# Patient Record
Sex: MALE | Race: WHITE | Employment: OTHER | URBAN - METROPOLITAN AREA
[De-identification: names, ages, dates, MRNs, and addresses within clinical notes are randomized per-mention and may not be internally consistent; named-entity substitution may affect disease eponyms.]

---

## 2017-09-18 PROBLEM — L03.115 CELLULITIS OF FOOT, RIGHT: Status: ACTIVE | Noted: 2017-09-18

## 2017-09-18 PROBLEM — R73.9 HYPERGLYCEMIA: Status: ACTIVE | Noted: 2017-09-18

## 2017-09-18 PROBLEM — G93.41 ACUTE METABOLIC ENCEPHALOPATHY: Status: ACTIVE | Noted: 2017-09-18

## 2017-09-18 PROBLEM — G90.A POTS (POSTURAL ORTHOSTATIC TACHYCARDIA SYNDROME): Status: ACTIVE | Noted: 2017-09-18

## 2017-09-18 PROBLEM — E11.9 DMII (DIABETES MELLITUS, TYPE 2) (HCC): Status: ACTIVE | Noted: 2017-09-18

## 2017-10-09 PROBLEM — R51.9 HEADACHE: Status: ACTIVE | Noted: 2017-10-09

## 2017-12-01 ENCOUNTER — HOSPITAL ENCOUNTER (OUTPATIENT)
Dept: OTHER | Age: 52
Discharge: HOME | End: 2017-12-31
Attending: HOSPITALIST | Admitting: HOSPITALIST

## 2018-06-01 ENCOUNTER — HOSPITAL ENCOUNTER (OUTPATIENT)
Dept: WOUND CARE | Age: 53
Discharge: HOME | End: 2018-06-01
Attending: NURSE PRACTITIONER | Admitting: NURSE PRACTITIONER

## 2018-06-01 VITALS
SYSTOLIC BLOOD PRESSURE: 119 MMHG | WEIGHT: 207.67 LBS | DIASTOLIC BLOOD PRESSURE: 80 MMHG | HEART RATE: 117 BPM | TEMPERATURE: 97.5 F | BODY MASS INDEX: 26.66 KG/M2 | RESPIRATION RATE: 16 BRPM

## 2018-06-01 DIAGNOSIS — L97.511 SKIN ULCER OF BOTH FEET LIMITED TO BREAKDOWN OF SKIN (HCC): ICD-10-CM

## 2018-06-01 DIAGNOSIS — L97.521 SKIN ULCER OF BOTH FEET LIMITED TO BREAKDOWN OF SKIN (HCC): ICD-10-CM

## 2018-06-01 PROCEDURE — 99213 OFFICE O/P EST LOW 20 MIN: CPT | Performed by: NURSE PRACTITIONER

## 2018-06-01 RX ORDER — LIDOCAINE HYDROCHLORIDE 40 MG/ML
SOLUTION TOPICAL PRN
Status: DISCONTINUED | OUTPATIENT
Start: 2018-06-01 | End: 2018-06-02 | Stop reason: HOSPADM

## 2018-06-01 RX ORDER — CITALOPRAM 40 MG/1
40 TABLET ORAL
COMMUNITY
End: 2018-07-20

## 2018-06-01 RX ORDER — ACETAMINOPHEN 160 MG
1 TABLET,DISINTEGRATING ORAL DAILY
COMMUNITY
End: 2018-08-03

## 2018-06-08 ENCOUNTER — HOSPITAL ENCOUNTER (OUTPATIENT)
Dept: WOUND CARE | Age: 53
Discharge: HOME | End: 2018-06-08
Attending: NURSE PRACTITIONER | Admitting: NURSE PRACTITIONER

## 2018-06-08 VITALS
SYSTOLIC BLOOD PRESSURE: 92 MMHG | HEART RATE: 96 BPM | DIASTOLIC BLOOD PRESSURE: 65 MMHG | TEMPERATURE: 97.4 F | RESPIRATION RATE: 16 BRPM

## 2018-06-08 DIAGNOSIS — E11.42 DIABETIC PERIPHERAL NEUROPATHY (HCC): ICD-10-CM

## 2018-06-08 DIAGNOSIS — L97.521 SKIN ULCER OF BOTH FEET LIMITED TO BREAKDOWN OF SKIN (HCC): Primary | ICD-10-CM

## 2018-06-08 DIAGNOSIS — L97.511 SKIN ULCER OF BOTH FEET LIMITED TO BREAKDOWN OF SKIN (HCC): Primary | ICD-10-CM

## 2018-06-08 PROCEDURE — 97597 DBRDMT OPN WND 1ST 20 CM/<: CPT | Performed by: NURSE PRACTITIONER

## 2018-06-08 RX ORDER — CEPHALEXIN 500 MG/1
500 CAPSULE ORAL 3 TIMES DAILY
COMMUNITY
End: 2018-06-26 | Stop reason: ALTCHOICE

## 2018-06-08 RX ORDER — LIDOCAINE HYDROCHLORIDE 40 MG/ML
SOLUTION TOPICAL PRN
Status: DISCONTINUED | OUTPATIENT
Start: 2018-06-08 | End: 2018-06-09 | Stop reason: HOSPADM

## 2018-06-15 ENCOUNTER — HOSPITAL ENCOUNTER (OUTPATIENT)
Dept: WOUND CARE | Age: 53
Discharge: HOME | End: 2018-06-15
Attending: NURSE PRACTITIONER | Admitting: NURSE PRACTITIONER

## 2018-06-15 DIAGNOSIS — L97.521 SKIN ULCER OF LEFT FOOT, LIMITED TO BREAKDOWN OF SKIN (HCC): Primary | ICD-10-CM

## 2018-06-15 PROCEDURE — 97597 DBRDMT OPN WND 1ST 20 CM/<: CPT | Performed by: NURSE PRACTITIONER

## 2018-06-15 RX ORDER — LIDOCAINE HYDROCHLORIDE 40 MG/ML
SOLUTION TOPICAL PRN
Status: DISCONTINUED | OUTPATIENT
Start: 2018-06-15 | End: 2018-06-16 | Stop reason: HOSPADM

## 2018-06-29 ENCOUNTER — HOSPITAL ENCOUNTER (OUTPATIENT)
Dept: WOUND CARE | Age: 53
Discharge: HOME | End: 2018-06-29
Admitting: NURSE PRACTITIONER

## 2018-06-29 VITALS
HEART RATE: 99 BPM | DIASTOLIC BLOOD PRESSURE: 85 MMHG | TEMPERATURE: 97.5 F | SYSTOLIC BLOOD PRESSURE: 142 MMHG | RESPIRATION RATE: 18 BRPM

## 2018-06-29 DIAGNOSIS — L97.521 SKIN ULCER OF LEFT FOOT, LIMITED TO BREAKDOWN OF SKIN (HCC): Primary | ICD-10-CM

## 2018-06-29 DIAGNOSIS — L02.414 ABSCESS OF ARM, LEFT: ICD-10-CM

## 2018-06-29 PROCEDURE — 97597 DBRDMT OPN WND 1ST 20 CM/<: CPT | Performed by: NURSE PRACTITIONER

## 2018-06-29 RX ORDER — LIDOCAINE HYDROCHLORIDE 40 MG/ML
SOLUTION TOPICAL PRN
Status: DISCONTINUED | OUTPATIENT
Start: 2018-06-29 | End: 2018-06-30 | Stop reason: HOSPADM

## 2018-06-29 ASSESSMENT — PAIN DESCRIPTION - ORIENTATION: ORIENTATION: LEFT

## 2018-06-29 ASSESSMENT — PAIN SCALES - GENERAL: PAINLEVEL_OUTOF10: 6

## 2018-06-29 ASSESSMENT — PAIN DESCRIPTION - LOCATION: LOCATION: ARM

## 2018-06-29 ASSESSMENT — PAIN DESCRIPTION - DESCRIPTORS: DESCRIPTORS: ACHING

## 2018-06-29 ASSESSMENT — PAIN DESCRIPTION - FREQUENCY: FREQUENCY: CONTINUOUS

## 2018-06-29 ASSESSMENT — PAIN DESCRIPTION - PAIN TYPE: TYPE: ACUTE PAIN

## 2018-07-01 PROBLEM — L02.414 ABSCESS OF ARM, LEFT: Status: ACTIVE | Noted: 2018-07-01

## 2018-07-13 ENCOUNTER — HOSPITAL ENCOUNTER (OUTPATIENT)
Dept: WOUND CARE | Age: 53
Discharge: HOME | End: 2018-07-13
Attending: NURSE PRACTITIONER | Admitting: NURSE PRACTITIONER

## 2018-07-13 VITALS
SYSTOLIC BLOOD PRESSURE: 148 MMHG | RESPIRATION RATE: 16 BRPM | DIASTOLIC BLOOD PRESSURE: 92 MMHG | HEART RATE: 96 BPM | TEMPERATURE: 97.6 F | WEIGHT: 207.45 LBS | BODY MASS INDEX: 27.37 KG/M2

## 2018-07-13 DIAGNOSIS — L97.522 SKIN ULCER OF LEFT FOOT WITH FAT LAYER EXPOSED (HCC): Primary | ICD-10-CM

## 2018-07-13 DIAGNOSIS — L02.414 ABSCESS OF ARM, LEFT: ICD-10-CM

## 2018-07-13 PROCEDURE — 11042 DBRDMT SUBQ TIS 1ST 20SQCM/<: CPT | Performed by: NURSE PRACTITIONER

## 2018-07-13 RX ORDER — LIDOCAINE 50 MG/G
OINTMENT TOPICAL PRN
Status: DISCONTINUED | OUTPATIENT
Start: 2018-07-13 | End: 2018-07-14 | Stop reason: HOSPADM

## 2018-07-13 ASSESSMENT — PAIN DESCRIPTION - ORIENTATION: ORIENTATION: LEFT

## 2018-07-13 ASSESSMENT — PAIN DESCRIPTION - DESCRIPTORS: DESCRIPTORS: ACHING

## 2018-07-13 ASSESSMENT — PAIN DESCRIPTION - PROGRESSION: CLINICAL_PROGRESSION: NOT CHANGED

## 2018-07-13 ASSESSMENT — PAIN DESCRIPTION - PAIN TYPE: TYPE: ACUTE PAIN

## 2018-07-13 ASSESSMENT — PAIN DESCRIPTION - LOCATION: LOCATION: FOOT

## 2018-07-13 ASSESSMENT — PAIN SCALES - GENERAL: PAINLEVEL_OUTOF10: 3

## 2018-07-13 ASSESSMENT — PAIN DESCRIPTION - FREQUENCY: FREQUENCY: CONTINUOUS

## 2018-07-13 NOTE — PROGRESS NOTES
unspecified type diabetes mellitus without mention of complication, not stated as uncontrolled        PAST SURGICAL HISTORY    Past Surgical History:   Procedure Laterality Date    CARDIAC CATHETERIZATION      FINGER SURGERY Left     Left Thumb    HERNIA REPAIR      VASECTOMY         FAMILY HISTORY    Family History   Problem Relation Age of Onset    High Blood Pressure Mother     Stroke Mother     Heart Disease Mother     COPD Mother     Heart Disease Father     Cancer Father         skin    Diabetes Sister     Cancer Sister     Heart Disease Brother     Diabetes Brother        SOCIAL HISTORY    Social History   Substance Use Topics    Smoking status: Former Smoker     Types: Cigars     Quit date: 9/10/1987    Smokeless tobacco: Never Used    Alcohol use No       ALLERGIES    Allergies   Allergen Reactions    No Known Allergies        MEDICATIONS    Current Outpatient Prescriptions on File Prior to Encounter   Medication Sig Dispense Refill    citalopram (CELEXA) 40 MG tablet Take 40 mg by mouth      Cholecalciferol (VITAMIN D3) 2000 units CAPS Take 1 capsule by mouth daily      ondansetron (ZOFRAN) 4 MG tablet Take 1 tablet by mouth every 8 hours as needed for Nausea or Vomiting 12 tablet 0    simvastatin (ZOCOR) 20 MG tablet Take 1 tablet by mouth nightly 30 tablet 3    insulin glargine (LANTUS) 100 UNIT/ML injection vial Inject 15 Units into the skin nightly 4.5 mL 0    insulin aspart (NOVOLOG FLEXPEN) 100 UNIT/ML injection pen Inject 15 Units into the skin 3 times daily (before meals) 13.5 mL 0    Insulin Pen Needle 32G X 4 MM MISC 1 each by Does not apply route daily 100 each 3     No current facility-administered medications on file prior to encounter. REVIEW OF SYSTEMS    Pertinent items are noted in HPI.     Objective:      BP (!) 148/92   Pulse 96   Temp 97.6 °F (36.4 °C) (Oral)   Resp 16   Wt 207 lb 7.3 oz (94.1 kg)   BMI 27.37 kg/m²     Wt Readings from Last 3 Cleansing:   Do not scrub or use excessive force. Cleanse wound prior to applying a clean dressing with:  [] Normal Saline            [x] Keep Wound Dry in Shower    [] Wound Cleanser   [] Cleanse wound with Mild Soap & Water  [] May Shower at Discharge   [] Other:        Topical Treatments:  Do not apply lotions, creams, or ointments to wound bed unless directed. [] Apply moisturizing lotion to skin surrounding the wound prior to dressing change.  [] Apply antifungal ointment to skin surrounding the wound prior to dressing change.  [] Apply thin film of moisture barrier ointment to skin immediately around wound.   [] Other:       Dressings:                  Wound Location: LEFT PLANTAR FOOT  [x] Apply Primary Dressing:                                                                      [x] Moistened Collagen with Silver     [x] Cover and Secure with:                       [x] Gauze            [x] Nolan  [] Kerlix              [] Ace Wrap       [] Cover Roll Tape         [] ABD                              [x] Other: Oval Podiatry Pad to Off Load Wound              Avoid contact of tape with skin.      [x] Change dressing:       [] Daily              [] Every Other Day        [x] Three times per week              [] Once a week  [] Do Not Change Dressing         [] Other:     Dressings:                  Wound Location: LEFT LATERAL ARM  [x] Apply Primary Dressing:                                                                     [x] Bactroban/Mupirocin       [x] Cover and Secure with:                       [x] Gauze            [x] Nolan  [] Kerlix              [] Ace Wrap       [] Cover Roll Tape         [] ABD                              [] Other:               Avoid contact of tape with skin.      [x] Change dressing:       [x] Daily              [] Every Other Day        [] Three times per week              [] Once a week  [] Do Not Change Dressing         [] Other:     Off-Loading:   [x] Off-loading when        [x] walking           [] in bed [] sitting  [] Total non-weight bearing  [] Right Leg  [] Left Leg          [] Assistive Device         [] Walker            [] Cane   [] Wheelchair      [] Crutches              [] Surgical shoe    [] Podus Boot(s)   [] Foam Boot(s)  [] Roll About              [] Cast Boot       [] CROW Boot  [] Other:     Dietary:  [] Diet as tolerated:        [x] Calorie Diabetic Diet: [] No Added Salt:  [x] Increase Protein:        [] Other:      Activity:  [x] Activity as tolerated:  [] Patient has no activity restrictions     [] Strict Bedrest:            [] Remain off Work:     [] May return to full duty work:                                       [] [facility] with [employer]     Return Appointment:  [] Wound and dressing supply provider:   [] ECF or Home Healthcare:  [] Nurse visit: Onur Hyman or NP scheduled for Nurse Visit:   [x] Return Appointment: Maris Peters CNP in 1 WEEK(S)  [x] Ordered:  WOUND CULTURE FOR LEFT FOOT- PENDING     **PLEASE CONSIDER DIABETIC EDUCATION**     Nurse Case Manger:  Sandrine  Electronically signed by Moises Lam RN on 7/13/2018 at 9:41 AM  [address] [place] [address] Information: Should you experience any significant changes in your wound(s) or have questions about your wound care, please contact the [address] [place] [address] [phone] [facility] - THURSDAY 8:30 am - 4:30 pm and Friday 8:30 am - 1:00 pm.  If you need help with your wound outside these hours and cannot wait until we are again available, contact your PCP or go to the hospital emergency room.      Nurse Signature:_______________________     Date: ___________ Time:  ____________        Discharge Nurse Signature        PLEASE NOTE: IF YOU ARE UNABLE TO OBTAIN WOUND SUPPLIES, CONTINUE TO USE THE SUPPLIES YOU HAVE AVAILABLE UNTIL YOU ARE ABLE TO [facility].  IT IS MOST IMPORTANT TO KEEP THE WOUND COVERED AT ALL TIMES.     Physician Signature:_______________________     Date: ___________ Time:  ____________                    [] Dr Michael Hammond    [] Dr Jean Marie Lima CNP              [] Dr Clifton Ware  [] Dr Alfred Mccarthy   [] Dr Patti Fall                [] Dr Liana Amezquita   [x] Thomas Almeida NP    [] Dr. Karolina Kraft        The information contained in the After Visit Summary has been reviewed with me, the patient and/or responsible adult, by my health care provider(s).  I had the opportunity to ask questions regarding this information.  I have elected to receive;        Patient Signature:_______________________     Date: ___________ Time:  ____________     [] Patient unable to sign Discharge Instructions given to ECF/Transportation/POA        [x]  After Visit Summary  []  Comprehensive Discharge Instruction                   Electronically signed by LAURA Sun CNP on 7/13/2018 at 11:16 AM

## 2018-07-15 LAB
GRAM STAIN RESULT: ABNORMAL
ORGANISM: ABNORMAL
WOUND/ABSCESS: ABNORMAL
WOUND/ABSCESS: ABNORMAL

## 2018-07-16 RX ORDER — SULFAMETHOXAZOLE AND TRIMETHOPRIM 800; 160 MG/1; MG/1
1 TABLET ORAL 2 TIMES DAILY
Qty: 20 TABLET | Refills: 0 | Status: SHIPPED | OUTPATIENT
Start: 2018-07-16 | End: 2018-07-26

## 2018-07-18 ENCOUNTER — TELEPHONE (OUTPATIENT)
Dept: WOUND CARE | Age: 53
End: 2018-07-18

## 2018-07-20 ENCOUNTER — HOSPITAL ENCOUNTER (OUTPATIENT)
Dept: WOUND CARE | Age: 53
Discharge: HOME | End: 2018-07-20
Attending: NURSE PRACTITIONER | Admitting: NURSE PRACTITIONER

## 2018-07-20 VITALS
SYSTOLIC BLOOD PRESSURE: 120 MMHG | TEMPERATURE: 97.6 F | RESPIRATION RATE: 16 BRPM | DIASTOLIC BLOOD PRESSURE: 91 MMHG | HEART RATE: 106 BPM

## 2018-07-20 DIAGNOSIS — L02.414 ABSCESS OF ARM, LEFT: Primary | ICD-10-CM

## 2018-07-20 DIAGNOSIS — L97.522 SKIN ULCER OF LEFT FOOT WITH FAT LAYER EXPOSED (HCC): ICD-10-CM

## 2018-07-20 PROCEDURE — 11042 DBRDMT SUBQ TIS 1ST 20SQCM/<: CPT | Performed by: NURSE PRACTITIONER

## 2018-07-20 RX ORDER — LIDOCAINE HYDROCHLORIDE 40 MG/ML
SOLUTION TOPICAL ONCE
Status: DISCONTINUED | OUTPATIENT
Start: 2018-07-20 | End: 2018-07-21 | Stop reason: HOSPADM

## 2018-07-20 ASSESSMENT — PAIN DESCRIPTION - ONSET: ONSET: ON-GOING

## 2018-07-20 ASSESSMENT — PAIN SCALES - GENERAL: PAINLEVEL_OUTOF10: 3

## 2018-07-20 ASSESSMENT — PAIN DESCRIPTION - PAIN TYPE: TYPE: ACUTE PAIN

## 2018-07-20 ASSESSMENT — PAIN DESCRIPTION - FREQUENCY: FREQUENCY: INTERMITTENT

## 2018-07-20 ASSESSMENT — PAIN DESCRIPTION - ORIENTATION: ORIENTATION: LEFT

## 2018-07-20 ASSESSMENT — PAIN DESCRIPTION - LOCATION: LOCATION: FOOT

## 2018-07-20 ASSESSMENT — PAIN DESCRIPTION - DESCRIPTORS: DESCRIPTORS: ACHING

## 2018-07-20 NOTE — PROGRESS NOTES
[facility]   Progress Note and Procedure Note      Ancelmo Hammond  MEDICAL RECORD NUMBER:  [id number]  AGE: 48 y.o. GENDER: male  : 1965  EPISODE DATE:  2018    Subjective:     Chief Complaint   Patient presents with    Wound Check     f/u left arm & left plantar foot         HISTORY of PRESENT ILLNESS HPI  Juni King is a 48 y. o. male who presents today for wound/ulcer evaluation. History of Wound Context: foot ulcers developed from walking on hot concrete at a water park. He developed blisters and and the skin sloughed off of those areas of his feet.  All of them have healed except for the area on plantar aspect left foot which pt states is getting better. Pt started taking ordered Bactrim DS for the past 2 days and tolerating well. Pt developed an abscess on his left forearm and was seen in the ED on 6/6/15. This has nearly healed with the use of an topical antibiotic, no pain in left arm this past week. Denies constitutional issues and is adherent with dressing changes. Wound/Ulcer Pain Timing/Severity: none  Quality of pain: n/a  Severity:  0 / 10   Modifying Factors: none  Associated Signs/Symptoms: none     Ulcer Identification:  Ulcer Type: traumatic  Contributing Factors: diabetes, poor glucose control and neuropathy in his feet     Wound: one remaining wound on the plantar aspect of his left foot.           PAST MEDICAL HISTORY        Diagnosis Date    Arthritis     Blood circulation, collateral     CAD (coronary artery disease) 7/15/2014    [facility]bral [facility] with [facility]     Diabetes mellitus [facility]     Diabetic peripheral neuropathy ([facility] 2018    GERD (gastroesophageal reflux disease)     ulcers    Hypercholesteremia     Hypertension     Movement disorder     possible arthritis right hand    MRSA (methicillin resistant staph aureus) culture positive 2018    foot wound    Neuropathy (HCC)     Other disorders of kidney and ureter in diseases classified elsewhere     POTS (postural orthostatic tachycardia syndrome)     Psychiatric problem     anxiety, depression, bipolar    Sleep apnea     Syncope     Type II or unspecified type diabetes mellitus without mention of complication, not stated as uncontrolled        PAST SURGICAL HISTORY    Past Surgical History:   Procedure Laterality Date    CARDIAC CATHETERIZATION      FINGER SURGERY Left     Left Thumb    HERNIA REPAIR      VASECTOMY         FAMILY HISTORY    Family History   Problem Relation Age of Onset    High Blood Pressure Mother     Stroke Mother     Heart Disease Mother     COPD Mother     Heart Disease Father     Cancer Father         skin    Diabetes Sister     Cancer Sister     Heart Disease Brother     Diabetes Brother        SOCIAL HISTORY    Social History   Substance Use Topics    Smoking status: Former Smoker     Types: Cigars     Quit date: 9/10/1987    Smokeless tobacco: Never Used    Alcohol use No       ALLERGIES    Allergies   Allergen Reactions    No Known Allergies        MEDICATIONS    Current Outpatient Prescriptions on File Prior to Encounter   Medication Sig Dispense Refill    sulfamethoxazole-trimethoprim (BACTRIM DS) 800-160 MG per tablet Take 1 tablet by mouth 2 times daily for 10 days 20 tablet 0    simvastatin (ZOCOR) 20 MG tablet Take 1 tablet by mouth nightly 30 tablet 3    Cholecalciferol (VITAMIN D3) 2000 units CAPS Take 1 capsule by mouth daily       No current facility-administered medications on file prior to encounter. REVIEW OF SYSTEMS    Pertinent items are noted in HPI.     Objective:      BP (!) 120/91   Pulse 106   Temp 97.6 °F (36.4 °C) (Oral)   Resp 16     Wt Readings from Last 3 Encounters:   07/13/18 207 lb 7.3 oz (94.1 kg)   06/26/18 208 lb 1.8 oz (94.4 kg)   06/01/18 207 lb 10.8 oz (94.2 kg)       PHYSICAL EXAM    General Appearance: alert and oriented to person, place and time, well-developed and well-nourished, in no acute distress  Skin: warm and dry, no rash or erythema  Head: normocephalic and atraumatic  Eyes: pupils equal, round, and reactive to light  Pulmonary/Chest:  normal air movement, no respiratory distress  Cardiovascular: normal rate, regular rhythm and intact distal pulses  Wound:  Wound base moist, and red, bleeds easily with debridement to a healthy granular base, does not probe to bone. No evidence of infection. Callus formation around wound. Left arm wound is almost resurfaced with only a small amount of pink surrounding tissue, no drainage or erythema. Assessment:      Patient Active Problem List   Diagnosis Code    Diabetes mellitus out of control ([facility] E11.65    Chest pain R07.9    Left arm numbness R20.0    Essential hypertension I10    Elbow contusion S50.00XA    CAD (coronary artery disease) I25.10    HLD (hyperlipidemia) E78.5    Abnormal stress test R94.39    DMII (diabetes mellitus, type 2) ([facility]) E11.9    Acute metabolic encephalopathy F08.74    POTS (postural orthostatic tachycardia syndrome) R00.0, I95.1    Hyperglycemia R73.9    Headache R51    Skin ulcer of left foot with fat layer exposed ([facility] L97.522    Diabetic peripheral neuropathy ([facility]) E11.42    Abscess of arm, left L02.414        Procedure Note  Indications:  Based on my examination of this patient's wound(s)/ulcer(s) today, debridement is required to promote healing and evaluate the wound base. Performed by: LAURA Solorzano - CNP    Consent obtained:  Yes    Time out taken:  Yes    Pain Control: Anesthetic  Anesthetic: 4% Lidocaine Liquid Topical (2.5ml)       Debridement:Excisional Debridement    Using curette and #15 blade scalpel the wound(s)/ulcer(s) was/were sharply debrided down through and including the removal of epidermis, dermis and subcutaneous tissue.         Devitalized Tissue Debrided:  fibrin, biofilm, slough and callus    Pre Debridement   [] Wheelchair      [] Crutches              [] Surgical shoe    [] Podus Boot(s)   [] Foam Boot(s)  [] Roll About              [] Cast Boot       [] CROW Boot  [] Other:     Dietary:  [] Diet as tolerated:        [x] Calorie Diabetic Diet: [] No Added Salt:  [x] Increase Protein:        [] Other:      Activity:  [x] Activity as tolerated:  [] Patient has no activity restrictions     [] Strict Bedrest:            [] Remain off Work:     [] May return to full duty work: [employer] to work with [employer]     Return Appointment:  [] Wound and dressing supply provider:   [] ECF or Home Healthcare:  [] Nurse visit: Sarah Kahn or FERNANDO scheduled for Nurse Visit:   [x] Return Appointment: Juani Mtz CNP in 1 WEEK(S)  [x] Ordered:  Continue taking Bactrim DS Antibiotic     **PLEASE CONSIDER DIABETIC EDUCATION**     Nurse Case Manger:  Sandrine  Electronically signed by Colonel Lino RN on 7/13/2018 at 9:41 AM  [address] [place] [address] Information: Should you experience any significant changes in your wound(s) or have questions about your wound care, please contact the [address] [place] [address] [phone] [facility] - THURSDAY 8:30 am - 4:30 pm and Friday 8:30 am - 1:00 pm.  If you need help with your wound outside these hours and cannot wait until we are again available, contact your PCP or go to the hospital emergency room.      Nurse Signature:_______________________     Date: ___________ Time:  ____________        Discharge Nurse Signature        PLEASE NOTE: IF YOU ARE UNABLE TO OBTAIN WOUND SUPPLIES, CONTINUE TO USE THE SUPPLIES YOU HAVE AVAILABLE UNTIL YOU ARE ABLE TO [facility].  IT IS MOST IMPORTANT TO KEEP THE WOUND COVERED AT ALL TIMES.     Physician Signature:_______________________     Date: ___________ Time:  ____________                    [] Dr Nighat Hill    [] Dr Jessica Pruitt CNP              [] Dr Dixon Tompkins  [] Dr Vahe Strange   []  Darling Cuenca                [] Dr Michael Nicholson   [] Thomas Roche Left NP    [] Dr. Marzena Mccrary        The information contained in the After Visit Summary has been reviewed with me, the patient and/or responsible adult, by my health care provider(s).  I had the opportunity to ask questions regarding this information.  I have elected to receive;        Patient Signature:_______________________     Date: ___________ Time:  ____________     [] Patient unable to sign Discharge Instructions given to ECF/Transportation/POA        [x]  After Visit Summary  []  Comprehensive Discharge Instruction                   Electronically signed by LAURA Palma CNP on 7/20/2018 at 11:18 AM

## 2018-07-27 ENCOUNTER — HOSPITAL ENCOUNTER (OUTPATIENT)
Dept: WOUND CARE | Age: 53
Discharge: HOME | End: 2018-07-27
Attending: NURSE PRACTITIONER | Admitting: NURSE PRACTITIONER

## 2018-07-27 VITALS
DIASTOLIC BLOOD PRESSURE: 84 MMHG | HEART RATE: 90 BPM | RESPIRATION RATE: 16 BRPM | SYSTOLIC BLOOD PRESSURE: 122 MMHG | TEMPERATURE: 98 F

## 2018-07-27 DIAGNOSIS — E11.42 DIABETIC PERIPHERAL NEUROPATHY (HCC): ICD-10-CM

## 2018-07-27 DIAGNOSIS — L97.522 SKIN ULCER OF LEFT FOOT WITH FAT LAYER EXPOSED (HCC): Primary | ICD-10-CM

## 2018-07-27 PROCEDURE — 11042 DBRDMT SUBQ TIS 1ST 20SQCM/<: CPT | Performed by: NURSE PRACTITIONER

## 2018-07-27 RX ORDER — SULFAMETHOXAZOLE AND TRIMETHOPRIM 800; 160 MG/1; MG/1
1 TABLET ORAL 2 TIMES DAILY
COMMUNITY
End: 2018-08-03 | Stop reason: ALTCHOICE

## 2018-07-27 RX ORDER — LIDOCAINE 50 MG/G
OINTMENT TOPICAL PRN
Status: DISCONTINUED | OUTPATIENT
Start: 2018-07-27 | End: 2018-07-28 | Stop reason: HOSPADM

## 2018-07-27 NOTE — PROGRESS NOTES
6/1/2018  9:22 AM   Dressing Status Clean;Dry; Intact 7/27/2018  9:01 AM   Dressing Changed Changed/New 7/27/2018 10:09 AM   Dressing/Treatment Dry dressing; Other (Comment) 7/27/2018 10:09 AM   Wound Length (cm) 0.9 cm 7/27/2018  9:24 AM   Wound Width (cm) 0.7 cm 7/27/2018  9:24 AM   Wound Depth (cm)  0.2 7/27/2018  9:24 AM   Calculated Wound Size (cm^2) (l*w) 0.63 cm^2 7/27/2018  9:24 AM   Change in Wound Size % (l*w) 95.26 7/27/2018  9:24 AM   Distance Tunneling (cm) 0 cm 7/20/2018  9:17 AM   Undermining Starts ___ O'Clock 9 7/27/2018  9:01 AM   Undermining Ends___ O'Clock 3 7/27/2018  9:01 AM   Undermining Maxium Distance (cm) 0.3 7/27/2018  9:01 AM   Wound Assessment Granulation tissue;Slough 7/27/2018  9:01 AM   Drainage Amount None 7/27/2018  9:01 AM   Drainage Description Serosanguinous 7/20/2018  9:17 AM   Odor None 7/27/2018  9:01 AM   Margins Unattached edges 7/27/2018  9:01 AM   Kaley-wound Assessment Calloused;Dry 7/27/2018  9:01 AM   Non-staged Wound Description Full thickness 7/27/2018  9:01 AM   [place]%Wound Bed 50 7/27/2018  9:01 AM   Red%Wound Bed 90 7/20/2018  9:17 AM   Yellow%Wound Bed 50 7/27/2018  9:01 AM   Number of days: 56       Wound 06/29/18 Arm Left;Lateral #4 Date of onset June 25, went to ER on June 26 (Active)   Wound Image   7/27/2018  9:01 AM   Wound Type Wound 7/13/2018  8:51 AM   Wound Other 7/13/2018  8:51 AM   Dressing Changed Changed/New 7/20/2018 11:02 AM   Dressing/Treatment Other (Comment) 6/29/2018  9:44 AM   Wound Length (cm) 0 cm 7/27/2018  9:01 AM   Wound Width (cm) 0 cm 7/27/2018  9:01 AM   Wound Depth (cm)  0 7/27/2018  9:01 AM   Calculated Wound Size (cm^2) (l*w) 0 cm^2 7/27/2018  9:01 AM   Change in Wound Size % (l*w) 100 7/27/2018  9:01 AM   Wound Assessment Epithelialization 7/27/2018  9:01 AM   Drainage Amount Scant 7/13/2018  8:51 AM   Drainage Description Serosanguinous 7/13/2018  8:51 AM   Odor None 7/13/2018  8:51 AM   Margins Attached edges 7/13/2018  8:51 AM Kaley-wound Assessment [place] 7/13/2018  8:51 AM   Non-staged Wound Description Full thickness 7/13/2018  8:51 AM   Yellow%Wound Bed 100 7/13/2018  8:51 AM   Black%Wound Bed 40 6/29/2018  8:45 AM   Number of days: 28       Percent of Wound(s)/Ulcer(s) Debrided: 100%    Total Surface Area Debrided:  0.63 sq cm       Diabetic/Pressure/Non Pressure Ulcers only:  Ulcer: Diabetic ulcer, fat layer exposed      Estimated Blood Loss:  Minimal    Hemostasis Achieved:  by pressure    Procedural Pain:  0  / 10     Post Procedural Pain:  0 / 10     Response to treatment:  Well tolerated by patient. PATIENT INSTRUCTIONS focused on ways to offload pressure from the site of the wound. This week, we plan to offload with an oval podiatry pad. For future offloading, I went into detail about how shoe inserts are custom-made. Patient understands that he needs to follow up with a podiatrist.  He also understands that [employer] on [address] [place] [address] Drive. makes custom orthotics to offload. Plan:     Treatment Note please see attached Discharge Instructions    Written patient dismissal instructions given to patient and signed by patient or POA. Discharge Instructions         Discharge [facility] Physician Orders and Discharge Instructions  [facility]  [address] [place] [address] Drive   [address] [place] [address], [place] [address]  Telephone: [phone]      FAX [phone]     NAME: David Moore OF BIRTH:  1965  MEDICAL RECORD NUMBER:  [id number]  DATE:  7/27/2018     Wound Cleansing:   Do not scrub or use excessive force. Cleanse wound prior to applying a clean dressing with:  [] Normal Saline            [x] Keep Wound Dry in Shower    [] Wound Cleanser   [] Cleanse wound with Mild Soap & Water  [] May Shower at Discharge   [] Other:        Topical Treatments:  Do not apply lotions, creams, or ointments to wound bed unless directed.    [] Apply moisturizing lotion to skin surrounding

## 2018-08-03 ENCOUNTER — HOSPITAL ENCOUNTER (OUTPATIENT)
Dept: WOUND CARE | Age: 53
Discharge: HOME | End: 2018-08-03
Attending: NURSE PRACTITIONER | Admitting: NURSE PRACTITIONER

## 2018-08-03 VITALS
HEART RATE: 105 BPM | RESPIRATION RATE: 16 BRPM | DIASTOLIC BLOOD PRESSURE: 85 MMHG | TEMPERATURE: 97.6 F | SYSTOLIC BLOOD PRESSURE: 134 MMHG

## 2018-08-03 DIAGNOSIS — L97.522 SKIN ULCER OF LEFT FOOT WITH FAT LAYER EXPOSED (HCC): Primary | ICD-10-CM

## 2018-08-03 DIAGNOSIS — E11.42 DIABETIC PERIPHERAL NEUROPATHY (HCC): ICD-10-CM

## 2018-08-03 PROCEDURE — 11042 DBRDMT SUBQ TIS 1ST 20SQCM/<: CPT | Performed by: NURSE PRACTITIONER

## 2018-08-03 RX ORDER — LIDOCAINE HYDROCHLORIDE 40 MG/ML
SOLUTION TOPICAL PRN
Status: DISCONTINUED | OUTPATIENT
Start: 2018-08-03 | End: 2018-08-04 | Stop reason: HOSPADM

## 2018-08-03 NOTE — PROGRESS NOTES
[facility]   Progress Note and Procedure Note      Luci Hammond  MEDICAL RECORD NUMBER:  [id number]  AGE: 48 y.o. GENDER: male  : 1965  EPISODE DATE:  8/3/2018    Subjective:     Chief Complaint   Patient presents with    Wound Check     f/u left plantar foot         HISTORY of PRESENT ILLNESS HPI    Juni King is a 48 y. o. male who presents today for wound/ulcer evaluation. History of Wound Context: foot ulcers developed from walking on hot concrete at a water park. He developed blisters and and the skin sloughed off of those areas of his feet.  All of them have healed except for the area on plantar aspect left foot which pt states is getting better. Pt completed a course of Bactrim DS for MRSA (wound culture 18). Denies constitutional issues and is adherent with dressing changes.   Wound/Ulcer Pain Timing/Severity: none  Quality of pain: n/a  Severity:  0 / 10   Modifying Factors: none  Associated Signs/Symptoms: none     Ulcer Identification:  Ulcer Type: traumatic  Contributing Factors: diabetes, poor glucose control and neuropathy in his feet     Wound: one remaining wound on the plantar aspect of his left foot.                     PAST MEDICAL HISTORY        Diagnosis Date    Arthritis     Blood circulation, collateral     CAD (coronary artery disease) 7/15/2014    Cerebral artery occlusion with cerebral infarction ([address]     Diabetes mellitus ([address]     Diabetic peripheral neuropathy [facility] 2018    GERD (gastroesophageal reflux disease)     ulcers    Hypercholesteremia     Hypertension     Movement disorder     possible arthritis right hand    MRSA (methicillin resistant staph aureus) culture positive 2018    foot wound    Neuropathy (HCC)     Other disorders of kidney and ureter in diseases classified elsewhere     POTS (postural orthostatic tachycardia syndrome)     Psychiatric problem     anxiety, depression, bipolar    Sleep apnea     pulses  Wound:  Wound base moist, and red, bleeds easily with debridement to a healthy granular base, does not probe to bone. No evidence of infection.  Callus formation and undermining prior to debridement around wound. Assessment:      Patient Active Problem List   Diagnosis Code    Diabetes mellitus out of control ([facility] E11.65    Chest pain R07.9    Left arm numbness R20.0    Essential hypertension I10    Elbow contusion S50.00XA    CAD (coronary artery disease) I25.10    HLD (hyperlipidemia) E78.5    Abnormal stress test R94.39    DMII (diabetes mellitus, type 2) ([facility]) E11.9    Acute metabolic encephalopathy O20.49    POTS (postural orthostatic tachycardia syndrome) R00.0, I95.1    Hyperglycemia R73.9    Headache R51    Skin ulcer of left foot with fat layer exposed ([facility] L97.522    Diabetic peripheral neuropathy ([facility]) E11.42    Abscess of arm, left L02.414        Procedure Note  Indications:  Based on my examination of this patient's wound(s)/ulcer(s) today, debridement is required to promote healing and evaluate the wound base. Performed by: LAURA Butler CNP    Consent obtained:  Yes    Time out taken:  Yes    Pain Control: Anesthetic  Anesthetic: 4% Lidocaine Liquid Topical (2.5 ml)       Debridement:Excisional Debridement    Using curette, scissors, forceps and # 10 blade scalpel the wound(s)/ulcer(s) was/were sharply debrided down through and including the removal of subcutaneous tissue. Devitalized Tissue Debrided:  slough and callus    Pre Debridement Measurements:  Are located in the [place]  Documentation Flow Sheet    Wound/Ulcer #: 3    Post Debridement Measurements:  Wound/Ulcer Descriptions are Pre Debridement except measurements:    Wound 06/01/18 Burn Foot Plantar;Left #3 (Active)   Wound Image   7/13/2018  8:51 AM   Wound Type Wound 8/3/2018  8:45 AM   Wound Diabetic Moon 1 6/1/2018  9:22 AM   Dressing Status Clean;Dry; Intact 8/3/2018  8:45 AM Dressing Changed Changed/New 8/3/2018 10:02 AM   Dressing/Treatment Other (Comment) 8/3/2018 10:02 AM   Wound Length (cm) 0.8 cm 8/3/2018  9:32 AM   Wound Width (cm) 0.9 cm 8/3/2018  9:32 AM   Wound Depth (cm)  0.2 8/3/2018  9:32 AM   Calculated Wound Size (cm^2) (l*w) 0.72 cm^2 8/3/2018  9:32 AM   Change in Wound Size % (l*w) 94.59 8/3/2018  9:32 AM   Distance Tunneling (cm) 0 cm 7/20/2018  9:17 AM   Undermining Starts ___ O'Clock 12 8/3/2018  8:45 AM   Undermining Ends___ O'Clock 12 8/3/2018  8:45 AM   Undermining Maxium Distance (cm) 0.1 8/3/2018  8:45 AM   Wound Assessment Granulation tissue 8/3/2018  8:45 AM   Drainage Amount Scant 8/3/2018  8:45 AM   Drainage Description Serosanguinous 8/3/2018  8:45 AM   Odor None 8/3/2018  8:45 AM   Margins Unattached edges 8/3/2018  8:45 AM   Kaley-wound Assessment Calloused;Dry 7/27/2018  9:01 AM   Non-staged Wound Description Full thickness 7/27/2018  9:01 AM   [place]%Wound Bed 80 8/3/2018  8:45 AM   Red%Wound Bed 90 7/20/2018  9:17 AM   Yellow%Wound Bed 20 8/3/2018  8:45 AM   Number of days: 63       Percent of Wound(s)/Ulcer(s) Debrided: 100%    Total Surface Area Debrided:  0.72 sq cm       Diabetic/Pressure/Non Pressure Ulcers only:  Ulcer: Diabetic ulcer, fat layer exposed      Estimated Blood Loss:  Minimal    Hemostasis Achieved:  by pressure    Procedural Pain:  0  / 10     Post Procedural Pain:  0 / 10     Response to treatment:  Well tolerated by patient. PATIENT INSTRUCTIONS focused on ways to offload pressure from the site of the wound.  This week, we plan to offload with an oval podiatry pad.  I explained how a [employer]oe offloads, in the event the patient can get one. For long-term, he will need orthotics.   Patient understands that he needs to follow up with a podiatrist. [facility] also understands that [employer] on [address]. makes custom orthotics to offload.       Plan:     Treatment Note please see attached Discharge Instructions    Written patient dismissal instructions given to patient and signed by patient or POA. Discharge Instructions         Discharge [facility] Physician Orders and Discharge [facility]   [address], [place]  Telephone: [phone]      FAX [phone]     NAME: Alesia Rushing  DATE OF BIRTH:  1965  MEDICAL RECORD NUMBER:  [id number]  DATE:  8/3/2018     Wound Cleansing:   Do not scrub or use excessive force. Cleanse wound prior to applying a clean dressing with:  [] Normal Saline            [x] Keep Wound Dry in Shower    [] Wound Cleanser   [] Cleanse wound with Mild Soap & Water  [] May Shower at Discharge   [] Other:        Topical Treatments:  Do not apply lotions, creams, or ointments to wound bed unless directed. [] Apply moisturizing lotion to skin surrounding the wound prior to dressing change.  [] Apply antifungal ointment to skin surrounding the wound prior to dressing change.  [] Apply thin film of moisture barrier ointment to skin immediately around wound.   [] Other:       Dressings:                  Wound Location: LEFT PLANTAR FOOT  [x] Apply Primary Dressing:                                                                      [x] Moistened Collagen with Silver     [x] Cover and Secure with:                       [x] Gauze            [x] Nolan  [] Kerlix              [] Ace Wrap       [] Cover Roll Tape         [] ABD                              [x] Other: Oval Podiatry Pad to Off Load Wound              Avoid contact of tape with skin.      [x] Change dressing:       [] Daily              [] Every Other Day        [x] Three times per week- Change outer dressing if it gets saturated               [] Once a week  [] Do Not Change Dressing         [] Other:     Off-Loading:   [x] Off-loading when        [x] walking           [] in bed [] sitting  [] Total non-weight bearing  [] Right Leg  [] Left Leg          [] Assistive Device         [] Walker            [] Cane   [] Wheelchair      [] Crutches              [] Surgical shoe    [] Podus Boot(s)   [] Foam Boot(s)  [] Roll About              [] Cast Boot       [] CROW Boot  [] Other:     Dietary:  [] Diet as tolerated:        [x] Calorie Diabetic Diet: [] No Added Salt:  [x] Increase Protein:        [] Other:      Activity:  [x] Activity as tolerated:  [] Patient has no activity restrictions     [] Strict Bedrest:            [] Remain off Work:     [] May return to full duty work: [phone] to work with [address]     Return Appointment:  [] Wound and dressing supply provider:   [] ECF or Home Healthcare:  [] Nurse visit: Luis Ramírez or FERNANDO scheduled for Nurse Visit:   [x] Return Appointment: Yeyo Chavez CNP in 2 WEEK(S)  [] Ordered:       **PLEASE CONSIDER DIABETIC EDUCATION**     Nurse Case Manger:  Sandrine  Electronically signed by Cornelia Urban RN on 8/3/2018 at [address] [place] [address] Information: Should you experience any significant changes in your wound(s) or have questions about your wound care, please contact the [address] [place] [address] [phone] [facility] - THURSDAY 8:30 am - 4:30 pm and Friday 8:30 am - 1:00 pm.  If you need help with your wound outside these hours and cannot wait until we are again available, contact your PCP or go to the hospital emergency room.      Nurse Signature:_______________________     Date: ___________ Time:  ____________        Discharge Nurse Signature        PLEASE NOTE: IF YOU ARE UNABLE TO OBTAIN WOUND SUPPLIES, CONTINUE TO USE THE SUPPLIES YOU HAVE AVAILABLE UNTIL YOU ARE ABLE TO [facility].  IT IS MOST IMPORTANT TO KEEP THE WOUND COVERED AT ALL TIMES.     Physician Signature:_______________________     Date: ___________ Time:  ____________                    [] Dr Donnis Snellen    [] Dr Jhon Avitia CNP              [] Dr Richard Cardona Karen  [] Dr Ursula Mccarthy   [] Dr Jessica Hobbs                [] Dr Alvina Hernandez   [x] Thomas Izquierdo NP    [] Dr. Deonte Willard        The information contained in the After Visit Summary has been reviewed with me, the patient and/or responsible adult, by my health care provider(s).  I had the opportunity to ask questions regarding this information.  I have elected to receive;        Patient Signature:_______________________     Date: ___________ Time:  ____________     [] Patient unable to sign Discharge Instructions given to ECF/Transportation/POA        [x]  After Visit Summary  []  Comprehensive Discharge Instruction                                  Electronically signed by LAURA Martínez CNP on 8/3/2018 at 12:27 PM

## 2018-08-06 RX ORDER — MORPHINE SULFATE 4 MG/ML
2 INJECTION, SOLUTION INTRAMUSCULAR; INTRAVENOUS EVERY 5 MIN PRN
Status: DISCONTINUED | OUTPATIENT
Start: 2018-08-06 | End: 2018-08-11 | Stop reason: HOSPADM

## 2018-08-06 RX ORDER — MEPERIDINE HYDROCHLORIDE 25 MG/ML
12.5 INJECTION INTRAMUSCULAR; INTRAVENOUS; SUBCUTANEOUS EVERY 5 MIN PRN
Status: DISCONTINUED | OUTPATIENT
Start: 2018-08-06 | End: 2018-08-11 | Stop reason: HOSPADM

## 2018-08-06 RX ORDER — ONDANSETRON 2 MG/ML
4 INJECTION INTRAMUSCULAR; INTRAVENOUS
Status: ACTIVE | OUTPATIENT
Start: 2018-08-06 | End: 2018-08-06

## 2018-08-06 RX ORDER — FENTANYL CITRATE 50 UG/ML
50 INJECTION, SOLUTION INTRAMUSCULAR; INTRAVENOUS EVERY 5 MIN PRN
Status: DISCONTINUED | OUTPATIENT
Start: 2018-08-06 | End: 2018-08-11 | Stop reason: HOSPADM

## 2018-08-06 RX ORDER — FENTANYL CITRATE 50 UG/ML
25 INJECTION, SOLUTION INTRAMUSCULAR; INTRAVENOUS EVERY 5 MIN PRN
Status: DISCONTINUED | OUTPATIENT
Start: 2018-08-06 | End: 2018-08-11 | Stop reason: HOSPADM

## 2018-08-06 RX ORDER — OXYCODONE HYDROCHLORIDE AND ACETAMINOPHEN 5; 325 MG/1; MG/1
2 TABLET ORAL PRN
Status: ACTIVE | OUTPATIENT
Start: 2018-08-06 | End: 2018-08-06

## 2018-08-06 RX ORDER — OXYCODONE HYDROCHLORIDE AND ACETAMINOPHEN 5; 325 MG/1; MG/1
1 TABLET ORAL PRN
Status: ACTIVE | OUTPATIENT
Start: 2018-08-06 | End: 2018-08-06

## 2018-08-06 RX ORDER — MORPHINE SULFATE 4 MG/ML
1 INJECTION, SOLUTION INTRAMUSCULAR; INTRAVENOUS EVERY 5 MIN PRN
Status: DISCONTINUED | OUTPATIENT
Start: 2018-08-06 | End: 2018-08-11 | Stop reason: HOSPADM

## 2018-08-06 ASSESSMENT — LIFESTYLE VARIABLES: SMOKING_STATUS: 0

## 2018-08-06 NOTE — ANESTHESIA PRE-OP
Department of Anesthesiology  Preprocedure Note       Name:  Wisam Porras   Age:  48 y.o.  :  1965                                          MRN:  [id number]         Date:  2018      [facility] Department of Anesthesiology  Pre-Anesthesia Evaluation/Consultation       Name:  Wisam Porras                                         Age:  48 y.o.   MRN:  [id number]           Procedure (Scheduled):  COLONOSCOPY  Surgeon:  Dr. Gennaro Phoenix No Known Allergies      Patient Active Problem List   Diagnosis    Diabetes mellitus out of control ([address]    Chest pain    Left arm numbness    Essential hypertension    Elbow contusion    CAD (coronary artery disease)    HLD (hyperlipidemia)    Abnormal stress test    DMII (diabetes mellitus, type 2) (HCC)    Acute metabolic encephalopathy    POTS (postural orthostatic tachycardia syndrome)    Hyperglycemia    Headache    Skin ulcer of left foot with fat layer exposed ([address]    Diabetic peripheral neuropathy (HCC)    Abscess of arm, left     Past Medical History:   Diagnosis Date    Arthritis     Blood circulation, collateral     CAD (coronary artery disease) 7/15/2014    Cerebral artery occlusion with cerebral infarction ([address]     Diabetes mellitus ([address]     Diabetic peripheral neuropathy ([address] 2018    GERD (gastroesophageal reflux disease)     ulcers    Hypercholesteremia     Hypertension     Movement disorder     possible arthritis right hand    MRSA (methicillin resistant staph aureus) culture positive 2018    foot wound    Neuropathy     Other disorders of kidney and ureter in diseases classified elsewhere     POTS (postural orthostatic tachycardia syndrome)     Psychiatric problem     anxiety, depression, bipolar    Sleep apnea     Syncope     Type II or unspecified type diabetes mellitus without mention of complication, not stated as uncontrolled      Past Surgical History:   Procedure PROT 8.3 11/12/2012    CALCIUM 9.6 01/09/2018    BILITOT 0.3 12/06/2017    ALKPHOS 55 12/06/2017    AST 16 12/06/2017    ALT 26 12/06/2017     BMP    Lab Results   Component Value Date     01/09/2018    K 3.9 01/09/2018     01/09/2018    CO2 29 01/09/2018    BUN 15 01/09/2018    CREATININE 1.2 01/09/2018    CALCIUM 9.6 01/09/2018    GFRAA >60 01/09/2018    GFRAA >60 02/10/2013    LABGLOM >60 01/09/2018    GLUCOSE 136 01/09/2018     POCGlucose  No results for input(s): GLUCOSE in the last 72 hours. Coags    Lab Results   Component Value Date    PROTIME 9.9 07/26/2014    INR 0.88 07/26/2014    APTT 30.0 81/01/5159     HCG (If Applicable) No results found for: PREGTESTUR, PREGSERUM, HCG, HCGQUANT   ABGs No results found for: PHART, PO2ART, BGL5NJB, PZG0YHR, BEART, C5ODZDIZ   Type & Screen (If Applicable)  No results found for: LABABO, [address]    Surgeon:    Procedure:    Medications prior to admission:   Prior to Admission medications    Medication Sig Start Date End Date Taking? Authorizing Provider   Dulaglutide (TRULICITY) 1.5 IO/8.7KG SOPN Inject 1.5 mg into the skin once a week    Historical Provider, MD   simvastatin (ZOCOR) 20 MG tablet Take 1 tablet by mouth nightly 10/9/17   Manan Beth MD       Current medications:    Current Outpatient Prescriptions   Medication Sig Dispense Refill    Dulaglutide (TRULICITY) 1.5 UV/2.0VD SOPN Inject 1.5 mg into the skin once a week      simvastatin (ZOCOR) 20 MG tablet Take 1 tablet by mouth nightly 30 tablet 3     No current facility-administered medications for this encounter. Allergies:     Allergies   Allergen Reactions    No Known Allergies        Problem List:    Patient Active Problem List   Diagnosis Code    Diabetes mellitus out of control ([facility] E11.65    Chest pain R07.9    Left arm numbness R20.0    Essential hypertension I10    Elbow contusion S50.00XA    CAD (coronary artery disease) I25.10    HLD (hyperlipidemia) E78.5    Abnormal stress test R94.39    DMII (diabetes mellitus, type 2) (HCC) E11.9    Acute metabolic encephalopathy K74.90    POTS (postural orthostatic tachycardia syndrome) R00.0, I95.1    Hyperglycemia R73.9    Headache R51    Skin ulcer of left foot with fat layer exposed ([address] L97.522    Diabetic peripheral neuropathy (HCC) E11.42    Abscess of arm, left L02.414       Past Medical History:        Diagnosis Date    Arthritis     Blood circulation, collateral     CAD (coronary artery disease) 7/15/2014    [facility]     Diabetes mellitus [facility]     Diabetic peripheral neuropathy ([facility] 6/8/2018    GERD (gastroesophageal reflux disease)     ulcers    Hypercholesteremia     Hypertension     Movement disorder     possible arthritis right hand    MRSA (methicillin resistant staph aureus) culture positive 07/13/2018    foot wound    Neuropathy     Other disorders of kidney and ureter in diseases classified elsewhere     POTS (postural orthostatic tachycardia syndrome)     Psychiatric problem     anxiety, depression, bipolar    Sleep apnea     Syncope     Type II or unspecified type diabetes mellitus without mention of complication, not stated as uncontrolled        Past Surgical History:        Procedure Laterality Date    CARDIAC CATHETERIZATION      FINGER SURGERY Left     Left Thumb    HERNIA REPAIR      VASECTOMY         Social History:    Social History   Substance Use Topics    Smoking status: Former Smoker     Types: Cigars     Quit date: 9/10/1987    Smokeless tobacco: Never Used    Alcohol use No                                Counseling given: Not Answered      Vital Signs (Current): There were no vitals filed for this visit.                                            BP Readings from Last 3 Encounters:   08/03/18 134/85   07/27/18 122/84   07/20/18 (!) 120/91       NPO Status:  PREP 0400 MD  August 6, 2018  12:42 PM      Rafita Herrera MD   8/6/2018

## 2018-08-07 ENCOUNTER — PAT TELEPHONE (OUTPATIENT)
Dept: PREADMISSION TESTING | Age: 53
End: 2018-08-07

## 2018-08-07 VITALS — HEIGHT: 74 IN | BODY MASS INDEX: 26.95 KG/M2 | WEIGHT: 210 LBS

## 2018-08-07 NOTE — PROGRESS NOTES
[facility] time___0730________        Surgery time__0830__________    Take the following medications with a sip of water:    Do not eat or drink anything after 12:00 midnight prior to your surgery. EXCEPT PREP  This includes water chewing gum, mints and ice chips. You may brush your teeth and gargle the morning of your surgery, but do not swallow the water      You may be asked to stop blood thinners such as Coumadin, Plavix, Fragmin, Lovenox, etc., or any anti-inflammatories such as:  Aspirin, Ibuprofen, Advil, Naproxen prior to your surgery. We also ask that you stop any OTC medications such as fish oil, vitamin E, glucosamine, garlic, Multivitamins, COQ 10, etc.    We ask that you do not smoke 24 hours prior to surgery  We ask that you do not  drink any alcoholic beverages 24 hours prior to surgery     You must make arrangements for a responsible adult to take you home after your surgery. For your safety you will not be allowed to leave alone or drive yourself home. Your surgery will be cancelled if you do not have a ride home. Also for your safety, it is strongly suggested that someone stay with you the first 24 hours after your surgery. A parent or legal guardian must accompany a child scheduled for surgery and plan to stay at the hospital until the child is discharged. Please do not bring other children with you. For your comfort, please wear simple loose fitting clothing to the hospital.  Please do not bring valuables. Do not wear any make-up or nail polish on your fingers or toes      For your safety, please do not wear any jewelry or body piercing's on the day of surgery. All jewelry must be removed. If you have dentures, they will be removed before going to operating room. For your convenience, we will provide you with a container.     If you wear contact lenses or glasses, they will be removed, please bring a case for them.     If you have a living will and a durable power of  for healthcare, please bring in a copy. As part of our patient safety program to minimize surgical site infections, we ask you to do the following:    · Please notify your surgeon if you develop any illness between         now and the  day of your surgery. · This includes a cough, cold, fever, sore throat, nausea,         or vomiting, and diarrhea, etc.  ·  Please notify your surgeon if you experience dizziness, shortness         of breath or blurred vision between now and the time of your surgery. You may shower the night before surgery or the morning of   your surgery with an antibacterial soap. You will need to bring a photo ID and insurance card    [facility] has an onsite pharmacy, would you like to utilize our pharmacy     If you will be staying overnight and use a C-pap machine, please bring   your C-pap to hospital     Our goal is to provide you with excellent care, therefore, visitors will be limited to two(2) in the room at a time so that we may focus on providing this care for you. Please contact pre-admission testing if you have any further questions. [facility] phone number:  [phone]  Please note these are generalized instructions for all surgical cases, you may be provided with more specific instructions according to your surgery.

## 2018-08-10 ENCOUNTER — HOSPITAL ENCOUNTER (OUTPATIENT)
Dept: ENDOSCOPY | Age: 53
Discharge: HOME | End: 2018-08-10
Attending: INTERNAL MEDICINE | Admitting: INTERNAL MEDICINE

## 2018-08-10 VITALS
WEIGHT: 210 LBS | BODY MASS INDEX: 26.95 KG/M2 | HEART RATE: 96 BPM | OXYGEN SATURATION: 100 % | DIASTOLIC BLOOD PRESSURE: 84 MMHG | SYSTOLIC BLOOD PRESSURE: 109 MMHG | HEIGHT: 74 IN | RESPIRATION RATE: 18 BRPM | TEMPERATURE: 97.2 F

## 2018-08-10 DIAGNOSIS — Z12.10 ENCOUNTER FOR SCREENING FOR MALIGNANT NEOPLASM OF INTESTINAL TRACT: ICD-10-CM

## 2018-08-10 LAB
GLUCOSE BLD-MCNC: 199 MG/DL (ref 70–99)
PERFORMED ON: ABNORMAL

## 2018-08-10 RX ORDER — SODIUM CHLORIDE 0.9 % (FLUSH) 0.9 %
10 SYRINGE (ML) INJECTION PRN
Status: DISCONTINUED | OUTPATIENT
Start: 2018-08-10 | End: 2018-08-10 | Stop reason: SDUPTHER

## 2018-08-10 RX ORDER — SODIUM CHLORIDE 0.9 % (FLUSH) 0.9 %
10 SYRINGE (ML) INJECTION PRN
Status: DISCONTINUED | OUTPATIENT
Start: 2018-08-10 | End: 2018-08-11 | Stop reason: HOSPADM

## 2018-08-10 RX ORDER — SODIUM CHLORIDE, SODIUM LACTATE, POTASSIUM CHLORIDE, CALCIUM CHLORIDE 600; 310; 30; 20 MG/100ML; MG/100ML; MG/100ML; MG/100ML
INJECTION, SOLUTION INTRAVENOUS CONTINUOUS
Status: DISCONTINUED | OUTPATIENT
Start: 2018-08-10 | End: 2018-08-10 | Stop reason: SDUPTHER

## 2018-08-10 RX ORDER — SODIUM CHLORIDE 0.9 % (FLUSH) 0.9 %
10 SYRINGE (ML) INJECTION EVERY 12 HOURS SCHEDULED
Status: DISCONTINUED | OUTPATIENT
Start: 2018-08-10 | End: 2018-08-10 | Stop reason: SDUPTHER

## 2018-08-10 RX ORDER — SODIUM CHLORIDE 9 MG/ML
INJECTION, SOLUTION INTRAVENOUS CONTINUOUS
Status: DISCONTINUED | OUTPATIENT
Start: 2018-08-10 | End: 2018-08-11 | Stop reason: HOSPADM

## 2018-08-10 RX ORDER — SODIUM CHLORIDE 0.9 % (FLUSH) 0.9 %
10 SYRINGE (ML) INJECTION EVERY 12 HOURS SCHEDULED
Status: DISCONTINUED | OUTPATIENT
Start: 2018-08-10 | End: 2018-08-11 | Stop reason: HOSPADM

## 2018-08-10 RX ORDER — LIDOCAINE HYDROCHLORIDE 10 MG/ML
1 INJECTION, SOLUTION EPIDURAL; INFILTRATION; INTRACAUDAL; PERINEURAL
Status: ACTIVE | OUTPATIENT
Start: 2018-08-10 | End: 2018-08-10

## 2018-08-10 RX ADMIN — SODIUM CHLORIDE: 9 INJECTION, SOLUTION INTRAVENOUS at 08:09

## 2018-08-10 ASSESSMENT — PAIN SCALES - GENERAL
PAINLEVEL_OUTOF10: 0
PAINLEVEL_OUTOF10: 3

## 2018-08-10 ASSESSMENT — PAIN DESCRIPTION - PAIN TYPE: TYPE: ACUTE PAIN

## 2018-08-10 ASSESSMENT — PAIN DESCRIPTION - ORIENTATION: ORIENTATION: LEFT

## 2018-08-10 ASSESSMENT — PAIN SCALES - WONG BAKER: WONGBAKER_NUMERICALRESPONSE: 0

## 2018-08-10 ASSESSMENT — PAIN DESCRIPTION - LOCATION: LOCATION: FOOT

## 2018-08-10 ASSESSMENT — PAIN - FUNCTIONAL ASSESSMENT: PAIN_FUNCTIONAL_ASSESSMENT: 0-10

## 2018-08-10 ASSESSMENT — PAIN DESCRIPTION - DESCRIPTORS: DESCRIPTORS: ACHING

## 2018-08-10 NOTE — H&P
 Drug use: No    Sexual activity: Yes     Partners: Female     Other Topics Concern    Not on file     Social History Narrative    No narrative on file     Family History   Problem Relation Age of Onset    High Blood Pressure Mother     Stroke Mother     Heart Disease Mother     COPD Mother     Heart Disease Father     Cancer Father         skin    Diabetes Sister     Cancer Sister     Heart Disease Brother     Diabetes Brother          PHYSICAL EXAM:      /84   Pulse 95   Temp 98 °F (36.7 °C) (Tympanic)   Resp 18   Ht 6' 2\" (1.88 m)   Wt 210 lb (95.3 kg)   SpO2 100%   BMI 26.96 kg/m²  I        Heart:normal    Lungs: normal    Abdomen: normal      ASA Grade:  See anesthesia note      ASSESSMENT AND PLAN:    1. Procedure options, risks and benefits reviewed with patient and expresses understanding.

## 2018-08-13 ENCOUNTER — TELEPHONE (OUTPATIENT)
Dept: WOUND CARE | Age: 53
End: 2018-08-13

## 2018-08-15 PROBLEM — E13.621 ULCER OF FOOT DUE TO SECONDARY DIABETES (HCC): Status: ACTIVE | Noted: 2018-08-15

## 2018-08-15 PROBLEM — L97.509 ULCER OF FOOT DUE TO SECONDARY DIABETES (HCC): Status: ACTIVE | Noted: 2018-08-15

## 2018-08-28 ENCOUNTER — HOSPITAL ENCOUNTER (OUTPATIENT)
Dept: WOUND CARE | Age: 53
Discharge: HOME | End: 2018-08-28
Attending: PODIATRIST | Admitting: PODIATRIST

## 2018-08-28 VITALS
DIASTOLIC BLOOD PRESSURE: 73 MMHG | HEART RATE: 128 BPM | SYSTOLIC BLOOD PRESSURE: 115 MMHG | RESPIRATION RATE: 16 BRPM | TEMPERATURE: 98.4 F

## 2018-09-04 ENCOUNTER — HOSPITAL ENCOUNTER (OUTPATIENT)
Dept: WOUND CARE | Age: 53
Discharge: HOME | End: 2018-09-04
Attending: PODIATRIST | Admitting: PODIATRIST

## 2018-09-04 VITALS
TEMPERATURE: 97.3 F | SYSTOLIC BLOOD PRESSURE: 136 MMHG | DIASTOLIC BLOOD PRESSURE: 67 MMHG | RESPIRATION RATE: 18 BRPM | HEART RATE: 106 BPM

## 2018-09-04 NOTE — PROGRESS NOTES
[facility] AT [address], [place] [address]  Telephone: [phone]    NAME:  Maria Guadalupe Callahan OF BIRTH:  1965  MEDICAL RECORD NUMBER:  [id number]  DATE:  9/4/2018    Congratulations! You have completed your treatment. 1. Return to your Primary Care Physician for all your health issues. 2. Resume your ordinary activities as tolerated. 3. Take your medications as prescribed by your primary care physician. 4. Check your skin daily for cracks, bruises, sores, or dryness. Use a moisturizer as needed. 5. Clean and dry your skin, using mild soap and warm water (not hot). 6. Avoid alcohol and caffeine and do not smoke. 7. Maintain a nutritious diet. 8. Follow-up with Dr. Lieutenant Seaman in his office in 1 month. Call for an appointment. [x] Avoid pressure on your wound site. Keep your legs elevated above the level of the heart whenever possible.   [] Continue to use wraps/stockings/compression as prescribed. [] Replace compression stockings every 4 to 6 months as needed to ensure proper fit. [x] Wear well-fitting shoes and leg garments. THANK YOU FOR ALLOWING US TO SERVE YOU.   PLEASE CALL IF YOU DEVELOP ANOTHER WOUND. ([phone])    Physician Signature:_______________________    Date: ___________ Time:  ____________       [] Dr Ernestina Diaz    [] Dr Jackie Fitzpatrick   [] Adriana Corbett CNP     [] Dr Jammie Gamboa  [] Dr Yesica Ochoa   [] Dr Angela Velazquez  [] Dr Job Oneill     [x] Dr Prakash Hendrix   [] Glenn Villeda NP         Electronically signed by Hina Larsen RN on 9/4/2018 at 5:14 PM                            Electronically signed by Fatoumata Gao DPM on 9/4/2018 at 6:24 PM
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
## 2018-10-03 ENCOUNTER — APPOINTMENT (OUTPATIENT)
Dept: GENERAL RADIOLOGY | Age: 53
DRG: 247 | End: 2018-10-03
Payer: COMMERCIAL

## 2018-10-03 ENCOUNTER — HOSPITAL ENCOUNTER (INPATIENT)
Age: 53
LOS: 1 days | Discharge: HOME | DRG: 247 | End: 2018-10-04
Attending: EMERGENCY MEDICINE | Admitting: INTERNAL MEDICINE
Payer: COMMERCIAL

## 2018-10-03 DIAGNOSIS — R42 LIGHTHEADEDNESS: Primary | ICD-10-CM

## 2018-10-03 DIAGNOSIS — R07.9 CHEST PAIN, UNSPECIFIED TYPE: ICD-10-CM

## 2018-10-03 PROBLEM — I21.4 NSTEMI (NON-ST ELEVATED MYOCARDIAL INFARCTION) (HCC): Status: ACTIVE | Noted: 2018-10-03

## 2018-10-03 LAB
A/G RATIO: 0.7 (ref 1.1–2.2)
ALBUMIN SERPL-MCNC: 3.2 G/DL (ref 3.4–5)
ALP BLD-CCNC: 73 U/L (ref 40–129)
ALT SERPL-CCNC: 17 U/L (ref 10–40)
ANION GAP SERPL CALCULATED.3IONS-SCNC: 17 MMOL/L (ref 3–16)
APTT: 30.8 SEC (ref 26–36)
AST SERPL-CCNC: 13 U/L (ref 15–37)
BASOPHILS ABSOLUTE: 0.1 K/UL (ref 0–0.2)
BASOPHILS RELATIVE PERCENT: 1 %
BILIRUB SERPL-MCNC: 0.6 MG/DL (ref 0–1)
BUN BLDV-MCNC: 15 MG/DL (ref 7–20)
CALCIUM SERPL-MCNC: 9.2 MG/DL (ref 8.3–10.6)
CHLORIDE BLD-SCNC: 94 MMOL/L (ref 99–110)
CO2: 26 MMOL/L (ref 21–32)
CREAT SERPL-MCNC: 0.8 MG/DL (ref 0.9–1.3)
EKG ATRIAL RATE: 103 BPM
EKG DIAGNOSIS: NORMAL
EKG P AXIS: 30 DEGREES
EKG P-R INTERVAL: 144 MS
EKG Q-T INTERVAL: 372 MS
EKG QRS DURATION: 88 MS
EKG QTC CALCULATION (BAZETT): 487 MS
EKG R AXIS: 13 DEGREES
EKG T AXIS: 50 DEGREES
EKG VENTRICULAR RATE: 103 BPM
EOSINOPHILS ABSOLUTE: 0.1 K/UL (ref 0–0.6)
EOSINOPHILS RELATIVE PERCENT: 1.1 %
GFR AFRICAN AMERICAN: >60
GFR NON-AFRICAN AMERICAN: >60
GLOBULIN: 4.5 G/DL
GLUCOSE BLD-MCNC: 227 MG/DL (ref 70–99)
GLUCOSE BLD-MCNC: 256 MG/DL (ref 70–99)
GLUCOSE BLD-MCNC: 270 MG/DL (ref 70–99)
HCT VFR BLD CALC: 42.1 % (ref 40.5–52.5)
HEMOGLOBIN: 14.2 G/DL (ref 13.5–17.5)
INR BLD: 1.22 (ref 0.86–1.14)
LEFT VENTRICULAR EJECTION FRACTION MODE: NORMAL
LV EF: 48 %
LV EF: 50 %
LVEF MODALITY: NORMAL
LYMPHOCYTES ABSOLUTE: 1.9 K/UL (ref 1–5.1)
LYMPHOCYTES RELATIVE PERCENT: 17.6 %
MCH RBC QN AUTO: 27.3 PG (ref 26–34)
MCHC RBC AUTO-ENTMCNC: 33.8 G/DL (ref 31–36)
MCV RBC AUTO: 80.8 FL (ref 80–100)
MONOCYTES ABSOLUTE: 1.3 K/UL (ref 0–1.3)
MONOCYTES RELATIVE PERCENT: 11.9 %
NEUTROPHILS ABSOLUTE: 7.4 K/UL (ref 1.7–7.7)
NEUTROPHILS RELATIVE PERCENT: 68.4 %
PDW BLD-RTO: 14 % (ref 12.4–15.4)
PERFORMED ON: ABNORMAL
PERFORMED ON: ABNORMAL
PLATELET # BLD: 329 K/UL (ref 135–450)
PMV BLD AUTO: 7.5 FL (ref 5–10.5)
POC ACT LR: 231 SEC
POTASSIUM SERPL-SCNC: 3.8 MMOL/L (ref 3.5–5.1)
PROTHROMBIN TIME: 13.9 SEC (ref 9.8–13)
RBC # BLD: 5.21 M/UL (ref 4.2–5.9)
SODIUM BLD-SCNC: 137 MMOL/L (ref 136–145)
TOTAL PROTEIN: 7.7 G/DL (ref 6.4–8.2)
TROPONIN: 0.47 NG/ML
TROPONIN: 0.49 NG/ML
WBC # BLD: 10.8 K/UL (ref 4–11)

## 2018-10-03 PROCEDURE — 71045 X-RAY EXAM CHEST 1 VIEW: CPT

## 2018-10-03 PROCEDURE — 6370000000 HC RX 637 (ALT 250 FOR IP): Performed by: ORTHOPAEDIC SURGERY

## 2018-10-03 PROCEDURE — B2151ZZ FLUOROSCOPY OF LEFT HEART USING LOW OSMOLAR CONTRAST: ICD-10-PCS | Performed by: INTERNAL MEDICINE

## 2018-10-03 PROCEDURE — 92928 PRQ TCAT PLMT NTRAC ST 1 LES: CPT

## 2018-10-03 PROCEDURE — 6360000002 HC RX W HCPCS: Performed by: INTERNAL MEDICINE

## 2018-10-03 PROCEDURE — 99152 MOD SED SAME PHYS/QHP 5/>YRS: CPT | Performed by: INTERNAL MEDICINE

## 2018-10-03 PROCEDURE — C1769 GUIDE WIRE: HCPCS

## 2018-10-03 PROCEDURE — 4A023N7 MEASUREMENT OF CARDIAC SAMPLING AND PRESSURE, LEFT HEART, PERCUTANEOUS APPROACH: ICD-10-PCS | Performed by: INTERNAL MEDICINE

## 2018-10-03 PROCEDURE — 93005 ELECTROCARDIOGRAM TRACING: CPT | Performed by: EMERGENCY MEDICINE

## 2018-10-03 PROCEDURE — 2709999900 HC NON-CHARGEABLE SUPPLY

## 2018-10-03 PROCEDURE — 85025 COMPLETE CBC W/AUTO DIFF WBC: CPT

## 2018-10-03 PROCEDURE — 85347 COAGULATION TIME ACTIVATED: CPT

## 2018-10-03 PROCEDURE — 85730 THROMBOPLASTIN TIME PARTIAL: CPT

## 2018-10-03 PROCEDURE — 2500000003 HC RX 250 WO HCPCS

## 2018-10-03 PROCEDURE — 99152 MOD SED SAME PHYS/QHP 5/>YRS: CPT

## 2018-10-03 PROCEDURE — 99223 1ST HOSP IP/OBS HIGH 75: CPT | Performed by: INTERNAL MEDICINE

## 2018-10-03 PROCEDURE — 94762 N-INVAS EAR/PLS OXIMTRY CONT: CPT

## 2018-10-03 PROCEDURE — 84484 ASSAY OF TROPONIN QUANT: CPT

## 2018-10-03 PROCEDURE — 93005 ELECTROCARDIOGRAM TRACING: CPT | Performed by: INTERNAL MEDICINE

## 2018-10-03 PROCEDURE — 6370000000 HC RX 637 (ALT 250 FOR IP): Performed by: EMERGENCY MEDICINE

## 2018-10-03 PROCEDURE — 6370000000 HC RX 637 (ALT 250 FOR IP): Performed by: INTERNAL MEDICINE

## 2018-10-03 PROCEDURE — 027034Z DILATION OF CORONARY ARTERY, ONE ARTERY WITH DRUG-ELUTING INTRALUMINAL DEVICE, PERCUTANEOUS APPROACH: ICD-10-PCS | Performed by: INTERNAL MEDICINE

## 2018-10-03 PROCEDURE — 99285 EMERGENCY DEPT VISIT HI MDM: CPT

## 2018-10-03 PROCEDURE — 92941 PRQ TRLML REVSC TOT OCCL AMI: CPT | Performed by: INTERNAL MEDICINE

## 2018-10-03 PROCEDURE — 93306 TTE W/DOPPLER COMPLETE: CPT

## 2018-10-03 PROCEDURE — 6370000000 HC RX 637 (ALT 250 FOR IP)

## 2018-10-03 PROCEDURE — 6360000002 HC RX W HCPCS

## 2018-10-03 PROCEDURE — C1887 CATHETER, GUIDING: HCPCS

## 2018-10-03 PROCEDURE — 99153 MOD SED SAME PHYS/QHP EA: CPT

## 2018-10-03 PROCEDURE — 2580000003 HC RX 258: Performed by: INTERNAL MEDICINE

## 2018-10-03 PROCEDURE — C1894 INTRO/SHEATH, NON-LASER: HCPCS

## 2018-10-03 PROCEDURE — 2100000000 HC CCU R&B

## 2018-10-03 PROCEDURE — 6360000004 HC RX CONTRAST MEDICATION: Performed by: INTERNAL MEDICINE

## 2018-10-03 PROCEDURE — B2111ZZ FLUOROSCOPY OF MULTIPLE CORONARY ARTERIES USING LOW OSMOLAR CONTRAST: ICD-10-PCS | Performed by: INTERNAL MEDICINE

## 2018-10-03 PROCEDURE — C1725 CATH, TRANSLUMIN NON-LASER: HCPCS

## 2018-10-03 PROCEDURE — 80053 COMPREHEN METABOLIC PANEL: CPT

## 2018-10-03 PROCEDURE — 93458 L HRT ARTERY/VENTRICLE ANGIO: CPT

## 2018-10-03 PROCEDURE — C1874 STENT, COATED/COV W/DEL SYS: HCPCS

## 2018-10-03 PROCEDURE — 93458 L HRT ARTERY/VENTRICLE ANGIO: CPT | Performed by: INTERNAL MEDICINE

## 2018-10-03 PROCEDURE — 85610 PROTHROMBIN TIME: CPT

## 2018-10-03 RX ORDER — ACETAMINOPHEN 325 MG/1
650 TABLET ORAL EVERY 4 HOURS PRN
Status: DISCONTINUED | OUTPATIENT
Start: 2018-10-03 | End: 2018-10-04 | Stop reason: HOSPADM

## 2018-10-03 RX ORDER — CHOLECALCIFEROL (VITAMIN D3) 1250 MCG
50000 CAPSULE ORAL WEEKLY
COMMUNITY
End: 2018-10-24 | Stop reason: ALTCHOICE

## 2018-10-03 RX ORDER — SODIUM CHLORIDE 0.9 % (FLUSH) 0.9 %
10 SYRINGE (ML) INJECTION EVERY 12 HOURS SCHEDULED
Status: DISCONTINUED | OUTPATIENT
Start: 2018-10-03 | End: 2018-10-04 | Stop reason: HOSPADM

## 2018-10-03 RX ORDER — ONDANSETRON 2 MG/ML
4 INJECTION INTRAMUSCULAR; INTRAVENOUS EVERY 6 HOURS PRN
Status: DISCONTINUED | OUTPATIENT
Start: 2018-10-03 | End: 2018-10-04 | Stop reason: HOSPADM

## 2018-10-03 RX ORDER — DEXTROSE MONOHYDRATE 50 MG/ML
100 INJECTION, SOLUTION INTRAVENOUS PRN
Status: DISCONTINUED | OUTPATIENT
Start: 2018-10-03 | End: 2018-10-04 | Stop reason: HOSPADM

## 2018-10-03 RX ORDER — BENZONATATE 100 MG/1
100 CAPSULE ORAL 3 TIMES DAILY PRN
Status: DISCONTINUED | OUTPATIENT
Start: 2018-10-03 | End: 2018-10-04 | Stop reason: HOSPADM

## 2018-10-03 RX ORDER — ATORVASTATIN CALCIUM 20 MG/1
20 TABLET, FILM COATED ORAL NIGHTLY
Refills: 5 | Status: ON HOLD | COMMUNITY
Start: 2018-08-29 | End: 2018-10-04

## 2018-10-03 RX ORDER — ASPIRIN 81 MG/1
81 TABLET, CHEWABLE ORAL DAILY
Status: DISCONTINUED | OUTPATIENT
Start: 2018-10-04 | End: 2018-10-04 | Stop reason: HOSPADM

## 2018-10-03 RX ORDER — SODIUM CHLORIDE 9 MG/ML
INJECTION, SOLUTION INTRAVENOUS CONTINUOUS
Status: DISCONTINUED | OUTPATIENT
Start: 2018-10-03 | End: 2018-10-04 | Stop reason: HOSPADM

## 2018-10-03 RX ORDER — ATORVASTATIN CALCIUM 80 MG/1
80 TABLET, FILM COATED ORAL NIGHTLY
Status: DISCONTINUED | OUTPATIENT
Start: 2018-10-03 | End: 2018-10-04 | Stop reason: HOSPADM

## 2018-10-03 RX ORDER — DEXTROSE MONOHYDRATE 25 G/50ML
12.5 INJECTION, SOLUTION INTRAVENOUS PRN
Status: DISCONTINUED | OUTPATIENT
Start: 2018-10-03 | End: 2018-10-04 | Stop reason: HOSPADM

## 2018-10-03 RX ORDER — NICOTINE POLACRILEX 4 MG
15 LOZENGE BUCCAL PRN
Status: DISCONTINUED | OUTPATIENT
Start: 2018-10-03 | End: 2018-10-04 | Stop reason: HOSPADM

## 2018-10-03 RX ORDER — SODIUM CHLORIDE 0.9 % (FLUSH) 0.9 %
10 SYRINGE (ML) INJECTION PRN
Status: DISCONTINUED | OUTPATIENT
Start: 2018-10-03 | End: 2018-10-04 | Stop reason: HOSPADM

## 2018-10-03 RX ORDER — ASPIRIN 325 MG
325 TABLET ORAL ONCE
Status: COMPLETED | OUTPATIENT
Start: 2018-10-03 | End: 2018-10-03

## 2018-10-03 RX ADMIN — INSULIN LISPRO 1 UNITS: 100 INJECTION, SOLUTION INTRAVENOUS; SUBCUTANEOUS at 20:07

## 2018-10-03 RX ADMIN — ASPIRIN 325 MG ORAL TABLET 325 MG: 325 PILL ORAL at 14:19

## 2018-10-03 RX ADMIN — IOPAMIDOL 113 ML: 755 INJECTION, SOLUTION INTRAVENOUS at 16:41

## 2018-10-03 RX ADMIN — Medication 10 ML: at 21:26

## 2018-10-03 RX ADMIN — TICAGRELOR 90 MG: 90 TABLET ORAL at 21:25

## 2018-10-03 RX ADMIN — BENZONATATE 100 MG: 100 CAPSULE ORAL at 21:25

## 2018-10-03 RX ADMIN — INSULIN LISPRO 2 UNITS: 100 INJECTION, SOLUTION INTRAVENOUS; SUBCUTANEOUS at 21:27

## 2018-10-03 RX ADMIN — ONDANSETRON 4 MG: 2 INJECTION INTRAMUSCULAR; INTRAVENOUS at 23:48

## 2018-10-03 RX ADMIN — ATORVASTATIN CALCIUM 80 MG: 80 TABLET, FILM COATED ORAL at 21:25

## 2018-10-03 ASSESSMENT — PAIN DESCRIPTION - DESCRIPTORS: DESCRIPTORS: ACHING

## 2018-10-03 ASSESSMENT — PAIN SCALES - WONG BAKER
WONGBAKER_NUMERICALRESPONSE: 0
WONGBAKER_NUMERICALRESPONSE: 6

## 2018-10-03 ASSESSMENT — PAIN SCALES - GENERAL
PAINLEVEL_OUTOF10: 0
PAINLEVEL_OUTOF10: 0
PAINLEVEL_OUTOF10: 6
PAINLEVEL_OUTOF10: 0
PAINLEVEL_OUTOF10: 0

## 2018-10-03 ASSESSMENT — PAIN DESCRIPTION - ORIENTATION: ORIENTATION: LEFT

## 2018-10-03 ASSESSMENT — PAIN DESCRIPTION - PAIN TYPE: TYPE: ACUTE PAIN

## 2018-10-03 ASSESSMENT — PAIN DESCRIPTION - FREQUENCY: FREQUENCY: CONTINUOUS

## 2018-10-03 ASSESSMENT — PAIN DESCRIPTION - LOCATION: LOCATION: SHOULDER

## 2018-10-03 NOTE — ED PROVIDER NOTES
services I provided to this patient were to treat and/or prevent clinically significant deterioration that could result in: Cardiovascular collapse. Services included the following: chart data review, reviewing nursing notes and/or old charts, documentation time, consultant collaboration regarding findings and treatment options, medication orders and management, direct patient care, re-evaluations, vital sign assessments and ordering, interpreting and reviewing diagnostic studies/lab tests. Aggregate critical care time was 35 minutes, which includes only time during which I was engaged in work directly related to the patient's care, as described above, whether at the bedside or elsewhere in the Emergency Department. It did not include time spent performing other reported procedures or the services of residents, students, nurses or physician assistants. CONSULTS:  IP CONSULT TO CARDIAC REHAB      FINAL IMPRESSION      1. Lightheadedness    2.  Chest pain, unspecified type          DISPOSITION/PLAN   DISPOSITION Decision To Admit 10/03/2018 01:34:09 PM      PATIENT REFERRED TO:  LAURA Suggs - ROMMEL  [address]  [phone]            DISCHARGE MEDICATIONS:  Current Discharge Medication List             (Please note that portions of this note were completed with a voice recognition program.  Efforts were made to edit the dictations but occasionally words are mis-transcribed.)    Martina Fall DO (electronically signed)  Attending Emergency Physician      Martina Fall DO  10/03/18 1696

## 2018-10-03 NOTE — H&P
without obvious abnormality, atraumatic. Eyes:  Pupils equal and round. No scleral icterus. Mouth: Moist mucosa, no pharyngeal erythema. Nose: Nares normal. No drainage or sinus tenderness. Neck: Supple, symmetrical, trachea midline. No adenopathy. No tenderness/mass/nodules. No carotid bruit or elevated JVD. Lungs:   Clear to auscultation bilaterally, respirations unlabored. No wheeze, rales, or rhonchi. Chest Wall:  No tenderness or deformity. Heart:  Regular rate, S1/ S2 normal. No murmur, rub, or gallop. Abdomen:   Soft, non-tender, bowel sounds active. Musculoskeletal: No muscle wasting or digital clubbing. Extremities: Extremities normal, atraumatic. No cyanosis or edema. Pulses: 2+ radial and pedal pulses, symmetric. Skin: No rashes or lesions. Pysch: Normal mood and affect. Alert and oriented x 4.    Neurologic: Normal gross motor and sensory exam.       Labs     CBC: Lab Results   Component Value Date    WBC 10.8 10/03/2018    RBC 5.21 10/03/2018    HGB 14.2 10/03/2018    HCT 42.1 10/03/2018    MCV 80.8 10/03/2018    RDW 14.0 10/03/2018     10/03/2018     CMP:  Lab Results   Component Value Date     10/03/2018    K 3.8 10/03/2018    K 3.9 08/16/2018    CL 94 10/03/2018    CO2 26 10/03/2018    BUN 15 10/03/2018    CREATININE 0.8 10/03/2018    GFRAA >60 10/03/2018    GFRAA >60 02/10/2013    AGRATIO 0.7 10/03/2018    LABGLOM >60 10/03/2018    GLUCOSE 270 10/03/2018    PROT 7.7 10/03/2018    PROT 8.3 11/12/2012    CALCIUM 9.2 10/03/2018    BILITOT 0.6 10/03/2018    ALKPHOS 73 10/03/2018    AST 13 10/03/2018    ALT 17 10/03/2018     PT/INR:  No results found for: PTINR  HgBA1c:  Lab Results   Component Value Date    LABA1C 11.1 08/16/2018     Lab Results   Component Value Date    TROPONINI 0.49 (H) 10/03/2018       Cardiac, Vascular and Imaging Data     EKG: NSR with lateral dynamic changes     Echo: Left ventricular cavity size is at upper limits of normal. Normal

## 2018-10-03 NOTE — PRE SEDATION
complication, not stated as uncontrolled          Surgical History:  Past Surgical History:   Procedure Laterality Date    CARDIAC CATHETERIZATION      FINGER SURGERY Left     Left Thumb    HERNIA REPAIR      VASECTOMY           Medications:  Current Facility-Administered Medications   Medication Dose Route Frequency Provider Last Rate Last Dose    perflutren lipid microspheres (DEFINITY) injection 1.65 mg  1.5 mL Intravenous ONCE PRN Edel Seats, DO               Pre-Sedation:    Pre-Sedation Documentation and Exam:  I have personally completed a history, physical exam & review of systems for this patient (see notes). Prior History of Anesthesia Complications:   none    Modified Mallampati:  II (soft palate, uvula, fauces visible)    ASA Classification:  Class 2 - A normal healthy patient with mild systemic disease and Class 2 -- A normal healthy patient with mild systemic disease    Lauren Scale: Activity:  2 - Able to move 4 extremities voluntarily on command  Respiration:  2 - Able to breathe deeply and cough freely  Circulation:  2 - BP+/- 20mmHg of normal  Consciousness:  2 - Fully awake  Oxygen Saturation (color):  2 - Able to maintain oxygen saturation >92% on room air    Sedation/Anesthesia Plan:  Guard the patient's safety and welfare. Minimize physical discomfort and pain. Minimize negative psychological responses to treatment by providing sedation and analgesia and maximize the potential amnesia. Patient to meet pre-procedure discharge plan.     Medication Planned:  midazolam intravenously and fentanyl intravenously    Patient is an appropriate candidate for plan of sedation: yes      Electronically signed by Fadi Navas MD on 10/3/2018 at 4:34 PM

## 2018-10-04 VITALS
WEIGHT: 198.85 LBS | TEMPERATURE: 98.7 F | BODY MASS INDEX: 26.36 KG/M2 | RESPIRATION RATE: 20 BRPM | HEIGHT: 73 IN | SYSTOLIC BLOOD PRESSURE: 104 MMHG | OXYGEN SATURATION: 96 % | HEART RATE: 92 BPM | DIASTOLIC BLOOD PRESSURE: 68 MMHG

## 2018-10-04 LAB
ANION GAP SERPL CALCULATED.3IONS-SCNC: 15 MMOL/L (ref 3–16)
BUN BLDV-MCNC: 12 MG/DL (ref 7–20)
CALCIUM SERPL-MCNC: 9 MG/DL (ref 8.3–10.6)
CHLORIDE BLD-SCNC: 94 MMOL/L (ref 99–110)
CO2: 26 MMOL/L (ref 21–32)
CREAT SERPL-MCNC: 0.7 MG/DL (ref 0.9–1.3)
GFR AFRICAN AMERICAN: >60
GFR NON-AFRICAN AMERICAN: >60
GLUCOSE BLD-MCNC: 227 MG/DL (ref 70–99)
GLUCOSE BLD-MCNC: 240 MG/DL (ref 70–99)
PERFORMED ON: ABNORMAL
POTASSIUM REFLEX MAGNESIUM: 3.7 MMOL/L (ref 3.5–5.1)
SODIUM BLD-SCNC: 135 MMOL/L (ref 136–145)

## 2018-10-04 PROCEDURE — 36415 COLL VENOUS BLD VENIPUNCTURE: CPT

## 2018-10-04 PROCEDURE — 6370000000 HC RX 637 (ALT 250 FOR IP): Performed by: INTERNAL MEDICINE

## 2018-10-04 PROCEDURE — 94762 N-INVAS EAR/PLS OXIMTRY CONT: CPT

## 2018-10-04 PROCEDURE — 2580000003 HC RX 258: Performed by: INTERNAL MEDICINE

## 2018-10-04 PROCEDURE — 80048 BASIC METABOLIC PNL TOTAL CA: CPT

## 2018-10-04 RX ORDER — ATORVASTATIN CALCIUM 20 MG/1
40 TABLET, FILM COATED ORAL NIGHTLY
Qty: 30 TABLET | Refills: 5 | Status: SHIPPED | OUTPATIENT
Start: 2018-10-04 | End: 2018-10-24 | Stop reason: ALTCHOICE

## 2018-10-04 RX ORDER — METOPROLOL SUCCINATE 50 MG/1
50 TABLET, EXTENDED RELEASE ORAL DAILY
Status: DISCONTINUED | OUTPATIENT
Start: 2018-10-04 | End: 2018-10-04 | Stop reason: HOSPADM

## 2018-10-04 RX ORDER — ASPIRIN 81 MG/1
81 TABLET, CHEWABLE ORAL DAILY
Qty: 30 TABLET | Refills: 3 | Status: SHIPPED | OUTPATIENT
Start: 2018-10-05 | End: 2018-10-24 | Stop reason: ALTCHOICE

## 2018-10-04 RX ORDER — METOPROLOL SUCCINATE 50 MG/1
50 TABLET, EXTENDED RELEASE ORAL DAILY
Qty: 30 TABLET | Refills: 3 | Status: ON HOLD | OUTPATIENT
Start: 2018-10-05 | End: 2018-10-25

## 2018-10-04 RX ADMIN — METOPROLOL SUCCINATE 50 MG: 50 TABLET, EXTENDED RELEASE ORAL at 10:02

## 2018-10-04 RX ADMIN — INSULIN LISPRO 2 UNITS: 100 INJECTION, SOLUTION INTRAVENOUS; SUBCUTANEOUS at 10:09

## 2018-10-04 RX ADMIN — TICAGRELOR 90 MG: 90 TABLET ORAL at 10:02

## 2018-10-04 RX ADMIN — ASPIRIN 81 MG 81 MG: 81 TABLET ORAL at 10:03

## 2018-10-04 RX ADMIN — Medication 10 ML: at 10:03

## 2018-10-04 ASSESSMENT — PAIN SCALES - GENERAL
PAINLEVEL_OUTOF10: 0

## 2018-10-04 NOTE — PLAN OF CARE
Problem: Risk for Impaired Skin Integrity  Goal: Tissue integrity - skin and mucous membranes  Structural intactness and normal physiological function of skin and  mucous membranes. Outcome: Ongoing  Skin assessment completed Q4h. No signs of new skin breakdown noted. Pt repositions self in bed. Heels and elbows elevated off of bed. Will monitor. Problem: Falls - Risk of:  Goal: Will remain free from falls  Will remain free from falls   Outcome: Met This Shift    Goal: Absence of physical injury  Absence of physical injury   Outcome: Met This Shift  Pt remains free of falls. Fall risk protocol in place. Bed locked in lowest position. Call light in reach. Pt instructed to call for assistance, verbalizes understanding. Will continue to monitor. Problem: Pain - Acute:  Goal: Pain level will decrease  Pain level will decrease   Outcome: Ongoing  Pt alert and oriented. Pt able to communicate present pain and use the pain scale appropriately. Nonpharmacological pain reducers and pain medication offered as needed. Will cont to monitor. Problem: Fluid Volume - Imbalance:  Goal: Will show no signs and symptoms of excessive bleeding  Will show no signs and symptoms of excessive bleeding   Outcome: Met This Shift      Problem: Cardiac Output - Decreased:  Goal: Cardiac output within specified parameters  Cardiac output within specified parameters   Outcome: Ongoing  VSS throughout shift. Pt A&O x4. Denies pain and SOB. No chest pain noted. Continuous cardiac and pulse oximetry monitoring in place. NSR/ST on monitor. On room air, O2 sat > 92%. Pt tolerating PO fluids. Will monitor. Problem: Serum Glucose Level - Abnormal:  Goal: Ability to maintain appropriate glucose levels will improve  Ability to maintain appropriate glucose levels will improve   Outcome: Ongoing  Blood glucose assessed AC/HS. SSI admin per order. Will monitor.     Problem: Tissue Perfusion - Cardiopulmonary, Altered:  Goal:

## 2018-10-04 NOTE — PROGRESS NOTES
% oral gel 15 g PRN   dextrose 50 % solution 12.5 g PRN   glucagon (rDNA) injection 1 mg PRN   dextrose 5 % solution PRN   influenza quadrivalent split vaccine (FLUZONE;FLUARIX;FLULAVAL;AFLURIA) injection 0.5 mL Once   benzonatate (TESSALON) capsule 100 mg TID PRN       Allergies:  No known allergies     Review of Systems:     Constitutional: negative  Eyes: negative  Ears, nose, mouth, throat, and face: negative  Respiratory: negative  Cardiovascular: negative  Gastrointestinal: negative  Genitourinary:negative  Integument/breast: negative  Hematologic/lymphatic: negative  Musculoskeletal:negative  Neurological: negative  Behavioral/Psych: negative  Endocrine: negative  Allergic/Immunologic: negative          Objective:     PHYSICAL EXAM:      Vitals:    10/04/18 0820   BP:    Pulse:    Resp:    Temp:    SpO2: 95%    Weight: 198 lb 13.7 oz (90.2 kg)       General Appearance:  Alert, cooperative, no distress, appears stated age. Head:  Normocephalic, without obvious abnormality, atraumatic. Eyes:  Pupils equal and round. No scleral icterus. Mouth: Moist mucosa, no pharyngeal erythema. Nose: Nares normal. No drainage or sinus tenderness. Neck: Supple, symmetrical, trachea midline. No adenopathy. No tenderness/mass/nodules. No carotid bruit or elevated JVD. Lungs:   Clear to auscultation bilaterally, respirations unlabored. No wheeze, rales, or rhonchi. Chest Wall:  No tenderness or deformity. Heart:  Regular rate, S1/ S2 normal. No murmur, rub, or gallop. Abdomen:   Soft, non-tender, bowel sounds active. Musculoskeletal: No muscle wasting or digital clubbing. Extremities: Extremities normal, atraumatic. No cyanosis or edema. Pulses: 2+ radial and pedal pulses, symmetric. Skin: No rashes or lesions. Pysch: Normal mood and affect. Alert and oriented x 4.    Neurologic: Normal gross motor and sensory exam.       Labs     CBC: Lab Results   Component Value Date    WBC 10.8 10/03/2018    RBC 5.21 10/03/2018    HGB 14.2 10/03/2018    HCT 42.1 10/03/2018    MCV 80.8 10/03/2018    RDW 14.0 10/03/2018     10/03/2018     CMP:  Lab Results   Component Value Date     10/04/2018    K 3.7 10/04/2018    CL 94 10/04/2018    CO2 26 10/04/2018    BUN 12 10/04/2018    CREATININE 0.7 10/04/2018    GFRAA >60 10/04/2018    GFRAA >60 02/10/2013    AGRATIO 0.7 10/03/2018    LABGLOM >60 10/04/2018    GLUCOSE 240 10/04/2018    PROT 7.7 10/03/2018    PROT 8.3 11/12/2012    CALCIUM 9.0 10/04/2018    BILITOT 0.6 10/03/2018    ALKPHOS 73 10/03/2018    AST 13 10/03/2018    ALT 17 10/03/2018     PT/INR:  No results found for: PTINR  HgBA1c:  Lab Results   Component Value Date    LABA1C 11.1 08/16/2018     Lab Results   Component Value Date    TROPONINI 0.47 (H) 10/03/2018       Cardiac, Vascular and Imaging Data     EKG: Normal sinus rhythmInferior-posterior infarct (cited on or before 03-OCT-2018) ACUTE MI  Abnormal ECGWhen compared with ECG of 03-OCT-2018 12:22    Echo:   Left ventricular cavity size is at upper limits of normal. Normal left   ventricular wall thickness. Ejection fraction is visually estimated to be   45- 50%.   There is moderate hypokinesis of the inferolateral and inferior walls.   Normal diastolic function. Cath: ANGIOGRAPHIC FINDINGS:  1. Left main is normal.  LYNNE 3 flow. No stenosis. 2.  Left anterior descending artery comes from the left main coronary  artery. LYNNE 3 flow. No stenosis present with patent diagonal branches. 3.  Left circumflex is occluded in the mid portion. 4.  Right coronary comes from the right coronary cusp. It has a PDA which  has 80% stenosis present. The patient also has an obtuse marginal 1 which  as 80% stenosis present.     In summary, successful revascularization with stent placement in the  circumflex artery. This was 2.5 x 38 mm, expanded up to 2.8 mm. This was  a drug-coated Synergy stent.     Assessment and Plan     ---Acute Coronary

## 2018-10-05 ENCOUNTER — APPOINTMENT (OUTPATIENT)
Dept: CT IMAGING | Age: 53
DRG: 222 | End: 2018-10-05
Payer: COMMERCIAL

## 2018-10-05 ENCOUNTER — APPOINTMENT (OUTPATIENT)
Dept: GENERAL RADIOLOGY | Age: 53
DRG: 222 | End: 2018-10-05
Payer: COMMERCIAL

## 2018-10-05 ENCOUNTER — HOSPITAL ENCOUNTER (INPATIENT)
Age: 53
LOS: 11 days | Discharge: HOME | DRG: 222 | End: 2018-10-17
Attending: EMERGENCY MEDICINE | Admitting: INTERNAL MEDICINE
Payer: COMMERCIAL

## 2018-10-05 DIAGNOSIS — R55 SYNCOPE AND COLLAPSE: ICD-10-CM

## 2018-10-05 DIAGNOSIS — R77.8 ELEVATED TROPONIN: ICD-10-CM

## 2018-10-05 DIAGNOSIS — R94.31 ABNORMAL EKG: Primary | ICD-10-CM

## 2018-10-05 LAB
A/G RATIO: 0.8 (ref 1.1–2.2)
ALBUMIN SERPL-MCNC: 3.6 G/DL (ref 3.4–5)
ALP BLD-CCNC: 102 U/L (ref 40–129)
ALT SERPL-CCNC: 37 U/L (ref 10–40)
ANION GAP SERPL CALCULATED.3IONS-SCNC: 18 MMOL/L (ref 3–16)
APTT: 29.7 SEC (ref 26–36)
AST SERPL-CCNC: 68 U/L (ref 15–37)
BASOPHILS ABSOLUTE: 0.1 K/UL (ref 0–0.2)
BASOPHILS RELATIVE PERCENT: 0.8 %
BILIRUB SERPL-MCNC: 0.6 MG/DL (ref 0–1)
BUN BLDV-MCNC: 12 MG/DL (ref 7–20)
CALCIUM SERPL-MCNC: 9.5 MG/DL (ref 8.3–10.6)
CHLORIDE BLD-SCNC: 94 MMOL/L (ref 99–110)
CO2: 25 MMOL/L (ref 21–32)
CREAT SERPL-MCNC: 0.9 MG/DL (ref 0.9–1.3)
EKG ATRIAL RATE: 92 BPM
EKG DIAGNOSIS: NORMAL
EKG P AXIS: 44 DEGREES
EKG P-R INTERVAL: 150 MS
EKG Q-T INTERVAL: 400 MS
EKG QRS DURATION: 80 MS
EKG QTC CALCULATION (BAZETT): 494 MS
EKG R AXIS: 15 DEGREES
EKG T AXIS: 68 DEGREES
EKG VENTRICULAR RATE: 92 BPM
EOSINOPHILS ABSOLUTE: 0.3 K/UL (ref 0–0.6)
EOSINOPHILS RELATIVE PERCENT: 1.8 %
GFR AFRICAN AMERICAN: >60
GFR NON-AFRICAN AMERICAN: >60
GLOBULIN: 4.6 G/DL
GLUCOSE BLD-MCNC: 321 MG/DL (ref 70–99)
HCT VFR BLD CALC: 41.7 % (ref 40.5–52.5)
HEMOGLOBIN: 14.1 G/DL (ref 13.5–17.5)
LACTIC ACID: 3.7 MMOL/L (ref 0.4–2)
LYMPHOCYTES ABSOLUTE: 3.9 K/UL (ref 1–5.1)
LYMPHOCYTES RELATIVE PERCENT: 26.7 %
MCH RBC QN AUTO: 27.5 PG (ref 26–34)
MCHC RBC AUTO-ENTMCNC: 34 G/DL (ref 31–36)
MCV RBC AUTO: 81 FL (ref 80–100)
MONOCYTES ABSOLUTE: 1.4 K/UL (ref 0–1.3)
MONOCYTES RELATIVE PERCENT: 9.3 %
NEUTROPHILS ABSOLUTE: 9 K/UL (ref 1.7–7.7)
NEUTROPHILS RELATIVE PERCENT: 61.4 %
PDW BLD-RTO: 14 % (ref 12.4–15.4)
PLATELET # BLD: 463 K/UL (ref 135–450)
PMV BLD AUTO: 7.5 FL (ref 5–10.5)
POTASSIUM SERPL-SCNC: 3.5 MMOL/L (ref 3.5–5.1)
RBC # BLD: 5.15 M/UL (ref 4.2–5.9)
SODIUM BLD-SCNC: 137 MMOL/L (ref 136–145)
TOTAL PROTEIN: 8.2 G/DL (ref 6.4–8.2)
TROPONIN: 0.93 NG/ML
WBC # BLD: 14.7 K/UL (ref 4–11)

## 2018-10-05 PROCEDURE — 84484 ASSAY OF TROPONIN QUANT: CPT

## 2018-10-05 PROCEDURE — 80053 COMPREHEN METABOLIC PANEL: CPT

## 2018-10-05 PROCEDURE — 70450 CT HEAD/BRAIN W/O DYE: CPT

## 2018-10-05 PROCEDURE — C1725 CATH, TRANSLUMIN NON-LASER: HCPCS

## 2018-10-05 PROCEDURE — C1760 CLOSURE DEV, VASC: HCPCS

## 2018-10-05 PROCEDURE — 71045 X-RAY EXAM CHEST 1 VIEW: CPT

## 2018-10-05 PROCEDURE — 96375 TX/PRO/DX INJ NEW DRUG ADDON: CPT

## 2018-10-05 PROCEDURE — C1769 GUIDE WIRE: HCPCS

## 2018-10-05 PROCEDURE — 6370000000 HC RX 637 (ALT 250 FOR IP)

## 2018-10-05 PROCEDURE — 85730 THROMBOPLASTIN TIME PARTIAL: CPT

## 2018-10-05 PROCEDURE — 96365 THER/PROPH/DIAG IV INF INIT: CPT

## 2018-10-05 PROCEDURE — 2709999900 HC NON-CHARGEABLE SUPPLY

## 2018-10-05 PROCEDURE — 93005 ELECTROCARDIOGRAM TRACING: CPT | Performed by: EMERGENCY MEDICINE

## 2018-10-05 PROCEDURE — C1887 CATHETER, GUIDING: HCPCS

## 2018-10-05 PROCEDURE — C1894 INTRO/SHEATH, NON-LASER: HCPCS

## 2018-10-05 PROCEDURE — 83735 ASSAY OF MAGNESIUM: CPT

## 2018-10-05 PROCEDURE — 6360000002 HC RX W HCPCS

## 2018-10-05 PROCEDURE — 96368 THER/DIAG CONCURRENT INF: CPT

## 2018-10-05 PROCEDURE — 83605 ASSAY OF LACTIC ACID: CPT

## 2018-10-05 PROCEDURE — 85025 COMPLETE CBC W/AUTO DIFF WBC: CPT

## 2018-10-05 PROCEDURE — 2500000003 HC RX 250 WO HCPCS

## 2018-10-05 PROCEDURE — 99291 CRITICAL CARE FIRST HOUR: CPT

## 2018-10-05 PROCEDURE — 6360000002 HC RX W HCPCS: Performed by: EMERGENCY MEDICINE

## 2018-10-05 PROCEDURE — C1876 STENT, NON-COA/NON-COV W/DEL: HCPCS

## 2018-10-05 RX ORDER — HEPARIN SODIUM 1000 [USP'U]/ML
60 INJECTION, SOLUTION INTRAVENOUS; SUBCUTANEOUS PRN
Status: DISCONTINUED | OUTPATIENT
Start: 2018-10-05 | End: 2018-10-05 | Stop reason: CLARIF

## 2018-10-05 RX ORDER — HEPARIN SODIUM 10000 [USP'U]/100ML
10 INJECTION, SOLUTION INTRAVENOUS CONTINUOUS
Status: DISCONTINUED | OUTPATIENT
Start: 2018-10-05 | End: 2018-10-06

## 2018-10-05 RX ORDER — MORPHINE SULFATE 4 MG/ML
INJECTION, SOLUTION INTRAMUSCULAR; INTRAVENOUS
Status: COMPLETED
Start: 2018-10-05 | End: 2018-10-05

## 2018-10-05 RX ORDER — HEPARIN SODIUM 1000 [USP'U]/ML
4000 INJECTION, SOLUTION INTRAVENOUS; SUBCUTANEOUS ONCE
Status: COMPLETED | OUTPATIENT
Start: 2018-10-05 | End: 2018-10-05

## 2018-10-05 RX ORDER — HEPARIN SODIUM 1000 [USP'U]/ML
30 INJECTION, SOLUTION INTRAVENOUS; SUBCUTANEOUS PRN
Status: DISCONTINUED | OUTPATIENT
Start: 2018-10-05 | End: 2018-10-05 | Stop reason: CLARIF

## 2018-10-05 RX ADMIN — HEPARIN SODIUM 4000 UNITS: 1000 INJECTION INTRAVENOUS; SUBCUTANEOUS at 22:41

## 2018-10-05 RX ADMIN — HEPARIN SODIUM 10 ML/HR: 10000 INJECTION, SOLUTION INTRAVENOUS at 22:41

## 2018-10-05 RX ADMIN — Medication 12.5 MG: at 22:57

## 2018-10-05 RX ADMIN — MORPHINE SULFATE: 4 INJECTION INTRAVENOUS at 23:01

## 2018-10-05 ASSESSMENT — PAIN SCALES - GENERAL: PAINLEVEL_OUTOF10: 10

## 2018-10-06 PROBLEM — I21.3 STEMI (ST ELEVATION MYOCARDIAL INFARCTION) (HCC): Status: ACTIVE | Noted: 2018-10-06

## 2018-10-06 LAB
A/G RATIO: 0.6 (ref 1.1–2.2)
ALBUMIN SERPL-MCNC: 2.6 G/DL (ref 3.4–5)
ALP BLD-CCNC: 85 U/L (ref 40–129)
ALT SERPL-CCNC: 28 U/L (ref 10–40)
ANION GAP SERPL CALCULATED.3IONS-SCNC: 17 MMOL/L (ref 3–16)
AST SERPL-CCNC: 35 U/L (ref 15–37)
BILIRUB SERPL-MCNC: 0.3 MG/DL (ref 0–1)
BUN BLDV-MCNC: 12 MG/DL (ref 7–20)
CALCIUM SERPL-MCNC: 8.4 MG/DL (ref 8.3–10.6)
CHLORIDE BLD-SCNC: 97 MMOL/L (ref 99–110)
CHOLESTEROL, TOTAL: 129 MG/DL (ref 0–199)
CO2: 22 MMOL/L (ref 21–32)
CREAT SERPL-MCNC: 0.6 MG/DL (ref 0.9–1.3)
EKG ATRIAL RATE: 106 BPM
EKG ATRIAL RATE: 83 BPM
EKG ATRIAL RATE: 95 BPM
EKG DIAGNOSIS: NORMAL
EKG P AXIS: 26 DEGREES
EKG P AXIS: 54 DEGREES
EKG P AXIS: 54 DEGREES
EKG P-R INTERVAL: 154 MS
EKG P-R INTERVAL: 160 MS
EKG P-R INTERVAL: 168 MS
EKG Q-T INTERVAL: 404 MS
EKG Q-T INTERVAL: 406 MS
EKG Q-T INTERVAL: 408 MS
EKG QRS DURATION: 86 MS
EKG QRS DURATION: 86 MS
EKG QRS DURATION: 88 MS
EKG QTC CALCULATION (BAZETT): 479 MS
EKG QTC CALCULATION (BAZETT): 507 MS
EKG QTC CALCULATION (BAZETT): 539 MS
EKG R AXIS: 10 DEGREES
EKG R AXIS: 15 DEGREES
EKG R AXIS: 23 DEGREES
EKG T AXIS: 50 DEGREES
EKG T AXIS: 53 DEGREES
EKG T AXIS: 54 DEGREES
EKG VENTRICULAR RATE: 106 BPM
EKG VENTRICULAR RATE: 83 BPM
EKG VENTRICULAR RATE: 95 BPM
ESTIMATED AVERAGE GLUCOSE: 217.3 MG/DL
GFR AFRICAN AMERICAN: >60
GFR NON-AFRICAN AMERICAN: >60
GLOBULIN: 4.1 G/DL
GLUCOSE BLD-MCNC: 176 MG/DL (ref 70–99)
GLUCOSE BLD-MCNC: 184 MG/DL (ref 70–99)
GLUCOSE BLD-MCNC: 218 MG/DL (ref 70–99)
GLUCOSE BLD-MCNC: 231 MG/DL (ref 70–99)
GLUCOSE BLD-MCNC: 281 MG/DL (ref 70–99)
GLUCOSE BLD-MCNC: 283 MG/DL (ref 70–99)
GLUCOSE BLD-MCNC: 303 MG/DL (ref 70–99)
HBA1C MFR BLD: 9.2 %
HDLC SERPL-MCNC: 20 MG/DL (ref 40–60)
LDL CHOLESTEROL CALCULATED: 83 MG/DL
MAGNESIUM: 1.9 MG/DL (ref 1.8–2.4)
MAGNESIUM: 2.1 MG/DL (ref 1.8–2.4)
PERFORMED ON: ABNORMAL
POC ACT LR: 182 SEC
POC ACT LR: 196 SEC
POC ACT LR: 231 SEC
POTASSIUM SERPL-SCNC: 3.8 MMOL/L (ref 3.5–5.1)
SODIUM BLD-SCNC: 136 MMOL/L (ref 136–145)
TOTAL PROTEIN: 6.7 G/DL (ref 6.4–8.2)
TRIGL SERPL-MCNC: 131 MG/DL (ref 0–150)
VLDLC SERPL CALC-MCNC: 26 MG/DL

## 2018-10-06 PROCEDURE — 2060000000 HC ICU INTERMEDIATE R&B

## 2018-10-06 PROCEDURE — 99153 MOD SED SAME PHYS/QHP EA: CPT

## 2018-10-06 PROCEDURE — 85347 COAGULATION TIME ACTIVATED: CPT

## 2018-10-06 PROCEDURE — 6360000004 HC RX CONTRAST MEDICATION: Performed by: INTERNAL MEDICINE

## 2018-10-06 PROCEDURE — 6370000000 HC RX 637 (ALT 250 FOR IP): Performed by: INTERNAL MEDICINE

## 2018-10-06 PROCEDURE — 92941 PRQ TRLML REVSC TOT OCCL AMI: CPT

## 2018-10-06 PROCEDURE — 6360000002 HC RX W HCPCS: Performed by: INTERNAL MEDICINE

## 2018-10-06 PROCEDURE — 99233 SBSQ HOSP IP/OBS HIGH 50: CPT | Performed by: INTERNAL MEDICINE

## 2018-10-06 PROCEDURE — 99152 MOD SED SAME PHYS/QHP 5/>YRS: CPT

## 2018-10-06 PROCEDURE — 2580000003 HC RX 258: Performed by: INTERNAL MEDICINE

## 2018-10-06 PROCEDURE — 83036 HEMOGLOBIN GLYCOSYLATED A1C: CPT

## 2018-10-06 PROCEDURE — 02703DZ DILATION OF CORONARY ARTERY, ONE ARTERY WITH INTRALUMINAL DEVICE, PERCUTANEOUS APPROACH: ICD-10-PCS | Performed by: INTERNAL MEDICINE

## 2018-10-06 PROCEDURE — B2111ZZ FLUOROSCOPY OF MULTIPLE CORONARY ARTERIES USING LOW OSMOLAR CONTRAST: ICD-10-PCS | Performed by: INTERNAL MEDICINE

## 2018-10-06 PROCEDURE — C1769 GUIDE WIRE: HCPCS

## 2018-10-06 PROCEDURE — 80061 LIPID PANEL: CPT

## 2018-10-06 PROCEDURE — 4A023N7 MEASUREMENT OF CARDIAC SAMPLING AND PRESSURE, LEFT HEART, PERCUTANEOUS APPROACH: ICD-10-PCS | Performed by: INTERNAL MEDICINE

## 2018-10-06 PROCEDURE — 6360000002 HC RX W HCPCS

## 2018-10-06 PROCEDURE — 93005 ELECTROCARDIOGRAM TRACING: CPT | Performed by: INTERNAL MEDICINE

## 2018-10-06 PROCEDURE — 80053 COMPREHEN METABOLIC PANEL: CPT

## 2018-10-06 PROCEDURE — 93458 L HRT ARTERY/VENTRICLE ANGIO: CPT

## 2018-10-06 PROCEDURE — 93010 ELECTROCARDIOGRAM REPORT: CPT | Performed by: INTERNAL MEDICINE

## 2018-10-06 PROCEDURE — B2151ZZ FLUOROSCOPY OF LEFT HEART USING LOW OSMOLAR CONTRAST: ICD-10-PCS | Performed by: INTERNAL MEDICINE

## 2018-10-06 PROCEDURE — 6370000000 HC RX 637 (ALT 250 FOR IP)

## 2018-10-06 PROCEDURE — 83735 ASSAY OF MAGNESIUM: CPT

## 2018-10-06 PROCEDURE — 6370000000 HC RX 637 (ALT 250 FOR IP): Performed by: NURSE PRACTITIONER

## 2018-10-06 PROCEDURE — 94762 N-INVAS EAR/PLS OXIMTRY CONT: CPT

## 2018-10-06 RX ORDER — BENZONATATE 100 MG/1
100 CAPSULE ORAL 3 TIMES DAILY PRN
Status: DISCONTINUED | OUTPATIENT
Start: 2018-10-06 | End: 2018-10-17 | Stop reason: HOSPADM

## 2018-10-06 RX ORDER — SODIUM CHLORIDE 0.9 % (FLUSH) 0.9 %
10 SYRINGE (ML) INJECTION EVERY 12 HOURS SCHEDULED
Status: DISCONTINUED | OUTPATIENT
Start: 2018-10-06 | End: 2018-10-11

## 2018-10-06 RX ORDER — MORPHINE SULFATE 2 MG/ML
2 INJECTION, SOLUTION INTRAMUSCULAR; INTRAVENOUS
Status: DISCONTINUED | OUTPATIENT
Start: 2018-10-06 | End: 2018-10-06

## 2018-10-06 RX ORDER — PANTOPRAZOLE SODIUM 40 MG/1
40 TABLET, DELAYED RELEASE ORAL
Status: DISCONTINUED | OUTPATIENT
Start: 2018-10-06 | End: 2018-10-17 | Stop reason: HOSPADM

## 2018-10-06 RX ORDER — ONDANSETRON 2 MG/ML
4 INJECTION INTRAMUSCULAR; INTRAVENOUS EVERY 6 HOURS PRN
Status: DISCONTINUED | OUTPATIENT
Start: 2018-10-06 | End: 2018-10-17 | Stop reason: HOSPADM

## 2018-10-06 RX ORDER — DEXTROSE MONOHYDRATE 50 MG/ML
100 INJECTION, SOLUTION INTRAVENOUS PRN
Status: DISCONTINUED | OUTPATIENT
Start: 2018-10-06 | End: 2018-10-17 | Stop reason: HOSPADM

## 2018-10-06 RX ORDER — ASPIRIN 81 MG/1
81 TABLET, CHEWABLE ORAL DAILY
Status: DISCONTINUED | OUTPATIENT
Start: 2018-10-07 | End: 2018-10-06

## 2018-10-06 RX ORDER — ASPIRIN 81 MG/1
81 TABLET, CHEWABLE ORAL DAILY
Status: DISCONTINUED | OUTPATIENT
Start: 2018-10-06 | End: 2018-10-17 | Stop reason: HOSPADM

## 2018-10-06 RX ORDER — ACETAMINOPHEN 325 MG/1
650 TABLET ORAL EVERY 4 HOURS PRN
Status: DISCONTINUED | OUTPATIENT
Start: 2018-10-06 | End: 2018-10-17 | Stop reason: HOSPADM

## 2018-10-06 RX ORDER — METOPROLOL SUCCINATE 50 MG/1
50 TABLET, EXTENDED RELEASE ORAL DAILY
Status: DISCONTINUED | OUTPATIENT
Start: 2018-10-06 | End: 2018-10-08

## 2018-10-06 RX ORDER — ATORVASTATIN CALCIUM 40 MG/1
40 TABLET, FILM COATED ORAL NIGHTLY
Status: DISCONTINUED | OUTPATIENT
Start: 2018-10-06 | End: 2018-10-06 | Stop reason: SDUPTHER

## 2018-10-06 RX ORDER — SODIUM CHLORIDE 9 MG/ML
INJECTION, SOLUTION INTRAVENOUS CONTINUOUS
Status: ACTIVE | OUTPATIENT
Start: 2018-10-06 | End: 2018-10-06

## 2018-10-06 RX ORDER — MORPHINE SULFATE 2 MG/ML
2 INJECTION, SOLUTION INTRAMUSCULAR; INTRAVENOUS
Status: DISCONTINUED | OUTPATIENT
Start: 2018-10-06 | End: 2018-10-06 | Stop reason: RX

## 2018-10-06 RX ORDER — SODIUM CHLORIDE 0.9 % (FLUSH) 0.9 %
10 SYRINGE (ML) INJECTION PRN
Status: DISCONTINUED | OUTPATIENT
Start: 2018-10-06 | End: 2018-10-17 | Stop reason: HOSPADM

## 2018-10-06 RX ORDER — MORPHINE SULFATE 2 MG/ML
2 INJECTION, SOLUTION INTRAMUSCULAR; INTRAVENOUS
Status: DISCONTINUED | OUTPATIENT
Start: 2018-10-06 | End: 2018-10-17 | Stop reason: HOSPADM

## 2018-10-06 RX ORDER — DEXTROSE MONOHYDRATE 25 G/50ML
12.5 INJECTION, SOLUTION INTRAVENOUS PRN
Status: DISCONTINUED | OUTPATIENT
Start: 2018-10-06 | End: 2018-10-17 | Stop reason: HOSPADM

## 2018-10-06 RX ORDER — FAMOTIDINE 20 MG/1
20 TABLET, FILM COATED ORAL 2 TIMES DAILY
Status: DISCONTINUED | OUTPATIENT
Start: 2018-10-06 | End: 2018-10-06

## 2018-10-06 RX ORDER — POTASSIUM CHLORIDE 20 MEQ/1
40 TABLET, EXTENDED RELEASE ORAL ONCE
Status: COMPLETED | OUTPATIENT
Start: 2018-10-06 | End: 2018-10-06

## 2018-10-06 RX ORDER — LOSARTAN POTASSIUM 25 MG/1
25 TABLET ORAL DAILY
Status: DISCONTINUED | OUTPATIENT
Start: 2018-10-06 | End: 2018-10-08

## 2018-10-06 RX ORDER — CARVEDILOL 3.12 MG/1
3.12 TABLET ORAL 2 TIMES DAILY WITH MEALS
Status: DISCONTINUED | OUTPATIENT
Start: 2018-10-06 | End: 2018-10-06

## 2018-10-06 RX ORDER — NICOTINE POLACRILEX 4 MG
15 LOZENGE BUCCAL PRN
Status: DISCONTINUED | OUTPATIENT
Start: 2018-10-06 | End: 2018-10-17 | Stop reason: HOSPADM

## 2018-10-06 RX ORDER — MORPHINE SULFATE 10 MG/ML
2 INJECTION, SOLUTION INTRAMUSCULAR; INTRAVENOUS
Status: DISCONTINUED | OUTPATIENT
Start: 2018-10-06 | End: 2018-10-06 | Stop reason: ALTCHOICE

## 2018-10-06 RX ORDER — ATORVASTATIN CALCIUM 40 MG/1
40 TABLET, FILM COATED ORAL NIGHTLY
Status: DISCONTINUED | OUTPATIENT
Start: 2018-10-06 | End: 2018-10-17 | Stop reason: HOSPADM

## 2018-10-06 RX ORDER — MORPHINE SULFATE 2 MG/ML
1 INJECTION, SOLUTION INTRAMUSCULAR; INTRAVENOUS
Status: DISCONTINUED | OUTPATIENT
Start: 2018-10-06 | End: 2018-10-17 | Stop reason: HOSPADM

## 2018-10-06 RX ORDER — ATROPINE SULFATE 0.4 MG/ML
0.5 AMPUL (ML) INJECTION
Status: COMPLETED | OUTPATIENT
Start: 2018-10-06 | End: 2018-10-07

## 2018-10-06 RX ADMIN — INSULIN LISPRO 3 UNITS: 100 INJECTION, SOLUTION INTRAVENOUS; SUBCUTANEOUS at 10:37

## 2018-10-06 RX ADMIN — TIROFIBAN 0.15 MCG/KG/MIN: 5 INJECTION, SOLUTION INTRAVENOUS at 01:00

## 2018-10-06 RX ADMIN — ENOXAPARIN SODIUM 40 MG: 40 INJECTION SUBCUTANEOUS at 21:05

## 2018-10-06 RX ADMIN — INSULIN LISPRO 5 UNITS: 100 INJECTION, SOLUTION INTRAVENOUS; SUBCUTANEOUS at 18:48

## 2018-10-06 RX ADMIN — INSULIN LISPRO 8 UNITS: 100 INJECTION, SOLUTION INTRAVENOUS; SUBCUTANEOUS at 18:04

## 2018-10-06 RX ADMIN — IOPAMIDOL 273 ML: 755 INJECTION, SOLUTION INTRAVENOUS at 00:36

## 2018-10-06 RX ADMIN — TICAGRELOR 90 MG: 90 TABLET ORAL at 09:08

## 2018-10-06 RX ADMIN — MORPHINE SULFATE 2 MG: 2 INJECTION, SOLUTION INTRAMUSCULAR; INTRAVENOUS at 21:18

## 2018-10-06 RX ADMIN — AMIODARONE HYDROCHLORIDE 1 MG/MIN: 50 INJECTION, SOLUTION INTRAVENOUS at 02:00

## 2018-10-06 RX ADMIN — INSULIN LISPRO 3 UNITS: 100 INJECTION, SOLUTION INTRAVENOUS; SUBCUTANEOUS at 02:26

## 2018-10-06 RX ADMIN — INSULIN LISPRO 1 UNITS: 100 INJECTION, SOLUTION INTRAVENOUS; SUBCUTANEOUS at 21:09

## 2018-10-06 RX ADMIN — ATORVASTATIN CALCIUM 40 MG: 40 TABLET, FILM COATED ORAL at 21:05

## 2018-10-06 RX ADMIN — PANTOPRAZOLE SODIUM 40 MG: 40 TABLET, DELAYED RELEASE ORAL at 09:07

## 2018-10-06 RX ADMIN — Medication 10 ML: at 08:53

## 2018-10-06 RX ADMIN — Medication 10 ML: at 21:05

## 2018-10-06 RX ADMIN — TICAGRELOR 90 MG: 90 TABLET ORAL at 21:05

## 2018-10-06 RX ADMIN — SODIUM CHLORIDE: 9 INJECTION, SOLUTION INTRAVENOUS at 01:59

## 2018-10-06 RX ADMIN — BENZONATATE 100 MG: 100 CAPSULE ORAL at 22:58

## 2018-10-06 RX ADMIN — MORPHINE SULFATE 2 MG: 2 INJECTION, SOLUTION INTRAMUSCULAR; INTRAVENOUS at 18:10

## 2018-10-06 RX ADMIN — ASPIRIN 81 MG 81 MG: 81 TABLET ORAL at 11:07

## 2018-10-06 RX ADMIN — MORPHINE SULFATE 2 MG: 2 INJECTION, SOLUTION INTRAMUSCULAR; INTRAVENOUS at 13:56

## 2018-10-06 RX ADMIN — INSULIN LISPRO 2 UNITS: 100 INJECTION, SOLUTION INTRAVENOUS; SUBCUTANEOUS at 14:01

## 2018-10-06 RX ADMIN — MORPHINE SULFATE 2 MG: 2 INJECTION, SOLUTION INTRAMUSCULAR; INTRAVENOUS at 09:54

## 2018-10-06 RX ADMIN — POTASSIUM CHLORIDE 40 MEQ: 20 TABLET, EXTENDED RELEASE ORAL at 02:22

## 2018-10-06 ASSESSMENT — PAIN SCALES - GENERAL
PAINLEVEL_OUTOF10: 0
PAINLEVEL_OUTOF10: 0
PAINLEVEL_OUTOF10: 3
PAINLEVEL_OUTOF10: 6
PAINLEVEL_OUTOF10: 10
PAINLEVEL_OUTOF10: 7
PAINLEVEL_OUTOF10: 2
PAINLEVEL_OUTOF10: 8
PAINLEVEL_OUTOF10: 7
PAINLEVEL_OUTOF10: 2

## 2018-10-06 ASSESSMENT — PAIN DESCRIPTION - FREQUENCY: FREQUENCY: INTERMITTENT

## 2018-10-06 ASSESSMENT — PAIN DESCRIPTION - DESCRIPTORS: DESCRIPTORS: SORE

## 2018-10-06 ASSESSMENT — PAIN DESCRIPTION - ORIENTATION: ORIENTATION: RIGHT

## 2018-10-06 ASSESSMENT — PAIN DESCRIPTION - PROGRESSION
CLINICAL_PROGRESSION: NOT CHANGED

## 2018-10-06 ASSESSMENT — PAIN DESCRIPTION - LOCATION
LOCATION: GROIN
LOCATION: CHEST

## 2018-10-06 ASSESSMENT — PAIN DESCRIPTION - PAIN TYPE
TYPE: SURGICAL PAIN
TYPE: ACUTE PAIN

## 2018-10-06 NOTE — H&P
tachycardia on the cath lab table. GENERAL:  The patient is somewhat more alert. He is in some mild distress  from his chest pain which seems to be chest wall pain. HEENT:  Grossly nonfocal.  Poor dentition. CARDIAC EXAM:  S1, S2 normal.  LUNGS:  Clear anteriorly and laterally. ABDOMEN:  Nondistended, nontender. Hypoactive bowel sounds. EXTREMITIES:  Trace edema. No cyanosis. NEUROLOGIC:  Appears to be grossly intact. LABORATORY DATA:  EKG as described, subtle inferior ST elevation with  salvos of ventricular tachycardia on the EKG. IMPRESSION:  The patient had what sounds to be a witnessed arrest and  underwent CPR briefly and was recovered and returned to spontaneous  circulation. He was having ventricular tachycardia and subtle ST elevation  in the inferior leads. Because of this, he requires cardiac  catheterization. There will be a delay in this because a CT scan of the  head was ordered which subsequently came back negative for acute  intracranial pathology. PLAN:  Cath lab, ICU management postprocedurally. Interestingly, he was  known to have a high grade right coronary artery occlusion which was going  to be staged as when he presented on Wednesday, 10/03/2018, he had an  occluded circumflex and this was stented and he was discharged on  10/04/2018. So, he is critically ill but hopefully stable. We will give  amiodarone.         Nicole Munoz MD    D: 10/06/2018 0:43:26       T: 10/06/2018 4:12:50     [email or web address]  Job#: [id number]     Doc#: [id number]    CC:

## 2018-10-06 NOTE — PLAN OF CARE
Problem: Tissue Perfusion, Altered:  Goal: Circulatory function within specified parameters  Circulatory function within specified parameters   Outcome: Ongoing

## 2018-10-06 NOTE — ED NOTES
Pt to CT with Primary RN.  Electronically signed by Neo García RN on 10/5/2018 at 11:10 PM       Neo García RN  10/05/18 6090

## 2018-10-06 NOTE — PLAN OF CARE
Brief Pre-Op Note/Sedation Assessment      Juni King  1965  CATH/NONE  [id number]  12:51 AM    Planned Procedure: Cardiac Catheterization Procedure and possible PCI    Post Procedure Plan: Return to same level of care    Consent: Consent was unable to be obtained due to patient's condition. Chief Complaint: Chest Pain/Pressure  STEMI      Indications for the Procedure:   CAD Presentation:  Resuscitated Cardiac Arrest  Anginal Classification within 2 weeks:  CCS IV - Inability to perform any activity without angina or angina at rest, i.e., severe limitation  NYHA Heart Failure Class within 2 weeks: No symptoms      Anti- Anginal Meds within 2 weeks:   ANTI-ANGINAL MEDS: Yes: Beta Blockers, Aspirin and Statin (Any)      Stress or Imaging Studies Performed:  None    Vital Signs:  /62   Pulse 96   Temp 98.4 °F (36.9 °C) (Oral)   Resp 18   Ht 6' 2\" (1.88 m)   Wt 205 lb 14.6 oz (93.4 kg)   SpO2 99%   BMI 26.44 kg/m²     Allergies:  No Known Allergies    Past Medical History:  No past medical history on file. Surgical History:  No past surgical history on file. Medications:  Current Facility-Administered Medications   Medication Dose Route Frequency Provider Last Rate Last Dose    heparin 25,000 units in dextrose 5% 250 mL infusion  10 mL/hr Intravenous Continuous Ashley Paige MD 10 mL/hr at 10/05/18 2241 10 mL/hr at 10/05/18 2241           Pre-Sedation:    Pre-Sedation Documentation and Exam:  I have personally completed a history, physical exam & review of systems for this patient (see notes). Prior History of Anesthesia Complications:   unknown    Modified Mallampati:  II (soft palate, uvula, fauces visible)    ASA Classification:  Class 3 - A patient with severe systemic disease that limits activity but is not incapacitating and Class 2 -- A normal healthy patient with mild systemic disease    Lauren Scale:   Activity:  2 - Able to move 4 extremities voluntarily on

## 2018-10-06 NOTE — PROCEDURES
The right posterior descending with the high bifurcation of the  right coronary artery into the posterolateral and right posterior  descending has minor irregularities. The distal vessels are somewhat small  in caliber of the right posterior descending. I elected to proceed with PCI upon the posterolateral branch of the right  coronary artery. I used a JR4 guide 6-Somali. A 190 BMW guidewire was  advanced. He was given an extra 4000 units of heparin in divided doses. The 190 BMW guidewire advanced beyond the subtotal occlusion with some  difficulty and then I used a 2.0 x 15 mm balloon, inflated to 9 atmospheres  x25 seconds. There was LYNNE III blood flow with a residual 75% stenosis. I elected to stent with a 2.25 x 18 mm length Mini Vision bare metal stent. The stent was deployed at 12 atmospheres x25 seconds. There was LAN III  blood flow with nearly 0% residual stenosis. No dissection was  appreciated. The patient tolerated that well. The rhythm remained stable. There was minimal ST elevation on the inferior leads in the cath lab but  his chest pain was limited to chest wall pain. His rhythm was normal.  His  blood pressure was stable. Pigtail catheter in the left ventricle showed an LVEDP of 19 mmHg. Power  injection CERDA projection shows inferior wall hypokinesia. LV ejection  fraction estimated at 45%. No gradient on pullback from the left ventricle  and ascending aorta. No significant mitral regurgitation was seen. Injection of femoral sheath showed there was normal right common femoral  artery. For patient's safety and comfort, a 6-Somali Angio-Seal device was  deployed and hemostasis was achieved. PLAN:  Aggrastat bolus and drip for four hours, Brilinta 90 mg x1 now, and  we will resume the 90 mg p.o. b.i.d. starting tomorrow. We will start an  amiodarone drip for the time being. Patient will need EP evaluation.         Nicole Munoz MD    D: 10/06/2018 0:50:52 T: 10/06/2018 3:33:25     [email or web address]  Job#: [id number]     Doc#: [id number]    CC: MD Tamela Sun.  MD DR. Calli Salinas

## 2018-10-06 NOTE — ED PROVIDER NOTES
Triage Chief Complaint:   Cardiac Arrest (pt was found pulseless. CPR performed by . EMS states ST on arrival.  heart cath completed thursday. )    [place]:  Errol Hu is a 48 y.o. male that presents Emergency Department with complaints of having an episode of 60 while at home. The patient apparently had a stent placed yesterday here in the hospital, and was discharged home. The patient had no real symptoms until earlier this evening when he started to have increasing chest pain. Patient's wife states that the patient stood up, and while standing, then had an episode of syncope. The patient according to the wife did not have a pulse and was not breathing. She has not checked her pulse. The patient had CPR done as a bystander, and by the time EMS arrived, the patient was awake and alert. Patient's family at the bedside, and they state that he is not acting himself because he has not been able to recall that he had cardiac catheterization done yesterday. ROS:  At least 10 systems reviewed and otherwise acutely negative except as in the [address] History reviewed. No pertinent past medical history. No past surgical history on file. No family history on file. Social History     Social History    Marital status:      Spouse name: N/A    Number of children: N/A    Years of education: N/A     Occupational History    Not on file.      Social History Main Topics    Smoking status: Not on file    Smokeless tobacco: Not on file    Alcohol use Not on file    Drug use: Unknown    Sexual activity: Not on file     Other Topics Concern    Not on file     Social History Narrative    No narrative on file     Current Facility-Administered Medications   Medication Dose Route Frequency Provider Last Rate Last Dose    sodium chloride flush 0.9 % injection 10 mL  10 mL Intravenous 2 times per day Brigido Lomeli MD        sodium chloride flush 0.9 % injection 10 mL  10 mL Intravenous PRN recognition program. Efforts were made to edit the dictations but occasionally words are mis-transcribed.)    MD Kamilla Suarez MD  10/06/18 8617

## 2018-10-06 NOTE — PROGRESS NOTES
0730 Handoff report received from Mirlande Garcia RN. Patient resting in bed with eyes closed. VS stable    0830 Assessment completed. Afebrile. VS stable. Alert and oriented x 4. R groin site C/D/I, no hematoma noted, + pulses, neurovasc checks intact. Patient moaning with touch, movement. Rating rib pain 7/10.     0900 Dr Rob Syed at the bedside for patient assessment. Updated on overnight events. Order given for morphine 1 -2 mg PRN q 3 hours for pain, as patient states he is \"sore\" and having large amount of pain to rib area from CPR in the field. Amiodarone infusion stopped, orders reviewed. 1030 Per Dr Damion Howard administer 3 units as glucose level elevated 231 but patient not wanting to eat morning time meal.     1045 Dr Damion Howard at the bedside for patient assessment. Orders reviewed. 1200 Reassessment completed. Afebrile. VS stable. Patient states pain level is greatly improved. Denies chest pain/dyspnea. 1400 Patient awake, resting in bed, rating pain 7/10. PRN morphine administered. Wife at the bedside, updated on plan of care, questions addressed. Patient assisted up to chair, tolerated well. [address] report given to Carmina Estrada RN.

## 2018-10-07 ENCOUNTER — APPOINTMENT (OUTPATIENT)
Dept: GENERAL RADIOLOGY | Age: 53
DRG: 222 | End: 2018-10-07
Payer: COMMERCIAL

## 2018-10-07 LAB
ANION GAP SERPL CALCULATED.3IONS-SCNC: 14 MMOL/L (ref 3–16)
BUN BLDV-MCNC: 9 MG/DL (ref 7–20)
CALCIUM SERPL-MCNC: 8.6 MG/DL (ref 8.3–10.6)
CHLORIDE BLD-SCNC: 96 MMOL/L (ref 99–110)
CO2: 24 MMOL/L (ref 21–32)
CREAT SERPL-MCNC: 0.8 MG/DL (ref 0.9–1.3)
EKG ATRIAL RATE: 95 BPM
EKG DIAGNOSIS: NORMAL
EKG P AXIS: 25 DEGREES
EKG P-R INTERVAL: 154 MS
EKG Q-T INTERVAL: 388 MS
EKG QRS DURATION: 88 MS
EKG QTC CALCULATION (BAZETT): 487 MS
EKG R AXIS: 39 DEGREES
EKG T AXIS: 55 DEGREES
EKG VENTRICULAR RATE: 95 BPM
GFR AFRICAN AMERICAN: >60
GFR NON-AFRICAN AMERICAN: >60
GLUCOSE BLD-MCNC: 202 MG/DL (ref 70–99)
GLUCOSE BLD-MCNC: 227 MG/DL (ref 70–99)
GLUCOSE BLD-MCNC: 234 MG/DL (ref 70–99)
GLUCOSE BLD-MCNC: 247 MG/DL (ref 70–99)
GLUCOSE BLD-MCNC: 253 MG/DL (ref 70–99)
HCT VFR BLD CALC: 37.4 % (ref 40.5–52.5)
HEMOGLOBIN: 12.5 G/DL (ref 13.5–17.5)
MAGNESIUM: 2.1 MG/DL (ref 1.8–2.4)
MCH RBC QN AUTO: 27.3 PG (ref 26–34)
MCHC RBC AUTO-ENTMCNC: 33.5 G/DL (ref 31–36)
MCV RBC AUTO: 81.6 FL (ref 80–100)
PDW BLD-RTO: 14.1 % (ref 12.4–15.4)
PERFORMED ON: ABNORMAL
PHOSPHORUS: 2.6 MG/DL (ref 2.5–4.9)
PLATELET # BLD: 409 K/UL (ref 135–450)
PMV BLD AUTO: 7.8 FL (ref 5–10.5)
POC ACT LR: 197 SEC
POTASSIUM SERPL-SCNC: 3.2 MMOL/L (ref 3.5–5.1)
RBC # BLD: 4.58 M/UL (ref 4.2–5.9)
SODIUM BLD-SCNC: 134 MMOL/L (ref 136–145)
TROPONIN: 0.93 NG/ML
WBC # BLD: 16.6 K/UL (ref 4–11)

## 2018-10-07 PROCEDURE — 36556 INSERT NON-TUNNEL CV CATH: CPT

## 2018-10-07 PROCEDURE — 2000000000 HC ICU R&B

## 2018-10-07 PROCEDURE — 6360000002 HC RX W HCPCS: Performed by: INTERNAL MEDICINE

## 2018-10-07 PROCEDURE — 83735 ASSAY OF MAGNESIUM: CPT

## 2018-10-07 PROCEDURE — 2580000003 HC RX 258: Performed by: INTERNAL MEDICINE

## 2018-10-07 PROCEDURE — 93010 ELECTROCARDIOGRAM REPORT: CPT | Performed by: INTERNAL MEDICINE

## 2018-10-07 PROCEDURE — 6370000000 HC RX 637 (ALT 250 FOR IP): Performed by: INTERNAL MEDICINE

## 2018-10-07 PROCEDURE — 99152 MOD SED SAME PHYS/QHP 5/>YRS: CPT

## 2018-10-07 PROCEDURE — 93458 L HRT ARTERY/VENTRICLE ANGIO: CPT

## 2018-10-07 PROCEDURE — 85027 COMPLETE CBC AUTOMATED: CPT

## 2018-10-07 PROCEDURE — C1894 INTRO/SHEATH, NON-LASER: HCPCS

## 2018-10-07 PROCEDURE — 6360000004 HC RX CONTRAST MEDICATION: Performed by: INTERNAL MEDICINE

## 2018-10-07 PROCEDURE — 94762 N-INVAS EAR/PLS OXIMTRY CONT: CPT

## 2018-10-07 PROCEDURE — 92928 PRQ TCAT PLMT NTRAC ST 1 LES: CPT

## 2018-10-07 PROCEDURE — 2780000010 HC IMPLANT OTHER

## 2018-10-07 PROCEDURE — 99153 MOD SED SAME PHYS/QHP EA: CPT

## 2018-10-07 PROCEDURE — 36415 COLL VENOUS BLD VENIPUNCTURE: CPT

## 2018-10-07 PROCEDURE — 6360000002 HC RX W HCPCS

## 2018-10-07 PROCEDURE — C1769 GUIDE WIRE: HCPCS

## 2018-10-07 PROCEDURE — 99233 SBSQ HOSP IP/OBS HIGH 50: CPT | Performed by: INTERNAL MEDICINE

## 2018-10-07 PROCEDURE — 93005 ELECTROCARDIOGRAM TRACING: CPT

## 2018-10-07 PROCEDURE — 80048 BASIC METABOLIC PNL TOTAL CA: CPT

## 2018-10-07 PROCEDURE — 85347 COAGULATION TIME ACTIVATED: CPT

## 2018-10-07 PROCEDURE — 84100 ASSAY OF PHOSPHORUS: CPT

## 2018-10-07 PROCEDURE — 33967 INSERT I-AORT PERCUT DEVICE: CPT

## 2018-10-07 PROCEDURE — 02703DZ DILATION OF CORONARY ARTERY, ONE ARTERY WITH INTRALUMINAL DEVICE, PERCUTANEOUS APPROACH: ICD-10-PCS | Performed by: INTERNAL MEDICINE

## 2018-10-07 PROCEDURE — 5A02210 ASSISTANCE WITH CARDIAC OUTPUT USING BALLOON PUMP, CONTINUOUS: ICD-10-PCS | Performed by: INTERNAL MEDICINE

## 2018-10-07 PROCEDURE — 2500000003 HC RX 250 WO HCPCS

## 2018-10-07 PROCEDURE — C1887 CATHETER, GUIDING: HCPCS

## 2018-10-07 PROCEDURE — 2580000003 HC RX 258

## 2018-10-07 PROCEDURE — 93005 ELECTROCARDIOGRAM TRACING: CPT | Performed by: INTERNAL MEDICINE

## 2018-10-07 PROCEDURE — C1876 STENT, NON-COA/NON-COV W/DEL: HCPCS

## 2018-10-07 PROCEDURE — 71046 X-RAY EXAM CHEST 2 VIEWS: CPT

## 2018-10-07 PROCEDURE — 84484 ASSAY OF TROPONIN QUANT: CPT

## 2018-10-07 PROCEDURE — 2709999900 HC NON-CHARGEABLE SUPPLY

## 2018-10-07 RX ORDER — AMIODARONE HYDROCHLORIDE 200 MG/1
400 TABLET ORAL 2 TIMES DAILY
Status: DISCONTINUED | OUTPATIENT
Start: 2018-10-07 | End: 2018-10-17

## 2018-10-07 RX ORDER — POTASSIUM CHLORIDE 1.5 G/1.77G
40 POWDER, FOR SOLUTION ORAL ONCE
Status: DISCONTINUED | OUTPATIENT
Start: 2018-10-07 | End: 2018-10-08

## 2018-10-07 RX ORDER — SODIUM CHLORIDE 9 MG/ML
INJECTION, SOLUTION INTRAVENOUS CONTINUOUS
Status: DISPENSED | OUTPATIENT
Start: 2018-10-07 | End: 2018-10-08

## 2018-10-07 RX ORDER — MORPHINE SULFATE 2 MG/ML
2 INJECTION, SOLUTION INTRAMUSCULAR; INTRAVENOUS ONCE
Status: COMPLETED | OUTPATIENT
Start: 2018-10-07 | End: 2018-10-07

## 2018-10-07 RX ORDER — HEPARIN SODIUM AND DEXTROSE 10000; 5 [USP'U]/100ML; G/100ML
1000 INJECTION INTRAVENOUS CONTINUOUS
Status: DISCONTINUED | OUTPATIENT
Start: 2018-10-07 | End: 2018-10-08

## 2018-10-07 RX ORDER — HEPARIN SODIUM 10000 [USP'U]/100ML
10 INJECTION, SOLUTION INTRAVENOUS CONTINUOUS
Status: DISCONTINUED | OUTPATIENT
Start: 2018-10-08 | End: 2018-10-08

## 2018-10-07 RX ORDER — MORPHINE SULFATE 2 MG/ML
INJECTION, SOLUTION INTRAMUSCULAR; INTRAVENOUS
Status: DISPENSED
Start: 2018-10-07 | End: 2018-10-07

## 2018-10-07 RX ORDER — SODIUM CHLORIDE 9 MG/ML
INJECTION, SOLUTION INTRAVENOUS
Status: COMPLETED
Start: 2018-10-07 | End: 2018-10-07

## 2018-10-07 RX ORDER — HEPARIN SODIUM 1000 [USP'U]/ML
7000 INJECTION, SOLUTION INTRAVENOUS; SUBCUTANEOUS ONCE
Status: COMPLETED | OUTPATIENT
Start: 2018-10-07 | End: 2018-10-07

## 2018-10-07 RX ORDER — MAGNESIUM SULFATE IN WATER 40 MG/ML
INJECTION, SOLUTION INTRAVENOUS CONTINUOUS PRN
Status: COMPLETED | OUTPATIENT
Start: 2018-10-07 | End: 2018-10-07

## 2018-10-07 RX ORDER — AMIODARONE HYDROCHLORIDE 50 MG/ML
INJECTION, SOLUTION INTRAVENOUS
Status: COMPLETED | OUTPATIENT
Start: 2018-10-07 | End: 2018-10-07

## 2018-10-07 RX ORDER — POTASSIUM CHLORIDE 7.45 MG/ML
10 INJECTION INTRAVENOUS
Status: COMPLETED | OUTPATIENT
Start: 2018-10-07 | End: 2018-10-07

## 2018-10-07 RX ORDER — 0.9 % SODIUM CHLORIDE 0.9 %
250 INTRAVENOUS SOLUTION INTRAVENOUS ONCE
Status: COMPLETED | OUTPATIENT
Start: 2018-10-07 | End: 2018-10-07

## 2018-10-07 RX ORDER — HEPARIN SODIUM 1000 [USP'U]/ML
INJECTION, SOLUTION INTRAVENOUS; SUBCUTANEOUS
Status: DISPENSED
Start: 2018-10-07 | End: 2018-10-08

## 2018-10-07 RX ORDER — INSULIN GLARGINE 100 [IU]/ML
10 INJECTION, SOLUTION SUBCUTANEOUS NIGHTLY
Status: DISCONTINUED | OUTPATIENT
Start: 2018-10-07 | End: 2018-10-09

## 2018-10-07 RX ORDER — INSULIN GLARGINE 100 [IU]/ML
10 INJECTION, SOLUTION SUBCUTANEOUS NIGHTLY
Status: DISCONTINUED | OUTPATIENT
Start: 2018-10-07 | End: 2018-10-07

## 2018-10-07 RX ADMIN — MORPHINE SULFATE 2 MG: 2 INJECTION, SOLUTION INTRAMUSCULAR; INTRAVENOUS at 04:50

## 2018-10-07 RX ADMIN — ONDANSETRON 4 MG: 2 INJECTION INTRAMUSCULAR; INTRAVENOUS at 04:51

## 2018-10-07 RX ADMIN — INSULIN LISPRO 4 UNITS: 100 INJECTION, SOLUTION INTRAVENOUS; SUBCUTANEOUS at 13:18

## 2018-10-07 RX ADMIN — METOPROLOL SUCCINATE 50 MG: 50 TABLET, EXTENDED RELEASE ORAL at 09:07

## 2018-10-07 RX ADMIN — AMIODARONE HYDROCHLORIDE 400 MG: 200 TABLET ORAL at 05:33

## 2018-10-07 RX ADMIN — MAGNESIUM SULFATE HEPTAHYDRATE 2 G: 40 INJECTION, SOLUTION INTRAVENOUS at 04:53

## 2018-10-07 RX ADMIN — MORPHINE SULFATE 2 MG: 2 INJECTION, SOLUTION INTRAMUSCULAR; INTRAVENOUS at 17:05

## 2018-10-07 RX ADMIN — MORPHINE SULFATE 2 MG: 2 INJECTION, SOLUTION INTRAMUSCULAR; INTRAVENOUS at 09:17

## 2018-10-07 RX ADMIN — Medication 10 ML: at 09:08

## 2018-10-07 RX ADMIN — MORPHINE SULFATE 2 MG: 2 INJECTION, SOLUTION INTRAMUSCULAR; INTRAVENOUS at 03:24

## 2018-10-07 RX ADMIN — ATROPINE SULFATE 0.5 MG: 0.4 INJECTION, SOLUTION INTRAMUSCULAR; INTRAVENOUS; SUBCUTANEOUS at 04:45

## 2018-10-07 RX ADMIN — TICAGRELOR 90 MG: 90 TABLET ORAL at 09:07

## 2018-10-07 RX ADMIN — AMIODARONE HYDROCHLORIDE 400 MG: 200 TABLET ORAL at 21:32

## 2018-10-07 RX ADMIN — INSULIN LISPRO 4 UNITS: 100 INJECTION, SOLUTION INTRAVENOUS; SUBCUTANEOUS at 08:59

## 2018-10-07 RX ADMIN — Medication 10 ML: at 21:00

## 2018-10-07 RX ADMIN — ASPIRIN 81 MG 81 MG: 81 TABLET ORAL at 09:07

## 2018-10-07 RX ADMIN — AMIODARONE HYDROCHLORIDE 150 MG: 50 INJECTION, SOLUTION INTRAVENOUS at 04:57

## 2018-10-07 RX ADMIN — LOSARTAN POTASSIUM 25 MG: 25 TABLET ORAL at 10:41

## 2018-10-07 RX ADMIN — ENOXAPARIN SODIUM 40 MG: 40 INJECTION SUBCUTANEOUS at 09:07

## 2018-10-07 RX ADMIN — SODIUM CHLORIDE: 9 INJECTION, SOLUTION INTRAVENOUS at 16:36

## 2018-10-07 RX ADMIN — MORPHINE SULFATE 2 MG: 2 INJECTION, SOLUTION INTRAMUSCULAR; INTRAVENOUS at 20:37

## 2018-10-07 RX ADMIN — TICAGRELOR 90 MG: 90 TABLET ORAL at 21:32

## 2018-10-07 RX ADMIN — ONDANSETRON 4 MG: 2 INJECTION INTRAMUSCULAR; INTRAVENOUS at 19:11

## 2018-10-07 RX ADMIN — BENZONATATE 100 MG: 100 CAPSULE ORAL at 13:59

## 2018-10-07 RX ADMIN — Medication 10 MEQ: at 09:07

## 2018-10-07 RX ADMIN — MORPHINE SULFATE 2 MG: 2 INJECTION, SOLUTION INTRAMUSCULAR; INTRAVENOUS at 13:59

## 2018-10-07 RX ADMIN — INSULIN LISPRO 6 UNITS: 100 INJECTION, SOLUTION INTRAVENOUS; SUBCUTANEOUS at 17:12

## 2018-10-07 RX ADMIN — SODIUM CHLORIDE 250 ML: 9 INJECTION, SOLUTION INTRAVENOUS at 18:46

## 2018-10-07 RX ADMIN — ATORVASTATIN CALCIUM 40 MG: 40 TABLET, FILM COATED ORAL at 21:32

## 2018-10-07 RX ADMIN — HEPARIN SODIUM 7000 UNITS: 1000 INJECTION INTRAVENOUS; SUBCUTANEOUS at 21:52

## 2018-10-07 RX ADMIN — Medication 10 MEQ: at 10:41

## 2018-10-07 RX ADMIN — IOPAMIDOL 185 ML: 755 INJECTION, SOLUTION INTRAVENOUS at 23:43

## 2018-10-07 RX ADMIN — PANTOPRAZOLE SODIUM 40 MG: 40 TABLET, DELAYED RELEASE ORAL at 09:17

## 2018-10-07 RX ADMIN — HEPARIN SODIUM 1000 UNITS/HR: 10000 INJECTION, SOLUTION INTRAVENOUS at 22:06

## 2018-10-07 RX ADMIN — SODIUM CHLORIDE 250 ML: 9 INJECTION, SOLUTION INTRAVENOUS at 09:21

## 2018-10-07 ASSESSMENT — PAIN SCALES - GENERAL
PAINLEVEL_OUTOF10: 6
PAINLEVEL_OUTOF10: 6
PAINLEVEL_OUTOF10: 0
PAINLEVEL_OUTOF10: 6
PAINLEVEL_OUTOF10: 5
PAINLEVEL_OUTOF10: 8
PAINLEVEL_OUTOF10: 0
PAINLEVEL_OUTOF10: 7
PAINLEVEL_OUTOF10: 5
PAINLEVEL_OUTOF10: 6
PAINLEVEL_OUTOF10: 5

## 2018-10-07 ASSESSMENT — PAIN DESCRIPTION - LOCATION
LOCATION: RIB CAGE
LOCATION: CHEST

## 2018-10-07 ASSESSMENT — PAIN DESCRIPTION - PAIN TYPE
TYPE: ACUTE PAIN

## 2018-10-07 NOTE — PROGRESS NOTES
[address]   Cardiology Progress Note   Date: 10/7/2018  Reason for Consultation: syncope, VT, and ACS  Consult Requesting Physician: MD Dr. Toby Khanna    Chief Complaint:   Chief Complaint   Patient presents with    Cardiac Arrest     pt was found pulseless. CPR performed by . EMS states ST on arrival.  heart cath completed thursday. HPI: Luna Webb is a 48 y.o.male that presents Emergency Department with complaints of having an episode of 60 while at home. The patient apparently had a stent placed yesterday here in the hospital, and was discharged home. The patient had no real symptoms until earlier this evening when he started to have increasing chest pain. Patient's wife states that the patient stood up, and while standing, then had an episode of syncope. The patient according to the wife did not have a pulse and was not breathing. She has not checked her pulse. The patient had CPR done by EMS vs. Police officers who arrived at the scene with defibrillation x 1 performed for sustained VT, and by the time EMS arrived, the patient was awake and alert. Patient's family at the bedside, and they state that he is not acting himself because he has not been able to recall that he had cardiac catheterization done . Overnight 10/6 12am cath revealed that previous stent was patent in the LCx. However, PDA was 90% occluded. Now s/p PCI with bare metal stent in PDA. Of note, VT incessantly in cath lab but now quiescent. 10/7 from 02:00 - 04:40 sustained VT on telemetry with v-rates 212-220 bpm, some were 1 or 2 minutes in duration, others 35 beats, or 25 beats in duration. The patient became unresponsive and required CPR, external shock (?).       Past Medical History:   Diagnosis Date    Arthritis     CAD (coronary artery disease)     Diabetes mellitus ([facility] [address]     Hyperlipidemia     Hypertension         Past Surgical History:   Procedure Laterality Date  COLONOSCOPY      JOINT REPLACEMENT      VASCULAR SURGERY         Allergies:  No Known Allergies    Medication:   Prior to Admission medications    Not on File       Social History:   reports that he has never smoked. He has never used smokeless tobacco.        Family History:  family history is not on file. Reviewed. Denies family history of sudden cardiac death, arrhythmia, premature CAD    Review of System:    · General ROS: negative for - chills, fever   · Psychological ROS: negative for - anxiety or depression  · Ophthalmic ROS: negative for - eye pain or loss of vision  · ENT ROS: negative for - epistaxis, headaches, nasal discharge, sore throat   · Allergy and Immunology ROS: negative for - hives, nasal congestion   · Hematological and Lymphatic ROS: negative for - bleeding problems, blood clots, bruising or jaundice  · Endocrine ROS: negative for - skin changes, temperature intolerance or unexpected weight changes  · Respiratory ROS: negative for - cough, hemoptysis, pleuritic pain, SOB, sputum changes or wheezing  · Cardiovascular ROS: Per HPI. · Gastrointestinal ROS: negative for - abdominal pain, blood in stools, diarrhea, hematemesis, melena, nausea/vomiting or swallowing difficulty/pain  · Genito-Urinary ROS: negative for - dysuria or incontinence  · Musculoskeletal ROS: negative for - joint swelling or muscle pain  · Neurological ROS: negative for - confusion, dizziness, gait disturbance, headaches, numbness/tingling, seizures, speech problems, tremors, visual changes or weakness  · Dermatological ROS: negative for - rash    Physical Examination:  Vitals:    10/07/18 0900   BP: 109/69   Pulse: 93   Resp: 22   Temp:    SpO2: 98%       · Constitutional: Oriented. No distress. · Head: Normocephalic and atraumatic. · Mouth/Throat: Oropharynx is clear and moist.   · Eyes: Conjunctivae normal. EOM are normal.   · Neck: Normal range of motion. Neck supple. No rigidity. No JVD present.

## 2018-10-07 NOTE — PROGRESS NOTES
4 Eyes Skin Assessment     The patient is being assess for  Transfer to New Unit    I agree that 2 RN's have performed a thorough Head to Toe Skin Assessment on the patient. ALL assessment sites listed below have been assessed. Areas assessed by both nurses:   [x]   Head, Face, and Ears   [x]   Shoulders, Back, and Chest  [x]   Arms, Elbows, and Hands   [x]   Coccyx, Sacrum, and IschIum  [x]   Legs, Feet, and Heels        Does the Patient have Skin Breakdown?   No         Michael Prevention initiated:  No   Wound Care Orders initiated:  No      [facility] nurse consulted for Pressure Injury (Stage 3,4, Unstageable, DTI, NWPT, and Complex wounds), New and Established Ostomies:  No      Nurse 1 eSignature: Electronically signed by Stephen Diaz RN on 10/7/18 at 12:01 AM    **SHARE this note so that the co-signing nurse is able to place an eSignature**    Nurse 2 eSignature: Electronically signed by Lynn Blanc RN on 10/7/18 at 12:54 AM

## 2018-10-08 LAB
ALBUMIN SERPL-MCNC: 2.5 G/DL (ref 3.4–5)
ANION GAP SERPL CALCULATED.3IONS-SCNC: 16 MMOL/L (ref 3–16)
ANION GAP SERPL CALCULATED.3IONS-SCNC: 16 MMOL/L (ref 3–16)
APTT: 34.8 SEC (ref 26–36)
APTT: 35.2 SEC (ref 26–36)
APTT: 40.9 SEC (ref 26–36)
APTT: 50.3 SEC (ref 26–36)
BANDED NEUTROPHILS RELATIVE PERCENT: 3 % (ref 0–7)
BASOPHILS ABSOLUTE: 0 K/UL (ref 0–0.2)
BASOPHILS RELATIVE PERCENT: 0 %
BUN BLDV-MCNC: 13 MG/DL (ref 7–20)
BUN BLDV-MCNC: 13 MG/DL (ref 7–20)
CALCIUM SERPL-MCNC: 8.2 MG/DL (ref 8.3–10.6)
CALCIUM SERPL-MCNC: 8.3 MG/DL (ref 8.3–10.6)
CHLORIDE BLD-SCNC: 96 MMOL/L (ref 99–110)
CHLORIDE BLD-SCNC: 96 MMOL/L (ref 99–110)
CO2: 22 MMOL/L (ref 21–32)
CO2: 22 MMOL/L (ref 21–32)
CREAT SERPL-MCNC: 1.2 MG/DL (ref 0.9–1.3)
CREAT SERPL-MCNC: 1.3 MG/DL (ref 0.9–1.3)
EKG ATRIAL RATE: 48 BPM
EKG ATRIAL RATE: 61 BPM
EKG ATRIAL RATE: 91 BPM
EKG DIAGNOSIS: NORMAL
EKG P AXIS: 53 DEGREES
EKG P-R INTERVAL: 126 MS
EKG Q-T INTERVAL: 384 MS
EKG Q-T INTERVAL: 450 MS
EKG Q-T INTERVAL: 452 MS
EKG QRS DURATION: 100 MS
EKG QRS DURATION: 132 MS
EKG QRS DURATION: 176 MS
EKG QTC CALCULATION (BAZETT): 472 MS
EKG QTC CALCULATION (BAZETT): 565 MS
EKG QTC CALCULATION (BAZETT): 565 MS
EKG R AXIS: -20 DEGREES
EKG R AXIS: 106 DEGREES
EKG R AXIS: 108 DEGREES
EKG T AXIS: -40 DEGREES
EKG T AXIS: -41 DEGREES
EKG T AXIS: 89 DEGREES
EKG VENTRICULAR RATE: 91 BPM
EKG VENTRICULAR RATE: 94 BPM
EKG VENTRICULAR RATE: 95 BPM
EOSINOPHILS ABSOLUTE: 0 K/UL (ref 0–0.6)
EOSINOPHILS RELATIVE PERCENT: 0 %
ESTIMATED AVERAGE GLUCOSE: 208.7 MG/DL
GFR AFRICAN AMERICAN: >60
GFR AFRICAN AMERICAN: >60
GFR NON-AFRICAN AMERICAN: 58
GFR NON-AFRICAN AMERICAN: >60
GLUCOSE BLD-MCNC: 156 MG/DL (ref 70–99)
GLUCOSE BLD-MCNC: 194 MG/DL (ref 70–99)
GLUCOSE BLD-MCNC: 204 MG/DL (ref 70–99)
GLUCOSE BLD-MCNC: 204 MG/DL (ref 70–99)
GLUCOSE BLD-MCNC: 212 MG/DL (ref 70–99)
GLUCOSE BLD-MCNC: 266 MG/DL (ref 70–99)
HBA1C MFR BLD: 8.9 %
HCT VFR BLD CALC: 35.5 % (ref 40.5–52.5)
HCT VFR BLD CALC: 36.1 % (ref 40.5–52.5)
HEMOGLOBIN: 11.7 G/DL (ref 13.5–17.5)
HEMOGLOBIN: 11.9 G/DL (ref 13.5–17.5)
LV EF: 45 %
LVEF MODALITY: NORMAL
LYMPHOCYTES ABSOLUTE: 1.2 K/UL (ref 1–5.1)
LYMPHOCYTES RELATIVE PERCENT: 8 %
MAGNESIUM: 2.1 MG/DL (ref 1.8–2.4)
MCH RBC QN AUTO: 26.6 PG (ref 26–34)
MCH RBC QN AUTO: 26.7 PG (ref 26–34)
MCHC RBC AUTO-ENTMCNC: 32.9 G/DL (ref 31–36)
MCHC RBC AUTO-ENTMCNC: 33.1 G/DL (ref 31–36)
MCV RBC AUTO: 80.8 FL (ref 80–100)
MCV RBC AUTO: 80.9 FL (ref 80–100)
MONOCYTES ABSOLUTE: 1.2 K/UL (ref 0–1.3)
MONOCYTES RELATIVE PERCENT: 8 %
NEUTROPHILS ABSOLUTE: 12.2 K/UL (ref 1.7–7.7)
NEUTROPHILS RELATIVE PERCENT: 81 %
PDW BLD-RTO: 14.1 % (ref 12.4–15.4)
PDW BLD-RTO: 14.4 % (ref 12.4–15.4)
PERFORMED ON: ABNORMAL
PHOSPHORUS: 3.5 MG/DL (ref 2.5–4.9)
PLATELET # BLD: 411 K/UL (ref 135–450)
PLATELET # BLD: 434 K/UL (ref 135–450)
PLATELET SLIDE REVIEW: ADEQUATE
PMV BLD AUTO: 7.7 FL (ref 5–10.5)
PMV BLD AUTO: 7.9 FL (ref 5–10.5)
POTASSIUM SERPL-SCNC: 4.1 MMOL/L (ref 3.5–5.1)
POTASSIUM SERPL-SCNC: 4.2 MMOL/L (ref 3.5–5.1)
RBC # BLD: 4.39 M/UL (ref 4.2–5.9)
RBC # BLD: 4.46 M/UL (ref 4.2–5.9)
RBC # BLD: NORMAL 10*6/UL
SLIDE REVIEW: ABNORMAL
SODIUM BLD-SCNC: 134 MMOL/L (ref 136–145)
SODIUM BLD-SCNC: 134 MMOL/L (ref 136–145)
WBC # BLD: 14.3 K/UL (ref 4–11)
WBC # BLD: 14.5 K/UL (ref 4–11)

## 2018-10-08 PROCEDURE — 83735 ASSAY OF MAGNESIUM: CPT

## 2018-10-08 PROCEDURE — 6360000002 HC RX W HCPCS: Performed by: INTERNAL MEDICINE

## 2018-10-08 PROCEDURE — 85025 COMPLETE CBC W/AUTO DIFF WBC: CPT

## 2018-10-08 PROCEDURE — 36592 COLLECT BLOOD FROM PICC: CPT

## 2018-10-08 PROCEDURE — 6370000000 HC RX 637 (ALT 250 FOR IP): Performed by: INTERNAL MEDICINE

## 2018-10-08 PROCEDURE — 85027 COMPLETE CBC AUTOMATED: CPT

## 2018-10-08 PROCEDURE — 99211 OFF/OP EST MAY X REQ PHY/QHP: CPT

## 2018-10-08 PROCEDURE — 93010 ELECTROCARDIOGRAM REPORT: CPT | Performed by: INTERNAL MEDICINE

## 2018-10-08 PROCEDURE — 99232 SBSQ HOSP IP/OBS MODERATE 35: CPT | Performed by: INTERNAL MEDICINE

## 2018-10-08 PROCEDURE — 80069 RENAL FUNCTION PANEL: CPT

## 2018-10-08 PROCEDURE — 93306 TTE W/DOPPLER COMPLETE: CPT

## 2018-10-08 PROCEDURE — 2100000000 HC CCU R&B

## 2018-10-08 PROCEDURE — 94762 N-INVAS EAR/PLS OXIMTRY CONT: CPT

## 2018-10-08 PROCEDURE — 85730 THROMBOPLASTIN TIME PARTIAL: CPT

## 2018-10-08 PROCEDURE — 83036 HEMOGLOBIN GLYCOSYLATED A1C: CPT

## 2018-10-08 PROCEDURE — 2580000003 HC RX 258: Performed by: INTERNAL MEDICINE

## 2018-10-08 PROCEDURE — 93005 ELECTROCARDIOGRAM TRACING: CPT | Performed by: INTERNAL MEDICINE

## 2018-10-08 RX ORDER — FUROSEMIDE 10 MG/ML
20 INJECTION INTRAMUSCULAR; INTRAVENOUS 2 TIMES DAILY
Status: DISCONTINUED | OUTPATIENT
Start: 2018-10-08 | End: 2018-10-08

## 2018-10-08 RX ORDER — HEPARIN SODIUM 1000 [USP'U]/ML
4000 INJECTION, SOLUTION INTRAVENOUS; SUBCUTANEOUS ONCE
Status: DISCONTINUED | OUTPATIENT
Start: 2018-10-08 | End: 2018-10-08

## 2018-10-08 RX ORDER — HEPARIN SODIUM 10000 [USP'U]/100ML
19.5 INJECTION, SOLUTION INTRAVENOUS CONTINUOUS
Status: DISCONTINUED | OUTPATIENT
Start: 2018-10-08 | End: 2018-10-10

## 2018-10-08 RX ORDER — METOPROLOL SUCCINATE 50 MG/1
50 TABLET, EXTENDED RELEASE ORAL DAILY
Status: DISCONTINUED | OUTPATIENT
Start: 2018-10-09 | End: 2018-10-17 | Stop reason: HOSPADM

## 2018-10-08 RX ORDER — METOPROLOL SUCCINATE 25 MG/1
25 TABLET, EXTENDED RELEASE ORAL DAILY
Status: DISCONTINUED | OUTPATIENT
Start: 2018-10-08 | End: 2018-10-08

## 2018-10-08 RX ORDER — METOPROLOL SUCCINATE 25 MG/1
25 TABLET, EXTENDED RELEASE ORAL ONCE
Status: COMPLETED | OUTPATIENT
Start: 2018-10-08 | End: 2018-10-08

## 2018-10-08 RX ORDER — POTASSIUM CHLORIDE 29.8 MG/ML
20 INJECTION INTRAVENOUS ONCE
Status: COMPLETED | OUTPATIENT
Start: 2018-10-08 | End: 2018-10-08

## 2018-10-08 RX ORDER — MORPHINE SULFATE 2 MG/ML
1 INJECTION, SOLUTION INTRAMUSCULAR; INTRAVENOUS
Status: DISCONTINUED | OUTPATIENT
Start: 2018-10-08 | End: 2018-10-09

## 2018-10-08 RX ADMIN — ATORVASTATIN CALCIUM 40 MG: 40 TABLET, FILM COATED ORAL at 20:29

## 2018-10-08 RX ADMIN — AMIODARONE HYDROCHLORIDE 400 MG: 200 TABLET ORAL at 20:28

## 2018-10-08 RX ADMIN — Medication 10 ML: at 20:37

## 2018-10-08 RX ADMIN — MORPHINE SULFATE 2 MG: 2 INJECTION, SOLUTION INTRAMUSCULAR; INTRAVENOUS at 23:51

## 2018-10-08 RX ADMIN — AMIODARONE HYDROCHLORIDE 400 MG: 200 TABLET ORAL at 08:28

## 2018-10-08 RX ADMIN — PIPERACILLIN SODIUM,TAZOBACTAM SODIUM 3.38 G: 3; .375 INJECTION, POWDER, FOR SOLUTION INTRAVENOUS at 20:37

## 2018-10-08 RX ADMIN — PIPERACILLIN SODIUM,TAZOBACTAM SODIUM 3.38 G: 3; .375 INJECTION, POWDER, FOR SOLUTION INTRAVENOUS at 12:35

## 2018-10-08 RX ADMIN — METOPROLOL SUCCINATE 25 MG: 25 TABLET, EXTENDED RELEASE ORAL at 08:30

## 2018-10-08 RX ADMIN — INSULIN GLARGINE 10 UNITS: 100 INJECTION, SOLUTION SUBCUTANEOUS at 20:37

## 2018-10-08 RX ADMIN — Medication 10 ML: at 08:31

## 2018-10-08 RX ADMIN — POTASSIUM CHLORIDE 20 MEQ: 29.8 INJECTION, SOLUTION INTRAVENOUS at 02:47

## 2018-10-08 RX ADMIN — ONDANSETRON 4 MG: 2 INJECTION INTRAMUSCULAR; INTRAVENOUS at 08:34

## 2018-10-08 RX ADMIN — MORPHINE SULFATE 2 MG: 2 INJECTION, SOLUTION INTRAMUSCULAR; INTRAVENOUS at 12:47

## 2018-10-08 RX ADMIN — MORPHINE SULFATE 2 MG: 2 INJECTION, SOLUTION INTRAMUSCULAR; INTRAVENOUS at 02:40

## 2018-10-08 RX ADMIN — PIPERACILLIN SODIUM,TAZOBACTAM SODIUM 3.38 G: 3; .375 INJECTION, POWDER, FOR SOLUTION INTRAVENOUS at 03:59

## 2018-10-08 RX ADMIN — INSULIN LISPRO 2 UNITS: 100 INJECTION, SOLUTION INTRAVENOUS; SUBCUTANEOUS at 08:25

## 2018-10-08 RX ADMIN — HEPARIN SODIUM 13.9 ML/HR: 10000 INJECTION, SOLUTION INTRAVENOUS at 18:12

## 2018-10-08 RX ADMIN — BENZONATATE 100 MG: 100 CAPSULE ORAL at 13:42

## 2018-10-08 RX ADMIN — PHENYLEPHRINE HYDROCHLORIDE 35 MCG/MIN: 10 INJECTION INTRAVENOUS at 00:26

## 2018-10-08 RX ADMIN — TICAGRELOR 90 MG: 90 TABLET ORAL at 08:28

## 2018-10-08 RX ADMIN — ASPIRIN 81 MG 81 MG: 81 TABLET ORAL at 08:30

## 2018-10-08 RX ADMIN — MORPHINE SULFATE 2 MG: 2 INJECTION, SOLUTION INTRAMUSCULAR; INTRAVENOUS at 09:02

## 2018-10-08 RX ADMIN — METOPROLOL SUCCINATE 25 MG: 25 TABLET, EXTENDED RELEASE ORAL at 14:44

## 2018-10-08 RX ADMIN — INSULIN LISPRO 6 UNITS: 100 INJECTION, SOLUTION INTRAVENOUS; SUBCUTANEOUS at 17:13

## 2018-10-08 RX ADMIN — MORPHINE SULFATE 2 MG: 2 INJECTION, SOLUTION INTRAMUSCULAR; INTRAVENOUS at 05:55

## 2018-10-08 RX ADMIN — TICAGRELOR 90 MG: 90 TABLET ORAL at 20:29

## 2018-10-08 RX ADMIN — FUROSEMIDE 20 MG: 10 INJECTION, SOLUTION INTRAMUSCULAR; INTRAVENOUS at 08:29

## 2018-10-08 RX ADMIN — ONDANSETRON 4 MG: 2 INJECTION INTRAMUSCULAR; INTRAVENOUS at 18:10

## 2018-10-08 RX ADMIN — LOSARTAN POTASSIUM 25 MG: 25 TABLET ORAL at 08:28

## 2018-10-08 RX ADMIN — INSULIN LISPRO 1 UNITS: 100 INJECTION, SOLUTION INTRAVENOUS; SUBCUTANEOUS at 20:28

## 2018-10-08 RX ADMIN — INSULIN LISPRO 4 UNITS: 100 INJECTION, SOLUTION INTRAVENOUS; SUBCUTANEOUS at 12:35

## 2018-10-08 RX ADMIN — MORPHINE SULFATE 2 MG: 2 INJECTION, SOLUTION INTRAMUSCULAR; INTRAVENOUS at 18:31

## 2018-10-08 ASSESSMENT — PAIN DESCRIPTION - LOCATION
LOCATION: CHEST
LOCATION: BACK;RIB CAGE
LOCATION: RIB CAGE
LOCATION: CHEST

## 2018-10-08 ASSESSMENT — PAIN SCALES - GENERAL
PAINLEVEL_OUTOF10: 4
PAINLEVEL_OUTOF10: 9
PAINLEVEL_OUTOF10: 7
PAINLEVEL_OUTOF10: 8
PAINLEVEL_OUTOF10: 3
PAINLEVEL_OUTOF10: 5
PAINLEVEL_OUTOF10: 8
PAINLEVEL_OUTOF10: 8
PAINLEVEL_OUTOF10: 0
PAINLEVEL_OUTOF10: 8

## 2018-10-08 ASSESSMENT — PAIN DESCRIPTION - DESCRIPTORS: DESCRIPTORS: SORE

## 2018-10-08 ASSESSMENT — PAIN DESCRIPTION - PAIN TYPE
TYPE: ACUTE PAIN

## 2018-10-08 ASSESSMENT — PAIN DESCRIPTION - ORIENTATION: ORIENTATION: RIGHT

## 2018-10-08 ASSESSMENT — PAIN DESCRIPTION - FREQUENCY: FREQUENCY: CONTINUOUS

## 2018-10-08 ASSESSMENT — PAIN DESCRIPTION - PROGRESSION: CLINICAL_PROGRESSION: GRADUALLY IMPROVING

## 2018-10-08 NOTE — PROGRESS NOTES
@ [phone] Continuing to slowly wean Hussein gtt with Map greater then ordered parameters. Wife confirmed pt with h/o sleep apnea, has a CPAP at home that he does not wear; currently asleep maintain SpO2 >90% with O2 weaned to 5L/min high flow cannula.

## 2018-10-08 NOTE — PLAN OF CARE
Brief Pre-Op Note/Sedation Assessment      Juni King  1965  [id number]  [id number]  12:07 AM    Planned Procedure: Cardiac Catheterization Procedure and PCI    Post Procedure Plan: Return to same level of care    Consent: Consent was unable to be obtained due to patient's condition. Chief Complaint: Chest Pain/Pressure      Indications for the Procedure:   CAD Presentation:  Worsening Angina  Anginal Classification within 2 weeks:  CCS IV - Inability to perform any activity without angina or angina at rest, i.e., severe limitation  NYHA Heart Failure Class within 2 weeks: Class II - Symptoms of HF on ordinary exertion, Newly Diagnosed?  Yes, Heart Failure Type: Unknown      Anti- Anginal Meds within 2 weeks:   ANTI-ANGINAL MEDS: Yes: Beta Blockers      Stress or Imaging Studies Performed:  None    Vital Signs:  BP (!) 81/54   Pulse 90   Temp 98.4 °F (36.9 °C) (Oral)   Resp 20   Ht 6' 2\" (1.88 m)   Wt 205 lb 14.6 oz (93.4 kg)   SpO2 93%   BMI 26.44 kg/m²     Allergies:  No Known Allergies    Past Medical History:  Past Medical History:   Diagnosis Date    Arthritis     CAD (coronary artery disease)     Diabetes mellitus ([facility] [address]     Hyperlipidemia     Hypertension          Surgical History:  Past Surgical History:   Procedure Laterality Date    COLONOSCOPY      JOINT REPLACEMENT      VASCULAR SURGERY           Medications:  Current Facility-Administered Medications   Medication Dose Route Frequency Provider Last Rate Last Dose    metoprolol succinate (TOPROL XL) extended release tablet 25 mg  25 mg Oral Daily Jessica Alvarado MD        heparin 25,000 units in dextrose 5% 250 mL infusion  12 Units/kg/hr (Adjusted) Intravenous Continuous Jessica Alvarado MD        potassium chloride 20 mEq/50 mL IVPB (Central Line)  20 mEq Intravenous Once Jessica Alvarado MD        piperacillin-tazobactam (ZOSYN) 3.375 g in dextrose 5 % 100 mL IVPB extended infusion (mini-bag)  3.375 g Intravenous Mary Schwarz MD        amiodarone (CORDARONE) tablet 400 mg  400 mg Oral BID Mariela Sanchez MD   400 mg at 10/07/18 2132    insulin glargine (LANTUS) injection vial 10 Units  10 Units Subcutaneous Nightly Vincent Casey MD        0.9 % sodium chloride infusion   Intravenous Continuous Vincent Casey  mL/hr at 10/07/18 1636      heparin (porcine) 1000 UNIT/ML injection             heparin 25,000 units in dextrose 5 % 250 mL infusion (rate based)  1,000 Units/hr Intravenous Continuous Aarti Chavira MD 10 mL/hr at 10/07/18 2206 1,000 Units/hr at 10/07/18 2206    sodium chloride flush 0.9 % injection 10 mL  10 mL Intravenous 2 times per day Aarti Chavira MD   10 mL at 10/07/18 0908    sodium chloride flush 0.9 % injection 10 mL  10 mL Intravenous PRN Aarti Chavira MD        acetaminophen (TYLENOL) tablet 650 mg  650 mg Oral Q4H PRN Aarti Chavira MD        magnesium hydroxide (MILK OF MAGNESIA) 400 MG/5ML suspension 30 mL  30 mL Oral Daily PRN Aarti Chavira MD        ondansetron [facility]) injection 4 mg  4 mg Intravenous Q6H PRN Aarti Chavira MD   4 mg at 10/07/18 1911    losartan (COZAAR) tablet 25 mg  25 mg Oral Daily Aarti Chavira MD   25 mg at 10/07/18 1041    ticagrelor (BRILINTA) tablet 90 mg  90 mg Oral BID Aarti Chavira MD   90 mg at 10/07/18 2132    glucose (GLUTOSE) 40 % oral gel 15 g  15 g Oral PRN Aarti Chavira MD        dextrose 50 % solution 12.5 g  12.5 g Intravenous PRN Aarti Chavira MD        glucagon (rDNA) injection 1 mg  1 mg Intramuscular PRN Aarti Chavira MD        dextrose 5 % solution  100 mL/hr Intravenous PRN Aarti Chavira MD        insulin lispro (HUMALOG) injection vial 0-12 Units  0-12 Units Subcutaneous TID WC Aarti Chavira MD   6 Units at 10/07/18 1712    insulin lispro (HUMALOG) injection vial 0-6 Units  0-6 Units Subcutaneous Nightly Aarti Chavira MD   1 Units at 10/06/18 2104    atorvastatin

## 2018-10-08 NOTE — PLAN OF CARE
continued use. Assess and educate for signs/symptoms of infection. Occlusive dressing with antimicrobial patch in place. Alcohol swab cap on unused port. Aseptic technique when accessing ports.

## 2018-10-08 NOTE — CONSULTS
Hospital Medicine consult    PCP: LAURA Shepherd - CNP    Date of Admission: 10/5/2018    Date of Service: 10/06/2018    Chief Complaint:  Cardiac arrest      History Of Present Illness:      48 y.o. male who presented to [facility] after he had cardiac arrest, patient became unresponsive at hoe, CPR initiated, ROSC, transferred to ED, with [facility], concerns of STEMI, admitted by cardiology, underwent LHC, stent to LCx, PDA. Denies SOB, no palpitation or dizziness, he is having anterior chest wall pain, non radiating, 4/10 intensity, worse with deep breathing, in the context of CPR, sharp. Past Medical History:          Diagnosis Date    Arthritis     CAD (coronary artery disease)     Diabetes mellitus ([facility] [address]     Hyperlipidemia     Hypertension        Past Surgical History:          Procedure Laterality Date    COLONOSCOPY      JOINT REPLACEMENT      VASCULAR SURGERY         Medications Prior to Admission:      Prior to Admission medications    Not on File       Allergies:  Patient has no known allergies. Social History:      The patient currently lives at home    TOBACCO:   reports that he has never smoked. He has never used smokeless tobacco.  ETOH:   has no alcohol history on file. Family History:       Reviewed in detail and negative for DM    No family history on file. REVIEW OF SYSTEMS:   Pertinent positives as noted in the HPI. All other systems reviewed and negative. PHYSICAL EXAM PERFORMED:    BP 93/64   Pulse 85   Temp 97.9 °F (36.6 °C) (Oral)   Resp 22   Ht 6' 2\" (1.88 m)   Wt 214 lb 1.1 oz (97.1 kg)   SpO2 97%   BMI 27.48 kg/m²     General appearance:  No apparent distress  HEENT:  Normal cephalic  Neck: Supple  Respiratory:  Normal respiratory effort. Clear to auscultation, bilaterally without Rales/Wheezes/Rhonchi. Cardiovascular:  Regular rate and rhythm with normal S1/S2 without murmurs, rubs or gallops.   Abdomen: Soft, non-tender  Musculoskeletal:  No
Assessment:     CONSULTS:   IP CONSULT TO HOSPITALIST  IP CONSULT TO CARDIAC REHAB  IP CONSULT TO NEPHROLOGY  IP CONSULT TO CARDIAC REHAB    Patient has a CARDIOLOGY CONSULT: Yes        EDUCATION STATUS: Patient   []  Provided both written and verbal education on Cardiopulmonary Rehabilitation. []  Provided instructions for smoking cessation programs. []  Provided education of CAD risk factors. []  Provided recommendations on activity and exercise. []  Provided education on medications. []  Provided education on coronary anatomy and coronary interventions. [x]  Other:    CURRENT DIET: DIET CARDIAC; Daily Fluid Restriction: 2000 ml    EDUCATIONAL PACKETS PROVIDED- PRINTED FROM [employer]. Titles and material given:  Yes   []  Managing Heart Disease and Preventing Stroke  []  Heart Owner's Manual  []  Living Well with Heart Failure  [x]  Other:     PATIENT/CAREGIVER TEACHING:    Level of patient/caregiver understanding able to:   [] Verbalize understanding   [] Demonstrate understanding       [] Teach back        [x] Needs reinforcement     [x]  Other:       TEACHING TIME:  10 minutes       Plan:       DISCHARGE PLAN:  Placement for patient upon discharge: home with support   Hospice Care:  no  Code Status: Full Code  Discharge appointment scheduled: No Patient not \"really interested at this time. \"  RECOMMENDATIONS:   []  Patient/Family instructed to call Cardiopulmonary Rehab ([phone]) upon discharge schedule the initial evaluation  []  Encourage to call Cardiopulmonary Rehabilitation with any questions. []  Referral to alternate Cardiopulmonary Rehabilitation facility.    [x]  Other:           Electronically signed by Monse Camargo RN,MS on 10/8/2018 at 2:58 PM

## 2018-10-09 LAB
ALBUMIN SERPL-MCNC: 2.6 G/DL (ref 3.4–5)
ANION GAP SERPL CALCULATED.3IONS-SCNC: 11 MMOL/L (ref 3–16)
APTT: 40.7 SEC (ref 26–36)
APTT: 56.1 SEC (ref 26–36)
APTT: 65.7 SEC (ref 26–36)
BUN BLDV-MCNC: 10 MG/DL (ref 7–20)
CALCIUM SERPL-MCNC: 8.2 MG/DL (ref 8.3–10.6)
CHLORIDE BLD-SCNC: 96 MMOL/L (ref 99–110)
CHOLESTEROL, TOTAL: 95 MG/DL (ref 0–199)
CO2: 27 MMOL/L (ref 21–32)
CREAT SERPL-MCNC: 0.8 MG/DL (ref 0.9–1.3)
FERRITIN: 1278 NG/ML (ref 30–400)
GFR AFRICAN AMERICAN: >60
GFR NON-AFRICAN AMERICAN: >60
GLUCOSE BLD-MCNC: 185 MG/DL (ref 70–99)
GLUCOSE BLD-MCNC: 216 MG/DL (ref 70–99)
GLUCOSE BLD-MCNC: 216 MG/DL (ref 70–99)
GLUCOSE BLD-MCNC: 227 MG/DL (ref 70–99)
GLUCOSE BLD-MCNC: 228 MG/DL (ref 70–99)
HCT VFR BLD CALC: 33.1 % (ref 40.5–52.5)
HDLC SERPL-MCNC: 22 MG/DL (ref 40–60)
HEMOGLOBIN: 11 G/DL (ref 13.5–17.5)
IRON SATURATION: 19 % (ref 20–50)
IRON: 31 UG/DL (ref 59–158)
LDL CHOLESTEROL CALCULATED: 45 MG/DL
MAGNESIUM: 2 MG/DL (ref 1.8–2.4)
MCH RBC QN AUTO: 26.5 PG (ref 26–34)
MCHC RBC AUTO-ENTMCNC: 33.2 G/DL (ref 31–36)
MCV RBC AUTO: 79.8 FL (ref 80–100)
PDW BLD-RTO: 14.2 % (ref 12.4–15.4)
PERFORMED ON: ABNORMAL
PHOSPHORUS: 2.6 MG/DL (ref 2.5–4.9)
PLATELET # BLD: 355 K/UL (ref 135–450)
PMV BLD AUTO: 7.8 FL (ref 5–10.5)
POTASSIUM SERPL-SCNC: 3.6 MMOL/L (ref 3.5–5.1)
RBC # BLD: 4.15 M/UL (ref 4.2–5.9)
SODIUM BLD-SCNC: 134 MMOL/L (ref 136–145)
TOTAL IRON BINDING CAPACITY: 167 UG/DL (ref 260–445)
TRIGL SERPL-MCNC: 142 MG/DL (ref 0–150)
VLDLC SERPL CALC-MCNC: 28 MG/DL
WBC # BLD: 14 K/UL (ref 4–11)

## 2018-10-09 PROCEDURE — 6370000000 HC RX 637 (ALT 250 FOR IP): Performed by: INTERNAL MEDICINE

## 2018-10-09 PROCEDURE — 36415 COLL VENOUS BLD VENIPUNCTURE: CPT

## 2018-10-09 PROCEDURE — 6360000002 HC RX W HCPCS: Performed by: INTERNAL MEDICINE

## 2018-10-09 PROCEDURE — 85730 THROMBOPLASTIN TIME PARTIAL: CPT

## 2018-10-09 PROCEDURE — 83540 ASSAY OF IRON: CPT

## 2018-10-09 PROCEDURE — 6370000000 HC RX 637 (ALT 250 FOR IP): Performed by: NURSE PRACTITIONER

## 2018-10-09 PROCEDURE — 83550 IRON BINDING TEST: CPT

## 2018-10-09 PROCEDURE — 93005 ELECTROCARDIOGRAM TRACING: CPT | Performed by: INTERNAL MEDICINE

## 2018-10-09 PROCEDURE — 2100000000 HC CCU R&B

## 2018-10-09 PROCEDURE — 6370000000 HC RX 637 (ALT 250 FOR IP): Performed by: STUDENT IN AN ORGANIZED HEALTH CARE EDUCATION/TRAINING PROGRAM

## 2018-10-09 PROCEDURE — 85027 COMPLETE CBC AUTOMATED: CPT

## 2018-10-09 PROCEDURE — 2700000000 HC OXYGEN THERAPY PER DAY

## 2018-10-09 PROCEDURE — 80069 RENAL FUNCTION PANEL: CPT

## 2018-10-09 PROCEDURE — 2580000003 HC RX 258: Performed by: INTERNAL MEDICINE

## 2018-10-09 PROCEDURE — 99232 SBSQ HOSP IP/OBS MODERATE 35: CPT | Performed by: INTERNAL MEDICINE

## 2018-10-09 PROCEDURE — 94762 N-INVAS EAR/PLS OXIMTRY CONT: CPT

## 2018-10-09 PROCEDURE — 83735 ASSAY OF MAGNESIUM: CPT

## 2018-10-09 PROCEDURE — 82728 ASSAY OF FERRITIN: CPT

## 2018-10-09 PROCEDURE — 80061 LIPID PANEL: CPT

## 2018-10-09 RX ORDER — SODIUM CHLORIDE 9 MG/ML
INJECTION, SOLUTION INTRAVENOUS CONTINUOUS
Status: DISCONTINUED | OUTPATIENT
Start: 2018-10-09 | End: 2018-10-09

## 2018-10-09 RX ORDER — SODIUM CHLORIDE 0.9 % (FLUSH) 0.9 %
10 SYRINGE (ML) INJECTION PRN
Status: DISCONTINUED | OUTPATIENT
Start: 2018-10-09 | End: 2018-10-09

## 2018-10-09 RX ORDER — INSULIN GLARGINE 100 [IU]/ML
15 INJECTION, SOLUTION SUBCUTANEOUS NIGHTLY
Status: DISCONTINUED | OUTPATIENT
Start: 2018-10-09 | End: 2018-10-10

## 2018-10-09 RX ORDER — ACETAMINOPHEN 325 MG/1
650 TABLET ORAL EVERY 4 HOURS PRN
Status: DISCONTINUED | OUTPATIENT
Start: 2018-10-09 | End: 2018-10-09

## 2018-10-09 RX ORDER — ATORVASTATIN CALCIUM 40 MG/1
40 TABLET, FILM COATED ORAL NIGHTLY
Status: DISCONTINUED | OUTPATIENT
Start: 2018-10-09 | End: 2018-10-09

## 2018-10-09 RX ORDER — DOCUSATE SODIUM 100 MG/1
100 CAPSULE, LIQUID FILLED ORAL DAILY
Status: DISCONTINUED | OUTPATIENT
Start: 2018-10-09 | End: 2018-10-17 | Stop reason: HOSPADM

## 2018-10-09 RX ORDER — OXYCODONE HYDROCHLORIDE AND ACETAMINOPHEN 5; 325 MG/1; MG/1
2 TABLET ORAL EVERY 6 HOURS PRN
Status: DISCONTINUED | OUTPATIENT
Start: 2018-10-09 | End: 2018-10-10

## 2018-10-09 RX ORDER — POTASSIUM CHLORIDE 29.8 MG/ML
20 INJECTION INTRAVENOUS
Status: COMPLETED | OUTPATIENT
Start: 2018-10-09 | End: 2018-10-09

## 2018-10-09 RX ORDER — MORPHINE SULFATE 2 MG/ML
2 INJECTION, SOLUTION INTRAMUSCULAR; INTRAVENOUS
Status: DISCONTINUED | OUTPATIENT
Start: 2018-10-09 | End: 2018-10-09

## 2018-10-09 RX ORDER — ONDANSETRON 2 MG/ML
4 INJECTION INTRAMUSCULAR; INTRAVENOUS EVERY 6 HOURS PRN
Status: DISCONTINUED | OUTPATIENT
Start: 2018-10-09 | End: 2018-10-09

## 2018-10-09 RX ORDER — POTASSIUM CHLORIDE 29.8 MG/ML
20 INJECTION INTRAVENOUS PRN
Status: DISCONTINUED | OUTPATIENT
Start: 2018-10-09 | End: 2018-10-17 | Stop reason: HOSPADM

## 2018-10-09 RX ORDER — ATROPINE SULFATE 0.4 MG/ML
0.5 AMPUL (ML) INJECTION
Status: ACTIVE | OUTPATIENT
Start: 2018-10-09 | End: 2018-10-09

## 2018-10-09 RX ORDER — SODIUM CHLORIDE 9 MG/ML
INJECTION, SOLUTION INTRAVENOUS CONTINUOUS
Status: DISCONTINUED | OUTPATIENT
Start: 2018-10-09 | End: 2018-10-11

## 2018-10-09 RX ORDER — ASPIRIN 81 MG/1
81 TABLET, CHEWABLE ORAL DAILY
Status: DISCONTINUED | OUTPATIENT
Start: 2018-10-10 | End: 2018-10-09

## 2018-10-09 RX ORDER — FAMOTIDINE 20 MG/1
20 TABLET, FILM COATED ORAL 2 TIMES DAILY
Status: DISCONTINUED | OUTPATIENT
Start: 2018-10-09 | End: 2018-10-17 | Stop reason: HOSPADM

## 2018-10-09 RX ORDER — SODIUM CHLORIDE 0.9 % (FLUSH) 0.9 %
10 SYRINGE (ML) INJECTION EVERY 12 HOURS SCHEDULED
Status: DISCONTINUED | OUTPATIENT
Start: 2018-10-09 | End: 2018-10-09

## 2018-10-09 RX ADMIN — INSULIN LISPRO 4 UNITS: 100 INJECTION, SOLUTION INTRAVENOUS; SUBCUTANEOUS at 16:34

## 2018-10-09 RX ADMIN — PHENYLEPHRINE HYDROCHLORIDE 18.5 MCG/MIN: 10 INJECTION INTRAVENOUS at 02:21

## 2018-10-09 RX ADMIN — BENZONATATE 100 MG: 100 CAPSULE ORAL at 15:05

## 2018-10-09 RX ADMIN — INSULIN GLARGINE 15 UNITS: 100 INJECTION, SOLUTION SUBCUTANEOUS at 20:47

## 2018-10-09 RX ADMIN — ASPIRIN 81 MG 81 MG: 81 TABLET ORAL at 08:55

## 2018-10-09 RX ADMIN — HEPARIN SODIUM 19.5 ML/HR: 10000 INJECTION, SOLUTION INTRAVENOUS at 08:55

## 2018-10-09 RX ADMIN — MORPHINE SULFATE 1 MG: 2 INJECTION, SOLUTION INTRAMUSCULAR; INTRAVENOUS at 09:16

## 2018-10-09 RX ADMIN — Medication 10 ML: at 20:47

## 2018-10-09 RX ADMIN — METOPROLOL SUCCINATE 50 MG: 50 TABLET, EXTENDED RELEASE ORAL at 08:55

## 2018-10-09 RX ADMIN — PIPERACILLIN SODIUM,TAZOBACTAM SODIUM 3.38 G: 3; .375 INJECTION, POWDER, FOR SOLUTION INTRAVENOUS at 20:46

## 2018-10-09 RX ADMIN — MORPHINE SULFATE 1 MG: 2 INJECTION, SOLUTION INTRAMUSCULAR; INTRAVENOUS at 23:42

## 2018-10-09 RX ADMIN — PANTOPRAZOLE SODIUM 40 MG: 40 TABLET, DELAYED RELEASE ORAL at 06:36

## 2018-10-09 RX ADMIN — AMIODARONE HYDROCHLORIDE 400 MG: 200 TABLET ORAL at 20:46

## 2018-10-09 RX ADMIN — PIPERACILLIN SODIUM,TAZOBACTAM SODIUM 3.38 G: 3; .375 INJECTION, POWDER, FOR SOLUTION INTRAVENOUS at 12:02

## 2018-10-09 RX ADMIN — INSULIN LISPRO 1 UNITS: 100 INJECTION, SOLUTION INTRAVENOUS; SUBCUTANEOUS at 20:47

## 2018-10-09 RX ADMIN — AMIODARONE HYDROCHLORIDE 400 MG: 200 TABLET ORAL at 08:55

## 2018-10-09 RX ADMIN — ATORVASTATIN CALCIUM 40 MG: 40 TABLET, FILM COATED ORAL at 20:46

## 2018-10-09 RX ADMIN — PIPERACILLIN SODIUM,TAZOBACTAM SODIUM 3.38 G: 3; .375 INJECTION, POWDER, FOR SOLUTION INTRAVENOUS at 03:57

## 2018-10-09 RX ADMIN — TICAGRELOR 90 MG: 90 TABLET ORAL at 08:55

## 2018-10-09 RX ADMIN — POTASSIUM CHLORIDE 20 MEQ: 29.8 INJECTION, SOLUTION INTRAVENOUS at 09:49

## 2018-10-09 RX ADMIN — OXYCODONE HYDROCHLORIDE AND ACETAMINOPHEN 2 TABLET: 5; 325 TABLET ORAL at 18:54

## 2018-10-09 RX ADMIN — POTASSIUM CHLORIDE 20 MEQ: 29.8 INJECTION, SOLUTION INTRAVENOUS at 10:51

## 2018-10-09 RX ADMIN — FAMOTIDINE 20 MG: 20 TABLET ORAL at 08:55

## 2018-10-09 RX ADMIN — TICAGRELOR 90 MG: 90 TABLET ORAL at 20:46

## 2018-10-09 RX ADMIN — DOCUSATE SODIUM 100 MG: 100 CAPSULE, LIQUID FILLED ORAL at 13:58

## 2018-10-09 RX ADMIN — INSULIN LISPRO 4 UNITS: 100 INJECTION, SOLUTION INTRAVENOUS; SUBCUTANEOUS at 12:14

## 2018-10-09 RX ADMIN — INSULIN LISPRO 4 UNITS: 100 INJECTION, SOLUTION INTRAVENOUS; SUBCUTANEOUS at 09:19

## 2018-10-09 RX ADMIN — HEPARIN SODIUM 19.5 ML/HR: 10000 INJECTION, SOLUTION INTRAVENOUS at 23:42

## 2018-10-09 RX ADMIN — MORPHINE SULFATE 2 MG: 2 INJECTION, SOLUTION INTRAMUSCULAR; INTRAVENOUS at 16:26

## 2018-10-09 RX ADMIN — Medication 10 ML: at 08:55

## 2018-10-09 RX ADMIN — MORPHINE SULFATE 2 MG: 2 INJECTION, SOLUTION INTRAMUSCULAR; INTRAVENOUS at 13:24

## 2018-10-09 RX ADMIN — FAMOTIDINE 20 MG: 20 TABLET ORAL at 20:47

## 2018-10-09 ASSESSMENT — PAIN SCALES - GENERAL
PAINLEVEL_OUTOF10: 4
PAINLEVEL_OUTOF10: 7
PAINLEVEL_OUTOF10: 2
PAINLEVEL_OUTOF10: 3
PAINLEVEL_OUTOF10: 7
PAINLEVEL_OUTOF10: 7
PAINLEVEL_OUTOF10: 2
PAINLEVEL_OUTOF10: 4
PAINLEVEL_OUTOF10: 8
PAINLEVEL_OUTOF10: 9
PAINLEVEL_OUTOF10: 4
PAINLEVEL_OUTOF10: 7
PAINLEVEL_OUTOF10: 5
PAINLEVEL_OUTOF10: 2

## 2018-10-09 ASSESSMENT — PAIN DESCRIPTION - ORIENTATION
ORIENTATION: RIGHT;ANTERIOR;MID
ORIENTATION: ANTERIOR;MID
ORIENTATION: RIGHT;ANTERIOR;MID
ORIENTATION: MID;ANTERIOR
ORIENTATION: RIGHT;MID;ANTERIOR
ORIENTATION: RIGHT;ANTERIOR;MID

## 2018-10-09 ASSESSMENT — PAIN DESCRIPTION - PAIN TYPE
TYPE: ACUTE PAIN

## 2018-10-09 ASSESSMENT — PAIN DESCRIPTION - PROGRESSION
CLINICAL_PROGRESSION: GRADUALLY IMPROVING
CLINICAL_PROGRESSION: GRADUALLY WORSENING
CLINICAL_PROGRESSION: GRADUALLY IMPROVING

## 2018-10-09 ASSESSMENT — PAIN DESCRIPTION - LOCATION
LOCATION: CHEST

## 2018-10-09 ASSESSMENT — PAIN DESCRIPTION - DESCRIPTORS
DESCRIPTORS: PRESSURE
DESCRIPTORS: DISCOMFORT;SORE;PRESSURE
DESCRIPTORS: ACHING
DESCRIPTORS: DISCOMFORT;PRESSURE
DESCRIPTORS: PRESSURE
DESCRIPTORS: ACHING

## 2018-10-09 ASSESSMENT — PAIN DESCRIPTION - ONSET
ONSET: ON-GOING

## 2018-10-09 ASSESSMENT — PAIN DESCRIPTION - FREQUENCY
FREQUENCY: CONTINUOUS

## 2018-10-09 NOTE — PROGRESS NOTES
27   Temp:    SpO2: 97%    Weight: 205 lb 7.5 oz (93.2 kg)       General Appearance:  Alert, cooperative, no distress, appears stated age. Head:  Normocephalic, without obvious abnormality, atraumatic. Eyes:  Pupils equal and round. No scleral icterus. Mouth: Moist mucosa, no pharyngeal erythema. Nose: Nares normal. No drainage or sinus tenderness. Neck: Supple, symmetrical, trachea midline. No adenopathy. No tenderness/mass/nodules. No carotid bruit or elevated JVD. Lungs:   Clear to auscultation bilaterally, respirations unlabored. No wheeze, rales, or rhonchi. Chest Wall:  No tenderness or deformity. Heart:  Regular rate, S1/ S2 normal. No murmur, rub, or gallop. Abdomen:   Soft, non-tender, bowel sounds active. Musculoskeletal: No muscle wasting or digital clubbing. Extremities: Extremities normal, atraumatic. No cyanosis or edema. Pulses: 2+ radial and pedal pulses, symmetric. Skin: No rashes or lesions. Pysch: Normal mood and affect. Alert and oriented x 4. Neurologic: Normal gross motor and sensory exam.       Labs     CBC:   Lab Results   Component Value Date    WBC 14.0 10/09/2018    RBC 4.15 10/09/2018    HGB 11.0 10/09/2018    HCT 33.1 10/09/2018    MCV 79.8 10/09/2018    RDW 14.2 10/09/2018     10/09/2018     CMP:  Lab Results   Component Value Date     10/09/2018    K 3.6 10/09/2018    CL 96 10/09/2018    CO2 27 10/09/2018    BUN 10 10/09/2018    CREATININE 0.8 10/09/2018    GFRAA >60 10/09/2018    AGRATIO 0.6 10/06/2018    LABGLOM >60 10/09/2018    GLUCOSE 216 10/09/2018    PROT 6.7 10/06/2018    CALCIUM 8.2 10/09/2018    BILITOT 0.3 10/06/2018    ALKPHOS 85 10/06/2018    AST 35 10/06/2018    ALT 28 10/06/2018     PT/INR:  No results found for: PTINR  HgBA1c:  Lab Results   Component Value Date    LABA1C 8.9 10/08/2018     Lab Results   Component Value Date    TROPONINI 0.93 ([place]) 10/07/2018       Cardiac, Vascular and Imaging Data     EKG:  Wide QRS rhythmRight bundle branch blockT wave abnormality, consider inferolateral ischemiaAbnormal ECGWhen compared with ECG of 08-OCT-2018 08:16,     Echo: will repeat     Cath: Right PDAstent with a 2.25 x 18 mm length Mini Vision bare metal stent     Assessment and Plan     -Acute MI involving infero-lateral walls  -Acute VT  Arrest   -Hypotension     Status post multivessl PCI of LCX, OM1, Right PDA  On IABP  Will decrease support to 2:1   On BB  Wean pressors     Remains critical   Continue IV heparin, DAPT  On pressors secondary to hypotension and cardiogenic shock state  EP consult as he will probaly need ICD   On Amiodarone     Prognosis remains guarded     Thank you for allowing us to participate in the care of Juni King. Please do not hesitate to contact me if you have any questions.     Kaylene Wolfe MD, MPH    [address]   Dhaval Segovia [facility]  Ph: [phone]  Fax: [phone]    10/9/2018 3:41 PM

## 2018-10-09 NOTE — PROGRESS NOTES
Am meds taken with no problems. Did request pudding and then did order his breakfast. Did have blood pressure cuff on the right upper arm. Did have previous IV in the right [facility] which is no longer there, but is red and sore. Did place blood pressure cuff on the left upper arm. Did wean the bhavya drip down to 9 mcg will continue to monitor and wean as approp.

## 2018-10-09 NOTE — PLAN OF CARE
Problem: Pain:  Goal: Control of acute pain  Control of acute pain   Outcome: Ongoing  Pt alert and oriented. Pt able to communicate present pain and use the pain scale appropriately. Nonpharmacological pain reducers and pain medication offered as needed. Will cont to monitor. Problem: Falls - Risk of:  Goal: Will remain free from falls  Will remain free from falls   Outcome: Ongoing  Pt remains free of falls. Fall risk protocol in place. Bed locked in lowest position. Call light in reach. Pt instructed to call for assistance, verbalizes understanding. Will continue to monitor.

## 2018-10-09 NOTE — PROGRESS NOTES
benzonatate    Physical Exam:    TEMPERATURE:  Current - Temp: 98.1 °F (36.7 °C); Max - Temp  Av.6 °F (37 °C)  Min: 98 °F (36.7 °C)  Max: 99.4 °F (37.4 °C)  RESPIRATIONS RANGE: Resp  Av  Min: 0  Max: 37  PULSE RANGE: Pulse  Av  Min: 74  Max: 99  BLOOD PRESSURE RANGE:  Systolic (91HQU), WWN:890 , Min:94 , CHF:470   ; Diastolic (94ZVK), ODE:98, Min:36, Max:91    24HR INTAKE/OUTPUT:    Intake/Output Summary (Last 24 hours) at 10/09/18 09  Last data filed at 10/09/18 0636   Gross per 24 hour   Intake           2011.9 ml   Output             2275 ml   Net           -263.1 ml       General appearance: alert, appears stated age and cooperative  Head: Normocephalic, atraumatic  Eyes: PERRL, EOM's intact. Nose: Nares normal.  No drainage or sinus tenderness.   Neck:  no JVD, supple  Lungs: clear to auscultation bilaterally  Heart: regular rate and rhythm, no rub  Abdomen: soft, non-tender; no organomegaly  Extremities: no edema  Skin: Skin color, texture, turgor normal. No rashes or lesions  Neurologic: Grossly normal        LAB DATA:    CBC: Lab Results   Component Value Date    WBC 14.0 10/09/2018    RBC 4.15 10/09/2018    HGB 11.0 10/09/2018    HCT 33.1 10/09/2018    MCV 79.8 10/09/2018    MCH 26.5 10/09/2018    MCHC 33.2 10/09/2018    RDW 14.2 10/09/2018     10/09/2018    MPV 7.8 10/09/2018     BMP:  Lab Results   Component Value Date     10/09/2018    K 3.6 10/09/2018    CL 96 10/09/2018    CO2 27 10/09/2018    BUN 10 10/09/2018    CREATININE 0.8 10/09/2018    CALCIUM 8.2 10/09/2018    GFRAA >60 10/09/2018    LABGLOM >60 10/09/2018    GLUCOSE 216 10/09/2018     Ionized Calcium:  No results found for: IONCA  Magnesium:    Lab Results   Component Value Date    MG 2.00 10/09/2018     Phosphorus:    Lab Results   Component Value Date    PHOS 2.6 10/09/2018       IMPRESSION/RECOMMENDATIONS:      Active Problems:    STEMI (ST elevation myocardial infarction) (HCC)    Abnormal EKG    Elevated

## 2018-10-10 ENCOUNTER — APPOINTMENT (OUTPATIENT)
Dept: GENERAL RADIOLOGY | Age: 53
DRG: 222 | End: 2018-10-10
Payer: COMMERCIAL

## 2018-10-10 LAB
ALBUMIN SERPL-MCNC: 2.3 G/DL (ref 3.4–5)
ALBUMIN SERPL-MCNC: 2.3 G/DL (ref 3.4–5)
ALP BLD-CCNC: 63 U/L (ref 40–129)
ALT SERPL-CCNC: 14 U/L (ref 10–40)
ANION GAP SERPL CALCULATED.3IONS-SCNC: 10 MMOL/L (ref 3–16)
APTT: 65.7 SEC (ref 26–36)
AST SERPL-CCNC: 11 U/L (ref 15–37)
BILIRUB SERPL-MCNC: 0.3 MG/DL (ref 0–1)
BILIRUBIN DIRECT: <0.2 MG/DL (ref 0–0.3)
BILIRUBIN, INDIRECT: ABNORMAL MG/DL (ref 0–1)
BUN BLDV-MCNC: 8 MG/DL (ref 7–20)
CALCIUM SERPL-MCNC: 7.9 MG/DL (ref 8.3–10.6)
CHLORIDE BLD-SCNC: 97 MMOL/L (ref 99–110)
CO2: 28 MMOL/L (ref 21–32)
CREAT SERPL-MCNC: 0.8 MG/DL (ref 0.9–1.3)
GFR AFRICAN AMERICAN: >60
GFR NON-AFRICAN AMERICAN: >60
GLUCOSE BLD-MCNC: 132 MG/DL (ref 70–99)
GLUCOSE BLD-MCNC: 144 MG/DL (ref 70–99)
GLUCOSE BLD-MCNC: 182 MG/DL (ref 70–99)
GLUCOSE BLD-MCNC: 187 MG/DL (ref 70–99)
GLUCOSE BLD-MCNC: 222 MG/DL (ref 70–99)
MAGNESIUM: 2 MG/DL (ref 1.8–2.4)
PERFORMED ON: ABNORMAL
PHOSPHORUS: 3.1 MG/DL (ref 2.5–4.9)
POC ACT LR: 165 SEC
POTASSIUM REFLEX MAGNESIUM: 3.4 MMOL/L (ref 3.5–5.1)
POTASSIUM SERPL-SCNC: 3.4 MMOL/L (ref 3.5–5.1)
SODIUM BLD-SCNC: 135 MMOL/L (ref 136–145)
TOTAL PROTEIN: 5.9 G/DL (ref 6.4–8.2)

## 2018-10-10 PROCEDURE — 83735 ASSAY OF MAGNESIUM: CPT

## 2018-10-10 PROCEDURE — 6370000000 HC RX 637 (ALT 250 FOR IP): Performed by: INTERNAL MEDICINE

## 2018-10-10 PROCEDURE — 80076 HEPATIC FUNCTION PANEL: CPT

## 2018-10-10 PROCEDURE — 6360000002 HC RX W HCPCS: Performed by: INTERNAL MEDICINE

## 2018-10-10 PROCEDURE — 2100000000 HC CCU R&B

## 2018-10-10 PROCEDURE — 85347 COAGULATION TIME ACTIVATED: CPT

## 2018-10-10 PROCEDURE — 6370000000 HC RX 637 (ALT 250 FOR IP): Performed by: STUDENT IN AN ORGANIZED HEALTH CARE EDUCATION/TRAINING PROGRAM

## 2018-10-10 PROCEDURE — 36592 COLLECT BLOOD FROM PICC: CPT

## 2018-10-10 PROCEDURE — 71111 X-RAY EXAM RIBS/CHEST4/> VWS: CPT

## 2018-10-10 PROCEDURE — 2580000003 HC RX 258: Performed by: INTERNAL MEDICINE

## 2018-10-10 PROCEDURE — 2700000000 HC OXYGEN THERAPY PER DAY

## 2018-10-10 PROCEDURE — 36415 COLL VENOUS BLD VENIPUNCTURE: CPT

## 2018-10-10 PROCEDURE — 80069 RENAL FUNCTION PANEL: CPT

## 2018-10-10 PROCEDURE — 85730 THROMBOPLASTIN TIME PARTIAL: CPT

## 2018-10-10 RX ORDER — OXYCODONE HYDROCHLORIDE 10 MG/1
10 TABLET ORAL EVERY 4 HOURS PRN
Status: DISCONTINUED | OUTPATIENT
Start: 2018-10-10 | End: 2018-10-17 | Stop reason: HOSPADM

## 2018-10-10 RX ORDER — INSULIN GLARGINE 100 [IU]/ML
20 INJECTION, SOLUTION SUBCUTANEOUS NIGHTLY
Status: DISCONTINUED | OUTPATIENT
Start: 2018-10-10 | End: 2018-10-14

## 2018-10-10 RX ORDER — OXYCODONE HCL 10 MG/1
20 TABLET, FILM COATED, EXTENDED RELEASE ORAL 2 TIMES DAILY
Status: DISCONTINUED | OUTPATIENT
Start: 2018-10-10 | End: 2018-10-12

## 2018-10-10 RX ORDER — FENTANYL CITRATE 50 UG/ML
INJECTION, SOLUTION INTRAMUSCULAR; INTRAVENOUS
Status: DISPENSED
Start: 2018-10-10 | End: 2018-10-10

## 2018-10-10 RX ORDER — SODIUM CHLORIDE 9 MG/ML
INJECTION, SOLUTION INTRAVENOUS
Status: DISPENSED
Start: 2018-10-10 | End: 2018-10-10

## 2018-10-10 RX ORDER — FENTANYL CITRATE 50 UG/ML
25 INJECTION, SOLUTION INTRAMUSCULAR; INTRAVENOUS ONCE
Status: COMPLETED | OUTPATIENT
Start: 2018-10-10 | End: 2018-10-10

## 2018-10-10 RX ADMIN — OXYCODONE HYDROCHLORIDE 20 MG: 10 TABLET, FILM COATED, EXTENDED RELEASE ORAL at 13:27

## 2018-10-10 RX ADMIN — BENZONATATE 100 MG: 100 CAPSULE ORAL at 03:17

## 2018-10-10 RX ADMIN — SODIUM CHLORIDE: 9 INJECTION, SOLUTION INTRAVENOUS at 15:06

## 2018-10-10 RX ADMIN — DOCUSATE SODIUM 100 MG: 100 CAPSULE, LIQUID FILLED ORAL at 10:28

## 2018-10-10 RX ADMIN — ONDANSETRON 4 MG: 2 INJECTION INTRAMUSCULAR; INTRAVENOUS at 08:29

## 2018-10-10 RX ADMIN — OXYCODONE HYDROCHLORIDE 20 MG: 10 TABLET, FILM COATED, EXTENDED RELEASE ORAL at 20:49

## 2018-10-10 RX ADMIN — TICAGRELOR 90 MG: 90 TABLET ORAL at 22:44

## 2018-10-10 RX ADMIN — INSULIN LISPRO 2 UNITS: 100 INJECTION, SOLUTION INTRAVENOUS; SUBCUTANEOUS at 12:50

## 2018-10-10 RX ADMIN — INSULIN LISPRO 2 UNITS: 100 INJECTION, SOLUTION INTRAVENOUS; SUBCUTANEOUS at 07:37

## 2018-10-10 RX ADMIN — BENZONATATE 100 MG: 100 CAPSULE ORAL at 20:54

## 2018-10-10 RX ADMIN — PIPERACILLIN SODIUM,TAZOBACTAM SODIUM 3.38 G: 3; .375 INJECTION, POWDER, FOR SOLUTION INTRAVENOUS at 04:16

## 2018-10-10 RX ADMIN — MORPHINE SULFATE 2 MG: 2 INJECTION, SOLUTION INTRAMUSCULAR; INTRAVENOUS at 08:36

## 2018-10-10 RX ADMIN — BENZONATATE 100 MG: 100 CAPSULE ORAL at 14:52

## 2018-10-10 RX ADMIN — METOPROLOL SUCCINATE 50 MG: 50 TABLET, EXTENDED RELEASE ORAL at 10:28

## 2018-10-10 RX ADMIN — TICAGRELOR 90 MG: 90 TABLET ORAL at 10:28

## 2018-10-10 RX ADMIN — ONDANSETRON 4 MG: 2 INJECTION INTRAMUSCULAR; INTRAVENOUS at 20:54

## 2018-10-10 RX ADMIN — FENTANYL CITRATE 25 MCG: 50 INJECTION INTRAMUSCULAR; INTRAVENOUS at 08:50

## 2018-10-10 RX ADMIN — OXYCODONE HYDROCHLORIDE AND ACETAMINOPHEN 2 TABLET: 5; 325 TABLET ORAL at 02:28

## 2018-10-10 RX ADMIN — INSULIN GLARGINE 20 UNITS: 100 INJECTION, SOLUTION SUBCUTANEOUS at 20:52

## 2018-10-10 RX ADMIN — AMIODARONE HYDROCHLORIDE 400 MG: 200 TABLET ORAL at 09:37

## 2018-10-10 RX ADMIN — ASPIRIN 81 MG 81 MG: 81 TABLET ORAL at 10:28

## 2018-10-10 RX ADMIN — INSULIN LISPRO 2 UNITS: 100 INJECTION, SOLUTION INTRAVENOUS; SUBCUTANEOUS at 17:12

## 2018-10-10 RX ADMIN — ATORVASTATIN CALCIUM 40 MG: 40 TABLET, FILM COATED ORAL at 20:50

## 2018-10-10 RX ADMIN — Medication 10 ML: at 09:38

## 2018-10-10 RX ADMIN — OXYCODONE HYDROCHLORIDE AND ACETAMINOPHEN 2 TABLET: 5; 325 TABLET ORAL at 08:21

## 2018-10-10 RX ADMIN — POTASSIUM CHLORIDE 20 MEQ: 29.8 INJECTION, SOLUTION INTRAVENOUS at 06:36

## 2018-10-10 RX ADMIN — AMIODARONE HYDROCHLORIDE 400 MG: 200 TABLET ORAL at 20:50

## 2018-10-10 RX ADMIN — PANTOPRAZOLE SODIUM 40 MG: 40 TABLET, DELAYED RELEASE ORAL at 09:37

## 2018-10-10 RX ADMIN — Medication 10 ML: at 20:50

## 2018-10-10 RX ADMIN — POTASSIUM CHLORIDE 20 MEQ: 29.8 INJECTION, SOLUTION INTRAVENOUS at 08:29

## 2018-10-10 ASSESSMENT — PAIN DESCRIPTION - PROGRESSION
CLINICAL_PROGRESSION: GRADUALLY IMPROVING

## 2018-10-10 ASSESSMENT — PAIN SCALES - GENERAL
PAINLEVEL_OUTOF10: 8
PAINLEVEL_OUTOF10: 8
PAINLEVEL_OUTOF10: 5
PAINLEVEL_OUTOF10: 8
PAINLEVEL_OUTOF10: 2
PAINLEVEL_OUTOF10: 3
PAINLEVEL_OUTOF10: 3

## 2018-10-10 ASSESSMENT — PAIN DESCRIPTION - PAIN TYPE
TYPE: ACUTE PAIN

## 2018-10-10 ASSESSMENT — PAIN DESCRIPTION - LOCATION
LOCATION: CHEST;RIB CAGE

## 2018-10-10 ASSESSMENT — PAIN DESCRIPTION - ORIENTATION: ORIENTATION: MID;ANTERIOR

## 2018-10-10 ASSESSMENT — PAIN DESCRIPTION - DESCRIPTORS: DESCRIPTORS: ACHING

## 2018-10-10 ASSESSMENT — PAIN DESCRIPTION - FREQUENCY: FREQUENCY: CONTINUOUS

## 2018-10-10 ASSESSMENT — PAIN DESCRIPTION - ONSET: ONSET: ON-GOING

## 2018-10-10 NOTE — PLAN OF CARE
Problem: Pain:  Goal: Pain level will decrease  Pain level will decrease    Outcome: Ongoing  A: Assess for pain. Reposition and/or medicate as needed for pain. Reassess for effectiveness of intervention. Notify physician of unmanaged pain. Goal: Recognizes and communicates pain  Recognizes and communicates pain   Outcome: Ongoing  A: Assess for pain. Reposition and/or medicate as needed for pain. Reassess for effectiveness of intervention. Notify physician of unmanaged pain. Goal: Control of acute pain  Control of acute pain   Outcome: Ongoing  A: Assess for pain. Reposition and/or medicate as needed for pain. Reassess for effectiveness of intervention. Notify physician of unmanaged pain. Problem: Tissue Perfusion, Altered:  Intervention: Monitor intra-arterial pressure  A: Assess cath lab puncture site for bruising, oozing, hematoma. Notify cardiologist if hematoma develops. Apply manual pressure until hemostasis achieved and hematoma soft. Goal: Circulatory function within specified parameters  Circulatory function within specified parameters   Outcome: Ongoing  A: Assess Peripheral pulses, color, cap refill and skin temp. Problem: Pain:  Goal: Pain level will decrease  Pain level will decrease    Outcome: Ongoing  A: Assess for pain. Reposition and/or medicate as needed for pain. Reassess for effectiveness of intervention. Notify physician of unmanaged pain. Problem: Falls - Risk of:  Goal: Will remain free from falls  Will remain free from falls   Outcome: Ongoing  A: Sound com dome light, fall risk arm band, bed/chair alarm, bed in lowest position, wheels of bed/chair locked, non skid footwear, call light in reach, fall precaution education, intentional rounding. PT/OT eval/treat      Problem: Tissue Perfusion - Cardiopulmonary, Altered:  Goal: Hemodynamic stability will improve  Hemodynamic stability will improve   Outcome: Ongoing  A: Assess VS per routine.  Titrate Hussein gtt to

## 2018-10-11 LAB
ALBUMIN SERPL-MCNC: 2.4 G/DL (ref 3.4–5)
ANION GAP SERPL CALCULATED.3IONS-SCNC: 9 MMOL/L (ref 3–16)
BASOPHILS ABSOLUTE: 0.1 K/UL (ref 0–0.2)
BASOPHILS RELATIVE PERCENT: 0.8 %
BUN BLDV-MCNC: 6 MG/DL (ref 7–20)
CALCIUM SERPL-MCNC: 8.2 MG/DL (ref 8.3–10.6)
CHLORIDE BLD-SCNC: 98 MMOL/L (ref 99–110)
CO2: 28 MMOL/L (ref 21–32)
CREAT SERPL-MCNC: 0.8 MG/DL (ref 0.9–1.3)
EKG ATRIAL RATE: 75 BPM
EKG DIAGNOSIS: NORMAL
EKG P AXIS: 44 DEGREES
EKG P-R INTERVAL: 166 MS
EKG Q-T INTERVAL: 424 MS
EKG QRS DURATION: 84 MS
EKG QTC CALCULATION (BAZETT): 473 MS
EKG R AXIS: 17 DEGREES
EKG T AXIS: 51 DEGREES
EKG VENTRICULAR RATE: 75 BPM
EOSINOPHILS ABSOLUTE: 0.3 K/UL (ref 0–0.6)
EOSINOPHILS RELATIVE PERCENT: 2.3 %
GFR AFRICAN AMERICAN: >60
GFR NON-AFRICAN AMERICAN: >60
GLUCOSE BLD-MCNC: 111 MG/DL (ref 70–99)
GLUCOSE BLD-MCNC: 164 MG/DL (ref 70–99)
GLUCOSE BLD-MCNC: 166 MG/DL (ref 70–99)
GLUCOSE BLD-MCNC: 176 MG/DL (ref 70–99)
GLUCOSE BLD-MCNC: 95 MG/DL (ref 70–99)
HCT VFR BLD CALC: 35.6 % (ref 40.5–52.5)
HEMOGLOBIN: 11.7 G/DL (ref 13.5–17.5)
LACTIC ACID: 0.9 MMOL/L (ref 0.4–2)
LYMPHOCYTES ABSOLUTE: 2 K/UL (ref 1–5.1)
LYMPHOCYTES RELATIVE PERCENT: 17 %
MAGNESIUM: 2 MG/DL (ref 1.8–2.4)
MCH RBC QN AUTO: 26.3 PG (ref 26–34)
MCHC RBC AUTO-ENTMCNC: 32.8 G/DL (ref 31–36)
MCV RBC AUTO: 80 FL (ref 80–100)
MONOCYTES ABSOLUTE: 1.3 K/UL (ref 0–1.3)
MONOCYTES RELATIVE PERCENT: 10.7 %
NEUTROPHILS ABSOLUTE: 8.2 K/UL (ref 1.7–7.7)
NEUTROPHILS RELATIVE PERCENT: 69.2 %
PDW BLD-RTO: 14.4 % (ref 12.4–15.4)
PERFORMED ON: ABNORMAL
PERFORMED ON: NORMAL
PHOSPHORUS: 3.1 MG/DL (ref 2.5–4.9)
PLATELET # BLD: 383 K/UL (ref 135–450)
PMV BLD AUTO: 7.8 FL (ref 5–10.5)
POTASSIUM SERPL-SCNC: 3.8 MMOL/L (ref 3.5–5.1)
RBC # BLD: 4.45 M/UL (ref 4.2–5.9)
SODIUM BLD-SCNC: 135 MMOL/L (ref 136–145)
WBC # BLD: 11.8 K/UL (ref 4–11)

## 2018-10-11 PROCEDURE — 6370000000 HC RX 637 (ALT 250 FOR IP): Performed by: INTERNAL MEDICINE

## 2018-10-11 PROCEDURE — 85025 COMPLETE CBC W/AUTO DIFF WBC: CPT

## 2018-10-11 PROCEDURE — 2580000003 HC RX 258: Performed by: INTERNAL MEDICINE

## 2018-10-11 PROCEDURE — 93010 ELECTROCARDIOGRAM REPORT: CPT | Performed by: INTERNAL MEDICINE

## 2018-10-11 PROCEDURE — 36592 COLLECT BLOOD FROM PICC: CPT

## 2018-10-11 PROCEDURE — 2700000000 HC OXYGEN THERAPY PER DAY

## 2018-10-11 PROCEDURE — 83735 ASSAY OF MAGNESIUM: CPT

## 2018-10-11 PROCEDURE — 2100000000 HC CCU R&B

## 2018-10-11 PROCEDURE — 99233 SBSQ HOSP IP/OBS HIGH 50: CPT | Performed by: INTERNAL MEDICINE

## 2018-10-11 PROCEDURE — 80069 RENAL FUNCTION PANEL: CPT

## 2018-10-11 PROCEDURE — 94762 N-INVAS EAR/PLS OXIMTRY CONT: CPT

## 2018-10-11 PROCEDURE — 83605 ASSAY OF LACTIC ACID: CPT

## 2018-10-11 PROCEDURE — 6370000000 HC RX 637 (ALT 250 FOR IP): Performed by: STUDENT IN AN ORGANIZED HEALTH CARE EDUCATION/TRAINING PROGRAM

## 2018-10-11 RX ORDER — CHLORHEXIDINE GLUCONATE 4 G/100ML
SOLUTION TOPICAL ONCE
Status: DISCONTINUED | OUTPATIENT
Start: 2018-10-11 | End: 2018-10-11

## 2018-10-11 RX ORDER — CHLORHEXIDINE GLUCONATE 4 G/100ML
SOLUTION TOPICAL ONCE
Status: COMPLETED | OUTPATIENT
Start: 2018-10-11 | End: 2018-10-12

## 2018-10-11 RX ORDER — SODIUM CHLORIDE 0.9 % (FLUSH) 0.9 %
10 SYRINGE (ML) INJECTION PRN
Status: DISCONTINUED | OUTPATIENT
Start: 2018-10-11 | End: 2018-10-17 | Stop reason: HOSPADM

## 2018-10-11 RX ORDER — SODIUM CHLORIDE 0.9 % (FLUSH) 0.9 %
10 SYRINGE (ML) INJECTION EVERY 12 HOURS SCHEDULED
Status: DISCONTINUED | OUTPATIENT
Start: 2018-10-11 | End: 2018-10-17 | Stop reason: HOSPADM

## 2018-10-11 RX ADMIN — AMIODARONE HYDROCHLORIDE 400 MG: 200 TABLET ORAL at 20:29

## 2018-10-11 RX ADMIN — DOCUSATE SODIUM 100 MG: 100 CAPSULE, LIQUID FILLED ORAL at 09:03

## 2018-10-11 RX ADMIN — ASPIRIN 81 MG 81 MG: 81 TABLET ORAL at 09:03

## 2018-10-11 RX ADMIN — TICAGRELOR 90 MG: 90 TABLET ORAL at 20:30

## 2018-10-11 RX ADMIN — OXYCODONE HYDROCHLORIDE 10 MG: 10 TABLET ORAL at 02:42

## 2018-10-11 RX ADMIN — OXYCODONE HYDROCHLORIDE 10 MG: 10 TABLET ORAL at 18:55

## 2018-10-11 RX ADMIN — INSULIN LISPRO 2 UNITS: 100 INJECTION, SOLUTION INTRAVENOUS; SUBCUTANEOUS at 11:55

## 2018-10-11 RX ADMIN — SODIUM CHLORIDE: 9 INJECTION, SOLUTION INTRAVENOUS at 04:09

## 2018-10-11 RX ADMIN — FAMOTIDINE 20 MG: 20 TABLET ORAL at 20:29

## 2018-10-11 RX ADMIN — FAMOTIDINE 20 MG: 20 TABLET ORAL at 09:03

## 2018-10-11 RX ADMIN — Medication 10 ML: at 20:29

## 2018-10-11 RX ADMIN — TICAGRELOR 90 MG: 90 TABLET ORAL at 09:03

## 2018-10-11 RX ADMIN — OXYCODONE HYDROCHLORIDE 20 MG: 10 TABLET, FILM COATED, EXTENDED RELEASE ORAL at 20:29

## 2018-10-11 RX ADMIN — Medication 10 ML: at 09:03

## 2018-10-11 RX ADMIN — PANTOPRAZOLE SODIUM 40 MG: 40 TABLET, DELAYED RELEASE ORAL at 06:13

## 2018-10-11 RX ADMIN — BENZONATATE 100 MG: 100 CAPSULE ORAL at 18:55

## 2018-10-11 RX ADMIN — METOPROLOL SUCCINATE 50 MG: 50 TABLET, EXTENDED RELEASE ORAL at 09:03

## 2018-10-11 RX ADMIN — OXYCODONE HYDROCHLORIDE 20 MG: 10 TABLET, FILM COATED, EXTENDED RELEASE ORAL at 09:03

## 2018-10-11 RX ADMIN — AMIODARONE HYDROCHLORIDE 400 MG: 200 TABLET ORAL at 09:02

## 2018-10-11 RX ADMIN — INSULIN GLARGINE 20 UNITS: 100 INJECTION, SOLUTION SUBCUTANEOUS at 20:29

## 2018-10-11 RX ADMIN — INSULIN LISPRO 2 UNITS: 100 INJECTION, SOLUTION INTRAVENOUS; SUBCUTANEOUS at 18:28

## 2018-10-11 RX ADMIN — INSULIN LISPRO 1 UNITS: 100 INJECTION, SOLUTION INTRAVENOUS; SUBCUTANEOUS at 20:30

## 2018-10-11 RX ADMIN — ATORVASTATIN CALCIUM 40 MG: 40 TABLET, FILM COATED ORAL at 20:30

## 2018-10-11 ASSESSMENT — PAIN DESCRIPTION - PROGRESSION
CLINICAL_PROGRESSION: GRADUALLY IMPROVING

## 2018-10-11 ASSESSMENT — PAIN SCALES - GENERAL
PAINLEVEL_OUTOF10: 3
PAINLEVEL_OUTOF10: 3
PAINLEVEL_OUTOF10: 4
PAINLEVEL_OUTOF10: 7
PAINLEVEL_OUTOF10: 4
PAINLEVEL_OUTOF10: 0
PAINLEVEL_OUTOF10: 2
PAINLEVEL_OUTOF10: 7
PAINLEVEL_OUTOF10: 5
PAINLEVEL_OUTOF10: 2
PAINLEVEL_OUTOF10: 5

## 2018-10-11 ASSESSMENT — PAIN DESCRIPTION - LOCATION: LOCATION: CHEST;RIB CAGE

## 2018-10-11 ASSESSMENT — PAIN DESCRIPTION - ORIENTATION: ORIENTATION: ANTERIOR;MID

## 2018-10-11 ASSESSMENT — PAIN DESCRIPTION - FREQUENCY: FREQUENCY: INTERMITTENT

## 2018-10-11 ASSESSMENT — PAIN DESCRIPTION - ONSET: ONSET: ON-GOING

## 2018-10-11 ASSESSMENT — PAIN DESCRIPTION - PAIN TYPE: TYPE: ACUTE PAIN

## 2018-10-11 ASSESSMENT — PAIN DESCRIPTION - DESCRIPTORS: DESCRIPTORS: ACHING

## 2018-10-11 NOTE — PROGRESS NOTES
[[id number]]    Physical Examination:   · General appearance: alert, appears stated age and cooperative  · Skin: Skin color, texture, turgor normal.   · HEENT: EOMI: Normocephalic, no lesions, without obvious abnormality. · Lungs: clear to auscultation bilaterally  · Heart: regular rate and rhythm, S1, S2 normal, no murmur, click, rub or gallop  · Abdomen: soft, non-tender; bowel sounds normal; no masses,  no organomegaly  · MSK: extremities normal, atraumatic, no cyanosis or edema, CVC in right groin  · Neurologic:  Mental status: Alert, oriented, thought content appropriate  · Lines:   DATA:       Labs:  CBC:   Recent Labs      10/09/18   0453  10/11/18   0918   WBC  14.0*  11.8*   HGB  11.0*  11.7*   HCT  33.1*  35.6*   PLT  355  383       BMP: Recent Labs      10/09/18   0445  10/10/18   0433  10/11/18   0400   NA  134*  135*  135*   K  3.6  3.4*  3.4*  3.8   CL  96*  97*  98*   CO2  27  28  28   BUN  10  8  6*   CREATININE  0.8*  0.8*  0.8*   GLUCOSE  216*  222*  111*     LFT's: Recent Labs      10/10/18   0433   AST  11*   ALT  14   BILITOT  0.3   ALKPHOS  63     Troponin: No results for input(s): TROPONINI in the last 72 hours. BNP: No results for input(s): BNP in the last 72 hours. ABGs: No results for input(s): PHART, IZI3XSD, PO2ART in the last 72 hours. INR: No results for input(s): INR in the last 72 hours. Lipids:   Recent Labs      10/09/18   0445   CHOL  95   HDL  22*       U/A:No results for input(s): NITRITE, COLORU, PHUR, LABCAST, WBCUA, RBCUA, MUCUS, TRICHOMONAS, YEAST, BACTERIA, CLARITYU, SPECGRAV, LEUKOCYTESUR, UROBILINOGEN, BILIRUBINUR, BLOODU, GLUCOSEU, AMORPHOUS in the last 72 hours. Invalid input(s): KETONESU     Micro:   ASSESSMENT AND PLAN:   Juni King is a 48 y. o. male with PMH of DM, CAD, who was admitted s/p cardiac arrest in the field.  Recent PCI to PCI to LCx on 10/3 (he has separate chart under [id number] for that encounter), PCI with BMS to PDA 10/6, and PCI to

## 2018-10-11 NOTE — PROGRESS NOTES
@ 1930 family at bedside,  Getting ready to leave for the night, pt in good spirits, no complaints  @ 2000 Assessment complete, no complaints, hemodynamically stable cont to wean/titrate bhavya gtt maintaining a map of >65, see flow sheet, continue to monitor and assess

## 2018-10-11 NOTE — PROGRESS NOTES
@ 0400 labs drawn/sent, had the bhavya gtt off from 0200 to 0400 map 60-63, turned gtt back on low dose of 5 mcgs see flow sheet, otherwise no issues cont to monitor and assess

## 2018-10-11 NOTE — PROGRESS NOTES
[address]   Cardiology Progress Note   Date: 10/11/2018  Reason for Consultation: syncope, VT, and ACS  Consult Requesting Physician: Juan Kingsley MD     Chief Complaint:   Chief Complaint   Patient presents with    Cardiac Arrest     pt was found pulseless. CPR performed by . EMS states ST on arrival.  heart cath completed thursday. HPI: Nicole Ruiz is a 48 y.o.male that presents Emergency Department with complaints of having an episode of 60 while at home. The patient apparently had a stent placed yesterday here in the hospital, and was discharged home. The patient had no real symptoms until earlier the evening prior to admission when he started to have increasing chest pain. Patient's wife states that the patient stood up, and while standing, then had an episode of syncope. The patient according to the wife did not have a pulse and was not breathing. She has not checked her pulse. The patient had CPR done by EMS vs. Police officers who arrived at the scene with defibrillation x 1 performed for sustained VT, and by the time EMS arrived, the patient was awake and alert. Patient's family at the bedside, and they state that he is not acting himself because he has not been able to recall that he had cardiac catheterization done . Overnight 10/6 12am cath revealed that previous stent was patent in the LCx. However, PDA was 90% occluded. Now s/p PCI with bare metal stent in PDA.  Of note, VT incessantly in cath lab but now quiescent.     Subsequently he had a cardiac arrest and a repeat LHC showed patent LCX stent and Dr Angy Dolan then did PCI of right PDA followed by PCI of OM1.     Patient has continued to be sick with episodes of VT and coronary ischemia with hypotension for which he had an IABP placed.      At present patient denies any symptoms of chest pain, pressure, tightness, nausea, vomiting, near syncope, syncope, heart racing, palpitations, dizziness, lightheaded, PND, orthopnea, bilateral lower extremities pain, cramping or fatigue. Past Medical History:   Diagnosis Date    Arthritis     CAD (coronary artery disease)     Cardiac arrest ([facility] [address] 10/05/2018    Diabetes mellitus ([facility] [address]     Hyperlipidemia     Hypertension         Past Surgical History:   Procedure Laterality Date    COLONOSCOPY      CORONARY ANGIOPLASTY WITH STENT PLACEMENT  10/06/2018    Bare Metal Stent to PDA    CORONARY ANGIOPLASTY WITH STENT PLACEMENT  10/07/2018    Bare Metal Stent to OM    CORONARY ANGIOPLASTY WITH STENT PLACEMENT  10/03/2018    CECE to Circ    JOINT REPLACEMENT      VASCULAR SURGERY         Allergies:  No Known Allergies    Medication:   Prior to Admission medications    Not on File       Social History:   reports that he has never smoked. He has never used smokeless tobacco.        Family History:  family history is not on file. Reviewed. Denies family history of sudden cardiac death, arrhythmia, premature CAD    Review of System:    · General ROS: negative for - chills, fever   · Psychological ROS: negative for - anxiety or depression  · Ophthalmic ROS: negative for - eye pain or loss of vision  · ENT ROS: negative for - epistaxis, headaches, nasal discharge, sore throat   · Allergy and Immunology ROS: negative for - hives, nasal congestion   · Hematological and Lymphatic ROS: negative for - bleeding problems, blood clots, bruising or jaundice  · Endocrine ROS: negative for - skin changes, temperature intolerance or unexpected weight changes  · Respiratory ROS: negative for - cough, hemoptysis, pleuritic pain, SOB, sputum changes or wheezing  · Cardiovascular ROS: Per HPI.    · Gastrointestinal ROS: negative for - abdominal pain, blood in stools, diarrhea, hematemesis, melena, nausea/vomiting or swallowing difficulty/pain  · Genito-Urinary ROS: negative for - dysuria or incontinence  · Musculoskeletal ROS: negative for - joint swelling or muscle

## 2018-10-12 ENCOUNTER — APPOINTMENT (OUTPATIENT)
Dept: GENERAL RADIOLOGY | Age: 53
DRG: 222 | End: 2018-10-12
Payer: COMMERCIAL

## 2018-10-12 LAB
ALBUMIN SERPL-MCNC: 2.3 G/DL (ref 3.4–5)
ANION GAP SERPL CALCULATED.3IONS-SCNC: 10 MMOL/L (ref 3–16)
BASOPHILS ABSOLUTE: 0.1 K/UL (ref 0–0.2)
BASOPHILS RELATIVE PERCENT: 0.5 %
BUN BLDV-MCNC: 6 MG/DL (ref 7–20)
CALCIUM SERPL-MCNC: 8.1 MG/DL (ref 8.3–10.6)
CHLORIDE BLD-SCNC: 98 MMOL/L (ref 99–110)
CO2: 28 MMOL/L (ref 21–32)
CREAT SERPL-MCNC: 0.8 MG/DL (ref 0.9–1.3)
EOSINOPHILS ABSOLUTE: 0.3 K/UL (ref 0–0.6)
EOSINOPHILS RELATIVE PERCENT: 2.6 %
GFR AFRICAN AMERICAN: >60
GFR NON-AFRICAN AMERICAN: >60
GLUCOSE BLD-MCNC: 101 MG/DL (ref 70–99)
GLUCOSE BLD-MCNC: 123 MG/DL (ref 70–99)
GLUCOSE BLD-MCNC: 125 MG/DL (ref 70–99)
GLUCOSE BLD-MCNC: 246 MG/DL (ref 70–99)
GLUCOSE BLD-MCNC: 96 MG/DL (ref 70–99)
HCT VFR BLD CALC: 34.7 % (ref 40.5–52.5)
HEMOGLOBIN: 11.3 G/DL (ref 13.5–17.5)
LACTIC ACID: 0.8 MMOL/L (ref 0.4–2)
LACTIC ACID: 0.8 MMOL/L (ref 0.4–2)
LYMPHOCYTES ABSOLUTE: 2.4 K/UL (ref 1–5.1)
LYMPHOCYTES RELATIVE PERCENT: 20 %
MAGNESIUM: 2 MG/DL (ref 1.8–2.4)
MCH RBC QN AUTO: 26.1 PG (ref 26–34)
MCHC RBC AUTO-ENTMCNC: 32.6 G/DL (ref 31–36)
MCV RBC AUTO: 80 FL (ref 80–100)
MONOCYTES ABSOLUTE: 1.4 K/UL (ref 0–1.3)
MONOCYTES RELATIVE PERCENT: 11.6 %
NEUTROPHILS ABSOLUTE: 7.9 K/UL (ref 1.7–7.7)
NEUTROPHILS RELATIVE PERCENT: 65.3 %
PDW BLD-RTO: 14.3 % (ref 12.4–15.4)
PERFORMED ON: ABNORMAL
PERFORMED ON: NORMAL
PHOSPHORUS: 3.7 MG/DL (ref 2.5–4.9)
PLATELET # BLD: 401 K/UL (ref 135–450)
PMV BLD AUTO: 7.9 FL (ref 5–10.5)
POTASSIUM SERPL-SCNC: 3.7 MMOL/L (ref 3.5–5.1)
RBC # BLD: 4.34 M/UL (ref 4.2–5.9)
SODIUM BLD-SCNC: 136 MMOL/L (ref 136–145)
WBC # BLD: 12.1 K/UL (ref 4–11)

## 2018-10-12 PROCEDURE — 80069 RENAL FUNCTION PANEL: CPT

## 2018-10-12 PROCEDURE — 2100000000 HC CCU R&B

## 2018-10-12 PROCEDURE — 83605 ASSAY OF LACTIC ACID: CPT

## 2018-10-12 PROCEDURE — 33249 INSJ/RPLCMT DEFIB W/LEAD(S): CPT | Performed by: FAMILY MEDICINE

## 2018-10-12 PROCEDURE — C1722 AICD, SINGLE CHAMBER: HCPCS

## 2018-10-12 PROCEDURE — 2700000000 HC OXYGEN THERAPY PER DAY

## 2018-10-12 PROCEDURE — 99152 MOD SED SAME PHYS/QHP 5/>YRS: CPT | Performed by: FAMILY MEDICINE

## 2018-10-12 PROCEDURE — 99153 MOD SED SAME PHYS/QHP EA: CPT | Performed by: FAMILY MEDICINE

## 2018-10-12 PROCEDURE — 2580000003 HC RX 258: Performed by: INTERNAL MEDICINE

## 2018-10-12 PROCEDURE — 6370000000 HC RX 637 (ALT 250 FOR IP): Performed by: INTERNAL MEDICINE

## 2018-10-12 PROCEDURE — 6360000002 HC RX W HCPCS: Performed by: INTERNAL MEDICINE

## 2018-10-12 PROCEDURE — 85025 COMPLETE CBC W/AUTO DIFF WBC: CPT

## 2018-10-12 PROCEDURE — 6360000002 HC RX W HCPCS

## 2018-10-12 PROCEDURE — 71045 X-RAY EXAM CHEST 1 VIEW: CPT

## 2018-10-12 PROCEDURE — 0JH608Z INSERTION OF DEFIBRILLATOR GENERATOR INTO CHEST SUBCUTANEOUS TISSUE AND FASCIA, OPEN APPROACH: ICD-10-PCS | Performed by: INTERNAL MEDICINE

## 2018-10-12 PROCEDURE — C1894 INTRO/SHEATH, NON-LASER: HCPCS

## 2018-10-12 PROCEDURE — 02H63KZ INSERTION OF DEFIBRILLATOR LEAD INTO RIGHT ATRIUM, PERCUTANEOUS APPROACH: ICD-10-PCS | Performed by: INTERNAL MEDICINE

## 2018-10-12 PROCEDURE — 36592 COLLECT BLOOD FROM PICC: CPT

## 2018-10-12 PROCEDURE — 6370000000 HC RX 637 (ALT 250 FOR IP): Performed by: STUDENT IN AN ORGANIZED HEALTH CARE EDUCATION/TRAINING PROGRAM

## 2018-10-12 PROCEDURE — 2580000003 HC RX 258

## 2018-10-12 PROCEDURE — 83735 ASSAY OF MAGNESIUM: CPT

## 2018-10-12 PROCEDURE — 02HK3KZ INSERTION OF DEFIBRILLATOR LEAD INTO RIGHT VENTRICLE, PERCUTANEOUS APPROACH: ICD-10-PCS | Performed by: INTERNAL MEDICINE

## 2018-10-12 PROCEDURE — 94762 N-INVAS EAR/PLS OXIMTRY CONT: CPT

## 2018-10-12 PROCEDURE — C1777 LEAD, AICD, ENDO SINGLE COIL: HCPCS

## 2018-10-12 PROCEDURE — 99152 MOD SED SAME PHYS/QHP 5/>YRS: CPT | Performed by: INTERNAL MEDICINE

## 2018-10-12 PROCEDURE — 33249 INSJ/RPLCMT DEFIB W/LEAD(S): CPT | Performed by: INTERNAL MEDICINE

## 2018-10-12 PROCEDURE — 2500000003 HC RX 250 WO HCPCS

## 2018-10-12 RX ORDER — ACETAMINOPHEN 325 MG/1
650 TABLET ORAL EVERY 4 HOURS PRN
Status: DISCONTINUED | OUTPATIENT
Start: 2018-10-12 | End: 2018-10-17 | Stop reason: HOSPADM

## 2018-10-12 RX ORDER — ONDANSETRON 2 MG/ML
4 INJECTION INTRAMUSCULAR; INTRAVENOUS EVERY 6 HOURS PRN
Status: DISCONTINUED | OUTPATIENT
Start: 2018-10-12 | End: 2018-10-17 | Stop reason: HOSPADM

## 2018-10-12 RX ORDER — OXYCODONE HCL 10 MG/1
10 TABLET, FILM COATED, EXTENDED RELEASE ORAL 2 TIMES DAILY
Status: DISCONTINUED | OUTPATIENT
Start: 2018-10-12 | End: 2018-10-13

## 2018-10-12 RX ADMIN — BENZONATATE 100 MG: 100 CAPSULE ORAL at 07:44

## 2018-10-12 RX ADMIN — HYDROCORTISONE SODIUM SUCCINATE 100 MG: 100 INJECTION, POWDER, FOR SOLUTION INTRAMUSCULAR; INTRAVENOUS at 11:09

## 2018-10-12 RX ADMIN — DOCUSATE SODIUM 100 MG: 100 CAPSULE, LIQUID FILLED ORAL at 07:40

## 2018-10-12 RX ADMIN — FAMOTIDINE 20 MG: 20 TABLET ORAL at 07:41

## 2018-10-12 RX ADMIN — Medication 2 G: at 21:13

## 2018-10-12 RX ADMIN — OXYCODONE HYDROCHLORIDE 10 MG: 10 TABLET ORAL at 23:19

## 2018-10-12 RX ADMIN — MUPIROCIN: 20 OINTMENT TOPICAL at 16:49

## 2018-10-12 RX ADMIN — MORPHINE SULFATE 2 MG: 2 INJECTION, SOLUTION INTRAMUSCULAR; INTRAVENOUS at 18:48

## 2018-10-12 RX ADMIN — OXYCODONE HYDROCHLORIDE 10 MG: 10 TABLET ORAL at 03:52

## 2018-10-12 RX ADMIN — TICAGRELOR 90 MG: 90 TABLET ORAL at 21:13

## 2018-10-12 RX ADMIN — TICAGRELOR 90 MG: 90 TABLET ORAL at 07:40

## 2018-10-12 RX ADMIN — HYDROCORTISONE SODIUM SUCCINATE 100 MG: 100 INJECTION, POWDER, FOR SOLUTION INTRAMUSCULAR; INTRAVENOUS at 18:58

## 2018-10-12 RX ADMIN — ONDANSETRON 4 MG: 2 INJECTION INTRAMUSCULAR; INTRAVENOUS at 18:48

## 2018-10-12 RX ADMIN — ANTISEPTIC SURGICAL SCRUB: 0.04 SOLUTION TOPICAL at 16:49

## 2018-10-12 RX ADMIN — METOPROLOL SUCCINATE 50 MG: 50 TABLET, EXTENDED RELEASE ORAL at 07:40

## 2018-10-12 RX ADMIN — AMIODARONE HYDROCHLORIDE 400 MG: 200 TABLET ORAL at 07:40

## 2018-10-12 RX ADMIN — INSULIN LISPRO 2 UNITS: 100 INJECTION, SOLUTION INTRAVENOUS; SUBCUTANEOUS at 21:14

## 2018-10-12 RX ADMIN — Medication 10 ML: at 21:23

## 2018-10-12 RX ADMIN — FAMOTIDINE 20 MG: 20 TABLET ORAL at 21:13

## 2018-10-12 RX ADMIN — PANTOPRAZOLE SODIUM 40 MG: 40 TABLET, DELAYED RELEASE ORAL at 07:39

## 2018-10-12 RX ADMIN — MORPHINE SULFATE 2 MG: 2 INJECTION, SOLUTION INTRAMUSCULAR; INTRAVENOUS at 01:33

## 2018-10-12 RX ADMIN — INSULIN GLARGINE 20 UNITS: 100 INJECTION, SOLUTION SUBCUTANEOUS at 21:13

## 2018-10-12 RX ADMIN — BENZONATATE 100 MG: 100 CAPSULE ORAL at 23:22

## 2018-10-12 RX ADMIN — OXYCODONE HYDROCHLORIDE 20 MG: 10 TABLET, FILM COATED, EXTENDED RELEASE ORAL at 07:41

## 2018-10-12 RX ADMIN — OXYCODONE HYDROCHLORIDE 10 MG: 10 TABLET, FILM COATED, EXTENDED RELEASE ORAL at 21:13

## 2018-10-12 RX ADMIN — ASPIRIN 81 MG 81 MG: 81 TABLET ORAL at 07:40

## 2018-10-12 RX ADMIN — ONDANSETRON 4 MG: 2 INJECTION INTRAMUSCULAR; INTRAVENOUS at 07:44

## 2018-10-12 RX ADMIN — AMIODARONE HYDROCHLORIDE 400 MG: 200 TABLET ORAL at 21:13

## 2018-10-12 RX ADMIN — ATORVASTATIN CALCIUM 40 MG: 40 TABLET, FILM COATED ORAL at 21:13

## 2018-10-12 ASSESSMENT — PAIN SCALES - GENERAL
PAINLEVEL_OUTOF10: 4
PAINLEVEL_OUTOF10: 3
PAINLEVEL_OUTOF10: 7
PAINLEVEL_OUTOF10: 7
PAINLEVEL_OUTOF10: 9
PAINLEVEL_OUTOF10: 7
PAINLEVEL_OUTOF10: 4
PAINLEVEL_OUTOF10: 0
PAINLEVEL_OUTOF10: 0
PAINLEVEL_OUTOF10: 8

## 2018-10-12 ASSESSMENT — PAIN DESCRIPTION - LOCATION
LOCATION: RIB CAGE

## 2018-10-12 ASSESSMENT — PAIN DESCRIPTION - PAIN TYPE
TYPE: ACUTE PAIN

## 2018-10-12 ASSESSMENT — PAIN DESCRIPTION - DESCRIPTORS
DESCRIPTORS: ACHING
DESCRIPTORS: ACHING

## 2018-10-12 ASSESSMENT — PAIN DESCRIPTION - ORIENTATION
ORIENTATION: ANTERIOR;MID
ORIENTATION: ANTERIOR;MID

## 2018-10-12 NOTE — PROCEDURES
combination. Using electrocautery and blunt dissection, a pocket was created. Central venous access into the left axillary vein was obtained using the modified Seldinger technique. After central venous access was obtained, a sheath was placed in the axillary vein. A right ventricular lead was advanced into the apex position under fluoroscopic guidance and using a series of curved and straight stylets. The lead was actively fixated. After confirming appropriate function and no diaphragmatic stimulation at maximum output, the sheath was split and removed. The lead was secured to the underlying tissue using suture material.  While placing this RV lead, there were 3 episodes of sustained VT with v-rates 200-212 bpm that required DC cardioversion x 2 which would successfully convert him to sinus rhythm. The leads were then connected to the new ICD pulse generator which was then placed into the cleaned pocket. The pulse generator was sutured inside the pocket. Copious amount (> 200 cc) of saline flush was used to irrigate the pocket. The pocket was then closed using a 2-0 Vicryl subcutaneous layer and a subcuticular layer using 4-0 Vicryl. The skin was covered with pressure dressing. Defibrillation threshold testing was not performed. All sponge and needle counts were reported as correct at the end of the procedure. The patient tolerated the procedure well and there were no complications. Post-sedation evaluation was completed. Patient was transported to the holding area in stable condition. Device and Leads information:    Device pulse generator is [employer] Model # B2632582, serial # [id number]  Right ventricular lead model # [id number], serial # [id number]      RV lead measurements: sensed R  wave of 6.1 mV, impedance of 668 ohms, pacing capture threshold of 0.7 V @ 0.5 ms. Device was programmed to VVI with lower rate of 40. VF zone is 176 ms.      Impression:    Pre- and post-procedural

## 2018-10-12 NOTE — PROGRESS NOTES
Hospitalist Progress Note      PCP: SILVIO POLLOCK, APRN - CNP    Date of Admission: 10/5/2018    Chief Complaint: Vtach arrest.     Subjective:     Chest pain controlled 3/10 this am.   Still on neosynephrine - BP drops to 70/30 when pham stopped last night.         Medications:  Reviewed    Infusion Medications    phenylephrine (PHAM-SYNEPHRINE) 50mg/250mL infusion 3 mcg/min (10/12/18 1002)    dextrose       Scheduled Medications    oxyCODONE  10 mg Oral BID    hydrocortisone sodium succinate PF  100 mg Intravenous Q8H    sodium chloride flush  10 mL Intravenous 2 times per day    chlorhexidine   Topical Once    mupirocin   Nasal Once    insulin glargine  20 Units Subcutaneous Nightly    famotidine  20 mg Oral BID    docusate sodium  100 mg Oral Daily    metoprolol succinate  50 mg Oral Daily    amiodarone  400 mg Oral BID    ticagrelor  90 mg Oral BID    insulin lispro  0-12 Units Subcutaneous TID WC    insulin lispro  0-6 Units Subcutaneous Nightly    atorvastatin  40 mg Oral Nightly    pantoprazole  40 mg Oral QAM AC    influenza virus vaccine  0.5 mL Intramuscular Once    aspirin  81 mg Oral Daily     PRN Meds: sodium chloride flush, oxyCODONE, potassium chloride, sodium chloride flush, acetaminophen, magnesium hydroxide, ondansetron, glucose, dextrose, glucagon (rDNA), dextrose, morphine, morphine, benzonatate      Intake/Output Summary (Last 24 hours) at 10/12/18 1430  Last data filed at 10/12/18 1017   Gross per 24 hour   Intake            418.9 ml   Output              950 ml   Net           -531.1 ml       Physical Exam Performed:    BP 94/64   Pulse 75   Temp 98.7 °F (37.1 °C) (Oral)   Resp 13   Ht 6' 2\" (1.88 m)   Wt 214 lb 11.7 oz (97.4 kg)   SpO2 94%   BMI 27.57 kg/m²     Physical Examination:   · General appearance: alert, appears stated age and cooperative  · Skin: Skin color, texture, turgor normal.   · HEENT: EOMI: Normocephalic, no lesions, without obvious abnormality. · Lungs: clear to auscultation bilaterally  · Heart: regular rate and rhythm, S1, S2 normal, no murmur, click, rub or gallop  · Abdomen: soft, non-tender; bowel sounds normal; no masses,  no organomegaly  · MSK: extremities normal, atraumatic, no cyanosis or edema, CVC in right groin  · Neurologic:  Mental status: Alert, oriented, thought content appropriate  Labs:   Recent Labs      10/11/18   0918  10/12/18   0407   WBC  11.8*  12.1*   HGB  11.7*  11.3*   HCT  35.6*  34.7*   PLT  383  401     Recent Labs      10/10/18   0433  10/11/18   0400  10/12/18   0406   NA  135*  135*  136   K  3.4*  3.4*  3.8  3.7   CL  97*  98*  98*   CO2  28  28  28   BUN  8  6*  6*   CREATININE  0.8*  0.8*  0.8*   CALCIUM  7.9*  8.2*  8.1*   PHOS  3.1  3.1  3.7     Recent Labs      10/10/18   0433   AST  11*   ALT  14   BILIDIR  <0.2   BILITOT  0.3   ALKPHOS  63     No results for input(s): INR in the last 72 hours. No results for input(s): Merryl Passe in the last 72 hours. Urinalysis:    No results found for: Roslyn Rodriguez, BACTERIA, RBCUA, BLOODU, SPECGRAV, [address]    Radiology:  XR CHEST PORTABLE   Final Result   Decreasing vascular congestion and improved aeration of the lungs. Bibasilar, mild airspace disease or atelectasis. Apparent elevation of right hemidiaphragm. Component of subpulmonic pleural   effusion is possible. XR RIBS BILATERAL (MIN 4 VIEWS)   Final Result   Mildly displaced fracture of the right anterior 2nd rib. XR CHEST STANDARD (2 VW)   Final Result   Right basilar opacity increased, possibly atelectasis. No pneumothorax is   visible. Small pleural effusions are suggested. CT Head WO Contrast   Final Result   No acute intracranial abnormality. XR CHEST PORTABLE   Final Result   No acute cardiopulmonary disease on this portable study obtained at low lung   volumes.                  Assessment/Plan:    Active Hospital Problems    Diagnosis Date

## 2018-10-13 LAB
ALBUMIN SERPL-MCNC: 2.4 G/DL (ref 3.4–5)
ANION GAP SERPL CALCULATED.3IONS-SCNC: 12 MMOL/L (ref 3–16)
BASOPHILS ABSOLUTE: 0.1 K/UL (ref 0–0.2)
BASOPHILS RELATIVE PERCENT: 0.3 %
BUN BLDV-MCNC: 9 MG/DL (ref 7–20)
CALCIUM SERPL-MCNC: 8.3 MG/DL (ref 8.3–10.6)
CHLORIDE BLD-SCNC: 97 MMOL/L (ref 99–110)
CO2: 27 MMOL/L (ref 21–32)
CREAT SERPL-MCNC: 0.8 MG/DL (ref 0.9–1.3)
EOSINOPHILS ABSOLUTE: 0 K/UL (ref 0–0.6)
EOSINOPHILS RELATIVE PERCENT: 0 %
GFR AFRICAN AMERICAN: >60
GFR NON-AFRICAN AMERICAN: >60
GLUCOSE BLD-MCNC: 278 MG/DL (ref 70–99)
GLUCOSE BLD-MCNC: 293 MG/DL (ref 70–99)
GLUCOSE BLD-MCNC: 301 MG/DL (ref 70–99)
GLUCOSE BLD-MCNC: 313 MG/DL (ref 70–99)
GLUCOSE BLD-MCNC: 351 MG/DL (ref 70–99)
HCT VFR BLD CALC: 36.4 % (ref 40.5–52.5)
HEMOGLOBIN: 12.1 G/DL (ref 13.5–17.5)
LYMPHOCYTES ABSOLUTE: 1 K/UL (ref 1–5.1)
LYMPHOCYTES RELATIVE PERCENT: 5.6 %
MAGNESIUM: 2.1 MG/DL (ref 1.8–2.4)
MCH RBC QN AUTO: 26.3 PG (ref 26–34)
MCHC RBC AUTO-ENTMCNC: 33.2 G/DL (ref 31–36)
MCV RBC AUTO: 79.2 FL (ref 80–100)
MONOCYTES ABSOLUTE: 0.8 K/UL (ref 0–1.3)
MONOCYTES RELATIVE PERCENT: 4.8 %
NEUTROPHILS ABSOLUTE: 15.6 K/UL (ref 1.7–7.7)
NEUTROPHILS RELATIVE PERCENT: 89.3 %
PDW BLD-RTO: 14.3 % (ref 12.4–15.4)
PERFORMED ON: ABNORMAL
PHOSPHORUS: 3.1 MG/DL (ref 2.5–4.9)
PLATELET # BLD: 449 K/UL (ref 135–450)
PMV BLD AUTO: 7.8 FL (ref 5–10.5)
POTASSIUM SERPL-SCNC: 4.1 MMOL/L (ref 3.5–5.1)
RBC # BLD: 4.6 M/UL (ref 4.2–5.9)
SODIUM BLD-SCNC: 136 MMOL/L (ref 136–145)
WBC # BLD: 17.4 K/UL (ref 4–11)

## 2018-10-13 PROCEDURE — 36592 COLLECT BLOOD FROM PICC: CPT

## 2018-10-13 PROCEDURE — 6370000000 HC RX 637 (ALT 250 FOR IP): Performed by: INTERNAL MEDICINE

## 2018-10-13 PROCEDURE — 6360000002 HC RX W HCPCS: Performed by: INTERNAL MEDICINE

## 2018-10-13 PROCEDURE — 2140000000 HC CCU INTERMEDIATE R&B

## 2018-10-13 PROCEDURE — 2580000003 HC RX 258: Performed by: INTERNAL MEDICINE

## 2018-10-13 PROCEDURE — 6370000000 HC RX 637 (ALT 250 FOR IP): Performed by: STUDENT IN AN ORGANIZED HEALTH CARE EDUCATION/TRAINING PROGRAM

## 2018-10-13 PROCEDURE — 99024 POSTOP FOLLOW-UP VISIT: CPT | Performed by: INTERNAL MEDICINE

## 2018-10-13 PROCEDURE — 85025 COMPLETE CBC W/AUTO DIFF WBC: CPT

## 2018-10-13 PROCEDURE — 83735 ASSAY OF MAGNESIUM: CPT

## 2018-10-13 PROCEDURE — 94762 N-INVAS EAR/PLS OXIMTRY CONT: CPT

## 2018-10-13 PROCEDURE — 80069 RENAL FUNCTION PANEL: CPT

## 2018-10-13 PROCEDURE — 2700000000 HC OXYGEN THERAPY PER DAY

## 2018-10-13 RX ORDER — BENZONATATE 100 MG/1
100 CAPSULE ORAL 3 TIMES DAILY PRN
Status: ON HOLD | COMMUNITY
End: 2018-10-17 | Stop reason: HOSPADM

## 2018-10-13 RX ORDER — METOPROLOL SUCCINATE 50 MG/1
50 TABLET, EXTENDED RELEASE ORAL DAILY
COMMUNITY
End: 2018-10-24 | Stop reason: ALTCHOICE

## 2018-10-13 RX ORDER — CHOLECALCIFEROL (VITAMIN D3) 1250 MCG
1 CAPSULE ORAL WEEKLY
COMMUNITY

## 2018-10-13 RX ORDER — ASPIRIN 81 MG/1
81 TABLET, CHEWABLE ORAL DAILY
COMMUNITY

## 2018-10-13 RX ORDER — PANTOPRAZOLE SODIUM 40 MG/1
40 TABLET, DELAYED RELEASE ORAL DAILY PRN
Status: ON HOLD | COMMUNITY
End: 2018-10-17

## 2018-10-13 RX ORDER — ESOMEPRAZOLE MAGNESIUM 40 MG/1
40 FOR SUSPENSION ORAL DAILY PRN
Status: ON HOLD | COMMUNITY
End: 2018-10-13

## 2018-10-13 RX ORDER — ATORVASTATIN CALCIUM 40 MG/1
40 TABLET, FILM COATED ORAL NIGHTLY
COMMUNITY
End: 2019-03-17 | Stop reason: SDUPTHER

## 2018-10-13 RX ADMIN — TICAGRELOR 90 MG: 90 TABLET ORAL at 21:21

## 2018-10-13 RX ADMIN — FAMOTIDINE 20 MG: 20 TABLET ORAL at 21:22

## 2018-10-13 RX ADMIN — Medication 2 G: at 05:28

## 2018-10-13 RX ADMIN — AMIODARONE HYDROCHLORIDE 400 MG: 200 TABLET ORAL at 21:21

## 2018-10-13 RX ADMIN — TICAGRELOR 90 MG: 90 TABLET ORAL at 08:28

## 2018-10-13 RX ADMIN — OXYCODONE HYDROCHLORIDE 10 MG: 10 TABLET ORAL at 21:22

## 2018-10-13 RX ADMIN — ASPIRIN 81 MG 81 MG: 81 TABLET ORAL at 08:28

## 2018-10-13 RX ADMIN — Medication 10 ML: at 08:29

## 2018-10-13 RX ADMIN — ATORVASTATIN CALCIUM 40 MG: 40 TABLET, FILM COATED ORAL at 21:22

## 2018-10-13 RX ADMIN — OXYCODONE HYDROCHLORIDE 10 MG: 10 TABLET ORAL at 17:20

## 2018-10-13 RX ADMIN — OXYCODONE HYDROCHLORIDE 10 MG: 10 TABLET, FILM COATED, EXTENDED RELEASE ORAL at 08:28

## 2018-10-13 RX ADMIN — Medication 10 ML: at 21:26

## 2018-10-13 RX ADMIN — INSULIN GLARGINE 20 UNITS: 100 INJECTION, SOLUTION SUBCUTANEOUS at 21:22

## 2018-10-13 RX ADMIN — INSULIN LISPRO 8 UNITS: 100 INJECTION, SOLUTION INTRAVENOUS; SUBCUTANEOUS at 08:08

## 2018-10-13 RX ADMIN — INSULIN LISPRO 6 UNITS: 100 INJECTION, SOLUTION INTRAVENOUS; SUBCUTANEOUS at 12:59

## 2018-10-13 RX ADMIN — INSULIN LISPRO 3 UNITS: 100 INJECTION, SOLUTION INTRAVENOUS; SUBCUTANEOUS at 21:22

## 2018-10-13 RX ADMIN — PANTOPRAZOLE SODIUM 40 MG: 40 TABLET, DELAYED RELEASE ORAL at 08:28

## 2018-10-13 RX ADMIN — BENZONATATE 100 MG: 100 CAPSULE ORAL at 08:34

## 2018-10-13 RX ADMIN — DOCUSATE SODIUM 100 MG: 100 CAPSULE, LIQUID FILLED ORAL at 08:28

## 2018-10-13 RX ADMIN — FAMOTIDINE 20 MG: 20 TABLET ORAL at 08:28

## 2018-10-13 RX ADMIN — HYDROCORTISONE SODIUM SUCCINATE 100 MG: 100 INJECTION, POWDER, FOR SOLUTION INTRAMUSCULAR; INTRAVENOUS at 02:30

## 2018-10-13 RX ADMIN — HYDROCORTISONE SODIUM SUCCINATE 50 MG: 100 INJECTION, POWDER, FOR SOLUTION INTRAMUSCULAR; INTRAVENOUS at 17:31

## 2018-10-13 RX ADMIN — ONDANSETRON 4 MG: 2 INJECTION INTRAMUSCULAR; INTRAVENOUS at 17:20

## 2018-10-13 RX ADMIN — AMIODARONE HYDROCHLORIDE 400 MG: 200 TABLET ORAL at 08:28

## 2018-10-13 RX ADMIN — OXYCODONE HYDROCHLORIDE 10 MG: 10 TABLET ORAL at 09:49

## 2018-10-13 RX ADMIN — INSULIN LISPRO 10 UNITS: 100 INJECTION, SOLUTION INTRAVENOUS; SUBCUTANEOUS at 17:24

## 2018-10-13 ASSESSMENT — PAIN SCALES - GENERAL
PAINLEVEL_OUTOF10: 8
PAINLEVEL_OUTOF10: 7
PAINLEVEL_OUTOF10: 3
PAINLEVEL_OUTOF10: 0
PAINLEVEL_OUTOF10: 7
PAINLEVEL_OUTOF10: 3
PAINLEVEL_OUTOF10: 9
PAINLEVEL_OUTOF10: 4

## 2018-10-13 ASSESSMENT — PAIN DESCRIPTION - DESCRIPTORS
DESCRIPTORS: ACHING;SHARP
DESCRIPTORS: ACHING
DESCRIPTORS: ACHING;SHARP

## 2018-10-13 ASSESSMENT — PAIN DESCRIPTION - LOCATION
LOCATION: CHEST
LOCATION: CHEST
LOCATION: RIB CAGE

## 2018-10-13 ASSESSMENT — PAIN DESCRIPTION - PAIN TYPE
TYPE: ACUTE PAIN

## 2018-10-13 ASSESSMENT — PAIN DESCRIPTION - FREQUENCY
FREQUENCY: CONTINUOUS

## 2018-10-13 ASSESSMENT — PAIN DESCRIPTION - ONSET
ONSET: ON-GOING
ONSET: ON-GOING

## 2018-10-13 ASSESSMENT — PAIN DESCRIPTION - PROGRESSION
CLINICAL_PROGRESSION: GRADUALLY WORSENING
CLINICAL_PROGRESSION: NOT CHANGED

## 2018-10-13 ASSESSMENT — PAIN DESCRIPTION - ORIENTATION
ORIENTATION: MID
ORIENTATION: MID

## 2018-10-13 NOTE — PLAN OF CARE
Perfusion - Cardiopulmonary, Altered:  Goal: Hemodynamic stability will improve  Hemodynamic stability will improve   Outcome: Met This Shift      Problem: Tissue Perfusion - Peripheral, Altered:  Goal: Absence of hematoma at arterial access site  Absence of hematoma at arterial access site   Outcome: Completed Date Met: 10/13/18    Goal: Circulatory function of lower extremities is within specified parameters  Circulatory function of lower extremities is within specified parameters   Outcome: Met This Shift      Problem: Infection - Central Venous Catheter-Associated Bloodstream Infection:  Goal: Will show no infection signs and symptoms  Will show no infection signs and symptoms   Outcome: Met This Shift

## 2018-10-13 NOTE — PROGRESS NOTES
Hospitalist Progress Note      PCP: Taylor Carbajal APRN - CNP    Date of Admission: 10/5/2018    Chief Complaint: Vtach arrest.     Subjective:     Chest pain controlled better. Neosynephrine weaned off last night. BG elevated this am - since steroid started yesterday.        Medications:  Reviewed    Infusion Medications    phenylephrine (PHAM-SYNEPHRINE) 50mg/250mL infusion Stopped (10/12/18 1805)    dextrose       Scheduled Medications    hydrocortisone sodium succinate PF  50 mg Intravenous Q12H    oxyCODONE  10 mg Oral BID    sodium chloride flush  10 mL Intravenous 2 times per day    insulin glargine  20 Units Subcutaneous Nightly    famotidine  20 mg Oral BID    docusate sodium  100 mg Oral Daily    metoprolol succinate  50 mg Oral Daily    amiodarone  400 mg Oral BID    ticagrelor  90 mg Oral BID    insulin lispro  0-12 Units Subcutaneous TID WC    insulin lispro  0-6 Units Subcutaneous Nightly    atorvastatin  40 mg Oral Nightly    pantoprazole  40 mg Oral QAM AC    influenza virus vaccine  0.5 mL Intramuscular Once    aspirin  81 mg Oral Daily     PRN Meds: acetaminophen, magnesium hydroxide, ondansetron, sodium chloride flush, oxyCODONE, potassium chloride, sodium chloride flush, acetaminophen, magnesium hydroxide, ondansetron, glucose, dextrose, glucagon (rDNA), dextrose, morphine, morphine, benzonatate      Intake/Output Summary (Last 24 hours) at 10/13/18 1136  Last data filed at 10/13/18 0951   Gross per 24 hour   Intake             1280 ml   Output             1130 ml   Net              150 ml       Physical Exam Performed:    BP (!) 93/56   Pulse 71   Temp 97.9 °F (36.6 °C) (Oral)   Resp 11   Ht 6' 2\" (1.88 m)   Wt 214 lb 1.1 oz (97.1 kg)   SpO2 93%   BMI 27.48 kg/m²     Physical Examination:   · General appearance: alert, appears stated age and cooperative  · Skin: Skin color, texture, turgor normal.   · HEENT: EOMI: Normocephalic, no lesions, without obtained at low lung   volumes. Assessment/Plan:    Active Hospital Problems    Diagnosis Date Noted    Idiopathic hypotension [I95.0]     Abnormal ECG [R94.31]     Ventricular tachycardia (HCC) [I47.2]     Abnormal EKG [R94.31]     Elevated troponin [R74.8]     Syncope and collapse [R55]     VT (ventricular tachycardia) (HCC) [I47.2]     STEMI (ST elevation myocardial infarction) ([facility] [address] [I21.3] 10/06/2018               He has separate chart under [id number] for the encounter with first PTCA on 10/3. VT Arrest s/p ROSC 2/2 STEMI and ongoing cardiogenic shock - resolving.   - IABP removed 10/10  - neosynephrine weaned off 10/12   - ASA, Statin, BB  - reassess for ARB/ACEI once BP stable. - Ticagrelor  - trial of stress dose steroid to aid with BP support - wean to 50 BID today. Chest pain due to R mildly displaced anterior rib fracture - better   - cont pain control    - avoid IV opiates as able. - stop long acting oxycodone ER.    - cont Oxy IR PRN    - cont IS       Recurrent Ventricular Dysrhythmias  - s/p ICD placement 10/12 w Dr. Fatuma Schneider  - amiodarone per cardiology  - tele       Anemia  - suspect anemia of chronic disease  - Hgb has been stable with no signs of active bleeding - cont to monitor.          Diabetes Mellitus - with uncontrolled hyperglycemia secondary to steroid. - Continue medium dose sliding scale  - Lantus 20u nightly  - wean steroid.          Code Status: Full Code    PPX: heparin, PPI    DISPO: per attending. [id number] Isha Escalona for PCU from my stand point.        Argelia Armendariz MD

## 2018-10-13 NOTE — PROGRESS NOTES
Bilateral respiratory sounds. No wheezes. No rhonchi. · Abdominal: Soft. Bowel sounds present. No distension, No tenderness. · Musculoskeletal: No tenderness. No edema    · Lymphadenopathy: Has no cervical adenopathy. · Neurological: Alert and oriented. Cranial nerve appears intact, No Gross deficit   · Skin: Skin is warm and dry. No rash noted. · Psychiatric: Has a normal mood, affect and behavior       ECG: reviewed, sinus rhythm ST 0.5mm elevation inferiorly    Coronary angiogram and intervention  10/6 12am -   previous stent in RCA patent. PCI with BMS of PDA performed for 90% stenosis. VT during procedure but now resolved. Assessment and plan:       CAD  Acute MI involving infero-lateral walls  Acute VT  Arrest   Hypotension   Status post multivessl PCI of LCX, OM1, Right PDA  Was on IABP from 10/9 - 10/10. Now off all support and pressors  On DPT  Blood pressure still running low, so cannot use lisinopril at this time      Cardiac arrest/VT  Patient is status post ICD placement  He is on amiodarone 400 twice a day  Rhythm is sinus. An overnight.   No events      Shavonne Castellanos M.D.

## 2018-10-14 LAB
ALBUMIN SERPL-MCNC: 2.6 G/DL (ref 3.4–5)
ANION GAP SERPL CALCULATED.3IONS-SCNC: 10 MMOL/L (ref 3–16)
BASOPHILS ABSOLUTE: 0.1 K/UL (ref 0–0.2)
BASOPHILS RELATIVE PERCENT: 0.8 %
BUN BLDV-MCNC: 9 MG/DL (ref 7–20)
CALCIUM SERPL-MCNC: 8.6 MG/DL (ref 8.3–10.6)
CHLORIDE BLD-SCNC: 99 MMOL/L (ref 99–110)
CO2: 28 MMOL/L (ref 21–32)
CREAT SERPL-MCNC: 0.7 MG/DL (ref 0.9–1.3)
EOSINOPHILS ABSOLUTE: 0 K/UL (ref 0–0.6)
EOSINOPHILS RELATIVE PERCENT: 0.1 %
GFR AFRICAN AMERICAN: >60
GFR NON-AFRICAN AMERICAN: >60
GLUCOSE BLD-MCNC: 233 MG/DL (ref 70–99)
GLUCOSE BLD-MCNC: 241 MG/DL (ref 70–99)
GLUCOSE BLD-MCNC: 246 MG/DL (ref 70–99)
GLUCOSE BLD-MCNC: 258 MG/DL (ref 70–99)
GLUCOSE BLD-MCNC: 278 MG/DL (ref 70–99)
HCT VFR BLD CALC: 36.6 % (ref 40.5–52.5)
HEMOGLOBIN: 12.1 G/DL (ref 13.5–17.5)
LYMPHOCYTES ABSOLUTE: 1 K/UL (ref 1–5.1)
LYMPHOCYTES RELATIVE PERCENT: 6.3 %
MAGNESIUM: 2.2 MG/DL (ref 1.8–2.4)
MCH RBC QN AUTO: 26.5 PG (ref 26–34)
MCHC RBC AUTO-ENTMCNC: 33 G/DL (ref 31–36)
MCV RBC AUTO: 80.3 FL (ref 80–100)
MONOCYTES ABSOLUTE: 1.1 K/UL (ref 0–1.3)
MONOCYTES RELATIVE PERCENT: 6.6 %
NEUTROPHILS ABSOLUTE: 13.8 K/UL (ref 1.7–7.7)
NEUTROPHILS RELATIVE PERCENT: 86.2 %
PDW BLD-RTO: 14.6 % (ref 12.4–15.4)
PERFORMED ON: ABNORMAL
PHOSPHORUS: 2.9 MG/DL (ref 2.5–4.9)
PLATELET # BLD: 464 K/UL (ref 135–450)
PMV BLD AUTO: 7.4 FL (ref 5–10.5)
POTASSIUM SERPL-SCNC: 4.5 MMOL/L (ref 3.5–5.1)
PRO-BNP: 1442 PG/ML (ref 0–124)
RBC # BLD: 4.56 M/UL (ref 4.2–5.9)
SODIUM BLD-SCNC: 137 MMOL/L (ref 136–145)
WBC # BLD: 16 K/UL (ref 4–11)

## 2018-10-14 PROCEDURE — 6370000000 HC RX 637 (ALT 250 FOR IP): Performed by: INTERNAL MEDICINE

## 2018-10-14 PROCEDURE — 6370000000 HC RX 637 (ALT 250 FOR IP): Performed by: NURSE PRACTITIONER

## 2018-10-14 PROCEDURE — 94762 N-INVAS EAR/PLS OXIMTRY CONT: CPT

## 2018-10-14 PROCEDURE — 85025 COMPLETE CBC W/AUTO DIFF WBC: CPT

## 2018-10-14 PROCEDURE — 2700000000 HC OXYGEN THERAPY PER DAY

## 2018-10-14 PROCEDURE — 83880 ASSAY OF NATRIURETIC PEPTIDE: CPT

## 2018-10-14 PROCEDURE — 6360000002 HC RX W HCPCS: Performed by: NURSE PRACTITIONER

## 2018-10-14 PROCEDURE — 6360000002 HC RX W HCPCS: Performed by: INTERNAL MEDICINE

## 2018-10-14 PROCEDURE — 99024 POSTOP FOLLOW-UP VISIT: CPT | Performed by: NURSE PRACTITIONER

## 2018-10-14 PROCEDURE — 36415 COLL VENOUS BLD VENIPUNCTURE: CPT

## 2018-10-14 PROCEDURE — 2140000000 HC CCU INTERMEDIATE R&B

## 2018-10-14 PROCEDURE — 80069 RENAL FUNCTION PANEL: CPT

## 2018-10-14 PROCEDURE — 2580000003 HC RX 258: Performed by: INTERNAL MEDICINE

## 2018-10-14 PROCEDURE — 83735 ASSAY OF MAGNESIUM: CPT

## 2018-10-14 RX ORDER — INSULIN GLARGINE 100 [IU]/ML
25 INJECTION, SOLUTION SUBCUTANEOUS NIGHTLY
Status: DISCONTINUED | OUTPATIENT
Start: 2018-10-14 | End: 2018-10-17 | Stop reason: HOSPADM

## 2018-10-14 RX ORDER — FUROSEMIDE 10 MG/ML
40 INJECTION INTRAMUSCULAR; INTRAVENOUS ONCE
Status: COMPLETED | OUTPATIENT
Start: 2018-10-14 | End: 2018-10-14

## 2018-10-14 RX ADMIN — FAMOTIDINE 20 MG: 20 TABLET ORAL at 20:12

## 2018-10-14 RX ADMIN — AMIODARONE HYDROCHLORIDE 400 MG: 200 TABLET ORAL at 09:31

## 2018-10-14 RX ADMIN — BENZONATATE 100 MG: 100 CAPSULE ORAL at 09:53

## 2018-10-14 RX ADMIN — PANTOPRAZOLE SODIUM 40 MG: 40 TABLET, DELAYED RELEASE ORAL at 06:21

## 2018-10-14 RX ADMIN — ASPIRIN 81 MG 81 MG: 81 TABLET ORAL at 09:31

## 2018-10-14 RX ADMIN — INSULIN LISPRO 2 UNITS: 100 INJECTION, SOLUTION INTRAVENOUS; SUBCUTANEOUS at 21:12

## 2018-10-14 RX ADMIN — TICAGRELOR 90 MG: 90 TABLET ORAL at 20:12

## 2018-10-14 RX ADMIN — AMIODARONE HYDROCHLORIDE 400 MG: 200 TABLET ORAL at 20:12

## 2018-10-14 RX ADMIN — INSULIN LISPRO 4 UNITS: 100 INJECTION, SOLUTION INTRAVENOUS; SUBCUTANEOUS at 18:05

## 2018-10-14 RX ADMIN — FAMOTIDINE 20 MG: 20 TABLET ORAL at 09:31

## 2018-10-14 RX ADMIN — METOPROLOL SUCCINATE 50 MG: 50 TABLET, EXTENDED RELEASE ORAL at 09:32

## 2018-10-14 RX ADMIN — HYDROCORTISONE SODIUM SUCCINATE 50 MG: 100 INJECTION, POWDER, FOR SOLUTION INTRAMUSCULAR; INTRAVENOUS at 02:02

## 2018-10-14 RX ADMIN — INSULIN LISPRO 5 UNITS: 100 INJECTION, SOLUTION INTRAVENOUS; SUBCUTANEOUS at 18:09

## 2018-10-14 RX ADMIN — FUROSEMIDE 40 MG: 10 INJECTION, SOLUTION INTRAMUSCULAR; INTRAVENOUS at 18:04

## 2018-10-14 RX ADMIN — OXYCODONE HYDROCHLORIDE 10 MG: 10 TABLET ORAL at 19:30

## 2018-10-14 RX ADMIN — OXYCODONE HYDROCHLORIDE 10 MG: 10 TABLET ORAL at 09:32

## 2018-10-14 RX ADMIN — TICAGRELOR 90 MG: 90 TABLET ORAL at 09:31

## 2018-10-14 RX ADMIN — BENZONATATE 100 MG: 100 CAPSULE ORAL at 14:07

## 2018-10-14 RX ADMIN — OXYCODONE HYDROCHLORIDE 10 MG: 10 TABLET ORAL at 14:08

## 2018-10-14 RX ADMIN — ATORVASTATIN CALCIUM 40 MG: 40 TABLET, FILM COATED ORAL at 20:12

## 2018-10-14 RX ADMIN — INSULIN LISPRO 6 UNITS: 100 INJECTION, SOLUTION INTRAVENOUS; SUBCUTANEOUS at 12:29

## 2018-10-14 RX ADMIN — INSULIN LISPRO 4 UNITS: 100 INJECTION, SOLUTION INTRAVENOUS; SUBCUTANEOUS at 09:37

## 2018-10-14 RX ADMIN — Medication 10 ML: at 21:14

## 2018-10-14 RX ADMIN — INSULIN LISPRO 5 UNITS: 100 INJECTION, SOLUTION INTRAVENOUS; SUBCUTANEOUS at 12:31

## 2018-10-14 RX ADMIN — INSULIN GLARGINE 25 UNITS: 100 INJECTION, SOLUTION SUBCUTANEOUS at 21:12

## 2018-10-14 RX ADMIN — Medication 10 ML: at 09:32

## 2018-10-14 RX ADMIN — INSULIN LISPRO 5 UNITS: 100 INJECTION, SOLUTION INTRAVENOUS; SUBCUTANEOUS at 09:48

## 2018-10-14 ASSESSMENT — PAIN SCALES - GENERAL
PAINLEVEL_OUTOF10: 9
PAINLEVEL_OUTOF10: 4
PAINLEVEL_OUTOF10: 8
PAINLEVEL_OUTOF10: 9
PAINLEVEL_OUTOF10: 7

## 2018-10-14 ASSESSMENT — PAIN DESCRIPTION - ORIENTATION
ORIENTATION: LEFT
ORIENTATION: LEFT
ORIENTATION: MID;LEFT

## 2018-10-14 ASSESSMENT — PAIN DESCRIPTION - ONSET
ONSET: PROGRESSIVE

## 2018-10-14 ASSESSMENT — PAIN DESCRIPTION - DESCRIPTORS
DESCRIPTORS: ACHING
DESCRIPTORS: ACHING;SHARP
DESCRIPTORS: ACHING

## 2018-10-14 ASSESSMENT — PAIN DESCRIPTION - FREQUENCY
FREQUENCY: INTERMITTENT

## 2018-10-14 ASSESSMENT — PAIN DESCRIPTION - PAIN TYPE
TYPE: ACUTE PAIN

## 2018-10-14 ASSESSMENT — PAIN DESCRIPTION - LOCATION
LOCATION: CHEST
LOCATION: CHEST
LOCATION: RIB CAGE

## 2018-10-14 ASSESSMENT — PAIN DESCRIPTION - PROGRESSION: CLINICAL_PROGRESSION: RAPIDLY WORSENING

## 2018-10-14 NOTE — PROGRESS NOTES
diplopia, numbness or tingling. · Psychiatric: No anxiety or depression. · Endocrine: No temperature intolerance. No excessive thirst, fluid intake, or urination. No tremor. · Hematologic/Lymphatic: No abnormal bruising or bleeding, blood clots or swollen lymph nodes. · Allergic/Immunologic: No nasal congestion or hives. Objective:   /65   Pulse 81   Temp 98.1 °F (36.7 °C) (Oral)   Resp 16   Ht 6' 2\" (1.88 m)   Wt 215 lb 2.7 oz (97.6 kg)   SpO2 95%   BMI 27.63 kg/m²     Intake/Output Summary (Last 24 hours) at 10/14/18 0853  Last data filed at 10/13/18 1300   Gross per 24 hour   Intake              480 ml   Output              320 ml   Net              160 ml     Wt Readings from Last 3 Encounters:   10/14/18 215 lb 2.7 oz (97.6 kg)       Physical Exam:  General: In no acute distress. Awake, alert, and oriented X4. Resting comfortably ion bed  Skin:  Warm and dry. No new appearing rashes or lesions. Neck:  Supple. No JVD or carotid bruit appreciated. Chest:  Lungs with coarse breath sounds throughout. No wheezes/rhonchi/rales. ICD site well approximated and with steri strips in place. No ecchymosis or hematoma. Cardiovascular:  RRR. Normal S1 and S2; soft systolic murmur  Abdomen:  soft, nontender, nondistended, +bowel sounds. No hepatomegaly  Extremities:  No LE edema. No clubbing or cyanosis.  2+ bilateral radial and DP pulses    Medications:    [START ON 10/15/2018] hydrocortisone sodium succinate PF  50 mg Intravenous Daily    insulin glargine  25 Units Subcutaneous Nightly    insulin lispro  5 Units Subcutaneous TID     sodium chloride flush  10 mL Intravenous 2 times per day    famotidine  20 mg Oral BID    docusate sodium  100 mg Oral Daily    metoprolol succinate  50 mg Oral Daily    amiodarone  400 mg Oral BID    ticagrelor  90 mg Oral BID    insulin lispro  0-12 Units Subcutaneous TID WC    insulin lispro  0-6 Units Subcutaneous Nightly    atorvastatin  40 mg Oral

## 2018-10-14 NOTE — PROGRESS NOTES
Hospitalist Progress Note      PCP: SILVIO POLLOCK, APRN - CNP    Date of Admission: 10/5/2018    Chief Complaint: Vtach arrest.     Subjective:     Chest pain controlled - rib fracture post CPR. Neosynephrine weaned off evening of 10/12    BG elevated this am - due to started.    _________________________________________________________  Pt is s/p stent placement last admit, then Vtach arrest at home shortly after discharge. Wife started CPR. Had 2 more stents and IABP this admission. Doing well past few days. Weaned off pressor and increasing level of activity.      Had AICD placed with EP team.       Medications:  Reviewed    Infusion Medications    phenylephrine (PHAM-SYNEPHRINE) 50mg/250mL infusion Stopped (10/12/18 1805)    dextrose       Scheduled Medications    [START ON 10/15/2018] hydrocortisone sodium succinate PF  50 mg Intravenous Daily    insulin glargine  25 Units Subcutaneous Nightly    insulin lispro  5 Units Subcutaneous TID WC    sodium chloride flush  10 mL Intravenous 2 times per day    famotidine  20 mg Oral BID    docusate sodium  100 mg Oral Daily    metoprolol succinate  50 mg Oral Daily    amiodarone  400 mg Oral BID    ticagrelor  90 mg Oral BID    insulin lispro  0-12 Units Subcutaneous TID WC    insulin lispro  0-6 Units Subcutaneous Nightly    atorvastatin  40 mg Oral Nightly    pantoprazole  40 mg Oral QAM AC    influenza virus vaccine  0.5 mL Intramuscular Once    aspirin  81 mg Oral Daily     PRN Meds: acetaminophen, magnesium hydroxide, ondansetron, sodium chloride flush, oxyCODONE, potassium chloride, sodium chloride flush, acetaminophen, magnesium hydroxide, ondansetron, glucose, dextrose, glucagon (rDNA), dextrose, morphine, morphine, benzonatate      Intake/Output Summary (Last 24 hours) at 10/14/18 1157  Last data filed at 10/14/18 0936   Gross per 24 hour   Intake              720 ml   Output              320 ml   Net              400 ml Physical Exam Performed:    /73   Pulse 81   Temp 98.2 °F (36.8 °C) (Oral)   Resp 20   Ht 6' 2\" (1.88 m)   Wt 215 lb 2.7 oz (97.6 kg)   SpO2 96%   BMI 27.63 kg/m²     Physical Examination:   · General appearance: alert, appears stated age and cooperative  · Skin: Skin color, texture, turgor normal.   · HEENT: EOMI: Normocephalic, no lesions, without obvious abnormality. · Lungs: clear to auscultation bilaterally  · Heart: regular rate and rhythm, S1, S2 normal, no murmur, click, rub or gallop  · Abdomen: soft, non-tender; bowel sounds normal; no masses,  no organomegaly  · MSK: extremities normal, atraumatic, no cyanosis or edema, CVC in right groin  · Neurologic:  Mental status: Alert, oriented, thought content appropriate  Labs:   Recent Labs      10/12/18   0407  10/13/18   0523  10/14/18   0625   WBC  12.1*  17.4*  16.0*   HGB  11.3*  12.1*  12.1*   HCT  34.7*  36.4*  36.6*   PLT  401  449  464*     Recent Labs      10/12/18   0406  10/13/18   0523  10/14/18   0625   NA  136  136  137   K  3.7  4.1  4.5   CL  98*  97*  99   CO2  28  27  28   BUN  6*  9  9   CREATININE  0.8*  0.8*  0.7*   CALCIUM  8.1*  8.3  8.6   PHOS  3.7  3.1  2.9     No results for input(s): AST, ALT, BILIDIR, BILITOT, ALKPHOS in the last 72 hours. No results for input(s): INR in the last 72 hours. No results for input(s): Randolm Sellar in the last 72 hours. Urinalysis:    No results found for: Maty Echevaria, BACTERIA, RBCUA, BLOODU, SPECGRAV, [address]    Radiology:  XR CHEST PORTABLE   Final Result   No pneumothorax status post ICD placement. XR CHEST PORTABLE   Final Result   Decreasing vascular congestion and improved aeration of the lungs. Bibasilar, mild airspace disease or atelectasis. Apparent elevation of right hemidiaphragm. Component of subpulmonic pleural   effusion is possible.          XR RIBS BILATERAL (MIN 4 VIEWS)   Final Result   Mildly displaced fracture of the right anterior

## 2018-10-15 LAB
ALBUMIN SERPL-MCNC: 2.5 G/DL (ref 3.4–5)
ANION GAP SERPL CALCULATED.3IONS-SCNC: 10 MMOL/L (ref 3–16)
BASOPHILS ABSOLUTE: 0.1 K/UL (ref 0–0.2)
BASOPHILS RELATIVE PERCENT: 1 %
BUN BLDV-MCNC: 10 MG/DL (ref 7–20)
CALCIUM SERPL-MCNC: 8.3 MG/DL (ref 8.3–10.6)
CHLORIDE BLD-SCNC: 98 MMOL/L (ref 99–110)
CO2: 28 MMOL/L (ref 21–32)
CREAT SERPL-MCNC: 0.7 MG/DL (ref 0.9–1.3)
EOSINOPHILS ABSOLUTE: 0.2 K/UL (ref 0–0.6)
EOSINOPHILS RELATIVE PERCENT: 1.6 %
GFR AFRICAN AMERICAN: >60
GFR NON-AFRICAN AMERICAN: >60
GLUCOSE BLD-MCNC: 135 MG/DL (ref 70–99)
GLUCOSE BLD-MCNC: 139 MG/DL (ref 70–99)
GLUCOSE BLD-MCNC: 178 MG/DL (ref 70–99)
GLUCOSE BLD-MCNC: 185 MG/DL (ref 70–99)
GLUCOSE BLD-MCNC: 186 MG/DL (ref 70–99)
HCT VFR BLD CALC: 33.6 % (ref 40.5–52.5)
HEMOGLOBIN: 11.1 G/DL (ref 13.5–17.5)
LYMPHOCYTES ABSOLUTE: 3.3 K/UL (ref 1–5.1)
LYMPHOCYTES RELATIVE PERCENT: 23.2 %
MAGNESIUM: 2 MG/DL (ref 1.8–2.4)
MCH RBC QN AUTO: 26.6 PG (ref 26–34)
MCHC RBC AUTO-ENTMCNC: 33.1 G/DL (ref 31–36)
MCV RBC AUTO: 80.2 FL (ref 80–100)
MONOCYTES ABSOLUTE: 1.5 K/UL (ref 0–1.3)
MONOCYTES RELATIVE PERCENT: 10.6 %
NEUTROPHILS ABSOLUTE: 9 K/UL (ref 1.7–7.7)
NEUTROPHILS RELATIVE PERCENT: 63.6 %
PDW BLD-RTO: 14.3 % (ref 12.4–15.4)
PERFORMED ON: ABNORMAL
PHOSPHORUS: 3.4 MG/DL (ref 2.5–4.9)
PLATELET # BLD: 438 K/UL (ref 135–450)
PMV BLD AUTO: 7.4 FL (ref 5–10.5)
POTASSIUM SERPL-SCNC: 3.5 MMOL/L (ref 3.5–5.1)
RBC # BLD: 4.19 M/UL (ref 4.2–5.9)
SODIUM BLD-SCNC: 136 MMOL/L (ref 136–145)
WBC # BLD: 14.1 K/UL (ref 4–11)

## 2018-10-15 PROCEDURE — 6370000000 HC RX 637 (ALT 250 FOR IP): Performed by: INTERNAL MEDICINE

## 2018-10-15 PROCEDURE — 6360000002 HC RX W HCPCS: Performed by: INTERNAL MEDICINE

## 2018-10-15 PROCEDURE — 97162 PT EVAL MOD COMPLEX 30 MIN: CPT

## 2018-10-15 PROCEDURE — 36415 COLL VENOUS BLD VENIPUNCTURE: CPT

## 2018-10-15 PROCEDURE — G8987 SELF CARE CURRENT STATUS: HCPCS

## 2018-10-15 PROCEDURE — 2060000000 HC ICU INTERMEDIATE R&B

## 2018-10-15 PROCEDURE — 97116 GAIT TRAINING THERAPY: CPT

## 2018-10-15 PROCEDURE — 2580000003 HC RX 258: Performed by: INTERNAL MEDICINE

## 2018-10-15 PROCEDURE — 97535 SELF CARE MNGMENT TRAINING: CPT

## 2018-10-15 PROCEDURE — 85025 COMPLETE CBC W/AUTO DIFF WBC: CPT

## 2018-10-15 PROCEDURE — 97530 THERAPEUTIC ACTIVITIES: CPT

## 2018-10-15 PROCEDURE — G8979 MOBILITY GOAL STATUS: HCPCS

## 2018-10-15 PROCEDURE — 97165 OT EVAL LOW COMPLEX 30 MIN: CPT

## 2018-10-15 PROCEDURE — 83735 ASSAY OF MAGNESIUM: CPT

## 2018-10-15 PROCEDURE — G8988 SELF CARE GOAL STATUS: HCPCS

## 2018-10-15 PROCEDURE — G8989 SELF CARE D/C STATUS: HCPCS

## 2018-10-15 PROCEDURE — 99024 POSTOP FOLLOW-UP VISIT: CPT | Performed by: INTERNAL MEDICINE

## 2018-10-15 PROCEDURE — G8978 MOBILITY CURRENT STATUS: HCPCS

## 2018-10-15 PROCEDURE — 94760 N-INVAS EAR/PLS OXIMETRY 1: CPT

## 2018-10-15 PROCEDURE — 80069 RENAL FUNCTION PANEL: CPT

## 2018-10-15 RX ADMIN — PANTOPRAZOLE SODIUM 40 MG: 40 TABLET, DELAYED RELEASE ORAL at 06:25

## 2018-10-15 RX ADMIN — Medication 10 ML: at 07:51

## 2018-10-15 RX ADMIN — INSULIN LISPRO 1 UNITS: 100 INJECTION, SOLUTION INTRAVENOUS; SUBCUTANEOUS at 20:06

## 2018-10-15 RX ADMIN — FAMOTIDINE 20 MG: 20 TABLET ORAL at 09:04

## 2018-10-15 RX ADMIN — BENZONATATE 100 MG: 100 CAPSULE ORAL at 06:26

## 2018-10-15 RX ADMIN — ONDANSETRON 4 MG: 2 INJECTION INTRAMUSCULAR; INTRAVENOUS at 17:18

## 2018-10-15 RX ADMIN — OXYCODONE HYDROCHLORIDE 10 MG: 10 TABLET ORAL at 04:45

## 2018-10-15 RX ADMIN — ATORVASTATIN CALCIUM 40 MG: 40 TABLET, FILM COATED ORAL at 19:55

## 2018-10-15 RX ADMIN — BENZONATATE 100 MG: 100 CAPSULE ORAL at 00:11

## 2018-10-15 RX ADMIN — FAMOTIDINE 20 MG: 20 TABLET ORAL at 19:55

## 2018-10-15 RX ADMIN — INSULIN LISPRO 5 UNITS: 100 INJECTION, SOLUTION INTRAVENOUS; SUBCUTANEOUS at 08:57

## 2018-10-15 RX ADMIN — TICAGRELOR 90 MG: 90 TABLET ORAL at 19:55

## 2018-10-15 RX ADMIN — AMIODARONE HYDROCHLORIDE 400 MG: 200 TABLET ORAL at 10:07

## 2018-10-15 RX ADMIN — ASPIRIN 81 MG 81 MG: 81 TABLET ORAL at 10:08

## 2018-10-15 RX ADMIN — AMIODARONE HYDROCHLORIDE 400 MG: 200 TABLET ORAL at 19:55

## 2018-10-15 RX ADMIN — INSULIN LISPRO 2 UNITS: 100 INJECTION, SOLUTION INTRAVENOUS; SUBCUTANEOUS at 18:15

## 2018-10-15 RX ADMIN — HYDROCORTISONE SODIUM SUCCINATE 50 MG: 100 INJECTION, POWDER, FOR SOLUTION INTRAMUSCULAR; INTRAVENOUS at 11:21

## 2018-10-15 RX ADMIN — OXYCODONE HYDROCHLORIDE 10 MG: 10 TABLET ORAL at 00:11

## 2018-10-15 RX ADMIN — TICAGRELOR 90 MG: 90 TABLET ORAL at 10:07

## 2018-10-15 RX ADMIN — ONDANSETRON 4 MG: 2 INJECTION INTRAMUSCULAR; INTRAVENOUS at 07:48

## 2018-10-15 ASSESSMENT — PAIN SCALES - GENERAL
PAINLEVEL_OUTOF10: 3
PAINLEVEL_OUTOF10: 0
PAINLEVEL_OUTOF10: 0
PAINLEVEL_OUTOF10: 8
PAINLEVEL_OUTOF10: 6
PAINLEVEL_OUTOF10: 9
PAINLEVEL_OUTOF10: 3

## 2018-10-15 ASSESSMENT — PAIN DESCRIPTION - PAIN TYPE
TYPE: ACUTE PAIN

## 2018-10-15 ASSESSMENT — PAIN DESCRIPTION - ONSET
ONSET: PROGRESSIVE
ONSET: PROGRESSIVE

## 2018-10-15 ASSESSMENT — PAIN DESCRIPTION - ORIENTATION
ORIENTATION: LEFT
ORIENTATION: LEFT

## 2018-10-15 ASSESSMENT — PAIN DESCRIPTION - LOCATION
LOCATION: RIB CAGE
LOCATION: CHEST

## 2018-10-15 ASSESSMENT — PAIN DESCRIPTION - FREQUENCY
FREQUENCY: INTERMITTENT
FREQUENCY: INTERMITTENT

## 2018-10-15 ASSESSMENT — PAIN DESCRIPTION - DESCRIPTORS
DESCRIPTORS: ACHING
DESCRIPTORS: ACHING

## 2018-10-15 NOTE — PROGRESS NOTES
Pt's wife is concerned that they have not received a \"permanent\" card for the AICD. Nurse attempted to call [employer] and at this time it is only for emergencies. Will call [employer] tomorrow.

## 2018-10-15 NOTE — PROGRESS NOTES
CAD, multiple vessel [I25.10]     ICD (implantable cardioverter-defibrillator) in place [Z95.810]     Idiopathic hypotension [I95.0]     Abnormal ECG [R94.31]     Ventricular tachycardia (HCC) [I47.2]     Abnormal EKG [R94.31]     Elevated troponin [R74.8]     Syncope and collapse [R55]     VT (ventricular tachycardia) (HCC) [I47.2]     STEMI (ST elevation myocardial infarction) ([facility] [address] [I21.3] 10/06/2018     1. VT Arrest s/p ROSC 2/2 STEMI and cardiogenic shock, resolved, IABP removed 10/10, neosynephrine weaned off 10/12, ASA, Statin, BB, trial of stress dose steroid to aid with BP support, off now. 2. Chest pain due to R mildly displaced anterior rib fracture, improved now, continue pain control. 3. Recurrent Ventricular Dysrhythmias, s/p ICD placement 10/12 w Dr. Mirlande Sandoval  4. Anemia, likely anemia of chronic disease  5. Diabetes Mellitus, with uncontrolled hyperglycemia secondary to steroid.        Diet: DIET LOW SODIUM 2 GM;  Code Status: Full Code      Dispo - per cardiology     Brittany Kennedy MD

## 2018-10-15 NOTE — PROGRESS NOTES
4 Eyes Skin Assessment     The patient is being assess for  Transfer to New Unit    I agree that 2 RN's have performed a thorough Head to Toe Skin Assessment on the patient. ALL assessment sites listed below have been assessed. Areas assessed by both nurses:   [x]   Head, Face, and Ears   [x]   Shoulders, Back, and Chest  [x]   Arms, Elbows, and Hands   [x]   Coccyx, Sacrum, and IschIum  [x]   Legs, Feet, and Heels        Does the Patient have Skin Breakdown?   No         Michael Prevention initiated:  NA   Wound Care Orders initiated:  No      [facility] nurse consulted for Pressure Injury (Stage 3,4, Unstageable, DTI, NWPT, and Complex wounds), New and Established Ostomies:  NA      Nurse 1 eSignature: Electronically signed by Frankie Paget, RN on 10/15/18 at 6:57 PM    **SHARE this note so that the co-signing nurse is able to place an eSignature**    Nurse 2 eSignature: Electronically signed by Eduardo Garcia RN on 10/15/18 at 6:58 PM

## 2018-10-15 NOTE — PROGRESS NOTES
0715: Handoff report received from [facility]. Pt seen and sleeping. 3898: Pt c/o nausea, zofran given. 0800: Assessment complete. Pt reports continues nausea, am medications held at this time. Prandial humalog given. AICD insertion site on L chest remains covered. Pt aware that he may move rooms if he is not discharged today. He states he was told he will not be discharged until Tuesday or Wednesday. 0915: Dr. Marin Christiansen bedside. Pt can move to PCU floor per Dr. Marin Christiansen. 1045: Report called to [email or web address]. 1100: humalog and flu vaccine tubed to PCU. Pt placed on [facility] telebox. Signal clear to [address]. Awaiting transport.
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
## 2018-10-16 ENCOUNTER — APPOINTMENT (OUTPATIENT)
Dept: GENERAL RADIOLOGY | Age: 53
DRG: 222 | End: 2018-10-16
Payer: COMMERCIAL

## 2018-10-16 LAB
ALBUMIN SERPL-MCNC: 2.6 G/DL (ref 3.4–5)
ANION GAP SERPL CALCULATED.3IONS-SCNC: 9 MMOL/L (ref 3–16)
BASOPHILS ABSOLUTE: 0.2 K/UL (ref 0–0.2)
BASOPHILS RELATIVE PERCENT: 1.3 %
BUN BLDV-MCNC: 8 MG/DL (ref 7–20)
CALCIUM SERPL-MCNC: 8.8 MG/DL (ref 8.3–10.6)
CHLORIDE BLD-SCNC: 100 MMOL/L (ref 99–110)
CO2: 31 MMOL/L (ref 21–32)
CREAT SERPL-MCNC: 0.9 MG/DL (ref 0.9–1.3)
EOSINOPHILS ABSOLUTE: 0.1 K/UL (ref 0–0.6)
EOSINOPHILS RELATIVE PERCENT: 0.6 %
GFR AFRICAN AMERICAN: >60
GFR NON-AFRICAN AMERICAN: >60
GLUCOSE BLD-MCNC: 111 MG/DL (ref 70–99)
GLUCOSE BLD-MCNC: 141 MG/DL (ref 70–99)
GLUCOSE BLD-MCNC: 166 MG/DL (ref 70–99)
GLUCOSE BLD-MCNC: 169 MG/DL (ref 70–99)
GLUCOSE BLD-MCNC: 193 MG/DL (ref 70–99)
HCT VFR BLD CALC: 37 % (ref 40.5–52.5)
HEMOGLOBIN: 12.1 G/DL (ref 13.5–17.5)
LYMPHOCYTES ABSOLUTE: 2.3 K/UL (ref 1–5.1)
LYMPHOCYTES RELATIVE PERCENT: 15.3 %
MAGNESIUM: 2.1 MG/DL (ref 1.8–2.4)
MCH RBC QN AUTO: 26.1 PG (ref 26–34)
MCHC RBC AUTO-ENTMCNC: 32.7 G/DL (ref 31–36)
MCV RBC AUTO: 79.6 FL (ref 80–100)
MONOCYTES ABSOLUTE: 1.4 K/UL (ref 0–1.3)
MONOCYTES RELATIVE PERCENT: 9.4 %
NEUTROPHILS ABSOLUTE: 11.2 K/UL (ref 1.7–7.7)
NEUTROPHILS RELATIVE PERCENT: 73.4 %
PDW BLD-RTO: 14.7 % (ref 12.4–15.4)
PERFORMED ON: ABNORMAL
PHOSPHORUS: 3.9 MG/DL (ref 2.5–4.9)
PLATELET # BLD: 482 K/UL (ref 135–450)
PMV BLD AUTO: 7.5 FL (ref 5–10.5)
POTASSIUM SERPL-SCNC: 4.8 MMOL/L (ref 3.5–5.1)
RBC # BLD: 4.64 M/UL (ref 4.2–5.9)
SODIUM BLD-SCNC: 140 MMOL/L (ref 136–145)
WBC # BLD: 15.3 K/UL (ref 4–11)

## 2018-10-16 PROCEDURE — 2060000000 HC ICU INTERMEDIATE R&B

## 2018-10-16 PROCEDURE — 6370000000 HC RX 637 (ALT 250 FOR IP): Performed by: INTERNAL MEDICINE

## 2018-10-16 PROCEDURE — 2580000003 HC RX 258: Performed by: INTERNAL MEDICINE

## 2018-10-16 PROCEDURE — 94760 N-INVAS EAR/PLS OXIMETRY 1: CPT

## 2018-10-16 PROCEDURE — G8979 MOBILITY GOAL STATUS: HCPCS

## 2018-10-16 PROCEDURE — G8978 MOBILITY CURRENT STATUS: HCPCS

## 2018-10-16 PROCEDURE — 71046 X-RAY EXAM CHEST 2 VIEWS: CPT

## 2018-10-16 PROCEDURE — 85025 COMPLETE CBC W/AUTO DIFF WBC: CPT

## 2018-10-16 PROCEDURE — 99024 POSTOP FOLLOW-UP VISIT: CPT | Performed by: NURSE PRACTITIONER

## 2018-10-16 PROCEDURE — 80069 RENAL FUNCTION PANEL: CPT

## 2018-10-16 PROCEDURE — 6360000002 HC RX W HCPCS: Performed by: INTERNAL MEDICINE

## 2018-10-16 PROCEDURE — 6370000000 HC RX 637 (ALT 250 FOR IP): Performed by: NURSE PRACTITIONER

## 2018-10-16 PROCEDURE — 97530 THERAPEUTIC ACTIVITIES: CPT

## 2018-10-16 PROCEDURE — 94664 DEMO&/EVAL PT USE INHALER: CPT

## 2018-10-16 PROCEDURE — 36415 COLL VENOUS BLD VENIPUNCTURE: CPT

## 2018-10-16 PROCEDURE — 83735 ASSAY OF MAGNESIUM: CPT

## 2018-10-16 RX ORDER — DOXYCYCLINE HYCLATE 100 MG
100 TABLET ORAL EVERY 12 HOURS SCHEDULED
Status: DISCONTINUED | OUTPATIENT
Start: 2018-10-16 | End: 2018-10-17 | Stop reason: HOSPADM

## 2018-10-16 RX ORDER — LISINOPRIL 5 MG/1
2.5 TABLET ORAL DAILY
Status: DISCONTINUED | OUTPATIENT
Start: 2018-10-16 | End: 2018-10-17 | Stop reason: HOSPADM

## 2018-10-16 RX ADMIN — AMIODARONE HYDROCHLORIDE 400 MG: 200 TABLET ORAL at 21:18

## 2018-10-16 RX ADMIN — FAMOTIDINE 20 MG: 20 TABLET ORAL at 09:58

## 2018-10-16 RX ADMIN — ATORVASTATIN CALCIUM 40 MG: 40 TABLET, FILM COATED ORAL at 21:18

## 2018-10-16 RX ADMIN — METOPROLOL SUCCINATE 50 MG: 50 TABLET, EXTENDED RELEASE ORAL at 09:58

## 2018-10-16 RX ADMIN — ONDANSETRON 4 MG: 2 INJECTION INTRAMUSCULAR; INTRAVENOUS at 02:14

## 2018-10-16 RX ADMIN — DOCUSATE SODIUM 100 MG: 100 CAPSULE, LIQUID FILLED ORAL at 09:58

## 2018-10-16 RX ADMIN — INSULIN LISPRO 1 UNITS: 100 INJECTION, SOLUTION INTRAVENOUS; SUBCUTANEOUS at 21:19

## 2018-10-16 RX ADMIN — ASPIRIN 81 MG 81 MG: 81 TABLET ORAL at 09:58

## 2018-10-16 RX ADMIN — INSULIN LISPRO 2 UNITS: 100 INJECTION, SOLUTION INTRAVENOUS; SUBCUTANEOUS at 10:00

## 2018-10-16 RX ADMIN — INSULIN GLARGINE 25 UNITS: 100 INJECTION, SOLUTION SUBCUTANEOUS at 21:19

## 2018-10-16 RX ADMIN — TICAGRELOR 90 MG: 90 TABLET ORAL at 21:18

## 2018-10-16 RX ADMIN — INSULIN LISPRO 2 UNITS: 100 INJECTION, SOLUTION INTRAVENOUS; SUBCUTANEOUS at 12:05

## 2018-10-16 RX ADMIN — CEFTRIAXONE 1 G: 1 INJECTION, POWDER, FOR SOLUTION INTRAMUSCULAR; INTRAVENOUS at 16:03

## 2018-10-16 RX ADMIN — TICAGRELOR 90 MG: 90 TABLET ORAL at 09:58

## 2018-10-16 RX ADMIN — FAMOTIDINE 20 MG: 20 TABLET ORAL at 21:18

## 2018-10-16 RX ADMIN — INSULIN LISPRO 5 UNITS: 100 INJECTION, SOLUTION INTRAVENOUS; SUBCUTANEOUS at 10:02

## 2018-10-16 RX ADMIN — Medication 10 ML: at 09:57

## 2018-10-16 RX ADMIN — DOXYCYCLINE HYCLATE 100 MG: 100 TABLET, COATED ORAL at 21:18

## 2018-10-16 RX ADMIN — BENZONATATE 100 MG: 100 CAPSULE ORAL at 16:03

## 2018-10-16 RX ADMIN — INSULIN LISPRO 5 UNITS: 100 INJECTION, SOLUTION INTRAVENOUS; SUBCUTANEOUS at 12:05

## 2018-10-16 RX ADMIN — INSULIN LISPRO 5 UNITS: 100 INJECTION, SOLUTION INTRAVENOUS; SUBCUTANEOUS at 17:40

## 2018-10-16 RX ADMIN — AMIODARONE HYDROCHLORIDE 400 MG: 200 TABLET ORAL at 09:58

## 2018-10-16 ASSESSMENT — PAIN SCALES - GENERAL: PAINLEVEL_OUTOF10: 0

## 2018-10-16 NOTE — PROGRESS NOTES
Physical Therapy  Attempt  PT offered to assist pt to bedside chair this morning, and complete light activity session. Pt declined d/t nausea, stating he had been \"throwing up all night. \"  He plans to transfer to bedside chair once his breakfast arrives. PT will attempt to see pt later in day as session allows.    Time In: 1  Electronically signed by Maryana Triplett, [place] [id number] on 10/16/2018 at 9:10 AM

## 2018-10-16 NOTE — PLAN OF CARE
Problem: Falls - Risk of:  Goal: Will remain free from falls  Will remain free from falls   Outcome: Ongoing  Fall risk precautions in place. Bed in lowest position with wheels locked,bed alarm in place and activated,non-skid socks on pt, fall risk ID on pt, call light in reach, pt sedated, will continue to monitor.

## 2018-10-16 NOTE — PROGRESS NOTES
non-tender  Musculoskeletal: No clubbing  Skin: Skin color, texture, turgor normal.  No rashes or lesions. Neurologic:  No focal weakness   Psychiatric: Alert and oriented  Capillary Refill: Brisk,< 3 seconds         Labs:   Recent Labs      10/14/18   0625  10/15/18   0441  10/16/18   0508   WBC  16.0*  14.1*  15.3*   HGB  12.1*  11.1*  12.1*   HCT  36.6*  33.6*  37.0*   PLT  464*  438  482*     Recent Labs      10/14/18   0625  10/15/18   0441  10/16/18   0508   NA  137  136  140   K  4.5  3.5  4.8   CL  99  98*  100   CO2  28  28  31   BUN  9  10  8   CREATININE  0.7*  0.7*  0.9   CALCIUM  8.6  8.3  8.8   PHOS  2.9  3.4  3.9     No results for input(s): AST, ALT, BILIDIR, BILITOT, ALKPHOS in the last 72 hours. No results for input(s): INR in the last 72 hours. No results for input(s): Jazz Caro in the last 72 hours. Urinalysis:    No results found for: Donnald Los, BACTERIA, RBCUA, BLOODU, SPECGRAV, [address]    Radiology:  XR CHEST PORTABLE   Final Result   No pneumothorax status post ICD placement. XR CHEST PORTABLE   Final Result   Decreasing vascular congestion and improved aeration of the lungs. Bibasilar, mild airspace disease or atelectasis. Apparent elevation of right hemidiaphragm. Component of subpulmonic pleural   effusion is possible. XR RIBS BILATERAL (MIN 4 VIEWS)   Final Result   Mildly displaced fracture of the right anterior 2nd rib. XR CHEST STANDARD (2 VW)   Final Result   Right basilar opacity increased, possibly atelectasis. No pneumothorax is   visible. Small pleural effusions are suggested. CT Head WO Contrast   Final Result   No acute intracranial abnormality. XR CHEST PORTABLE   Final Result   No acute cardiopulmonary disease on this portable study obtained at low lung   volumes.          XR CHEST STANDARD (2 VW)    (Results Pending)           Assessment/Plan:    Active Hospital Problems    Diagnosis Date Noted    CAD, multiple vessel [I25.10]     ICD (implantable cardioverter-defibrillator) in place [Z95.810]     Idiopathic hypotension [I95.0]     Abnormal ECG [R94.31]     Ventricular tachycardia (HCC) [I47.2]     Abnormal EKG [R94.31]     Elevated troponin [R74.8]     Syncope and collapse [R55]     VT (ventricular tachycardia) (HCC) [I47.2]     STEMI (ST elevation myocardial infarction) ([facility] [address] [I21.3] 10/06/2018     1. VT Arrest s/p ROSC 2/2 STEMI and cardiogenic shock, resolved, IABP removed 10/10, neosynephrine weaned off 10/12, ASA, Statin, BB.  2. Chest pain due to R mildly displaced anterior rib fracture, improved now, continue pain control. 3. Recurrent Ventricular Dysrhythmias, s/p ICD placement 10/12 w Dr. Cindy Howard  4. Anemia, likely anemia of chronic disease  5. Diabetes Mellitus, with uncontrolled hyperglycemia secondary to steroid. 6. Nausea and vomiting, improved. 7. Leukocytosis, likely reactive, will get a chest xray though. Afebrile.        Diet: DIET LOW SODIUM 2 GM;  Code Status: Full Code            Leopold Govern, MD

## 2018-10-16 NOTE — PROGRESS NOTES
diplopia, numbness or tingling. · Psychiatric: No anxiety or depression. · Endocrine: No temperature intolerance. No excessive thirst, fluid intake, or urination. No tremor. · Hematologic/Lymphatic: No abnormal bruising or bleeding, blood clots or swollen lymph nodes. · Allergic/Immunologic: No nasal congestion or hives. Objective:   /75   Pulse 80   Temp 98.2 °F (36.8 °C) (Oral)   Resp 12   Ht 6' 2\" (1.88 m)   Wt 217 lb 9.5 oz (98.7 kg)   SpO2 93%   BMI 27.94 kg/m²     Intake/Output Summary (Last 24 hours) at 10/16/18 1051  Last data filed at 10/16/18 3793   Gross per 24 hour   Intake              240 ml   Output              228 ml   Net               12 ml     Wt Readings from Last 3 Encounters:   10/16/18 217 lb 9.5 oz (98.7 kg)       Physical Exam:  General: In no acute distress. Awake, alert, and oriented X4. Up in chair  Skin:  Warm and dry. No new appearing rashes or lesions. Neck:  Supple. No JVD or carotid bruit appreciated. Chest:  Lungs clear to auscultation. No wheezes/rhonchi/rales. ICD site well approximated and with steri strips in place. No ecchymosis or hematoma. Cardiovascular:  RRR. Normal S1 and S2; soft systolic murmur  Abdomen:  soft, nontender, nondistended, +bowel sounds. No hepatomegaly  Extremities:  No LE edema. No cyanosis. 2+ bilateral radial and DP pulses.  Cap refill brisk       Medications:    insulin glargine  25 Units Subcutaneous Nightly    insulin lispro  5 Units Subcutaneous TID WC    sodium chloride flush  10 mL Intravenous 2 times per day    famotidine  20 mg Oral BID    docusate sodium  100 mg Oral Daily    metoprolol succinate  50 mg Oral Daily    amiodarone  400 mg Oral BID    ticagrelor  90 mg Oral BID    insulin lispro  0-12 Units Subcutaneous TID WC    insulin lispro  0-6 Units Subcutaneous Nightly    atorvastatin  40 mg Oral Nightly    pantoprazole  40 mg Oral QAM AC    influenza virus vaccine  0.5 mL Intramuscular Once    aspirin

## 2018-10-17 ENCOUNTER — APPOINTMENT (OUTPATIENT)
Dept: GENERAL RADIOLOGY | Age: 53
DRG: 222 | End: 2018-10-17
Payer: COMMERCIAL

## 2018-10-17 VITALS
TEMPERATURE: 97.5 F | BODY MASS INDEX: 27.22 KG/M2 | RESPIRATION RATE: 18 BRPM | WEIGHT: 212.08 LBS | OXYGEN SATURATION: 97 % | DIASTOLIC BLOOD PRESSURE: 77 MMHG | SYSTOLIC BLOOD PRESSURE: 119 MMHG | HEIGHT: 74 IN | HEART RATE: 71 BPM

## 2018-10-17 LAB
ALBUMIN SERPL-MCNC: 2.5 G/DL (ref 3.4–5)
ANION GAP SERPL CALCULATED.3IONS-SCNC: 11 MMOL/L (ref 3–16)
BASOPHILS ABSOLUTE: 0.1 K/UL (ref 0–0.2)
BASOPHILS RELATIVE PERCENT: 0.8 %
BUN BLDV-MCNC: 7 MG/DL (ref 7–20)
CALCIUM SERPL-MCNC: 8.7 MG/DL (ref 8.3–10.6)
CHLORIDE BLD-SCNC: 102 MMOL/L (ref 99–110)
CO2: 27 MMOL/L (ref 21–32)
CREAT SERPL-MCNC: 0.7 MG/DL (ref 0.9–1.3)
EOSINOPHILS ABSOLUTE: 0.2 K/UL (ref 0–0.6)
EOSINOPHILS RELATIVE PERCENT: 1.2 %
GFR AFRICAN AMERICAN: >60
GFR NON-AFRICAN AMERICAN: >60
GLUCOSE BLD-MCNC: 135 MG/DL (ref 70–99)
GLUCOSE BLD-MCNC: 75 MG/DL (ref 70–99)
GLUCOSE BLD-MCNC: 81 MG/DL (ref 70–99)
HCT VFR BLD CALC: 36.1 % (ref 40.5–52.5)
HEMOGLOBIN: 12 G/DL (ref 13.5–17.5)
LYMPHOCYTES ABSOLUTE: 3.4 K/UL (ref 1–5.1)
LYMPHOCYTES RELATIVE PERCENT: 25.9 %
MAGNESIUM: 2.2 MG/DL (ref 1.8–2.4)
MCH RBC QN AUTO: 26.1 PG (ref 26–34)
MCHC RBC AUTO-ENTMCNC: 33.1 G/DL (ref 31–36)
MCV RBC AUTO: 78.8 FL (ref 80–100)
MONOCYTES ABSOLUTE: 1.5 K/UL (ref 0–1.3)
MONOCYTES RELATIVE PERCENT: 11.2 %
NEUTROPHILS ABSOLUTE: 8 K/UL (ref 1.7–7.7)
NEUTROPHILS RELATIVE PERCENT: 60.9 %
PDW BLD-RTO: 14.6 % (ref 12.4–15.4)
PERFORMED ON: ABNORMAL
PERFORMED ON: NORMAL
PHOSPHORUS: 4.2 MG/DL (ref 2.5–4.9)
PLATELET # BLD: 496 K/UL (ref 135–450)
PMV BLD AUTO: 7.3 FL (ref 5–10.5)
POTASSIUM SERPL-SCNC: 3.8 MMOL/L (ref 3.5–5.1)
RBC # BLD: 4.58 M/UL (ref 4.2–5.9)
SODIUM BLD-SCNC: 140 MMOL/L (ref 136–145)
WBC # BLD: 13.1 K/UL (ref 4–11)

## 2018-10-17 PROCEDURE — 99024 POSTOP FOLLOW-UP VISIT: CPT | Performed by: NURSE PRACTITIONER

## 2018-10-17 PROCEDURE — 6370000000 HC RX 637 (ALT 250 FOR IP): Performed by: INTERNAL MEDICINE

## 2018-10-17 PROCEDURE — 83735 ASSAY OF MAGNESIUM: CPT

## 2018-10-17 PROCEDURE — 85025 COMPLETE CBC W/AUTO DIFF WBC: CPT

## 2018-10-17 PROCEDURE — 80069 RENAL FUNCTION PANEL: CPT

## 2018-10-17 PROCEDURE — 36415 COLL VENOUS BLD VENIPUNCTURE: CPT

## 2018-10-17 PROCEDURE — 71046 X-RAY EXAM CHEST 2 VIEWS: CPT

## 2018-10-17 PROCEDURE — 2580000003 HC RX 258: Performed by: INTERNAL MEDICINE

## 2018-10-17 RX ORDER — AMIODARONE HYDROCHLORIDE 200 MG/1
TABLET ORAL
Qty: 40 TABLET | Refills: 2 | Status: SHIPPED | OUTPATIENT
Start: 2018-10-17 | End: 2018-10-24 | Stop reason: ALTCHOICE

## 2018-10-17 RX ORDER — AMOXICILLIN AND CLAVULANATE POTASSIUM 875; 125 MG/1; MG/1
1 TABLET, FILM COATED ORAL EVERY 12 HOURS SCHEDULED
Qty: 12 TABLET | Refills: 0 | Status: SHIPPED | OUTPATIENT
Start: 2018-10-17 | End: 2018-10-23

## 2018-10-17 RX ORDER — LISINOPRIL 2.5 MG/1
2.5 TABLET ORAL DAILY
Qty: 30 TABLET | Refills: 3 | Status: SHIPPED | OUTPATIENT
Start: 2018-10-18 | End: 2018-12-03 | Stop reason: SINTOL

## 2018-10-17 RX ORDER — AMIODARONE HYDROCHLORIDE 200 MG/1
400 TABLET ORAL DAILY
Status: DISCONTINUED | OUTPATIENT
Start: 2018-10-18 | End: 2018-10-17 | Stop reason: HOSPADM

## 2018-10-17 RX ORDER — DOXYCYCLINE HYCLATE 100 MG
100 TABLET ORAL EVERY 12 HOURS SCHEDULED
Qty: 12 TABLET | Refills: 0 | Status: SHIPPED | OUTPATIENT
Start: 2018-10-17 | End: 2018-10-23

## 2018-10-17 RX ORDER — AMOXICILLIN AND CLAVULANATE POTASSIUM 875; 125 MG/1; MG/1
1 TABLET, FILM COATED ORAL EVERY 12 HOURS SCHEDULED
Status: DISCONTINUED | OUTPATIENT
Start: 2018-10-17 | End: 2018-10-17 | Stop reason: HOSPADM

## 2018-10-17 RX ORDER — PANTOPRAZOLE SODIUM 40 MG/1
40 TABLET, DELAYED RELEASE ORAL DAILY
Qty: 30 TABLET | Refills: 0 | Status: ON HOLD | OUTPATIENT
Start: 2018-10-17 | End: 2020-07-01

## 2018-10-17 RX ADMIN — Medication 10 ML: at 09:05

## 2018-10-17 RX ADMIN — DOCUSATE SODIUM 100 MG: 100 CAPSULE, LIQUID FILLED ORAL at 09:04

## 2018-10-17 RX ADMIN — AMIODARONE HYDROCHLORIDE 400 MG: 200 TABLET ORAL at 09:04

## 2018-10-17 RX ADMIN — FAMOTIDINE 20 MG: 20 TABLET ORAL at 09:04

## 2018-10-17 RX ADMIN — ASPIRIN 81 MG 81 MG: 81 TABLET ORAL at 09:04

## 2018-10-17 RX ADMIN — TICAGRELOR 90 MG: 90 TABLET ORAL at 09:04

## 2018-10-17 RX ADMIN — DOXYCYCLINE HYCLATE 100 MG: 100 TABLET, COATED ORAL at 09:04

## 2018-10-17 RX ADMIN — METOPROLOL SUCCINATE 50 MG: 50 TABLET, EXTENDED RELEASE ORAL at 09:04

## 2018-10-17 ASSESSMENT — PAIN DESCRIPTION - PAIN TYPE: TYPE: ACUTE PAIN

## 2018-10-17 ASSESSMENT — PAIN SCALES - GENERAL
PAINLEVEL_OUTOF10: 2
PAINLEVEL_OUTOF10: 0

## 2018-10-17 ASSESSMENT — PAIN DESCRIPTION - LOCATION: LOCATION: RIB CAGE

## 2018-10-17 NOTE — DISCHARGE SUMMARY
10/8/2018:  Left ventricular cavity size is mildly dilated.   Normal left ventricular wall thickness.   Ejection fraction is visually estimated to be 45%.   There is moderate to severe inferior hypokinesis.   Normal right ventricular size and function.     Cardiac cath 10/3/2018: stent to LCX     Cardiac cath with PCI 10/7/2018: LCX stent patent; PCI of RPDA and PCI OM1; IABP placement     · 10/12/2018: Insertion of MRI compatible right ventricular defibrillator lead under fluoroscopy. Insertion of MRI compatible right atrial lead under fluoroscopy  Implantation of a MRI compatible [employer] ICD. Labs:   Lab Results   Component Value Date    CREATININE 0.7 (L) 10/17/2018    BUN 7 10/17/2018     10/17/2018    K 3.8 10/17/2018     10/17/2018    CO2 27 10/17/2018      Lab Results   Component Value Date    WBC 13.1 (H) 10/17/2018    HGB 12.0 (L) 10/17/2018    HCT 36.1 (L) 10/17/2018    MCV 78.8 (L) 10/17/2018     (H) 10/17/2018    No results found for: INR, PROTIME No results found for: BNP    Disposition: home    Patient Instructions:    Gordon Salter   Home Medication Instructions AGL:[id number]    Printed on:10/17/18 1129   Medication Information                      amiodarone (CORDARONE) 200 MG tablet  Take 400 mg (2 tablets) daily x 7 days and then decrease to 200 mg tablet daily beginning October 24, 2018.              aspirin 81 MG chewable tablet  Take 81 mg by mouth daily             atorvastatin (LIPITOR) 40 MG tablet  Take 40 mg by mouth nightly              Cholecalciferol (VITAMIN D3) 25520 units CAPS  Take 1 capsule by mouth once a week              Dulaglutide (TRULICITY) 1.5 UK/0.9BN SOPN  Inject 1.5 mg into the skin once a week              insulin detemir (LEVEMIR) 100 UNIT/ML injection vial  Inject 30 Units into the skin nightly             lisinopril (PRINIVIL;ZESTRIL) 2.5 MG tablet  Take 1 tablet by mouth daily             metoprolol succinate (TOPROL XL) 50 MG extended

## 2018-10-17 NOTE — PROGRESS NOTES
ondansetron, sodium chloride flush, oxyCODONE, potassium chloride, sodium chloride flush, acetaminophen, magnesium hydroxide, ondansetron, glucose, dextrose, glucagon (rDNA), dextrose, morphine, morphine, benzonatate    Lab Data:  CBC:   Recent Labs      10/15/18   0441  10/16/18   0508  10/17/18   0439   WBC  14.1*  15.3*  13.1*   HGB  11.1*  12.1*  12.0*   PLT  438  482*  496*     BMP:  Recent Labs      10/15/18   0441  10/16/18   0508  10/17/18   0439   NA  136  140  140   K  3.5  4.8  3.8   CO2  28  31  27   BUN  10  8  7   CREATININE  0.7*  0.9  0.7*     Results for Olivia Church (MRN [id number]) as of 10/16/2018 10:56    Ref. Range 10/14/2018 06:25   Pro-BNP Latest Ref Range: 0 - 124 pg/mL 1,442 (H)      Results for Olivia Church (MRN [id number]) as of 10/16/2018 10:56    Ref. Range 10/9/2018 04:45   Cholesterol, Total Latest Ref Range: 0 - 199 mg/dL 95   HDL Cholesterol Latest Ref Range: 40 - 60 mg/dL 22 (L)   LDL Calculated Latest Ref Range: <100 mg/dL 45   Triglycerides Latest Ref Range: 0 - 150 mg/dL 142   VLDL Cholesterol Calculated Latest Ref Range: Not Established mg/dL 28     Echo 10/8/2018:  Left ventricular cavity size is mildly dilated.   Normal left ventricular wall thickness.   Ejection fraction is visually estimated to be 45%.   There is moderate to severe inferior hypokinesis.   Normal right ventricular size and function.     Cardiac cath 10/3/2018: stent to LCX     Cardiac cath with PCI 10/7/2018: LCX stent patent; PCI of RPDA and PCI OM1; IABP placement     · 10/12/2018: Insertion of MRI compatible right ventricular defibrillator lead under fluoroscopy. Insertion of MRI compatible right atrial lead under fluoroscopy  Implantation of a MRI compatible [employer] ICD. Telemetry: SR without ectopy    Assessment/Plan:    1.  Coronary artery disease  -presented with cardiac arrest and acute infero-lateral MI  -S/P multivessel PCI: LCX, EDUARD and RPDA  -IABP removed 10/10/2018  -remains off

## 2018-10-17 NOTE — PROGRESS NOTES
Hospitalist Progress Note      PCP: SILVIO POLLOCK, APRN - CNP    Date of Admission: 10/5/2018        Subjective: no dizziness, cough or fever        Medications:  Reviewed    Infusion Medications    dextrose       Scheduled Medications    [START ON 10/18/2018] amiodarone  400 mg Oral Daily    amoxicillin-clavulanate  1 tablet Oral 2 times per day    lisinopril  2.5 mg Oral Daily    doxycycline hyclate  100 mg Oral 2 times per day    insulin glargine  25 Units Subcutaneous Nightly    insulin lispro  5 Units Subcutaneous TID WC    sodium chloride flush  10 mL Intravenous 2 times per day    famotidine  20 mg Oral BID    docusate sodium  100 mg Oral Daily    metoprolol succinate  50 mg Oral Daily    ticagrelor  90 mg Oral BID    insulin lispro  0-12 Units Subcutaneous TID WC    insulin lispro  0-6 Units Subcutaneous Nightly    atorvastatin  40 mg Oral Nightly    pantoprazole  40 mg Oral QAM AC    influenza virus vaccine  0.5 mL Intramuscular Once    aspirin  81 mg Oral Daily     PRN Meds: acetaminophen, magnesium hydroxide, ondansetron, sodium chloride flush, oxyCODONE, potassium chloride, sodium chloride flush, acetaminophen, magnesium hydroxide, ondansetron, glucose, dextrose, glucagon (rDNA), dextrose, morphine, morphine, benzonatate      Intake/Output Summary (Last 24 hours) at 10/17/18 1303  Last data filed at 10/17/18 0634   Gross per 24 hour   Intake              470 ml   Output                0 ml   Net              470 ml       Physical Exam Performed:    /77   Pulse 71   Temp 97.5 °F (36.4 °C) (Oral)   Resp 18   Ht 6' 2\" (1.88 m)   Wt 212 lb 1.3 oz (96.2 kg)   SpO2 97%   BMI 27.23 kg/m²     General appearance: No apparent distress. Neck: Supple  Respiratory:  Normal respiratory effort. Clear to auscultation, bilaterally without Rales/Wheezes/Rhonchi. Cardiovascular: Regular rate and rhythm with normal S1/S2 without murmurs, rubs or gallops.   Abdomen: Soft,

## 2018-10-22 ENCOUNTER — PROCEDURE VISIT (OUTPATIENT)
Dept: CARDIOLOGY CLINIC | Age: 53
End: 2018-10-22
Payer: COMMERCIAL

## 2018-10-22 ENCOUNTER — NURSE ONLY (OUTPATIENT)
Dept: CARDIOLOGY CLINIC | Age: 53
End: 2018-10-22

## 2018-10-22 DIAGNOSIS — Z95.0 CARDIAC PACEMAKER IN SITU: Primary | ICD-10-CM

## 2018-10-22 DIAGNOSIS — I47.20 VT (VENTRICULAR TACHYCARDIA): ICD-10-CM

## 2018-10-22 DIAGNOSIS — Z95.810 ICD (IMPLANTABLE CARDIOVERTER-DEFIBRILLATOR) IN PLACE: Primary | ICD-10-CM

## 2018-10-22 PROCEDURE — 93282 PRGRMG EVAL IMPLANTABLE DFB: CPT | Performed by: INTERNAL MEDICINE

## 2018-10-24 ENCOUNTER — APPOINTMENT (OUTPATIENT)
Dept: GENERAL RADIOLOGY | Age: 53
End: 2018-10-24
Payer: COMMERCIAL

## 2018-10-24 ENCOUNTER — TELEPHONE (OUTPATIENT)
Dept: CARDIOLOGY CLINIC | Age: 53
End: 2018-10-24

## 2018-10-24 ENCOUNTER — HOSPITAL ENCOUNTER (OUTPATIENT)
Age: 53
Setting detail: OBSERVATION
Discharge: HOME | End: 2018-10-25
Attending: EMERGENCY MEDICINE | Admitting: INTERNAL MEDICINE
Payer: COMMERCIAL

## 2018-10-24 DIAGNOSIS — E86.0 DEHYDRATION: ICD-10-CM

## 2018-10-24 DIAGNOSIS — R77.8 ELEVATED TROPONIN: ICD-10-CM

## 2018-10-24 DIAGNOSIS — R55 NEAR SYNCOPE: ICD-10-CM

## 2018-10-24 DIAGNOSIS — I95.1 ORTHOSTATIC HYPOTENSION: Primary | ICD-10-CM

## 2018-10-24 PROBLEM — R42 LIGHTHEADEDNESS: Status: ACTIVE | Noted: 2018-10-24

## 2018-10-24 LAB
A/G RATIO: 0.9 (ref 1.1–2.2)
ALBUMIN SERPL-MCNC: 3.5 G/DL (ref 3.4–5)
ALP BLD-CCNC: 145 U/L (ref 40–129)
ALT SERPL-CCNC: 18 U/L (ref 10–40)
ANION GAP SERPL CALCULATED.3IONS-SCNC: 9 MMOL/L (ref 3–16)
AST SERPL-CCNC: 17 U/L (ref 15–37)
BASOPHILS ABSOLUTE: 0.1 K/UL (ref 0–0.2)
BASOPHILS RELATIVE PERCENT: 1 %
BILIRUB SERPL-MCNC: 0.4 MG/DL (ref 0–1)
BUN BLDV-MCNC: 8 MG/DL (ref 7–20)
CALCIUM SERPL-MCNC: 9.2 MG/DL (ref 8.3–10.6)
CHLORIDE BLD-SCNC: 99 MMOL/L (ref 99–110)
CO2: 28 MMOL/L (ref 21–32)
CREAT SERPL-MCNC: 0.8 MG/DL (ref 0.9–1.3)
EOSINOPHILS ABSOLUTE: 0.1 K/UL (ref 0–0.6)
EOSINOPHILS RELATIVE PERCENT: 1.1 %
GFR AFRICAN AMERICAN: >60
GFR NON-AFRICAN AMERICAN: >60
GLOBULIN: 4.1 G/DL
GLUCOSE BLD-MCNC: 174 MG/DL (ref 70–99)
GLUCOSE BLD-MCNC: 180 MG/DL (ref 70–99)
GLUCOSE BLD-MCNC: 184 MG/DL (ref 70–99)
HCT VFR BLD CALC: 40 % (ref 40.5–52.5)
HEMOGLOBIN: 13.1 G/DL (ref 13.5–17.5)
LIPASE: 25 U/L (ref 13–60)
LYMPHOCYTES ABSOLUTE: 2.3 K/UL (ref 1–5.1)
LYMPHOCYTES RELATIVE PERCENT: 25.2 %
MCH RBC QN AUTO: 25.8 PG (ref 26–34)
MCHC RBC AUTO-ENTMCNC: 32.9 G/DL (ref 31–36)
MCV RBC AUTO: 78.5 FL (ref 80–100)
MONOCYTES ABSOLUTE: 0.7 K/UL (ref 0–1.3)
MONOCYTES RELATIVE PERCENT: 7.4 %
NEUTROPHILS ABSOLUTE: 5.9 K/UL (ref 1.7–7.7)
NEUTROPHILS RELATIVE PERCENT: 65.3 %
PDW BLD-RTO: 15.1 % (ref 12.4–15.4)
PERFORMED ON: ABNORMAL
PERFORMED ON: ABNORMAL
PLATELET # BLD: 337 K/UL (ref 135–450)
PMV BLD AUTO: 7.6 FL (ref 5–10.5)
POTASSIUM SERPL-SCNC: 4.1 MMOL/L (ref 3.5–5.1)
PRO-BNP: 1245 PG/ML (ref 0–124)
RBC # BLD: 5.1 M/UL (ref 4.2–5.9)
SODIUM BLD-SCNC: 136 MMOL/L (ref 136–145)
TOTAL PROTEIN: 7.6 G/DL (ref 6.4–8.2)
TROPONIN: 0.23 NG/ML
WBC # BLD: 9.1 K/UL (ref 4–11)

## 2018-10-24 PROCEDURE — 93005 ELECTROCARDIOGRAM TRACING: CPT | Performed by: EMERGENCY MEDICINE

## 2018-10-24 PROCEDURE — 83880 ASSAY OF NATRIURETIC PEPTIDE: CPT

## 2018-10-24 PROCEDURE — G0378 HOSPITAL OBSERVATION PER HR: HCPCS

## 2018-10-24 PROCEDURE — 6360000002 HC RX W HCPCS: Performed by: INTERNAL MEDICINE

## 2018-10-24 PROCEDURE — 6370000000 HC RX 637 (ALT 250 FOR IP): Performed by: INTERNAL MEDICINE

## 2018-10-24 PROCEDURE — 96372 THER/PROPH/DIAG INJ SC/IM: CPT

## 2018-10-24 PROCEDURE — 96361 HYDRATE IV INFUSION ADD-ON: CPT

## 2018-10-24 PROCEDURE — 71046 X-RAY EXAM CHEST 2 VIEWS: CPT

## 2018-10-24 PROCEDURE — 80053 COMPREHEN METABOLIC PANEL: CPT

## 2018-10-24 PROCEDURE — 99285 EMERGENCY DEPT VISIT HI MDM: CPT

## 2018-10-24 PROCEDURE — 96360 HYDRATION IV INFUSION INIT: CPT

## 2018-10-24 PROCEDURE — 6370000000 HC RX 637 (ALT 250 FOR IP): Performed by: EMERGENCY MEDICINE

## 2018-10-24 PROCEDURE — 85025 COMPLETE CBC W/AUTO DIFF WBC: CPT

## 2018-10-24 PROCEDURE — 83690 ASSAY OF LIPASE: CPT

## 2018-10-24 PROCEDURE — 80299 QUANTITATIVE ASSAY DRUG: CPT

## 2018-10-24 PROCEDURE — 2580000003 HC RX 258: Performed by: EMERGENCY MEDICINE

## 2018-10-24 PROCEDURE — 2580000003 HC RX 258: Performed by: INTERNAL MEDICINE

## 2018-10-24 PROCEDURE — 84484 ASSAY OF TROPONIN QUANT: CPT

## 2018-10-24 RX ORDER — LISINOPRIL 5 MG/1
2.5 TABLET ORAL DAILY
Status: DISCONTINUED | OUTPATIENT
Start: 2018-10-25 | End: 2018-10-25 | Stop reason: HOSPADM

## 2018-10-24 RX ORDER — ATORVASTATIN CALCIUM 40 MG/1
40 TABLET, FILM COATED ORAL NIGHTLY
Status: DISCONTINUED | OUTPATIENT
Start: 2018-10-24 | End: 2018-10-24

## 2018-10-24 RX ORDER — DEXTROSE MONOHYDRATE 50 MG/ML
100 INJECTION, SOLUTION INTRAVENOUS PRN
Status: DISCONTINUED | OUTPATIENT
Start: 2018-10-24 | End: 2018-10-25 | Stop reason: HOSPADM

## 2018-10-24 RX ORDER — POTASSIUM CHLORIDE 1.5 G/1.77G
40 POWDER, FOR SOLUTION ORAL PRN
Status: DISCONTINUED | OUTPATIENT
Start: 2018-10-24 | End: 2018-10-25 | Stop reason: HOSPADM

## 2018-10-24 RX ORDER — ASPIRIN 81 MG/1
81 TABLET, CHEWABLE ORAL DAILY
Status: DISCONTINUED | OUTPATIENT
Start: 2018-10-24 | End: 2018-10-25 | Stop reason: HOSPADM

## 2018-10-24 RX ORDER — MECLIZINE HCL 12.5 MG/1
50 TABLET ORAL ONCE
Status: COMPLETED | OUTPATIENT
Start: 2018-10-24 | End: 2018-10-24

## 2018-10-24 RX ORDER — POTASSIUM CHLORIDE 7.45 MG/ML
10 INJECTION INTRAVENOUS PRN
Status: DISCONTINUED | OUTPATIENT
Start: 2018-10-24 | End: 2018-10-25 | Stop reason: HOSPADM

## 2018-10-24 RX ORDER — AMIODARONE HYDROCHLORIDE 200 MG/1
200 TABLET ORAL DAILY
Status: DISCONTINUED | OUTPATIENT
Start: 2018-10-25 | End: 2018-10-25 | Stop reason: HOSPADM

## 2018-10-24 RX ORDER — FAMOTIDINE 20 MG/1
20 TABLET, FILM COATED ORAL 2 TIMES DAILY PRN
Status: DISCONTINUED | OUTPATIENT
Start: 2018-10-24 | End: 2018-10-25 | Stop reason: HOSPADM

## 2018-10-24 RX ORDER — INSULIN GLARGINE 100 [IU]/ML
30 INJECTION, SOLUTION SUBCUTANEOUS NIGHTLY
Status: DISCONTINUED | OUTPATIENT
Start: 2018-10-24 | End: 2018-10-25 | Stop reason: HOSPADM

## 2018-10-24 RX ORDER — ONDANSETRON 2 MG/ML
4 INJECTION INTRAMUSCULAR; INTRAVENOUS ONCE
Status: DISCONTINUED | OUTPATIENT
Start: 2018-10-24 | End: 2018-10-25 | Stop reason: HOSPADM

## 2018-10-24 RX ORDER — DEXTROSE MONOHYDRATE 25 G/50ML
12.5 INJECTION, SOLUTION INTRAVENOUS PRN
Status: DISCONTINUED | OUTPATIENT
Start: 2018-10-24 | End: 2018-10-25 | Stop reason: HOSPADM

## 2018-10-24 RX ORDER — ESOMEPRAZOLE MAGNESIUM 40 MG/1
40 FOR SUSPENSION ORAL DAILY PRN
Status: DISCONTINUED | OUTPATIENT
Start: 2018-10-24 | End: 2018-10-24

## 2018-10-24 RX ORDER — ATORVASTATIN CALCIUM 40 MG/1
40 TABLET, FILM COATED ORAL DAILY
Status: DISCONTINUED | OUTPATIENT
Start: 2018-10-25 | End: 2018-10-25 | Stop reason: HOSPADM

## 2018-10-24 RX ORDER — 0.9 % SODIUM CHLORIDE 0.9 %
1000 INTRAVENOUS SOLUTION INTRAVENOUS ONCE
Status: COMPLETED | OUTPATIENT
Start: 2018-10-24 | End: 2018-10-24

## 2018-10-24 RX ORDER — SODIUM CHLORIDE 0.9 % (FLUSH) 0.9 %
10 SYRINGE (ML) INJECTION PRN
Status: DISCONTINUED | OUTPATIENT
Start: 2018-10-24 | End: 2018-10-25 | Stop reason: HOSPADM

## 2018-10-24 RX ORDER — NICOTINE POLACRILEX 4 MG
15 LOZENGE BUCCAL PRN
Status: DISCONTINUED | OUTPATIENT
Start: 2018-10-24 | End: 2018-10-25 | Stop reason: HOSPADM

## 2018-10-24 RX ORDER — DOCUSATE SODIUM 100 MG/1
100 CAPSULE, LIQUID FILLED ORAL 2 TIMES DAILY PRN
Status: DISCONTINUED | OUTPATIENT
Start: 2018-10-24 | End: 2018-10-25 | Stop reason: HOSPADM

## 2018-10-24 RX ORDER — SODIUM CHLORIDE 9 MG/ML
INJECTION, SOLUTION INTRAVENOUS CONTINUOUS
Status: DISCONTINUED | OUTPATIENT
Start: 2018-10-24 | End: 2018-10-25 | Stop reason: HOSPADM

## 2018-10-24 RX ORDER — METOPROLOL SUCCINATE 50 MG/1
50 TABLET, EXTENDED RELEASE ORAL DAILY
Status: DISCONTINUED | OUTPATIENT
Start: 2018-10-25 | End: 2018-10-25 | Stop reason: HOSPADM

## 2018-10-24 RX ORDER — SUCRALFATE 1 G/1
1 TABLET ORAL EVERY 6 HOURS SCHEDULED
Status: DISCONTINUED | OUTPATIENT
Start: 2018-10-24 | End: 2018-10-25 | Stop reason: HOSPADM

## 2018-10-24 RX ORDER — PANTOPRAZOLE SODIUM 40 MG/10ML
40 INJECTION, POWDER, LYOPHILIZED, FOR SOLUTION INTRAVENOUS DAILY
Status: DISCONTINUED | OUTPATIENT
Start: 2018-10-24 | End: 2018-10-25 | Stop reason: HOSPADM

## 2018-10-24 RX ORDER — MAGNESIUM SULFATE 1 G/100ML
1 INJECTION INTRAVENOUS PRN
Status: DISCONTINUED | OUTPATIENT
Start: 2018-10-24 | End: 2018-10-25 | Stop reason: HOSPADM

## 2018-10-24 RX ORDER — POTASSIUM CHLORIDE 20 MEQ/1
40 TABLET, EXTENDED RELEASE ORAL PRN
Status: DISCONTINUED | OUTPATIENT
Start: 2018-10-24 | End: 2018-10-25 | Stop reason: HOSPADM

## 2018-10-24 RX ORDER — 0.9 % SODIUM CHLORIDE 0.9 %
10 VIAL (ML) INJECTION DAILY
Status: DISCONTINUED | OUTPATIENT
Start: 2018-10-24 | End: 2018-10-25 | Stop reason: HOSPADM

## 2018-10-24 RX ORDER — AMIODARONE HYDROCHLORIDE 200 MG/1
100 TABLET ORAL DAILY
COMMUNITY
End: 2019-02-07 | Stop reason: ALTCHOICE

## 2018-10-24 RX ORDER — SUCRALFATE 1 G/1
1 TABLET ORAL EVERY 6 HOURS SCHEDULED
Status: DISCONTINUED | OUTPATIENT
Start: 2018-10-24 | End: 2018-10-24

## 2018-10-24 RX ORDER — SODIUM CHLORIDE 0.9 % (FLUSH) 0.9 %
10 SYRINGE (ML) INJECTION EVERY 12 HOURS SCHEDULED
Status: DISCONTINUED | OUTPATIENT
Start: 2018-10-24 | End: 2018-10-25 | Stop reason: HOSPADM

## 2018-10-24 RX ORDER — PANTOPRAZOLE SODIUM 40 MG/1
40 TABLET, DELAYED RELEASE ORAL DAILY
Status: DISCONTINUED | OUTPATIENT
Start: 2018-10-25 | End: 2018-10-24

## 2018-10-24 RX ORDER — ONDANSETRON 2 MG/ML
4 INJECTION INTRAMUSCULAR; INTRAVENOUS EVERY 6 HOURS PRN
Status: DISCONTINUED | OUTPATIENT
Start: 2018-10-24 | End: 2018-10-25 | Stop reason: HOSPADM

## 2018-10-24 RX ADMIN — SODIUM CHLORIDE: 9 INJECTION, SOLUTION INTRAVENOUS at 18:50

## 2018-10-24 RX ADMIN — INSULIN LISPRO 2 UNITS: 100 INJECTION, SOLUTION INTRAVENOUS; SUBCUTANEOUS at 18:50

## 2018-10-24 RX ADMIN — ENOXAPARIN SODIUM 40 MG: 40 INJECTION SUBCUTANEOUS at 18:50

## 2018-10-24 RX ADMIN — TICAGRELOR 90 MG: 90 TABLET ORAL at 22:10

## 2018-10-24 RX ADMIN — SODIUM CHLORIDE 1000 ML: 9 INJECTION, SOLUTION INTRAVENOUS at 14:39

## 2018-10-24 RX ADMIN — MECLIZINE 50 MG: 12.5 TABLET ORAL at 14:35

## 2018-10-24 RX ADMIN — INSULIN GLARGINE 30 UNITS: 100 INJECTION, SOLUTION SUBCUTANEOUS at 22:11

## 2018-10-24 NOTE — H&P
Hospital Medicine   History and Physical    Patient:  Pearl Mclaughlin                                               LOO:[birth year]   . MRN: [id number]  Date of admission: 10/24/2018  Date of Service: Pt seen/examined on 10/24/2018 and Admitted to Observation    CHIEF COMPLAINT:  Dizziness    History Obtained From:  patient  PCP: LAURA MORLEY - CNP    HISTORY OF PRESENT ILLNESS:   Patient is a 51-year-old  male with past medical history significant for coronary artery disease status post stent placement, ischemic cardiomyopathy with last known ejection fraction of 45%, ventricular tachycardia status post AICD placement and diabetes mellitus type 2 who presents emergency room with complaint of lightheadedness and weakness. History was partially obtained from spouse was at bedside. As per spouse, patient had developed worsening weakness after discharge from the hospital. Was admitted for 2 weeks and discharged 1 week ago. Patient states that since arriving at home he has been very deconditioned. He also have had poor oral intake due to nausea and vomiting. He had developed profound weakness and difficulty in ambulating with decision made to seek help. Workup in the emergency room includes vital signs that showed slightly low blood pressure 103/72. Labs, EKG and imaging were negative for any acute findings. Due to concern for lightheadedness and weakness, patient is being admitted to the hospital for further workup and management.       Past Medical History:        Diagnosis Date    Arthritis     Blood circulation, collateral     CAD (coronary artery disease) 7/15/2014    CAD (coronary artery disease)     Cardiac arrest ([address] 10/05/2018    Cerebral artery occlusion with cerebral infarction ([address]     Diabetes mellitus ([address]     Diabetic peripheral neuropathy ([address] 6/8/2018    GERD (gastroesophageal reflux disease)     ulcers    Hypercholesteremia     Hyperlipidemia     10/5/18  Yes Claire Phan MD   ticagrelor (BRILINTA) 90 MG TABS tablet Take 1 tablet by mouth 2 times daily 10/4/18  Yes Claire Phan MD   insulin detemir (LEVEMIR FLEXTOUCH) 100 UNIT/ML injection pen Inject 30 Units into the skin nightly 8/23/18  Yes Karmen Naqvi, DO   Offloading Shoe MISC by Does not apply route Darco Peg Offloading Shoe System 8/13/18  Yes LAURA Lundberg - CNP       Allergies:  No known allergies    Social History:   TOBACCO:   reports that he has never smoked. He has never used smokeless tobacco.  ETOH:   reports that he does not drink alcohol. OCCUPATION:      Family History:   Family History   Problem Relation Age of Onset    High Blood Pressure Mother     Stroke Mother     Heart Disease Mother     COPD Mother     Heart Disease Father     Cancer Father         skin    Diabetes Sister     Cancer Sister     Heart Disease Brother     Diabetes Brother        REVIEW OF SYSTEMS:  Pertinent positives as noted in HPI. All others systems were reviewed and are negative. Physical Exam:    Vitals: /78   Pulse 83   Temp 98.2 °F (36.8 °C) (Oral)   Resp 12   Wt 201 lb 4.5 oz (91.3 kg)   SpO2 96%   BMI 25.84 kg/m²   General appearance: Laying in bed  Skin: Warm  HEENT: Head: Normocephalic, no lesions, without obvious abnormality.   Neck: no adenopathy, no carotid bruit, no JVD, supple, symmetrical, trachea midline and thyroid not enlarged, symmetric, no tenderness/mass/nodules  Lungs: diminished breath sounds bilaterally  Heart: regular rate and rhythm  Abdomen: soft, non-tender; bowel sounds normal; no masses,  no organomegaly  Extremities: extremities normal, atraumatic, no cyanosis or edema  Neurologic: Mental status: Alert, oriented, thought content appropriate        Results for orders placed or performed during the hospital encounter of 10/24/18   CBC Auto Differential   Result Value Ref Range    WBC 9.1 4.0 - 11.0 K/uL    RBC 5.10 4.20 - 5.90 M/uL    Hemoglobin 13.1 (L) Chest Updated: 10/24/18 1425     Narrative:       EXAMINATION:  TWO VIEWS OF THE CHEST    10/24/2018 1:51 pm    COMPARISON:  October 17, October 16, and October 12, 2018. HISTORY:  ORDERING SYSTEM PROVIDED HISTORY: Chest Discomfort  TECHNOLOGIST PROVIDED HISTORY:  Reason for exam:->Chest Discomfort  Ordering Physician Provided Reason for Exam: Dizziness (Arrived per [facility]  EMS with c/o feeling lightheaded. Recent MI with stent placement. )  Acuity: Acute  Type of Exam: Initial    FINDINGS:  No change position of AICD with left-sided battery pack.  Heart and pulmonary  vascularity within normal limits.  No focal alveolar infiltrate or  consolidation.  Resolution of left basilar airspace opacity over the past  week.  No significant pleural effusion.     Impression:       No acute pulmonary finding.             Assessment/Plan:   Patient Active Problem List:    Lightheadedness/weakness/failure to thrive: Attributed to deconditioning in the setting of dehydration and new onset cardiomyopathy. -PT/OT consultation   - consultation for possible placement   -Gentle IV fluid hydration   -Cardiology consultation         Nausea and vomiting: Etiology unclear. Unable to keep anything down for the past few days.   -Anti-emetics   -Protonix   -IV fluids   -Supportive care        Coronary artery disease: Status post stent placement.   -Continue home meds        Chronic systolic CHF/Ischemic cardiomyopathy: Appears hypovolemic on presentation. Last known ejection fraction of 45%.    -Continue Toprol and lisinopril   -No diuretics due to hypovolemia   -Gentle IV fluid hydration        Ventricular tachycardia: Status post AICD placement.   -Continue amiodarone   -Will defer further workup to cardiology        Diabetes mellitus type 2: Uncontrolled as last available hemoglobin A1c was 8.9 on 10/8/18.   -Sliding scale insulin   -Accu-Cheks   -Plan to resume home medications on discharge              DVT Prophylaxis: Lovenox  Code Status: Full    Disposition:   1. Floor. PHYSICIANS CERTIFICATION:    I certify that Angie Torres is expected to be hospitalized for < than 2 midnights based on the following assessment and plan: Thank you LAURA MORLEY CNP for the opportunity to be involved in this patient's care. If you have any questions or concerns please feel free to contact me at [phone].       Kathyleen Lesch, MD, FACP  5:35 PM  10/24/2018  [phone]

## 2018-10-24 NOTE — ED PROVIDER NOTES
RIBS WITH FRONTAL XRAY VIEW OF THE CHEST 10/10/2018 10:44 am COMPARISON: Radiograph 10/07/2018 HISTORY: ORDERING SYSTEM PROVIDED HISTORY: chest and back pain after CPR TECHNOLOGIST PROVIDED HISTORY: Include chest Reason for exam:->chest and back pain after CPR Ordering Physician Provided Reason for Exam: check for Rib fx after cpr, chest and back pain Acuity: Acute Type of Exam: Initial FINDINGS: Cardiomediastinal silhouette is within normal limits. No pneumothorax or effusion. Mild bibasilar opacities are again noted. Mildly displaced fracture of the right anterior 2nd rib. Mildly displaced fracture of the right anterior 2nd rib. Ct Head Wo Contrast    Result Date: 10/5/2018  EXAMINATION: CT OF THE HEAD WITHOUT CONTRAST  10/5/2018 11:11 pm TECHNIQUE: CT of the head was performed without the administration of intravenous contrast. Dose modulation, iterative reconstruction, and/or weight based adjustment of the mA/kV was utilized to reduce the radiation dose to as low as reasonably achievable. COMPARISON: None. HISTORY: ORDERING SYSTEM PROVIDED HISTORY: SYNCOPE/FAINTING TECHNOLOGIST PROVIDED HISTORY: Has a \"code stroke\" or \"stroke alert\" been called? ->No Ordering Physician Provided Reason for Exam: synope; cardiac arrest; heart cath on Thursday; confusion Acuity: Acute Type of Exam: Initial FINDINGS: BRAIN/VENTRICLES: There is no acute intracranial hemorrhage, mass effect or midline shift. No abnormal extra-axial fluid collection. The gray-white differentiation is maintained without evidence of an acute infarct. There is no evidence of hydrocephalus. Left basal ganglia prominent perivascular space versus remote lacunar infarct. ORBITS: The visualized portion of the orbits demonstrate no acute abnormality. SINUSES: The visualized paranasal sinuses and mastoid air cells demonstrate no acute abnormality. Small mucous retention cyst or polyp in the left maxillary sinus.  SOFT TISSUES/SKULL:  No acute abnormality of the visualized skull or soft tissues. No acute intracranial abnormality. Xr Chest Portable    Result Date: 10/12/2018  EXAMINATION: SINGLE XRAY VIEW OF THE CHEST 10/12/2018 5:14 pm COMPARISON: 10/12/2018 HISTORY: ORDERING SYSTEM PROVIDED HISTORY: ICD placement and rule out pneumothorax TECHNOLOGIST PROVIDED HISTORY: Reason for exam:->ICD placement and rule out pneumothorax Ordering Physician Provided Reason for Exam: ICD placement and rule out pneumothorax Acuity: Acute Type of Exam: Initial FINDINGS: ICD is now in place. AP position of the lead cannot be ascertained without a lateral examination. No pneumothorax is identified. Discoid atelectasis within the right midlung is noted. The lungs are otherwise clear. The heart size is stable. No pneumothorax status post ICD placement. Xr Chest Portable    Result Date: 10/12/2018  EXAMINATION: SINGLE XRAY VIEW OF THE CHEST 10/12/2018 11:20 am COMPARISON: 10/07/2018 HISTORY: ORDERING SYSTEM PROVIDED HISTORY: CHF TECHNOLOGIST PROVIDED HISTORY: Reason for exam:->CHF Ordering Physician Provided Reason for Exam: CHF Acuity: Acute Type of Exam: Initial FINDINGS: The cardiac silhouette is at the upper limits of normal.  Pulmonary vessels are better defined. There is slightly improved aeration. There is mild patchy opacity in the bases. There is apparent elevation of the right hemidiaphragm. Decreasing vascular congestion and improved aeration of the lungs. Bibasilar, mild airspace disease or atelectasis. Apparent elevation of right hemidiaphragm. Component of subpulmonic pleural effusion is possible. Xr Chest Portable    Result Date: 10/5/2018  EXAMINATION: SINGLE XRAY VIEW OF THE CHEST 10/5/2018 10:27 pm COMPARISON: None.  HISTORY: ORDERING SYSTEM PROVIDED HISTORY: chest pain TECHNOLOGIST PROVIDED HISTORY: Reason for exam:->chest pain Ordering Physician Provided Reason for Exam: chest pain, post cardiac arrest, n/v Acuity: Acute Type of Exam: Initial FINDINGS: Portable study was obtained at low lung volumes. Crowding of lung markings the bases. Lungs are otherwise clear. No pneumothorax or pleural effusion. Slight elevation of the right hemidiaphragm. Heart size is normal.  No acute bone finding. No acute cardiopulmonary disease on this portable study obtained at low lung volumes. Xr Chest Portable    Result Date: 10/3/2018  EXAMINATION: SINGLE XRAY VIEW OF THE CHEST 10/3/2018 1:07 pm COMPARISON: 10/09/2017 HISTORY: ORDERING SYSTEM PROVIDED HISTORY: lightheaded TECHNOLOGIST PROVIDED HISTORY: Reason for exam:->lightheaded Ordering Physician Provided Reason for Exam: Fatigue (x 1 week) cough Acuity: Acute Type of Exam: Initial FINDINGS: The lungs are without acute focal process. There is no effusion or pneumothorax. The cardiomediastinal silhouette is stable. The osseous structures are stable. No acute process. PROCEDURES    ED COURSE/MDM  Patient was triaged, vital signs are taken and EKGs performed that shows normal sinus rhythm is no acute ST elevation or acute T-wave inversion rate of 89.   QTc 414 no old EKG for immediate comparison. Chest x-ray as noted above no acute findings. The patient had a mild elevation of troponin generally improved from previous. Orthostatic vital signs performed is found to be positive. Significant drop in blood pressure with elevation of heart rate with symptomatic dizziness. Very benefit from definitive evaluation by cardiology with IV fluids secondary to dehydration discussed with the hospitalist who agreed to accept the patient to their service. Old records reviewed. Labs and imaging reviewed and results discussed withpatient. Plan of care discussed with patient and family. Patient and family in agreement with plan. Patient was given scripts for the following medications. I counseled patient how to take these medications.    New Prescriptions    No

## 2018-10-25 VITALS
SYSTOLIC BLOOD PRESSURE: 93 MMHG | HEIGHT: 74 IN | OXYGEN SATURATION: 96 % | TEMPERATURE: 98.1 F | DIASTOLIC BLOOD PRESSURE: 59 MMHG | WEIGHT: 201.06 LBS | BODY MASS INDEX: 25.8 KG/M2 | RESPIRATION RATE: 18 BRPM | HEART RATE: 87 BPM

## 2018-10-25 LAB
ANION GAP SERPL CALCULATED.3IONS-SCNC: 11 MMOL/L (ref 3–16)
BASOPHILS ABSOLUTE: 0.1 K/UL (ref 0–0.2)
BASOPHILS RELATIVE PERCENT: 1 %
BUN BLDV-MCNC: 8 MG/DL (ref 7–20)
CALCIUM SERPL-MCNC: 8.4 MG/DL (ref 8.3–10.6)
CHLORIDE BLD-SCNC: 105 MMOL/L (ref 99–110)
CO2: 24 MMOL/L (ref 21–32)
CREAT SERPL-MCNC: 0.7 MG/DL (ref 0.9–1.3)
EOSINOPHILS ABSOLUTE: 0.2 K/UL (ref 0–0.6)
EOSINOPHILS RELATIVE PERCENT: 1.7 %
GFR AFRICAN AMERICAN: >60
GFR NON-AFRICAN AMERICAN: >60
GLUCOSE BLD-MCNC: 134 MG/DL (ref 70–99)
GLUCOSE BLD-MCNC: 147 MG/DL (ref 70–99)
GLUCOSE BLD-MCNC: 179 MG/DL (ref 70–99)
HCT VFR BLD CALC: 35.3 % (ref 40.5–52.5)
HEMOGLOBIN: 11.7 G/DL (ref 13.5–17.5)
LYMPHOCYTES ABSOLUTE: 3 K/UL (ref 1–5.1)
LYMPHOCYTES RELATIVE PERCENT: 32 %
MAGNESIUM: 2 MG/DL (ref 1.8–2.4)
MCH RBC QN AUTO: 26 PG (ref 26–34)
MCHC RBC AUTO-ENTMCNC: 33 G/DL (ref 31–36)
MCV RBC AUTO: 78.6 FL (ref 80–100)
MONOCYTES ABSOLUTE: 0.8 K/UL (ref 0–1.3)
MONOCYTES RELATIVE PERCENT: 8.3 %
NEUTROPHILS ABSOLUTE: 5.3 K/UL (ref 1.7–7.7)
NEUTROPHILS RELATIVE PERCENT: 57 %
PDW BLD-RTO: 15.2 % (ref 12.4–15.4)
PERFORMED ON: ABNORMAL
PERFORMED ON: ABNORMAL
PLATELET # BLD: 272 K/UL (ref 135–450)
PMV BLD AUTO: 7.7 FL (ref 5–10.5)
POTASSIUM REFLEX MAGNESIUM: 3.5 MMOL/L (ref 3.5–5.1)
RBC # BLD: 4.49 M/UL (ref 4.2–5.9)
SODIUM BLD-SCNC: 140 MMOL/L (ref 136–145)
TROPONIN: 0.21 NG/ML
TROPONIN: 0.24 NG/ML
WBC # BLD: 9.4 K/UL (ref 4–11)

## 2018-10-25 PROCEDURE — 83735 ASSAY OF MAGNESIUM: CPT

## 2018-10-25 PROCEDURE — 6370000000 HC RX 637 (ALT 250 FOR IP): Performed by: INTERNAL MEDICINE

## 2018-10-25 PROCEDURE — G0378 HOSPITAL OBSERVATION PER HR: HCPCS

## 2018-10-25 PROCEDURE — 80048 BASIC METABOLIC PNL TOTAL CA: CPT

## 2018-10-25 PROCEDURE — 97530 THERAPEUTIC ACTIVITIES: CPT

## 2018-10-25 PROCEDURE — 6360000002 HC RX W HCPCS: Performed by: INTERNAL MEDICINE

## 2018-10-25 PROCEDURE — 96374 THER/PROPH/DIAG INJ IV PUSH: CPT

## 2018-10-25 PROCEDURE — C9113 INJ PANTOPRAZOLE SODIUM, VIA: HCPCS | Performed by: INTERNAL MEDICINE

## 2018-10-25 PROCEDURE — 97116 GAIT TRAINING THERAPY: CPT

## 2018-10-25 PROCEDURE — 94760 N-INVAS EAR/PLS OXIMETRY 1: CPT

## 2018-10-25 PROCEDURE — 85025 COMPLETE CBC W/AUTO DIFF WBC: CPT

## 2018-10-25 PROCEDURE — 36415 COLL VENOUS BLD VENIPUNCTURE: CPT

## 2018-10-25 PROCEDURE — G8978 MOBILITY CURRENT STATUS: HCPCS

## 2018-10-25 PROCEDURE — 2580000003 HC RX 258: Performed by: INTERNAL MEDICINE

## 2018-10-25 PROCEDURE — 84484 ASSAY OF TROPONIN QUANT: CPT

## 2018-10-25 PROCEDURE — 96372 THER/PROPH/DIAG INJ SC/IM: CPT

## 2018-10-25 PROCEDURE — G8979 MOBILITY GOAL STATUS: HCPCS

## 2018-10-25 PROCEDURE — 97162 PT EVAL MOD COMPLEX 30 MIN: CPT

## 2018-10-25 RX ORDER — SODIUM CHLORIDE 9 MG/ML
INJECTION, SOLUTION INTRAVENOUS
Status: DISCONTINUED
Start: 2018-10-25 | End: 2018-10-25 | Stop reason: HOSPADM

## 2018-10-25 RX ORDER — METOPROLOL SUCCINATE 25 MG/1
25 TABLET, EXTENDED RELEASE ORAL DAILY
Qty: 30 TABLET | Refills: 5 | Status: SHIPPED | OUTPATIENT
Start: 2018-10-25 | End: 2018-12-03 | Stop reason: SDUPTHER

## 2018-10-25 RX ORDER — ONDANSETRON 4 MG/1
4 TABLET, FILM COATED ORAL DAILY PRN
Qty: 10 TABLET | Refills: 5 | Status: SHIPPED | OUTPATIENT
Start: 2018-10-25 | End: 2018-12-12 | Stop reason: ALTCHOICE

## 2018-10-25 RX ADMIN — SODIUM CHLORIDE 75 ML/HR: 9 INJECTION, SOLUTION INTRAVENOUS at 09:27

## 2018-10-25 RX ADMIN — ATORVASTATIN CALCIUM 40 MG: 40 TABLET, FILM COATED ORAL at 09:26

## 2018-10-25 RX ADMIN — ASPIRIN 81 MG CHEWABLE TABLET 81 MG: 81 TABLET CHEWABLE at 09:25

## 2018-10-25 RX ADMIN — LISINOPRIL 2.5 MG: 5 TABLET ORAL at 09:26

## 2018-10-25 RX ADMIN — ENOXAPARIN SODIUM 40 MG: 40 INJECTION SUBCUTANEOUS at 09:25

## 2018-10-25 RX ADMIN — PANTOPRAZOLE SODIUM 40 MG: 40 INJECTION, POWDER, FOR SOLUTION INTRAVENOUS at 09:25

## 2018-10-25 RX ADMIN — METOPROLOL SUCCINATE 50 MG: 50 TABLET, EXTENDED RELEASE ORAL at 09:26

## 2018-10-25 RX ADMIN — TICAGRELOR 90 MG: 90 TABLET ORAL at 09:26

## 2018-10-25 RX ADMIN — AMIODARONE HYDROCHLORIDE 200 MG: 200 TABLET ORAL at 09:26

## 2018-10-25 RX ADMIN — SODIUM CHLORIDE: 9 INJECTION, SOLUTION INTRAVENOUS at 01:02

## 2018-10-25 RX ADMIN — SODIUM CHLORIDE 10 ML: 9 INJECTION INTRAMUSCULAR; INTRAVENOUS; SUBCUTANEOUS at 09:25

## 2018-10-25 NOTE — DISCHARGE SUMMARY
at bedside.      As per spouse, patient had developed worsening weakness after discharge from the hospital. Was admitted for 2 weeks and discharged 1 week ago. Patient states that since arriving at home he has been very deconditioned. He also have had poor oral intake due to nausea and vomiting. He had developed profound weakness and difficulty in ambulating with decision made to seek help.     Workup in the emergency room includes vital signs that showed slightly low blood pressure 103/72. Labs, EKG and imaging were negative for any acute findings.     Due to concern for lightheadedness and weakness, patient is being admitted to the hospital for further workup and management.       Hospital Course:    Patient was admitted to the hospital and then started on supportive care with improvement in symptoms and please see below for further information. Lightheadedness/weakness/failure to thrive: Improved. Attributed to deconditioning in the setting of dehydration and new onset cardiomyopathy. -s/p PT/OT consultation and clearance for discharge   -s/p Gentle IV fluid hydration   -Case discussed with Cardiology (Dr. Rolanda Metz) and no further recommendations from Cardiac standpoint        Nausea and vomiting: Resolved. Etiology unclear. Unable to keep anything down for the past few days.   -Anti-emetics   -Protonix   -s/p IV fluids   -Supportive care        Coronary artery disease: Status post stent placement.   -Continue home meds        Chronic systolic CHF/Ischemic cardiomyopathy: Appears hypovolemic on presentation. Last known ejection fraction of 45%.    -Toprol dose decreased due to hypotension   -Continue lisinopril   -No diuretics due to hypovolemia   -s/p Gentle IV fluid hydration        Ventricular tachycardia: Status post AICD placement.   -Continue amiodarone        Diabetes mellitus type 2: Uncontrolled as last available hemoglobin A1c was 8.9 on 10/8/18.   -Okay to esume home medications        Discharge

## 2018-10-25 NOTE — CARE COORDINATION
Discharge Planning:  SW met with patient and introduced self and role of discharge planning   Patient independent in ADLs- no assistance needed   Patient lives with his wife and states good family and friend support  Patient currently on short term disability   Patient states no barriers to medication cost  Has no current home care services- Therapy recommend home with PRN assist  Patient will have transport home from wife  Patient denies any additional needs at this time  SW left contact information for patient to call with any questions  SW available if d/c needs arise    Electronically signed by JACKIE Lozaon on 10/25/2018 at 10:35 AM  [phone]

## 2018-10-26 PROCEDURE — 93010 ELECTROCARDIOGRAM REPORT: CPT | Performed by: INTERNAL MEDICINE

## 2018-10-27 LAB
AMIODARONE LEVEL: 0.9 UG/ML (ref 0.5–2)
DES-AMIOD: 0.6 UG/ML

## 2018-10-27 NOTE — PROGRESS NOTES
Discontinued Nexium, Protonix (po). Patient on Protonix IV and Pepcid PRN. Duplication of therapy.   [facility] [place] [facility] [place] 10/24/2018  6:38 PM
Result reviewed no further action necessary
23/24 on the AM-PAC short mobility form. Initial research suggests that an -PAC score of 18 or greater may be associated with a discharge to patient's home setting, however this same research reports a standardized positive predictive value of 0.75 indicating that 25% of patients with a score of 18 or greater actually went to a rehab facility. Based on my clinical judgement I recommend that the patient have no further skilled PT at discharge. Safety devices:   Type of devices: All fall risk precautions in place, Call light within reach, Patient at risk for falls, Nurse notified, Left in chair (GILMER Guzman notified)        PLAN:  Times per week: 2-5x/wk while in the hospital;    Current Treatment Recommendations: Functional Mobility Training     G-Code  Functional Assessment Tool Used: -pac with stairs  Score: 23  Functional Limitation: Mobility: Walking and moving around  Mobility: Walking and Moving Around Current Status (): At least 1 percent but less than 20 percent impaired, limited or restricted  Mobility: Walking and Moving Around Goal Status ():  At least 1 percent but less than 20 percent impaired, limited or restricted     OutComes Score  How much difficulty turning over in bed?: None  How much difficulty sitting down on / standing up from a chair with arms?: None  How much difficulty moving from lying on back to sitting on side of bed?: None  How much help from another person moving to and from a bed to a chair?: None  How much help from another person needed to walk in hospital room?: None  How much help from another person for climbing 3-5 steps with a railing?: A Little  [facility] Inpatient Mobility Raw Score : 23  AM-PAC Inpatient T-Scale Score : 56.93  Mobility Inpatient CMS 0-100% Score: 11.2  Mobility Inpatient CMS G-Code Modifier : CI       Goals  Patient Goals   Patient goals : to go home today  Time Frame for Short term goals: upon d/c  Short term goal 1: amb 400' Ind   Short term goal 2:

## 2018-10-29 LAB
EKG ATRIAL RATE: 89 BPM
EKG DIAGNOSIS: NORMAL
EKG P AXIS: 27 DEGREES
EKG P-R INTERVAL: 154 MS
EKG Q-T INTERVAL: 414 MS
EKG QRS DURATION: 86 MS
EKG QTC CALCULATION (BAZETT): 503 MS
EKG R AXIS: 12 DEGREES
EKG T AXIS: 46 DEGREES
EKG VENTRICULAR RATE: 89 BPM

## 2018-10-30 ENCOUNTER — OFFICE VISIT (OUTPATIENT)
Dept: CARDIOLOGY CLINIC | Age: 53
End: 2018-10-30
Payer: COMMERCIAL

## 2018-10-30 ENCOUNTER — HOSPITAL ENCOUNTER (OUTPATIENT)
Age: 53
Discharge: HOME | End: 2018-10-30
Payer: COMMERCIAL

## 2018-10-30 ENCOUNTER — HOSPITAL ENCOUNTER (OUTPATIENT)
Dept: GENERAL RADIOLOGY | Age: 53
Discharge: HOME | End: 2018-10-30
Payer: COMMERCIAL

## 2018-10-30 VITALS
HEIGHT: 74 IN | WEIGHT: 202 LBS | OXYGEN SATURATION: 99 % | SYSTOLIC BLOOD PRESSURE: 98 MMHG | BODY MASS INDEX: 25.93 KG/M2 | HEART RATE: 95 BPM | DIASTOLIC BLOOD PRESSURE: 60 MMHG

## 2018-10-30 DIAGNOSIS — E78.2 MIXED HYPERLIPIDEMIA: ICD-10-CM

## 2018-10-30 DIAGNOSIS — R42 POSTURAL LIGHTHEADEDNESS: ICD-10-CM

## 2018-10-30 DIAGNOSIS — R53.83 FATIGUE, UNSPECIFIED TYPE: ICD-10-CM

## 2018-10-30 DIAGNOSIS — I10 ESSENTIAL HYPERTENSION: ICD-10-CM

## 2018-10-30 DIAGNOSIS — I25.10 CAD, MULTIPLE VESSEL: Primary | ICD-10-CM

## 2018-10-30 DIAGNOSIS — J18.9 PNEUMONIA OF LEFT LUNG DUE TO INFECTIOUS ORGANISM, UNSPECIFIED PART OF LUNG: ICD-10-CM

## 2018-10-30 DIAGNOSIS — R53.1 WEAKNESS: ICD-10-CM

## 2018-10-30 PROCEDURE — 71046 X-RAY EXAM CHEST 2 VIEWS: CPT

## 2018-10-30 PROCEDURE — 99214 OFFICE O/P EST MOD 30 MIN: CPT | Performed by: INTERNAL MEDICINE

## 2018-10-30 PROCEDURE — 93000 ELECTROCARDIOGRAM COMPLETE: CPT | Performed by: INTERNAL MEDICINE

## 2018-10-30 NOTE — PATIENT INSTRUCTIONS
have new or increased shortness of breath.     · You are dizzy or lightheaded, or you feel like you may faint.     · You gain weight suddenly, such as more than 2 to 3 pounds in a day or 5 pounds in a week. (Your doctor may suggest a different range of weight gain.)     · You have increased swelling in your legs, ankles, or feet.    Watch closely for changes in your health, and be sure to contact your doctor if you have any problems. Where can you learn more? Go to [email or web address]://[email or web address]. org and sign in to your [employer] account. Enter I403 in the [employer] box to learn more about \"Cardiac Rehabilitation: Care Instructions. \"     If you do not have an account, please click on the \"Sign Up Now\" link. Current as of: December 6, 2017  Content Version: 11.7  © 1156-5416 [employer]. Care instructions adapted under license by [facility] ([facility]). If you have questions about a medical condition or this instruction, always ask your healthcare professional. [address] any warranty or liability for your use of this information. Patient Education        Walking for Exercise: Care Instructions  Your Care Instructions    Walking is one of the easiest ways to get the exercise you need for good health. A brisk, 30-minute walk each day can help you feel better and have more energy. It can help you lower your risk of disease. Walking can help you keep your bones strong and your heart healthy. Check with your doctor before you start a walking plan if you have heart problems, other health issues, or you have not been active in a long time. Follow your doctor's instructions for safe levels of exercise. Follow-up care is a key part of your treatment and safety. Be sure to make and go to all appointments, and call your doctor if you are having problems. It's also a good idea to know your test results and keep a list of the medicines you take.   How can you care for yourself at home? Getting started  · Start slowly and set a short-term goal. For example, walk for 5 or 10 minutes every day. · Bit by bit, increase the amount you walk every day. Try for at least 30 minutes on most days of the week. You also may want to swim, bike, or do other activities. · If finding enough time is a problem, it is fine to be active in blocks of 10 minutes or more throughout your day and week. · To get the heart-healthy benefits of walking, you need to walk briskly enough to increase your heart rate and breathing, but not so fast that you cannot talk comfortably. · Wear comfortable shoes that fit well and provide good support for your feet and ankles. Staying with your plan  · After you've made walking a habit, set a longer-term goal. You may want to set a goal of walking briskly for longer or walking farther. Experts say to do 2½ hours of moderate activity a week. A faster heartbeat is what defines moderate-level activity. · To stay motivated, walk with friends, coworkers, or pets. · Use a phone courtney or pedometer to track your steps each day. Set a goal to increase your steps. Once you get there, set a higher goal. Aim for 10,000 steps a day. · If the weather keeps you from walking outside, go for walks at the mall with a friend. Local schools and churches may have indoor gyms where you can walk. Fitting a walk into your workday  · Park several blocks away from work, or get off the bus a few stops early. · Use the stairs instead of the elevator, at least for a few floors. · Suggest holding meetings with colleagues during a walk inside or outside the building. · Use the restroom that is the farthest from your desk or workstation. · Use your morning and afternoon breaks to take quick 15-minute walks. Staying safe  · Know your surroundings. Walk in a well-lighted, safe place. If it is dark, walk with a partner. Wear light-colored clothing.  If you can, buy a vest or jacket that reflects a \"portion\" are not always the same thing. Make sure that you are not eating larger portions than are recommended. For example, a serving of pasta is ½ cup. A serving size of meat is 2 to 3 ounces. A 3-ounce serving is about the size of a deck of cards. Measure serving sizes until you are good at [place]" them. Keep in mind that restaurants often serve portions that are 2 or 3 times the size of one serving. · To keep your energy level up and keep you from feeling hungry, eat often but in smaller portions. · Eat only the number of calories you need to stay at a healthy weight. If you need to lose weight, eat fewer calories than your body burns (through exercise and other physical activity). Eat more fruits and vegetables  · Eat a variety of fruit and vegetables every day. Dark green, deep orange, red, or yellow fruits and vegetables are especially good for you. Examples include spinach, carrots, peaches, and berries. · Keep carrots, celery, and other veggies handy for snacks. Buy fruit that is in season and store it where you can see it so that you will be tempted to eat it. · Cook dishes that have a lot of veggies in them, such as stir-fries and soups. Limit saturated and trans fat  · Read food labels, and try to avoid saturated and trans fats. They increase your risk of heart disease. Trans fat is found in many processed foods such as cookies and crackers. · Use olive or canola oil when you cook. Try cholesterol-lowering spreads, such as Benecol or Take Control. · Bake, broil, grill, or steam foods instead of frying them. · Choose lean meats instead of high-fat meats such as hot dogs and sausages. Cut off all visible fat when you prepare meat. · Eat fish, skinless poultry, and meat alternatives such as soy products instead of high-fat meats. Soy products, such as tofu, may be especially good for your heart. · Choose low-fat or fat-free milk and dairy products.   Eat fish  · Eat at least two servings

## 2018-10-30 NOTE — PROGRESS NOTES
10/25/2018     Lab Results   Component Value Date     10/25/2018    K 3.5 10/25/2018     10/25/2018    CO2 24 10/25/2018    BUN 8 10/25/2018    CREATININE 0.7 10/25/2018    GFRAA >60 10/25/2018    GFRAA >60 02/10/2013    AGRATIO 0.9 10/24/2018    LABGLOM >60 10/25/2018    GLUCOSE 147 10/25/2018    PROT 7.6 10/24/2018    PROT 8.3 11/12/2012    CALCIUM 8.4 10/25/2018    BILITOT 0.4 10/24/2018    ALKPHOS 145 10/24/2018    AST 17 10/24/2018    ALT 18 10/24/2018      No results found for: PTINR  Lab Results   Component Value Date    LABA1C 8.9 10/08/2018     Lab Results   Component Value Date    TROPONINI 0.24 (H) 10/25/2018       Cardiac, Vascular and Imaging Data all Personally Reviewed in Detail by Myself      EKG: 10/31/18 SR, reviewed     Echocardiogram: 10/8/18   Summary   Left ventricular cavity size is mildly dilated.   Normal left ventricular wall thickness.   Ejection fraction is visually estimated to be 45%.   There is moderate to severe inferior hypokinesis.   Normal right ventricular size and function.     Stress Test: 10/9/17   Summary    Pharmacological Stress/MPI Results:        1. Technically a satisfactory study.    2. Normal pharmacological stress portion of the study.    3. No evidence of Ischemia by Myocardial Perfusion Imaging.    4. Gated Study shows normal LV size and Systolic function; EF is 61 %. Cath:   10/3/18   ANGIOGRAPHIC FINDINGS:  1. Left main is normal.  LYNNE 3 flow. No stenosis. 2.  Left anterior descending artery comes from the left main coronary  artery. LYNNE 3 flow. No stenosis present with patent diagonal branches. 3.  Left circumflex is occluded in the mid portion. 4.  Right coronary comes from the right coronary cusp. It has a PDA which  has 80% stenosis present. The patient also has an obtuse marginal 1 which  as 80% stenosis present.   In summary, successful revascularization with stent placement in the  circumflex artery.   This was 2.5 x 38 mm, expanded

## 2018-11-13 PROBLEM — I46.9 CARDIAC ARREST (HCC): Status: ACTIVE | Noted: 2018-11-13

## 2018-11-13 PROBLEM — I21.19 ST ELEVATION MYOCARDIAL INFARCTION (STEMI) OF INFEROLATERAL WALL (HCC): Status: ACTIVE | Noted: 2018-11-13

## 2018-11-13 PROBLEM — I25.5 ISCHEMIC CARDIOMYOPATHY: Status: ACTIVE | Noted: 2018-11-13

## 2018-11-23 PROBLEM — E86.0 DEHYDRATION: Status: RESOLVED | Noted: 2018-10-24 | Resolved: 2018-11-23

## 2018-11-27 LAB
LEFT VENTRICULAR EJECTION FRACTION MODE: NORMAL
LV EF: 45 %

## 2018-12-03 ENCOUNTER — OFFICE VISIT (OUTPATIENT)
Dept: CARDIOLOGY CLINIC | Age: 53
End: 2018-12-03
Payer: COMMERCIAL

## 2018-12-03 VITALS
OXYGEN SATURATION: 100 % | DIASTOLIC BLOOD PRESSURE: 48 MMHG | BODY MASS INDEX: 26.18 KG/M2 | WEIGHT: 204 LBS | SYSTOLIC BLOOD PRESSURE: 76 MMHG | HEART RATE: 94 BPM | HEIGHT: 74 IN

## 2018-12-03 DIAGNOSIS — I47.20 VT (VENTRICULAR TACHYCARDIA) (HCC): ICD-10-CM

## 2018-12-03 DIAGNOSIS — R55 SYNCOPE AND COLLAPSE: Primary | ICD-10-CM

## 2018-12-03 DIAGNOSIS — R55 SYNCOPE AND COLLAPSE: ICD-10-CM

## 2018-12-03 DIAGNOSIS — I25.5 ISCHEMIC CARDIOMYOPATHY: ICD-10-CM

## 2018-12-03 DIAGNOSIS — E78.2 MIXED HYPERLIPIDEMIA: ICD-10-CM

## 2018-12-03 DIAGNOSIS — Z95.810 ICD (IMPLANTABLE CARDIOVERTER-DEFIBRILLATOR) IN PLACE: ICD-10-CM

## 2018-12-03 DIAGNOSIS — I47.20 VT (VENTRICULAR TACHYCARDIA): ICD-10-CM

## 2018-12-03 DIAGNOSIS — I25.10 CAD, MULTIPLE VESSEL: ICD-10-CM

## 2018-12-03 LAB
ANION GAP SERPL CALCULATED.3IONS-SCNC: 19 MMOL/L (ref 3–16)
BUN BLDV-MCNC: 17 MG/DL (ref 7–20)
CALCIUM SERPL-MCNC: 10 MG/DL (ref 8.3–10.6)
CHLORIDE BLD-SCNC: 96 MMOL/L (ref 99–110)
CO2: 22 MMOL/L (ref 21–32)
CREAT SERPL-MCNC: 1 MG/DL (ref 0.9–1.3)
GFR AFRICAN AMERICAN: >60
GFR NON-AFRICAN AMERICAN: >60
GLUCOSE BLD-MCNC: 267 MG/DL (ref 70–99)
HCT VFR BLD CALC: 44.7 % (ref 40.5–52.5)
HEMOGLOBIN: 14.8 G/DL (ref 13.5–17.5)
MCH RBC QN AUTO: 26.4 PG (ref 26–34)
MCHC RBC AUTO-ENTMCNC: 33.1 G/DL (ref 31–36)
MCV RBC AUTO: 79.7 FL (ref 80–100)
PDW BLD-RTO: 16.5 % (ref 12.4–15.4)
PLATELET # BLD: 278 K/UL (ref 135–450)
PMV BLD AUTO: 8.1 FL (ref 5–10.5)
POTASSIUM SERPL-SCNC: 4.9 MMOL/L (ref 3.5–5.1)
RBC # BLD: 5.61 M/UL (ref 4.2–5.9)
SODIUM BLD-SCNC: 137 MMOL/L (ref 136–145)
WBC # BLD: 9.2 K/UL (ref 4–11)

## 2018-12-03 PROCEDURE — 99214 OFFICE O/P EST MOD 30 MIN: CPT | Performed by: NURSE PRACTITIONER

## 2018-12-03 RX ORDER — METOPROLOL SUCCINATE 25 MG/1
12.5 TABLET, EXTENDED RELEASE ORAL DAILY
Qty: 30 TABLET | Refills: 5
Start: 2018-12-03 | End: 2018-12-12 | Stop reason: SINTOL

## 2018-12-03 NOTE — PROGRESS NOTES
positive 07/13/2018    foot wound    Neuropathy     Other disorders of kidney and ureter in diseases classified elsewhere     POTS (postural orthostatic tachycardia syndrome)     Psychiatric problem     anxiety, depression, bipolar    Sleep apnea     Syncope     Type II or unspecified type diabetes mellitus without mention of complication, not stated as uncontrolled      Past Surgical History:   Procedure Laterality Date    CARDIAC CATHETERIZATION      COLONOSCOPY      CORONARY ANGIOPLASTY WITH STENT PLACEMENT  10/06/2018    Bare Metal Stent to PDA    CORONARY ANGIOPLASTY WITH STENT PLACEMENT  10/07/2018    Bare Metal Stent to OM    CORONARY ANGIOPLASTY WITH STENT PLACEMENT  10/03/2018    CECE to Circ    FINGER SURGERY Left     Left Thumb    HERNIA REPAIR      JOINT REPLACEMENT      VASCULAR SURGERY      VASECTOMY       Family History   Problem Relation Age of Onset    High Blood Pressure Mother     Stroke Mother     Heart Disease Mother     COPD Mother     Heart Disease Father     Cancer Father         skin    Diabetes Sister     Cancer Sister     Heart Disease Brother     Diabetes Brother      Social History   Substance Use Topics    Smoking status: Never Smoker    Smokeless tobacco: Never Used    Alcohol use No       Allergies   Allergen Reactions    No Known Allergies      Current Outpatient Prescriptions   Medication Sig Dispense Refill    metoprolol succinate (TOPROL XL) 25 MG extended release tablet Take 0.5 tablets by mouth daily 30 tablet 5    ondansetron (ZOFRAN) 4 MG tablet Take 1 tablet by mouth daily as needed for Nausea or Vomiting 10 tablet 5    amiodarone (CORDARONE) 200 MG tablet Take 200 mg by mouth daily      pantoprazole (PROTONIX) 40 MG tablet Take 1 tablet by mouth daily 30 tablet 0    aspirin 81 MG chewable tablet Take 81 mg by mouth daily      atorvastatin (LIPITOR) 40 MG tablet Take 40 mg by mouth nightly       Cholecalciferol (VITAMIN D3) 62478 units

## 2018-12-04 NOTE — COMMUNICATION BODY
chest pain/discomfort, orthopnea/PND, cough, palpitations, edema , weight change or claudication. Has ache in L side of chest when he lays on L side. Wife concerned that she will find him on the floor (he can't drive, etc). Has some SOB with increased physical activity (noted going up 25 steps). Concerned he has developed diarrhea and having issues with soiling himself. Not consistent with timing of meds daily. Walked around [facility] yesterday without difficulty. Assessment:    1. Syncope and collapse  -secondary to hypotension  -BP low on readings today  -will stop ACE-I and decrease BB  -reinforced adequate hydration  -device check unremarkable for VT    2. CAD, multiple vessel  -S/P multivessel PCI: LCX, EDUARD and RPDA  -10/2018: acute inferolateral MI and cardiac arrest; LVEF 45%  -continue DAPT, lower dose of BB and statin  -will discontinue ACE-I given his hypotension  -no recurrent chest pain    3. VT (ventricular tachycardia) ([address]  -S/P ICD implant 10/12/2018 d/t sustained VT  -device interrogation today unremarkable for arrhythmia  -continue low dose Toprol and amiodarone    4. Ischemic cardiomyopathy  -LVEF 45%  -no overt evidence of CHF  -continue low dose BB  -no aldactone or ACE-I/ARB given hypotension    5. Mixed hyperlipidemia  -continue statin    6. Diabetes Mellitus, type II  -Rx per hospitalist    Plan:    Stop lisinopril and decrease Toprol to 12.5  mg daily  Continue ASA, Brilinta, statin, amiodarone  Emphasized and discussed low-fat/low sodium diet, monitoring of daily weights, fluid restriction, worsening signs and symptoms of heart failure and when to call, and the importance of regular exercise and activity. Emphasized consistency with medication timing  Check BMP and CBC  Advised not safe to fly at this juncture given his episodes of syncope  Follow up with Dr. Rocco Webb as scheduled in February 2019 and in 4-5 weeks with D. Enzweiler, CNP or sooner if needed    Return in about 4

## 2018-12-05 ENCOUNTER — TELEPHONE (OUTPATIENT)
Dept: CARDIOLOGY CLINIC | Age: 53
End: 2018-12-05

## 2018-12-12 ENCOUNTER — HOSPITAL ENCOUNTER (OUTPATIENT)
Dept: CARDIAC REHAB | Age: 53
Setting detail: THERAPIES SERIES
Discharge: HOME | End: 2018-12-12
Payer: COMMERCIAL

## 2018-12-12 ENCOUNTER — TELEPHONE (OUTPATIENT)
Dept: CARDIOLOGY CLINIC | Age: 53
End: 2018-12-12

## 2018-12-12 DIAGNOSIS — R42 LIGHTHEADED: ICD-10-CM

## 2018-12-12 DIAGNOSIS — I95.1 ORTHOSTATIC HYPOTENSION: Primary | ICD-10-CM

## 2018-12-12 NOTE — PROGRESS NOTES
he was eating and became hot and c/o neck sweating. He was getting up for wife to take him to the ER when he passed out and was in cardiac arrest. His wife did CPR and called 911. EMT' defibrillated him with ROSC. He was admitted to cath lab underwent PTCA Stent. He developed sustained V-Tach. He reports he stayed in CVU a couple days when he was moved to the 5th floor. He states \" My heart stopped again\" and he reports PTCA and another Stent placement of ICD on 10/12/18. One week after discharged from the hospital he states he was coming out of the store after feeling hot and dizzy. He knew something wasn't right. He was nauseated and fell against  the car door. His wife called 46. He was admitted to the ER and reports the ICD did not show any activity and he was dehydrated. He was given IV fluids. Upon discharge he has had continued complaints of dizziness. He states he has good and bad days. [x]  MI              []  CABG         [x]  PTCA  X 3 Stents            []  Valve Replacement    [x]  Arrhythmia  V Tach    []  CHF   Last Admission:   [] Pacemaker        [x]  ICD    []  Other     Ejection fraction   45%        Physical Assessment Alert and oriented to person,place, and time Very talkative. No acute distress     General Appearance   Color: [x]  Natural [] Pale ( ) Cyanotic []  Flushed [] Jaundice   Skin Integrity:  skin warm and dry Small scrapes on both hands due to getting a new little kitten    Incision(s) where: Left clavicular, ICD site with steri Strips intact   Hx of infection at incision(s) site [x] No  [] Yes      Cardiovascular Assessment/ Vital Signs    Heart rate: 90  Heart sounds: S1S2   Height: 71''  Weight: 207.1    Resp rate: 12  Lungs sounds: Clear to auscultation      Dyspnea  []  no  [x] yes with little activity       [x]  Sleep Apnea    [x]  CPAP  Had a CPAP  Got rid of it because was not using it.  Had it for about 6 months  Didn't use it as it was a hassle      []  Oxygen anti-depressant or anti-anxiety medications? [] Yes   [x] No  Current depression tx:  Has a history of depression and prefers not to take any medications and/or counseling   He states taking all the medications now reminds him of the days he was a drug addict, which he has been clean for over 30 years. He rated a \"2\" on the statement Thoughts that you would be better off dead or of hurting yourself in some way. ROMMEL MANCERA At [facility] was notified. Wife was informed of his response. She states she is aware of his statements of \" why did you do CPR on me and bring me back\" He would not be compliant with his medications if she did not help and encourage him to take care of himself. She states she has angry about all of it. He states he has a lot of stress now with concerns of his ability to do his job and insurance coverage. He does not have a plan to harm himself. He was instructed to call 911 if he has any feelings of harming himself or suicidal thoughts. He is to call [facility] and speak with Chauncey DUMONT to make an appointment in regards to his emotional health     Wife does aubrey shows He does her programs for her and states  It does help with stress. Current depression tx:   []  N/A Current depression tx   [] N/A: Current depression tx:   []  N/A  []  Patient has plan/treatment for anxiety/depression.    Education Education Education Education Education   Individual discussion/education of:   [x] Stress management skills   [x] Relaxation Techniques   [x] Coping Techniques    [facility], Likes to work on cars and plays with new kitten  Check if completed:   [] Attends Emotional Wellness class  ()Voices method of coping/ relaxation technique   Check if completed:   [] Attends Emotional wellness class   [] Voices method of coping/ relaxation technique   Check if completed:   [] Attends Emotional wellness class   [] Voices method of coping/ relaxation technique      Goals Education Core Measure Education Core Measure Education Education   [x] Cardiac Rehab Education booklet given  [x] Cardiac Rehab education class list given Attended Education Classes:  1. []  A & P of the  Heart & Body  2. []Heart Disease  3. [] Risk Factors  4. []  Cardiac Medications  5. [] Cardiac Interventions Attended Education Classes:  1. []  A & P of the  Heart & Body  2. [] Heart Disease  3. []  Risk Factors  4.[] ()  Cardiac Medications  5. [] Cardiac Interventions Attended Education Classes:  1. [] A & P of the  Heart & Body  2. []Heart Disease  3. [] Risk Factors  4. [] Cardiac Medications  5. []Cardiac Interventions Attend all the education classes. *Goals* Goal Reassessment Goal Reassessment Goal Reassessment *Goals*   Roselia Initial Risk factor/education  goals are as follows:    1. To increase muscle mass and strength   2. To establish home exercise routine and be consistent         Resting B/P < 140/90 or 130/80 if DM or CKD  [] Y []N Complete tobacco cessation  []Y []N Understanding risks of heart disease  []Y []N Heart failure self management     Goal Progress:    Goal Progress:     Goal Progress: Juni has met goals ()yes         Physician Response  [x]Initiate Individualized Treatment Plan (ITP)  []Other   Physician Response  [] Proceed with rehab and 30 day updates per ITP. []Other     Physician Response  [] Proceed with rehab and 30 day updates per ITP. []Other   Physician Response  [] Proceed with rehab and 30 day updates per ITP. []Other    Physician Final Signature     Comments: 12/12/18 Initial evaluation H&P completed . Six minute walk or 6 minutes on the Nustep not completed today due to symptomatic hypotension. Spoke with Mendoza Kinney RN at Dr. Poncho Stauffer office informed of complaints of chest pain on Monday, BP's at 100/60, 90/50 after 4 oz water and 70/40 standing with complaints of dizziness. HR Sinus rhythm  90-96.  Dr Poncho Stauffer office informed of BP, PH-Q 9 score and thoughts of being

## 2018-12-14 ENCOUNTER — HOSPITAL ENCOUNTER (OUTPATIENT)
Dept: CARDIAC REHAB | Age: 53
Setting detail: THERAPIES SERIES
Discharge: HOME | End: 2018-12-14
Payer: COMMERCIAL

## 2018-12-14 PROCEDURE — 93798 PHYS/QHP OP CAR RHAB W/ECG: CPT

## 2018-12-14 NOTE — TELEPHONE ENCOUNTER
Bernice Sahni,   [facility] [phone] and left a detailed message for Jose Buckley and his wife Reji Moulton with Dr. Borjas Drivers instructions(see below)    I asked for them to call back to confirm recevied and let  know what day next week they can come for blood work and BP, HR check in the office.

## 2018-12-17 ENCOUNTER — HOSPITAL ENCOUNTER (OUTPATIENT)
Dept: CARDIAC REHAB | Age: 53
Setting detail: THERAPIES SERIES
Discharge: HOME | End: 2018-12-17
Payer: COMMERCIAL

## 2018-12-17 DIAGNOSIS — R42 LIGHTHEADED: ICD-10-CM

## 2018-12-17 DIAGNOSIS — I95.1 ORTHOSTATIC HYPOTENSION: ICD-10-CM

## 2018-12-17 LAB
ANION GAP SERPL CALCULATED.3IONS-SCNC: 15 MMOL/L (ref 3–16)
BUN BLDV-MCNC: 13 MG/DL (ref 7–20)
CALCIUM SERPL-MCNC: 9.6 MG/DL (ref 8.3–10.6)
CHLORIDE BLD-SCNC: 96 MMOL/L (ref 99–110)
CO2: 25 MMOL/L (ref 21–32)
CREAT SERPL-MCNC: 1 MG/DL (ref 0.9–1.3)
GFR AFRICAN AMERICAN: >60
GFR NON-AFRICAN AMERICAN: >60
GLUCOSE BLD-MCNC: 224 MG/DL (ref 70–99)
POTASSIUM SERPL-SCNC: 4.2 MMOL/L (ref 3.5–5.1)
PRO-BNP: 297 PG/ML (ref 0–124)
SODIUM BLD-SCNC: 136 MMOL/L (ref 136–145)

## 2018-12-17 PROCEDURE — 93798 PHYS/QHP OP CAR RHAB W/ECG: CPT

## 2018-12-19 ENCOUNTER — HOSPITAL ENCOUNTER (OUTPATIENT)
Dept: CARDIAC REHAB | Age: 53
Setting detail: THERAPIES SERIES
Discharge: HOME | End: 2018-12-19
Payer: COMMERCIAL

## 2018-12-19 PROCEDURE — 93798 PHYS/QHP OP CAR RHAB W/ECG: CPT

## 2018-12-20 ENCOUNTER — OFFICE VISIT (OUTPATIENT)
Dept: CARDIOLOGY CLINIC | Age: 53
End: 2018-12-20
Payer: COMMERCIAL

## 2018-12-20 VITALS
HEART RATE: 104 BPM | BODY MASS INDEX: 26.95 KG/M2 | SYSTOLIC BLOOD PRESSURE: 84 MMHG | WEIGHT: 210 LBS | DIASTOLIC BLOOD PRESSURE: 40 MMHG | OXYGEN SATURATION: 99 % | HEIGHT: 74 IN

## 2018-12-20 DIAGNOSIS — I25.10 CORONARY ARTERY DISEASE INVOLVING NATIVE CORONARY ARTERY OF NATIVE HEART WITHOUT ANGINA PECTORIS: Chronic | ICD-10-CM

## 2018-12-20 DIAGNOSIS — G90.A POTS (POSTURAL ORTHOSTATIC TACHYCARDIA SYNDROME): Primary | ICD-10-CM

## 2018-12-20 PROCEDURE — 93000 ELECTROCARDIOGRAM COMPLETE: CPT | Performed by: INTERNAL MEDICINE

## 2018-12-20 PROCEDURE — 99214 OFFICE O/P EST MOD 30 MIN: CPT | Performed by: INTERNAL MEDICINE

## 2018-12-20 NOTE — PROGRESS NOTES
12/17/2018    K 4.2 12/17/2018    K 3.5 10/25/2018    CL 96 12/17/2018    CO2 25 12/17/2018    BUN 13 12/17/2018    CREATININE 1.0 12/17/2018    GFRAA >60 12/17/2018    GFRAA >60 02/10/2013    AGRATIO 0.9 10/24/2018    LABGLOM >60 12/17/2018    GLUCOSE 224 12/17/2018    PROT 7.6 10/24/2018    PROT 8.3 11/12/2012    CALCIUM 9.6 12/17/2018    BILITOT 0.4 10/24/2018    ALKPHOS 145 10/24/2018    AST 17 10/24/2018    ALT 18 10/24/2018      No results found for: PTINR  Lab Results   Component Value Date    LABA1C 8.9 10/08/2018     Lab Results   Component Value Date    TROPONINI 0.24 (H) 10/25/2018       Cardiac, Vascular and Imaging Data all Personally Reviewed in Detail by Myself      EKG: 10/31/18 SR, reviewed     Echocardiogram: 10/8/18   Summary   Left ventricular cavity size is mildly dilated.   Normal left ventricular wall thickness.   Ejection fraction is visually estimated to be 45%.   There is moderate to severe inferior hypokinesis.   Normal right ventricular size and function.     Stress Test: 10/9/17   Summary    Pharmacological Stress/MPI Results:        1. Technically a satisfactory study.    2. Normal pharmacological stress portion of the study.    3. No evidence of Ischemia by Myocardial Perfusion Imaging.    4. Gated Study shows normal LV size and Systolic function; EF is 61 %. Cath:   10/3/18   ANGIOGRAPHIC FINDINGS:  1. Left main is normal.  LYNNE 3 flow. No stenosis. 2.  Left anterior descending artery comes from the left main coronary  artery. LYNNE 3 flow. No stenosis present with patent diagonal branches. 3.  Left circumflex is occluded in the mid portion. 4.  Right coronary comes from the right coronary cusp. It has a PDA which  has 80% stenosis present. The patient also has an obtuse marginal 1 which  as 80% stenosis present.   In summary, successful revascularization with stent placement in the  circumflex artery. This was 2.5 x 38 mm, expanded up to 2.8 mm.   This was  a drug-coated

## 2018-12-21 ENCOUNTER — HOSPITAL ENCOUNTER (OUTPATIENT)
Dept: CARDIAC REHAB | Age: 53
Setting detail: THERAPIES SERIES
Discharge: HOME | End: 2018-12-21
Payer: COMMERCIAL

## 2018-12-21 PROCEDURE — 93798 PHYS/QHP OP CAR RHAB W/ECG: CPT

## 2018-12-26 ENCOUNTER — OFFICE VISIT (OUTPATIENT)
Dept: CARDIOLOGY CLINIC | Age: 53
End: 2018-12-26
Payer: COMMERCIAL

## 2018-12-26 VITALS
HEIGHT: 74 IN | OXYGEN SATURATION: 97 % | HEART RATE: 108 BPM | BODY MASS INDEX: 27.08 KG/M2 | SYSTOLIC BLOOD PRESSURE: 80 MMHG | WEIGHT: 211 LBS | DIASTOLIC BLOOD PRESSURE: 60 MMHG

## 2018-12-26 DIAGNOSIS — R55 NEUROGENIC SYNCOPE: Primary | ICD-10-CM

## 2018-12-26 DIAGNOSIS — R42 DIZZINESS: ICD-10-CM

## 2018-12-26 DIAGNOSIS — I47.20 VT (VENTRICULAR TACHYCARDIA): ICD-10-CM

## 2018-12-26 DIAGNOSIS — R42 LIGHTHEADEDNESS: ICD-10-CM

## 2018-12-26 PROCEDURE — 93000 ELECTROCARDIOGRAM COMPLETE: CPT | Performed by: INTERNAL MEDICINE

## 2018-12-26 PROCEDURE — 99214 OFFICE O/P EST MOD 30 MIN: CPT | Performed by: INTERNAL MEDICINE

## 2018-12-26 RX ORDER — CARVEDILOL 3.12 MG/1
3.12 TABLET ORAL 2 TIMES DAILY
Qty: 60 TABLET | Refills: 5 | Status: SHIPPED | OUTPATIENT
Start: 2018-12-26 | End: 2019-02-08 | Stop reason: ALTCHOICE

## 2018-12-26 NOTE — LETTER
Dulaglutide (TRULICITY) 1.5 GR/2.2YG SOPN Inject 1.5 mg into the skin once a week    Yes Historical Provider, MD   ticagrelor (BRILINTA) 90 MG TABS tablet Take 1 tablet by mouth 2 times daily 10/4/18  Yes Kaylene Wolfe MD   insulin detemir (LEVEMIR FLEXTOUCH) 100 UNIT/ML injection pen Inject 30 Units into the skin nightly 8/23/18  Yes Karmen Naqvi, DO       Social History:   reports that he has never smoked. He has never used smokeless tobacco. He reports that he does not drink alcohol or use drugs. Family History:  family history includes COPD in his mother; Cancer in his father and sister; Diabetes in his brother and sister; Heart Disease in his brother, father, and mother; High Blood Pressure in his mother; Stroke in his mother. Reviewed. Denies family history of sudden cardiac death, arrhythmia, premature CAD    Review of System:    · General ROS: negative for - chills, fever   · Psychological ROS: negative for - anxiety or depression  · Ophthalmic ROS: negative for - eye pain or loss of vision  · ENT ROS: negative for - epistaxis, headaches, nasal discharge, sore throat   · Allergy and Immunology ROS: negative for - hives, nasal congestion   · Hematological and Lymphatic ROS: negative for - bleeding problems, blood clots, bruising or jaundice  · Endocrine ROS: negative for - skin changes, temperature intolerance or unexpected weight changes  · Respiratory ROS: negative for - cough, hemoptysis, pleuritic pain, SOB, sputum changes or wheezing  · Cardiovascular ROS: Per HPI.    · Gastrointestinal ROS: negative for - abdominal pain, blood in stools, diarrhea, hematemesis, melena, nausea/vomiting or  swallowing difficulty/pain  · Genito-Urinary ROS: negative for - dysuria or incontinence  · Musculoskeletal ROS: negative for - joint swelling or muscle pain  · Neurological ROS: negative for - confusion, + lightheadedness/ dizziness, gait disturbance, headaches,  numbness/tingling, seizures,

## 2018-12-26 NOTE — PROGRESS NOTES
SOPN Inject 1.5 mg into the skin once a week    Yes Historical Provider, MD   ticagrelor (BRILINTA) 90 MG TABS tablet Take 1 tablet by mouth 2 times daily 10/4/18  Yes Yakov Cespedes MD   insulin detemir (LEVEMIR FLEXTOUCH) 100 UNIT/ML injection pen Inject 30 Units into the skin nightly 8/23/18  Yes Karmen Naqvi, DO       Social History:   reports that he has never smoked. He has never used smokeless tobacco. He reports that he does not drink alcohol or use drugs. Family History:  family history includes COPD in his mother; Cancer in his father and sister; Diabetes in his brother and sister; Heart Disease in his brother, father, and mother; High Blood Pressure in his mother; Stroke in his mother. Reviewed. Denies family history of sudden cardiac death, arrhythmia, premature CAD    Review of System:    · General ROS: negative for - chills, fever   · Psychological ROS: negative for - anxiety or depression  · Ophthalmic ROS: negative for - eye pain or loss of vision  · ENT ROS: negative for - epistaxis, headaches, nasal discharge, sore throat   · Allergy and Immunology ROS: negative for - hives, nasal congestion   · Hematological and Lymphatic ROS: negative for - bleeding problems, blood clots, bruising or jaundice  · Endocrine ROS: negative for - skin changes, temperature intolerance or unexpected weight changes  · Respiratory ROS: negative for - cough, hemoptysis, pleuritic pain, SOB, sputum changes or wheezing  · Cardiovascular ROS: Per HPI.    · Gastrointestinal ROS: negative for - abdominal pain, blood in stools, diarrhea, hematemesis, melena, nausea/vomiting or  swallowing difficulty/pain  · Genito-Urinary ROS: negative for - dysuria or incontinence  · Musculoskeletal ROS: negative for - joint swelling or muscle pain  · Neurological ROS: negative for - confusion, + lightheadedness/ dizziness, gait disturbance, headaches,  numbness/tingling, seizures, speech problems, tremors, visual changes or weakness +Hx of

## 2018-12-27 ENCOUNTER — TELEPHONE (OUTPATIENT)
Dept: CARDIOLOGY CLINIC | Age: 53
End: 2018-12-27

## 2018-12-31 ENCOUNTER — HOSPITAL ENCOUNTER (OUTPATIENT)
Dept: CARDIAC REHAB | Age: 53
Setting detail: THERAPIES SERIES
Discharge: HOME | End: 2018-12-31
Payer: COMMERCIAL

## 2018-12-31 PROCEDURE — 93798 PHYS/QHP OP CAR RHAB W/ECG: CPT

## 2019-01-07 ENCOUNTER — HOSPITAL ENCOUNTER (OUTPATIENT)
Dept: CARDIAC REHAB | Age: 54
Setting detail: THERAPIES SERIES
End: 2019-01-07
Payer: COMMERCIAL

## 2019-01-09 ENCOUNTER — APPOINTMENT (OUTPATIENT)
Dept: CARDIAC REHAB | Age: 54
End: 2019-01-09
Payer: COMMERCIAL

## 2019-01-11 ENCOUNTER — APPOINTMENT (OUTPATIENT)
Dept: CARDIAC REHAB | Age: 54
End: 2019-01-11
Payer: COMMERCIAL

## 2019-01-14 ENCOUNTER — HOSPITAL ENCOUNTER (OUTPATIENT)
Dept: CARDIAC REHAB | Age: 54
Setting detail: THERAPIES SERIES
Discharge: HOME | End: 2019-01-14
Payer: COMMERCIAL

## 2019-01-14 PROCEDURE — 93798 PHYS/QHP OP CAR RHAB W/ECG: CPT

## 2019-01-16 ENCOUNTER — HOSPITAL ENCOUNTER (OUTPATIENT)
Dept: CARDIAC REHAB | Age: 54
Setting detail: THERAPIES SERIES
Discharge: HOME | End: 2019-01-16
Payer: COMMERCIAL

## 2019-01-16 PROCEDURE — 93798 PHYS/QHP OP CAR RHAB W/ECG: CPT

## 2019-01-23 ENCOUNTER — TELEPHONE (OUTPATIENT)
Dept: CARDIOLOGY CLINIC | Age: 54
End: 2019-01-23

## 2019-01-23 ENCOUNTER — HOSPITAL ENCOUNTER (OUTPATIENT)
Dept: CARDIAC REHAB | Age: 54
Setting detail: THERAPIES SERIES
Discharge: HOME | End: 2019-01-23
Payer: COMMERCIAL

## 2019-01-23 PROCEDURE — 93798 PHYS/QHP OP CAR RHAB W/ECG: CPT

## 2019-01-25 ENCOUNTER — HOSPITAL ENCOUNTER (OUTPATIENT)
Dept: CARDIAC REHAB | Age: 54
Setting detail: THERAPIES SERIES
Discharge: HOME | End: 2019-01-25
Payer: COMMERCIAL

## 2019-01-25 PROCEDURE — 93798 PHYS/QHP OP CAR RHAB W/ECG: CPT

## 2019-02-06 ENCOUNTER — HOSPITAL ENCOUNTER (OUTPATIENT)
Dept: CARDIAC REHAB | Age: 54
Setting detail: THERAPIES SERIES
Discharge: HOME | End: 2019-02-06
Payer: COMMERCIAL

## 2019-02-06 PROCEDURE — 93798 PHYS/QHP OP CAR RHAB W/ECG: CPT

## 2019-02-07 ENCOUNTER — OFFICE VISIT (OUTPATIENT)
Dept: CARDIOLOGY CLINIC | Age: 54
End: 2019-02-07
Payer: COMMERCIAL

## 2019-02-07 ENCOUNTER — HOSPITAL ENCOUNTER (EMERGENCY)
Age: 54
Discharge: HOME | End: 2019-02-07
Attending: EMERGENCY MEDICINE
Payer: COMMERCIAL

## 2019-02-07 ENCOUNTER — APPOINTMENT (OUTPATIENT)
Dept: GENERAL RADIOLOGY | Age: 54
End: 2019-02-07
Payer: COMMERCIAL

## 2019-02-07 VITALS
SYSTOLIC BLOOD PRESSURE: 118 MMHG | OXYGEN SATURATION: 98 % | BODY MASS INDEX: 28.36 KG/M2 | HEART RATE: 90 BPM | DIASTOLIC BLOOD PRESSURE: 82 MMHG | WEIGHT: 221 LBS | HEIGHT: 74 IN

## 2019-02-07 VITALS
HEIGHT: 73 IN | SYSTOLIC BLOOD PRESSURE: 135 MMHG | WEIGHT: 224.43 LBS | RESPIRATION RATE: 9 BRPM | OXYGEN SATURATION: 98 % | BODY MASS INDEX: 29.74 KG/M2 | HEART RATE: 76 BPM | TEMPERATURE: 97.5 F | DIASTOLIC BLOOD PRESSURE: 67 MMHG

## 2019-02-07 DIAGNOSIS — R07.9 CHEST PAIN, UNSPECIFIED TYPE: Primary | ICD-10-CM

## 2019-02-07 LAB
A/G RATIO: 1.4 (ref 1.1–2.2)
ALBUMIN SERPL-MCNC: 4.3 G/DL (ref 3.4–5)
ALP BLD-CCNC: 76 U/L (ref 40–129)
ALT SERPL-CCNC: 30 U/L (ref 10–40)
ANION GAP SERPL CALCULATED.3IONS-SCNC: 11 MMOL/L (ref 3–16)
AST SERPL-CCNC: 19 U/L (ref 15–37)
BASOPHILS ABSOLUTE: 0.1 K/UL (ref 0–0.2)
BASOPHILS RELATIVE PERCENT: 0.7 %
BILIRUB SERPL-MCNC: 0.5 MG/DL (ref 0–1)
BUN BLDV-MCNC: 15 MG/DL (ref 7–20)
CALCIUM SERPL-MCNC: 9.4 MG/DL (ref 8.3–10.6)
CHLORIDE BLD-SCNC: 98 MMOL/L (ref 99–110)
CO2: 25 MMOL/L (ref 21–32)
CREAT SERPL-MCNC: 0.9 MG/DL (ref 0.9–1.3)
EKG ATRIAL RATE: 85 BPM
EKG DIAGNOSIS: NORMAL
EKG P AXIS: 35 DEGREES
EKG P-R INTERVAL: 162 MS
EKG Q-T INTERVAL: 374 MS
EKG QRS DURATION: 82 MS
EKG QTC CALCULATION (BAZETT): 445 MS
EKG R AXIS: -5 DEGREES
EKG T AXIS: 19 DEGREES
EKG VENTRICULAR RATE: 85 BPM
EOSINOPHILS ABSOLUTE: 0.2 K/UL (ref 0–0.6)
EOSINOPHILS RELATIVE PERCENT: 1.6 %
GFR AFRICAN AMERICAN: >60
GFR NON-AFRICAN AMERICAN: >60
GLOBULIN: 3 G/DL
GLUCOSE BLD-MCNC: 281 MG/DL (ref 70–99)
HCT VFR BLD CALC: 44.2 % (ref 40.5–52.5)
HEMOGLOBIN: 15.5 G/DL (ref 13.5–17.5)
LYMPHOCYTES ABSOLUTE: 2.6 K/UL (ref 1–5.1)
LYMPHOCYTES RELATIVE PERCENT: 26.9 %
MCH RBC QN AUTO: 27.9 PG (ref 26–34)
MCHC RBC AUTO-ENTMCNC: 35 G/DL (ref 31–36)
MCV RBC AUTO: 79.7 FL (ref 80–100)
MONOCYTES ABSOLUTE: 0.7 K/UL (ref 0–1.3)
MONOCYTES RELATIVE PERCENT: 7 %
NEUTROPHILS ABSOLUTE: 6.2 K/UL (ref 1.7–7.7)
NEUTROPHILS RELATIVE PERCENT: 63.8 %
PDW BLD-RTO: 14.4 % (ref 12.4–15.4)
PLATELET # BLD: 195 K/UL (ref 135–450)
PMV BLD AUTO: 8.1 FL (ref 5–10.5)
POTASSIUM REFLEX MAGNESIUM: 4.6 MMOL/L (ref 3.5–5.1)
RBC # BLD: 5.54 M/UL (ref 4.2–5.9)
SODIUM BLD-SCNC: 134 MMOL/L (ref 136–145)
TOTAL PROTEIN: 7.3 G/DL (ref 6.4–8.2)
TROPONIN: <0.01 NG/ML
TROPONIN: <0.01 NG/ML
WBC # BLD: 9.8 K/UL (ref 4–11)

## 2019-02-07 PROCEDURE — 84484 ASSAY OF TROPONIN QUANT: CPT

## 2019-02-07 PROCEDURE — 99214 OFFICE O/P EST MOD 30 MIN: CPT | Performed by: INTERNAL MEDICINE

## 2019-02-07 PROCEDURE — 99285 EMERGENCY DEPT VISIT HI MDM: CPT

## 2019-02-07 PROCEDURE — 93005 ELECTROCARDIOGRAM TRACING: CPT | Performed by: PHYSICIAN ASSISTANT

## 2019-02-07 PROCEDURE — 80053 COMPREHEN METABOLIC PANEL: CPT

## 2019-02-07 PROCEDURE — 6370000000 HC RX 637 (ALT 250 FOR IP): Performed by: PHYSICIAN ASSISTANT

## 2019-02-07 PROCEDURE — 85025 COMPLETE CBC W/AUTO DIFF WBC: CPT

## 2019-02-07 PROCEDURE — 71045 X-RAY EXAM CHEST 1 VIEW: CPT

## 2019-02-07 PROCEDURE — 93010 ELECTROCARDIOGRAM REPORT: CPT | Performed by: INTERNAL MEDICINE

## 2019-02-07 PROCEDURE — 93000 ELECTROCARDIOGRAM COMPLETE: CPT | Performed by: INTERNAL MEDICINE

## 2019-02-07 RX ORDER — IBUPROFEN 800 MG/1
800 TABLET ORAL EVERY 8 HOURS PRN
Qty: 20 TABLET | Refills: 0 | Status: SHIPPED | OUTPATIENT
Start: 2019-02-07 | End: 2019-02-08 | Stop reason: ALTCHOICE

## 2019-02-07 RX ORDER — ASPIRIN 81 MG/1
243 TABLET, CHEWABLE ORAL ONCE
Status: COMPLETED | OUTPATIENT
Start: 2019-02-07 | End: 2019-02-07

## 2019-02-07 RX ORDER — ONDANSETRON 4 MG/1
4 TABLET, ORALLY DISINTEGRATING ORAL EVERY 8 HOURS PRN
Qty: 20 TABLET | Refills: 0 | Status: SHIPPED | OUTPATIENT
Start: 2019-02-07 | End: 2019-02-08

## 2019-02-07 RX ORDER — LORATADINE 10 MG/1
10 TABLET ORAL DAILY PRN
COMMUNITY
End: 2019-08-06 | Stop reason: ALTCHOICE

## 2019-02-07 RX ADMIN — ASPIRIN 81 MG 243 MG: 81 TABLET ORAL at 16:02

## 2019-02-07 ASSESSMENT — PAIN DESCRIPTION - PAIN TYPE: TYPE: ACUTE PAIN

## 2019-02-07 ASSESSMENT — PAIN DESCRIPTION - LOCATION: LOCATION: NECK

## 2019-02-07 ASSESSMENT — HEART SCORE: ECG: 0

## 2019-02-07 ASSESSMENT — PAIN SCALES - GENERAL
PAINLEVEL_OUTOF10: 0
PAINLEVEL_OUTOF10: 4

## 2019-02-07 ASSESSMENT — PAIN DESCRIPTION - FREQUENCY: FREQUENCY: CONTINUOUS

## 2019-02-08 ENCOUNTER — OFFICE VISIT (OUTPATIENT)
Dept: CARDIOLOGY CLINIC | Age: 54
End: 2019-02-08
Payer: COMMERCIAL

## 2019-02-08 VITALS
OXYGEN SATURATION: 97 % | DIASTOLIC BLOOD PRESSURE: 54 MMHG | BODY MASS INDEX: 28.23 KG/M2 | HEART RATE: 99 BPM | SYSTOLIC BLOOD PRESSURE: 86 MMHG | HEIGHT: 74 IN | WEIGHT: 220 LBS

## 2019-02-08 DIAGNOSIS — I47.20 VENTRICULAR TACHYCARDIA: Primary | ICD-10-CM

## 2019-02-08 DIAGNOSIS — R55 NEUROGENIC SYNCOPE: ICD-10-CM

## 2019-02-08 DIAGNOSIS — Z95.810 ICD (IMPLANTABLE CARDIOVERTER-DEFIBRILLATOR) IN PLACE: ICD-10-CM

## 2019-02-08 DIAGNOSIS — R00.0 INAPPROPRIATE SINUS TACHYCARDIA: ICD-10-CM

## 2019-02-08 LAB
EKG ATRIAL RATE: 82 BPM
EKG DIAGNOSIS: NORMAL
EKG P AXIS: 63 DEGREES
EKG P-R INTERVAL: 168 MS
EKG Q-T INTERVAL: 386 MS
EKG QRS DURATION: 88 MS
EKG QTC CALCULATION (BAZETT): 450 MS
EKG R AXIS: 1 DEGREES
EKG T AXIS: 27 DEGREES
EKG VENTRICULAR RATE: 82 BPM

## 2019-02-08 PROCEDURE — 99214 OFFICE O/P EST MOD 30 MIN: CPT | Performed by: INTERNAL MEDICINE

## 2019-02-08 PROCEDURE — 93000 ELECTROCARDIOGRAM COMPLETE: CPT | Performed by: INTERNAL MEDICINE

## 2019-02-08 PROCEDURE — 93010 ELECTROCARDIOGRAM REPORT: CPT | Performed by: INTERNAL MEDICINE

## 2019-02-08 RX ORDER — ACETAMINOPHEN 500 MG
500 TABLET ORAL EVERY 6 HOURS PRN
COMMUNITY
End: 2019-08-06 | Stop reason: ALTCHOICE

## 2019-02-13 ENCOUNTER — TELEPHONE (OUTPATIENT)
Dept: CARDIOLOGY CLINIC | Age: 54
End: 2019-02-13

## 2019-02-15 ENCOUNTER — PROCEDURE VISIT (OUTPATIENT)
Dept: CARDIOLOGY CLINIC | Age: 54
End: 2019-02-15
Payer: COMMERCIAL

## 2019-02-15 DIAGNOSIS — I47.20 VT (VENTRICULAR TACHYCARDIA): ICD-10-CM

## 2019-02-15 DIAGNOSIS — I25.5 ISCHEMIC CARDIOMYOPATHY: ICD-10-CM

## 2019-02-15 DIAGNOSIS — Z95.810 ICD (IMPLANTABLE CARDIOVERTER-DEFIBRILLATOR) IN PLACE: ICD-10-CM

## 2019-02-15 PROCEDURE — 93282 PRGRMG EVAL IMPLANTABLE DFB: CPT | Performed by: INTERNAL MEDICINE

## 2019-02-15 RX ORDER — TICAGRELOR 90 MG/1
TABLET ORAL
Qty: 60 TABLET | Refills: 2 | Status: SHIPPED | OUTPATIENT
Start: 2019-02-15 | End: 2019-05-22 | Stop reason: SDUPTHER

## 2019-02-20 ENCOUNTER — HOSPITAL ENCOUNTER (OUTPATIENT)
Dept: CARDIAC REHAB | Age: 54
Setting detail: THERAPIES SERIES
Discharge: HOME | End: 2019-02-20
Payer: COMMERCIAL

## 2019-02-20 PROCEDURE — 93798 PHYS/QHP OP CAR RHAB W/ECG: CPT

## 2019-02-21 LAB
GLUCOSE BLD-MCNC: 340 MG/DL (ref 70–99)
PERFORMED ON: ABNORMAL

## 2019-03-01 ENCOUNTER — TELEPHONE (OUTPATIENT)
Dept: CARDIOLOGY CLINIC | Age: 54
End: 2019-03-01

## 2019-03-04 ENCOUNTER — HOSPITAL ENCOUNTER (OUTPATIENT)
Dept: CARDIAC REHAB | Age: 54
Setting detail: THERAPIES SERIES
Discharge: HOME | End: 2019-03-04
Payer: COMMERCIAL

## 2019-03-04 PROCEDURE — 93798 PHYS/QHP OP CAR RHAB W/ECG: CPT

## 2019-03-05 ENCOUNTER — TELEPHONE (OUTPATIENT)
Dept: CARDIOLOGY CLINIC | Age: 54
End: 2019-03-05

## 2019-03-06 ENCOUNTER — HOSPITAL ENCOUNTER (OUTPATIENT)
Dept: CARDIAC REHAB | Age: 54
Setting detail: THERAPIES SERIES
Discharge: HOME | End: 2019-03-06
Payer: COMMERCIAL

## 2019-03-06 PROCEDURE — 93798 PHYS/QHP OP CAR RHAB W/ECG: CPT

## 2019-03-07 ENCOUNTER — TELEPHONE (OUTPATIENT)
Dept: CARDIOLOGY CLINIC | Age: 54
End: 2019-03-07

## 2019-03-08 ENCOUNTER — HOSPITAL ENCOUNTER (OUTPATIENT)
Dept: CARDIAC REHAB | Age: 54
Setting detail: THERAPIES SERIES
Discharge: HOME | End: 2019-03-08
Payer: COMMERCIAL

## 2019-03-08 PROCEDURE — 93798 PHYS/QHP OP CAR RHAB W/ECG: CPT

## 2019-03-13 ENCOUNTER — HOSPITAL ENCOUNTER (OUTPATIENT)
Dept: CARDIAC REHAB | Age: 54
Setting detail: THERAPIES SERIES
Discharge: HOME | End: 2019-03-13
Payer: COMMERCIAL

## 2019-03-13 PROCEDURE — 93798 PHYS/QHP OP CAR RHAB W/ECG: CPT

## 2019-03-15 ENCOUNTER — HOSPITAL ENCOUNTER (OUTPATIENT)
Dept: CARDIAC REHAB | Age: 54
Setting detail: THERAPIES SERIES
Discharge: HOME | End: 2019-03-15
Payer: COMMERCIAL

## 2019-03-15 PROCEDURE — 93798 PHYS/QHP OP CAR RHAB W/ECG: CPT

## 2019-03-19 RX ORDER — ATORVASTATIN CALCIUM 40 MG/1
TABLET, FILM COATED ORAL
Qty: 30 TABLET | Refills: 4 | Status: SHIPPED | OUTPATIENT
Start: 2019-03-19 | End: 2020-12-03

## 2019-03-20 ENCOUNTER — HOSPITAL ENCOUNTER (OUTPATIENT)
Dept: CARDIAC REHAB | Age: 54
Setting detail: THERAPIES SERIES
Discharge: HOME | End: 2019-03-20
Payer: COMMERCIAL

## 2019-03-20 PROCEDURE — 93798 PHYS/QHP OP CAR RHAB W/ECG: CPT

## 2019-03-22 ENCOUNTER — HOSPITAL ENCOUNTER (OUTPATIENT)
Dept: CARDIAC REHAB | Age: 54
Setting detail: THERAPIES SERIES
Discharge: HOME | End: 2019-03-22
Payer: COMMERCIAL

## 2019-03-22 PROCEDURE — 93798 PHYS/QHP OP CAR RHAB W/ECG: CPT

## 2019-03-25 ENCOUNTER — HOSPITAL ENCOUNTER (OUTPATIENT)
Dept: CARDIAC REHAB | Age: 54
Setting detail: THERAPIES SERIES
Discharge: HOME | End: 2019-03-25
Payer: COMMERCIAL

## 2019-03-25 PROCEDURE — 93798 PHYS/QHP OP CAR RHAB W/ECG: CPT

## 2019-03-29 ENCOUNTER — HOSPITAL ENCOUNTER (OUTPATIENT)
Dept: CARDIAC REHAB | Age: 54
Setting detail: THERAPIES SERIES
Discharge: HOME | End: 2019-03-29
Payer: COMMERCIAL

## 2019-03-29 PROCEDURE — 93798 PHYS/QHP OP CAR RHAB W/ECG: CPT

## 2019-04-01 ENCOUNTER — HOSPITAL ENCOUNTER (OUTPATIENT)
Dept: CARDIAC REHAB | Age: 54
Setting detail: THERAPIES SERIES
Discharge: HOME | End: 2019-04-01
Payer: COMMERCIAL

## 2019-04-01 PROCEDURE — 93798 PHYS/QHP OP CAR RHAB W/ECG: CPT

## 2019-04-03 ENCOUNTER — HOSPITAL ENCOUNTER (OUTPATIENT)
Dept: CARDIAC REHAB | Age: 54
Setting detail: THERAPIES SERIES
Discharge: HOME | End: 2019-04-03
Payer: COMMERCIAL

## 2019-04-03 PROCEDURE — 93798 PHYS/QHP OP CAR RHAB W/ECG: CPT

## 2019-04-08 ENCOUNTER — HOSPITAL ENCOUNTER (OUTPATIENT)
Dept: CARDIAC REHAB | Age: 54
Setting detail: THERAPIES SERIES
Discharge: HOME | End: 2019-04-08
Payer: COMMERCIAL

## 2019-04-08 PROCEDURE — 93798 PHYS/QHP OP CAR RHAB W/ECG: CPT

## 2019-04-10 ENCOUNTER — HOSPITAL ENCOUNTER (OUTPATIENT)
Dept: CARDIAC REHAB | Age: 54
Setting detail: THERAPIES SERIES
Discharge: HOME | End: 2019-04-10
Payer: COMMERCIAL

## 2019-04-10 PROCEDURE — 93798 PHYS/QHP OP CAR RHAB W/ECG: CPT

## 2019-04-30 ENCOUNTER — PROCEDURE VISIT (OUTPATIENT)
Dept: CARDIOLOGY CLINIC | Age: 54
End: 2019-04-30

## 2019-04-30 DIAGNOSIS — I47.20 VT (VENTRICULAR TACHYCARDIA): ICD-10-CM

## 2019-04-30 DIAGNOSIS — Z95.810 ICD (IMPLANTABLE CARDIOVERTER-DEFIBRILLATOR) IN PLACE: ICD-10-CM

## 2019-05-21 ENCOUNTER — NURSE ONLY (OUTPATIENT)
Dept: CARDIOLOGY CLINIC | Age: 54
End: 2019-05-21

## 2019-05-21 DIAGNOSIS — I47.20 VT (VENTRICULAR TACHYCARDIA): ICD-10-CM

## 2019-05-21 DIAGNOSIS — Z95.810 ICD (IMPLANTABLE CARDIOVERTER-DEFIBRILLATOR) IN PLACE: ICD-10-CM

## 2019-05-22 RX ORDER — TICAGRELOR 90 MG/1
TABLET ORAL
Qty: 60 TABLET | Refills: 1 | Status: SHIPPED | OUTPATIENT
Start: 2019-05-22 | End: 2019-07-24 | Stop reason: SDUPTHER

## 2019-07-25 RX ORDER — TICAGRELOR 90 MG/1
TABLET ORAL
Qty: 60 TABLET | Refills: 0 | Status: SHIPPED | OUTPATIENT
Start: 2019-07-25 | End: 2019-08-18 | Stop reason: SDUPTHER

## 2019-08-06 ENCOUNTER — OFFICE VISIT (OUTPATIENT)
Dept: CARDIOLOGY CLINIC | Age: 54
End: 2019-08-06
Payer: COMMERCIAL

## 2019-08-06 ENCOUNTER — NURSE ONLY (OUTPATIENT)
Dept: CARDIOLOGY CLINIC | Age: 54
End: 2019-08-06
Payer: COMMERCIAL

## 2019-08-06 VITALS
OXYGEN SATURATION: 96 % | WEIGHT: 221 LBS | SYSTOLIC BLOOD PRESSURE: 94 MMHG | DIASTOLIC BLOOD PRESSURE: 62 MMHG | BODY MASS INDEX: 28.36 KG/M2 | HEIGHT: 74 IN | HEART RATE: 100 BPM

## 2019-08-06 DIAGNOSIS — R55 SYNCOPE, UNSPECIFIED SYNCOPE TYPE: Primary | ICD-10-CM

## 2019-08-06 DIAGNOSIS — Z95.810 ICD (IMPLANTABLE CARDIOVERTER-DEFIBRILLATOR) IN PLACE: ICD-10-CM

## 2019-08-06 DIAGNOSIS — E78.5 HYPERLIPIDEMIA LDL GOAL <70: ICD-10-CM

## 2019-08-06 DIAGNOSIS — I25.10 CORONARY ARTERY DISEASE INVOLVING NATIVE CORONARY ARTERY OF NATIVE HEART WITHOUT ANGINA PECTORIS: ICD-10-CM

## 2019-08-06 DIAGNOSIS — Z95.810 S/P IMPLANTATION OF AUTOMATIC CARDIOVERTER/DEFIBRILLATOR (AICD): ICD-10-CM

## 2019-08-06 DIAGNOSIS — I47.20 VT (VENTRICULAR TACHYCARDIA): ICD-10-CM

## 2019-08-06 PROCEDURE — 93295 DEV INTERROG REMOTE 1/2/MLT: CPT | Performed by: INTERNAL MEDICINE

## 2019-08-06 PROCEDURE — 93296 REM INTERROG EVL PM/IDS: CPT | Performed by: INTERNAL MEDICINE

## 2019-08-06 PROCEDURE — 99214 OFFICE O/P EST MOD 30 MIN: CPT | Performed by: INTERNAL MEDICINE

## 2019-08-14 ENCOUNTER — HOSPITAL ENCOUNTER (OUTPATIENT)
Dept: NON INVASIVE DIAGNOSTICS | Age: 54
Discharge: HOME | End: 2019-08-14
Payer: COMMERCIAL

## 2019-08-14 DIAGNOSIS — R55 SYNCOPE, UNSPECIFIED SYNCOPE TYPE: ICD-10-CM

## 2019-08-14 LAB
LV EF: 53 %
LVEF MODALITY: NORMAL

## 2019-08-14 PROCEDURE — 93306 TTE W/DOPPLER COMPLETE: CPT

## 2019-08-14 PROCEDURE — C8929 TTE W OR WO FOL WCON,DOPPLER: HCPCS

## 2019-08-14 PROCEDURE — 6360000004 HC RX CONTRAST MEDICATION: Performed by: INTERNAL MEDICINE

## 2019-08-14 RX ADMIN — PERFLUTREN 1.5 ML: 6.52 INJECTION, SUSPENSION INTRAVENOUS at 07:58

## 2019-08-20 RX ORDER — TICAGRELOR 90 MG/1
TABLET ORAL
Qty: 60 TABLET | Refills: 6 | Status: SHIPPED | OUTPATIENT
Start: 2019-08-20 | End: 2020-01-24 | Stop reason: ALTCHOICE

## 2019-08-28 ENCOUNTER — TELEPHONE (OUTPATIENT)
Dept: CARDIOLOGY CLINIC | Age: 54
End: 2019-08-28

## 2019-09-23 ENCOUNTER — OFFICE VISIT (OUTPATIENT)
Dept: CARDIOLOGY CLINIC | Age: 54
End: 2019-09-23
Payer: COMMERCIAL

## 2019-09-23 ENCOUNTER — NURSE ONLY (OUTPATIENT)
Dept: CARDIOLOGY CLINIC | Age: 54
End: 2019-09-23
Payer: COMMERCIAL

## 2019-09-23 VITALS
HEIGHT: 74 IN | HEART RATE: 103 BPM | WEIGHT: 215 LBS | SYSTOLIC BLOOD PRESSURE: 88 MMHG | BODY MASS INDEX: 27.59 KG/M2 | OXYGEN SATURATION: 97 % | DIASTOLIC BLOOD PRESSURE: 60 MMHG

## 2019-09-23 DIAGNOSIS — I47.20 VT (VENTRICULAR TACHYCARDIA): ICD-10-CM

## 2019-09-23 DIAGNOSIS — Z95.810 ICD (IMPLANTABLE CARDIOVERTER-DEFIBRILLATOR) IN PLACE: ICD-10-CM

## 2019-09-23 DIAGNOSIS — I47.20 VT (VENTRICULAR TACHYCARDIA): Primary | ICD-10-CM

## 2019-09-23 PROCEDURE — 99214 OFFICE O/P EST MOD 30 MIN: CPT | Performed by: INTERNAL MEDICINE

## 2019-09-23 PROCEDURE — 93282 PRGRMG EVAL IMPLANTABLE DFB: CPT | Performed by: INTERNAL MEDICINE

## 2019-09-23 NOTE — PROGRESS NOTES
[place] [id number]   Cardiac Electrophysiology Consultation   Date: 9/23/2019  Reason for Consultation: Neurogenic syncope  Consult Requesting Physician: Mihai Garcia MD  Primary Care Physician: WILL Cho    Chief Complaint:   Chief Complaint   Patient presents with    Loss of Consciousness    Shortness of Breath    Dizziness        HPI: Shira Marques is a 47 y.o. male with a PMH significant for multivessel CAD/prior STEMI/cardiac arrest, ischemic cardiomyopathy, VT s/p single chamber ICD (for secondary prevention 10/2018), HTN, HLD, DM and anemia who is typically followed by Dr. Mame Baig and Merly Tavera NP for his general cardiac needs. He was previously followed by [facility] and [facility] in the past but more recently he switched care to [place] [id number]. He had 2 eventful hospitalizations in October 2018 and to date he continues to have issues with his ADL's. He was seen in office with the EP service on 12/26/19 to discuss ongoing symptoms that he believed to be secondary to \"POTS. \"  He reported that he was diagnosed with POTS approximately 13 years ago (supporting positive tilt table test that supports neurogenic syncope). The records are limited due to the switch many years ago from paper medical records to EMR. He reported that he has had a long standing history of dizziness/ lightheadedness and \"blacking out\" but he felt that it was progressing. He is on evidence based medications for his CAD but he attributed the progression to his the medications. He is currently participating in cardiac rehab and has multiple concerns over his current health status and future return to work. Interval history: Today, he presents to office for follow up for neurogenic syncope. Patient states he passes out at least a few times a week and will occur when sitting and standing.  He experienced a syncopal episode during his device check in office today which was witnessed by device technician positive tilt table test for vasodepressor response for neurocardiogenic syncope.       4. Stress Test: 7/14/14   No evidence of reversible ischemia. There is a moderate intensity fixed    defects of the basal inferolateral and basal to mid inferior wall with a    mild intensity fixed defect of the basal to mid anteroseptal walls. There is    artifact from motion, bowel, and liver. The defects may represent scar or    artifact but no reversible defects identified. 5. Cath: 10/3/2018  Stent to LCX     6. Cath: 10/7/2018  LCX stent patent; PCI of RPDA and PCI OM1; IABP placement    The MCOT, echocardiogram, stress test, and coronary angiography/PCI were reviewed by myself and used for my plan of care. Procedures:  1. [employer]onic single chamber ICD implant on 10/12/18    Assessment/Plan:     Neurogenic syncope:  -Long standing history with multiple falls  +Tilt Table Test in 2005 through [facility] to tolerate Toprol XL 12.5mg daily and ACEi and Carvedilol even at low doses, due to recurrent syncope/ hypotension  Patient reports a low sodium diet makes him feel poorly and his blood pressure drops when get does not take in enough salt.    -Would not diagnose the patient with Postural Orthostatic Tachycardia Syndrome, as he does not show any evidence of such. At the prior office visit, we ambulated the patient from a sitting to standing position and found that the patient's heart rate would increase from 102 (sitting) to 108 (standing) bpm which would not fulfill the definition of POTS. -Concerned about the patient's resting HR being tachycardic and is in need of a beta blocker. VT:   -ICD implanted 10/12/18 for secondary prevention.  -Device interrogation today and functioning appropriately  -Manufactures airplane parts. He has concerns about his job duties interacting with his device.  Given the electromagnetic gutierrez that are released as he is preparing parts, I would advise against this type of

## 2019-09-23 NOTE — LETTER
BRILINTA 90 MG TABS tablet TAKE 1 TABLET BY MOUTH TWO TIMES A DAY  8/20/19   Carie Thompson MD   atorvastatin (LIPITOR) 40 MG tablet TAKE 1 TABLET BY MOUTH IN THE EVENING  3/19/19   Carie Thompson MD   pantoprazole (PROTONIX) 40 MG tablet Take 1 tablet by mouth daily  Patient not taking: Reported on 8/6/2019 10/17/18   Kaiden Lucas, APRN - CNP   aspirin 81 MG chewable tablet Take 81 mg by mouth daily    Historical Provider, MD   Cholecalciferol (VITAMIN D3) 88114 units CAPS Take 1 capsule by mouth once a week     Historical Provider, MD   Dulaglutide (TRULICITY) 1.5 XB/6.8DV SOPN Inject 1.5 mg into the skin once a week     Historical Provider, MD   insulin detemir (LEVEMIR FLEXTOUCH) 100 UNIT/ML injection pen Inject 30 Units into the skin nightly  Patient taking differently: Inject 50 Units into the skin nightly  8/23/18   Karmen Bonner, DO       Social History:   reports that he has never smoked. He has never used smokeless tobacco. He reports that he does not drink alcohol or use drugs. Family History:  family history includes COPD in his mother; Cancer in his father and sister; Diabetes in his brother and sister; Heart Disease in his brother, father, and mother; High Blood Pressure in his mother; Stroke in his mother. Reviewed.  Denies family history of sudden cardiac death, arrhythmia, premature CAD    Review of System:    · General ROS: negative for - chills, fever   · Psychological ROS: negative for - anxiety or depression  · Ophthalmic ROS: negative for - eye pain or loss of vision  · ENT ROS: negative for - epistaxis, headaches, nasal discharge, sore throat   · Allergy and Immunology ROS: negative for - hives, nasal congestion   · Hematological and Lymphatic ROS: negative for - bleeding problems, blood clots, bruising or jaundice  · Endocrine ROS: negative for - skin changes, temperature intolerance or unexpected weight changes  · Respiratory ROS: negative for - cough, hemoptysis, pleuritic pain, SOB,

## 2019-09-23 NOTE — PROGRESS NOTES
In office device interrogation of his ICD shows normal device function. All prior auto testing was within normal limits. No changes need to be made at this time. RV threshold testing was not completed today d/t intermittent failure to capture on .      - alternate  was later tried and worked without issue, though testing was not re-attempted d/t already increased heart rate. - pt presented at approximately 112 bpm.    When attempting to run RV threshold test with the initial , pt passed out momentarily. I was able to wake him up right away and regain consciousness. Pt states that he passed out regularly. He will pass out with little exertion and when he bends over. He is seeing Dr. Donny Brooks in office today to further discuss this issue. No new episodes/arrhythmias recorded. - specifically, no episodes correlate with pt's recent syncopal episodes (Sept 22nd and 23rd). Pt will f/u in 3 months remotely. Patient is to see Dr. Donny Brooks in office today also. Dr. Donny Brooks to review interrogation. See interrogation/Paceart report for further details.

## 2019-10-02 ENCOUNTER — TELEPHONE (OUTPATIENT)
Dept: CARDIOLOGY CLINIC | Age: 54
End: 2019-10-02

## 2019-11-12 ENCOUNTER — NURSE ONLY (OUTPATIENT)
Dept: CARDIOLOGY CLINIC | Age: 54
End: 2019-11-12
Payer: COMMERCIAL

## 2019-11-12 ENCOUNTER — TELEPHONE (OUTPATIENT)
Dept: CARDIOLOGY CLINIC | Age: 54
End: 2019-11-12

## 2019-11-12 DIAGNOSIS — Z95.810 ICD (IMPLANTABLE CARDIOVERTER-DEFIBRILLATOR) IN PLACE: ICD-10-CM

## 2019-11-12 DIAGNOSIS — I10 ESSENTIAL HYPERTENSION: Primary | ICD-10-CM

## 2019-11-12 DIAGNOSIS — I25.10 CORONARY ARTERY DISEASE INVOLVING NATIVE CORONARY ARTERY OF NATIVE HEART WITHOUT ANGINA PECTORIS: Chronic | ICD-10-CM

## 2019-11-12 DIAGNOSIS — I47.20 VT (VENTRICULAR TACHYCARDIA): ICD-10-CM

## 2019-11-12 PROCEDURE — 93296 REM INTERROG EVL PM/IDS: CPT | Performed by: INTERNAL MEDICINE

## 2019-11-12 PROCEDURE — 93295 DEV INTERROG REMOTE 1/2/MLT: CPT | Performed by: INTERNAL MEDICINE

## 2019-11-13 ENCOUNTER — HOSPITAL ENCOUNTER (OUTPATIENT)
Dept: VASCULAR LAB | Age: 54
Discharge: HOME | End: 2019-11-13
Payer: COMMERCIAL

## 2019-11-13 DIAGNOSIS — I10 ESSENTIAL HYPERTENSION: ICD-10-CM

## 2019-11-13 DIAGNOSIS — I25.10 CORONARY ARTERY DISEASE INVOLVING NATIVE CORONARY ARTERY OF NATIVE HEART WITHOUT ANGINA PECTORIS: Chronic | ICD-10-CM

## 2019-11-13 PROCEDURE — 93882 EXTRACRANIAL UNI/LTD STUDY: CPT

## 2019-11-15 ENCOUNTER — APPOINTMENT (OUTPATIENT)
Dept: GENERAL RADIOLOGY | Age: 54
End: 2019-11-15
Payer: COMMERCIAL

## 2019-11-15 ENCOUNTER — HOSPITAL ENCOUNTER (EMERGENCY)
Age: 54
Discharge: HOME | End: 2019-11-15
Attending: EMERGENCY MEDICINE
Payer: COMMERCIAL

## 2019-11-15 VITALS
TEMPERATURE: 98.2 F | HEIGHT: 74 IN | WEIGHT: 218.48 LBS | OXYGEN SATURATION: 97 % | HEART RATE: 78 BPM | SYSTOLIC BLOOD PRESSURE: 124 MMHG | RESPIRATION RATE: 16 BRPM | DIASTOLIC BLOOD PRESSURE: 72 MMHG | BODY MASS INDEX: 28.04 KG/M2

## 2019-11-15 DIAGNOSIS — R73.9 HYPERGLYCEMIA: ICD-10-CM

## 2019-11-15 DIAGNOSIS — T30.0 BURN: Primary | ICD-10-CM

## 2019-11-15 DIAGNOSIS — L03.115 CELLULITIS OF FOOT, RIGHT: ICD-10-CM

## 2019-11-15 LAB
ANION GAP SERPL CALCULATED.3IONS-SCNC: 11 MMOL/L (ref 3–16)
BASOPHILS ABSOLUTE: 0.1 K/UL (ref 0–0.2)
BASOPHILS RELATIVE PERCENT: 0.7 %
BUN BLDV-MCNC: 11 MG/DL (ref 7–20)
CALCIUM SERPL-MCNC: 9 MG/DL (ref 8.3–10.6)
CHLORIDE BLD-SCNC: 100 MMOL/L (ref 99–110)
CHP ED QC CHECK: YES
CO2: 24 MMOL/L (ref 21–32)
CREAT SERPL-MCNC: 0.7 MG/DL (ref 0.9–1.3)
EOSINOPHILS ABSOLUTE: 0.1 K/UL (ref 0–0.6)
EOSINOPHILS RELATIVE PERCENT: 1.3 %
GFR AFRICAN AMERICAN: >60
GFR NON-AFRICAN AMERICAN: >60
GLUCOSE BLD-MCNC: 184 MG/DL
GLUCOSE BLD-MCNC: 184 MG/DL (ref 70–99)
GLUCOSE BLD-MCNC: 409 MG/DL (ref 70–99)
HCT VFR BLD CALC: 44.7 % (ref 40.5–52.5)
HEMOGLOBIN: 15.3 G/DL (ref 13.5–17.5)
LACTIC ACID: 1.5 MMOL/L (ref 0.4–2)
LYMPHOCYTES ABSOLUTE: 1.8 K/UL (ref 1–5.1)
LYMPHOCYTES RELATIVE PERCENT: 20.1 %
MCH RBC QN AUTO: 28.2 PG (ref 26–34)
MCHC RBC AUTO-ENTMCNC: 34.4 G/DL (ref 31–36)
MCV RBC AUTO: 82.2 FL (ref 80–100)
MONOCYTES ABSOLUTE: 0.7 K/UL (ref 0–1.3)
MONOCYTES RELATIVE PERCENT: 8.2 %
NEUTROPHILS ABSOLUTE: 6.3 K/UL (ref 1.7–7.7)
NEUTROPHILS RELATIVE PERCENT: 69.7 %
PDW BLD-RTO: 12.7 % (ref 12.4–15.4)
PERFORMED ON: ABNORMAL
PLATELET # BLD: 219 K/UL (ref 135–450)
PMV BLD AUTO: 8.3 FL (ref 5–10.5)
POTASSIUM REFLEX MAGNESIUM: 4.3 MMOL/L (ref 3.5–5.1)
RBC # BLD: 5.43 M/UL (ref 4.2–5.9)
SODIUM BLD-SCNC: 135 MMOL/L (ref 136–145)
WBC # BLD: 9 K/UL (ref 4–11)

## 2019-11-15 PROCEDURE — 6370000000 HC RX 637 (ALT 250 FOR IP): Performed by: EMERGENCY MEDICINE

## 2019-11-15 PROCEDURE — 6370000000 HC RX 637 (ALT 250 FOR IP): Performed by: NURSE PRACTITIONER

## 2019-11-15 PROCEDURE — 96361 HYDRATE IV INFUSION ADD-ON: CPT

## 2019-11-15 PROCEDURE — 80048 BASIC METABOLIC PNL TOTAL CA: CPT

## 2019-11-15 PROCEDURE — 85025 COMPLETE CBC W/AUTO DIFF WBC: CPT

## 2019-11-15 PROCEDURE — 2580000003 HC RX 258: Performed by: EMERGENCY MEDICINE

## 2019-11-15 PROCEDURE — 83605 ASSAY OF LACTIC ACID: CPT

## 2019-11-15 PROCEDURE — 99285 EMERGENCY DEPT VISIT HI MDM: CPT

## 2019-11-15 PROCEDURE — 96374 THER/PROPH/DIAG INJ IV PUSH: CPT

## 2019-11-15 PROCEDURE — 73630 X-RAY EXAM OF FOOT: CPT

## 2019-11-15 RX ORDER — CEPHALEXIN 500 MG/1
500 CAPSULE ORAL ONCE
Status: COMPLETED | OUTPATIENT
Start: 2019-11-15 | End: 2019-11-15

## 2019-11-15 RX ORDER — 0.9 % SODIUM CHLORIDE 0.9 %
1000 INTRAVENOUS SOLUTION INTRAVENOUS ONCE
Status: COMPLETED | OUTPATIENT
Start: 2019-11-15 | End: 2019-11-15

## 2019-11-15 RX ORDER — CEPHALEXIN 500 MG/1
500 CAPSULE ORAL 4 TIMES DAILY
Qty: 40 CAPSULE | Refills: 0 | Status: SHIPPED | OUTPATIENT
Start: 2019-11-15 | End: 2019-11-25

## 2019-11-15 RX ORDER — SULFAMETHOXAZOLE AND TRIMETHOPRIM 800; 160 MG/1; MG/1
1 TABLET ORAL ONCE
Status: COMPLETED | OUTPATIENT
Start: 2019-11-15 | End: 2019-11-15

## 2019-11-15 RX ORDER — SULFAMETHOXAZOLE AND TRIMETHOPRIM 800; 160 MG/1; MG/1
1 TABLET ORAL 2 TIMES DAILY
Qty: 20 TABLET | Refills: 0 | Status: SHIPPED | OUTPATIENT
Start: 2019-11-15 | End: 2019-11-25

## 2019-11-15 RX ORDER — ACETAMINOPHEN 500 MG
1000 TABLET ORAL ONCE
Status: COMPLETED | OUTPATIENT
Start: 2019-11-15 | End: 2019-11-15

## 2019-11-15 RX ADMIN — CEPHALEXIN 500 MG: 500 CAPSULE ORAL at 16:25

## 2019-11-15 RX ADMIN — INSULIN HUMAN 10 UNITS: 100 INJECTION, SOLUTION PARENTERAL at 17:03

## 2019-11-15 RX ADMIN — SULFAMETHOXAZOLE AND TRIMETHOPRIM 1 TABLET: 800; 160 TABLET ORAL at 16:25

## 2019-11-15 RX ADMIN — SODIUM CHLORIDE 1000 ML: 9 INJECTION, SOLUTION INTRAVENOUS at 17:02

## 2019-11-15 RX ADMIN — ACETAMINOPHEN 1000 MG: 500 TABLET ORAL at 15:00

## 2019-11-15 ASSESSMENT — PAIN SCALES - GENERAL: PAINLEVEL_OUTOF10: 2

## 2019-11-20 ENCOUNTER — TELEPHONE (OUTPATIENT)
Dept: CARDIOLOGY CLINIC | Age: 54
End: 2019-11-20

## 2019-11-20 ENCOUNTER — OFFICE VISIT (OUTPATIENT)
Dept: CARDIOLOGY CLINIC | Age: 54
End: 2019-11-20
Payer: COMMERCIAL

## 2019-11-20 VITALS
OXYGEN SATURATION: 98 % | HEART RATE: 108 BPM | BODY MASS INDEX: 27.72 KG/M2 | SYSTOLIC BLOOD PRESSURE: 100 MMHG | HEIGHT: 74 IN | WEIGHT: 216 LBS | DIASTOLIC BLOOD PRESSURE: 66 MMHG

## 2019-11-20 DIAGNOSIS — E78.2 MIXED HYPERLIPIDEMIA: ICD-10-CM

## 2019-11-20 DIAGNOSIS — I25.10 CAD, MULTIPLE VESSEL: ICD-10-CM

## 2019-11-20 DIAGNOSIS — I47.20 VENTRICULAR TACHYCARDIA: ICD-10-CM

## 2019-11-20 DIAGNOSIS — I25.5 ISCHEMIC CARDIOMYOPATHY: Primary | ICD-10-CM

## 2019-11-20 DIAGNOSIS — R55 SYNCOPE AND COLLAPSE: ICD-10-CM

## 2019-11-20 DIAGNOSIS — Z95.810 ICD (IMPLANTABLE CARDIOVERTER-DEFIBRILLATOR) IN PLACE: ICD-10-CM

## 2019-11-20 PROCEDURE — 99214 OFFICE O/P EST MOD 30 MIN: CPT | Performed by: NURSE PRACTITIONER

## 2019-12-06 ENCOUNTER — TELEPHONE (OUTPATIENT)
Dept: CARDIOLOGY CLINIC | Age: 54
End: 2019-12-06

## 2020-01-24 ENCOUNTER — OFFICE VISIT (OUTPATIENT)
Dept: CARDIOLOGY CLINIC | Age: 55
End: 2020-01-24
Payer: COMMERCIAL

## 2020-01-24 VITALS
OXYGEN SATURATION: 98 % | BODY MASS INDEX: 28.36 KG/M2 | HEIGHT: 74 IN | SYSTOLIC BLOOD PRESSURE: 108 MMHG | WEIGHT: 221 LBS | HEART RATE: 88 BPM | DIASTOLIC BLOOD PRESSURE: 74 MMHG

## 2020-01-24 PROCEDURE — 99214 OFFICE O/P EST MOD 30 MIN: CPT | Performed by: INTERNAL MEDICINE

## 2020-01-24 RX ORDER — CITALOPRAM 40 MG/1
40 TABLET ORAL
COMMUNITY
Start: 2011-05-20 | End: 2020-01-24

## 2020-01-24 RX ORDER — PRASUGREL 10 MG/1
10 TABLET, FILM COATED ORAL ONCE
Qty: 30 TABLET | Refills: 11 | Status: SHIPPED | OUTPATIENT
Start: 2020-01-24 | End: 2020-12-03

## 2020-01-24 RX ORDER — TRAZODONE HYDROCHLORIDE 100 MG/1
TABLET ORAL
Status: ON HOLD | COMMUNITY
Start: 2020-01-14 | End: 2020-07-01

## 2020-01-24 NOTE — PATIENT INSTRUCTIONS
pain, arthritis, fever, or swelling. This includes aspirin, ibuprofen (Advil, Motrin), naproxen (Aleve), and others. Using an NSAID with prasugrel may cause you to bruise or bleed easily. What are the possible side effects of prasugrel? Get emergency medical help if you have signs of an allergic reaction: hives; dizziness, chest pain, difficulty breathing; swelling of your face, lips, tongue, or throat. Call your doctor at once if you have:  · a light-headed feeling, like you might pass out;  · any bleeding that will not stop;  · pink or brown urine;  · signs of a serious blood-clotting problem --pale skin, purple spots under your skin or on your mouth, fever, fast heart rate, weakness, stomach pain, trouble breathing, jaundice (yellowing of the skin or eyes);  · signs of stomach bleeding --bloody or tarry stools, coughing up blood or vomit that looks like coffee grounds; or  · signs of a stroke --sudden numbness or weakness (especially on one side of the body), sudden severe headache, slurred speech, problems with vision or balance. The risk of bleeding is higher in older adults. Common side effects may include:  · nosebleeds; or  · easy bruising or bleeding. This is not a complete list of side effects and others may occur. Call your doctor for medical advice about side effects. You may report side effects to FDA at 7-783-FDA-1290. What other drugs will affect prasugrel? Taking prasugrel with certain other drugs can increase your risk of bleeding. Tell your doctor about all your other medicines, especially:  · opioid medication;  · any other medicines to treat or prevent blood clots, including heparin or warfarin (Coumadin, Jantoven); or  · NSAIDs (nonsteroidal anti-inflammatory drugs) --aspirin, ibuprofen (Advil, Motrin), naproxen (Aleve), celecoxib, diclofenac, indomethacin, meloxicam, and others. This list is not complete.  Other drugs may affect prasugrel, including prescription and over-the-counter medicines, vitamins, and herbal products. Not all possible drug interactions are listed here. Where can I get more information? Your pharmacist can provide more information about prasugrel. Remember, keep this and all other medicines out of the reach of children, never share your medicines with others, and use this medication only for the indication prescribed. Every effort has been made to ensure that the information provided by [address] is accurate, up-to-date, and complete, but no guarantee is made to that effect. Drug information contained herein may be time sensitive. [facility] information has been compiled for use by healthcare practitioners and consumers in the [facility] and therefore [facility] does not warrant that uses outside of the [facility] are appropriate, unless specifically indicated otherwise. [facility]'s drug information does not endorse drugs, diagnose patients or recommend therapy. [facility]'s drug information is an informational resource designed to assist licensed healthcare practitioners in caring for their patients and/or to serve consumers viewing this service as a supplement to, and not a substitute for, the expertise, skill, knowledge and judgment of healthcare practitioners. The absence of a warning for a given drug or drug combination in no way should be construed to indicate that the drug or drug combination is safe, effective or appropriate for any given patient. [facility] does not assume any responsibility for any aspect of healthcare administered with the aid of information [facility] provides. The information contained herein is not intended to cover all possible uses, directions, precautions, warnings, drug interactions, allergic reactions, or adverse effects. If you have questions about the drugs you are taking, check with your doctor, nurse or pharmacist.  Copyright 5261-0146 [address]. Version: 3.01. Revision date: 3/18/2019.   Care instructions adapted

## 2020-01-24 NOTE — PROGRESS NOTES
diseases classified elsewhere, POTS (postural orthostatic tachycardia syndrome), Psychiatric problem, Sleep apnea, Syncope, and Type II or unspecified type diabetes mellitus without mention of complication, not stated as uncontrolled. Surgical History:   has a past surgical history that includes Vasectomy; Cardiac catheterization; Finger surgery (Left); hernia repair; vascular surgery; Colonoscopy; joint replacement; Coronary angioplasty with stent (10/06/2018); Coronary angioplasty with stent (10/07/2018); and Coronary angioplasty with stent (10/03/2018). Social History:   reports that he has never smoked. He has never used smokeless tobacco. He reports that he does not drink alcohol or use drugs. Family History:  family history includes COPD in his mother; Cancer in his father and sister; Diabetes in his brother and sister; Heart Disease in his brother, father, and mother; High Blood Pressure in his mother; Stroke in his mother. Home Medications:  Were reviewed and are listed in nursing record and/or below  Prior to Admission medications    Medication Sig Start Date End Date Taking?  Authorizing Provider   BRILINTA 90 MG TABS tablet TAKE 1 TABLET BY MOUTH TWO TIMES A DAY  8/20/19   Janiya King MD   atorvastatin (LIPITOR) 40 MG tablet TAKE 1 TABLET BY MOUTH IN THE EVENING  3/19/19   Janiya King MD   pantoprazole (PROTONIX) 40 MG tablet Take 1 tablet by mouth daily 10/17/18   LAURA Booker - CNP   aspirin 81 MG chewable tablet Take 81 mg by mouth daily    Historical Provider, MD   Cholecalciferol (VITAMIN D3) 59094 units CAPS Take 1 capsule by mouth once a week     Historical Provider, MD   Dulaglutide (TRULICITY) 1.5 OP/0.3NJ SOPN Inject 1.5 mg into the skin once a week     Historical Provider, MD   insulin detemir (LEVEMIR FLEXTOUCH) 100 UNIT/ML injection pen Inject 30 Units into the skin nightly  Patient taking differently: Inject 50 Units into the skin nightly  8/23/18   Karmen Naqvi, DO Echocardiogram: 10/8/18   Summary   Left ventricular cavity size is mildly dilated.   Normal left ventricular wall thickness.   Ejection fraction is visually estimated to be 45%.   There is moderate to severe inferior hypokinesis.   Normal right ventricular size and function.     Stress Test: 10/9/17   Summary    Pharmacological Stress/MPI Results:        1. Technically a satisfactory study.    2. Normal pharmacological stress portion of the study.    3. No evidence of Ischemia by Myocardial Perfusion Imaging.    4. Gated Study shows normal LV size and Systolic function; EF is 61 %. Cath:   10/3/18   ANGIOGRAPHIC FINDINGS:  1. Left main is normal.  LYNNE 3 flow. No stenosis. 2.  Left anterior descending artery comes from the left main coronary  artery. LYNNE 3 flow. No stenosis present with patent diagonal branches. 3.  Left circumflex is occluded in the mid portion. 4.  Right coronary comes from the right coronary cusp. It has a PDA which  has 80% stenosis present. The patient also has an obtuse marginal 1 which  as 80% stenosis present.   In summary, successful revascularization with stent placement in the  circumflex artery. This was 2.5 x 38 mm, expanded up to 2.8 mm. This was  a drug-coated Synergy stent    10/7/18 Dr. Elvis Banerjee stent patent; PCI of RPDA and PCI OM1, IABP placement    Imaging: All available imaging to date reviewed     ECHO:8/14/19  Suboptimal image quality. Definity contrast administered. Left ventricular cavity size is normal. Normal left ventricular wall   thickness. Overall left ventricular systolic function appears mildly   reduced. Ejection fraction is visually estimated to be 50-55%. No regional   wall motion abnormalities are noted. Diastolic filling parameters suggest   grade I diastolic dysfunction. Mitral valve leaflets appear mildly thickened. No evidence of significant mitral regurgitation or stenosis. Normal right ventricular size and function.  TAPSE measures 17mm. Pacer / ICD wire is visualized in the right ventricle. .   Trivial tricuspid regurgitation. Carotid Duplex:11/13/19  Right Impression   1. There are no focal elevated velocities involving the internal carotid   artery. 2. There is no gross plaque seen involving the internal carotid artery. 3. The right vertebral artery demonstrates normal antegrade flow. Left Impression   1. There are no focal elevated velocities involving the internal carotid   artery. 2. There is no gross plaque seen involving the internal carotid artery. 3. The left vertebral artery demonstrates normal antegrade flow. Assessment and Plan       -CAD, VT recurrent MI requiring repeat PCI  s/p AICD implant 10/12/18  --S/p NSTEMI, S/p Inferolateral STEMI, S/P multivessel PCI: LCX, EDUARD and RPDA Dr. Rodolfo Beth   LVEF 45%   Device checked on 11/12/19 remote   No chest pain but continues with HINOJOSA or SOB at rest with deep breath. Continue Brilinta and ASA   Will switch from Brilinta to Effient  Echo now    -Neurogenic Syncope  Long standing hx of falls  + TTT 2005  Unable to tolerate BB and ACEI due to hypotension and recurrent syncope  Dr. Grady Martini has evaluated and referred to Dr. Lucas Gonzalez Neurosurgery  Workup continues  Asked for communication regarding medications moving forward per Dr. Mateo Berrios    -Hyperlipidemia, mixed   Remains on high potency statin therapy with Lipitor 40 mg daily with no reported myalgias    -DM, II  Levamir, trulicity     Follow up in 8 months     Thank you for allowing us to participate in the care of Juni King. Please do not hesitate to contact me if you have any questions. Harjit Naranjo MD, MPH    [place] [address]  Ph: [phone]  Fax: [phone]    This note was scribed in the presence of Dr. Vinay Veronica MD by Kiera Brenner RN.   Physician Attestation:  The scribes documentation has been prepared under my direction and personally reviewed by me in its entirety. I confirm that the note above accurately reflects all work, treatment, procedures, and medical decision making performed by me.

## 2020-01-27 ENCOUNTER — TELEPHONE (OUTPATIENT)
Dept: CARDIOLOGY CLINIC | Age: 55
End: 2020-01-27

## 2020-01-27 NOTE — TELEPHONE ENCOUNTER
Kelin from Dr Shayna aPtricio office(NEUROLOGY) called stated Dr Chapman Even wants to know if Florinef or Alpha Lipoic Acid might be an option for treating his Orthostatic Hypotension since she knows Midodrine and Droxidopa are likely off the table given his significant heart history.      Please call Dr Chapman Even back at [phone](IAXO) or  [phone](APPCV) Dial 0 for

## 2020-02-10 ENCOUNTER — HOSPITAL ENCOUNTER (OUTPATIENT)
Dept: NON INVASIVE DIAGNOSTICS | Age: 55
Discharge: HOME | End: 2020-02-10
Payer: COMMERCIAL

## 2020-02-10 LAB
LV EF: 60 %
LVEF MODALITY: NORMAL

## 2020-02-10 PROCEDURE — C8929 TTE W OR WO FOL WCON,DOPPLER: HCPCS

## 2020-02-18 ENCOUNTER — NURSE ONLY (OUTPATIENT)
Dept: CARDIOLOGY CLINIC | Age: 55
End: 2020-02-18
Payer: COMMERCIAL

## 2020-02-18 PROCEDURE — 93296 REM INTERROG EVL PM/IDS: CPT | Performed by: INTERNAL MEDICINE

## 2020-02-18 PROCEDURE — 93295 DEV INTERROG REMOTE 1/2/MLT: CPT | Performed by: INTERNAL MEDICINE

## 2020-02-18 NOTE — LETTER
[facility] [phone]  [address]  [facility]    Pacemaker/Defibrillator Clinic          02/18/20        Alpesh King  [address] [facility] [address]        Dear Ramya Lilly    This letter is to inform you that we received the transmission from your monitor at home that checks your implanted heart device. The next date your monitor will automatically transmit will be 5-19-20. If your report needs attention we will notify you. Your device and monitor are wireless and most transmit cellularly, but please periodically check your monitor is still plugged in to the electrical outlet. If you still use the telephone land line to send please ensure the connection to the phone maddie is secure. This will help to ensure successful automatic transmissions in the future. Also, the monitor needs to be close to you while sleeping at night. Please be aware that the remote device transmission sites are periodically monitored only during regular business hours during which simultaneous in-office device clinics are being run. If your transmission requires attention, we will contact you as soon as possible. Thank you.             [facility]

## 2020-03-06 NOTE — TELEPHONE ENCOUNTER
Please refill in Dr. Alin Gutierrez absence, thank you.   Last OV: 2/7/19  Next OV: 8/6/19   Labs: THANH [FreeTextEntry1] : Mr. Treadwell is a 68 year old male who presents today for follow up.  He has a history of squamous cell lung Ca stage III (T3 N2) of LETY with neoadjuvant chemo/RT, followed by left thoracotomy, pneumonectomy on 6/23/15, pathology revealed [id number]. In January of 2017 he completed SBRT to a RUL nodule.  On follow up, CT Chest and PET showed enlargement of this lesion. He is s/p right VATS, RUL lung wedge resection on 2/23/18, pathology revealed scar with reactive changes consistent with radiation atypia.\par \par CT Chest on 2/25/2020 reveals prior RUL clustered nodules first noted on prior study have nearly resolved. Unchanged 4mm nodule along left major fissure. \par \par CT Chest on 11/18/19 revealed new RUL cluster of tree-in-bud opacities or 2 mm nodules adjacent to the anterior mediastinum. Stable 4 mm nodule along right minor fissure\par \par Pt presents today for follow up. The patient denies shortness of breath, recent URI, fever, chills, dysphagia or hemoptysis. \par

## 2020-05-19 ENCOUNTER — NURSE ONLY (OUTPATIENT)
Dept: CARDIOLOGY CLINIC | Age: 55
End: 2020-05-19
Payer: COMMERCIAL

## 2020-05-19 PROCEDURE — 93295 DEV INTERROG REMOTE 1/2/MLT: CPT | Performed by: INTERNAL MEDICINE

## 2020-05-19 PROCEDURE — 93296 REM INTERROG EVL PM/IDS: CPT | Performed by: INTERNAL MEDICINE

## 2020-06-30 ENCOUNTER — APPOINTMENT (OUTPATIENT)
Dept: GENERAL RADIOLOGY | Age: 55
DRG: 871 | End: 2020-06-30
Payer: COMMERCIAL

## 2020-06-30 ENCOUNTER — HOSPITAL ENCOUNTER (INPATIENT)
Age: 55
LOS: 9 days | Discharge: ANOTHER HOSPITAL | DRG: 871 | End: 2020-07-09
Attending: EMERGENCY MEDICINE | Admitting: INTERNAL MEDICINE
Payer: COMMERCIAL

## 2020-06-30 PROBLEM — J18.9 PNEUMONIA: Status: ACTIVE | Noted: 2020-06-30

## 2020-06-30 LAB
A/G RATIO: 0.8 (ref 1.1–2.2)
ALBUMIN SERPL-MCNC: 3.1 G/DL (ref 3.4–5)
ALP BLD-CCNC: 60 U/L (ref 40–129)
ALT SERPL-CCNC: 16 U/L (ref 10–40)
ANION GAP SERPL CALCULATED.3IONS-SCNC: 22 MMOL/L (ref 3–16)
AST SERPL-CCNC: 31 U/L (ref 15–37)
BASOPHILS ABSOLUTE: 0 K/UL (ref 0–0.2)
BASOPHILS RELATIVE PERCENT: 0.6 %
BILIRUB SERPL-MCNC: 0.5 MG/DL (ref 0–1)
BUN BLDV-MCNC: 15 MG/DL (ref 7–20)
CALCIUM SERPL-MCNC: 8.6 MG/DL (ref 8.3–10.6)
CHLORIDE BLD-SCNC: 92 MMOL/L (ref 99–110)
CO2: 18 MMOL/L (ref 21–32)
CREAT SERPL-MCNC: 0.9 MG/DL (ref 0.9–1.3)
EOSINOPHILS ABSOLUTE: 0 K/UL (ref 0–0.6)
EOSINOPHILS RELATIVE PERCENT: 0 %
GFR AFRICAN AMERICAN: >60
GFR NON-AFRICAN AMERICAN: >60
GLOBULIN: 4.1 G/DL
GLUCOSE BLD-MCNC: 281 MG/DL (ref 70–99)
HCT VFR BLD CALC: 48 % (ref 40.5–52.5)
HEMOGLOBIN: 16.2 G/DL (ref 13.5–17.5)
LACTIC ACID, SEPSIS: 1.4 MMOL/L (ref 0.4–1.9)
LACTIC ACID, SEPSIS: 2.1 MMOL/L (ref 0.4–1.9)
LYMPHOCYTES ABSOLUTE: 1.1 K/UL (ref 1–5.1)
LYMPHOCYTES RELATIVE PERCENT: 14.6 %
MCH RBC QN AUTO: 28.3 PG (ref 26–34)
MCHC RBC AUTO-ENTMCNC: 33.8 G/DL (ref 31–36)
MCV RBC AUTO: 83.6 FL (ref 80–100)
MONOCYTES ABSOLUTE: 0.9 K/UL (ref 0–1.3)
MONOCYTES RELATIVE PERCENT: 11.7 %
NEUTROPHILS ABSOLUTE: 5.7 K/UL (ref 1.7–7.7)
NEUTROPHILS RELATIVE PERCENT: 73.1 %
PDW BLD-RTO: 12.7 % (ref 12.4–15.4)
PLATELET # BLD: 105 K/UL (ref 135–450)
PMV BLD AUTO: 9.2 FL (ref 5–10.5)
POTASSIUM SERPL-SCNC: 4.3 MMOL/L (ref 3.5–5.1)
PRO-BNP: 134 PG/ML (ref 0–124)
RBC # BLD: 5.74 M/UL (ref 4.2–5.9)
SODIUM BLD-SCNC: 132 MMOL/L (ref 136–145)
TOTAL PROTEIN: 7.2 G/DL (ref 6.4–8.2)
WBC # BLD: 7.8 K/UL (ref 4–11)

## 2020-06-30 PROCEDURE — 84145 PROCALCITONIN (PCT): CPT

## 2020-06-30 PROCEDURE — U0002 COVID-19 LAB TEST NON-CDC: HCPCS

## 2020-06-30 PROCEDURE — 85025 COMPLETE CBC W/AUTO DIFF WBC: CPT

## 2020-06-30 PROCEDURE — 6360000002 HC RX W HCPCS: Performed by: INTERNAL MEDICINE

## 2020-06-30 PROCEDURE — 99285 EMERGENCY DEPT VISIT HI MDM: CPT

## 2020-06-30 PROCEDURE — 93005 ELECTROCARDIOGRAM TRACING: CPT | Performed by: EMERGENCY MEDICINE

## 2020-06-30 PROCEDURE — 96375 TX/PRO/DX INJ NEW DRUG ADDON: CPT

## 2020-06-30 PROCEDURE — 80053 COMPREHEN METABOLIC PANEL: CPT

## 2020-06-30 PROCEDURE — 1200000000 HC SEMI PRIVATE

## 2020-06-30 PROCEDURE — 87150 DNA/RNA AMPLIFIED PROBE: CPT

## 2020-06-30 PROCEDURE — 71045 X-RAY EXAM CHEST 1 VIEW: CPT

## 2020-06-30 PROCEDURE — 83880 ASSAY OF NATRIURETIC PEPTIDE: CPT

## 2020-06-30 PROCEDURE — 96365 THER/PROPH/DIAG IV INF INIT: CPT

## 2020-06-30 PROCEDURE — 2580000003 HC RX 258: Performed by: EMERGENCY MEDICINE

## 2020-06-30 PROCEDURE — 6360000002 HC RX W HCPCS: Performed by: EMERGENCY MEDICINE

## 2020-06-30 PROCEDURE — 2580000003 HC RX 258: Performed by: INTERNAL MEDICINE

## 2020-06-30 PROCEDURE — 6370000000 HC RX 637 (ALT 250 FOR IP): Performed by: EMERGENCY MEDICINE

## 2020-06-30 PROCEDURE — U0003 INFECTIOUS AGENT DETECTION BY NUCLEIC ACID (DNA OR RNA); SEVERE ACUTE RESPIRATORY SYNDROME CORONAVIRUS 2 (SARS-COV-2) (CORONAVIRUS DISEASE [COVID-19]), AMPLIFIED PROBE TECHNIQUE, MAKING USE OF HIGH THROUGHPUT TECHNOLOGIES AS DESCRIBED BY CMS-2020-01-R: HCPCS

## 2020-06-30 PROCEDURE — 87040 BLOOD CULTURE FOR BACTERIA: CPT

## 2020-06-30 PROCEDURE — 36415 COLL VENOUS BLD VENIPUNCTURE: CPT

## 2020-06-30 PROCEDURE — 83605 ASSAY OF LACTIC ACID: CPT

## 2020-06-30 RX ORDER — ACETAMINOPHEN 500 MG
1000 TABLET ORAL ONCE
Status: COMPLETED | OUTPATIENT
Start: 2020-06-30 | End: 2020-06-30

## 2020-06-30 RX ORDER — ONDANSETRON 2 MG/ML
8 INJECTION INTRAMUSCULAR; INTRAVENOUS ONCE
Status: COMPLETED | OUTPATIENT
Start: 2020-06-30 | End: 2020-06-30

## 2020-06-30 RX ORDER — CITALOPRAM 40 MG/1
TABLET ORAL
Status: ON HOLD | COMMUNITY
End: 2020-07-01

## 2020-06-30 RX ORDER — DEXAMETHASONE SODIUM PHOSPHATE 4 MG/ML
10 INJECTION, SOLUTION INTRA-ARTICULAR; INTRALESIONAL; INTRAMUSCULAR; INTRAVENOUS; SOFT TISSUE ONCE
Status: COMPLETED | OUTPATIENT
Start: 2020-06-30 | End: 2020-06-30

## 2020-06-30 RX ORDER — 0.9 % SODIUM CHLORIDE 0.9 %
30 INTRAVENOUS SOLUTION INTRAVENOUS ONCE
Status: COMPLETED | OUTPATIENT
Start: 2020-06-30 | End: 2020-06-30

## 2020-06-30 RX ADMIN — ACETAMINOPHEN 1000 MG: 500 TABLET, FILM COATED ORAL at 19:50

## 2020-06-30 RX ADMIN — DEXAMETHASONE SODIUM PHOSPHATE 10 MG: 4 INJECTION, SOLUTION INTRAMUSCULAR; INTRAVENOUS at 19:42

## 2020-06-30 RX ADMIN — ONDANSETRON 8 MG: 2 INJECTION INTRAMUSCULAR; INTRAVENOUS at 19:40

## 2020-06-30 RX ADMIN — CEFEPIME HYDROCHLORIDE 2 G: 2 INJECTION, POWDER, FOR SOLUTION INTRAVENOUS at 23:15

## 2020-06-30 RX ADMIN — SODIUM CHLORIDE 2553 ML: 9 INJECTION, SOLUTION INTRAVENOUS at 19:38

## 2020-06-30 ASSESSMENT — PAIN DESCRIPTION - PAIN TYPE: TYPE: ACUTE PAIN

## 2020-06-30 ASSESSMENT — PAIN SCALES - GENERAL
PAINLEVEL_OUTOF10: 3
PAINLEVEL_OUTOF10: 3

## 2020-06-30 ASSESSMENT — PAIN DESCRIPTION - ONSET: ONSET: ON-GOING

## 2020-06-30 ASSESSMENT — PAIN DESCRIPTION - LOCATION: LOCATION: CHEST

## 2020-06-30 ASSESSMENT — PAIN DESCRIPTION - DESCRIPTORS: DESCRIPTORS: STABBING

## 2020-06-30 ASSESSMENT — PAIN DESCRIPTION - FREQUENCY: FREQUENCY: CONTINUOUS

## 2020-06-30 NOTE — ED TRIAGE NOTES
C/o chest pain for 1 week, nausea for 1 week. Tested positive for CO-VID. Got results today. States has been feeling worse and worse.

## 2020-07-01 ENCOUNTER — TELEPHONE (OUTPATIENT)
Dept: INFECTIOUS DISEASES | Age: 55
End: 2020-07-01

## 2020-07-01 PROBLEM — R06.82 TACHYPNEA: Status: ACTIVE | Noted: 2020-07-01

## 2020-07-01 PROBLEM — R00.0 TACHYCARDIA: Status: ACTIVE | Noted: 2020-07-01

## 2020-07-01 PROBLEM — R63.0 POOR APPETITE: Status: ACTIVE | Noted: 2020-07-01

## 2020-07-01 PROBLEM — A41.9 SEPSIS (HCC): Status: ACTIVE | Noted: 2020-07-01

## 2020-07-01 PROBLEM — R50.9 FEVER: Status: ACTIVE | Noted: 2020-07-01

## 2020-07-01 PROBLEM — E87.29 HIGH ANION GAP METABOLIC ACIDOSIS: Status: ACTIVE | Noted: 2020-07-01

## 2020-07-01 PROBLEM — E87.20 LACTIC ACIDOSIS: Status: ACTIVE | Noted: 2020-07-01

## 2020-07-01 PROBLEM — U07.1 COVID-19 VIRUS INFECTION: Status: ACTIVE | Noted: 2020-07-01

## 2020-07-01 LAB
ALBUMIN SERPL-MCNC: 2.7 G/DL (ref 3.4–5)
ANION GAP SERPL CALCULATED.3IONS-SCNC: 18 MMOL/L (ref 3–16)
ANION GAP SERPL CALCULATED.3IONS-SCNC: 23 MMOL/L (ref 3–16)
BASE EXCESS ARTERIAL: -7.6 MMOL/L (ref -3–3)
BASOPHILS ABSOLUTE: 0 K/UL (ref 0–0.2)
BASOPHILS RELATIVE PERCENT: 0.4 %
BUN BLDV-MCNC: 17 MG/DL (ref 7–20)
BUN BLDV-MCNC: 20 MG/DL (ref 7–20)
CALCIUM SERPL-MCNC: 8.4 MG/DL (ref 8.3–10.6)
CALCIUM SERPL-MCNC: 8.4 MG/DL (ref 8.3–10.6)
CARBOXYHEMOGLOBIN ARTERIAL: 0.7 % (ref 0–1.5)
CHLORIDE BLD-SCNC: 100 MMOL/L (ref 99–110)
CHLORIDE BLD-SCNC: 97 MMOL/L (ref 99–110)
CO2: 13 MMOL/L (ref 21–32)
CO2: 14 MMOL/L (ref 21–32)
CREAT SERPL-MCNC: 0.9 MG/DL (ref 0.9–1.3)
CREAT SERPL-MCNC: 0.9 MG/DL (ref 0.9–1.3)
D DIMER: 840 NG/ML DDU (ref 0–229)
EKG ATRIAL RATE: 110 BPM
EKG DIAGNOSIS: NORMAL
EKG P AXIS: 42 DEGREES
EKG P-R INTERVAL: 122 MS
EKG Q-T INTERVAL: 342 MS
EKG QRS DURATION: 74 MS
EKG QTC CALCULATION (BAZETT): 462 MS
EKG R AXIS: -8 DEGREES
EKG T AXIS: 16 DEGREES
EKG VENTRICULAR RATE: 110 BPM
EOSINOPHILS ABSOLUTE: 0 K/UL (ref 0–0.6)
EOSINOPHILS RELATIVE PERCENT: 0 %
FERRITIN: 1643 NG/ML (ref 30–400)
GFR AFRICAN AMERICAN: >60
GFR AFRICAN AMERICAN: >60
GFR NON-AFRICAN AMERICAN: >60
GFR NON-AFRICAN AMERICAN: >60
GLUCOSE BLD-MCNC: 277 MG/DL (ref 70–99)
GLUCOSE BLD-MCNC: 290 MG/DL (ref 70–99)
GLUCOSE BLD-MCNC: 301 MG/DL (ref 70–99)
GLUCOSE BLD-MCNC: 305 MG/DL (ref 70–99)
GLUCOSE BLD-MCNC: 318 MG/DL (ref 70–99)
GLUCOSE BLD-MCNC: 321 MG/DL (ref 70–99)
GLUCOSE BLD-MCNC: 325 MG/DL (ref 70–99)
GLUCOSE BLD-MCNC: 373 MG/DL (ref 70–99)
GLUCOSE BLD-MCNC: 390 MG/DL (ref 70–99)
GLUCOSE BLD-MCNC: 430 MG/DL (ref 70–99)
HCO3 ARTERIAL: 17.5 MMOL/L (ref 21–29)
HCT VFR BLD CALC: 47.2 % (ref 40.5–52.5)
HEMOGLOBIN, ART, EXTENDED: 15 G/DL (ref 13.5–17.5)
HEMOGLOBIN: 15.9 G/DL (ref 13.5–17.5)
L. PNEUMOPHILA SEROGP 1 UR AG: NORMAL
LACTIC ACID: 2.3 MMOL/L (ref 0.4–2)
LYMPHOCYTES ABSOLUTE: 0.7 K/UL (ref 1–5.1)
LYMPHOCYTES RELATIVE PERCENT: 9.9 %
MCH RBC QN AUTO: 28.3 PG (ref 26–34)
MCHC RBC AUTO-ENTMCNC: 33.6 G/DL (ref 31–36)
MCV RBC AUTO: 84.2 FL (ref 80–100)
METHEMOGLOBIN ARTERIAL: 0.4 %
MONOCYTES ABSOLUTE: 0.4 K/UL (ref 0–1.3)
MONOCYTES RELATIVE PERCENT: 6.2 %
NEUTROPHILS ABSOLUTE: 5.7 K/UL (ref 1.7–7.7)
NEUTROPHILS RELATIVE PERCENT: 83.5 %
O2 CONTENT ARTERIAL: 18 ML/DL
O2 SAT, ARTERIAL: 87 %
O2 THERAPY: ABNORMAL
PCO2 ARTERIAL: 33.7 MMHG (ref 35–45)
PDW BLD-RTO: 12.8 % (ref 12.4–15.4)
PERFORMED ON: ABNORMAL
PH ARTERIAL: 7.32 (ref 7.35–7.45)
PHOSPHORUS: 2.3 MG/DL (ref 2.5–4.9)
PLATELET # BLD: 99 K/UL (ref 135–450)
PMV BLD AUTO: 9 FL (ref 5–10.5)
PO2 ARTERIAL: 51.4 MMHG (ref 75–108)
POTASSIUM REFLEX MAGNESIUM: 5.2 MMOL/L (ref 3.5–5.1)
POTASSIUM SERPL-SCNC: 4 MMOL/L (ref 3.5–5.1)
PROCALCITONIN: 0.16 NG/ML (ref 0–0.15)
RBC # BLD: 5.6 M/UL (ref 4.2–5.9)
REPORT: NORMAL
SARS-COV-2, PCR: DETECTED
SODIUM BLD-SCNC: 131 MMOL/L (ref 136–145)
SODIUM BLD-SCNC: 134 MMOL/L (ref 136–145)
STREP PNEUMONIAE ANTIGEN, URINE: NORMAL
TCO2 ARTERIAL: 18.5 MMOL/L
TROPONIN: <0.01 NG/ML
TROPONIN: <0.01 NG/ML
WBC # BLD: 6.8 K/UL (ref 4–11)

## 2020-07-01 PROCEDURE — 36415 COLL VENOUS BLD VENIPUNCTURE: CPT

## 2020-07-01 PROCEDURE — 6370000000 HC RX 637 (ALT 250 FOR IP): Performed by: INTERNAL MEDICINE

## 2020-07-01 PROCEDURE — 87449 NOS EACH ORGANISM AG IA: CPT

## 2020-07-01 PROCEDURE — 87081 CULTURE SCREEN ONLY: CPT

## 2020-07-01 PROCEDURE — 87070 CULTURE OTHR SPECIMN AEROBIC: CPT

## 2020-07-01 PROCEDURE — 85379 FIBRIN DEGRADATION QUANT: CPT

## 2020-07-01 PROCEDURE — 87077 CULTURE AEROBIC IDENTIFY: CPT

## 2020-07-01 PROCEDURE — 82728 ASSAY OF FERRITIN: CPT

## 2020-07-01 PROCEDURE — 2580000003 HC RX 258: Performed by: INTERNAL MEDICINE

## 2020-07-01 PROCEDURE — 82803 BLOOD GASES ANY COMBINATION: CPT

## 2020-07-01 PROCEDURE — 99255 IP/OBS CONSLTJ NEW/EST HI 80: CPT | Performed by: INTERNAL MEDICINE

## 2020-07-01 PROCEDURE — 84484 ASSAY OF TROPONIN QUANT: CPT

## 2020-07-01 PROCEDURE — 6360000002 HC RX W HCPCS: Performed by: INTERNAL MEDICINE

## 2020-07-01 PROCEDURE — 85025 COMPLETE CBC W/AUTO DIFF WBC: CPT

## 2020-07-01 PROCEDURE — 93010 ELECTROCARDIOGRAM REPORT: CPT | Performed by: INTERNAL MEDICINE

## 2020-07-01 PROCEDURE — 80069 RENAL FUNCTION PANEL: CPT

## 2020-07-01 PROCEDURE — 83605 ASSAY OF LACTIC ACID: CPT

## 2020-07-01 PROCEDURE — 94760 N-INVAS EAR/PLS OXIMETRY 1: CPT

## 2020-07-01 PROCEDURE — 1200000000 HC SEMI PRIVATE

## 2020-07-01 PROCEDURE — 87205 SMEAR GRAM STAIN: CPT

## 2020-07-01 PROCEDURE — 36600 WITHDRAWAL OF ARTERIAL BLOOD: CPT

## 2020-07-01 RX ORDER — NICOTINE POLACRILEX 4 MG
15 LOZENGE BUCCAL PRN
Status: DISCONTINUED | OUTPATIENT
Start: 2020-07-01 | End: 2020-07-09 | Stop reason: HOSPADM

## 2020-07-01 RX ORDER — DEXAMETHASONE SODIUM PHOSPHATE 4 MG/ML
4 INJECTION, SOLUTION INTRA-ARTICULAR; INTRALESIONAL; INTRAMUSCULAR; INTRAVENOUS; SOFT TISSUE EVERY 12 HOURS
Status: DISCONTINUED | OUTPATIENT
Start: 2020-07-01 | End: 2020-07-05

## 2020-07-01 RX ORDER — DEXTROSE MONOHYDRATE 25 G/50ML
12.5 INJECTION, SOLUTION INTRAVENOUS PRN
Status: DISCONTINUED | OUTPATIENT
Start: 2020-07-01 | End: 2020-07-09 | Stop reason: HOSPADM

## 2020-07-01 RX ORDER — CITALOPRAM 10 MG/1
10 TABLET ORAL DAILY
Status: DISCONTINUED | OUTPATIENT
Start: 2020-07-01 | End: 2020-07-01

## 2020-07-01 RX ORDER — ATORVASTATIN CALCIUM 40 MG/1
40 TABLET, FILM COATED ORAL NIGHTLY
Status: DISCONTINUED | OUTPATIENT
Start: 2020-07-01 | End: 2020-07-09 | Stop reason: HOSPADM

## 2020-07-01 RX ORDER — INSULIN GLARGINE 100 [IU]/ML
40 INJECTION, SOLUTION SUBCUTANEOUS 2 TIMES DAILY
Status: DISCONTINUED | OUTPATIENT
Start: 2020-07-01 | End: 2020-07-02

## 2020-07-01 RX ORDER — SODIUM CHLORIDE 9 MG/ML
INJECTION, SOLUTION INTRAVENOUS CONTINUOUS
Status: DISCONTINUED | OUTPATIENT
Start: 2020-07-01 | End: 2020-07-02

## 2020-07-01 RX ORDER — PANTOPRAZOLE SODIUM 40 MG/1
40 TABLET, DELAYED RELEASE ORAL DAILY
Status: DISCONTINUED | OUTPATIENT
Start: 2020-07-01 | End: 2020-07-01

## 2020-07-01 RX ORDER — ACETAMINOPHEN 650 MG/1
650 SUPPOSITORY RECTAL EVERY 4 HOURS PRN
Status: DISCONTINUED | OUTPATIENT
Start: 2020-07-01 | End: 2020-07-09 | Stop reason: HOSPADM

## 2020-07-01 RX ORDER — SODIUM CHLORIDE 0.9 % (FLUSH) 0.9 %
10 SYRINGE (ML) INJECTION PRN
Status: DISCONTINUED | OUTPATIENT
Start: 2020-07-01 | End: 2020-07-09 | Stop reason: HOSPADM

## 2020-07-01 RX ORDER — ASPIRIN 81 MG/1
81 TABLET, CHEWABLE ORAL DAILY
Status: DISCONTINUED | OUTPATIENT
Start: 2020-07-01 | End: 2020-07-09 | Stop reason: HOSPADM

## 2020-07-01 RX ORDER — PRASUGREL 10 MG/1
10 TABLET, FILM COATED ORAL DAILY
Status: DISCONTINUED | OUTPATIENT
Start: 2020-07-01 | End: 2020-07-09 | Stop reason: HOSPADM

## 2020-07-01 RX ORDER — ACETAMINOPHEN 325 MG/1
650 TABLET ORAL EVERY 4 HOURS PRN
Status: DISCONTINUED | OUTPATIENT
Start: 2020-07-01 | End: 2020-07-09 | Stop reason: HOSPADM

## 2020-07-01 RX ORDER — BENZONATATE 100 MG/1
200 CAPSULE ORAL 3 TIMES DAILY PRN
Status: DISCONTINUED | OUTPATIENT
Start: 2020-07-01 | End: 2020-07-09 | Stop reason: HOSPADM

## 2020-07-01 RX ORDER — ONDANSETRON 2 MG/ML
4 INJECTION INTRAMUSCULAR; INTRAVENOUS EVERY 4 HOURS PRN
Status: DISCONTINUED | OUTPATIENT
Start: 2020-07-01 | End: 2020-07-09 | Stop reason: HOSPADM

## 2020-07-01 RX ORDER — TRAZODONE HYDROCHLORIDE 100 MG/1
100 TABLET ORAL NIGHTLY PRN
Status: DISCONTINUED | OUTPATIENT
Start: 2020-07-01 | End: 2020-07-01

## 2020-07-01 RX ORDER — POLYETHYLENE GLYCOL 3350 17 G/17G
17 POWDER, FOR SOLUTION ORAL DAILY PRN
Status: DISCONTINUED | OUTPATIENT
Start: 2020-07-01 | End: 2020-07-09 | Stop reason: HOSPADM

## 2020-07-01 RX ORDER — INSULIN GLARGINE 100 [IU]/ML
50 INJECTION, SOLUTION SUBCUTANEOUS NIGHTLY
Status: DISCONTINUED | OUTPATIENT
Start: 2020-07-01 | End: 2020-07-01

## 2020-07-01 RX ORDER — SODIUM CHLORIDE 0.9 % (FLUSH) 0.9 %
10 SYRINGE (ML) INJECTION EVERY 12 HOURS SCHEDULED
Status: DISCONTINUED | OUTPATIENT
Start: 2020-07-01 | End: 2020-07-09 | Stop reason: HOSPADM

## 2020-07-01 RX ORDER — DEXTROSE MONOHYDRATE 50 MG/ML
100 INJECTION, SOLUTION INTRAVENOUS PRN
Status: DISCONTINUED | OUTPATIENT
Start: 2020-07-01 | End: 2020-07-09 | Stop reason: HOSPADM

## 2020-07-01 RX ADMIN — INSULIN LISPRO 7 UNITS: 100 INJECTION, SOLUTION INTRAVENOUS; SUBCUTANEOUS at 12:18

## 2020-07-01 RX ADMIN — SODIUM CHLORIDE 200 MG: 9 INJECTION, SOLUTION INTRAVENOUS at 17:10

## 2020-07-01 RX ADMIN — CEFEPIME HYDROCHLORIDE 2 G: 2 INJECTION, POWDER, FOR SOLUTION INTRAVENOUS at 09:28

## 2020-07-01 RX ADMIN — PRASUGREL HYDROCHLORIDE 10 MG: 10 TABLET, FILM COATED ORAL at 13:54

## 2020-07-01 RX ADMIN — INSULIN LISPRO 3 UNITS: 100 INJECTION, SOLUTION INTRAVENOUS; SUBCUTANEOUS at 21:00

## 2020-07-01 RX ADMIN — INSULIN GLARGINE 50 UNITS: 100 INJECTION, SOLUTION SUBCUTANEOUS at 03:00

## 2020-07-01 RX ADMIN — ASPIRIN 81 MG 81 MG: 81 TABLET ORAL at 09:28

## 2020-07-01 RX ADMIN — SODIUM CHLORIDE: 9 INJECTION, SOLUTION INTRAVENOUS at 12:22

## 2020-07-01 RX ADMIN — ACETAMINOPHEN 650 MG: 325 TABLET ORAL at 20:12

## 2020-07-01 RX ADMIN — BENZONATATE 200 MG: 100 CAPSULE ORAL at 20:12

## 2020-07-01 RX ADMIN — ENOXAPARIN SODIUM 40 MG: 40 INJECTION SUBCUTANEOUS at 09:28

## 2020-07-01 RX ADMIN — INSULIN LISPRO 7 UNITS: 100 INJECTION, SOLUTION INTRAVENOUS; SUBCUTANEOUS at 17:11

## 2020-07-01 RX ADMIN — ATORVASTATIN CALCIUM 40 MG: 40 TABLET, FILM COATED ORAL at 03:48

## 2020-07-01 RX ADMIN — SODIUM CHLORIDE: 9 INJECTION, SOLUTION INTRAVENOUS at 02:39

## 2020-07-01 RX ADMIN — DEXAMETHASONE SODIUM PHOSPHATE 4 MG: 4 INJECTION, SOLUTION INTRAMUSCULAR; INTRAVENOUS at 10:42

## 2020-07-01 RX ADMIN — INSULIN HUMAN 10 UNITS: 100 INJECTION, SOLUTION PARENTERAL at 18:09

## 2020-07-01 RX ADMIN — INSULIN LISPRO 12 UNITS: 100 INJECTION, SOLUTION INTRAVENOUS; SUBCUTANEOUS at 12:18

## 2020-07-01 RX ADMIN — Medication 1250 MG: at 12:22

## 2020-07-01 RX ADMIN — INSULIN LISPRO 8 UNITS: 100 INJECTION, SOLUTION INTRAVENOUS; SUBCUTANEOUS at 10:22

## 2020-07-01 RX ADMIN — Medication 1250 MG: at 00:07

## 2020-07-01 RX ADMIN — INSULIN LISPRO 7 UNITS: 100 INJECTION, SOLUTION INTRAVENOUS; SUBCUTANEOUS at 10:23

## 2020-07-01 RX ADMIN — SODIUM CHLORIDE, PRESERVATIVE FREE 10 ML: 5 INJECTION INTRAVENOUS at 19:49

## 2020-07-01 RX ADMIN — INSULIN GLARGINE 40 UNITS: 100 INJECTION, SOLUTION SUBCUTANEOUS at 10:22

## 2020-07-01 RX ADMIN — INSULIN HUMAN 15 UNITS: 100 INJECTION, SOLUTION PARENTERAL at 13:54

## 2020-07-01 RX ADMIN — INSULIN GLARGINE 40 UNITS: 100 INJECTION, SOLUTION SUBCUTANEOUS at 21:00

## 2020-07-01 RX ADMIN — ATORVASTATIN CALCIUM 40 MG: 40 TABLET, FILM COATED ORAL at 20:12

## 2020-07-01 RX ADMIN — INSULIN LISPRO 8 UNITS: 100 INJECTION, SOLUTION INTRAVENOUS; SUBCUTANEOUS at 17:11

## 2020-07-01 ASSESSMENT — PAIN DESCRIPTION - PROGRESSION
CLINICAL_PROGRESSION: NOT CHANGED

## 2020-07-01 ASSESSMENT — PAIN - FUNCTIONAL ASSESSMENT
PAIN_FUNCTIONAL_ASSESSMENT: ACTIVITIES ARE NOT PREVENTED
PAIN_FUNCTIONAL_ASSESSMENT: PREVENTS OR INTERFERES SOME ACTIVE ACTIVITIES AND ADLS

## 2020-07-01 ASSESSMENT — PAIN DESCRIPTION - ONSET
ONSET: ON-GOING
ONSET: ON-GOING

## 2020-07-01 ASSESSMENT — PAIN DESCRIPTION - FREQUENCY
FREQUENCY: CONTINUOUS
FREQUENCY: CONTINUOUS

## 2020-07-01 ASSESSMENT — PAIN SCALES - GENERAL
PAINLEVEL_OUTOF10: 4
PAINLEVEL_OUTOF10: 5
PAINLEVEL_OUTOF10: 2
PAINLEVEL_OUTOF10: 0
PAINLEVEL_OUTOF10: 0

## 2020-07-01 ASSESSMENT — PAIN DESCRIPTION - DESCRIPTORS
DESCRIPTORS: ACHING
DESCRIPTORS: HEADACHE

## 2020-07-01 ASSESSMENT — PAIN DESCRIPTION - LOCATION
LOCATION: HEAD
LOCATION: ABDOMEN

## 2020-07-01 ASSESSMENT — PAIN DESCRIPTION - PAIN TYPE
TYPE: ACUTE PAIN
TYPE: ACUTE PAIN

## 2020-07-01 ASSESSMENT — PAIN DESCRIPTION - ORIENTATION: ORIENTATION: MID

## 2020-07-01 NOTE — PROGRESS NOTES
4 Eyes Admission Assessment     I agree as the admission nurse that 2 RN's have performed a thorough Head to Toe Skin Assessment on the patient. ALL assessment sites listed below have been assessed on admission. Areas assessed by both nurses:   [x]   Head, Face, and Ears   [x]   Shoulders, Back, and Chest  [x]   Arms, Elbows, and Hands   [x]   Coccyx, Sacrum, and Ischum  [x]   Legs, Feet, and Heels        Does the Patient have Skin Breakdown?   No         Michael Prevention initiated:  NA   Wound Care Orders initiated:  NA      WOC nurse consulted for Pressure Injury (Stage 3,4, Unstageable, DTI, NWPT, and Complex wounds):  NA      Nurse 1 eSignature: Electronically signed by Promise Lee RN on 7/1/20 at 1:55 AM EDT    **SHARE this note so that the co-signing nurse is able to place an eSignature**    Nurse 2 eSignature: {Esignature:[id number]}

## 2020-07-01 NOTE — PLAN OF CARE
Problem: Skin Integrity:  Goal: Will show no infection signs and symptoms  Description: Will show no infection signs and symptoms  7/1/2020 1050 by Yanick Dickinson RN  Outcome: Ongoing  Note: Patient remains free from new signs and symptoms of infection during this shift. Infection prevention measures are in place. Will continue to monitor for alterations in patient condition throughout the shift. 7/1/2020 1050 by Yanick Dickinson RN  Outcome: Met This Shift  7/1/2020 0158 by Fatemeh Garcia RN  Outcome: Ongoing  Note: Pt assessed for infection, No signs or symptoms of surgical site noted. VVS, WBC WNL. Reviewed information with pt and family, pt verbalized understanding     Goal: Absence of new skin breakdown  Description: Absence of new skin breakdown  7/1/2020 1050 by Yanick Dickinson RN  Outcome: Ongoing  Note: Patient skin condition and mucus membrane integrity remain unchanged during this shift. Skin breakdown prevention interventions are in place. Will continue to monitor and assess. 7/1/2020 1050 by Yanick Dickinson RN  Outcome: Met This Shift  7/1/2020 0158 by Fatemeh Garcia RN  Outcome: Ongoing  Note: Michael score assessed. Patient able to ambulate and turn self. Repositioned patient Q2H and assessed skin. Educated patient on importance of repositioning to prevent skin issues. Problem: Falls - Risk of:  Goal: Will remain free from falls  Description: Will remain free from falls  7/1/2020 1050 by Yanick Dickinson RN  Outcome: Ongoing  Note: Patient remains free from falls during this shift. Fall precautions remain in place. Patient educated on the need to implement call light use prior to ambulation. Will continue to monitor and assess. 7/1/2020 1050 by Yanick Dickinson RN  Outcome: Met This Shift  7/1/2020 0158 by Fatemeh Garcia RN  Outcome: Ongoing  Note: Fall risk assessment completed . Fall precautions in place, bed/ chair alarm on, side rails 2/4 up, call light in reach, educated pt on calling for assistance when needed, room clear of clutter. Pt verbalized understanding. Goal: Absence of physical injury  Description: Absence of physical injury  7/1/2020 1050 by Shonda Lucio RN  Outcome: Ongoing  Note: Patient remains free from physical harm during this shift. Will continue to monitor and assess patient. 7/1/2020 1050 by Shonda Lucio RN  Outcome: Met This Shift  7/1/2020 0158 by Fina Huang RN  Outcome: Ongoing  Note: No falls noted this shift. Patient ambulates with x1 staff assistance without difficulty. Family member at bedside, spent the night. Bed kept in low position. Safe environment maintained. Bedside table & call light in reach. Uses call light appropriately when needing assistance. Problem: Pain:  Goal: Pain level will decrease  Description: Pain level will decrease  7/1/2020 1050 by Shonda Lucio RN  Outcome: Ongoing  Note: Patient declines pain during this shift. Pharmacologic and non-pharmacologic interventions will be implemented as needed. Will continue to monitor and assess patient. 7/1/2020 1050 by Shonda Lucio RN  Outcome: Met This Shift  7/1/2020 0158 by Fina Huang RN  Outcome: Ongoing  Note: Pain /discomfort being managed with PRN analgesics per MD orders. Patient able to express presence and absence of pain and rate pain appropriately using numerical scale. Goal: Control of acute pain  Description: Control of acute pain  7/1/2020 1050 by Shonda Lucio RN  Outcome: Ongoing  Note: Patient declines pain during this shift. Pharmacologic and non-pharmacologic interventions will be implemented as needed. Will continue to monitor and assess patient. 7/1/2020 1050 by Shonda Lucio RN  Outcome: Met This Shift  7/1/2020 0158 by Fina Huang RN  Outcome: Ongoing  Note: Pain /discomfort being managed with PRN analgesics per MD orders.  Patient able to express presence and absence of pain and rate pain appropriately using numerical scale.     Goal: Control of chronic pain  Description: Control of chronic pain  7/1/2020 1050 by Guy Causey RN  Outcome: Ongoing  Note: Patient declines pain during this shift. Pharmacologic and non-pharmacologic interventions will be implemented as needed. Will continue to monitor and assess patient. 7/1/2020 1050 by Guy Causey RN  Outcome: Met This Shift  7/1/2020 0158 by Fredy Bruno RN  Outcome: Ongoing  Note: Pain /discomfort being managed with PRN analgesics per MD orders. Patient able to express presence and absence of pain and rate pain appropriately using numerical scale. Problem: Airway Clearance - Ineffective:  Goal: Ability to maintain a clear airway will improve  Description: Ability to maintain a clear airway will improve  7/1/2020 1050 by Guy Causey RN  Outcome: Ongoing  Note: Patient maintains a clear airway during this shift, supported by supplemental O2.   7/1/2020 1050 by Guy Causey RN  Outcome: Met This Shift  7/1/2020 0200 by Fredy Bruno RN  Outcome: Ongoing  Note: Patient remains free of significant airway secretions. Patient able to effectively cough and clear any secretions that need cleared. Will continue to monitor patient throughout shift. Problem: Airway Clearance - Ineffective  Goal: Achieve or maintain patent airway  7/1/2020 1050 by Guy Causey RN  Outcome: Ongoing  Note: Patient airway remains patent during this shift. Will continue to monitor and assess   7/1/2020 1050 by Guy Causey RN  Outcome: Met This Shift     Problem: Gas Exchange - Impaired  Goal: Absence of hypoxia  7/1/2020 1050 by Guy Causey RN  Outcome: Ongoing  Note: Patient oxygen saturation remains above 90% when supported by supplemental O2.  Will continue to monitor and assess   7/1/2020 1050 by Guy Causey RN  Outcome: Met This Shift  Goal: Promote optimal lung function  7/1/2020 1050 by Guy Causey RN  Outcome: Ongoing  7/1/2020 1050 by uGy Causey RN  Outcome: Met This Shift     Problem: Breathing Pattern - Ineffective  Goal: Ability to achieve and maintain a regular respiratory rate  7/1/2020 1050 by iLo Duke RN  Outcome: Ongoing  Note: VSS during this shift   7/1/2020 1050 by Lio Duke RN  Outcome: Met This Shift     Problem:  Body Temperature -  Risk of, Imbalanced  Goal: Ability to maintain a body temperature within defined limits  7/1/2020 1050 by Lio Duke RN  Outcome: Ongoing  7/1/2020 1050 by Lio Duke RN  Outcome: Met This Shift  Goal: Will regain or maintain usual level of consciousness  7/1/2020 1050 by Lio Duke RN  Outcome: Ongoing  7/1/2020 1050 by Lio Duke RN  Outcome: Met This Shift  Goal: Complications related to the disease process, condition or treatment will be avoided or minimized  7/1/2020 1050 by Lio Duke RN  Outcome: Ongoing  7/1/2020 1050 by Lio Duke RN  Outcome: Met This Shift     Problem: Isolation Precautions - Risk of Spread of Infection  Goal: Prevent transmission of infection  7/1/2020 1050 by Lio Duke RN  Outcome: Ongoing  Note: Patient remains in droplet plus isolation at this time r/t + COVID19 test   7/1/2020 1050 by Lio Duke RN  Outcome: Met This Shift     Problem: Nutrition Deficits  Goal: Optimize nutrtional status  7/1/2020 1050 by Lio Duke RN  Outcome: Ongoing  Note: Patient tolerating PO during this shift   7/1/2020 1050 by Lio Duke RN  Outcome: Met This Shift     Problem: Risk for Fluid Volume Deficit  Goal: Maintain normal heart rhythm  7/1/2020 1050 by Lio Duke RN  Outcome: Ongoing  7/1/2020 1050 by Lio Duke RN  Outcome: Met This Shift  Goal: Maintain absence of muscle cramping  7/1/2020 1050 by Lio Duke RN  Outcome: Ongoing  7/1/2020 1050 by Lio Duke RN  Outcome: Met This Shift  Goal: Maintain normal serum potassium, sodium, calcium, phosphorus, and pH  7/1/2020 1050 by Loi Duke RN  Outcome: Ongoing  7/1/2020 1050 by Lio Duke, RN  Outcome: Met This Shift     Problem: Loneliness or Risk for Loneliness  Goal: Demonstrate positive use of time alone when socialization is not possible  7/1/2020 1050 by Apple Larsen RN  Outcome: Ongoing  7/1/2020 1050 by Apple Larsen RN  Outcome: Met This Shift     Problem: Fatigue  Goal: Verbalize increase energy and improved vitality  7/1/2020 1050 by Apple Larsen RN  Outcome: Ongoing  7/1/2020 1050 by Apple Larsen RN  Outcome: Met This Shift     Problem: Patient Education: Go to Patient Education Activity  Goal: Patient/Family Education  7/1/2020 1050 by Apple Larsen RN  Outcome: Ongoing  7/1/2020 1050 by Apple Larsen RN  Outcome: Met This Shift

## 2020-07-01 NOTE — ED NOTES
States feeling much better. Drinking fluids. States pain and nausea are improved.      Mady Nichols RN  06/30/20 0192

## 2020-07-01 NOTE — PLAN OF CARE
Problem: Skin Integrity:  Goal: Will show no infection signs and symptoms  Description: Will show no infection signs and symptoms  Outcome: Ongoing  Note: Pt assessed for infection, No signs or symptoms of surgical site noted. VVS, WBC WNL. Reviewed information with pt and family, pt verbalized understanding     Goal: Absence of new skin breakdown  Description: Absence of new skin breakdown  Outcome: Ongoing  Note: Micheal score assessed. Patient able to ambulate and turn self. Repositioned patient Q2H and assessed skin. Educated patient on importance of repositioning to prevent skin issues. Problem: Falls - Risk of:  Goal: Will remain free from falls  Description: Will remain free from falls  Outcome: Ongoing  Note: Fall risk assessment completed . Fall precautions in place, bed/ chair alarm on, side rails 2/4 up, call light in reach, educated pt on calling for assistance when needed, room clear of clutter. Pt verbalized understanding. Goal: Absence of physical injury  Description: Absence of physical injury  Outcome: Ongoing  Note: No falls noted this shift. Patient ambulates with x1 staff assistance without difficulty. Family member at bedside, spent the night. Bed kept in low position. Safe environment maintained. Bedside table & call light in reach. Uses call light appropriately when needing assistance. Problem: Pain:  Goal: Pain level will decrease  Description: Pain level will decrease  Outcome: Ongoing  Note: Pain /discomfort being managed with PRN analgesics per MD orders. Patient able to express presence and absence of pain and rate pain appropriately using numerical scale. Goal: Control of acute pain  Description: Control of acute pain  Outcome: Ongoing  Note: Pain /discomfort being managed with PRN analgesics per MD orders. Patient able to express presence and absence of pain and rate pain appropriately using numerical scale.      Goal: Control of chronic pain  Description: Control of chronic pain  Outcome: Ongoing  Note: Pain /discomfort being managed with PRN analgesics per MD orders. Patient able to express presence and absence of pain and rate pain appropriately using numerical scale. Problem: Airway Clearance - Ineffective:  Goal: Ability to maintain a clear airway will improve  Description: Ability to maintain a clear airway will improve  Outcome: Ongoing  Note: Patient remains free of significant airway secretions. Patient able to effectively cough and clear any secretions that need cleared. Will continue to monitor patient throughout shift.

## 2020-07-01 NOTE — PROGRESS NOTES
Spoke with patient about starting Remdesivir. Patient stated that he already spoke to the MD about it and is willing to stay for 5 days to receive treatment. Emergency Use Authorization of Remdesivir paper given to patient.

## 2020-07-01 NOTE — H&P
Hospital Medicine  History and Physical    PCP: SILVIO POLLOCK, APRN - CNP  Patient Name: Gordy Henriquez    Date of Service: Pt seen/examined on 06/30/2020 and admitted to Inpatient with expected LOS greater than two midnights due to medical therapy    CHIEF COMPLAINT:  Pt c/o shortness of breath, cough, fever  HISTORY OF PRESENT ILLNESS: Patient is a 49-year-old man with a history of hypertension, hyperlipidemia, diabetes mellitus and coronary artery disease who states that he was recently diagnosed with COVID-19 a few days ago. He states that he has not been able to eat anything in the past week and has struggled to get one bottle of water in a day. He presents to the emergency room for evaluation following a 3 to 4 day history of increasing cough, shortness of breath and fever. In the emergency room he was found to have multifocal pneumonia and associated sepsis. He is being admitted for further evaluation and treatment. Associated signs and symptoms do not include abdominal pain, hemoptysis, hematochezia, diarrhea, constipation or urinary symptoms.         Past Medical History:        Diagnosis Date    Arthritis     Blood circulation, collateral     CAD (coronary artery disease) 7/15/2014    CAD (coronary artery disease)     Cardiac arrest ([facility] [address] 10/05/2018    Cerebral artery occlusion with cerebral infarction ([facility] [address]     Diabetes mellitus ([address]     Diabetic peripheral neuropathy ([facility] [address] 6/8/2018    GERD (gastroesophageal reflux disease)     ulcers    Hypercholesteremia     Hyperlipidemia     Hypertension     Movement disorder     possible arthritis right hand    MRSA (methicillin resistant staph aureus) culture positive 07/13/2018    foot wound    Neuropathy     Other disorders of kidney and ureter in diseases classified elsewhere     POTS (postural orthostatic tachycardia syndrome)     Psychiatric problem     anxiety, depression, bipolar    Sleep apnea     Syncope     Type II or unspecified type diabetes mellitus without mention of complication, not stated as uncontrolled        Past Surgical History:        Procedure Laterality Date    CARDIAC CATHETERIZATION      COLONOSCOPY      CORONARY ANGIOPLASTY WITH STENT PLACEMENT  10/06/2018    Bare Metal Stent to PDA    CORONARY ANGIOPLASTY WITH STENT PLACEMENT  10/07/2018    Bare Metal Stent to OM    CORONARY ANGIOPLASTY WITH STENT PLACEMENT  10/03/2018    CECE to Circ    FINGER SURGERY Left     Left Thumb    HERNIA REPAIR      JOINT REPLACEMENT      VASCULAR SURGERY      VASECTOMY         Allergies:  No known allergies    Medications Prior to Admission:    Prior to Admission medications    Medication Sig Start Date End Date Taking?  Authorizing Provider   prasugrel (EFFIENT) 10 MG TABS Take 1 tablet by mouth once for 1 dose  Patient taking differently: Take 10 mg by mouth daily  1/24/20 7/1/20 Yes Sonia Love MD   atorvastatin (LIPITOR) 40 MG tablet TAKE 1 TABLET BY MOUTH IN THE EVENING  3/19/19  Yes Sonia Love MD   aspirin 81 MG chewable tablet Take 81 mg by mouth daily   Yes Historical Provider, MD   Dulaglutide (TRULICITY) 1.5 UR/8.9NG SOPN Inject 1.5 mg into the skin once a week    Yes Historical Provider, MD   insulin detemir (LEVEMIR FLEXTOUCH) 100 UNIT/ML injection pen Inject 30 Units into the skin nightly  Patient taking differently: Inject 50 Units into the skin nightly  8/23/18  Yes Karmen Naqvi DO   citalopram (CELEXA) 40 MG tablet Take by mouth    Historical Provider, MD   traZODone (DESYREL) 100 MG tablet  1/14/20   Historical Provider, MD   pantoprazole (PROTONIX) 40 MG tablet Take 1 tablet by mouth daily 10/17/18   Harry Back APRN - CNP   Cholecalciferol (VITAMIN D3) 25975 units CAPS Take 1 capsule by mouth once a week     Historical Provider, MD       Family History:       Problem Relation Age of Onset    High Blood Pressure Mother     Stroke Mother     Heart Disease Mother     COPD Mother     Heart Disease Father    [place] Cancer Father         skin    Diabetes Sister     Cancer Sister     Heart Disease Brother     Diabetes Brother      Social History:   TOBACCO:   reports that he has never smoked. He has never used smokeless tobacco.  ETOH:   reports no history of alcohol use. OCCUPATION:      REVIEW OF SYSTEMS:  A full review of systems was performed and is negative except for that which appears in the HPI    Physical Exam:    Vitals: /71   Pulse 90   Temp 97.5 °F (36.4 °C) (Oral)   Resp 18   Ht 6' 2\" (1.88 m)   Wt 203 lb 11.3 oz (92.4 kg)   SpO2 95%   BMI 26.15 kg/m²   General appearance: Ill but not toxic appearing 54y.o. year-old  male who is alert, appears stated age and is cooperative  HEENT: Head: Normocephalic, no lesions, without obvious abnormality. Eye: Normal external eye, conjunctiva, lids cornea, KEE. Ears: Normal external ears. Non-tender. Nose: Normal external nose, mucus membranes and septum. Pharynx: Dental Hygiene adequate. Normal buccal mucosa. Normal pharynx. Neck: no adenopathy, no carotid bruit, no JVD, supple, symmetrical, trachea midline and thyroid not enlarged, symmetric, no tenderness/mass/nodules  Lungs: clear to auscultation bilaterally and no use of accessory muscles. Heart: regular rate and rhythm, S1, S2 normal, no murmur, click, rub or gallop and normal apical impulse  Abdomen: soft, non-tender; bowel sounds normal; no masses, no organomegaly  Extremities: extremities atraumatic, no cyanosis or edema and Homans sign is negative, no sign of DVT. Capillary Refill: Acceptable < 3 seconds   Peripheral Pulses: +3 easily felt, not easily obliterated with pressures   Skin: Skin color, texture, turgor normal. No rashes or lesions on exposed skin  Neurologic: Neurovascularly intact without any focal sensory/motor deficits. Cranial nerves: II-XII intact, grossly non-focal. Gait was not tested.   Psychiatric: Alert and oriented, thought content appropriate, normal insight    CBC:   Recent Labs     06/30/20 1945   WBC 7.8   HGB 16.2   *     BMP:    Recent Labs     06/30/20 1945   *   K 4.3   CL 92*   CO2 18*   BUN 15   CREATININE 0.9   GLUCOSE 281*     Troponin: No results for input(s): TROPONINI in the last 72 hours. PT/INR:  No results found for: PTINR  U/A:    Lab Results   Component Value Date    LEUKOCYTESUR Negative 01/09/2018    RBCUA 0-2 01/09/2018    SPECGRAV 1.020 01/09/2018    UROBILINOGEN 0.2 01/09/2018    BILIRUBINUR Negative 01/09/2018    BILIRUBINUR NEGATIVE 09/10/2010    BLOODU Negative 01/09/2018    GLUCOSEU 500 01/09/2018    GLUCOSEU >=1000 09/10/2010    PROTEINU TRACE 01/09/2018         RAD:   I have independently reviewed and interpreted the imaging studies below and based my recommendations to the patient on those findings. Xr Chest Portable    Result Date: 6/30/2020  EXAMINATION: ONE XRAY VIEW OF THE CHEST 6/30/2020 7:43 pm COMPARISON: 02/07/2019 HISTORY: ORDERING SYSTEM PROVIDED HISTORY: SOB TECHNOLOGIST PROVIDED HISTORY: Reason for exam:->SOB Reason for Exam: sob FINDINGS: There is a left infraclavicular ICD, with lead projecting over the right ventricle in stable configuration. Cardiac silhouette is normal. Patchy opacities are noted in the right upper and both lower lungs. Distribution is perihilar. Right hemidiaphragm remains elevated. Pulmonary vessels are normal in caliber. Multifocal airspace disease, asymmetrical right pulmonary edema, versus pneumonia.          EKG:   Read by ER in the absence of a Cardiologist shows sinus tachycardia with remote anterior infarction but otherwise nonacute EKG      Assessment:   Principal Problem:    Pneumonia  Active Problems:    Essential hypertension    CAD (coronary artery disease)    HLD (hyperlipidemia)    DMII (diabetes mellitus, type 2) (HCC)    Sepsis (HCC)    Tachycardia    Tachypnea    Fever    Lactic acidosis    COVID-19 virus infection    Poor appetite    High anion gap the hospital for 2 to 3+ days depending on further evaluation and clinical course.      Please note that over 50 minutes was spent in evaluating the patient, review of records and results, discussion with staff/family, etc.    Danita aBr MD

## 2020-07-01 NOTE — PROGRESS NOTES
AAO4, able to make needs known call light in reach able to demonstrate how to utilize the call light. Vitals signs are stable.   Intake and output are being recorded, will continue to monitor

## 2020-07-01 NOTE — PROGRESS NOTES
Patient resting in bed this morning. Patient denies pain, nausea, and/or vomiting. Patient VSS. Critical CO2 of 14 noted. Message sent to Dr. Anirudh Bernabe regarding this value. Waiting for response at this time. Will continue to monitor and assess.

## 2020-07-01 NOTE — PROGRESS NOTES
Regular rate and rhythm with normal S1/S2 without murmurs, rubs or gallops. Abdomen: Soft, non-tender, non-distended with normal bowel sounds. Musculoskeletal: No clubbing, cyanosis or edema bilaterally. Full range of motion without deformity. Skin: Skin color, texture, turgor normal.  No rashes or lesions. Neurologic:  Neurovascularly intact without any focal sensory/motor deficits. Cranial nerves: II-XII intact, grossly non-focal.  Psychiatric: Alert and oriented, thought content appropriate, normal insight  Capillary Refill: Brisk,< 3 seconds   Peripheral Pulses: +2 palpable, equal bilaterally       Labs:   Recent Labs     06/30/20 1945 07/01/20  0550   WBC 7.8 6.8   HGB 16.2 15.9   HCT 48.0 47.2   * 99*     Recent Labs     06/30/20 1945 07/01/20  0550   * 134*   K 4.3 5.2*   CL 92* 97*   CO2 18* 14*   BUN 15 17   CREATININE 0.9 0.9   CALCIUM 8.6 8.4     Recent Labs     06/30/20 1945   AST 31   ALT 16   BILITOT 0.5   ALKPHOS 60     No results for input(s): INR in the last 72 hours. Recent Labs     07/01/20  1045   TROPONINI <0.01       Urinalysis:      Lab Results   Component Value Date    NITRU Negative 01/09/2018    WBCUA 0-2 01/09/2018    RBCUA 0-2 01/09/2018    BLOODU Negative 01/09/2018    SPECGRAV 1.020 01/09/2018    GLUCOSEU 500 01/09/2018    GLUCOSEU >=1000 09/10/2010       Radiology:  XR CHEST PORTABLE   Final Result   Multifocal airspace disease, asymmetrical right pulmonary edema, versus   pneumonia.                  Assessment/Plan:    Active Hospital Problems    Diagnosis    Sepsis ([facility] [address] [A41.9]    Tachycardia [R00.0]    Tachypnea [R06.82]    Fever [R50.9]    Lactic acidosis [E87.2]    COVID-19 virus infection [U07.1]    Poor appetite [R63.0]    High anion gap metabolic acidosis [N17.4]    Pneumonia [J18.9]    DMII (diabetes mellitus, type 2) (HCC) [E11.9]    CAD (coronary artery disease) [I25.10]    HLD (hyperlipidemia) [E78.5]    Essential hypertension [I10] PNA due to COVID 19 infection. Pt on Vanc and Cefepime empiric. Add decadron. Ask ID to eval if he is a candidate for remdesivir. Acute Hypox Resp Failure POA due to above. DMII with uncontrolled hyperglycemia  This will worsen with steroid. Increased lantus to 40BID. Add prandial bolus. Give humulin R IV x1 today. Cont IVF support. Carb control diet. CAD advanced ischemic cardiomyopathy multivessel CAD with Hx of VT s/p AICD  Follows with Dr Viridiana Kelly  Pt feels chest pressure today - EKG unremarkable. Will cycle troponin. ASA, statin, effient. Historically does not tolerate BB and ACEI/ARB due to hypotension and recurrent syncope. Lactic Acidosis - due to above.          DVT Prophylaxis: lovenox  Diet: DIET CARB CONTROL;  Code Status: Full Code      Dispo - cc    Jared Peterson MD

## 2020-07-01 NOTE — PROGRESS NOTES
Pharmacy Medication Reconciliation Note     List of medications patient is currently taking is complete. Source of information:   1. patient's wife      Notes regarding home medications:   1. removed Citalopram,Trazodone,Protonix -- patient no longer takes.     2. confirmed lastest Levimir dose is 50 units    Denies taking any other OTC or herbal medications    Mary Clemons [facility] 7/1/2020 3:12 PM

## 2020-07-01 NOTE — ACP (ADVANCE CARE PLANNING)
Advance Care Planning     Advance Care Planning Activator (Inpatient)  Conversation Note      Date of ACP Conversation: 6/30/2020    Conversation Conducted with: Patient with Decision Making Capacity    ACP Activator: [address] Maker:     Current Designated Health Care Decision Maker:   (If there is a valid [address] named in the [facility] Makers\" box in the ACP activity, but it is not visible above, be sure to open that field and then select the health care decision maker relationship (ie \"primary\") in the blank space to the right of the name.) Validate  this information as still accurate & up-to-date; edit [address] field as needed.)    Note: Assess and validate information in current ACP documents, as indicated. If no Decision Maker listed above or available through scanned documents, then:    If no Authorized Decision Maker has previously been identified, then patient chooses [address]:  \"Who would you like to name as your primary health care decision-maker? \"               Name: Brianda Monzon        Relationship: Spouse          Phone number: [phone]  Cathy Redmond this person be reached easily? \" Yes      Care Preferences    Ventilation: \"If you were in your present state of health and suddenly became very ill and were unable to breathe on your own, what would your preference be about the use of a ventilator (breathing machine) if it were available to you? \"      Would the patient desire the use of ventilator (breathing machine)?: yes    \"If your health worsens and it becomes clear that your chance of recovery is unlikely, what would your preference be about the use of a ventilator (breathing machine) if it were available to you? \"     Would the patient desire the use of ventilator (breathing machine)?: \"I don't know\"      Resuscitation  \"CPR works best to restart the heart when there is a sudden event, like a heart attack, in someone who is otherwise healthy. Unfortunately, CPR does not typically restart the heart for people who have serious health conditions or who are very sick. \"    \"In the event your heart stopped as a result of an underlying serious health condition, would you want attempts to be made to restart your heart (answer \"yes\" for attempt to resuscitate) or would you prefer a natural death (answer \"no\" for do not attempt to resuscitate)? \" yes      NOTE: If the patient has a valid advance directive AND now provides care preference(s) that are inconsistent with that prior directive, advise the patient to consider either: creating a new advance directive that complies with state-specific requirements; or, if that is not possible, orally revoking that prior directive in accordance with state-specific requirements, which must be documented in the EHR. [] Yes   [x] No   Educated Patient / Alphonza Fu regarding differences between Advance Directives and portable DNR orders.     Length of ACP Conversation in minutes:      Conversation Outcomes:  [x] ACP discussion completed  [] Existing advance directive reviewed with patient; no changes to patient's previously recorded wishes  [] New Advance Directive completed  [] Portable Do Not Rescitate prepared for Provider review and signature  [] POLST/POST/MOLST/MOST prepared for Provider review and signature      Follow-up plan:    [] Schedule follow-up conversation to continue planning  [] Referred individual to Provider for additional questions/concerns   [] Advised patient/agent/surrogate to review completed ACP document and update if needed with changes in condition, patient preferences or care setting    [x] This note routed to one or more involved healthcare providers    Electronically signed by JACKIE Schumacher, BEVERLY on 7/1/2020 at 10:01 AM

## 2020-07-01 NOTE — CONSULTS
Infectious Diseases Inpatient Consult Note      Reason for Consult:  COVID-19 Pneumonia    Requesting Physician:       Primary Care Physician:  SILVIO POLLOCK, LAURA - CNP    History Obtained From:  Medical records   CHIEF COMPLAINT:     Chief Complaint   Patient presents with    Chest Pain    Emesis       SOB, fevers     HISTORY OF PRESENT ILLNESS:  54 y.o. man with diagnosis of COVID-19 infection on 6/24 has not been feeling well for the past few days now fevers, sob, poor oral intake h/o HTN, CAD, PVD, DM and Cardiac stents and AICD in place. CXR with bi lateral infiltrates and Fevers here on admit using up to 2 lts nasal cannula with worsening symptoms. COVID-19 test here positive we are consulted for recommendations.         Past Medical History:    Past Medical History:   Diagnosis Date    Arthritis     Blood circulation, collateral     CAD (coronary artery disease) 7/15/2014    CAD (coronary artery disease)     Cardiac arrest ([facility] [address] 10/05/2018    Cerebral artery occlusion with cerebral infarction ([facility] [facility]     Diabetes mellitus ([facility] [address] [facility]     Diabetic peripheral neuropathy ([facility] [address] 6/8/2018    GERD (gastroesophageal reflux disease)     ulcers    Hypercholesteremia     Hyperlipidemia     Hypertension     Movement disorder     possible arthritis right hand    MRSA (methicillin resistant staph aureus) culture positive 07/13/2018    foot wound    Neuropathy     Other disorders of kidney and ureter in diseases classified elsewhere     POTS (postural orthostatic tachycardia syndrome)     Psychiatric problem     anxiety, depression, bipolar    Sleep apnea     Syncope     Type II or unspecified type diabetes mellitus without mention of complication, not stated as uncontrolled        Past Surgical History:    Past Surgical History:   Procedure Laterality Date    CARDIAC CATHETERIZATION      COLONOSCOPY      CORONARY ANGIOPLASTY WITH STENT PLACEMENT  10/06/2018    Bare Metal Stent to PDA    CORONARY ANGIOPLASTY WITH STENT PLACEMENT  10/07/2018    Bare Metal Stent to OM    CORONARY ANGIOPLASTY WITH STENT PLACEMENT  10/03/2018    CECE to Circ    FINGER SURGERY Left     Left Thumb    HERNIA REPAIR      JOINT REPLACEMENT      VASCULAR SURGERY      VASECTOMY         Current Medications:    Outpatient Medications Marked as Taking for the 6/30/20 encounter [facility] Encounter)   Medication Sig Dispense Refill    prasugrel (EFFIENT) 10 MG TABS Take 1 tablet by mouth once for 1 dose (Patient taking differently: Take 10 mg by mouth daily ) 30 tablet 11    atorvastatin (LIPITOR) 40 MG tablet TAKE 1 TABLET BY MOUTH IN THE EVENING  30 tablet 4    aspirin 81 MG chewable tablet Take 81 mg by mouth daily      Dulaglutide (TRULICITY) 1.5 MAY/6.9HX SOPN Inject 1.5 mg into the skin once a week       insulin detemir (LEVEMIR FLEXTOUCH) 100 UNIT/ML injection pen Inject 30 Units into the skin nightly (Patient taking differently: Inject 50 Units into the skin nightly ) 5 pen 3       Allergies:  No known allergies    Immunizations :   Immunization History   Administered Date(s) Administered    Pneumococcal Polysaccharide ([id number]) 09/11/2010    Tdap (Boostrix, Adacel) 05/27/2018         Social History:    Social History     Tobacco Use    Smoking status: Never Smoker    Smokeless tobacco: Never Used   Substance Use Topics    Alcohol use: No    Drug use: No     Social History     Tobacco Use   Smoking Status Never Smoker   Smokeless Tobacco Never Used      Family History   Problem Relation Age of Onset    High Blood Pressure Mother     Stroke Mother     Heart Disease Mother     COPD Mother     Heart Disease Father     Cancer Father         skin    Diabetes Sister     Cancer Sister     Heart Disease Brother     Diabetes Brother          REVIEW OF SYSTEMS:    No fever / chills / sweats. No weight loss. No visual change, eye pain, eye discharge.     No oral lesion, sore throat, dysphagia. Denies cough / sputum/Sob   Denies chest pain, palpitations/ dizziness  Denies nausea/ vomiting/abdominal pain/diarrhea. Denies dysuria or change in urinary function. Denies joint swelling or pain. No myalgia, arthralgia. No rashes, skin lesions   Denies focal weakness, sensory change or other neurologic symptoms  No lymph node swelling or tenderness. Fevers, sob+ cough +  Poor oral intake     PHYSICAL EXAM:      Vitals:  T max 102.4   /74   Pulse 82   Temp 98.4 °F (36.9 °C) (Oral)   Resp 18   Ht 6' 2\" (1.88 m)   Wt 203 lb 11.3 oz (92.4 kg)   SpO2 97%   BMI 26.15 kg/m²     PHYSICAL EXAM:     In-person bedside physical examination deferred. Pursuant to the emergency declaration under the [address] [place] [address], [address] [place] [address] authority and the [employer] Act, this clinical encounter was conducted to provide necessary medical care. (Also consistent with new provisions and guidance offered by [facility] on March 18, 2020 in setting of COVID 19 outbreak and in order to preserve personal protective equipment in accordance with the flexibilities announced by CMS on March 30, 2020)   References: https://[facility]. [facility]/Portals/0/Resources/COVID-19/3_18%20Telemed%20Guidance%20Updated%20March%2018. pdf?[email or web address]                      https://[facility]. [facility]/Portals/0/Resources/COVID-19/3_18%20Telemed%20Guidance%20Updated%20March%2018. pdf?[email or web address]                      [email or web address] pdf                               Pulmonary: deferred  Abdomen/GI: deferred  Neuro: deferred  Skin: deferred  Musculoskeletal:  deferred  Genitourinary: Deferred  Psych: deferred  Lymphatic/Immunologic: deferred             DATA:    Lab Results   Component Value Date    WBC 6.8 07/01/2020    HGB 15.9 07/01/2020    HCT 47.2 07/01/2020    MCV dextrose      sodium chloride 150 mL/hr at 07/01/20 1222       PRN Meds:  traZODone, sodium chloride flush, acetaminophen **OR** acetaminophen, polyethylene glycol, ondansetron, glucose, dextrose, glucagon (rDNA), dextrose, benzonatate    Lactic acid 2.3          pROCAL  0.16     fERRITIN  1643  D Dimer  840     7/1/2020  9:51 AM - [facility] Incoming Lab Results From Soft (Epic Adt)          Component Value Ref Range & Units Status Collected Lab   SARS-CoV-2, PCR DETECTEDAbnormal   Not Detected Final 06/30/2020  8:01 PM [facility]   This test has been authorized by FDA under an   Emergency Use Authorization (EUA). This test is only authorized for the duration of the   time of declaration that circumstances exist justifying the   authorization of the emergency use of in vitro diagnostic   testing for detection of the SARS-CoV-2 virus   and/or diagnosis of COVID-19 infection under   section 564 (b)(1) of the Act, 21 U. S.C. 646LEK-2 (b) (1),        MICRO: cultures reviewed and updated by me          Legionella antigen, urine [[id number]] Collected: 07/01/20 0100   Order Status: Completed Specimen: Urine voided Updated: 07/01/20 1212    L. pneumophila Serogp 1 Ur Ag --    Presumptive Negative   No Legionella pneumophila serogroup 1 antigens detected. A negative result does not exclude infection with   Legionella pneumophila serogroup 1 nor does it rule out   other microbial-caused respiratory infections or   disease caused by other serogroups of   Legionella pneumophila. Normal Range: Presumptive Negative    Narrative:     ORDER#: [id number]                          ORDERED BY: BERTIN IRWIN  SOURCE: Urine Voided                       COLLECTED:  07/01/20 01:00  ANTIBIOTICS AT KATI. :                      RECEIVED :  07/01/20 01:51  Performed at:  [facility]  [address] [place] [address]   Phone [phone]   Strep Pneumoniae Antigen [[id number]] Collected: 07/01/20 0100   Order Status: Completed Specimen: Urine, clean catch Updated: 07/01/20 1211    STREP PNEUMONIAE ANTIGEN, URINE --    Presumptive Negative   Presumptive negative suggests no current or recent   pneumococcal infection. Infection due to Strep pneumoniae   cannot be ruled out since the antigen present in the sample   may be below the detection limit of the test.   Normal Range:Presumptive Negative    Narrative:     ORDER#: [id number]                          ORDERED BY: Kortney Mir  SOURCE: Urine Clean Catch                  COLLECTED:  07/01/20 01:00  ANTIBIOTICS AT KATI. :                      RECEIVED :  07/01/20 01:54  Performed at:  [facility]  [address] [facility] [address]   Phone [phone]   Culture, MRSA, Screening [[id number]] Collected: 07/01/20 0100   Order Status: No result Updated: 07/01/20 0643   Respiratory Culture [[id number]] Collected: 07/01/20 0100   Order Status: Sent Specimen: Sputum Expectorated Updated: 07/01/20 0642   Sputum gram stain [[id number]] Collected: 07/01/20 0100   Order Status: Canceled Specimen: Sputum Expectorated    MRSA DNA Probe, Nasal [[id number]] Collected: 07/01/20 0100   Order Status: Canceled Specimen: Nares    Culture, Blood 2 [[id number]] Collected: 06/30/20 2001   Order Status: Sent Specimen: Blood Updated: 06/30/20 2005   Culture, Blood 1 [[id number]] Collected: 06/30/20 1945   Order Status: Sent Specimen: Blood Updated: 06/30/20 1954       Urine Culture  No results for input(s): Nalani Jeans in the last 72 hours. Imaging:   XR CHEST PORTABLE   Final Result   Multifocal airspace disease, asymmetrical right pulmonary edema, versus   pneumonia. All pertinent images and reports for the current Hospitalization were reviewed by me.     IMPRESSION:    Patient Active Problem List   Diagnosis    Diabetes mellitus out of control ([address]    Chest pain    Left arm numbness    Essential hypertension  Elbow contusion    CAD (coronary artery disease)    HLD (hyperlipidemia)    Abnormal stress test    DMII (diabetes mellitus, type 2) (HCC)    Acute metabolic encephalopathy    POTS (postural orthostatic tachycardia syndrome)    Hyperglycemia    Headache    Skin ulcer of left foot with fat layer exposed ([facility] [address]    Diabetic peripheral neuropathy (HCC)    Abscess of arm, left    Ulcer of foot due to secondary diabetes ([facility] [address] [facility]    NSTEMI (non-ST elevated myocardial infarction) ([facility] [address] [facility]    STEMI (ST elevation myocardial infarction) ([facility])    Abnormal EKG    Syncope and collapse    VT (ventricular tachycardia) ([facility])    Idiopathic hypotension    Abnormal ECG    Ventricular tachycardia (HCC)    CAD, multiple vessel    ICD (implantable cardioverter-defibrillator) in place    Anemia    Lightheadedness    ST elevation myocardial infarction (STEMI) of inferolateral wall ([facility])    Cardiac arrest ([facility])    Ischemic cardiomyopathy    Pneumonia    Sepsis (HCC)    Tachycardia    Tachypnea    Fever    Lactic acidosis    COVID-19 virus infection    Poor appetite    High anion gap metabolic acidosis     [id number] Pneumonia'  High fevers  Lactic acidosis  CXR with bi lateral infiltrates  AICD in place  Cardiomyopathy  CAD  DM+     He has multiple medical co morbidities that will put him at high risk for progression and poor out come - creat is stable and will initiate Remdesivir therapy     Labs, Microbiology, Radiology and pertinent results from current hospitalization and care every where were reviewed by me as a part of the consultation. PLAN :  1. Start IV Remdesivir x 200 mg followed by 100 mg daily x 4 days   2. Check CMP daily  3. D/C IV Vancomycin risk for JUANPABLO   4. D/C Cefepime   5. Trend CRP, Ferritin, LDH  6. CXR noted    7.  Cont Dexamethasone     Discussed with patient/Family and Nursing d/w      Risk of Complications/Morbidity: High      · Illness(es)/ Infection present that pose threat to bodily function. · There is potential for severe exacerbation of infection/side effects of treatment. · Therapy requires intensive monitoring for antimicrobial agent toxicity. Thanks for allowing me to participate in your patient's care please call me with any questions or concerns.     Dr. Dorinda Iqbal MD  [address] [place] [address] Physician  Phone: [phone]   Fax : [phone]

## 2020-07-01 NOTE — PROGRESS NOTES
Resting with eyes closed and call light in reach. No falls noted this shift. Patient ambulates with x1 staff assistance without difficulty. Bed kept in low position. Safe environment maintained. Bedside table & call light in reach. Uses call light appropriately when needing assistance. Vitals signs are stable.   Intake and output are being recorded, will continue to monitor

## 2020-07-01 NOTE — PLAN OF CARE
Problem: Skin Integrity:  Goal: Will show no infection signs and symptoms  Description: Will show no infection signs and symptoms  Outcome: Ongoing  Note: Pt assessed for infection, No signs or symptoms of surgical site noted. VVS, WBC WNL. Reviewed information with pt and family, pt verbalized understanding     Goal: Absence of new skin breakdown  Description: Absence of new skin breakdown  Outcome: Ongoing  Note: Michael score assessed. Patient able to ambulate and turn self. Repositioned patient Q2H and assessed skin. Educated patient on importance of repositioning to prevent skin issues. Problem: Falls - Risk of:  Goal: Will remain free from falls  Description: Will remain free from falls  Outcome: Ongoing  Note: Fall risk assessment completed . Fall precautions in place, bed/ chair alarm on, side rails 2/4 up, call light in reach, educated pt on calling for assistance when needed, room clear of clutter. Pt verbalized understanding. Goal: Absence of physical injury  Description: Absence of physical injury  Outcome: Ongoing  Note: No falls noted this shift. Patient ambulates with x1 staff assistance without difficulty. Family member at bedside, spent the night. Bed kept in low position. Safe environment maintained. Bedside table & call light in reach. Uses call light appropriately when needing assistance. Problem: Pain:  Goal: Pain level will decrease  Description: Pain level will decrease  Outcome: Ongoing  Note: Pain /discomfort being managed with PRN analgesics per MD orders. Patient able to express presence and absence of pain and rate pain appropriately using numerical scale. Goal: Control of acute pain  Description: Control of acute pain  Outcome: Ongoing  Note: Pain /discomfort being managed with PRN analgesics per MD orders. Patient able to express presence and absence of pain and rate pain appropriately using numerical scale.      Goal: Control of chronic pain  Description: Control of chronic pain  Outcome: Ongoing  Note: Pain /discomfort being managed with PRN analgesics per MD orders. Patient able to express presence and absence of pain and rate pain appropriately using numerical scale.

## 2020-07-01 NOTE — PROGRESS NOTES
This RN spoke with Lisa Michele in pharmacy regarding patient medications that have not been verified yet (Celexa, Protonix). Lisa Michele states that they are attempting to verify the medications but have been unable to at this point as they are waiting to hear from patient's family. Will administer once they have been verified.

## 2020-07-01 NOTE — CONSULTS
Clinical Pharmacy Note  Vancomycin Consult    Juni Sparks is a 54 y.o. male ordered Vancomycin for pneumonia; consult received from Dr. Mahsa Galo to manage therapy. Also receiving cefepime. Patient Active Problem List   Diagnosis    Diabetes mellitus out of control ([facility] [address]    Chest pain    Left arm numbness    Essential hypertension    Elbow contusion    CAD (coronary artery disease)    HLD (hyperlipidemia)    Abnormal stress test    DMII (diabetes mellitus, type 2) ([facility]    Acute metabolic encephalopathy    POTS (postural orthostatic tachycardia syndrome)    Hyperglycemia    Headache    Skin ulcer of left foot with fat layer exposed ([facility] [address]    Diabetic peripheral neuropathy ([facility])    Abscess of arm, left    Ulcer of foot due to secondary diabetes ([facility] [address]    NSTEMI (non-ST elevated myocardial infarction) ([facility] [address]    STEMI (ST elevation myocardial infarction) ([facility])    Abnormal EKG    Syncope and collapse    VT (ventricular tachycardia) ([facility])    Idiopathic hypotension    Abnormal ECG    Ventricular tachycardia ([facility])    CAD, multiple vessel    ICD (implantable cardioverter-defibrillator) in place    Anemia    Lightheadedness    ST elevation myocardial infarction (STEMI) of inferolateral wall ([facility])    Cardiac arrest ([facility])    Ischemic cardiomyopathy    Pneumonia       Allergies:  No known allergies     Temp max:  Temp (24hrs), Av.5 °F (38.6 °C), Min:100.5 °F (38.1 °C), Max:102.4 °F (39.1 °C)      Recent Labs     20   WBC 7.8       Recent Labs     20   BUN 15   CREATININE 0.9       No intake or output data in the 24 hours ending 20 2313    Culture Results:  Pending    Ht Readings from Last 1 Encounters:   20 6' 2\" (1.88 m)        Wt Readings from Last 1 Encounters:   20 187 lb 9.8 oz (85.1 kg)         Estimated Creatinine Clearance: 108 mL/min (based on SCr of 0.9 mg/dL). Assessment/Plan:  Vancomycin 1250 mg IV every 12 hours ordered.   Regimen projects a trough level of 15-20 mg/L. Level ordered for 1100  7/2/20. Thank you for the consult.      Yolande Tyson, PharmD  6/30/2020 11:14 PM

## 2020-07-02 ENCOUNTER — APPOINTMENT (OUTPATIENT)
Dept: GENERAL RADIOLOGY | Age: 55
DRG: 871 | End: 2020-07-02
Payer: COMMERCIAL

## 2020-07-02 LAB
ALBUMIN SERPL-MCNC: 2.6 G/DL (ref 3.4–5)
ALBUMIN SERPL-MCNC: 2.7 G/DL (ref 3.4–5)
ALBUMIN SERPL-MCNC: 2.8 G/DL (ref 3.4–5)
ALP BLD-CCNC: 47 U/L (ref 40–129)
ALT SERPL-CCNC: 13 U/L (ref 10–40)
ANION GAP SERPL CALCULATED.3IONS-SCNC: 10 MMOL/L (ref 3–16)
ANION GAP SERPL CALCULATED.3IONS-SCNC: 12 MMOL/L (ref 3–16)
AST SERPL-CCNC: 23 U/L (ref 15–37)
BASOPHILS ABSOLUTE: 0.1 K/UL (ref 0–0.2)
BASOPHILS RELATIVE PERCENT: 0.9 %
BILIRUB SERPL-MCNC: 0.3 MG/DL (ref 0–1)
BILIRUBIN DIRECT: <0.2 MG/DL (ref 0–0.3)
BILIRUBIN, INDIRECT: ABNORMAL MG/DL (ref 0–1)
BUN BLDV-MCNC: 14 MG/DL (ref 7–20)
BUN BLDV-MCNC: 17 MG/DL (ref 7–20)
CALCIUM SERPL-MCNC: 8.2 MG/DL (ref 8.3–10.6)
CALCIUM SERPL-MCNC: 8.4 MG/DL (ref 8.3–10.6)
CHLORIDE BLD-SCNC: 103 MMOL/L (ref 99–110)
CHLORIDE BLD-SCNC: 105 MMOL/L (ref 99–110)
CO2: 20 MMOL/L (ref 21–32)
CO2: 22 MMOL/L (ref 21–32)
CREAT SERPL-MCNC: 0.6 MG/DL (ref 0.9–1.3)
CREAT SERPL-MCNC: 0.8 MG/DL (ref 0.9–1.3)
D DIMER: 625 NG/ML DDU (ref 0–229)
EOSINOPHILS ABSOLUTE: 0 K/UL (ref 0–0.6)
EOSINOPHILS RELATIVE PERCENT: 0 %
FERRITIN: 1464 NG/ML (ref 30–400)
GFR AFRICAN AMERICAN: >60
GFR AFRICAN AMERICAN: >60
GFR NON-AFRICAN AMERICAN: >60
GFR NON-AFRICAN AMERICAN: >60
GLUCOSE BLD-MCNC: 179 MG/DL (ref 70–99)
GLUCOSE BLD-MCNC: 185 MG/DL (ref 70–99)
GLUCOSE BLD-MCNC: 193 MG/DL (ref 70–99)
GLUCOSE BLD-MCNC: 205 MG/DL (ref 70–99)
GLUCOSE BLD-MCNC: 218 MG/DL (ref 70–99)
GLUCOSE BLD-MCNC: 219 MG/DL (ref 70–99)
GLUCOSE BLD-MCNC: 219 MG/DL (ref 70–99)
GLUCOSE BLD-MCNC: 239 MG/DL (ref 70–99)
GLUCOSE BLD-MCNC: 244 MG/DL (ref 70–99)
GLUCOSE BLD-MCNC: 270 MG/DL (ref 70–99)
HCT VFR BLD CALC: 45 % (ref 40.5–52.5)
HEMOGLOBIN: 15.2 G/DL (ref 13.5–17.5)
LYMPHOCYTES ABSOLUTE: 0.6 K/UL (ref 1–5.1)
LYMPHOCYTES RELATIVE PERCENT: 3.9 %
MCH RBC QN AUTO: 28.1 PG (ref 26–34)
MCHC RBC AUTO-ENTMCNC: 33.9 G/DL (ref 31–36)
MCV RBC AUTO: 83.1 FL (ref 80–100)
MONOCYTES ABSOLUTE: 0.9 K/UL (ref 0–1.3)
MONOCYTES RELATIVE PERCENT: 5.8 %
NEUTROPHILS ABSOLUTE: 14 K/UL (ref 1.7–7.7)
NEUTROPHILS RELATIVE PERCENT: 89.4 %
PDW BLD-RTO: 12.9 % (ref 12.4–15.4)
PERFORMED ON: ABNORMAL
PHOSPHORUS: 1.8 MG/DL (ref 2.5–4.9)
PHOSPHORUS: 2.7 MG/DL (ref 2.5–4.9)
PLATELET # BLD: 148 K/UL (ref 135–450)
PMV BLD AUTO: 9.1 FL (ref 5–10.5)
POTASSIUM SERPL-SCNC: 4 MMOL/L (ref 3.5–5.1)
POTASSIUM SERPL-SCNC: 4.2 MMOL/L (ref 3.5–5.1)
RBC # BLD: 5.42 M/UL (ref 4.2–5.9)
SODIUM BLD-SCNC: 135 MMOL/L (ref 136–145)
SODIUM BLD-SCNC: 137 MMOL/L (ref 136–145)
TOTAL PROTEIN: 6 G/DL (ref 6.4–8.2)
TROPONIN: <0.01 NG/ML
WBC # BLD: 15.7 K/UL (ref 4–11)

## 2020-07-02 PROCEDURE — 6360000002 HC RX W HCPCS: Performed by: INTERNAL MEDICINE

## 2020-07-02 PROCEDURE — 6370000000 HC RX 637 (ALT 250 FOR IP): Performed by: INTERNAL MEDICINE

## 2020-07-02 PROCEDURE — 1200000000 HC SEMI PRIVATE

## 2020-07-02 PROCEDURE — 99233 SBSQ HOSP IP/OBS HIGH 50: CPT | Performed by: INTERNAL MEDICINE

## 2020-07-02 PROCEDURE — 6370000000 HC RX 637 (ALT 250 FOR IP): Performed by: NURSE PRACTITIONER

## 2020-07-02 PROCEDURE — 71045 X-RAY EXAM CHEST 1 VIEW: CPT

## 2020-07-02 PROCEDURE — 80076 HEPATIC FUNCTION PANEL: CPT

## 2020-07-02 PROCEDURE — 82728 ASSAY OF FERRITIN: CPT

## 2020-07-02 PROCEDURE — 85379 FIBRIN DEGRADATION QUANT: CPT

## 2020-07-02 PROCEDURE — 85025 COMPLETE CBC W/AUTO DIFF WBC: CPT

## 2020-07-02 PROCEDURE — 80069 RENAL FUNCTION PANEL: CPT

## 2020-07-02 PROCEDURE — 36415 COLL VENOUS BLD VENIPUNCTURE: CPT

## 2020-07-02 PROCEDURE — 2580000003 HC RX 258: Performed by: INTERNAL MEDICINE

## 2020-07-02 PROCEDURE — 2700000000 HC OXYGEN THERAPY PER DAY

## 2020-07-02 PROCEDURE — 94761 N-INVAS EAR/PLS OXIMETRY MLT: CPT

## 2020-07-02 PROCEDURE — 2500000003 HC RX 250 WO HCPCS: Performed by: INTERNAL MEDICINE

## 2020-07-02 RX ORDER — INSULIN GLARGINE 100 [IU]/ML
45 INJECTION, SOLUTION SUBCUTANEOUS 2 TIMES DAILY
Status: DISCONTINUED | OUTPATIENT
Start: 2020-07-02 | End: 2020-07-04

## 2020-07-02 RX ADMIN — SODIUM CHLORIDE 100 MG: 9 INJECTION, SOLUTION INTRAVENOUS at 17:28

## 2020-07-02 RX ADMIN — BENZONATATE 200 MG: 100 CAPSULE ORAL at 11:09

## 2020-07-02 RX ADMIN — INSULIN GLARGINE 45 UNITS: 100 INJECTION, SOLUTION SUBCUTANEOUS at 09:45

## 2020-07-02 RX ADMIN — INSULIN LISPRO 2 UNITS: 100 INJECTION, SOLUTION INTRAVENOUS; SUBCUTANEOUS at 09:45

## 2020-07-02 RX ADMIN — ATORVASTATIN CALCIUM 40 MG: 40 TABLET, FILM COATED ORAL at 20:15

## 2020-07-02 RX ADMIN — INSULIN LISPRO 10 UNITS: 100 INJECTION, SOLUTION INTRAVENOUS; SUBCUTANEOUS at 00:50

## 2020-07-02 RX ADMIN — BENZONATATE 200 MG: 100 CAPSULE ORAL at 20:15

## 2020-07-02 RX ADMIN — INSULIN LISPRO 1 UNITS: 100 INJECTION, SOLUTION INTRAVENOUS; SUBCUTANEOUS at 20:42

## 2020-07-02 RX ADMIN — INSULIN GLARGINE 45 UNITS: 100 INJECTION, SOLUTION SUBCUTANEOUS at 20:42

## 2020-07-02 RX ADMIN — DEXAMETHASONE SODIUM PHOSPHATE 4 MG: 4 INJECTION, SOLUTION INTRAMUSCULAR; INTRAVENOUS at 11:10

## 2020-07-02 RX ADMIN — SODIUM CHLORIDE: 9 INJECTION, SOLUTION INTRAVENOUS at 02:42

## 2020-07-02 RX ADMIN — ASPIRIN 81 MG 81 MG: 81 TABLET ORAL at 09:31

## 2020-07-02 RX ADMIN — ENOXAPARIN SODIUM 40 MG: 40 INJECTION SUBCUTANEOUS at 09:31

## 2020-07-02 RX ADMIN — DEXAMETHASONE SODIUM PHOSPHATE 4 MG: 4 INJECTION, SOLUTION INTRAMUSCULAR; INTRAVENOUS at 00:00

## 2020-07-02 RX ADMIN — PRASUGREL HYDROCHLORIDE 10 MG: 10 TABLET, FILM COATED ORAL at 09:31

## 2020-07-02 RX ADMIN — ENOXAPARIN SODIUM 40 MG: 40 INJECTION SUBCUTANEOUS at 20:42

## 2020-07-02 RX ADMIN — INSULIN LISPRO 15 UNITS: 100 INJECTION, SOLUTION INTRAVENOUS; SUBCUTANEOUS at 17:11

## 2020-07-02 RX ADMIN — POTASSIUM PHOSPHATE, MONOBASIC AND POTASSIUM PHOSPHATE, DIBASIC 15 MMOL: 224; 236 INJECTION, SOLUTION, CONCENTRATE INTRAVENOUS at 11:10

## 2020-07-02 RX ADMIN — INSULIN LISPRO 12 UNITS: 100 INJECTION, SOLUTION INTRAVENOUS; SUBCUTANEOUS at 11:23

## 2020-07-02 RX ADMIN — CEFTRIAXONE 1 G: 1 INJECTION, POWDER, FOR SOLUTION INTRAMUSCULAR; INTRAVENOUS at 12:37

## 2020-07-02 RX ADMIN — INSULIN LISPRO 4 UNITS: 100 INJECTION, SOLUTION INTRAVENOUS; SUBCUTANEOUS at 17:11

## 2020-07-02 RX ADMIN — BENZONATATE 200 MG: 100 CAPSULE ORAL at 04:46

## 2020-07-02 RX ADMIN — DEXAMETHASONE SODIUM PHOSPHATE 4 MG: 4 INJECTION, SOLUTION INTRAMUSCULAR; INTRAVENOUS at 23:45

## 2020-07-02 RX ADMIN — INSULIN LISPRO 4 UNITS: 100 INJECTION, SOLUTION INTRAVENOUS; SUBCUTANEOUS at 11:23

## 2020-07-02 ASSESSMENT — PAIN SCALES - GENERAL
PAINLEVEL_OUTOF10: 0

## 2020-07-02 NOTE — PLAN OF CARE
Problem: Skin Integrity:  Goal: Will show no infection signs and symptoms  Description: Will show no infection signs and symptoms  Outcome: Ongoing  Goal: Absence of new skin breakdown  Description: Absence of new skin breakdown  Outcome: Ongoing     Problem: Falls - Risk of:  Goal: Will remain free from falls  Description: Will remain free from falls  Outcome: Ongoing  Goal: Absence of physical injury  Description: Absence of physical injury  Outcome: Ongoing     Problem: Pain:  Goal: Pain level will decrease  Description: Pain level will decrease  Outcome: Ongoing  Goal: Control of acute pain  Description: Control of acute pain  Outcome: Ongoing  Goal: Control of chronic pain  Description: Control of chronic pain  Outcome: Ongoing     Problem: Airway Clearance - Ineffective:  Goal: Ability to maintain a clear airway will improve  Description: Ability to maintain a clear airway will improve  Outcome: Ongoing     Problem: Airway Clearance - Ineffective  Goal: Achieve or maintain patent airway  Outcome: Ongoing     Problem: Gas Exchange - Impaired  Goal: Absence of hypoxia  Outcome: Ongoing  Goal: Promote optimal lung function  Outcome: Ongoing     Problem: Breathing Pattern - Ineffective  Goal: Ability to achieve and maintain a regular respiratory rate  Outcome: Ongoing     Problem:  Body Temperature -  Risk of, Imbalanced  Goal: Ability to maintain a body temperature within defined limits  Outcome: Ongoing  Goal: Will regain or maintain usual level of consciousness  Outcome: Ongoing  Goal: Complications related to the disease process, condition or treatment will be avoided or minimized  Outcome: Ongoing     Problem: Isolation Precautions - Risk of Spread of Infection  Goal: Prevent transmission of infection  Outcome: Ongoing     Problem: Nutrition Deficits  Goal: Optimize nutrtional status  Outcome: Ongoing     Problem: Risk for Fluid Volume Deficit  Goal: Maintain normal heart rhythm  Outcome: Ongoing  Goal: Maintain absence of muscle cramping  Outcome: Ongoing  Goal: Maintain normal serum potassium, sodium, calcium, phosphorus, and pH  Outcome: Ongoing     Problem: Loneliness or Risk for Loneliness  Goal: Demonstrate positive use of time alone when socialization is not possible  Outcome: Ongoing     Problem: Fatigue  Goal: Verbalize increase energy and improved vitality  Outcome: Ongoing     Problem: Patient Education: Go to Patient Education Activity  Goal: Patient/Family Education  Outcome: Ongoing

## 2020-07-02 NOTE — PROGRESS NOTES
PLACEMENT  10/06/2018    Bare Metal Stent to PDA    CORONARY ANGIOPLASTY WITH STENT PLACEMENT  10/07/2018    Bare Metal Stent to OM    CORONARY ANGIOPLASTY WITH STENT PLACEMENT  10/03/2018    CECE to Circ    FINGER SURGERY Left     Left Thumb    HERNIA REPAIR      JOINT REPLACEMENT      VASCULAR SURGERY      VASECTOMY         Current Medications:    Outpatient Medications Marked as Taking for the 6/30/20 encounter [facility] Encounter)   Medication Sig Dispense Refill    prasugrel (EFFIENT) 10 MG TABS Take 1 tablet by mouth once for 1 dose (Patient taking differently: Take 10 mg by mouth daily ) 30 tablet 11    atorvastatin (LIPITOR) 40 MG tablet TAKE 1 TABLET BY MOUTH IN THE EVENING  30 tablet 4    aspirin 81 MG chewable tablet Take 81 mg by mouth daily      Dulaglutide (TRULICITY) 1.5 XK/4.6HT SOPN Inject 1.5 mg into the skin once a week       insulin detemir (LEVEMIR FLEXTOUCH) 100 UNIT/ML injection pen Inject 30 Units into the skin nightly (Patient taking differently: Inject 50 Units into the skin nightly ) 5 pen 3       Allergies:  No known allergies    Immunizations :   Immunization History   Administered Date(s) Administered    Pneumococcal Polysaccharide ([id number]) 09/11/2010    Tdap (Boostrix, Adacel) 05/27/2018       Social History:    Social History     Tobacco Use    Smoking status: Never Smoker    Smokeless tobacco: Never Used   Substance Use Topics    Alcohol use: No    Drug use: No     Social History     Tobacco Use   Smoking Status Never Smoker   Smokeless Tobacco Never Used      Family History   Problem Relation Age of Onset    High Blood Pressure Mother     Stroke Mother     Heart Disease Mother     COPD Mother     Heart Disease Father     Cancer Father         skin    Diabetes Sister     Cancer Sister     Heart Disease Brother     Diabetes Brother        REVIEW OF SYSTEMS:    No fever / chills / sweats. No weight loss. No visual change, eye pain, eye discharge.     No oral lesion, sore throat, dysphagia. Denies cough / sputum/Sob   Denies chest pain, palpitations/ dizziness  Denies nausea/ vomiting/abdominal pain/diarrhea. Denies dysuria or change in urinary function. Denies joint swelling or pain. No myalgia, arthralgia. No rashes, skin lesions   Denies focal weakness, sensory change or other neurologic symptoms  No lymph node swelling or tenderness. Fevers, sob+ cough +  Poor oral intake   PHYSICAL EXAM:      Vitals:  T max 102.4   /70   Pulse 81   Temp 98.1 °F (36.7 °C) (Oral)   Resp 16   Ht 6' 2\" (1.88 m)   Wt 208 lb 12.4 oz (94.7 kg)   SpO2 92%   BMI 26.81 kg/m²     In-person bedside physical examination deferred. Pursuant to the emergency declaration under the [address] [place] [address] waiver authority and the [employer] Act, this clinical encounter was conducted to provide necessary medical care.   (Also consistent with new provisions and guidance offered by [facility] on March 18, 2020 in setting of COVID 19 outbreak and in order to preserve personal protective equipment in accordance with the flexibilities announced by CMS on March 30, 2020)   References: https://[email or web address]/[email or web address]/[email or web address]/Resources/COVID-19/3_18%20Telemed%20Guidance%20Updated%20March%2018. pdf?wtb=[email or web address]                      [email or web address]://[email or web address]/[email or web address]/Resources/COVID-19/3_18%20Telemed%20Guidance%20Updated%20March%2018. pdf?kml=[email or web address]                      http://[employer]/. pdf                                Pulmonary: deferred  Abdomen/GI: deferred  Neuro: deferred  Skin: deferred  Musculoskeletal:  deferred  Genitourinary: Deferred  Psych: deferred  Lymphatic/Immunologic: deferred       Data Review:    Lab Results   Component Value Date    WBC 15.7 (H) 07/02/2020    HGB 15.2 07/02/2020    HCT 45.0 07/02/2020    MCV 83.1 07/02/2020     07/02/2020     Lab Results   Component Value Date    CREATININE 0.8 (L) 07/02/2020    BUN 17 07/02/2020     07/02/2020    K 4.2 07/02/2020     07/02/2020    CO2 20 (L) 07/02/2020       Hepatic Function Panel:   Lab Results   Component Value Date    ALKPHOS 47 07/02/2020    ALT 13 07/02/2020    AST 23 07/02/2020    PROT 6.0 07/02/2020    PROT 8.3 11/12/2012    BILITOT 0.3 07/02/2020    BILIDIR <0.2 07/02/2020    IBILI see below 07/02/2020    LABALBU 2.7 07/02/2020    LABALBU 2.8 07/02/2020       UA:  Lab Results   Component Value Date    COLORU Yellow 01/09/2018    CLARITYU Clear 01/09/2018    GLUCOSEU 500 01/09/2018    GLUCOSEU >=1000 09/10/2010    BILIRUBINUR Negative 01/09/2018    BILIRUBINUR NEGATIVE 09/10/2010    KETUA TRACE 01/09/2018    SPECGRAV 1.020 01/09/2018    BLOODU Negative 01/09/2018    PHUR 5.0 01/09/2018    PROTEINU TRACE 01/09/2018    UROBILINOGEN 0.2 01/09/2018    NITRU Negative 01/09/2018    LEUKOCYTESUR Negative 01/09/2018    LABMICR YES 01/09/2018    URINETYPE Not Specified 01/09/2018      Urine Microscopic:   Lab Results   Component Value Date    WBCUA 0-2 01/09/2018    RBCUA 0-2 01/09/2018    EPIU 0-2 01/09/2018     Procal  0.16       MICRO: cultures reviewed and updated by me   01/20 0100       Order Status: Completed Specimen: Sputum Expectorated Updated: 07/02/20 1055    CULTURE, RESPIRATORY Moderate growth normal respiratory letty withAbnormal     Organism BHS Group B (Strep agalacticae)Abnormal     CULTURE, RESPIRATORY --    Moderate growth   Susceptibility testing of penicillin and other beta lactams is   not necessary for beta hemolytic Streptococci since resistant   strains have not been identified. (CLSI M100)    Narrative:     ORDER#: [id number]                          ORDERED BY: Silvia Beck  SOURCE: Sputum Expectorated                COLLECTED:  07/01/20 01:00  ANTIBIOTICS AT KATI. :                      RECEIVED :  07/01/20 06:42  Performed at:  [facility]  [address] [place] [address], [place] [address]   Phone [phone]   Culture, MRSA, Screening [[id number]] Collected: 07/01/20 0100   Order Status: Completed Specimen: Nares Updated: 07/02/20 1035    MRSA Culture Only Further report to follow   Narrative:     ORDER#: [id number]                          ORDERED BY: Ashly Graham  SOURCE: Nares                              COLLECTED:  07/01/20 01:00  ANTIBIOTICS AT KATI. :                      RECEIVED :  07/01/20 01:39  Performed at:  [facility]  [address] [place] [address], [place] [address]   Phone [phone]   Culture, Blood 2 [[id number]] Collected: 06/30/20 2001   Order Status: Completed Specimen: Blood Updated: 07/01/20 2315    Culture, Blood 2 No Growth to date.  Any change in status will be called. Narrative:     ORDER#: [id number]                          ORDERED BY: Marialuisa Renee  SOURCE: Blood                              COLLECTED:  06/30/20 20:01  ANTIBIOTICS AT KATI. :                      RECEIVED :  06/30/20 20:05  If child <=2 yrs old please draw pediatric bottle. ~Blood Culture #2  Performed at:  [facility]  [address] [place] [address], [place] [address]   Phone [phone]   Culture, Blood 1 [[id number]] (Abnormal) Collected: 06/30/20 1945   Order Status: Completed Specimen: Blood Updated: 07/01/20 2308    Blood Culture, Routine --Abnormal     Gram stain Aerobic bottle:   Gram positive cocci in clusters   resembling Staphylococcus   Information to follow   Gram stain Anaerobic bottle:   Gram positive cocci in clusters   resembling Staphylococcus   Information to follow   Abnormal     Organism Staphylococcus coagulase negative DNA DetectedAbnormal     Blood Culture, Routine See additional report for complete BCID panel.    Narrative:     ORDER#: [id number]                          ORDERED BY: [address] [place] [address], JANIS  SOURCE: Blood                              COLLECTED:  06/30/20 19:45  ANTIBIOTICS AT KATI. :                      RECEIVED :  06/30/20 19:50  CALL  [place]  TIH[facility] tel. [phone],  Microbiology results called to and read back by GILMER Conde, 07/01/2020  19:20, by Katty Mcknight  If child <=2 yrs old please draw pediatric bottle. ~Blood Culture #1  Performed at:  [facility]  [address] [facility] [place] [address]   Phone [phone]   Culture, Blood, PCR ID Panel Results Report [[id number]] Collected: 06/30/20 1945   Order Status: Completed Updated: 07/01/20 1920    Report SEE IMAGE   Narrative:     Denia Davila [id number],  Microbiology results called to and read back by GILMER Conde, 07/01/2020  19:20, by Katty Mcknight  Referred out by:  [address] [place] [address]   Phone [phone]   Legionella antigen, urine [[id number]] Collected: 07/01/20 0100   Order Status: Completed Specimen: Urine voided Updated: 07/01/20 1212    L. pneumophila Serogp 1 Ur Ag --    Presumptive Negative   No Legionella pneumophila serogroup 1 antigens detected. A negative result does not exclude infection with   Legionella pneumophila serogroup 1 nor does it rule out   other microbial-caused respiratory infections or   disease caused by other serogroups of   Legionella pneumophila. Normal Range: Presumptive Negative    Narrative:     ORDER#: [id number]                          ORDERED BY: BERTIN IRWIN  SOURCE: Urine Voided                       COLLECTED:  07/01/20 01:00  ANTIBIOTICS AT KATI. :                      RECEIVED :  07/01/20 01:51  Performed at:  [facility]  [address] [facility] [address] [place] [address]   Phone [phone]   Strep Pneumoniae Antigen [[id number]] Collected: 07/01/20 0100   Order Status: Completed Specimen: Urine, clean catch Updated: 07/01/20 1211    STREP PNEUMONIAE 07/02/20 0932       PRN Meds:  sodium chloride flush, acetaminophen **OR** acetaminophen, polyethylene glycol, ondansetron, glucose, dextrose, glucagon (rDNA), dextrose, benzonatate      Assessment:     Patient Active Problem List   Diagnosis    Diabetes mellitus out of control ([facility]    Chest pain    Left arm numbness    Essential hypertension    Elbow contusion    CAD (coronary artery disease)    HLD (hyperlipidemia)    Abnormal stress test    DMII (diabetes mellitus, type 2) ([facility])    Acute metabolic encephalopathy    POTS (postural orthostatic tachycardia syndrome)    Hyperglycemia    Headache    Skin ulcer of left foot with fat layer exposed ([facility]    Diabetic peripheral neuropathy ([facility])    Abscess of arm, left    [facility] to [facility]    NSTEMI (non-ST elevated myocardial infarction) ([facility]    STEMI (ST elevation myocardial infarction) ([facility])    Abnormal EKG    Syncope and collapse    VT (ventricular tachycardia) ([facility])    Idiopathic hypotension    Abnormal ECG    Ventricular tachycardia ([facility])    CAD, multiple vessel    ICD (implantable cardioverter-defibrillator) in place    Anemia    Lightheadedness    ST elevation myocardial infarction (STEMI) of inferolateral wall ([facility])    Cardiac arrest ([facility])    Ischemic cardiomyopathy    Pneumonia    Sepsis (HCC)    Tachycardia    Tachypnea    Fever    Lactic acidosis    COVID-19 virus infection    Poor appetite    High anion gap metabolic acidosis     [id number] Pneumonia'  High fevers  Lactic acidosis  CXR with bi lateral infiltrates  AICD in place  Cardiomyopathy  CAD  DM+      He has multiple medical co morbidities that will put him at high risk for progression and poor out come - creat is stable and will initiate Remdesivir therapy     Tolerating the meds ok but Increasing in O2 Requirement noted hopefully improve soon on IV abx and Remdesivir therapy      Labs, Microbiology, Radiology and all the pertinent

## 2020-07-02 NOTE — PROGRESS NOTES
appetite [R63.0]    High anion gap metabolic acidosis [L70.1]    Pneumonia [J18.9]    DMII (diabetes mellitus, type 2) (HCC) [E11.9]    CAD (coronary artery disease) [I25.10]    HLD (hyperlipidemia) [E78.5]    Essential hypertension [I10]       PNA due to COVID 19 infection. Also growing Strep in sputum. Pt on Vanc and Cefepime - to rocephin IV due to strep in sputum. Cont decadron. ID involved - started remdesivir 7/1      Acute Hypox Resp Failure POA due to above. O2 up to 6l/min today - worse   CXR stable bilateral pulm disease. If oxygen continues worse - will Tx to ICU. Clinically actually feels better. DMII with uncontrolled hyperglycemia  This has worsened with steroid. Very poor control at baseline - A1C >12  Increased lantus to 45BID. Increase prandial bolus. S/p humulin R IV x2 on 7/1. Cont IVF support. Carb control diet. CAD advanced ischemic cardiomyopathy multivessel CAD with Hx of VT s/p AICD  Follows with Dr Rosaura Min  Pt felt chest pressure on 7/1    - EKG unremarkable. Serial troponin negative. - restarted ASA, statin, effient. Historically does not tolerate BB and ACEI/ARB due to hypotension and recurrent syncope. Lactic Acidosis - due to above. Resolved        DVT Prophylaxis: lovenox up to 40BID.    Diet: DIET CARB CONTROL;  Code Status: Full Code      Dispo - cc    Racquel Norman MD

## 2020-07-02 NOTE — PLAN OF CARE
Problem: Skin Integrity:  Goal: Will show no infection signs and symptoms  Description: Will show no infection signs and symptoms  7/2/2020 1503 by Maritza Jaquez RN  Outcome: Ongoing  7/2/2020 0340 by Lee Hussein RN  Outcome: Ongoing  Goal: Absence of new skin breakdown  Description: Absence of new skin breakdown  7/2/2020 1503 by Maritza Jaquez RN  Outcome: Ongoing  7/2/2020 0340 by Lee Hussein RN  Outcome: Ongoing     Problem: Falls - Risk of:  Goal: Will remain free from falls  Description: Will remain free from falls  7/2/2020 1503 by Maritza Jaquez RN  Outcome: Ongoing  7/2/2020 0340 by Lee Hussein RN  Outcome: Ongoing  Goal: Absence of physical injury  Description: Absence of physical injury  7/2/2020 1503 by Maritza Jaquez RN  Outcome: Ongoing  7/2/2020 0340 by Lee Hussein RN  Outcome: Ongoing     Problem: Pain:  Goal: Pain level will decrease  Description: Pain level will decrease  7/2/2020 1503 by Maritza Jaquez RN  Outcome: Ongoing  7/2/2020 0340 by Lee Hussein RN  Outcome: Ongoing  Goal: Control of acute pain  Description: Control of acute pain  7/2/2020 1503 by Maritza Jaquez RN  Outcome: Ongoing  7/2/2020 0340 by Lee Hussein RN  Outcome: Ongoing  Goal: Control of chronic pain  Description: Control of chronic pain  7/2/2020 1503 by Maritza Jaquez RN  Outcome: Ongoing  7/2/2020 0340 by Lee Hussein RN  Outcome: Ongoing     Problem: Airway Clearance - Ineffective:  Goal: Ability to maintain a clear airway will improve  Description: Ability to maintain a clear airway will improve  7/2/2020 1503 by Maritza Jaquez RN  Outcome: Ongoing  7/2/2020 0340 by Lee Hussein RN  Outcome: Ongoing     Problem: Airway Clearance - Ineffective  Goal: Achieve or maintain patent airway  7/2/2020 1503 by Maritza Jaquez RN  Outcome: Ongoing  7/2/2020 0340 by Lee Hussein RN  Outcome: Ongoing

## 2020-07-02 NOTE — PROGRESS NOTES
Patient's o2 sat was 84% on 3L. Needed to increase to 6 L to maintain sat above 92% (93%). Notified Dr. Davin Chacon. Order received for stat chest xray.

## 2020-07-02 NOTE — PROGRESS NOTES
Patient's o2 noted 88-90% on 6 lpm via n/c. Patient denies SOB or other distress. The rest of the VS WDL. Placed patient on 7 lpm high flow; O2 levels  fluctuate 90-92 %. MD notified.  Electronically signed by Julio Goddard RN on 7/2/2020 at 5:14 PM

## 2020-07-03 LAB
ALBUMIN SERPL-MCNC: 2.6 G/DL (ref 3.4–5)
ALBUMIN SERPL-MCNC: 2.6 G/DL (ref 3.4–5)
ALP BLD-CCNC: 62 U/L (ref 40–129)
ALT SERPL-CCNC: 15 U/L (ref 10–40)
ANION GAP SERPL CALCULATED.3IONS-SCNC: 12 MMOL/L (ref 3–16)
AST SERPL-CCNC: 41 U/L (ref 15–37)
BASOPHILS ABSOLUTE: 0 K/UL (ref 0–0.2)
BASOPHILS RELATIVE PERCENT: 0 %
BILIRUB SERPL-MCNC: 0.4 MG/DL (ref 0–1)
BILIRUBIN DIRECT: <0.2 MG/DL (ref 0–0.3)
BILIRUBIN, INDIRECT: ABNORMAL MG/DL (ref 0–1)
BUN BLDV-MCNC: 13 MG/DL (ref 7–20)
CALCIUM SERPL-MCNC: 8.2 MG/DL (ref 8.3–10.6)
CHLORIDE BLD-SCNC: 104 MMOL/L (ref 99–110)
CO2: 21 MMOL/L (ref 21–32)
CREAT SERPL-MCNC: 0.6 MG/DL (ref 0.9–1.3)
CULTURE, RESPIRATORY: ABNORMAL
CULTURE, RESPIRATORY: ABNORMAL
D DIMER: 675 NG/ML DDU (ref 0–229)
EOSINOPHILS ABSOLUTE: 0 K/UL (ref 0–0.6)
EOSINOPHILS RELATIVE PERCENT: 0 %
FERRITIN: 1363 NG/ML (ref 30–400)
GFR AFRICAN AMERICAN: >60
GFR NON-AFRICAN AMERICAN: >60
GLUCOSE BLD-MCNC: 130 MG/DL (ref 70–99)
GLUCOSE BLD-MCNC: 132 MG/DL (ref 70–99)
GLUCOSE BLD-MCNC: 136 MG/DL (ref 70–99)
GLUCOSE BLD-MCNC: 160 MG/DL (ref 70–99)
GLUCOSE BLD-MCNC: 183 MG/DL (ref 70–99)
GRAM STAIN RESULT: ABNORMAL
HCT VFR BLD CALC: 44.3 % (ref 40.5–52.5)
HEMOGLOBIN: 14.9 G/DL (ref 13.5–17.5)
LYMPHOCYTES ABSOLUTE: 0.7 K/UL (ref 1–5.1)
LYMPHOCYTES RELATIVE PERCENT: 3.9 %
MCH RBC QN AUTO: 27.8 PG (ref 26–34)
MCHC RBC AUTO-ENTMCNC: 33.5 G/DL (ref 31–36)
MCV RBC AUTO: 83 FL (ref 80–100)
MONOCYTES ABSOLUTE: 1.1 K/UL (ref 0–1.3)
MONOCYTES RELATIVE PERCENT: 6.6 %
MRSA CULTURE ONLY: NORMAL
NEUTROPHILS ABSOLUTE: 15.4 K/UL (ref 1.7–7.7)
NEUTROPHILS RELATIVE PERCENT: 89.5 %
ORGANISM: ABNORMAL
PDW BLD-RTO: 13.3 % (ref 12.4–15.4)
PERFORMED ON: ABNORMAL
PHOSPHORUS: 2.4 MG/DL (ref 2.5–4.9)
PLATELET # BLD: 155 K/UL (ref 135–450)
PMV BLD AUTO: 9.2 FL (ref 5–10.5)
POTASSIUM SERPL-SCNC: 3.6 MMOL/L (ref 3.5–5.1)
PROCALCITONIN: 0.11 NG/ML (ref 0–0.15)
RBC # BLD: 5.34 M/UL (ref 4.2–5.9)
SODIUM BLD-SCNC: 137 MMOL/L (ref 136–145)
TOTAL PROTEIN: 6 G/DL (ref 6.4–8.2)
WBC # BLD: 17.2 K/UL (ref 4–11)

## 2020-07-03 PROCEDURE — 6360000002 HC RX W HCPCS: Performed by: INTERNAL MEDICINE

## 2020-07-03 PROCEDURE — 36415 COLL VENOUS BLD VENIPUNCTURE: CPT

## 2020-07-03 PROCEDURE — 2000000000 HC ICU R&B

## 2020-07-03 PROCEDURE — 85379 FIBRIN DEGRADATION QUANT: CPT

## 2020-07-03 PROCEDURE — 94761 N-INVAS EAR/PLS OXIMETRY MLT: CPT

## 2020-07-03 PROCEDURE — 82728 ASSAY OF FERRITIN: CPT

## 2020-07-03 PROCEDURE — 84145 PROCALCITONIN (PCT): CPT

## 2020-07-03 PROCEDURE — 99291 CRITICAL CARE FIRST HOUR: CPT | Performed by: INTERNAL MEDICINE

## 2020-07-03 PROCEDURE — 2580000003 HC RX 258: Performed by: INTERNAL MEDICINE

## 2020-07-03 PROCEDURE — 85025 COMPLETE CBC W/AUTO DIFF WBC: CPT

## 2020-07-03 PROCEDURE — 6370000000 HC RX 637 (ALT 250 FOR IP): Performed by: INTERNAL MEDICINE

## 2020-07-03 PROCEDURE — 80076 HEPATIC FUNCTION PANEL: CPT

## 2020-07-03 PROCEDURE — 80069 RENAL FUNCTION PANEL: CPT

## 2020-07-03 PROCEDURE — 2700000000 HC OXYGEN THERAPY PER DAY

## 2020-07-03 RX ADMIN — BENZONATATE 200 MG: 100 CAPSULE ORAL at 19:14

## 2020-07-03 RX ADMIN — DEXAMETHASONE SODIUM PHOSPHATE 4 MG: 4 INJECTION, SOLUTION INTRAMUSCULAR; INTRAVENOUS at 23:13

## 2020-07-03 RX ADMIN — SODIUM CHLORIDE, PRESERVATIVE FREE 10 ML: 5 INJECTION INTRAVENOUS at 20:09

## 2020-07-03 RX ADMIN — ENOXAPARIN SODIUM 40 MG: 40 INJECTION SUBCUTANEOUS at 20:08

## 2020-07-03 RX ADMIN — ASPIRIN 81 MG 81 MG: 81 TABLET ORAL at 08:35

## 2020-07-03 RX ADMIN — ATORVASTATIN CALCIUM 40 MG: 40 TABLET, FILM COATED ORAL at 20:08

## 2020-07-03 RX ADMIN — INSULIN LISPRO 15 UNITS: 100 INJECTION, SOLUTION INTRAVENOUS; SUBCUTANEOUS at 12:16

## 2020-07-03 RX ADMIN — INSULIN LISPRO 2 UNITS: 100 INJECTION, SOLUTION INTRAVENOUS; SUBCUTANEOUS at 08:36

## 2020-07-03 RX ADMIN — ENOXAPARIN SODIUM 40 MG: 40 INJECTION SUBCUTANEOUS at 08:35

## 2020-07-03 RX ADMIN — SODIUM CHLORIDE 100 MG: 9 INJECTION, SOLUTION INTRAVENOUS at 17:02

## 2020-07-03 RX ADMIN — INSULIN GLARGINE 45 UNITS: 100 INJECTION, SOLUTION SUBCUTANEOUS at 08:36

## 2020-07-03 RX ADMIN — INSULIN LISPRO 15 UNITS: 100 INJECTION, SOLUTION INTRAVENOUS; SUBCUTANEOUS at 08:36

## 2020-07-03 RX ADMIN — SODIUM CHLORIDE, PRESERVATIVE FREE 10 ML: 5 INJECTION INTRAVENOUS at 08:35

## 2020-07-03 RX ADMIN — INSULIN GLARGINE 45 UNITS: 100 INJECTION, SOLUTION SUBCUTANEOUS at 20:07

## 2020-07-03 RX ADMIN — PRASUGREL HYDROCHLORIDE 10 MG: 10 TABLET, FILM COATED ORAL at 08:35

## 2020-07-03 RX ADMIN — INSULIN LISPRO 15 UNITS: 100 INJECTION, SOLUTION INTRAVENOUS; SUBCUTANEOUS at 17:10

## 2020-07-03 RX ADMIN — CEFTRIAXONE 1 G: 1 INJECTION, POWDER, FOR SOLUTION INTRAMUSCULAR; INTRAVENOUS at 11:43

## 2020-07-03 RX ADMIN — DEXAMETHASONE SODIUM PHOSPHATE 4 MG: 4 INJECTION, SOLUTION INTRAMUSCULAR; INTRAVENOUS at 11:43

## 2020-07-03 ASSESSMENT — PAIN SCALES - GENERAL
PAINLEVEL_OUTOF10: 0

## 2020-07-03 NOTE — PROGRESS NOTES
Patient admitted to ICU rm 16. On 15L NC, O2 sat 96%. VSS, afebrile, A/Ox4. Denies shortness of breath or pain. Call light and urinal within reach. Patient denies any needs at this time. Positioned on Left side. Encouraged to change positions frequently in the bed. Continuing to closely monitor.

## 2020-07-03 NOTE — CARE COORDINATION
INITIAL CASE MANAGEMENT ASSESSMENT (follow up)     DME: Prior to medical admission patient reports that he used no durable medical equipment. He has been on 15L of oxygen most recently. We will continue to monitor for home oxygen needs until discharge.      PLAN/COMMENTS:   1) Continue to monitor for home oxygen needs. 2) Discharge to home with family.      Respectfully submitted,    KAMRON Diaz  [facility]   [phone]    Electronically signed by KAMRON Gabriel on 7/3/2020 at 9:05 AM

## 2020-07-03 NOTE — PROGRESS NOTES
Hospitalist Progress Note      PCP: SILVIO POLLOCK, APRN - CNP    Date of Admission: 6/30/2020    Chief Complaint: SOB. Subjective:     Overall feels better but O2 requirement increased to 14l/min via NC to maintain sats in high 80s. Desats easily with even minimal activity. Does not feel SOB. Medications:  Reviewed    Infusion Medications    dextrose       Scheduled Medications    insulin glargine  45 Units Subcutaneous BID    cefTRIAXone (ROCEPHIN) IV  1 g Intravenous Q24H    insulin lispro  15 Units Subcutaneous TID WC    enoxaparin  40 mg Subcutaneous BID    atorvastatin  40 mg Oral Nightly    aspirin  81 mg Oral Daily    sodium chloride flush  10 mL Intravenous 2 times per day    insulin lispro  0-12 Units Subcutaneous TID WC    insulin lispro  0-6 Units Subcutaneous Nightly    dexamethasone  4 mg Intravenous Q12H    prasugrel  10 mg Oral Daily    remdesivir IVPB  100 mg Intravenous Q24H     PRN Meds: sodium chloride flush, acetaminophen **OR** acetaminophen, polyethylene glycol, ondansetron, glucose, dextrose, glucagon (rDNA), dextrose, benzonatate      Intake/Output Summary (Last 24 hours) at 7/3/2020 1129  Last data filed at 7/3/2020 0486  Gross per 24 hour   Intake 60 ml   Output 800 ml   Net -740 ml       Physical Exam Performed:    BP (!) 134/102   Pulse 98   Temp 98 °F (36.7 °C) (Oral)   Resp 21   Ht 6' 2\" (1.88 m)   Wt 209 lb 7 oz (95 kg)   SpO2 92%   BMI 26.89 kg/m²     General appearance: No apparent distress, appears stated age and cooperative. HEENT: Pupils equal, round, and reactive to light. Conjunctivae/corneas clear. Neck: Supple, with full range of motion. No jugular venous distention. Trachea midline. Respiratory:  Normal respiratory effort. Clear to auscultation, bilaterally without Rales/Wheezes/Rhonchi. Cardiovascular: Regular rate and rhythm with normal S1/S2 without murmurs, rubs or gallops.   Abdomen: Soft, non-tender, non-distended with normal bowel sounds. Musculoskeletal: No clubbing, cyanosis or edema bilaterally. Full range of motion without deformity. Skin: Skin color, texture, turgor normal.  No rashes or lesions. Neurologic:  Neurovascularly intact without any focal sensory/motor deficits. Cranial nerves: II-XII intact, grossly non-focal.  Psychiatric: Alert and oriented, thought content appropriate, normal insight  Capillary Refill: Brisk,< 3 seconds   Peripheral Pulses: +2 palpable, equal bilaterally       Labs:   Recent Labs     07/01/20  0550 07/02/20  0352 07/03/20  0712   WBC 6.8 15.7* 17.2*   HGB 15.9 15.2 14.9   HCT 47.2 45.0 44.3   PLT 99* 148 155     Recent Labs     07/02/20  0352 07/02/20  1530 07/03/20  0321    135* 137   K 4.2 4.0 3.6    103 104   CO2 20* 22 21   BUN 17 14 13   CREATININE 0.8* 0.6* 0.6*   CALCIUM 8.2* 8.4 8.2*   PHOS 1.8* 2.7 2.4*     Recent Labs     06/30/20  1945 07/02/20  0352 07/03/20  0712   AST 31 23 41*   ALT 16 13 15   BILIDIR  --  <0.2 <0.2   BILITOT 0.5 0.3 0.4   ALKPHOS 60 47 62     No results for input(s): INR in the last 72 hours. Recent Labs     07/01/20  1045 07/01/20  1515 07/01/20  2334   TROPONINI <0.01 <0.01 <0.01       Urinalysis:      Lab Results   Component Value Date    NITRU Negative 01/09/2018    WBCUA 0-2 01/09/2018    RBCUA 0-2 01/09/2018    BLOODU Negative 01/09/2018    SPECGRAV 1.020 01/09/2018    GLUCOSEU 500 01/09/2018    GLUCOSEU >=1000 09/10/2010       Radiology:  XR CHEST PORTABLE   Final Result   1. Stable bilateral lung infiltrates which may be related to pulmonary edema   versus pneumonia. XR CHEST PORTABLE   Final Result   Multifocal airspace disease, asymmetrical right pulmonary edema, versus   pneumonia.                  Assessment/Plan:    Active Hospital Problems    Diagnosis    Sepsis ([facility] [address] [A41.9]    Tachycardia [R00.0]    Tachypnea [R06.82]    Fever [R50.9]    Lactic acidosis [E87.2]    COVID-19 virus infection [U07.1]    Poor appetite [R63.0]    High anion gap metabolic acidosis [I10.2]    Pneumonia [J18.9]    DMII (diabetes mellitus, type 2) (HCC) [E11.9]    CAD (coronary artery disease) [I25.10]    HLD (hyperlipidemia) [E78.5]    Essential hypertension [I10]       PNA due to COVID 19 infection. Also growing Strep in sputum. Pt on Vanc and Cefepime - to rocephin IV due to strep in sputum. Cont decadron. ID involved - started remdesivir 7/1      Acute Hypox Resp Failure POA due to above. Worsening - 14l/min today - Tx to ICU for close monitoring. CXR stable bilateral pulm disease. DMII with uncontrolled hyperglycemia  Pt does not take his meds normally - has remote Hx of drug addiction and reports that taking DM meds makes him feel like drug addict, so he wasn't. BG worsened with steroid. Very poor control at baseline - A1C >12  Increased lantus to 45BID. Increased prandial bolus. S/p humulin R IV x2 on 7/1. Cont IVF support. Carb control diet. BG much better now. CAD advanced ischemic cardiomyopathy multivessel CAD with Hx of VT s/p AICD  Follows with Dr Charles Gipson  Pt felt chest pressure on 7/1    - EKG unremarkable. Serial troponin negative. - restarted ASA, statin, effient. Historically does not tolerate BB and ACEI/ARB due to hypotension and recurrent syncope episodes. Lactic Acidosis - due to above. Resolved        DVT Prophylaxis: lovenox up to 40BID. Diet: DIET CARB CONTROL;  Code Status: Full Code      Dispo - cc. To ICU with increasing O2 reqirements. Discussed potential need for intubation with patient - he is ok with it, if this is required.      Marta Dorado MD

## 2020-07-03 NOTE — FLOWSHEET NOTE
Patient returned to supine position. O2 saturations dropped to low 80s. High flow NC increased. Will attempt to wean back down once patient has time to recover.

## 2020-07-03 NOTE — PROGRESS NOTES
Took over care of pt. At 1500 from [facility]. 1540 Awake alert, using IS, volumes only about 250-500 ml. Occasional cough heard, denies pain except ribs get sore with coughing. 1834 Resting quietly, occasional cough, ate about 1/2 of supper, monitor SR, Remains on 14l Hi flow O2.

## 2020-07-03 NOTE — PROGRESS NOTES
1930: Bedside report received from [address] [place] [address]. Pt in droplet plus isolation. Pt awake in bed, watching television. VSS on 14L HFNC, SR per monitor. Pt voids per urinal and bedside commode. Pt denies having any needs at this time. Bed in lowest position, wheels locked, 2/4 siderails up, alarm deferred d/t pt being a and o x 4 and calling appropriately. 2000: Assessment completed, see documentation. Requested pt demonstrate proper use of IS. Pt did so and verbalizes understanding of importance of using while awake. 0000: Assessment completed, see documentation. No changes noted at this time. 0400: Assessment completed, see documentation. No changes noted at this time. 0630: Labs reviewed, potassium replacement protocol order received from Dr. Nola Morgan. 0700: Bedside report given to luda Zambrano

## 2020-07-03 NOTE — PROGRESS NOTES
Pt transferred to 2116 with all personal belongings. Stable on transfer on 15L HFNC. Report given to RN at bedside.  Electronically signed by Sandra Nunes RN on 7/3/2020 at 9:50 AM

## 2020-07-03 NOTE — CONSULTS
REASON FOR CONSULTATION/CC: Worsening hypoxemia      Consult at request of Kassidy Weston MD for worsening hypoxemia  PCP: LAURA MORLEY - CNP  Established Pulmonologist:   None    HISTORY OF PRESENT ILLNESS: Pearlean Merlin is a 54y.o. year old male with a history of GERD, diabetes, history of cardiac arrest who presents with     He was admitted 3 days ago with symptoms of anorexia, fatigue, cough and shortness of breath with fevers. He was diagnosed with COVID-19 prior to admission. Chest x-ray is significant for multifocal pneumonia. Infectious disease was consulted and started on Remdesivir, empiric ceftriaxone, trending CRP ferritin and LDH with dexamethasone for 10 days. Despite that, the patient continues to get worse with worsening hypoxemia transferred to the ICU and is now requiring 15 L nasal cannula. Sputum culture was obtained growing group B strep.       PAST MEDICAL HISTORY:  Past Medical History:   Diagnosis Date    Arthritis     Blood circulation, collateral     CAD (coronary artery disease) 7/15/2014    CAD (coronary artery disease)     Cardiac arrest ([facility] [address] 10/05/2018    Cerebral artery occlusion with cerebral infarction ([facility] [address]     Diabetes mellitus ([address]     Diabetic peripheral neuropathy ([facility] [address] 6/8/2018    GERD (gastroesophageal reflux disease)     ulcers    Hypercholesteremia     Hyperlipidemia     Hypertension     Movement disorder     possible arthritis right hand    MRSA (methicillin resistant staph aureus) culture positive 07/13/2018    foot wound    Neuropathy     Other disorders of kidney and ureter in diseases classified elsewhere     POTS (postural orthostatic tachycardia syndrome)     Psychiatric problem     anxiety, depression, bipolar    Sleep apnea     Syncope     Type II or unspecified type diabetes mellitus without mention of complication, not stated as uncontrolled        PAST SURGICAL HISTORY:  Past Surgical History:   Procedure rash  Neurological: Negative for syncope  Hematological: Negative for adenopathy  Psychiatric/Behavorial: Negative for anxiety    Objective:   PHYSICAL EXAM:  Blood pressure 102/77, pulse 102, temperature 98 °F (36.7 °C), temperature source Oral, resp. rate 28, height 6' 2\" (1.88 m), weight 209 lb 7 oz (95 kg), SpO2 95 %.'  Gen: No distress. Eyes: PERRL. No sclera icterus. No conjunctival injection. ENT: No discharge. Pharynx clear. External appearance of ears and nose normal.  Neck: Trachea midline. No obvious mass. Resp: No accessory muscle use. Few crackles. No wheezes. No rhonchi. CV: Regular rate. Regular rhythm. No murmur or rub. No edema. GI: Non-tender. Non-distended. No hernia. Skin: Warm, dry, normal texture and turgor. No nodule on exposed extremities. Lymph: No cervical LAD. No supraclavicular LAD. M/S: No cyanosis. No clubbing. No joint deformity. Neuro: Moves all four extremities. Psych: Oriented x 3. No anxiety. Awake. Alert. Intact judgement and insight. Data Reviewed:   LABS:  CBC:   Recent Labs     07/01/20  0550 07/02/20  0352 07/03/20  0712   WBC 6.8 15.7* 17.2*   HGB 15.9 15.2 14.9   HCT 47.2 45.0 44.3   MCV 84.2 83.1 83.0   PLT 99* 148 155     BMP:   Recent Labs     07/02/20  0352 07/02/20  1530 07/03/20  0321    135* 137   K 4.2 4.0 3.6    103 104   CO2 20* 22 21   PHOS 1.8* 2.7 2.4*   BUN 17 14 13   CREATININE 0.8* 0.6* 0.6*     LIVER PROFILE:   Recent Labs     06/30/20  1945 07/02/20  0352 07/03/20  0712   AST 31 23 41*   ALT 16 13 15   BILIDIR  --  <0.2 <0.2   BILITOT 0.5 0.3 0.4   ALKPHOS 60 47 62     PT/INR: No results for input(s): PROTIME, INR in the last 72 hours. APTT: No results for input(s): APTT in the last 72 hours.   UA:No results for input(s): NITRITE, COLORU, PHUR, LABCAST, WBCUA, RBCUA, MUCUS, TRICHOMONAS, YEAST, BACTERIA, CLARITYU, SPECGRAV, LEUKOCYTESUR, UROBILINOGEN, BILIRUBINUR, BLOODU, GLUCOSEU, AMORPHOUS in the last 72 hours.    Invalid input(s): [facility]     07/01/20  1730   PHART 7.323*   GFY8XEM 33.7*   PO2ART 51.4*       Vent Information  Skin Assessment: Clean, dry, & intact  SpO2: 95 %    Radiology Review:  Pertinent images / reports were reviewed as a part of this visit. CT Chest w/ contrast: No results found for this or any previous visit. CT Chest w/o contrast: No results found for this or any previous visit. CTPA: No results found for this or any previous visit. CXR PA/LAT:   Results for orders placed during the hospital encounter of 10/30/18   XR CHEST (2 VW)    Narrative EXAMINATION:  TWO VIEWS OF THE CHEST    10/30/2018 1:29 pm    COMPARISON:  October 24, 2018    HISTORY:  ORDERING SYSTEM PROVIDED HISTORY: Pneumonia of left lung due to infectious  organism, unspecified part of lung  TECHNOLOGIST PROVIDED HISTORY:  Ordering Physician Provided Reason for Exam: f/u pneumonia left side  Acuity: Acute  Type of Exam: Subsequent/Follow-up    FINDINGS:  Cardiac silhouette is normal in size. Lungs are clear. Left-sided cardiac  device. No acute bony abnormality. Impression No acute findings         CXR portable:   Results for orders placed during the hospital encounter of 06/30/20   XR CHEST PORTABLE    Narrative EXAMINATION:  ONE XRAY VIEW OF THE CHEST    7/2/2020 10:16 am    COMPARISON:  06/30/2020, 02/07/2019. HISTORY:  ORDERING SYSTEM PROVIDED HISTORY: increased o2 demand  TECHNOLOGIST PROVIDED HISTORY:  Reason for exam:->increased o2 demand    FINDINGS:  There is a left-sided cardiac device. The cardiac silhouette and mediastinal  contours are stable. There are bilateral lung infiltrates, worse since the February exam, however  stable since the 06/30/2020 exam.  This may be related to pulmonary edema  versus pneumonia. Elevation of the right hemidiaphragm is stable. The visualized osseous structures are unremarkable. Impression 1.  Stable bilateral lung infiltrates which may be related to pulmonary edema  versus pneumonia. Assessment:     COVID 19  Acute hypoxemia  Coronary disease  AICD secondary to VT  Syncope  Hyperlipidemia  Diabetes    Plan:      COVID-19  -Remdesivir, dexamethasone  -Has not received plasma. Not likely to be beneficial at this point given duration of illness  -Worsening leukocytosis. This may be secondary to dexamethasone. Add on procalcitonin today. Group B strep should be covered with ceftriaxone. Infectious disease following. Acute hypoxemia  -This likely represents ARDS/viral pneumonia. Chest x-ray from yesterday compared to admission is not significantly changed  - Net 1.8 L negative from admission.    -Patient was self pronating on the floor.  -Continue with high flow nasal cannula. Vapotherm if needed. Okay to use bedside commode. May need to escalate to Vapotherm. CODE STATUS discussed, full code          This note was transcribed using [address] Rapid [employer] Screening. Please disregard any translational errors.     Thank you for the consult    Anurag Rodriguez Pulmonary, Sleep and Critical Care  [phone]

## 2020-07-03 NOTE — FLOWSHEET NOTE
Oxygen demand continues to rise with increase from 7L to 10L since 1900. This RN educated patient on self-proning and has encouraged pt to do so several times throughout the shift. Pt agreeable to self-prone. Secure message sent to on call hospitalist regarding increased O2 demand. Awaiting response.

## 2020-07-03 NOTE — PROGRESS NOTES
This RN attempted to educate patient on incentive spirometer. pt states \"those don't work\". He then attempted to dial his wife's phone number to [facility] her come pick me up\". This RN provided extensive education on supportive oxygen and incentive spirometry. Patient did agree and use IS 3 times. Patient encouraged to use a few times an hour. Patient demonstrates poor understanding of oxygenation requirements despite education. Will continue to educate and encourage proning and IS.

## 2020-07-03 NOTE — PROGRESS NOTES
Pt encouraged to self prone d/t increase in O2 demand. Pt reluctant to teaching from RN, however, RN checked on pt and pt in prone position with sats greater than 95%. Will attempt to wean O2 as pt tolerates.  Electronically signed by Kaiden Ireland RN on 7/3/2020 at 8:25 AM

## 2020-07-04 LAB
ALBUMIN SERPL-MCNC: 2.5 G/DL (ref 3.4–5)
ALP BLD-CCNC: 60 U/L (ref 40–129)
ALT SERPL-CCNC: 16 U/L (ref 10–40)
ANION GAP SERPL CALCULATED.3IONS-SCNC: 10 MMOL/L (ref 3–16)
AST SERPL-CCNC: 40 U/L (ref 15–37)
BASOPHILS ABSOLUTE: 0 K/UL (ref 0–0.2)
BASOPHILS RELATIVE PERCENT: 0.1 %
BILIRUB SERPL-MCNC: 0.4 MG/DL (ref 0–1)
BILIRUBIN DIRECT: <0.2 MG/DL (ref 0–0.3)
BILIRUBIN, INDIRECT: ABNORMAL MG/DL (ref 0–1)
BLOOD CULTURE, ROUTINE: ABNORMAL
BUN BLDV-MCNC: 10 MG/DL (ref 7–20)
CALCIUM SERPL-MCNC: 8.2 MG/DL (ref 8.3–10.6)
CHLORIDE BLD-SCNC: 105 MMOL/L (ref 99–110)
CO2: 26 MMOL/L (ref 21–32)
CREAT SERPL-MCNC: <0.5 MG/DL (ref 0.9–1.3)
CULTURE, BLOOD 2: NORMAL
D DIMER: 713 NG/ML DDU (ref 0–229)
EOSINOPHILS ABSOLUTE: 0 K/UL (ref 0–0.6)
EOSINOPHILS RELATIVE PERCENT: 0 %
FERRITIN: 1041 NG/ML (ref 30–400)
GFR AFRICAN AMERICAN: >60
GFR NON-AFRICAN AMERICAN: >60
GLUCOSE BLD-MCNC: 107 MG/DL (ref 70–99)
GLUCOSE BLD-MCNC: 58 MG/DL (ref 70–99)
GLUCOSE BLD-MCNC: 61 MG/DL (ref 70–99)
GLUCOSE BLD-MCNC: 69 MG/DL (ref 70–99)
GLUCOSE BLD-MCNC: 85 MG/DL (ref 70–99)
GLUCOSE BLD-MCNC: 91 MG/DL (ref 70–99)
GLUCOSE BLD-MCNC: 94 MG/DL (ref 70–99)
HCT VFR BLD CALC: 44.2 % (ref 40.5–52.5)
HEMOGLOBIN: 15.1 G/DL (ref 13.5–17.5)
LYMPHOCYTES ABSOLUTE: 0.8 K/UL (ref 1–5.1)
LYMPHOCYTES RELATIVE PERCENT: 4.5 %
MCH RBC QN AUTO: 28.1 PG (ref 26–34)
MCHC RBC AUTO-ENTMCNC: 34.2 G/DL (ref 31–36)
MCV RBC AUTO: 82.3 FL (ref 80–100)
MONOCYTES ABSOLUTE: 1.2 K/UL (ref 0–1.3)
MONOCYTES RELATIVE PERCENT: 6.9 %
NEUTROPHILS ABSOLUTE: 15.2 K/UL (ref 1.7–7.7)
NEUTROPHILS RELATIVE PERCENT: 88.5 %
ORGANISM: ABNORMAL
PDW BLD-RTO: 13.1 % (ref 12.4–15.4)
PERFORMED ON: ABNORMAL
PERFORMED ON: NORMAL
PHOSPHORUS: 2.8 MG/DL (ref 2.5–4.9)
PLATELET # BLD: 183 K/UL (ref 135–450)
PMV BLD AUTO: 9.1 FL (ref 5–10.5)
POTASSIUM SERPL-SCNC: 3.1 MMOL/L (ref 3.5–5.1)
RBC # BLD: 5.37 M/UL (ref 4.2–5.9)
SODIUM BLD-SCNC: 141 MMOL/L (ref 136–145)
TOTAL PROTEIN: 5.7 G/DL (ref 6.4–8.2)
WBC # BLD: 17.2 K/UL (ref 4–11)

## 2020-07-04 PROCEDURE — 2580000003 HC RX 258: Performed by: INTERNAL MEDICINE

## 2020-07-04 PROCEDURE — 80076 HEPATIC FUNCTION PANEL: CPT

## 2020-07-04 PROCEDURE — 2000000000 HC ICU R&B

## 2020-07-04 PROCEDURE — 99233 SBSQ HOSP IP/OBS HIGH 50: CPT | Performed by: INTERNAL MEDICINE

## 2020-07-04 PROCEDURE — 6360000002 HC RX W HCPCS: Performed by: INTERNAL MEDICINE

## 2020-07-04 PROCEDURE — 36415 COLL VENOUS BLD VENIPUNCTURE: CPT

## 2020-07-04 PROCEDURE — 82728 ASSAY OF FERRITIN: CPT

## 2020-07-04 PROCEDURE — 94761 N-INVAS EAR/PLS OXIMETRY MLT: CPT

## 2020-07-04 PROCEDURE — 80069 RENAL FUNCTION PANEL: CPT

## 2020-07-04 PROCEDURE — 6370000000 HC RX 637 (ALT 250 FOR IP): Performed by: INTERNAL MEDICINE

## 2020-07-04 PROCEDURE — 85379 FIBRIN DEGRADATION QUANT: CPT

## 2020-07-04 PROCEDURE — 85025 COMPLETE CBC W/AUTO DIFF WBC: CPT

## 2020-07-04 PROCEDURE — 2700000000 HC OXYGEN THERAPY PER DAY

## 2020-07-04 PROCEDURE — 02HV33Z INSERTION OF INFUSION DEVICE INTO SUPERIOR VENA CAVA, PERCUTANEOUS APPROACH: ICD-10-PCS | Performed by: INTERNAL MEDICINE

## 2020-07-04 RX ORDER — POTASSIUM CHLORIDE 20 MEQ/1
40 TABLET, EXTENDED RELEASE ORAL PRN
Status: DISCONTINUED | OUTPATIENT
Start: 2020-07-04 | End: 2020-07-09 | Stop reason: HOSPADM

## 2020-07-04 RX ORDER — INSULIN GLARGINE 100 [IU]/ML
20 INJECTION, SOLUTION SUBCUTANEOUS 2 TIMES DAILY
Status: DISCONTINUED | OUTPATIENT
Start: 2020-07-04 | End: 2020-07-05

## 2020-07-04 RX ORDER — POTASSIUM CHLORIDE 7.45 MG/ML
10 INJECTION INTRAVENOUS PRN
Status: DISCONTINUED | OUTPATIENT
Start: 2020-07-04 | End: 2020-07-09 | Stop reason: HOSPADM

## 2020-07-04 RX ADMIN — BENZONATATE 200 MG: 100 CAPSULE ORAL at 08:34

## 2020-07-04 RX ADMIN — CEFTRIAXONE 1 G: 1 INJECTION, POWDER, FOR SOLUTION INTRAMUSCULAR; INTRAVENOUS at 13:33

## 2020-07-04 RX ADMIN — POTASSIUM CHLORIDE 40 MEQ: 1500 TABLET, EXTENDED RELEASE ORAL at 08:44

## 2020-07-04 RX ADMIN — INSULIN GLARGINE 20 UNITS: 100 INJECTION, SOLUTION SUBCUTANEOUS at 21:35

## 2020-07-04 RX ADMIN — ENOXAPARIN SODIUM 40 MG: 40 INJECTION SUBCUTANEOUS at 08:34

## 2020-07-04 RX ADMIN — SODIUM CHLORIDE, PRESERVATIVE FREE 10 ML: 5 INJECTION INTRAVENOUS at 08:35

## 2020-07-04 RX ADMIN — INSULIN GLARGINE 20 UNITS: 100 INJECTION, SOLUTION SUBCUTANEOUS at 08:54

## 2020-07-04 RX ADMIN — INSULIN LISPRO 12 UNITS: 100 INJECTION, SOLUTION INTRAVENOUS; SUBCUTANEOUS at 17:49

## 2020-07-04 RX ADMIN — INSULIN LISPRO 12 UNITS: 100 INJECTION, SOLUTION INTRAVENOUS; SUBCUTANEOUS at 13:48

## 2020-07-04 RX ADMIN — DEXAMETHASONE SODIUM PHOSPHATE 4 MG: 4 INJECTION, SOLUTION INTRAMUSCULAR; INTRAVENOUS at 11:13

## 2020-07-04 RX ADMIN — ATORVASTATIN CALCIUM 40 MG: 40 TABLET, FILM COATED ORAL at 21:11

## 2020-07-04 RX ADMIN — ASPIRIN 81 MG 81 MG: 81 TABLET ORAL at 08:34

## 2020-07-04 RX ADMIN — SODIUM CHLORIDE 100 MG: 9 INJECTION, SOLUTION INTRAVENOUS at 17:43

## 2020-07-04 RX ADMIN — DEXAMETHASONE SODIUM PHOSPHATE 4 MG: 4 INJECTION, SOLUTION INTRAMUSCULAR; INTRAVENOUS at 22:46

## 2020-07-04 RX ADMIN — SODIUM CHLORIDE, PRESERVATIVE FREE 10 ML: 5 INJECTION INTRAVENOUS at 21:11

## 2020-07-04 RX ADMIN — ENOXAPARIN SODIUM 40 MG: 40 INJECTION SUBCUTANEOUS at 21:10

## 2020-07-04 RX ADMIN — PRASUGREL HYDROCHLORIDE 10 MG: 10 TABLET, FILM COATED ORAL at 08:34

## 2020-07-04 ASSESSMENT — PAIN SCALES - GENERAL
PAINLEVEL_OUTOF10: 0
PAINLEVEL_OUTOF10: 0

## 2020-07-04 NOTE — PLAN OF CARE
Problem: Skin Integrity:  Goal: Will show no infection signs and symptoms  Description: Will show no infection signs and symptoms  7/4/2020 1007 by Jeny Wall RN  Outcome: Ongoing  7/4/2020 0156 by Missael Mayers RN  Outcome: Ongoing  Goal: Absence of new skin breakdown  Description: Absence of new skin breakdown  7/4/2020 1007 by Jeny Wall RN  Outcome: Ongoing  7/4/2020 0156 by Missael Mayers RN  Outcome: Ongoing     Problem: Falls - Risk of:  Goal: Will remain free from falls  Description: Will remain free from falls  7/4/2020 1007 by Jeny Wall RN  Outcome: Ongoing  7/4/2020 0156 by Missael Mayers RN  Outcome: Ongoing  Goal: Absence of physical injury  Description: Absence of physical injury  7/4/2020 1007 by Jeny Wlal RN  Outcome: Ongoing  7/4/2020 0156 by Missael Mayers RN  Outcome: Ongoing     Problem: Pain:  Description: Pain management should include both nonpharmacologic and pharmacologic interventions.   Goal: Pain level will decrease  Description: Pain level will decrease  7/4/2020 1007 by Jeny Wall RN  Outcome: Ongoing  7/4/2020 0156 by Missael Mayers RN  Outcome: Ongoing  Goal: Control of acute pain  Description: Control of acute pain  7/4/2020 1007 by Jeny Wall RN  Outcome: Ongoing  7/4/2020 0156 by Missael Mayers RN  Outcome: Ongoing  Goal: Control of chronic pain  Description: Control of chronic pain  7/4/2020 1007 by Jeny Wall RN  Outcome: Ongoing  7/4/2020 0156 by Missael Mayers RN  Outcome: Ongoing     Problem: Airway Clearance - Ineffective:  Goal: Ability to maintain a clear airway will improve  Description: Ability to maintain a clear airway will improve  7/4/2020 1007 by Jeny Wall RN  Outcome: Ongoing  7/4/2020 0156 by Missael Mayers RN  Outcome: Ongoing     Problem: Airway Clearance - Ineffective  Goal: Achieve or maintain patent airway  7/4/2020 1007 by Jeny Wall RN  Outcome: Ongoing  7/4/2020 0156 by Missael Mayers RN  Outcome: Ongoing     Problem: Gas Exchange - Impaired  Goal: Absence of hypoxia  7/4/2020 1007 by Makayla Pandya RN  Outcome: Ongoing  7/4/2020 0156 by Jatinder Beard RN  Outcome: Ongoing  Goal: Promote optimal lung function  7/4/2020 1007 by Makayla Pandya RN  Outcome: Ongoing  7/4/2020 0156 by Jatinder Beard RN  Outcome: Ongoing     Problem: Breathing Pattern - Ineffective  Goal: Ability to achieve and maintain a regular respiratory rate  7/4/2020 1007 by Makayla Pandya RN  Outcome: Ongoing  7/4/2020 0156 by Jatinder Beard RN  Outcome: Ongoing     Problem:  Body Temperature -  Risk of, Imbalanced  Goal: Ability to maintain a body temperature within defined limits  7/4/2020 1007 by Makayla Pandya RN  Outcome: Ongoing  7/4/2020 0156 by Jatinder Beard RN  Outcome: Ongoing  Goal: Will regain or maintain usual level of consciousness  7/4/2020 1007 by Makayla Pandya RN  Outcome: Ongoing  7/4/2020 0156 by Jatinder Beard RN  Outcome: Ongoing  Goal: Complications related to the disease process, condition or treatment will be avoided or minimized  7/4/2020 1007 by Makayla Pandya RN  Outcome: Ongoing  7/4/2020 0156 by Jatinder Beard RN  Outcome: Ongoing     Problem: Isolation Precautions - Risk of Spread of Infection  Goal: Prevent transmission of infection  7/4/2020 1007 by Makayla Pandya RN  Outcome: Ongoing  7/4/2020 0156 by Jatinder Beard RN  Outcome: Ongoing     Problem: Nutrition Deficits  Goal: Optimize nutrtional status  7/4/2020 1007 by Makayla Pandya RN  Outcome: Ongoing  7/4/2020 0156 by Jatinder Beard RN  Outcome: Ongoing     Problem: Risk for Fluid Volume Deficit  Goal: Maintain normal heart rhythm  7/4/2020 1007 by Makayla Pandya RN  Outcome: Ongoing  7/4/2020 0156 by Jatinder Beard RN  Outcome: Ongoing  Goal: Maintain absence of muscle cramping  7/4/2020 1007 by Makayla Pandya RN  Outcome: Ongoing  7/4/2020 0156 by Jatinder Beard RN  Outcome: Ongoing  Goal: Maintain normal serum potassium, sodium, calcium, phosphorus, and pH  7/4/2020 1007 by Makayla Pandya RN  Outcome: Ongoing  7/4/2020 0156 by Ofe Blum RN  Outcome: Ongoing     Problem: Loneliness or Risk for Loneliness  Goal: Demonstrate positive use of time alone when socialization is not possible  7/4/2020 1007 by Serene Baron RN  Outcome: Ongoing  7/4/2020 0156 by Ofe Blum RN  Outcome: Ongoing     Problem: Fatigue  Goal: Verbalize increase energy and improved vitality  7/4/2020 1007 by Serene Baron RN  Outcome: Ongoing  7/4/2020 0156 by Ofe Blum RN  Outcome: Ongoing     Problem: Patient Education: Go to Patient Education Activity  Goal: Patient/Family Education  7/4/2020 1007 by Serene Baron RN  Outcome: Ongoing  7/4/2020 0156 by Ofe Blum RN  Outcome: Ongoing     Problem: Breathing Pattern - Ineffective:  Goal: Ability to achieve and maintain a regular respiratory rate will improve  Description: Ability to achieve and maintain a regular respiratory rate will improve  7/4/2020 1007 by Serene Baron RN  Outcome: Ongoing  7/4/2020 0156 by Ofe Blum RN  Outcome: Ongoing

## 2020-07-04 NOTE — PROGRESS NOTES
Pulmonary Progress Note    Date of Admission: 6/30/2020   LOS: 4 days       CC:  COVID-19 with severe hypoxemia    Subjective:  No events night. He was transferred down yesterday. Continues on high flow nasal cannula. Blood culture positive for Staphylococcus coagulase-negative. Strep. Getting up to bedside commode. Hypoxemia with this. ROS:   No nausea  No Vomiting  No chest pain        PHYSICAL EXAM:   Blood pressure 137/75, pulse 68, temperature 99.6 °F (37.6 °C), temperature source Oral, resp. rate 19, height 6' 2\" (1.88 m), weight 209 lb 10.5 oz (95.1 kg), SpO2 91 %.'  Gen: No distress. Eyes: PERRL. No sclera icterus. No conjunctival injection. ENT: No discharge. Pharynx clear. External appearance of ears and nose normal.  Neck: Trachea midline. No obvious mass. Resp: No accessory muscle use. Few crackles. No wheezes. No rhonchi. CV: Regular rate. Regular rhythm. No murmur or rub. No edema. GI: Non-tender. Non-distended. No hernia. Skin: Warm, dry, normal texture and turgor. No nodule on exposed extremities. Lymph: No cervical LAD. No supraclavicular LAD. M/S: No cyanosis. No clubbing. No joint deformity. Neuro: Moves all four extremities. Psych: Oriented x 3. No anxiety. Awake. Alert. Intact judgement and insight.       Medications:    Scheduled Meds:   insulin glargine  45 Units Subcutaneous BID    cefTRIAXone (ROCEPHIN) IV  1 g Intravenous Q24H    insulin lispro  15 Units Subcutaneous TID     enoxaparin  40 mg Subcutaneous BID    atorvastatin  40 mg Oral Nightly    aspirin  81 mg Oral Daily    sodium chloride flush  10 mL Intravenous 2 times per day    insulin lispro  0-12 Units Subcutaneous TID     insulin lispro  0-6 Units Subcutaneous Nightly    dexamethasone  4 mg Intravenous Q12H    prasugrel  10 mg Oral Daily    remdesivir IVPB  100 mg Intravenous Q24H       Continuous Infusions:   dextrose         PRN Meds:  potassium chloride **OR** potassium alternative oral replacement **OR** potassium chloride, sodium chloride flush, acetaminophen **OR** acetaminophen, polyethylene glycol, ondansetron, glucose, dextrose, glucagon (rDNA), dextrose, benzonatate    Labs reviewed:  CBC:   Recent Labs     07/02/20  0352 07/03/20 0712 07/04/20 0429   WBC 15.7* 17.2* 17.2*   HGB 15.2 14.9 15.1   HCT 45.0 44.3 44.2   MCV 83.1 83.0 82.3    155 183     BMP:   Recent Labs     07/02/20  1530 07/03/20  0321 07/04/20  0429   * 137 141   K 4.0 3.6 3.1*    104 105   CO2 22 21 26   PHOS 2.7 2.4* 2.8   BUN 14 13 10   CREATININE 0.6* 0.6* <0.5*     LIVER PROFILE:   Recent Labs     07/02/20  0352 07/03/20 0712 07/04/20 0429   AST 23 41* 40*   ALT 13 15 16   BILIDIR <0.2 <0.2 <0.2   BILITOT 0.3 0.4 0.4   ALKPHOS 47 62 60     PT/INR: No results for input(s): PROTIME, INR in the last 72 hours. APTT: No results for input(s): APTT in the last 72 hours. UA:No results for input(s): NITRITE, COLORU, PHUR, LABCAST, WBCUA, RBCUA, MUCUS, TRICHOMONAS, YEAST, BACTERIA, CLARITYU, SPECGRAV, LEUKOCYTESUR, UROBILINOGEN, BILIRUBINUR, BLOODU, GLUCOSEU, AMORPHOUS in the last 72 hours. Invalid input(s): Barbette Gary  No results for input(s): PH, PCO2, PO2 in the last 72 hours. Cx:      Films:         Assessment:     COVID-19  Severe acute hypoxemia saturation less than 90% on room air  Coronary disease  AICD secondary to VT  Syncope  Hyperlipidemia  Diabetes       Plan:      COVID-19  -Remdesivir, dexamethasone  -Leukocytosis likely secondary to steroids. Does have infection. On antibiotics from infectious disease.  -The patient was surprised and somewhat devastated by estimation and duration illness in the hospital.  I advised he would be in the hospital for least 1 more week. -Continue using incentive spirometry  -Wean oxygen to 90% saturation. Currently requiring 14 L nasal cannula. -Fluid status controlled at -1.7 L  - replace K.       Mobility  As tolerated    Access  Arterial      PICC          CVC                    This note was transcribed using [id number] G3. Please disregard any translational errors.       Anurag Rodriguez [phone]

## 2020-07-04 NOTE — PROGRESS NOTES
Hospitalist Progress Note      PCP: SILVIO POLLOCK, APRN - CNP    Date of Admission: 6/30/2020    Chief Complaint: SOB. Subjective:     O2 requirement spiked 7/2 - 7/3 - on 14-15l/min now. Feels well. Frustrated. Medications:  Reviewed    Infusion Medications    dextrose       Scheduled Medications    insulin lispro  12 Units Subcutaneous TID WC    insulin glargine  20 Units Subcutaneous BID    cefTRIAXone (ROCEPHIN) IV  1 g Intravenous Q24H    enoxaparin  40 mg Subcutaneous BID    atorvastatin  40 mg Oral Nightly    aspirin  81 mg Oral Daily    sodium chloride flush  10 mL Intravenous 2 times per day    insulin lispro  0-12 Units Subcutaneous TID WC    insulin lispro  0-6 Units Subcutaneous Nightly    dexamethasone  4 mg Intravenous Q12H    prasugrel  10 mg Oral Daily    remdesivir IVPB  100 mg Intravenous Q24H     PRN Meds: potassium chloride **OR** potassium alternative oral replacement **OR** potassium chloride, sodium chloride flush, acetaminophen **OR** acetaminophen, polyethylene glycol, ondansetron, glucose, dextrose, glucagon (rDNA), dextrose, benzonatate      Intake/Output Summary (Last 24 hours) at 7/4/2020 1135  Last data filed at 7/4/2020 1040  Gross per 24 hour   Intake 490 ml   Output 1000 ml   Net -510 ml       Physical Exam Performed:    BP (!) 123/90   Pulse 107   Temp 97.6 °F (36.4 °C) (Oral)   Resp 22   Ht 6' 2\" (1.88 m)   Wt 209 lb 10.5 oz (95.1 kg)   SpO2 (!) 87%   BMI 26.92 kg/m²     General appearance: No apparent distress, appears stated age and cooperative. HEENT: Pupils equal, round, and reactive to light. Conjunctivae/corneas clear. Neck: Supple, with full range of motion. No jugular venous distention. Trachea midline. Respiratory:  Normal respiratory effort. Clear to auscultation, bilaterally without Rales/Wheezes/Rhonchi. Cardiovascular: Regular rate and rhythm with normal S1/S2 without murmurs, rubs or gallops.   Abdomen: Soft, non-tender, non-distended with normal bowel sounds. Musculoskeletal: No clubbing, cyanosis or edema bilaterally. Full range of motion without deformity. Skin: Skin color, texture, turgor normal.  No rashes or lesions. Neurologic:  Neurovascularly intact without any focal sensory/motor deficits. Cranial nerves: II-XII intact, grossly non-focal.  Psychiatric: Alert and oriented, thought content appropriate, normal insight  Capillary Refill: Brisk,< 3 seconds   Peripheral Pulses: +2 palpable, equal bilaterally       Labs:   Recent Labs     07/02/20  0352 07/03/20  0712 07/04/20  0429   WBC 15.7* 17.2* 17.2*   HGB 15.2 14.9 15.1   HCT 45.0 44.3 44.2    155 183     Recent Labs     07/02/20  1530 07/03/20  0321 07/04/20  0429   * 137 141   K 4.0 3.6 3.1*    104 105   CO2 22 21 26   BUN 14 13 10   CREATININE 0.6* 0.6* <0.5*   CALCIUM 8.4 8.2* 8.2*   PHOS 2.7 2.4* 2.8     Recent Labs     07/02/20  0352 07/03/20  0712 07/04/20  0429   AST 23 41* 40*   ALT 13 15 16   BILIDIR <0.2 <0.2 <0.2   BILITOT 0.3 0.4 0.4   ALKPHOS 47 62 60     No results for input(s): INR in the last 72 hours. Recent Labs     07/01/20  1515 07/01/20  2334   TROPONINI <0.01 <0.01       Urinalysis:      Lab Results   Component Value Date    NITRU Negative 01/09/2018    WBCUA 0-2 01/09/2018    RBCUA 0-2 01/09/2018    BLOODU Negative 01/09/2018    SPECGRAV 1.020 01/09/2018    GLUCOSEU 500 01/09/2018    GLUCOSEU >=1000 09/10/2010       Radiology:  XR CHEST PORTABLE   Final Result   1. Stable bilateral lung infiltrates which may be related to pulmonary edema   versus pneumonia. XR CHEST PORTABLE   Final Result   Multifocal airspace disease, asymmetrical right pulmonary edema, versus   pneumonia.                  Assessment/Plan:    Active Hospital Problems    Diagnosis    Sepsis ([facility] [address] [A41.9]    Tachycardia [R00.0]    Tachypnea [R06.82]    Fever [R50.9]    Lactic acidosis [E87.2]    COVID-19 virus infection [U07.1]    Poor appetite [R63.0]    High anion gap metabolic acidosis [F29.9]    Pneumonia [J18.9]    DMII (diabetes mellitus, type 2) (HCC) [E11.9]    CAD (coronary artery disease) [I25.10]    HLD (hyperlipidemia) [E78.5]    Essential hypertension [I10]       PNA due to COVID 19 infection. Also growing Strep in sputum. Pt on Vanc and Cefepime - to rocephin IV due to strep in sputum. Cont decadron. ID involved - started remdesivir 7/1        Acute Hypox Resp Failure POA due to above. Worsening - 14-15l/min   - Txed to ICU on 7/3 for close monitoring. CXR stable bilateral pulm disease. Cc involved. DMII with uncontrolled hyperglycemia  Pt does not take his meds normally - has remote Hx of drug addiction and reports that taking DM meds makes him feel like drug addict, so he wasn't. BG worsened with steroid. Very poor control at baseline - A1C >12  Increased lantus to 45BID. Increased prandial bolus. S/p humulin R IV x2 on 7/1. Carb control diet. BG much better now - actually on the low side this am - decrease lantus to 20BID and cut prandial bolus dose. CAD advanced ischemic cardiomyopathy multivessel CAD with Hx of VT s/p AICD  Follows with Dr Rhiannon Fried  Pt felt chest pressure on 7/1    - EKG unremarkable. Serial troponin negative. - restarted ASA, statin, effient. Historically does not tolerate BB and ACEI/ARB due to hypotension and recurrent syncope episodes. Lactic Acidosis - due to above. Resolved        DVT Prophylaxis: lovenox up to 40BID. Diet: DIET CARB CONTROL;  Code Status: Full Code      Dispo - cc. To ICU with increasing O2 reqirements. Discussed potential need for intubation with patient - he is ok with it, if this is required.      Mac Perdomo MD

## 2020-07-04 NOTE — PLAN OF CARE
Problem: Skin Integrity:  Goal: Will show no infection signs and symptoms  Description: Will show no infection signs and symptoms  Outcome: Ongoing  Goal: Absence of new skin breakdown  Description: Absence of new skin breakdown  Outcome: Ongoing     Problem: Falls - Risk of:  Goal: Will remain free from falls  Description: Will remain free from falls  Outcome: Ongoing  Goal: Absence of physical injury  Description: Absence of physical injury  Outcome: Ongoing     Problem: Pain:  Goal: Pain level will decrease  Description: Pain level will decrease  Outcome: Ongoing  Goal: Control of acute pain  Description: Control of acute pain  Outcome: Ongoing  Goal: Control of chronic pain  Description: Control of chronic pain  Outcome: Ongoing     Problem: Airway Clearance - Ineffective:  Goal: Ability to maintain a clear airway will improve  Description: Ability to maintain a clear airway will improve  Outcome: Ongoing     Problem: Airway Clearance - Ineffective  Goal: Achieve or maintain patent airway  Outcome: Ongoing     Problem: Gas Exchange - Impaired  Goal: Absence of hypoxia  Outcome: Ongoing  Goal: Promote optimal lung function  Outcome: Ongoing     Problem: Breathing Pattern - Ineffective  Goal: Ability to achieve and maintain a regular respiratory rate  Outcome: Ongoing     Problem:  Body Temperature -  Risk of, Imbalanced  Goal: Ability to maintain a body temperature within defined limits  Outcome: Ongoing  Goal: Will regain or maintain usual level of consciousness  Outcome: Ongoing  Goal: Complications related to the disease process, condition or treatment will be avoided or minimized  Outcome: Ongoing     Problem: Isolation Precautions - Risk of Spread of Infection  Goal: Prevent transmission of infection  Outcome: Ongoing     Problem: Nutrition Deficits  Goal: Optimize nutrtional status  Outcome: Ongoing     Problem: Risk for Fluid Volume Deficit  Goal: Maintain normal heart rhythm  Outcome: Ongoing  Goal: Maintain absence of muscle cramping  Outcome: Ongoing  Goal: Maintain normal serum potassium, sodium, calcium, phosphorus, and pH  Outcome: Ongoing     Problem: Loneliness or Risk for Loneliness  Goal: Demonstrate positive use of time alone when socialization is not possible  Outcome: Ongoing     Problem: Fatigue  Goal: Verbalize increase energy and improved vitality  Outcome: Ongoing     Problem: Patient Education: Go to Patient Education Activity  Goal: Patient/Family Education  Outcome: Ongoing     Problem: Breathing Pattern - Ineffective:  Goal: Ability to achieve and maintain a regular respiratory rate will improve  Description: Ability to achieve and maintain a regular respiratory rate will improve  Outcome: Ongoing

## 2020-07-05 LAB
ALBUMIN SERPL-MCNC: 2.5 G/DL (ref 3.4–5)
ALP BLD-CCNC: 66 U/L (ref 40–129)
ALT SERPL-CCNC: 16 U/L (ref 10–40)
ANION GAP SERPL CALCULATED.3IONS-SCNC: 12 MMOL/L (ref 3–16)
AST SERPL-CCNC: 28 U/L (ref 15–37)
ATYPICAL LYMPHOCYTE RELATIVE PERCENT: 1 % (ref 0–6)
BANDED NEUTROPHILS RELATIVE PERCENT: 1 % (ref 0–7)
BASOPHILS ABSOLUTE: 0 K/UL (ref 0–0.2)
BASOPHILS RELATIVE PERCENT: 0 %
BILIRUB SERPL-MCNC: 0.4 MG/DL (ref 0–1)
BILIRUBIN DIRECT: <0.2 MG/DL (ref 0–0.3)
BILIRUBIN, INDIRECT: ABNORMAL MG/DL (ref 0–1)
BUN BLDV-MCNC: 11 MG/DL (ref 7–20)
CALCIUM SERPL-MCNC: 8.4 MG/DL (ref 8.3–10.6)
CHLORIDE BLD-SCNC: 103 MMOL/L (ref 99–110)
CO2: 27 MMOL/L (ref 21–32)
CREAT SERPL-MCNC: 0.5 MG/DL (ref 0.9–1.3)
CRENATED RBC'S: ABNORMAL
D DIMER: 926 NG/ML DDU (ref 0–229)
EOSINOPHILS ABSOLUTE: 0 K/UL (ref 0–0.6)
EOSINOPHILS RELATIVE PERCENT: 0 %
FERRITIN: 892 NG/ML (ref 30–400)
GFR AFRICAN AMERICAN: >60
GFR NON-AFRICAN AMERICAN: >60
GLUCOSE BLD-MCNC: 113 MG/DL (ref 70–99)
GLUCOSE BLD-MCNC: 142 MG/DL (ref 70–99)
GLUCOSE BLD-MCNC: 152 MG/DL (ref 70–99)
GLUCOSE BLD-MCNC: 163 MG/DL (ref 70–99)
GLUCOSE BLD-MCNC: 182 MG/DL (ref 70–99)
GLUCOSE BLD-MCNC: 253 MG/DL (ref 70–99)
HCT VFR BLD CALC: 45.1 % (ref 40.5–52.5)
HEMOGLOBIN: 15.3 G/DL (ref 13.5–17.5)
LYMPHOCYTES ABSOLUTE: 0.5 K/UL (ref 1–5.1)
LYMPHOCYTES RELATIVE PERCENT: 2 %
MCH RBC QN AUTO: 27.9 PG (ref 26–34)
MCHC RBC AUTO-ENTMCNC: 33.9 G/DL (ref 31–36)
MCV RBC AUTO: 82.3 FL (ref 80–100)
MONOCYTES ABSOLUTE: 1.2 K/UL (ref 0–1.3)
MONOCYTES RELATIVE PERCENT: 7 %
NEUTROPHILS ABSOLUTE: 15.6 K/UL (ref 1.7–7.7)
NEUTROPHILS RELATIVE PERCENT: 89 %
PDW BLD-RTO: 13.2 % (ref 12.4–15.4)
PERFORMED ON: ABNORMAL
PHOSPHORUS: 3.8 MG/DL (ref 2.5–4.9)
PLATELET # BLD: 189 K/UL (ref 135–450)
PLATELET SLIDE REVIEW: ADEQUATE
PMV BLD AUTO: 8.8 FL (ref 5–10.5)
POIKILOCYTES: ABNORMAL
POTASSIUM SERPL-SCNC: 3.5 MMOL/L (ref 3.5–5.1)
RBC # BLD: 5.49 M/UL (ref 4.2–5.9)
SLIDE REVIEW: ABNORMAL
SODIUM BLD-SCNC: 142 MMOL/L (ref 136–145)
TOTAL PROTEIN: 5.6 G/DL (ref 6.4–8.2)
WBC # BLD: 17.3 K/UL (ref 4–11)

## 2020-07-05 PROCEDURE — 85379 FIBRIN DEGRADATION QUANT: CPT

## 2020-07-05 PROCEDURE — 80076 HEPATIC FUNCTION PANEL: CPT

## 2020-07-05 PROCEDURE — 2580000003 HC RX 258: Performed by: INTERNAL MEDICINE

## 2020-07-05 PROCEDURE — 80069 RENAL FUNCTION PANEL: CPT

## 2020-07-05 PROCEDURE — 36415 COLL VENOUS BLD VENIPUNCTURE: CPT

## 2020-07-05 PROCEDURE — 94760 N-INVAS EAR/PLS OXIMETRY 1: CPT

## 2020-07-05 PROCEDURE — 82728 ASSAY OF FERRITIN: CPT

## 2020-07-05 PROCEDURE — 6360000002 HC RX W HCPCS: Performed by: INTERNAL MEDICINE

## 2020-07-05 PROCEDURE — 85025 COMPLETE CBC W/AUTO DIFF WBC: CPT

## 2020-07-05 PROCEDURE — 2700000000 HC OXYGEN THERAPY PER DAY

## 2020-07-05 PROCEDURE — 6370000000 HC RX 637 (ALT 250 FOR IP): Performed by: INTERNAL MEDICINE

## 2020-07-05 PROCEDURE — 2000000000 HC ICU R&B

## 2020-07-05 RX ORDER — DEXAMETHASONE 6 MG/1
6 TABLET ORAL DAILY
Status: COMPLETED | OUTPATIENT
Start: 2020-07-05 | End: 2020-07-09

## 2020-07-05 RX ORDER — INSULIN GLARGINE 100 [IU]/ML
20 INJECTION, SOLUTION SUBCUTANEOUS NIGHTLY
Status: DISCONTINUED | OUTPATIENT
Start: 2020-07-05 | End: 2020-07-08

## 2020-07-05 RX ADMIN — ENOXAPARIN SODIUM 40 MG: 40 INJECTION SUBCUTANEOUS at 08:26

## 2020-07-05 RX ADMIN — INSULIN LISPRO 2 UNITS: 100 INJECTION, SOLUTION INTRAVENOUS; SUBCUTANEOUS at 13:12

## 2020-07-05 RX ADMIN — CEFTRIAXONE 1 G: 1 INJECTION, POWDER, FOR SOLUTION INTRAMUSCULAR; INTRAVENOUS at 12:31

## 2020-07-05 RX ADMIN — SODIUM CHLORIDE 100 MG: 9 INJECTION, SOLUTION INTRAVENOUS at 17:33

## 2020-07-05 RX ADMIN — INSULIN LISPRO 7 UNITS: 100 INJECTION, SOLUTION INTRAVENOUS; SUBCUTANEOUS at 10:00

## 2020-07-05 RX ADMIN — INSULIN LISPRO 7 UNITS: 100 INJECTION, SOLUTION INTRAVENOUS; SUBCUTANEOUS at 13:11

## 2020-07-05 RX ADMIN — SODIUM CHLORIDE, PRESERVATIVE FREE 10 ML: 5 INJECTION INTRAVENOUS at 08:33

## 2020-07-05 RX ADMIN — ATORVASTATIN CALCIUM 40 MG: 40 TABLET, FILM COATED ORAL at 20:50

## 2020-07-05 RX ADMIN — ASPIRIN 81 MG 81 MG: 81 TABLET ORAL at 08:26

## 2020-07-05 RX ADMIN — INSULIN GLARGINE 20 UNITS: 100 INJECTION, SOLUTION SUBCUTANEOUS at 21:43

## 2020-07-05 RX ADMIN — PRASUGREL HYDROCHLORIDE 10 MG: 10 TABLET, FILM COATED ORAL at 08:26

## 2020-07-05 RX ADMIN — ENOXAPARIN SODIUM 40 MG: 40 INJECTION SUBCUTANEOUS at 20:50

## 2020-07-05 RX ADMIN — DEXAMETHASONE 6 MG: 6 TABLET ORAL at 10:08

## 2020-07-05 RX ADMIN — INSULIN LISPRO 7 UNITS: 100 INJECTION, SOLUTION INTRAVENOUS; SUBCUTANEOUS at 18:05

## 2020-07-05 RX ADMIN — SODIUM CHLORIDE, PRESERVATIVE FREE 10 ML: 5 INJECTION INTRAVENOUS at 20:50

## 2020-07-05 RX ADMIN — INSULIN LISPRO 2 UNITS: 100 INJECTION, SOLUTION INTRAVENOUS; SUBCUTANEOUS at 18:05

## 2020-07-05 RX ADMIN — INSULIN LISPRO 3 UNITS: 100 INJECTION, SOLUTION INTRAVENOUS; SUBCUTANEOUS at 21:43

## 2020-07-05 ASSESSMENT — PAIN SCALES - GENERAL
PAINLEVEL_OUTOF10: 0
PAINLEVEL_OUTOF10: 3
PAINLEVEL_OUTOF10: 3
PAINLEVEL_OUTOF10: 4
PAINLEVEL_OUTOF10: 4

## 2020-07-05 ASSESSMENT — PAIN DESCRIPTION - LOCATION
LOCATION: CHEST

## 2020-07-05 ASSESSMENT — PAIN DESCRIPTION - DESCRIPTORS
DESCRIPTORS: ACHING
DESCRIPTORS: ACHING;OTHER (COMMENT)
DESCRIPTORS: ACHING
DESCRIPTORS: ACHING;OTHER (COMMENT)

## 2020-07-05 ASSESSMENT — PAIN - FUNCTIONAL ASSESSMENT
PAIN_FUNCTIONAL_ASSESSMENT: PREVENTS OR INTERFERES SOME ACTIVE ACTIVITIES AND ADLS

## 2020-07-05 ASSESSMENT — PAIN DESCRIPTION - ONSET
ONSET: ON-GOING

## 2020-07-05 ASSESSMENT — PAIN DESCRIPTION - PROGRESSION
CLINICAL_PROGRESSION: NOT CHANGED

## 2020-07-05 ASSESSMENT — PAIN DESCRIPTION - ORIENTATION
ORIENTATION: MID

## 2020-07-05 ASSESSMENT — PAIN DESCRIPTION - PAIN TYPE
TYPE: ACUTE PAIN

## 2020-07-05 ASSESSMENT — PAIN DESCRIPTION - FREQUENCY
FREQUENCY: INTERMITTENT

## 2020-07-05 NOTE — PROGRESS NOTES
2035: Shift assessment. Patient alert and oriented x4. Remain on oxygen per HFNC at 15L/m with sats 86%-92%. Patient positive HINOJOSA. VSS. 2055: Patient HS blood glucose check =58. Patient was given 12 units of prandial Humalog around dinner. Patient given 12 OZ's cranberry juice, ice cream and peanut butter crackers. 2117: BG recheck is 85.     2335: Dr aware episode of hypoglycemia. No new orders this time. Patient given scheduled Lanuts 20 units and given more juice and snacks. Will recheck BG around 0200.     2300: Up to [facility]. Had large BM and back to bed. 0025: Up to [facility] and had medium BM. O2 saturation drops to lower 80's up on moving.     4917:7210: patient resting quietly.  Maintaining O2 saturation >92% on 15L/m per HFNC.     8568: Shift hand off with oncoming, RNs.

## 2020-07-05 NOTE — PLAN OF CARE
Problem: Skin Integrity:  Goal: Will show no infection signs and symptoms  Description: Will show no infection signs and symptoms  Outcome: Ongoing  Goal: Absence of new skin breakdown  Description: Absence of new skin breakdown  Outcome: Ongoing     Problem: Falls - Risk of:  Goal: Will remain free from falls  Description: Will remain free from falls  Outcome: Ongoing  Goal: Absence of physical injury  Description: Absence of physical injury  Outcome: Ongoing   Problem: Airway Clearance - Ineffective:  Goal: Ability to maintain a clear airway will improve  Description: Ability to maintain a clear airway will improve  Outcome: Ongoing     Problem: Airway Clearance - Ineffective  Goal: Achieve or maintain patent airway  Outcome: Ongoing     Problem: Gas Exchange - Impaired  Goal: Absence of hypoxia  Outcome: Ongoing  Goal: Promote optimal lung function  Outcome: Ongoing     Problem: Breathing Pattern - Ineffective  Goal: Ability to achieve and maintain a regular respiratory rate  Outcome: Ongoing     Problem:  Body Temperature -  Risk of, Imbalanced  Goal: Ability to maintain a body temperature within defined limits  Outcome: Ongoing  Goal: Will regain or maintain usual level of consciousness  Outcome: Ongoing  Goal: Complications related to the disease process, condition or treatment will be avoided or minimized  Outcome: Ongoing     Problem: Isolation Precautions - Risk of Spread of Infection  Goal: Prevent transmission of infection  Outcome: Ongoing     Problem: Nutrition Deficits  Goal: Optimize nutrtional status  Outcome: Ongoing     Problem: Fatigue  Goal: Verbalize increase energy and improved vitality  Outcome: Ongoing     Problem: Patient Education: Go to Patient Education Activity  Goal: Patient/Family Education  Outcome: Ongoing     Problem: Breathing Pattern - Ineffective:  Goal: Ability to achieve and maintain a regular respiratory rate will improve  Description: Ability to achieve and maintain a

## 2020-07-05 NOTE — PROGRESS NOTES
0700- Handoff reports from [address] [place] [address] Road- Pt to bedside commode- BM. Complete linen change. Up to chair. Pt desats to 70's when moving. 1715- Pt to bathroom, bathed self while seated at sink. Returned to bed.    1900- Handoff report to [employer]

## 2020-07-05 NOTE — PLAN OF CARE
Problem: Skin Integrity:  Goal: Will show no infection signs and symptoms  Description: Will show no infection signs and symptoms  7/5/2020 1437 by Marcos Santizo RN  Outcome: Ongoing  7/5/2020 0120 by Tiffany Rodriguez RN  Outcome: Ongoing  Goal: Absence of new skin breakdown  Description: Absence of new skin breakdown  7/5/2020 1437 by Marcos Santizo RN  Outcome: Ongoing  7/5/2020 0120 by Tiffany Rodriguez RN  Outcome: Ongoing     Problem: Falls - Risk of:  Goal: Will remain free from falls  Description: Will remain free from falls  7/5/2020 1437 by Marcos Santizo RN  Outcome: Ongoing  7/5/2020 0120 by Tiffany Rodriguez RN  Outcome: Ongoing  Goal: Absence of physical injury  Description: Absence of physical injury  7/5/2020 1437 by Marcos Santizo RN  Outcome: Ongoing  7/5/2020 0120 by Tiffany Rodriguez RN  Outcome: Ongoing     Problem: Pain:  Goal: Pain level will decrease  Description: Pain level will decrease  7/5/2020 1437 by Marcos Santizo RN  Outcome: Ongoing  7/5/2020 0120 by Tiffany Rodriguez RN  Outcome: Ongoing  Goal: Control of acute pain  Description: Control of acute pain  7/5/2020 1437 by Marcos Santizo RN  Outcome: Ongoing  7/5/2020 0120 by Tiffany Rodriguez RN  Outcome: Ongoing  Goal: Control of chronic pain  Description: Control of chronic pain  7/5/2020 1437 by Marcos Santizo RN  Outcome: Ongoing  7/5/2020 0120 by Tiffany Rodriguez RN  Outcome: Ongoing     Problem: Airway Clearance - Ineffective:  Goal: Ability to maintain a clear airway will improve  Description: Ability to maintain a clear airway will improve  7/5/2020 1437 by Marcos Santizo RN  Outcome: Ongoing  7/5/2020 0120 by Tiffany Rodriguez RN  Outcome: Ongoing     Problem: Airway Clearance - Ineffective  Goal: Achieve or maintain patent airway  7/5/2020 1437 by Marcos Santizo RN  Outcome: Ongoing  7/5/2020 0120 by Tiffany Rodriguez RN  Outcome: Ongoing     Problem: Gas Exchange - Impaired  Goal: Absence of hypoxia  7/5/2020 1437 by Lloyd Diaz RN  Outcome: Ongoing  7/5/2020 0120 by Alexandre Houston RN  Outcome: Ongoing  Goal: Promote optimal lung function  7/5/2020 1437 by Lloyd Diaz RN  Outcome: Ongoing  7/5/2020 0120 by Alexandre Houston RN  Outcome: Ongoing     Problem: Breathing Pattern - Ineffective  Goal: Ability to achieve and maintain a regular respiratory rate  7/5/2020 1437 by Lloyd Diaz RN  Outcome: Ongoing  7/5/2020 0120 by Alexandre Houston RN  Outcome: Ongoing     Problem:  Body Temperature -  Risk of, Imbalanced  Goal: Ability to maintain a body temperature within defined limits  7/5/2020 1437 by Lloyd Diaz RN  Outcome: Ongoing  7/5/2020 0120 by Alexandre Houston RN  Outcome: Ongoing  Goal: Will regain or maintain usual level of consciousness  7/5/2020 1437 by Lloyd Diaz RN  Outcome: Ongoing  7/5/2020 0120 by Alexandre Houston RN  Outcome: Ongoing  Goal: Complications related to the disease process, condition or treatment will be avoided or minimized  7/5/2020 1437 by Lloyd Diaz RN  Outcome: Ongoing  7/5/2020 0120 by Alexandre Houston RN  Outcome: Ongoing     Problem: Isolation Precautions - Risk of Spread of Infection  Goal: Prevent transmission of infection  7/5/2020 1437 by Lloyd Diaz RN  Outcome: Ongoing  7/5/2020 0120 by Alexandre Houston RN  Outcome: Ongoing     Problem: Nutrition Deficits  Goal: Optimize nutrtional status  7/5/2020 1437 by Lloyd Diaz RN  Outcome: Ongoing  7/5/2020 0120 by Alexandre Houston RN  Outcome: Ongoing     Problem: Risk for Fluid Volume Deficit  Goal: Maintain normal heart rhythm  7/5/2020 1437 by Lloyd Diaz RN  Outcome: Ongoing  7/5/2020 0120 by Alexandre Houston RN  Outcome: Ongoing  Goal: Maintain absence of muscle cramping  7/5/2020 1437 by Lloyd Diaz RN  Outcome: Ongoing  7/5/2020 0120 by Alexandre Houston RN  Outcome: Ongoing  Goal: Maintain normal serum potassium, sodium, calcium, phosphorus, and

## 2020-07-05 NOTE — PROGRESS NOTES
Hospitalist Progress Note      PCP: SILVIO POLLOCK, APRN - CNP    Date of Admission: 6/30/2020    Chief Complaint: SOB. Subjective:     O2 requirement spiked 7/2 - 7/3 - on 14-15l/min - transferred to ICU. Clinically feels better. Ongoing O2 requirement, albeit oxygenation seems better. Medications:  Reviewed    Infusion Medications    dextrose       Scheduled Medications    insulin lispro  7 Units Subcutaneous TID WC    insulin glargine  20 Units Subcutaneous Nightly    dexamethasone  6 mg Oral Daily    cefTRIAXone (ROCEPHIN) IV  1 g Intravenous Q24H    enoxaparin  40 mg Subcutaneous BID    atorvastatin  40 mg Oral Nightly    aspirin  81 mg Oral Daily    sodium chloride flush  10 mL Intravenous 2 times per day    insulin lispro  0-12 Units Subcutaneous TID WC    insulin lispro  0-6 Units Subcutaneous Nightly    prasugrel  10 mg Oral Daily    remdesivir IVPB  100 mg Intravenous Q24H     PRN Meds: potassium chloride **OR** potassium alternative oral replacement **OR** potassium chloride, sodium chloride flush, acetaminophen **OR** acetaminophen, polyethylene glycol, ondansetron, glucose, dextrose, glucagon (rDNA), dextrose, benzonatate      Intake/Output Summary (Last 24 hours) at 7/5/2020 1153  Last data filed at 7/5/2020 0846  Gross per 24 hour   Intake 2310 ml   Output 1700 ml   Net 610 ml       Physical Exam Performed:    /70   Pulse 75   Temp 97.9 °F (36.6 °C) (Oral)   Resp 21   Ht 6' 2\" (1.88 m)   Wt 211 lb 13.8 oz (96.1 kg)   SpO2 95%   BMI 27.20 kg/m²     General appearance: No apparent distress, appears stated age and cooperative. HEENT: Pupils equal, round, and reactive to light. Conjunctivae/corneas clear. Neck: Supple, with full range of motion. No jugular venous distention. Trachea midline. Respiratory:  Normal respiratory effort. Clear to auscultation, bilaterally without Rales/Wheezes/Rhonchi.   Cardiovascular: Regular rate and rhythm

## 2020-07-06 LAB
ALBUMIN SERPL-MCNC: 2.3 G/DL (ref 3.4–5)
ANION GAP SERPL CALCULATED.3IONS-SCNC: 13 MMOL/L (ref 3–16)
BASOPHILS ABSOLUTE: 0 K/UL (ref 0–0.2)
BASOPHILS RELATIVE PERCENT: 0.2 %
BUN BLDV-MCNC: 12 MG/DL (ref 7–20)
CALCIUM SERPL-MCNC: 8.2 MG/DL (ref 8.3–10.6)
CHLORIDE BLD-SCNC: 98 MMOL/L (ref 99–110)
CO2: 28 MMOL/L (ref 21–32)
CREAT SERPL-MCNC: <0.5 MG/DL (ref 0.9–1.3)
D DIMER: 1581 NG/ML DDU (ref 0–229)
EOSINOPHILS ABSOLUTE: 0 K/UL (ref 0–0.6)
EOSINOPHILS RELATIVE PERCENT: 0 %
FERRITIN: 738 NG/ML (ref 30–400)
GFR AFRICAN AMERICAN: >60
GFR NON-AFRICAN AMERICAN: >60
GLUCOSE BLD-MCNC: 119 MG/DL (ref 70–99)
GLUCOSE BLD-MCNC: 147 MG/DL (ref 70–99)
GLUCOSE BLD-MCNC: 185 MG/DL (ref 70–99)
GLUCOSE BLD-MCNC: 200 MG/DL (ref 70–99)
GLUCOSE BLD-MCNC: 249 MG/DL (ref 70–99)
GLUCOSE BLD-MCNC: 269 MG/DL (ref 70–99)
HCT VFR BLD CALC: 44.8 % (ref 40.5–52.5)
HEMOGLOBIN: 15 G/DL (ref 13.5–17.5)
LYMPHOCYTES ABSOLUTE: 1.3 K/UL (ref 1–5.1)
LYMPHOCYTES RELATIVE PERCENT: 8.8 %
MCH RBC QN AUTO: 28.1 PG (ref 26–34)
MCHC RBC AUTO-ENTMCNC: 33.6 G/DL (ref 31–36)
MCV RBC AUTO: 83.5 FL (ref 80–100)
MONOCYTES ABSOLUTE: 1.3 K/UL (ref 0–1.3)
MONOCYTES RELATIVE PERCENT: 8.2 %
NEUTROPHILS ABSOLUTE: 12.7 K/UL (ref 1.7–7.7)
NEUTROPHILS RELATIVE PERCENT: 82.8 %
PDW BLD-RTO: 13.3 % (ref 12.4–15.4)
PERFORMED ON: ABNORMAL
PHOSPHORUS: 4.9 MG/DL (ref 2.5–4.9)
PLATELET # BLD: 227 K/UL (ref 135–450)
PMV BLD AUTO: 8.7 FL (ref 5–10.5)
POTASSIUM SERPL-SCNC: 3.4 MMOL/L (ref 3.5–5.1)
RBC # BLD: 5.36 M/UL (ref 4.2–5.9)
SODIUM BLD-SCNC: 139 MMOL/L (ref 136–145)
WBC # BLD: 15.3 K/UL (ref 4–11)

## 2020-07-06 PROCEDURE — 80069 RENAL FUNCTION PANEL: CPT

## 2020-07-06 PROCEDURE — 85379 FIBRIN DEGRADATION QUANT: CPT

## 2020-07-06 PROCEDURE — 6360000002 HC RX W HCPCS: Performed by: INTERNAL MEDICINE

## 2020-07-06 PROCEDURE — 2580000003 HC RX 258: Performed by: INTERNAL MEDICINE

## 2020-07-06 PROCEDURE — 6370000000 HC RX 637 (ALT 250 FOR IP): Performed by: INTERNAL MEDICINE

## 2020-07-06 PROCEDURE — 2060000000 HC ICU INTERMEDIATE R&B

## 2020-07-06 PROCEDURE — 94761 N-INVAS EAR/PLS OXIMETRY MLT: CPT

## 2020-07-06 PROCEDURE — 82728 ASSAY OF FERRITIN: CPT

## 2020-07-06 PROCEDURE — 36415 COLL VENOUS BLD VENIPUNCTURE: CPT

## 2020-07-06 PROCEDURE — 99233 SBSQ HOSP IP/OBS HIGH 50: CPT | Performed by: INTERNAL MEDICINE

## 2020-07-06 PROCEDURE — 2700000000 HC OXYGEN THERAPY PER DAY

## 2020-07-06 PROCEDURE — 85025 COMPLETE CBC W/AUTO DIFF WBC: CPT

## 2020-07-06 RX ADMIN — POTASSIUM CHLORIDE 40 MEQ: 1500 TABLET, EXTENDED RELEASE ORAL at 08:53

## 2020-07-06 RX ADMIN — CEFTRIAXONE 1 G: 1 INJECTION, POWDER, FOR SOLUTION INTRAMUSCULAR; INTRAVENOUS at 13:34

## 2020-07-06 RX ADMIN — DEXAMETHASONE 6 MG: 6 TABLET ORAL at 08:35

## 2020-07-06 RX ADMIN — INSULIN LISPRO 3 UNITS: 100 INJECTION, SOLUTION INTRAVENOUS; SUBCUTANEOUS at 20:46

## 2020-07-06 RX ADMIN — SODIUM CHLORIDE, PRESERVATIVE FREE 10 ML: 5 INJECTION INTRAVENOUS at 20:46

## 2020-07-06 RX ADMIN — ENOXAPARIN SODIUM 40 MG: 40 INJECTION SUBCUTANEOUS at 08:35

## 2020-07-06 RX ADMIN — ATORVASTATIN CALCIUM 40 MG: 40 TABLET, FILM COATED ORAL at 20:29

## 2020-07-06 RX ADMIN — INSULIN LISPRO 2 UNITS: 100 INJECTION, SOLUTION INTRAVENOUS; SUBCUTANEOUS at 17:55

## 2020-07-06 RX ADMIN — ASPIRIN 81 MG 81 MG: 81 TABLET ORAL at 08:35

## 2020-07-06 RX ADMIN — INSULIN LISPRO 7 UNITS: 100 INJECTION, SOLUTION INTRAVENOUS; SUBCUTANEOUS at 09:00

## 2020-07-06 RX ADMIN — PRASUGREL HYDROCHLORIDE 10 MG: 10 TABLET, FILM COATED ORAL at 08:35

## 2020-07-06 RX ADMIN — ACETAMINOPHEN 650 MG: 325 TABLET ORAL at 20:30

## 2020-07-06 RX ADMIN — INSULIN LISPRO 7 UNITS: 100 INJECTION, SOLUTION INTRAVENOUS; SUBCUTANEOUS at 17:55

## 2020-07-06 RX ADMIN — INSULIN GLARGINE 20 UNITS: 100 INJECTION, SOLUTION SUBCUTANEOUS at 20:46

## 2020-07-06 RX ADMIN — SODIUM CHLORIDE, PRESERVATIVE FREE 10 ML: 5 INJECTION INTRAVENOUS at 08:36

## 2020-07-06 RX ADMIN — ENOXAPARIN SODIUM 40 MG: 40 INJECTION SUBCUTANEOUS at 20:46

## 2020-07-06 ASSESSMENT — PAIN DESCRIPTION - LOCATION: LOCATION: HEAD

## 2020-07-06 ASSESSMENT — PAIN SCALES - GENERAL
PAINLEVEL_OUTOF10: 0
PAINLEVEL_OUTOF10: 2
PAINLEVEL_OUTOF10: 4
PAINLEVEL_OUTOF10: 0
PAINLEVEL_OUTOF10: 0

## 2020-07-06 ASSESSMENT — PAIN DESCRIPTION - DESCRIPTORS: DESCRIPTORS: ACHING

## 2020-07-06 ASSESSMENT — PAIN DESCRIPTION - PAIN TYPE: TYPE: ACUTE PAIN

## 2020-07-06 NOTE — PROGRESS NOTES
PT arrived to room 5276 via ICU RN at [phone]. PT A&Ox4, sating 92% on 15L HFNC. VSS. Bed alarm on, bed in lowest position. Skin assessment completed. Tele activated and verified with [facility].      Electronically signed by Marshall Metz RN on 7/6/2020 at 3:14 PM

## 2020-07-06 NOTE — PROGRESS NOTES
Pt transferred to [facility] via bed with COVID precautions. GILMER Colby at bedside, handoff given. PT tolerated transfer well. Tele in place, on 15L HFNC.

## 2020-07-06 NOTE — PROGRESS NOTES
Hospitalist Progress Note      PCP: SILVIO POLLOCK, APRN - CNP    Date of Admission: 6/30/2020    Chief Complaint:   Chief Complaint   Patient presents with    Chest Pain    Emesis     Hospital Course: Charity Avery is a 54y.o. year old male with a history of GERD, diabetes, history of cardiac arrest admitted with symptoms of anorexia, fatigue, cough and shortness of breath with fevers. He was diagnosed with COVID-19 prior to admission. Chest x-ray significant for multifocal pneumonia. Infectious disease was consulted and started on Remdesivir, empiric ceftriaxone, trending CRP ferritin and LDH with dexamethasone for 10 days. Worsening hypoxemia, transferred to the ICU, requiring 15 L nasal cannula.     Sputum culture growing group B strep. Subjective: No acute complaints. He says he denies feeling short of breath. Some coughing. Discussed with nurse, attempted to wean oxygen very slightly and he had a significant desat even when just rolling over in bed. Denies chest pain, crackles, nausea, vomiting, fevers, abdominal pain.        Medications:  Reviewed    Infusion Medications    dextrose       Scheduled Medications    insulin lispro  7 Units Subcutaneous TID WC    insulin glargine  20 Units Subcutaneous Nightly    dexamethasone  6 mg Oral Daily    cefTRIAXone (ROCEPHIN) IV  1 g Intravenous Q24H    enoxaparin  40 mg Subcutaneous BID    atorvastatin  40 mg Oral Nightly    aspirin  81 mg Oral Daily    sodium chloride flush  10 mL Intravenous 2 times per day    insulin lispro  0-12 Units Subcutaneous TID WC    insulin lispro  0-6 Units Subcutaneous Nightly    prasugrel  10 mg Oral Daily     PRN Meds: potassium chloride **OR** potassium alternative oral replacement **OR** potassium chloride, sodium chloride flush, acetaminophen **OR** acetaminophen, polyethylene glycol, ondansetron, glucose, dextrose, glucagon (rDNA), dextrose, benzonatate      Intake/Output Summary (Last 24 hours) at 7/6/2020 0844  Last data filed at 7/6/2020 0835  Gross per 24 hour   Intake 920 ml   Output 1675 ml   Net -755 ml       Physical Exam Performed:    /74   Pulse 86   Temp 96.4 °F (35.8 °C) (Oral)   Resp 20   Ht 6' 2\" (1.88 m)   Wt 211 lb 3.2 oz (95.8 kg)   SpO2 (!) 88%   BMI 27.12 kg/m²     General appearance: No apparent distress, appears stated age and cooperative. HEENT: Pupils equal, round, and reactive to light. Conjunctivae/corneas clear. Neck: Supple, with full range of motion. Trachea midline. Respiratory:  Normal respiratory effort. Crackles bilaterally without Rales/Wheezes/Rhonchi. Cardiovascular: Regular rate and rhythm with normal S1/S2 without murmurs, rubs or gallops. Abdomen: Soft, non-tender, non-distended with normal bowel sounds. Musculoskeletal: No clubbing, cyanosis or edema bilaterally. Full range of motion without deformity. Skin: Skin color, texture, turgor normal.  No rashes or lesions. Neurologic:  Neurovascularly intact without any focal sensory/motor deficits. Cranial nerves: II-XII intact, grossly non-focal.  Psychiatric: Alert and oriented, thought content appropriate, normal insight  Capillary Refill: Brisk,< 3 seconds   Peripheral Pulses: +2 palpable, equal bilaterally       Labs:   Recent Labs     07/04/20 0429 07/05/20 0447 07/06/20  0335   WBC 17.2* 17.3* 15.3*   HGB 15.1 15.3 15.0   HCT 44.2 45.1 44.8    189 227     Recent Labs     07/04/20 0429 07/05/20 0447 07/06/20  0335    142 139   K 3.1* 3.5 3.4*    103 98*   CO2 26 27 28   BUN 10 11 12   CREATININE <0.5* 0.5* <0.5*   CALCIUM 8.2* 8.4 8.2*   PHOS 2.8 3.8 4.9     Recent Labs     07/04/20 0429 07/05/20  0447   AST 40* 28   ALT 16 16   BILIDIR <0.2 <0.2   BILITOT 0.4 0.4   ALKPHOS 60 66     No results for input(s): INR in the last 72 hours. No results for input(s): Burnadette Lundborg in the last 72 hours.     Urinalysis:      Lab Results   Component Value Date    NITRU Negative AICD  Followed by Dr Akabr Haddad  Pt felt chest pressure on 7/1               - EKG unremarkable. Serial troponin negative. - restarted ASA, statin, effient. Historically does not tolerate BB and ACEI/ARB due to hypotension and recurrent syncope episodes.       Lactic Acidosis - due to above. Resolved       DVT Prophylaxis: Lovenox  Diet: DIET CARB CONTROL;  Code Status: Full Code    PT/OT Eval Status: deferred    Marcelo Tijerina MD    This note was transcribed using [id number] [employer]. Please disregard any translational errors.

## 2020-07-06 NOTE — PLAN OF CARE
Problem: Skin Integrity:  Goal: Will show no infection signs and symptoms  Description: Will show no infection signs and symptoms  Outcome: Ongoing  Goal: Absence of new skin breakdown  Description: Absence of new skin breakdown  Outcome: Ongoing     Problem: Falls - Risk of:  Goal: Will remain free from falls  Description: Will remain free from falls  Outcome: Ongoing  Goal: Absence of physical injury  Description: Absence of physical injury  Outcome: Ongoing   Problem: Airway Clearance - Ineffective:  Goal: Ability to maintain a clear airway will improve  Description: Ability to maintain a clear airway will improve  Outcome: Ongoing     Problem: Airway Clearance - Ineffective  Goal: Achieve or maintain patent airway  Outcome: Ongoing     Problem: Gas Exchange - Impaired  Goal: Absence of hypoxia  Outcome: Ongoing  Goal: Promote optimal lung function  Outcome: Ongoing     Problem: Breathing Pattern - Ineffective  Goal: Ability to achieve and maintain a regular respiratory rate  Outcome: Ongoing     Problem:  Body Temperature -  Risk of, Imbalanced  Goal: Ability to maintain a body temperature within defined limits  Outcome: Ongoing  Goal: Will regain or maintain usual level of consciousness  Outcome: Ongoing  Goal: Complications related to the disease process, condition or treatment will be avoided or minimized  Outcome: Ongoing     Problem: Isolation Precautions - Risk of Spread of Infection  Goal: Prevent transmission of infection  Outcome: Ongoing     Problem: Nutrition Deficits  Goal: Optimize nutrtional status  Outcome: Ongoing     Problem: Fatigue  Goal: Verbalize increase energy and improved vitality  Outcome: Ongoing     Problem: Patient Education: Go to Patient Education Activity  Goal: Patient/Family Education  Outcome: Ongoing     Problem: Breathing Pattern - Ineffective:  Goal: Ability to achieve and maintain a regular respiratory rate will improve  Description: Ability to achieve and maintain a regular respiratory rate will improve  Outcome: Ongoing     Problem: Serum Glucose Level - Abnormal:  Goal: Ability to maintain appropriate glucose levels will improve  Description: Ability to maintain appropriate glucose levels will improve  Outcome: Ongoing

## 2020-07-06 NOTE — PROGRESS NOTES
2030: Shift assessment. Patient alert and oriented x4. Patient remain on oxygen per HFNC at 15 L/m flush with O2 saturation 88-92% depending on patient's activity. Patient encouraged to do IS as frequently as possible. HS diabetic patient snacks and fluids offered per patient request. Repositioned in bed. 0015: Reassessment done and documented. Patient resting quietly. 8544: Ice water offered per patient request. Denies SOB. O2 sats drops to mid 80's with activity. 0710: Shift hand off given to oncoming Andrew Gomez RN.

## 2020-07-06 NOTE — PROGRESS NOTES
Pulmonary Progress Note    CC: Acute hypoxic respiratory failure  COVID-19 pneumonia  Multifocal pneumonia  Hyperglycemia      24 hr events:  He has shortness of breath with activity and a productive cough. He is on high flow oxygen. ROS:  +HINOJOSA  +cough  No vomiting       PHYSICAL EXAM:  Blood pressure 130/67, pulse 73, temperature 98.1 °F (36.7 °C), temperature source Oral, resp. rate 17, height 6' 2\" (1.88 m), weight 211 lb 3.2 oz (95.8 kg), SpO2 92 %. on 15L HF NC    Intake/Output Summary (Last 24 hours) at 7/6/2020 0804  Last data filed at 7/6/2020 0409  Gross per 24 hour   Intake 910 ml   Output 1675 ml   Net -765 ml       Gen: Well developed; well nourished  Eyes: No scleral icterus. No conjunctival injection. ENT: External appearance of ears and nose normal.  Neck: Trachea midline. No obvious mass. No visible thyroid enlargement    Respiratory: Crackles bilaterally, no accessory muscle use  Cardiovascular: Regular rate and rhythm, no appreciable murmurs. No edema. Skin: Warm and dry. No rashes or ulcers on visible areas. Normal texture and turgor  Musculoskeletal: No cyanosis, clubbing or joint deformity.     Psychiatric: Normal mood and affect; exhibits normal insight and judgement     Medications:  Current Facility-Administered Medications: insulin lispro (HUMALOG) injection vial 7 Units, 7 Units, Subcutaneous, TID WC  insulin glargine (LANTUS) injection vial 20 Units, 20 Units, Subcutaneous, Nightly  dexamethasone (DECADRON) tablet 6 mg, 6 mg, Oral, Daily  potassium chloride (KLOR-CON M) extended release tablet 40 mEq, 40 mEq, Oral, PRN **OR** potassium bicarb-citric acid (EFFER-K) effervescent tablet 40 mEq, 40 mEq, Oral, PRN **OR** potassium chloride 10 mEq/100 mL IVPB (Peripheral Line), 10 mEq, Intravenous, PRN  cefTRIAXone (ROCEPHIN) 1 g IVPB in 50 mL D5W minibag, 1 g, Intravenous, Q24H  enoxaparin (LOVENOX) injection 40 mg, 40 mg, Subcutaneous, BID  atorvastatin (LIPITOR) tablet 40 mg, 40 mg, Oral, Nightly  aspirin chewable tablet 81 mg, 81 mg, Oral, Daily  sodium chloride flush 0.9 % injection 10 mL, 10 mL, Intravenous, 2 times per day  sodium chloride flush 0.9 % injection 10 mL, 10 mL, Intravenous, PRN  acetaminophen (TYLENOL) tablet 650 mg, 650 mg, Oral, Q4H PRN **OR** acetaminophen (TYLENOL) suppository 650 mg, 650 mg, Rectal, Q4H PRN  polyethylene glycol (GLYCOLAX) packet 17 g, 17 g, Oral, Daily PRN  ondansetron (ZOFRAN) injection 4 mg, 4 mg, Intravenous, Q4H PRN  insulin lispro (HUMALOG) injection vial 0-12 Units, 0-12 Units, Subcutaneous, TID WC  insulin lispro (HUMALOG) injection vial 0-6 Units, 0-6 Units, Subcutaneous, Nightly  glucose (GLUTOSE) 40 % oral gel 15 g, 15 g, Oral, PRN  dextrose 50 % IV solution, 12.5 g, Intravenous, PRN  glucagon (rDNA) injection 1 mg, 1 mg, Intramuscular, PRN  dextrose 5 % solution, 100 mL/hr, Intravenous, PRN  benzonatate (TESSALON) capsule 200 mg, 200 mg, Oral, TID PRN  prasugrel (EFFIENT) tablet 10 mg, 10 mg, Oral, Daily    Data reviewed:  Labs:  CBC:   Recent Labs     07/04/20 0429 07/05/20 0447 07/06/20  0335   WBC 17.2* 17.3* 15.3*   HGB 15.1 15.3 15.0   HCT 44.2 45.1 44.8   MCV 82.3 82.3 83.5    189 227     BMP:   Recent Labs     07/04/20 0429 07/05/20 0447 07/06/20  0335    142 139   K 3.1* 3.5 3.4*    103 98*   CO2 26 27 28   PHOS 2.8 3.8 4.9   BUN 10 11 12   CREATININE <0.5* 0.5* <0.5*     LIVER PROFILE:   Recent Labs     07/04/20 0429 07/05/20 0447   AST 40* 28   ALT 16 16   BILIDIR <0.2 <0.2   BILITOT 0.4 0.4   ALKPHOS 60 66     PT/INR: No results for input(s): PROTIME, INR in the last 72 hours. APTT: No results for input(s): APTT in the last 72 hours. Cultures:   Blood culture (6/30): 2/4 bottles CNS  Sputum culture (7/1): Group B Strep  Nasal MRSA probe: Negative  Urine strep and legionella antigens: Negative      Films:  Chest images and reports were reviewed by me.  My interpretation is:  No new images      Assessment: Acute hypoxic respiratory failure  COVID-19 pneumonia  Multifocal pneumonia  Hyperglycemia      Plan:    Acute hypoxic respiratory failure  -Due to pneumonia  -Wean supplemental oxygen as able to keep oxygen saturation greater than 90%    COVID-19 pneumonia  -Completed 5 days of Remdesivir   -Continue Decadron    Multifocal pneumonia  -Sputum culture with group B strep  -On ceftriaxone    Hyperglycemia  -Sliding scale insulin      Prophylaxis  DVT- lovenox    Patient is at high risk given the presence of respiratory failure requiring the use of high flow oxygen.     Smith Sparks MD  [facility], Critical Care and Sleep

## 2020-07-06 NOTE — PROGRESS NOTES
4 Eyes Skin Assessment     The patient is being assess for  Transfer to New Unit     I agree that 2 RN's have performed a thorough Head to Toe Skin Assessment on the patient. ALL assessment sites listed below have been assessed. Areas assessed by both nurses:   [x]   Head, Face, and Ears   [x]   Shoulders, Back, and Chest  [x]   Arms, Elbows, and Hands   [x]   Coccyx, Sacrum, and IschIum  [x]   Legs, Feet, and Heels        Does the Patient have Skin Breakdown?   No         Michael Prevention initiated:  Yes   Wound Care Orders initiated:  No      [facility] nurse consulted for Pressure Injury (Stage 3,4, Unstageable, DTI, NWPT, and Complex wounds), New and Established Ostomies:  No      Nurse 1 eSignature: Electronically signed by Chasity Rivers RN on 7/6/20 at 3:10 PM EDT    **SHARE this note so that the co-signing nurse is able to place an eSignature**    Nurse 2 eSignature: Electronically signed by Marianela Morales RN on 7/6/20 at 3:53 PM EDT

## 2020-07-06 NOTE — PROGRESS NOTES
Infectious Disease Follow up Notes  Admit Date: 6/30/2020  Hospital Day: 7    Antibiotics :   IV Remdesivir stop date  7/5   IV Dexamethasone  IV Ceftriaxone    CHIEF COMPLAINT:      COVID-19 Pneumoia  Resp failure  CAD  CHF      Subjective interval History :  54 y.o. man with diagnosis of COVID-19 infection on 6/24 has not been feeling well for the past few days now fevers, sob, poor oral intake h/o HTN, CAD, PVD, DM and Cardiac stents and AICD in place. CXR with bi lateral infiltrates and Fevers here on admit using up to 2 lts nasal cannula with worsening symptoms. COVID-19 test here positive we are consulted for recommendations.     Remains sob now moved out of ICU using 15 ltS o2 and no fever and Ferritin Trend down- completed IV Remdesivir therapy    Past Medical History:    Past Medical History:   Diagnosis Date    Arthritis     Blood circulation, collateral     CAD (coronary artery disease) 7/15/2014    CAD (coronary artery disease)     Cardiac arrest ([facility] 10/05/2018    Cerebral artery occlusion with cerebral infarction ([facility]     Diabetes mellitus ([facility]     Diabetic peripheral neuropathy ([facility] [address] [facility] 6/8/2018    GERD (gastroesophageal reflux disease)     ulcers    Hypercholesteremia     Hyperlipidemia     Hypertension     Movement disorder     possible arthritis right hand    MRSA (methicillin resistant staph aureus) culture positive 07/13/2018    foot wound    Neuropathy     Other disorders of kidney and ureter in diseases classified elsewhere     POTS (postural orthostatic tachycardia syndrome)     Psychiatric problem     anxiety, depression, bipolar    Sleep apnea     Syncope     Type II or unspecified type diabetes mellitus without mention of complication, not stated as uncontrolled        Past Surgical History:    Past Surgical History:   Procedure Laterality Date    CARDIAC CATHETERIZATION      COLONOSCOPY  CORONARY ANGIOPLASTY WITH STENT PLACEMENT  10/06/2018    Bare Metal Stent to PDA    CORONARY ANGIOPLASTY WITH STENT PLACEMENT  10/07/2018    Bare Metal Stent to OM    CORONARY ANGIOPLASTY WITH STENT PLACEMENT  10/03/2018    CECE to Circ    FINGER SURGERY Left     Left Thumb    HERNIA REPAIR      JOINT REPLACEMENT      VASCULAR SURGERY      VASECTOMY         Current Medications:    Outpatient Medications Marked as Taking for the 6/30/20 encounter [facility] Encounter)   Medication Sig Dispense Refill    prasugrel (EFFIENT) 10 MG TABS Take 1 tablet by mouth once for 1 dose (Patient taking differently: Take 10 mg by mouth daily ) 30 tablet 11    atorvastatin (LIPITOR) 40 MG tablet TAKE 1 TABLET BY MOUTH IN THE EVENING  30 tablet 4    aspirin 81 MG chewable tablet Take 81 mg by mouth daily      Dulaglutide (TRULICITY) 1.5 RC/9.7SB SOPN Inject 1.5 mg into the skin once a week       insulin detemir (LEVEMIR FLEXTOUCH) 100 UNIT/ML injection pen Inject 30 Units into the skin nightly (Patient taking differently: Inject 50 Units into the skin nightly ) 5 pen 3       Allergies:  No known allergies    Immunizations :   Immunization History   Administered Date(s) Administered    Pneumococcal Polysaccharide ([id number]) 09/11/2010    Tdap (Boostrix, Adacel) 05/27/2018       Social History:    Social History     Tobacco Use    Smoking status: Never Smoker    Smokeless tobacco: Never Used   Substance Use Topics    Alcohol use: No    Drug use: No     Social History     Tobacco Use   Smoking Status Never Smoker   Smokeless Tobacco Never Used      Family History   Problem Relation Age of Onset    High Blood Pressure Mother     Stroke Mother     Heart Disease Mother     COPD Mother     Heart Disease Father     Cancer Father         skin    Diabetes Sister     Cancer Sister     Heart Disease Brother     Diabetes Brother        REVIEW OF SYSTEMS:    No fever / chills / sweats. No weight loss.   No visual 15.0 07/06/2020    HCT 44.8 07/06/2020    MCV 83.5 07/06/2020     07/06/2020     Lab Results   Component Value Date    CREATININE <0.5 (L) 07/06/2020    BUN 12 07/06/2020     07/06/2020    K 3.4 (L) 07/06/2020    CL 98 (L) 07/06/2020    CO2 28 07/06/2020       Hepatic Function Panel:   Lab Results   Component Value Date    ALKPHOS 66 07/05/2020    ALT 16 07/05/2020    AST 28 07/05/2020    PROT 5.6 07/05/2020    PROT 8.3 11/12/2012    BILITOT 0.4 07/05/2020    BILIDIR <0.2 07/05/2020    IBILI see below 07/05/2020    LABALBU 2.3 07/06/2020       UA:  Lab Results   Component Value Date    COLORU Yellow 01/09/2018    CLARITYU Clear 01/09/2018    GLUCOSEU 500 01/09/2018    GLUCOSEU >=1000 09/10/2010    BILIRUBINUR Negative 01/09/2018    BILIRUBINUR NEGATIVE 09/10/2010    KETUA TRACE 01/09/2018    SPECGRAV 1.020 01/09/2018    BLOODU Negative 01/09/2018    PHUR 5.0 01/09/2018    PROTEINU TRACE 01/09/2018    UROBILINOGEN 0.2 01/09/2018    NITRU Negative 01/09/2018    LEUKOCYTESUR Negative 01/09/2018    LABMICR YES 01/09/2018    URINETYPE Not Specified 01/09/2018      Urine Microscopic:   Lab Results   Component Value Date    WBCUA 0-2 01/09/2018    RBCUA 0-2 01/09/2018    EPIU 0-2 01/09/2018     Procal  0.16     Ferritin  738     D dimer  1581     MICRO: cultures reviewed and updated by me   01/20 0100       Order Status: Completed Specimen: Sputum Expectorated Updated: 07/02/20 1055    CULTURE, RESPIRATORY Moderate growth normal respiratory letty withAbnormal     Organism BHS Group B (Strep agalacticae)Abnormal     CULTURE, RESPIRATORY --    Moderate growth   Susceptibility testing of penicillin and other beta lactams is   not necessary for beta hemolytic Streptococci since resistant   strains have not been identified.  (CLSI M100)    Narrative:     ORDER#: [id number]                          ORDERED BY: Frida Sebastian  SOURCE: Sputum Expectorated                COLLECTED:  07/01/20 01:00  ANTIBIOTICS AT KATI.:                      RECEIVED :  07/01/20 06:42  Performed at:  [facility]  [address] [place] [address], [place] [address]   Phone [phone]   Culture, MRSA, Screening [[id number]] Collected: 07/01/20 0100   Order Status: Completed Specimen: Nares Updated: 07/02/20 1035    MRSA Culture Only Further report to follow   Narrative:     ORDER#: [id number]                          ORDERED BY: Dereck Yanez  SOURCE: Nares                              COLLECTED:  07/01/20 01:00  ANTIBIOTICS AT KATI. :                      RECEIVED :  07/01/20 01:39  Performed at:  [facility]  [address] [place] Pill [address], [place] [address]   Phone [phone]   Culture, Blood 2 [[id number]] Collected: 06/30/20 2001   Order Status: Completed Specimen: Blood Updated: 07/01/20 2315    Culture, Blood 2 No Growth to date.  Any change in status will be called. Narrative:     ORDER#: [id number]                          ORDERED BY: Kat Maxwell  SOURCE: Blood                              COLLECTED:  06/30/20 20:01  ANTIBIOTICS AT KTAI. :                      RECEIVED :  06/30/20 20:05  If child <=2 yrs old please draw pediatric bottle. ~Blood Culture #2  Performed at:  [facility]  [address] [place] [address], [place] [address]   Phone [phone]   Culture, Blood 1 [[id number]] (Abnormal) Collected: 06/30/20 1945   Order Status: Completed Specimen: Blood Updated: 07/01/20 2308    Blood Culture, Routine --Abnormal     Gram stain Aerobic bottle:   Gram positive cocci in clusters   resembling Staphylococcus   Information to follow   Gram stain Anaerobic bottle:   Gram positive cocci in clusters   resembling Staphylococcus   Information to follow   Abnormal     Organism Staphylococcus coagulase negative DNA DetectedAbnormal     Blood Culture, Routine See additional report for complete BCID panel.    Narrative:     ORDER#: [id number]                          ORDERED BY: Stevo Espinosa  SOURCE: Blood                              COLLECTED:  06/30/20 19:45  ANTIBIOTICS AT KATI. :                      RECEIVED :  06/30/20 19:50  CALL  [facility]  [id number] tel. [phone],  Microbiology results called to and read back by GILMER Perrin, 07/01/2020  19:20, by Dieter Kovacs  If child <=2 yrs old please draw pediatric bottle. ~Blood Culture #1  Performed at:  [address] [employer] [address]   Phone [phone]   Culture, Blood, PCR ID Panel Results Report [[id number]] Collected: 06/30/20 1945   Order Status: Completed Updated: 07/01/20 1920    Report SEE IMAGE   Narrative:     Vicky Vera [id number],  Microbiology results called to and read back by GILMER Perrin, 07/01/2020  19:20, by Dieter Kovacs  Referred out by:  [address] [facility] [employer] [address]   Phone [phone]   Legionella antigen, urine [[id number]] Collected: 07/01/20 0100   Order Status: Completed Specimen: Urine voided Updated: 07/01/20 1212    L. pneumophila Serogp 1 Ur Ag --    Presumptive Negative   No Legionella pneumophila serogroup 1 antigens detected. A negative result does not exclude infection with   Legionella pneumophila serogroup 1 nor does it rule out   other microbial-caused respiratory infections or   disease caused by other serogroups of   Legionella pneumophila. Normal Range: Presumptive Negative    Narrative:     ORDER#: [id number]                          ORDERED BY: BERTIN IRWIN  SOURCE: Urine Voided                       COLLECTED:  07/01/20 01:00  ANTIBIOTICS AT KATI. :                      RECEIVED :  07/01/20 01:51  Performed at:  [facility]  [address], [place] [employer] [address]   Phone [phone]   Strep Pneumoniae Antigen [[id number]] Collected: 07/01/20 0100   Order Status: Completed Specimen: Urine, clean catch Updated: 07/01/20 1211 STREP PNEUMONIAE ANTIGEN, URINE --    Presumptive Negative   Presumptive negative suggests no current or recent   pneumococcal infection. Infection due to Strep pneumoniae   cannot be ruled out since the antigen present in the sample   may be below the detection limit of the test.   Normal Range:Presumptive Negative    Narrative:     ORDER#: [id number]                          ORDERED BY: Dereck Yanez  SOURCE: Urine Clean Catch                  COLLECTED:  07/01/20 01:00  ANTIBIOTICS AT KATI. :                      RECEIVED :  07/01/20 01:54  Performed at:  [facility]  [address] [facility] [address]   Phone [phone]   Sputum gram stain [[id number]] Collected: 07/01/20 0100   Order Status: Canceled Specimen: Sputum Expectorated    MRSA DNA Probe, Nasal [[id number]] Collected: 07/01/20 0100   Order Status: Canceled Specimen: Nares          IMAGING:    XR CHEST PORTABLE   Final Result   1. Stable bilateral lung infiltrates which may be related to pulmonary edema   versus pneumonia. XR CHEST PORTABLE   Final Result   Multifocal airspace disease, asymmetrical right pulmonary edema, versus   pneumonia.                All the pertinent images and reports for the current Hospitalization were reviewed by me     Scheduled Meds:   insulin lispro  7 Units Subcutaneous TID WC    insulin glargine  20 Units Subcutaneous Nightly    dexamethasone  6 mg Oral Daily    cefTRIAXone (ROCEPHIN) IV  1 g Intravenous Q24H    enoxaparin  40 mg Subcutaneous BID    atorvastatin  40 mg Oral Nightly    aspirin  81 mg Oral Daily    sodium chloride flush  10 mL Intravenous 2 times per day    insulin lispro  0-12 Units Subcutaneous TID WC    insulin lispro  0-6 Units Subcutaneous Nightly    prasugrel  10 mg Oral Daily       Continuous Infusions:   dextrose         PRN Meds:  potassium chloride **OR** potassium alternative oral replacement **OR** potassium chloride, sodium chloride and  care every where were reviewed  by me as a part of the evaluation   Plan:   1. Completed IV Remdesivir  STOP  7/5   2. Check CMP daily  3. Cont IV Ceftriaxone stop date 7/8   4. Hopefully Fio2 goes down   5. Trend CRP, Ferritin, LDH  6. CXR noted    7. Cont Dexamethasone x 10 days can lower the dose to x 6 mg      Discussed with patient/Family and Nursing        Risk of Complications/Morbidity: High      · Illness(es)/ Infection present that pose threat to bodily function. · There is potential for severe exacerbation of infection/side effects of treatment. · Therapy requires intensive monitoring for antimicrobial agent toxicity. Discussed with patient/Family and Nursing staff     Thanks for allowing me to participate in your patient's care and please call me with any questions or concerns.     Tessa Nova MD  Infectious Disease  [facility]) Physician  Phone: [phone]   Fax : [phone]

## 2020-07-07 LAB
ALBUMIN SERPL-MCNC: 2.3 G/DL (ref 3.4–5)
ANION GAP SERPL CALCULATED.3IONS-SCNC: 8 MMOL/L (ref 3–16)
BANDED NEUTROPHILS RELATIVE PERCENT: 2 % (ref 0–7)
BASOPHILS ABSOLUTE: 0 K/UL (ref 0–0.2)
BASOPHILS RELATIVE PERCENT: 0 %
BUN BLDV-MCNC: 13 MG/DL (ref 7–20)
CALCIUM SERPL-MCNC: 8.1 MG/DL (ref 8.3–10.6)
CHLORIDE BLD-SCNC: 97 MMOL/L (ref 99–110)
CO2: 32 MMOL/L (ref 21–32)
CREAT SERPL-MCNC: <0.5 MG/DL (ref 0.9–1.3)
D DIMER: 1273 NG/ML DDU (ref 0–229)
EOSINOPHILS ABSOLUTE: 0.4 K/UL (ref 0–0.6)
EOSINOPHILS RELATIVE PERCENT: 2 %
FERRITIN: 810.9 NG/ML (ref 30–400)
GFR AFRICAN AMERICAN: >60
GFR NON-AFRICAN AMERICAN: >60
GLUCOSE BLD-MCNC: 186 MG/DL (ref 70–99)
GLUCOSE BLD-MCNC: 193 MG/DL (ref 70–99)
GLUCOSE BLD-MCNC: 202 MG/DL (ref 70–99)
GLUCOSE BLD-MCNC: 294 MG/DL (ref 70–99)
GLUCOSE BLD-MCNC: 309 MG/DL (ref 70–99)
HCT VFR BLD CALC: 44.1 % (ref 40.5–52.5)
HEMOGLOBIN: 15 G/DL (ref 13.5–17.5)
LYMPHOCYTES ABSOLUTE: 1.3 K/UL (ref 1–5.1)
LYMPHOCYTES RELATIVE PERCENT: 7 %
MCH RBC QN AUTO: 28.1 PG (ref 26–34)
MCHC RBC AUTO-ENTMCNC: 34 G/DL (ref 31–36)
MCV RBC AUTO: 82.6 FL (ref 80–100)
MONOCYTES ABSOLUTE: 1.3 K/UL (ref 0–1.3)
MONOCYTES RELATIVE PERCENT: 7 %
MYELOCYTE PERCENT: 1 %
NEUTROPHILS ABSOLUTE: 15.1 K/UL (ref 1.7–7.7)
NEUTROPHILS RELATIVE PERCENT: 81 %
PDW BLD-RTO: 12.8 % (ref 12.4–15.4)
PERFORMED ON: ABNORMAL
PHOSPHORUS: 3.4 MG/DL (ref 2.5–4.9)
PLATELET # BLD: 251 K/UL (ref 135–450)
PMV BLD AUTO: 8.4 FL (ref 5–10.5)
POTASSIUM SERPL-SCNC: 3.7 MMOL/L (ref 3.5–5.1)
RBC # BLD: 5.34 M/UL (ref 4.2–5.9)
SLIDE REVIEW: ABNORMAL
SODIUM BLD-SCNC: 137 MMOL/L (ref 136–145)
WBC # BLD: 18 K/UL (ref 4–11)

## 2020-07-07 PROCEDURE — 85379 FIBRIN DEGRADATION QUANT: CPT

## 2020-07-07 PROCEDURE — 94761 N-INVAS EAR/PLS OXIMETRY MLT: CPT

## 2020-07-07 PROCEDURE — 6370000000 HC RX 637 (ALT 250 FOR IP): Performed by: INTERNAL MEDICINE

## 2020-07-07 PROCEDURE — 80069 RENAL FUNCTION PANEL: CPT

## 2020-07-07 PROCEDURE — 2580000003 HC RX 258: Performed by: INTERNAL MEDICINE

## 2020-07-07 PROCEDURE — 6360000002 HC RX W HCPCS: Performed by: INTERNAL MEDICINE

## 2020-07-07 PROCEDURE — 82728 ASSAY OF FERRITIN: CPT

## 2020-07-07 PROCEDURE — 2060000000 HC ICU INTERMEDIATE R&B

## 2020-07-07 PROCEDURE — 85025 COMPLETE CBC W/AUTO DIFF WBC: CPT

## 2020-07-07 PROCEDURE — 99233 SBSQ HOSP IP/OBS HIGH 50: CPT | Performed by: INTERNAL MEDICINE

## 2020-07-07 PROCEDURE — 6370000000 HC RX 637 (ALT 250 FOR IP): Performed by: NURSE PRACTITIONER

## 2020-07-07 RX ORDER — GUAIFENESIN 600 MG/1
600 TABLET, EXTENDED RELEASE ORAL 2 TIMES DAILY
Status: DISCONTINUED | OUTPATIENT
Start: 2020-07-07 | End: 2020-07-09 | Stop reason: HOSPADM

## 2020-07-07 RX ADMIN — PRASUGREL HYDROCHLORIDE 10 MG: 10 TABLET, FILM COATED ORAL at 09:23

## 2020-07-07 RX ADMIN — ATORVASTATIN CALCIUM 40 MG: 40 TABLET, FILM COATED ORAL at 21:47

## 2020-07-07 RX ADMIN — INSULIN LISPRO 4 UNITS: 100 INJECTION, SOLUTION INTRAVENOUS; SUBCUTANEOUS at 21:47

## 2020-07-07 RX ADMIN — SODIUM CHLORIDE, PRESERVATIVE FREE 10 ML: 5 INJECTION INTRAVENOUS at 09:59

## 2020-07-07 RX ADMIN — SODIUM CHLORIDE, PRESERVATIVE FREE 10 ML: 5 INJECTION INTRAVENOUS at 21:47

## 2020-07-07 RX ADMIN — INSULIN GLARGINE 20 UNITS: 100 INJECTION, SOLUTION SUBCUTANEOUS at 21:48

## 2020-07-07 RX ADMIN — GUAIFENESIN 600 MG: 600 TABLET ORAL at 09:24

## 2020-07-07 RX ADMIN — INSULIN LISPRO 7 UNITS: 100 INJECTION, SOLUTION INTRAVENOUS; SUBCUTANEOUS at 13:08

## 2020-07-07 RX ADMIN — ENOXAPARIN SODIUM 40 MG: 40 INJECTION SUBCUTANEOUS at 21:47

## 2020-07-07 RX ADMIN — ENOXAPARIN SODIUM 40 MG: 40 INJECTION SUBCUTANEOUS at 09:24

## 2020-07-07 RX ADMIN — INSULIN LISPRO 7 UNITS: 100 INJECTION, SOLUTION INTRAVENOUS; SUBCUTANEOUS at 18:40

## 2020-07-07 RX ADMIN — INSULIN LISPRO 6 UNITS: 100 INJECTION, SOLUTION INTRAVENOUS; SUBCUTANEOUS at 18:40

## 2020-07-07 RX ADMIN — ASPIRIN 81 MG 81 MG: 81 TABLET ORAL at 09:24

## 2020-07-07 RX ADMIN — INSULIN LISPRO 7 UNITS: 100 INJECTION, SOLUTION INTRAVENOUS; SUBCUTANEOUS at 09:57

## 2020-07-07 RX ADMIN — INSULIN LISPRO 4 UNITS: 100 INJECTION, SOLUTION INTRAVENOUS; SUBCUTANEOUS at 13:08

## 2020-07-07 RX ADMIN — DEXAMETHASONE 6 MG: 6 TABLET ORAL at 09:24

## 2020-07-07 RX ADMIN — CEFTRIAXONE 1 G: 1 INJECTION, POWDER, FOR SOLUTION INTRAMUSCULAR; INTRAVENOUS at 12:51

## 2020-07-07 RX ADMIN — INSULIN LISPRO 2 UNITS: 100 INJECTION, SOLUTION INTRAVENOUS; SUBCUTANEOUS at 09:57

## 2020-07-07 RX ADMIN — GUAIFENESIN 600 MG: 600 TABLET ORAL at 21:47

## 2020-07-07 ASSESSMENT — PAIN SCALES - GENERAL
PAINLEVEL_OUTOF10: 0

## 2020-07-07 NOTE — PROGRESS NOTES
Nutrition Assessment (Low Risk)    Type and Reason for Visit: Initial(los)    Nutrition Recommendations:   Continue carb control diet  Monitor need for additional ed needs (pt with hx of diet ed on heart healthy eating with DM    Nutrition Assessment:  Pt with pmh of DM, CVA, HLD, HTN, adm r/t chest pain & emesis, anorexia, cough, fevers, fatigue & found to have COVID & pneumonia. MD noted little change over the past week, recommending LTAC at discharge. Diet adv to [facility] with good intake noted. Patient assessed for nutritional risk. Deemed to be at low risk at this time. Will continue to monitor for changes in status. Malnutrition Assessment:  · Malnutrition Status: Insufficient data   · Due to current CDC guidelines recommending 6-ft distancing for social isolation for COVID19 prevention, NFPE/malnutrition assessment was deferred at this time.     ·     Nutrition Risk Level   Risk Level: Low    Nutrition Diagnosis:   · Problem: No nutrition diagnosis at this time    Nutrition Intervention:  Food and/or Delivery: Continue current diet  Nutrition Education/Counseling/Coordination of Care:  Continued Inpatient Monitoring      Electronically signed by Caryle Armor, RD, LD on 7/7/20 at 2:37 PM EDT    Contact Number: [phone]

## 2020-07-07 NOTE — PROGRESS NOTES
Infectious Disease Follow up Notes  Admit Date: 6/30/2020  Hospital Day: 8    Antibiotics :   IV Remdesivir stop date  7/5   IV Dexamethasone  IV Ceftriaxone    CHIEF COMPLAINT:      COVID-19 Pneumoia  Resp failure  CAD  CHF      Subjective interval History :  54 y.o. man with diagnosis of COVID-19 infection on 6/24 has not been feeling well for the past few days now fevers, sob, poor oral intake h/o HTN, CAD, PVD, DM and Cardiac stents and AICD in place. CXR with bi lateral infiltrates and Fevers here on admit using up to 2 lts nasal cannula with worsening symptoms. COVID-19 test here positive we are consulted for recommendations.     Remains sob using x 15 lts nasal cannula anD wbc UP from steroids - no fevers not ready for O2 wean still hypoxic    Past Medical History:    Past Medical History:   Diagnosis Date    Arthritis     Blood circulation, collateral     CAD (coronary artery disease) 7/15/2014    CAD (coronary artery disease)     Cardiac arrest [facility] 10/05/2018    Cerebral artery occlusion with cerebral infarction ([facility]     Diabetes mellitus ([facility]     Diabetic peripheral neuropathy ([facility] 6/8/2018    GERD (gastroesophageal reflux disease)     ulcers    Hypercholesteremia     Hyperlipidemia     Hypertension     Movement disorder     possible arthritis right hand    MRSA (methicillin resistant staph aureus) culture positive 07/13/2018    foot wound    Neuropathy     Other disorders of kidney and ureter in diseases classified elsewhere     POTS (postural orthostatic tachycardia syndrome)     Psychiatric problem     anxiety, depression, bipolar    Sleep apnea     Syncope     Type II or unspecified type diabetes mellitus without mention of complication, not stated as uncontrolled        Past Surgical History:    Past Surgical History:   Procedure Laterality Date    CARDIAC CATHETERIZATION      COLONOSCOPY      CORONARY ANGIOPLASTY WITH STENT PLACEMENT  10/06/2018    Bare Metal Stent to PDA    CORONARY ANGIOPLASTY WITH STENT PLACEMENT  10/07/2018    Bare Metal Stent to OM    CORONARY ANGIOPLASTY WITH STENT PLACEMENT  10/03/2018    CECE to Circ    FINGER SURGERY Left     Left Thumb    HERNIA REPAIR      JOINT REPLACEMENT      VASCULAR SURGERY      VASECTOMY         Current Medications:    Outpatient Medications Marked as Taking for the 6/30/20 encounter [facility] Encounter)   Medication Sig Dispense Refill    prasugrel (EFFIENT) 10 MG TABS Take 1 tablet by mouth once for 1 dose (Patient taking differently: Take 10 mg by mouth daily ) 30 tablet 11    atorvastatin (LIPITOR) 40 MG tablet TAKE 1 TABLET BY MOUTH IN THE EVENING  30 tablet 4    aspirin 81 MG chewable tablet Take 81 mg by mouth daily      Dulaglutide (TRULICITY) 1.5 HZ/3.6JR SOPN Inject 1.5 mg into the skin once a week       insulin detemir (LEVEMIR FLEXTOUCH) 100 UNIT/ML injection pen Inject 30 Units into the skin nightly (Patient taking differently: Inject 50 Units into the skin nightly ) 5 pen 3       Allergies:  No known allergies    Immunizations :   Immunization History   Administered Date(s) Administered    Pneumococcal Polysaccharide ([id number]) 09/11/2010    Tdap (Boostrix, Adacel) 05/27/2018       Social History:    Social History     Tobacco Use    Smoking status: Never Smoker    Smokeless tobacco: Never Used   Substance Use Topics    Alcohol use: No    Drug use: No     Social History     Tobacco Use   Smoking Status Never Smoker   Smokeless Tobacco Never Used      Family History   Problem Relation Age of Onset    High Blood Pressure Mother     Stroke Mother     Heart Disease Mother     COPD Mother     Heart Disease Father     Cancer Father         skin    Diabetes Sister     Cancer Sister     Heart Disease Brother     Diabetes Brother        REVIEW OF SYSTEMS:    No fever / chills / sweats. No weight loss.   No visual change, eye pain, eye discharge. No oral lesion, sore throat, dysphagia. Denies cough / sputum/Sob   Denies chest pain, palpitations/ dizziness  Denies nausea/ vomiting/abdominal pain/diarrhea. Denies dysuria or change in urinary function. Denies joint swelling or pain. No myalgia, arthralgia. No rashes, skin lesions   Denies focal weakness, sensory change or other neurologic symptoms  No lymph node swelling or tenderness. Fevers, sob+ cough +  Poor oral intake   PHYSICAL EXAM:      Vitals:  T max 102.4   BP (!) 132/90   Pulse 91   Temp 97.5 °F (36.4 °C) (Oral)   Resp 20   Ht 6' 2\" (1.88 m)   Wt 211 lb 13.8 oz (96.1 kg)   SpO2 93%   BMI 27.20 kg/m²     In-person bedside physical examination deferred. Pursuant to the emergency declaration under the [address] [place] [address] authority and the [employer] Act, this clinical encounter was conducted to provide necessary medical care.   (Also consistent with new provisions and guidance offered by [facility] on March 18, 2020 in setting of COVID 19 outbreak and in order to preserve personal protective equipment in accordance with the flexibilities announced by CMS on March 30, 2020)   References: https://[facility]. [facility]/Portals/0/Resources/COVID-19/3_18%20Telemed%20Guidance%20Updated%20March%2018. pdf?iri=[email or web address]                      https://[facility]. [facility]/Portals/0/Resources/COVID-19/3_18%20Telemed%20Guidance%20Updated%20March%2018. pdf?ewp=[email or web address]                      [email or web address] pdf                                Pulmonary: deferred  Abdomen/GI: deferred  Neuro: deferred  Skin: deferred  Musculoskeletal:  deferred  Genitourinary: Deferred  Psych: deferred  Lymphatic/Immunologic: deferred       Data Review:    Lab Results   Component Value Date    WBC 18.0 (H) 07/07/2020 HGB 15.0 07/07/2020    HCT 44.1 07/07/2020    MCV 82.6 07/07/2020     07/07/2020     Lab Results   Component Value Date    CREATININE <0.5 (L) 07/07/2020    BUN 13 07/07/2020     07/07/2020    K 3.7 07/07/2020    CL 97 (L) 07/07/2020    CO2 32 07/07/2020       Hepatic Function Panel:   Lab Results   Component Value Date    ALKPHOS 66 07/05/2020    ALT 16 07/05/2020    AST 28 07/05/2020    PROT 5.6 07/05/2020    PROT 8.3 11/12/2012    BILITOT 0.4 07/05/2020    BILIDIR <0.2 07/05/2020    IBILI see below 07/05/2020    LABALBU 2.3 07/07/2020       UA:  Lab Results   Component Value Date    COLORU Yellow 01/09/2018    CLARITYU Clear 01/09/2018    GLUCOSEU 500 01/09/2018    GLUCOSEU >=1000 09/10/2010    BILIRUBINUR Negative 01/09/2018    BILIRUBINUR NEGATIVE 09/10/2010    KETUA TRACE 01/09/2018    SPECGRAV 1.020 01/09/2018    BLOODU Negative 01/09/2018    PHUR 5.0 01/09/2018    PROTEINU TRACE 01/09/2018    UROBILINOGEN 0.2 01/09/2018    NITRU Negative 01/09/2018    LEUKOCYTESUR Negative 01/09/2018    LABMICR YES 01/09/2018    URINETYPE Not Specified 01/09/2018      Urine Microscopic:   Lab Results   Component Value Date    WBCUA 0-2 01/09/2018    RBCUA 0-2 01/09/2018    EPIU 0-2 01/09/2018     Procal  0.16     Ferritin  738     D dimer  1581     MICRO: cultures reviewed and updated by me   01/20 0100       Order Status: Completed Specimen: Sputum Expectorated Updated: 07/02/20 1055    CULTURE, RESPIRATORY Moderate growth normal respiratory letty withAbnormal     Organism BHS Group B (Strep agalacticae)Abnormal     CULTURE, RESPIRATORY --    Moderate growth   Susceptibility testing of penicillin and other beta lactams is   not necessary for beta hemolytic Streptococci since resistant   strains have not been identified.  (CLSI M100)    Narrative:     ORDER#: [id number]                          ORDERED BY: Silvia Beck  SOURCE: Sputum Expectorated                COLLECTED:  07/01/20 01:00  ANTIBIOTICS AT KATI.:                      RECEIVED :  07/01/20 06:42  Performed at:  [facility]  [address]   Phone [phone]   Culture, MRSA, Screening [[id number]] Collected: 07/01/20 0100   Order Status: Completed Specimen: Nares Updated: 07/02/20 1035    MRSA Culture Only Further report to follow   Narrative:     ORDER#: [id number]                          ORDERED BY: Roseline Tijerina  SOURCE: Nares                              COLLECTED:  07/01/20 01:00  ANTIBIOTICS AT KATI. :                      RECEIVED :  07/01/20 01:39  Performed at:  [facility]  [address] [place] [address]   Phone [phone]   Culture, Blood 2 [[id number]] Collected: 06/30/20 2001   Order Status: Completed Specimen: Blood Updated: 07/01/20 2315    Culture, Blood 2 No Growth to date.  Any change in status will be called. Narrative:     ORDER#: [id number]                          ORDERED BY: Aparna Hutchinson  SOURCE: Blood                              COLLECTED:  06/30/20 20:01  ANTIBIOTICS AT KATI. :                      RECEIVED :  06/30/20 20:05  If child <=2 yrs old please draw pediatric bottle. ~Blood Culture #2  Performed at:  [facility]  [address]   Phone [phone]   Culture, Blood 1 [[id number]] (Abnormal) Collected: 06/30/20 1945   Order Status: Completed Specimen: Blood Updated: 07/01/20 2308    Blood Culture, Routine --Abnormal     Gram stain Aerobic bottle:   Gram positive cocci in clusters   resembling Staphylococcus   Information to follow   Gram stain Anaerobic bottle:   Gram positive cocci in clusters   resembling Staphylococcus   Information to follow   Abnormal     Organism Staphylococcus coagulase negative DNA DetectedAbnormal     Blood Culture, Routine See additional report for complete BCID panel.    Narrative:     ORDER#: [id number]                   [address], URINE --    Presumptive Negative   Presumptive negative suggests no current or recent   pneumococcal infection. Infection due to Strep pneumoniae   cannot be ruled out since the antigen present in the sample   may be below the detection limit of the test.   Normal Range:Presumptive Negative    Narrative:     ORDER#: [id number]                          ORDERED BY: Anna Weathers  SOURCE: Urine Clean Catch                  COLLECTED:  07/01/20 01:00  ANTIBIOTICS AT KATI. :                      RECEIVED :  07/01/20 01:54  Performed at:  [place] [address]   Phone [phone]   Sputum gram stain [[id number]] Collected: 07/01/20 0100   Order Status: Canceled Specimen: Sputum Expectorated    MRSA DNA Probe, Nasal [[id number]] Collected: 07/01/20 0100   Order Status: Canceled Specimen: Nares          IMAGING:    XR CHEST PORTABLE   Final Result   1. Stable bilateral lung infiltrates which may be related to pulmonary edema   versus pneumonia. XR CHEST PORTABLE   Final Result   Multifocal airspace disease, asymmetrical right pulmonary edema, versus   pneumonia.                All the pertinent images and reports for the current Hospitalization were reviewed by me     Scheduled Meds:   guaiFENesin  600 mg Oral BID    insulin lispro  7 Units Subcutaneous TID WC    insulin glargine  20 Units Subcutaneous Nightly    dexamethasone  6 mg Oral Daily    cefTRIAXone (ROCEPHIN) IV  1 g Intravenous Q24H    enoxaparin  40 mg Subcutaneous BID    atorvastatin  40 mg Oral Nightly    aspirin  81 mg Oral Daily    sodium chloride flush  10 mL Intravenous 2 times per day    insulin lispro  0-12 Units Subcutaneous TID WC    insulin lispro  0-6 Units Subcutaneous Nightly    prasugrel  10 mg Oral Daily       Continuous Infusions:   dextrose         PRN Meds:  sodium chloride, potassium chloride **OR** potassium alternative nasal cannula and hopefully Fio2 goes down and WBC elevation is from Steroids        Labs, Microbiology, Radiology and all the pertinent results from current hospitalization and  care every where were reviewed  by me as a part of the evaluation   Plan:   1. Completed IV Remdesivir  STOP  7/5   2. WBC up from steroids    3. D/C IV Ceftriaxone  4. Hopefully Fio2 goes down   5. nOTED plans for [facility] as he is unable to wean on O2 AND will need monitored setting    6. CXR noted    7. Cont Dexamethasone x 10 dayS    WILL sign off call with any Questions ?     Discussed with patient/Family and Nursing        Risk of Complications/Morbidity: High      · Illness(es)/ Infection present that pose threat to bodily function. · There is potential for severe exacerbation of infection/side effects of treatment. · Therapy requires intensive monitoring for antimicrobial agent toxicity. Discussed with patient/Family and Nursing staff     Thanks for allowing me to participate in your patient's care and please call me with any questions or concerns.     Jim Santos MD  Infectious Disease  [facility]) Physician  Phone: [phone]   Fax : [phone]

## 2020-07-07 NOTE — CARE COORDINATION
Per chart review, pulmonology is recommending LTACH. Call to [facility], she will review patient's chart and call back. Electronically signed by Xiang Myers RN on 7/7/2020 at 9:55 AM     Received call back from [facility]. She states patient is appropriate for LTACH, but their protocol requires the patient to be 14 days after diagnosis. Call into patient's room, spoke with him over the phone, he advised his first test was about 2 weeks ago at [facility] on [employer] [address]. He also advised he was interested in seeing if  [facility] could accept him as another option. Explained the criteria for [facility]. Call to [facility], faxed Facesheet to [phone]. Electronically signed by Xiang Myers RN on 7/7/2020 at 4:03 PM     Received a call from the patient's wife, she is concerned that the patient does not understand what the process/plan is and he doesn't understand why an LTACH is necessary and why he can't just go home. Advised CM will contact patient in his room again to discuss. Call to patient's room, spoke with him at length regarding the services that the [facility], [facility] would provide. He states he doesn't know why he can't go home, explained his high oxygen requirements and what LTACH will focus on is getting him able to go home, he states \"well who's to say that will ever improve\". When explaining to the patient that every Covid + patient's recovery has been different and the physicians are unable to tell him when he is going to improve, he hung up on CM. Advised patient's nurse, she will go into room to speak with patient.     Electronically signed by Xiang Myers RN on 7/7/2020 at 5:08 PM

## 2020-07-07 NOTE — PROGRESS NOTES
Hospitalist Progress Note      PCP: SILVIO POLLOCK, APRN - CNP    Date of Admission: 6/30/2020    Chief Complaint:   Chief Complaint   Patient presents with    Chest Pain    Emesis     Hospital Course: Margarita Kay is a 54y.o. year old male with a history of GERD, diabetes, history of cardiac arrest admitted with symptoms of anorexia, fatigue, cough and shortness of breath with fevers. He was diagnosed with COVID-19 prior to admission. Chest x-ray significant for multifocal pneumonia. Infectious disease was consulted and started on Remdesivir, empiric ceftriaxone, trending CRP ferritin and LDH with dexamethasone for 10 days. Worsening hypoxemia, transferred to the ICU, requiring 15 L nasal cannula.     Sputum culture growing group B strep. Subjective: No acute complaints. Denies chest pain, shortness of breath, nausea, vomiting, fevers, abdominal pain. Long conversation about possible discharge to [facility].     Medications:  Reviewed    Infusion Medications    dextrose       Scheduled Medications    guaiFENesin  600 mg Oral BID    insulin lispro  7 Units Subcutaneous TID WC    insulin glargine  20 Units Subcutaneous Nightly    dexamethasone  6 mg Oral Daily    cefTRIAXone (ROCEPHIN) IV  1 g Intravenous Q24H    enoxaparin  40 mg Subcutaneous BID    atorvastatin  40 mg Oral Nightly    aspirin  81 mg Oral Daily    sodium chloride flush  10 mL Intravenous 2 times per day    insulin lispro  0-12 Units Subcutaneous TID WC    insulin lispro  0-6 Units Subcutaneous Nightly    prasugrel  10 mg Oral Daily     PRN Meds: sodium chloride, potassium chloride **OR** potassium alternative oral replacement **OR** potassium chloride, sodium chloride flush, acetaminophen **OR** acetaminophen, polyethylene glycol, ondansetron, glucose, dextrose, glucagon (rDNA), dextrose, benzonatate      Intake/Output Summary (Last 24 hours) at 7/7/2020 1005  Last data filed at 7/7/2020 0639  Gross per 24 hour Intake 450 ml   Output 2200 ml   Net -1750 ml       Physical Exam Performed:    BP (!) 132/90   Pulse 91   Temp 97.5 °F (36.4 °C) (Oral)   Resp 20   Ht 6' 2\" (1.88 m)   Wt 211 lb 13.8 oz (96.1 kg)   SpO2 93%   BMI 27.20 kg/m²     General appearance: No apparent distress, appears stated age and cooperative. HEENT: Pupils equal, round, and reactive to light. Conjunctivae/corneas clear. Neck: Supple, with full range of motion. Trachea midline. Respiratory:  Normal respiratory effort. Crackles bilaterally without Rales/Wheezes/Rhonchi. Cardiovascular: Regular rate and rhythm with normal S1/S2 without murmurs, rubs or gallops. Abdomen: Soft, non-tender, non-distended with normal bowel sounds. Musculoskeletal: No clubbing, cyanosis or edema bilaterally. Full range of motion without deformity. Skin: Skin color, texture, turgor normal.  No rashes or lesions. Neurologic:  Neurovascularly intact without any focal sensory/motor deficits. Cranial nerves: II-XII intact, grossly non-focal.  Psychiatric: Alert and oriented, thought content appropriate, normal insight  Capillary Refill: Brisk,< 3 seconds   Peripheral Pulses: +2 palpable, equal bilaterally       Labs:   Recent Labs     07/05/20 0447 07/06/20  0335 07/07/20  0834   WBC 17.3* 15.3* 18.0*   HGB 15.3 15.0 15.0   HCT 45.1 44.8 44.1    227 251     Recent Labs     07/05/20 0447 07/06/20  0335 07/07/20  0834    139 137   K 3.5 3.4* 3.7    98* 97*   CO2 27 28 32   BUN 11 12 13   CREATININE 0.5* <0.5* <0.5*   CALCIUM 8.4 8.2* 8.1*   PHOS 3.8 4.9 3.4     Recent Labs     07/05/20 0447   AST 28   ALT 16   BILIDIR <0.2   BILITOT 0.4   ALKPHOS 66     No results for input(s): INR in the last 72 hours. No results for input(s): Tiney Olivares in the last 72 hours.     Urinalysis:      Lab Results   Component Value Date    NITRU Negative 01/09/2018    WBCUA 0-2 01/09/2018    RBCUA 0-2 01/09/2018    BLOODU Negative 01/09/2018    SPECGRAV 1.020 01/09/2018    GLUCOSEU 500 01/09/2018    GLUCOSEU >=1000 09/10/2010       Radiology:  XR CHEST PORTABLE   Final Result   1. Stable bilateral lung infiltrates which may be related to pulmonary edema   versus pneumonia. XR CHEST PORTABLE   Final Result   Multifocal airspace disease, asymmetrical right pulmonary edema, versus   pneumonia. Assessment/Plan:    Active Hospital Problems    Diagnosis    Acute respiratory failure with hypoxia ([address] [J96.01]    Pneumonia due to COVID-19 virus [U07.1, J12.89]    Multifocal pneumonia [J18.9]    Sepsis ([address] [A41.9]    Tachycardia [R00.0]    Tachypnea [R06.82]    Fever [R50.9]    Lactic acidosis [E87.2]    COVID-19 virus infection [U07.1]    Poor appetite [R63.0]    High anion gap metabolic acidosis [I95.7]    Pneumonia [J18.9]    DMII (diabetes mellitus, type 2) (HCC) [E11.9]    CAD (coronary artery disease) [I25.10]    HLD (hyperlipidemia) [E78.5]    Essential hypertension [I10]     Sepsis, PNA due to COVID 19 infection, GBS POA  Also growing GBS in sputum. Pt on Vanc and Cefepime - to rocephin IV due to strep in sputum, stop date 7/8  Cont decadron - stop date 7/10    ID involved - started remdesivir 7/1, completed     Acute Hypox Resp Failure POA   Due to above. Stabilized around 14-15l/min   - Txed to ICU on 7/3 for close monitoring. Stable, back to floor  CXR stable bilateral pulm disease. CC involved.      DMII with uncontrolled hyperglycemia  Pt does not take his meds normally - has remote Hx of drug addiction and reports that taking DM meds makes him feel like drug addict, so he wasn't. BG worsened with steroid. Very poor control at baseline - A1C >12  Increased lantus to 45BID. Increased prandial bolus. S/p humulin R IV x2 on 7/1. Carb control diet.    BG much better now - actually on the low side last 48hrs              Decrease lantus to 20 nightly              - decrease prandial bolus to 7              - cont ISS.      CAD advanced ischemic cardiomyopathy multivessel CAD with Hx of VT s/p AICD  Followed by Dr Hodan Leiva  Pt felt chest pressure on 7/1               - EKG unremarkable. Serial troponin negative. - restarted ASA, statin, effient. Historically does not tolerate BB and ACEI/ARB due to hypotension and recurrent syncope episodes.       Lactic Acidosis - due to above. Resolved       DVT Prophylaxis: Lovenox  Diet: DIET CARB CONTROL;  Code Status: Full Code    PT/OT Eval Status: deferred    Dispo - pending, possible Beto Banda MD    This note was transcribed using [id number]. Please disregard any translational errors.

## 2020-07-07 NOTE — PLAN OF CARE
Problem: Skin Integrity:  Goal: Will show no infection signs and symptoms  Description: Will show no infection signs and symptoms  Outcome: Ongoing  Goal: Absence of new skin breakdown  Description: Absence of new skin breakdown  Outcome: Ongoing  Note: Skin assessment completed every shift. Pt assessed for incontinence, appropriate barrier cream applied prn. Pt encouraged to turn/rotate every 2 hours. Assistance provided if pt unable to do so themselves. Problem: Falls - Risk of:  Goal: Will remain free from falls  Description: Will remain free from falls  Outcome: Ongoing  Goal: Absence of physical injury  Description: Absence of physical injury  Outcome: Ongoing     Problem: Pain:  Goal: Pain level will decrease  Description: Pain level will decrease  Outcome: Ongoing  Goal: Control of acute pain  Description: Control of acute pain  Outcome: Ongoing  Goal: Control of chronic pain  Description: Control of chronic pain  Outcome: Ongoing     Problem: Airway Clearance - Ineffective:  Goal: Ability to maintain a clear airway will improve  Description: Ability to maintain a clear airway will improve  Outcome: Ongoing     Problem: Airway Clearance - Ineffective  Goal: Achieve or maintain patent airway  Outcome: Ongoing     Problem: Gas Exchange - Impaired  Goal: Absence of hypoxia  Outcome: Ongoing  Goal: Promote optimal lung function  Outcome: Ongoing     Problem: Breathing Pattern - Ineffective  Goal: Ability to achieve and maintain a regular respiratory rate  Outcome: Ongoing     Problem:  Body Temperature -  Risk of, Imbalanced  Goal: Ability to maintain a body temperature within defined limits  Outcome: Ongoing  Goal: Will regain or maintain usual level of consciousness  Outcome: Ongoing  Goal: Complications related to the disease process, condition or treatment will be avoided or minimized  Outcome: Ongoing     Problem: Isolation Precautions - Risk of Spread of Infection  Goal: Prevent transmission of

## 2020-07-07 NOTE — PROGRESS NOTES
Physician Progress Note      Michelle Morris  CSN #:                  [id number]  :                       1965  ADMIT DATE:       2020 7:08 PM  [address] [place] DATE:  RESPONDING  PROVIDER #:        Gina Jimenez MD          QUERY TEXT:    Dropped Documentation [facility]    Patient admitted with Covid Pneumonia. Documentation in HP of Sepsis-fevers,   tachycardia, tachypnea, lactic acid,no further documentation of Sepsis in   assessment but Sepsis in active problem list. If possible, please document in   the progress notes and discharge summary if Sepsis was: The medical record reflects the following:  Risk Factors: multifactorial pneumonia, +covid test prior to admission  Clinical Indicators: admission T-102.5, P-110, RR-16 to 28, lactic acid-2.1,   documentation in HP of Sepsis  Treatment: fluid resuscitation, serial lactic acids, blood cultures, IV   Cefepime, IV Rocephin, IV Vanco  Options provided:  -- Sepsis, POA treated and resolved  -- Sepsis ruled out after study  -- Refer to Clinical Documentation Reviewer    PROVIDER RESPONSE TEXT:    Sepsis, POA treated and resolved.     Query created by: Ana Ortiz on 2020 2:51 PM      Electronically signed by:  Gina Jimenez MD 2020 3:16 PM

## 2020-07-07 NOTE — PROGRESS NOTES
Relatively little change over the last week. Would recommend LTAC at this point. Completed covid tx. Had dexamethasone for 10 days.

## 2020-07-07 NOTE — PROGRESS NOTES
Pt currently on non-rebreather 15L sating 89%-92%. Pt resting comfortably in bed, does not report any SOB, RR 20. Continuing to monitor.      Electronically signed by Tal Bernal RN on 7/7/2020 at 8:14 AM

## 2020-07-08 LAB
ALBUMIN SERPL-MCNC: 2.1 G/DL (ref 3.4–5)
ANION GAP SERPL CALCULATED.3IONS-SCNC: 11 MMOL/L (ref 3–16)
BANDED NEUTROPHILS RELATIVE PERCENT: 2 % (ref 0–7)
BASOPHILS ABSOLUTE: 0 K/UL (ref 0–0.2)
BASOPHILS RELATIVE PERCENT: 0 %
BUN BLDV-MCNC: 14 MG/DL (ref 7–20)
CALCIUM SERPL-MCNC: 8.6 MG/DL (ref 8.3–10.6)
CHLORIDE BLD-SCNC: 97 MMOL/L (ref 99–110)
CO2: 29 MMOL/L (ref 21–32)
CREAT SERPL-MCNC: <0.5 MG/DL (ref 0.9–1.3)
D DIMER: 782 NG/ML DDU (ref 0–229)
EOSINOPHILS ABSOLUTE: 0 K/UL (ref 0–0.6)
EOSINOPHILS RELATIVE PERCENT: 0 %
FERRITIN: 816.1 NG/ML (ref 30–400)
GFR AFRICAN AMERICAN: >60
GFR NON-AFRICAN AMERICAN: >60
GLUCOSE BLD-MCNC: 205 MG/DL (ref 70–99)
GLUCOSE BLD-MCNC: 211 MG/DL (ref 70–99)
GLUCOSE BLD-MCNC: 215 MG/DL (ref 70–99)
GLUCOSE BLD-MCNC: 273 MG/DL (ref 70–99)
GLUCOSE BLD-MCNC: 280 MG/DL (ref 70–99)
HCT VFR BLD CALC: 44.2 % (ref 40.5–52.5)
HEMATOLOGY PATH CONSULT: NO
HEMOGLOBIN: 14.8 G/DL (ref 13.5–17.5)
LYMPHOCYTES ABSOLUTE: 1.2 K/UL (ref 1–5.1)
LYMPHOCYTES RELATIVE PERCENT: 8 %
MCH RBC QN AUTO: 28 PG (ref 26–34)
MCHC RBC AUTO-ENTMCNC: 33.5 G/DL (ref 31–36)
MCV RBC AUTO: 83.6 FL (ref 80–100)
MONOCYTES ABSOLUTE: 0.7 K/UL (ref 0–1.3)
MONOCYTES RELATIVE PERCENT: 5 %
NEUTROPHILS ABSOLUTE: 13 K/UL (ref 1.7–7.7)
NEUTROPHILS RELATIVE PERCENT: 85 %
PDW BLD-RTO: 12.8 % (ref 12.4–15.4)
PERFORMED ON: ABNORMAL
PHOSPHORUS: 3.9 MG/DL (ref 2.5–4.9)
PLATELET # BLD: 260 K/UL (ref 135–450)
PLATELET SLIDE REVIEW: ADEQUATE
PMV BLD AUTO: 8.4 FL (ref 5–10.5)
POTASSIUM SERPL-SCNC: 4.1 MMOL/L (ref 3.5–5.1)
RBC # BLD: 5.28 M/UL (ref 4.2–5.9)
RBC # BLD: NORMAL 10*6/UL
SLIDE REVIEW: ABNORMAL
SODIUM BLD-SCNC: 137 MMOL/L (ref 136–145)
WBC # BLD: 14.9 K/UL (ref 4–11)

## 2020-07-08 PROCEDURE — 6370000000 HC RX 637 (ALT 250 FOR IP): Performed by: NURSE PRACTITIONER

## 2020-07-08 PROCEDURE — 2060000000 HC ICU INTERMEDIATE R&B

## 2020-07-08 PROCEDURE — 94761 N-INVAS EAR/PLS OXIMETRY MLT: CPT

## 2020-07-08 PROCEDURE — 6370000000 HC RX 637 (ALT 250 FOR IP): Performed by: INTERNAL MEDICINE

## 2020-07-08 PROCEDURE — 85379 FIBRIN DEGRADATION QUANT: CPT

## 2020-07-08 PROCEDURE — 6360000002 HC RX W HCPCS: Performed by: INTERNAL MEDICINE

## 2020-07-08 PROCEDURE — 80069 RENAL FUNCTION PANEL: CPT

## 2020-07-08 PROCEDURE — 2580000003 HC RX 258: Performed by: INTERNAL MEDICINE

## 2020-07-08 PROCEDURE — 82728 ASSAY OF FERRITIN: CPT

## 2020-07-08 PROCEDURE — 85025 COMPLETE CBC W/AUTO DIFF WBC: CPT

## 2020-07-08 PROCEDURE — 2700000000 HC OXYGEN THERAPY PER DAY

## 2020-07-08 PROCEDURE — 36415 COLL VENOUS BLD VENIPUNCTURE: CPT

## 2020-07-08 RX ORDER — INSULIN GLARGINE 100 [IU]/ML
25 INJECTION, SOLUTION SUBCUTANEOUS NIGHTLY
Status: DISCONTINUED | OUTPATIENT
Start: 2020-07-08 | End: 2020-07-09 | Stop reason: HOSPADM

## 2020-07-08 RX ADMIN — INSULIN LISPRO 6 UNITS: 100 INJECTION, SOLUTION INTRAVENOUS; SUBCUTANEOUS at 18:07

## 2020-07-08 RX ADMIN — ENOXAPARIN SODIUM 40 MG: 40 INJECTION SUBCUTANEOUS at 08:51

## 2020-07-08 RX ADMIN — INSULIN LISPRO 4 UNITS: 100 INJECTION, SOLUTION INTRAVENOUS; SUBCUTANEOUS at 09:29

## 2020-07-08 RX ADMIN — SODIUM CHLORIDE, PRESERVATIVE FREE 10 ML: 5 INJECTION INTRAVENOUS at 22:31

## 2020-07-08 RX ADMIN — INSULIN LISPRO 3 UNITS: 100 INJECTION, SOLUTION INTRAVENOUS; SUBCUTANEOUS at 21:54

## 2020-07-08 RX ADMIN — INSULIN GLARGINE 25 UNITS: 100 INJECTION, SOLUTION SUBCUTANEOUS at 21:54

## 2020-07-08 RX ADMIN — DEXAMETHASONE 6 MG: 6 TABLET ORAL at 08:52

## 2020-07-08 RX ADMIN — INSULIN LISPRO 4 UNITS: 100 INJECTION, SOLUTION INTRAVENOUS; SUBCUTANEOUS at 12:42

## 2020-07-08 RX ADMIN — INSULIN LISPRO 9 UNITS: 100 INJECTION, SOLUTION INTRAVENOUS; SUBCUTANEOUS at 12:42

## 2020-07-08 RX ADMIN — GUAIFENESIN 600 MG: 600 TABLET ORAL at 21:54

## 2020-07-08 RX ADMIN — INSULIN LISPRO 7 UNITS: 100 INJECTION, SOLUTION INTRAVENOUS; SUBCUTANEOUS at 09:29

## 2020-07-08 RX ADMIN — GUAIFENESIN 600 MG: 600 TABLET ORAL at 08:52

## 2020-07-08 RX ADMIN — PRASUGREL HYDROCHLORIDE 10 MG: 10 TABLET, FILM COATED ORAL at 08:52

## 2020-07-08 RX ADMIN — SODIUM CHLORIDE, PRESERVATIVE FREE 10 ML: 5 INJECTION INTRAVENOUS at 08:52

## 2020-07-08 RX ADMIN — ACETAMINOPHEN 650 MG: 325 TABLET ORAL at 21:54

## 2020-07-08 RX ADMIN — ENOXAPARIN SODIUM 40 MG: 40 INJECTION SUBCUTANEOUS at 21:55

## 2020-07-08 RX ADMIN — INSULIN LISPRO 9 UNITS: 100 INJECTION, SOLUTION INTRAVENOUS; SUBCUTANEOUS at 18:07

## 2020-07-08 RX ADMIN — ASPIRIN 81 MG 81 MG: 81 TABLET ORAL at 08:51

## 2020-07-08 ASSESSMENT — PAIN SCALES - GENERAL
PAINLEVEL_OUTOF10: 0
PAINLEVEL_OUTOF10: 0
PAINLEVEL_OUTOF10: 4
PAINLEVEL_OUTOF10: 0

## 2020-07-08 ASSESSMENT — PAIN DESCRIPTION - ORIENTATION
ORIENTATION: MID
ORIENTATION: MID

## 2020-07-08 ASSESSMENT — PAIN DESCRIPTION - LOCATION
LOCATION: HEAD
LOCATION: HEAD

## 2020-07-08 ASSESSMENT — PAIN DESCRIPTION - ONSET
ONSET: ON-GOING
ONSET: ON-GOING

## 2020-07-08 ASSESSMENT — PAIN DESCRIPTION - PAIN TYPE
TYPE: ACUTE PAIN
TYPE: ACUTE PAIN

## 2020-07-08 ASSESSMENT — PAIN DESCRIPTION - PROGRESSION
CLINICAL_PROGRESSION: NOT CHANGED
CLINICAL_PROGRESSION: NOT CHANGED

## 2020-07-08 ASSESSMENT — PAIN - FUNCTIONAL ASSESSMENT
PAIN_FUNCTIONAL_ASSESSMENT: ACTIVITIES ARE NOT PREVENTED
PAIN_FUNCTIONAL_ASSESSMENT: ACTIVITIES ARE NOT PREVENTED

## 2020-07-08 ASSESSMENT — PAIN DESCRIPTION - DESCRIPTORS
DESCRIPTORS: ACHING
DESCRIPTORS: ACHING

## 2020-07-08 NOTE — PROGRESS NOTES
20   Ht 6' 2\" (1.88 m)   Wt 202 lb 9.6 oz (91.9 kg)   SpO2 92%   BMI 26.01 kg/m²       ( based on new provisions and guidance offered by [facility] on March 18, 2020 in setting of COVID 19 outbreak and in order to preserve personal protective equipment in accordance with the flexibilities announced by CMS on March 30, 2020)   References:   https://[facility]. [facility]/Portals/0/Resources/COVID-19/3_18%20Telemed%20Guidance%20Updated%20March%2018. pdf?gnx=[email or web address]   ?????   ????????????????????http://[facility]/. pdf   ???????  Bedside physical examination deferred. However I did visually inspect the patient and observed the following:  ??????????????? General: vitals reviewed, alert, no distress? HEENT:?Normal  Cardiovascular: deferred, telemetry data reviewed Yes  Pulmonary:?deferred   Abdomen/GI: deferred   Neuro:  not assessed  Skin: no rashes or significant lesions  Musculoskeletal: ?deferred   Genitourinary: Vila catheter No  Psych: alert and oriented x 3  Lymphatic/Immunologic: deferred         Labs:   Recent Labs     07/06/20  0335 07/07/20  0834 07/08/20  0735   WBC 15.3* 18.0* 14.9*   HGB 15.0 15.0 14.8   HCT 44.8 44.1 44.2    251 260     Recent Labs     07/06/20  0335 07/07/20  0834    137   K 3.4* 3.7   CL 98* 97*   CO2 28 32   BUN 12 13   CREATININE <0.5* <0.5*   CALCIUM 8.2* 8.1*   PHOS 4.9 3.4     No results for input(s): AST, ALT, BILIDIR, BILITOT, ALKPHOS in the last 72 hours. No results for input(s): INR in the last 72 hours. No results for input(s): Leellen Carota in the last 72 hours. Urinalysis:      Lab Results   Component Value Date    NITRU Negative 01/09/2018    WBCUA 0-2 01/09/2018    RBCUA 0-2 01/09/2018    BLOODU Negative 01/09/2018    SPECGRAV 1.020 01/09/2018    GLUCOSEU 500 01/09/2018    GLUCOSEU >=1000 09/10/2010       Radiology:  XR CHEST PORTABLE   Final Result   1.  Stable bilateral lung infiltrates which may be related to pulmonary edema   versus pneumonia. XR CHEST PORTABLE   Final Result   Multifocal airspace disease, asymmetrical right pulmonary edema, versus   pneumonia. Assessment/Plan:    Active Hospital Problems    Diagnosis    Acute respiratory failure with hypoxia ([facility] [address] [J96.01]    Pneumonia due to COVID-19 virus [U07.1, J12.89]    Multifocal pneumonia [J18.9]    Sepsis ([facility] [address] [A41.9]    Tachycardia [R00.0]    Tachypnea [R06.82]    Fever [R50.9]    Lactic acidosis [E87.2]    COVID-19 virus infection [U07.1]    Poor appetite [R63.0]    High anion gap metabolic acidosis [G69.1]    Pneumonia [J18.9]    DMII (diabetes mellitus, type 2) (HCC) [E11.9]    CAD (coronary artery disease) [I25.10]    HLD (hyperlipidemia) [E78.5]    Essential hypertension [I10]     Sepsis, PNA due to COVID 19 infection, GBS POA  Also growing GBS in sputum. Pt on Vanc and Cefepime - to rocephin IV due to strep in sputum, stop date 7/8  Cont decadron - stop date 7/10    ID involved - started remdesivir 7/1, completed     Acute Hypox Resp Failure POA   Due to above. Stabilized around 14-15l/min   - Txed to ICU on 7/3 for close monitoring. Stable, back to floor  CXR stable bilateral pulm disease. CC involved. Pending d/c to LTAC     DMII with uncontrolled hyperglycemia  Pt does not take his meds normally - has remote Hx of drug addiction and reports that taking DM meds makes him feel like drug addict, so he wasn't. BG worsened with steroid. Very poor control at baseline - A1C >12  Increased lantus to 45BID. Increased prandial bolus. S/p humulin R IV x2 on 7/1. Carb control diet. Incr lantus with elevated glucose     CAD advanced ischemic cardiomyopathy multivessel CAD with Hx of VT s/p AICD  Followed by Dr Zainab Gilbert  Pt felt chest pressure on 7/1               - EKG unremarkable. Serial troponin negative. - restarted ASA, statin, effient.    Historically does not tolerate BB and ACEI/ARB due to hypotension and recurrent syncope episodes.       Lactic Acidosis - due to above. Resolved       DVT Prophylaxis: Lovenox  Diet: DIET CARB CONTROL;  Code Status: Full Code    PT/OT Eval Status: deferred    Dispo - pending, possible Ankita Ray MD    This note was transcribed using [id number] [employer]. Please disregard any translational errors.

## 2020-07-08 NOTE — PLAN OF CARE
Problem: Falls - Risk of:  Goal: Will remain free from falls  Description: Will remain free from falls  7/8/2020 1223 by Rosita Thompson RN  Outcome: Ongoing     Problem: Airway Clearance - Ineffective:  Goal: Ability to maintain a clear airway will improve  Description: Ability to maintain a clear airway will improve  7/8/2020 1223 by Rosita Thompson RN  Outcome: Ongoing     Problem: Airway Clearance - Ineffective  Goal: Achieve or maintain patent airway  7/8/2020 1223 by Rosita Thompson RN  Outcome: Ongoing     Problem: Gas Exchange - Impaired  Goal: Absence of hypoxia  7/8/2020 1223 by Rosita Thompson RN  Outcome: Ongoing     Problem: Gas Exchange - Impaired  Goal: Promote optimal lung function  7/8/2020 1223 by Rosita Thompson RN  Outcome: Ongoing     Problem: Breathing Pattern - Ineffective  Goal: Ability to achieve and maintain a regular respiratory rate  7/8/2020 1223 by Rosita Thompson RN  Outcome: Ongoing

## 2020-07-08 NOTE — PLAN OF CARE
Problem: Skin Integrity:  Goal: Will show no infection signs and symptoms  Description: Will show no infection signs and symptoms  Outcome: Ongoing  Goal: Absence of new skin breakdown  Description: Absence of new skin breakdown  Outcome: Ongoing   Alert and oriented x4 Dyspnic with min. Exertion Cough and deep breathing exercises encouraged O2 at 15L per high flow nasal canula. Cont.  Per urinal/BSC with SBA Free from fall/injury No c/o pain Frequently turns and repositions self

## 2020-07-09 VITALS
RESPIRATION RATE: 16 BRPM | OXYGEN SATURATION: 93 % | BODY MASS INDEX: 25.94 KG/M2 | HEART RATE: 98 BPM | SYSTOLIC BLOOD PRESSURE: 112 MMHG | DIASTOLIC BLOOD PRESSURE: 70 MMHG | TEMPERATURE: 98 F | WEIGHT: 202.16 LBS | HEIGHT: 74 IN

## 2020-07-09 LAB
ALBUMIN SERPL-MCNC: 2.1 G/DL (ref 3.4–5)
ANION GAP SERPL CALCULATED.3IONS-SCNC: 11 MMOL/L (ref 3–16)
BANDED NEUTROPHILS RELATIVE PERCENT: 1 % (ref 0–7)
BASOPHILS ABSOLUTE: 0 K/UL (ref 0–0.2)
BASOPHILS RELATIVE PERCENT: 0 %
BUN BLDV-MCNC: 18 MG/DL (ref 7–20)
CALCIUM SERPL-MCNC: 8.3 MG/DL (ref 8.3–10.6)
CHLORIDE BLD-SCNC: 95 MMOL/L (ref 99–110)
CO2: 28 MMOL/L (ref 21–32)
CREAT SERPL-MCNC: <0.5 MG/DL (ref 0.9–1.3)
EOSINOPHILS ABSOLUTE: 0 K/UL (ref 0–0.6)
EOSINOPHILS RELATIVE PERCENT: 0 %
GFR AFRICAN AMERICAN: >60
GFR NON-AFRICAN AMERICAN: >60
GLUCOSE BLD-MCNC: 189 MG/DL (ref 70–99)
GLUCOSE BLD-MCNC: 221 MG/DL (ref 70–99)
GLUCOSE BLD-MCNC: 270 MG/DL (ref 70–99)
HCT VFR BLD CALC: 47.5 % (ref 40.5–52.5)
HEMOGLOBIN: 15.6 G/DL (ref 13.5–17.5)
LYMPHOCYTES ABSOLUTE: 1.4 K/UL (ref 1–5.1)
LYMPHOCYTES RELATIVE PERCENT: 12 %
MCH RBC QN AUTO: 28 PG (ref 26–34)
MCHC RBC AUTO-ENTMCNC: 32.9 G/DL (ref 31–36)
MCV RBC AUTO: 85 FL (ref 80–100)
MONOCYTES ABSOLUTE: 1.2 K/UL (ref 0–1.3)
MONOCYTES RELATIVE PERCENT: 10 %
NEUTROPHILS ABSOLUTE: 9 K/UL (ref 1.7–7.7)
NEUTROPHILS RELATIVE PERCENT: 77 %
PDW BLD-RTO: 13.2 % (ref 12.4–15.4)
PERFORMED ON: ABNORMAL
PERFORMED ON: ABNORMAL
PHOSPHORUS: 4 MG/DL (ref 2.5–4.9)
PLATELET # BLD: 300 K/UL (ref 135–450)
PLATELET SLIDE REVIEW: ADEQUATE
PMV BLD AUTO: 8.2 FL (ref 5–10.5)
POTASSIUM SERPL-SCNC: 4.2 MMOL/L (ref 3.5–5.1)
RBC # BLD: 5.58 M/UL (ref 4.2–5.9)
RBC # BLD: NORMAL 10*6/UL
SLIDE REVIEW: ABNORMAL
SODIUM BLD-SCNC: 134 MMOL/L (ref 136–145)
WBC # BLD: 11.6 K/UL (ref 4–11)

## 2020-07-09 PROCEDURE — 6370000000 HC RX 637 (ALT 250 FOR IP): Performed by: INTERNAL MEDICINE

## 2020-07-09 PROCEDURE — 6360000002 HC RX W HCPCS: Performed by: INTERNAL MEDICINE

## 2020-07-09 PROCEDURE — 80069 RENAL FUNCTION PANEL: CPT

## 2020-07-09 PROCEDURE — 2580000003 HC RX 258: Performed by: INTERNAL MEDICINE

## 2020-07-09 PROCEDURE — 85025 COMPLETE CBC W/AUTO DIFF WBC: CPT

## 2020-07-09 PROCEDURE — 6370000000 HC RX 637 (ALT 250 FOR IP): Performed by: NURSE PRACTITIONER

## 2020-07-09 PROCEDURE — 36415 COLL VENOUS BLD VENIPUNCTURE: CPT

## 2020-07-09 RX ADMIN — SODIUM CHLORIDE, PRESERVATIVE FREE 10 ML: 5 INJECTION INTRAVENOUS at 07:42

## 2020-07-09 RX ADMIN — INSULIN LISPRO 9 UNITS: 100 INJECTION, SOLUTION INTRAVENOUS; SUBCUTANEOUS at 12:19

## 2020-07-09 RX ADMIN — INSULIN LISPRO 9 UNITS: 100 INJECTION, SOLUTION INTRAVENOUS; SUBCUTANEOUS at 07:46

## 2020-07-09 RX ADMIN — BENZONATATE 200 MG: 100 CAPSULE ORAL at 07:43

## 2020-07-09 RX ADMIN — ENOXAPARIN SODIUM 40 MG: 40 INJECTION SUBCUTANEOUS at 07:42

## 2020-07-09 RX ADMIN — ACETAMINOPHEN 650 MG: 325 TABLET ORAL at 07:45

## 2020-07-09 RX ADMIN — SALINE NASAL SPRAY 1 SPRAY: 1.5 SOLUTION NASAL at 07:43

## 2020-07-09 RX ADMIN — GUAIFENESIN 600 MG: 600 TABLET ORAL at 07:42

## 2020-07-09 RX ADMIN — ASPIRIN 81 MG 81 MG: 81 TABLET ORAL at 07:42

## 2020-07-09 RX ADMIN — INSULIN LISPRO 2 UNITS: 100 INJECTION, SOLUTION INTRAVENOUS; SUBCUTANEOUS at 07:46

## 2020-07-09 RX ADMIN — INSULIN LISPRO 6 UNITS: 100 INJECTION, SOLUTION INTRAVENOUS; SUBCUTANEOUS at 12:19

## 2020-07-09 RX ADMIN — PRASUGREL HYDROCHLORIDE 10 MG: 10 TABLET, FILM COATED ORAL at 07:42

## 2020-07-09 RX ADMIN — DEXAMETHASONE 6 MG: 6 TABLET ORAL at 07:42

## 2020-07-09 ASSESSMENT — PAIN DESCRIPTION - DESCRIPTORS: DESCRIPTORS: HEADACHE

## 2020-07-09 ASSESSMENT — PAIN DESCRIPTION - LOCATION: LOCATION: HEAD

## 2020-07-09 ASSESSMENT — PAIN SCALES - GENERAL
PAINLEVEL_OUTOF10: 2
PAINLEVEL_OUTOF10: 0
PAINLEVEL_OUTOF10: 0
PAINLEVEL_OUTOF10: 4
PAINLEVEL_OUTOF10: 0

## 2020-07-09 ASSESSMENT — PAIN DESCRIPTION - FREQUENCY: FREQUENCY: CONTINUOUS

## 2020-07-09 ASSESSMENT — PAIN DESCRIPTION - ORIENTATION: ORIENTATION: MID

## 2020-07-09 ASSESSMENT — PAIN DESCRIPTION - ONSET: ONSET: ON-GOING

## 2020-07-09 ASSESSMENT — PAIN - FUNCTIONAL ASSESSMENT: PAIN_FUNCTIONAL_ASSESSMENT: PREVENTS OR INTERFERES SOME ACTIVE ACTIVITIES AND ADLS

## 2020-07-09 ASSESSMENT — PAIN DESCRIPTION - PAIN TYPE: TYPE: ACUTE PAIN

## 2020-07-09 ASSESSMENT — PAIN DESCRIPTION - PROGRESSION: CLINICAL_PROGRESSION: NOT CHANGED

## 2020-07-09 NOTE — CARE COORDINATION
Received a call from Wayne Kessler at [facility], she states the patient's precert was approved and they can accept him today if he is ready. Perfect Serve message sent to Dr. Marielos Gaming.     Electronically signed by Litzy Hutchins RN on 7/9/2020 at 9:25 AM

## 2020-07-09 NOTE — DISCHARGE SUMMARY
Acute respiratory failure with hypoxia (HCC)    Pneumonia due to COVID-19 virus    Multifocal pneumonia  Resolved Problems:    * No resolved hospital problems. *      Code Status:  Full Code    Condition:  Stable    Discharge Diet: Diet:  DIET CARB CONTROL;    PCP to do list:  Routine follow up    Hospital Course:     Sepsis, PNA due to COVID 19 infection, GBS pneumonia with acute hypoxic respiratory failure  54 y. o. year old male with a history of GERD, diabetes, history of cardiac arrest admitted with symptoms of anorexia, fatigue, cough and shortness of breath with fevers.  He was diagnosed with COVID-19 prior to admission.  Chest x-ray significant for multifocal pneumonia. Respiratory culture grew GBS and sputum. Initially on Vanco and cefepime.  Infectious disease was consulted and started on Remdesivir, switched antibiotics to ceftriaxone ceftriaxone, trending CRP ferritin and LDH with dexamethasone for 10 days. D-dimer peaked at 1500, ferritin peaked at 1300.  He had significant hypoxemia requiring 15 L nasal cannula. He remained stable on 15 L for multiple days, unable to wean oxygen at all with desats even when weaned down to 14 L. Repeat chest x-ray relatively stable. He completed ceftriaxone, Remdesivir and Decadron inpatient. He is being discharged to long-term acute care facility given ongoing high oxygen requirements, anticipate it may be a long wean off of oxygen. Discharging on twice daily Lovenox while at [facility]. Uncontrolled diabetes  A1c greater than 12. Had not been taking his medications at home, reported that he had history of drug addiction and taking his diabetes medications made him feel like a drug addict. Restarted on basal bolus insulin with adjustments made inpatient. CAD advanced ischemic cardiomyopathy multivessel CAD with Hx of VT s/p AICD  Followed by Dr Danitza Toro. He did have an episode of chest pain, EKG was unremarkable and troponins were negative.   Restarted on aspirin, statin, Effient. Does not tolerate beta-blocker or ACE/arm due to hypotension and syncope. Lactic acidosis  Secondary to sepsis, resolved. Discharge Medications:   Current Discharge Medication List      START taking these medications    Details   sodium chloride (OCEAN, BABY AYR) 0.65 % nasal spray 1 spray by Nasal route as needed for Congestion  Qty: 30 mL, Refills: 0      enoxaparin (LOVENOX) 40 MG/0.4ML injection Inject 0.4 mLs into the skin 2 times daily for 14 days  Qty: 11.2 mL, Refills: 0           Current Discharge Medication List        Current Discharge Medication List      CONTINUE these medications which have NOT CHANGED    Details   prasugrel (EFFIENT) 10 MG TABS Take 1 tablet by mouth once for 1 dose  Qty: 30 tablet, Refills: 11      atorvastatin (LIPITOR) 40 MG tablet TAKE 1 TABLET BY MOUTH IN THE EVENING   Qty: 30 tablet, Refills: 4      aspirin 81 MG chewable tablet Take 81 mg by mouth daily      Dulaglutide (TRULICITY) 1.5 FR/6.3UF SOPN Inject 1.5 mg into the skin once a week       insulin detemir (LEVEMIR FLEXTOUCH) 100 UNIT/ML injection pen Inject 30 Units into the skin nightly  Qty: 5 pen, Refills: 3      Cholecalciferol (VITAMIN D3) 02381 units CAPS Take 1 capsule by mouth once a week            Current Discharge Medication List      STOP taking these medications       citalopram (CELEXA) 40 MG tablet Comments:   Reason for Stopping:         traZODone (DESYREL) 100 MG tablet Comments:   Reason for Stopping:         pantoprazole (PROTONIX) 40 MG tablet Comments:   Reason for Stopping:               Procedures: None     Assessment on Discharge: Stable, improved     Discharge Exam:  /72   Pulse 80   Temp 98.1 °F (36.7 °C) (Oral)   Resp 16   Ht 6' 2\" (1.88 m)   Wt 202 lb 2.6 oz (91.7 kg)   SpO2 90%   BMI 25.96 kg/m²     General appearance: No apparent distress, appears stated age and cooperative. HEENT: Pupils equal, round, and reactive to light.  Conjunctivae/corneas clear.  Neck: Supple, with full range of motion. Trachea midline. Respiratory:  Normal respiratory effort. Clear to auscultation, bilaterally without Rales/Wheezes/Rhonchi. Cardiovascular: Regular rate and rhythm with normal S1/S2 without murmurs, rubs or gallops. Abdomen: Soft, non-tender, non-distended with normal bowel sounds. Musculoskelatal: No clubbing, cyanosis or edema bilaterally. Full range of motion without deformity. Skin: Skin color, texture, turgor normal.  No rashes or lesions. Neurologic:  Neurovascularly intact without any focal sensory/motor deficits. Cranial nerves: II-XII intact, grossly non-focal.  Psychiatric: Alert and oriented, thought content appropriate, normal insight    Pertinent Studies During Hospital Stay:    Radiology:  Xr Chest Portable    Result Date: 7/2/2020  EXAMINATION: ONE XRAY VIEW OF THE CHEST 7/2/2020 10:16 am COMPARISON: 06/30/2020, 02/07/2019. HISTORY: ORDERING SYSTEM PROVIDED HISTORY: increased o2 demand TECHNOLOGIST PROVIDED HISTORY: Reason for exam:->increased o2 demand FINDINGS: There is a left-sided cardiac device. The cardiac silhouette and mediastinal contours are stable. There are bilateral lung infiltrates, worse since the February exam, however stable since the 06/30/2020 exam.  This may be related to pulmonary edema versus pneumonia. Elevation of the right hemidiaphragm is stable. The visualized osseous structures are unremarkable. 1. Stable bilateral lung infiltrates which may be related to pulmonary edema versus pneumonia. Xr Chest Portable    Result Date: 6/30/2020  EXAMINATION: ONE XRAY VIEW OF THE CHEST 6/30/2020 7:43 pm COMPARISON: 02/07/2019 HISTORY: ORDERING SYSTEM PROVIDED HISTORY: SOB TECHNOLOGIST PROVIDED HISTORY: Reason for exam:->SOB Reason for Exam: sob FINDINGS: There is a left infraclavicular ICD, with lead projecting over the right ventricle in stable configuration.   Cardiac silhouette is normal. Patchy opacities are noted in the right upper and both lower lungs. Distribution is perihilar. Right hemidiaphragm remains elevated. Pulmonary vessels are normal in caliber. Multifocal airspace disease, asymmetrical right pulmonary edema, versus pneumonia.        Last Labs on Discharge:     Recent Results (from the past 24 hour(s))   POCT Glucose    Collection Time: 07/08/20 11:53 AM   Result Value Ref Range    POC Glucose 205 (H) 70 - 99 mg/dl    Performed on ACCU-CHEK    POCT Glucose    Collection Time: 07/08/20  4:31 PM   Result Value Ref Range    POC Glucose 280 (H) 70 - 99 mg/dl    Performed on ACCU-CHEK    POCT Glucose    Collection Time: 07/08/20  9:19 PM   Result Value Ref Range    POC Glucose 273 (H) 70 - 99 mg/dl    Performed on ACCU-CHEK    CBC auto differential    Collection Time: 07/09/20  5:49 AM   Result Value Ref Range    WBC 11.6 (H) 4.0 - 11.0 K/uL    RBC 5.58 4.20 - 5.90 M/uL    Hemoglobin 15.6 13.5 - 17.5 g/dL    Hematocrit 47.5 40.5 - 52.5 %    MCV 85.0 80.0 - 100.0 fL    MCH 28.0 26.0 - 34.0 pg    MCHC 32.9 31.0 - 36.0 g/dL    RDW 13.2 12.4 - 15.4 %    Platelets 833 611 - 543 K/uL    MPV 8.2 5.0 - 10.5 fL    PLATELET SLIDE REVIEW Adequate     SLIDE REVIEW see below     Neutrophils % 77.0 %    Lymphocytes % 12.0 %    Monocytes % 10.0 %    Eosinophils % 0.0 %    Basophils % 0.0 %    Neutrophils Absolute 9.0 (H) 1.7 - 7.7 K/uL    Lymphocytes Absolute 1.4 1.0 - 5.1 K/uL    Monocytes Absolute 1.2 0.0 - 1.3 K/uL    Eosinophils Absolute 0.0 0.0 - 0.6 K/uL    Basophils Absolute 0.0 0.0 - 0.2 K/uL    Bands Relative 1 0 - 7 %    RBC Morphology Normal    Renal Function Panel    Collection Time: 07/09/20  5:49 AM   Result Value Ref Range    Sodium 134 (L) 136 - 145 mmol/L    Potassium 4.2 3.5 - 5.1 mmol/L    Chloride 95 (L) 99 - 110 mmol/L    CO2 28 21 - 32 mmol/L    Anion Gap 11 3 - 16    Glucose 221 (H) 70 - 99 mg/dL    BUN 18 7 - 20 mg/dL    CREATININE <0.5 (L) 0.9 - 1.3 mg/dL    GFR Non-African American >60 >60    GFR  American >60 >60    Calcium 8.3 8.3 - 10.6 mg/dL    Phosphorus 4.0 2.5 - 4.9 mg/dL    Alb 2.1 (L) 3.4 - 5.0 g/dL   POCT Glucose    Collection Time: 07/09/20  7:36 AM   Result Value Ref Range    POC Glucose 189 (H) 70 - 99 mg/dl    Performed on ACCU-CHEK          Follow up: with LAURA MORLEY CNP    Note that more  than 30 minutes was spent in preparing discharge papers, discussing discharge with patient, medication review, etc.    Thank you LAURA MORLEY CNP for the opportunity to be involved in this patient's care. If you have any questions or concerns please feel free to contact me at [phone]. Electronically signed by Kory Edwards MD on 7/9/2020 at 10:09 AM    This note was transcribed using [id number] [employer]. Please disregard any translational errors.

## 2020-07-09 NOTE — CARE COORDINATION
CASE MANAGEMENT DISCHARGE SUMMARY:    DISCHARGE DATE: 7/9/2020    DISCHARGED TO: [facility]               REPORT NUMBER: [id number]               FAX NUMBER: [phone]    TRANSPORTATION: [facility]             TIME: 1300     PREFERRED PHARMACY: at facility    INSURANCE PRECERT OBTAINED: Yes    ZULEIMA/MATIAS COMPLETED: N/A    Updated patient, Camden Preciado at [facility], patient's wife, Obie Mason, and his nurse on the transportation time and plan.       Electronically signed by Ana Luisa Ledbetter RN on 7/9/2020 at 11:48 AM     [facility] and [facility] faxed to [facility] at [phone] per Suellen's request.    Electronically signed by Ana Luisa Ledbetter RN on 7/9/2020 at 11:54 AM

## 2020-07-09 NOTE — PLAN OF CARE
Problem: Skin Integrity:  Goal: Will show no infection signs and symptoms  Description: Will show no infection signs and symptoms  Outcome: Ongoing     Problem: Falls - Risk of:  Goal: Will remain free from falls  Description: Will remain free from falls  7/9/2020 0104 by Mendel Young RN  Outcome: Ongoing     Problem: Pain:  Goal: Pain level will decrease  Description: Pain level will decrease  Outcome: Ongoing     Problem: Breathing Pattern - Ineffective  Goal: Ability to achieve and maintain a regular respiratory rate  7/9/2020 0104 by Mendel Young RN  Outcome: Ongoing

## 2020-07-09 NOTE — PLAN OF CARE
Problem: Skin Integrity:  Goal: Will show no infection signs and symptoms  Description: Will show no infection signs and symptoms  7/9/2020 1001 by Ambrosio Berkowitz RN  Outcome: Ongoing     Problem: Skin Integrity:  Goal: Absence of new skin breakdown  Description: Absence of new skin breakdown  Outcome: Ongoing     Problem: Falls - Risk of:  Goal: Will remain free from falls  Description: Will remain free from falls  7/9/2020 1001 by Ambrosio Berkowitz RN  Outcome: Ongoing     Problem: Falls - Risk of:  Goal: Absence of physical injury  Description: Absence of physical injury  Outcome: Ongoing     Problem: Pain:  Goal: Pain level will decrease  Description: Pain level will decrease  7/9/2020 1001 by Ambrosio Berkowitz RN  Outcome: Ongoing     Problem: Pain:  Goal: Control of acute pain  Description: Control of acute pain  Outcome: Ongoing

## 2020-07-14 NOTE — ADT AUTH CERT
Patient Demographics     Name  Patient ID  [facility]  Gender Identity  Birth Date    Pooja Singh  [id number]    Male  [birth year] (54 yrs)    Address  Phone  Email  Employer     [address]  (V)   [phone] (K)   [phone] (M)  [email or web address]  OTHER[employer]   [address] [facility] [address]  Occupation  Emp Status     [employer]  --  Full Time     Reg Status  PCP  Date Last Verified  Next Review Date     Verified  Lee Ilana - [address] [facility]  20     Admission Date  Discharge Date  Admitting Provider      20  Aspen Lao MD      Marital Status  Synagogue         Non-Sikhism       Emergency Contact 1  Emergency Contact 2    Michelle (2)   [address] [place] [address]   [phone] (L)   [phone] Moreno Nagel)  Agustin Moore (C)   [facility]   [phone] (N)   [phone] (W)    Emir Elise  [de-identified]   [employer]  Subscriber Name/Sex/Relation  Subscriber   Subscriber Address/Phone  Subscriber Emp/Emp Phone    3. [address] [facility]   [id number]  [address] [place] [address] Male   (Self)  1965  [address] [place] [address]  [address] [phone](R)   [phone](B)  OTHER    Utilization Reviews         Pneumonia - Care Day 9 (2020) by Jane Gee RN         Review Entered  Review Status    2020 15:36  Completed        Criteria Review       Care Day: 9 Care Date: 2020 Level of Care: Intermediate Care    Guideline Day 2    Level Of Care    (X) Floor    Clinical Status    (X) * No CO2 retention or acidosis    (X) * No requirement for mechanical ventilation    (X) * Hypotension absent    (X) * Fever absent or reduced    ( ) * No hypoxia on room air or oxygenation improved    (X) * Mental status improved or at baseline    Activity    ( ) * Increased activity    Routes    (X) Usual diet    Interventions    (X) Incentive spirometry    (X) Pulse Serial troponin negative.                - restarted ASA, statin, effient. Historically does not tolerate BB and ACEI/ARB due to hypotension and recurrent syncope episodes.           Lactic Acidosis - due to above. Resolved              DVT Prophylaxis: Lovenox    Diet: DIET CARB CONTROL;    Code Status: Full Code         PT/OT Eval Status: deferred         Dispo - pending, possible LTAC        Pneumonia - Care Day 7 (7/6/2020) by Deondre Newton RN         Review Entered  Review Status    7/13/2020 15:32  Completed        Criteria Review       Care Day: 7 Care Date: 7/6/2020 Level of Care: Intermediate Care    Guideline Day 2    Level Of Care    (X) Floor    Clinical Status    (X) * No CO2 retention or acidosis    (X) * No requirement for mechanical ventilation    (X) * Hypotension absent    (X) * Fever absent or reduced    ( ) * No hypoxia on room air or oxygenation improved    (X) * Mental status improved or at baseline    Activity    ( ) * Increased activity    Routes    (X) Usual diet    Interventions    (X) Incentive spirometry    (X) Pulse oximetry    (X) Head of bed at 30 degrees    (X) Possible oxygen    Medications    (X) IV or oral antibiotics    * Milestone    Additional Notes    07/06/2020    Patient remains IPE    Dyspnea with exertion       VS:98.1-96-/62    95% on 15 liter HFNC       Wbc 15.3    K+3.4       Per Pulmonary-CC: Acute hypoxic respiratory failure    COVID-19 pneumonia    Multifocal pneumonia    Hyperglycemia              24 hr events:He has shortness of breath with activity and a productive cough.  He is on high flow oxygen.        Assessment:         Acute hypoxic respiratory failure    COVID-19 pneumonia    Multifocal pneumonia    Hyperglycemia              Plan:         Acute hypoxic respiratory failure    -Due to pneumonia    -Wean supplemental oxygen as able to keep oxygen saturation greater than 90%         COVID-19 pneumonia    -Completed 5 days of Remdesivir -Continue Decadron         Multifocal pneumonia    -Sputum culture with group B strep    -On ceftriaxone         Hyperglycemia    -Sliding scale insulin              Prophylaxis    DVT- lovenox         Patient is at high risk given the presence of respiratory failure requiring the use of high flow oxygen.

## 2020-08-19 ENCOUNTER — NURSE ONLY (OUTPATIENT)
Dept: CARDIOLOGY CLINIC | Age: 55
End: 2020-08-19
Payer: COMMERCIAL

## 2020-08-19 PROCEDURE — 93295 DEV INTERROG REMOTE 1/2/MLT: CPT | Performed by: INTERNAL MEDICINE

## 2020-08-19 PROCEDURE — 93296 REM INTERROG EVL PM/IDS: CPT | Performed by: INTERNAL MEDICINE

## 2020-08-19 NOTE — LETTER
[address] [facility] [phone]  Luige Hao [address] [facility] [phone]    Pacemaker/Defibrillator Clinic          08/19/20        Juliette King  [address] [facility] [address]        Dear Issa Lenz    This letter is to inform you that we received the transmission from your monitor at home that checks your implanted heart device. The next date your monitor will automatically transmit will be 11-23-20. If your report needs attention we will notify you. Your device and monitor are wireless and most transmit cellularly, but please periodically check your monitor is still plugged in to the electrical outlet. If you still use the telephone land line to send please ensure the connection to the phone maddie is secure. This will help to ensure successful automatic transmissions in the future. Also, the monitor needs to be close to you while sleeping at night. Please be aware that the remote device transmission sites are periodically monitored only during regular business hours during which simultaneous in-office device clinics are being run. If your transmission requires attention, we will contact you as soon as possible. Thank you.             Jamshid [address]

## 2020-09-10 ENCOUNTER — OFFICE VISIT (OUTPATIENT)
Dept: PULMONOLOGY | Age: 55
End: 2020-09-10
Payer: COMMERCIAL

## 2020-09-10 VITALS
SYSTOLIC BLOOD PRESSURE: 108 MMHG | DIASTOLIC BLOOD PRESSURE: 70 MMHG | RESPIRATION RATE: 16 BRPM | HEART RATE: 100 BPM | BODY MASS INDEX: 28.36 KG/M2 | WEIGHT: 221 LBS | OXYGEN SATURATION: 98 % | TEMPERATURE: 97.4 F | HEIGHT: 74 IN

## 2020-09-10 PROCEDURE — 3017F COLORECTAL CA SCREEN DOC REV: CPT | Performed by: INTERNAL MEDICINE

## 2020-09-10 PROCEDURE — G8427 DOCREV CUR MEDS BY ELIG CLIN: HCPCS | Performed by: INTERNAL MEDICINE

## 2020-09-10 PROCEDURE — G8419 CALC BMI OUT NRM PARAM NOF/U: HCPCS | Performed by: INTERNAL MEDICINE

## 2020-09-10 PROCEDURE — 1036F TOBACCO NON-USER: CPT | Performed by: INTERNAL MEDICINE

## 2020-09-10 PROCEDURE — 99213 OFFICE O/P EST LOW 20 MIN: CPT | Performed by: INTERNAL MEDICINE

## 2020-09-10 NOTE — PROGRESS NOTES
REASON FOR CONSULTATION/CC:   Chief Complaint   Patient presents with    Follow-Up from [address] [place] [address] for acute respiratory failure w/hypoxia        PCP: LAURA MORLEY CNP    HISTORY OF PRESENT ILLNESS: Pearl Kingston is a 54y.o. year old male with a history of covid who presents :       COVID. He was discharged on oxygen, 15 L NC. He was using 5L at presentation. This was turned off and was in the 90% on room air. He has been using 5 L NC continuous.         PAST MEDICAL HISTORY:  Past Medical History:   Diagnosis Date    Arthritis     Blood circulation, collateral     CAD (coronary artery disease) 7/15/2014    CAD (coronary artery disease)     Cardiac arrest ([facility] [address] 10/05/2018    Cerebral artery occlusion with cerebral infarction ([facility] [address] [facility]     Diabetes mellitus ([facility] [address]     Diabetic peripheral neuropathy ([facility] [address] 6/8/2018    GERD (gastroesophageal reflux disease)     ulcers    Hypercholesteremia     Hyperlipidemia     Hypertension     Movement disorder     possible arthritis right hand    MRSA (methicillin resistant staph aureus) culture positive 07/13/2018    foot wound    Neuropathy     Other disorders of kidney and ureter in diseases classified elsewhere     POTS (postural orthostatic tachycardia syndrome)     Psychiatric problem     anxiety, depression, bipolar    Sleep apnea     Syncope     Type II or unspecified type diabetes mellitus without mention of complication, not stated as uncontrolled        PAST SURGICAL HISTORY:  Past Surgical History:   Procedure Laterality Date    CARDIAC CATHETERIZATION      COLONOSCOPY      CORONARY ANGIOPLASTY WITH STENT PLACEMENT  10/06/2018    Bare Metal Stent to PDA    CORONARY ANGIOPLASTY WITH STENT PLACEMENT  10/07/2018    Bare Metal Stent to OM    CORONARY ANGIOPLASTY WITH STENT PLACEMENT  10/03/2018    CECE to Circ    FINGER SURGERY Left     Left Thumb    HERNIA REPAIR      JOINT REPLACEMENT      VASCULAR SURGERY      VASECTOMY         FAMILY HISTORY:  family history includes COPD in his mother; Cancer in his father and sister; Diabetes in his brother and sister; Heart Disease in his brother, father, and mother; High Blood Pressure in his mother; Stroke in his mother. SOCIAL HISTORY:   reports that he has quit smoking. His smoking use included cigarettes and cigars. He has a 24.00 pack-year smoking history. He has never used smokeless tobacco.      ALLERGIES:  Patient is allergic to no known allergies. REVIEW OF SYSTEMS:  Constitutional: Negative for fever   HENT: Negative for sore throat  Eyes: Negative for redness   Respiratory: + for dyspnea, - cough  Cardiovascular: Negative for chest pain  Gastrointestinal: Negative for vomiting, diarrhea   Genitourinary: Negative for hematuria   Musculoskeletal: Negative for arthralgias   Skin: Negative for rash  Neurological: Negative for syncope  Hematological: Negative for adenopathy  Psychiatric/Behavorial: Negative for anxiety    Objective:   PHYSICAL EXAM:  Blood pressure 108/70, pulse 100, temperature 97.4 °F (36.3 °C), temperature source Oral, resp. rate 16, height 6' 2\" (1.88 m), weight 221 lb (100.2 kg), SpO2 98 %.'  Gen: No distress. Eyes: PERRL. No sclera icterus. No conjunctival injection. ENT: No discharge. Pharynx clear. External appearance of ears and nose normal.  Neck: Trachea midline. No obvious mass. Resp: No accessory muscle use. No crackles. No wheezes. No rhonchi. CV: Regular rate. Regular rhythm. No murmur or rub. No edema. GI: Non-tender. Non-distended. No hernia. Skin: Warm, dry, normal texture and turgor. No nodule on exposed extremities. Lymph: No cervical LAD. No supraclavicular LAD. M/S: No cyanosis. No clubbing. No joint deformity. Neuro: Moves all four extremities. Psych: Oriented x 3. No anxiety. Awake. Alert. Intact judgement and insight.     Current Outpatient Medications   Medication Sig Dispense Refill    Insulin Aspart (2 VW)    Narrative EXAMINATION:  TWO VIEWS OF THE CHEST    10/30/2018 1:29 pm    COMPARISON:  October 24, 2018    HISTORY:  ORDERING SYSTEM PROVIDED HISTORY: Pneumonia of left lung due to infectious  organism, unspecified part of lung  TECHNOLOGIST PROVIDED HISTORY:  Ordering Physician Provided Reason for Exam: f/u pneumonia left side  Acuity: Acute  Type of Exam: Subsequent/Follow-up    FINDINGS:  Cardiac silhouette is normal in size. Lungs are clear. Left-sided cardiac  device. No acute bony abnormality. Impression No acute findings         CXR portable:   Results for orders placed during the hospital encounter of 06/30/20   XR CHEST PORTABLE    Narrative EXAMINATION:  ONE XRAY VIEW OF THE CHEST    7/2/2020 10:16 am    COMPARISON:  06/30/2020, 02/07/2019. HISTORY:  ORDERING SYSTEM PROVIDED HISTORY: increased o2 demand  TECHNOLOGIST PROVIDED HISTORY:  Reason for exam:->increased o2 demand    FINDINGS:  There is a left-sided cardiac device. The cardiac silhouette and mediastinal  contours are stable. There are bilateral lung infiltrates, worse since the February exam, however  stable since the 06/30/2020 exam.  This may be related to pulmonary edema  versus pneumonia. Elevation of the right hemidiaphragm is stable. The visualized osseous structures are unremarkable. Impression 1. Stable bilateral lung infiltrates which may be related to pulmonary edema  versus pneumonia. 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 He has been improving with improving hypoxemia. He is currently on 5L NC with good saturations. Will order 6 min walk test to further assess current hypoxemia. He was advised to Wean O2 to sat >90%. The patient expressed understanding for all. Relevant Orders    Ambulatory referral to Pulmonary Rehab    Chronic hypoxemic respiratory failure ([facility] [address] - Primary    Relevant Orders    6 Minute Walk Test                This note was transcribed using [id number] [address]. Please disregard any translational errors.     Anurag Rodriguez Pulmonary, Sleep and [facility] [phone]

## 2020-09-10 NOTE — LETTER
[facility] Pulmonology   [address] [facility] [facility]  Phone: [phone]  Fax: [phone]     9/11/2020    Dr. Lucero Gomez, APRN - CNP    I have seen your patient, Issa Lenz on follow up. Here is the assessment and plan for your patient. Any question, please do not hesitate to call. Problem List Items Addressed This Visit     Postinflammatory pulmonary fibrosis ([address]     He has mild fibrosis secondary to COVID infection. He has been improving with improving hypoxemia. He is currently on 5L NC with good saturations. Will order 6 min walk test to further assess current hypoxemia. He was advised to Wean O2 to sat >90%. The patient expressed understanding for all.           Relevant Orders    Ambulatory referral to Pulmonary Rehab    Chronic hypoxemic respiratory failure [facility]) - Primary    Relevant Orders    6 Minute Walk Test            Sincerely,    [address] Pul[facility], Sleep and Critical Care  [phone] make needs known

## 2020-09-11 PROBLEM — J96.11 CHRONIC HYPOXEMIC RESPIRATORY FAILURE (HCC): Status: ACTIVE | Noted: 2020-09-11

## 2020-09-11 PROBLEM — J84.10 POSTINFLAMMATORY PULMONARY FIBROSIS (HCC): Status: ACTIVE | Noted: 2020-09-11

## 2020-09-16 ENCOUNTER — TELEPHONE (OUTPATIENT)
Dept: PULMONOLOGY | Age: 55
End: 2020-09-16

## 2020-09-16 NOTE — TELEPHONE ENCOUNTER
COLBY Alfredo to call back  We received a fax from [place] [phone] stating she is the Nurse  for Mohan Reagan and is requesting records. Need to verify this and get verbal OK to sent requested records.  H&P, Recent office notes, medication list, any lab work or referrals

## 2020-09-16 NOTE — TELEPHONE ENCOUNTER
Juni calls back and gave verbal to release records requested.  \"Jessica is working with patient through is \"insurance regarding recent case of Covid\"

## 2020-09-17 ENCOUNTER — HOSPITAL ENCOUNTER (OUTPATIENT)
Dept: CARDIAC REHAB | Age: 55
Setting detail: THERAPIES SERIES
Discharge: HOME | End: 2020-09-17
Payer: COMMERCIAL

## 2020-09-17 PROCEDURE — 94618 PULMONARY STRESS TESTING: CPT

## 2020-09-17 PROCEDURE — 94618 PULMONARY STRESS TESTING: CPT | Performed by: INTERNAL MEDICINE

## 2020-09-20 NOTE — PROCEDURES
[facility] Cardiopulmonary Rehabilitation   Six Minute Walk Test    Juni BLAKELYB: 1965  Account Number: [de-identified]  AGE: 54 y.o.     OP test []   Pulmonary Rehab PRE [x]  POST   []    Diagnosis:Chronic hypoxemic respiratory failure ([address]      Referring Physician: Dr Praveen Martinez [x]  male [] female AGE:55     Height: 6 ft 2 in 188 cm    Weight: 223 lbs  101 kg  Blood Pressure 110/66    Medications affecting heart rate:  none      Supplemental O2 during the test [x]  no [] yes  lpm     [] continuous []  intermittent    Device : [] NC []  HFNC    []  oximizer  [] mask      Laps completed: 12 (1 lap = 100 ft)        Baseline (resting) Walking End of Test (recovery)      Heart Rate 110   120  102    BP  110/66   122/72  108/70    Dyspnea 2   5  2 (Yari scale)    Fatigue 1   5  2 (Yari scale)    SpO2 RA 98%   92%  95%           Stopped or paused before 6 mins? [x]  no [] yes How long? Symptoms at end of exercise:[] angina  [] dizziness  []  hip, leg, calf, back pain       [x]  no complaints []  other    Number of laps 12 (x 30.48 meters) + final partial lap: Total distance walked in 6 mins: 366 meters    Predicted distance: 660 meters  Percent predicted:55%              [] normal       [x] reduced     Summary: This is a pre-rehab test, performed on room air. The patient ambulated 366 meters which is abnormal- 55-% predicted. His  heart rate response was normal, the blood pressure response was normal, oxygen saturations were normal.  The patient desaturated to 92% while ambulating on room air. The degree of symptoms based on the Yari Dyspnea/Fatigue scale were increased with testing. This test does not indicate the need for supplemental oxygen. Elevated restring heart rate could suggest deconditioning.         Yo Mckeon DO  Pulmonary Rehab Director

## 2020-09-20 NOTE — PROGRESS NOTES
[facility] PULMONARY REHABILITATION ORDER  Medical Director:  Dr. Alexy Lyles  (X)Phase II Pulmonary Rehabilitation [facility]-based, supervised exercise with SpO2 / HR monitoring and Oxygen Titration (if necessary) each session, 2-3 times weekly, with individualized education sessions. Patient Name: Randall Koo  : 1965  Referring Physician: Dr. Sydney Portillo  Date: 2020    Medically Necessary Pulmonary Rehab for:  Post Inflammatory Pulmonary Fibrosis (from my dictation or the PFT report)      Physician Prescribed Exercise:  Length of program:  Up to 36 sessions, subject to insurance limitations. Program to include aerobic endurance conditioning, resistance training (RT), step training (ST), flexibility training, and education (all relevant topics including psychosocial assessment). FITT Principles + Progression for Exercise Prescription (also found on the ITP):     Frequency: 2-3x / wk for up to 36 sessions    Intensity:   Set from Initial 6MWT (Feet achieved converted to METs)   Type:          Aerobic and Strength (Treadmill, AD, NuStep, SciFit, UBE, RT, ST)   Time:        15-60 min. of Treatment; Aerobic, RT, ST, Rests and Education  Progression:  1-2 minute increase in Time, per Type, per session, 5-20% increase in Intensity per week if SpO2 >88 AND Yari RPE/RPD is <14      Note:  These are guidelines. The Pulmonary Rehab staff may adjust the treatment to suit the patient's individual needs, goals, oxygen saturation and functional level. Plan of Care:  Pulmonary Rehab aerobic endurance and strength training sessions for a total of 30-91 min/day, including Education time, 2-3 days/week with suggested supplemented matching minutes of walking at home on most days not participating in Pulmonary Rehab. Patient is willing to cooperate and participate in the plan of care.  Patient is physically able, motivated, and willing to participate in ME, and I expect this patient to improve in a reasonable and predictable time frame. Other Program Components:   (X)6 Minute Walk Test (6 MWT) at program discharge   (X)Evaluation for oxygen needs while exercising   (X)Titrate Oxygen to maintain >88 SpO2   (X)Stop Exercise or Apply Oxygen if patient desaturates <88%   (x)May continue in the Maintenance Program upon completion    Measurable Endurance Goal:    -Aerobic endurance goal to be measured in minutes. Start endurance training per patient tolerance at 1-15 minutes per exercise type, progressing to a total of 31-60 minutes using various modes of training (see Exercise Prescription on Individualized Treatment Plan 'ITP'). -Measurable Muscular Strength Goal: Starting at 1-3 lbs x 8 reps, progressing daily/weekly to 5-10 lbs x 15 reps    NOTE: Juni King's tobacco History:   Social History     Tobacco Use   Smoking Status Former Smoker    Packs/day: 2.00    Years: 12.00    Pack years: 24.00    Types: Cigarettes, Cigars   Smokeless Tobacco Never Used   Tobacco Comment    also smoked marijuana quit 33 yrs ago     If patient is a current smoker, patient will participate in tobacco cessation program during Pulmonary Rehab.       Physician Signature/Date/Time:

## 2020-09-22 ENCOUNTER — HOSPITAL ENCOUNTER (OUTPATIENT)
Dept: CARDIAC REHAB | Age: 55
Setting detail: THERAPIES SERIES
Discharge: HOME | End: 2020-09-22
Payer: COMMERCIAL

## 2020-09-22 PROCEDURE — G0238 OTH RESP PROC, INDIV: HCPCS

## 2020-09-22 PROCEDURE — G0237 THERAPEUTIC PROCD STRG ENDUR: HCPCS

## 2020-09-23 NOTE — PROGRESS NOTES
[facility]  Cardiology Progress Note    Ruth Saleh  1965 September 28, 2020      Reason for Referral: CAD, HTN, HLD, ICD, syncope, hypotenson     CC: \"I am doing okay. \"       Subjective:     History of Present Illness:    Alek Garland is a 54 y.o. patient who seeing us today in follow up CAD-multivessel, ischemic cardiomyopathy, VT s/p AICD, anemia, HTN, HLD and DM. Patient had complicated course with NSTEMI, VT multi vessel PCI. He underwent heart catheterization on 10/3/18 resulting in CECE to LCX. He underwent repeat angiography with Dr. Maryse Shah on 10/7/19 resulting in CECE to [address]. He did have to be placed on balloon pump for hemodynamic support. Due to continued episodes of sustained VT, Dr. Frandy Feliciano placed MRI compatible [employer] AICD. He presents today for follow up. Today, he is here for follow up. He is currently on oxygen therapy due to having Covid in June. He is following with Dr Claybon Fabry and currently undergoing testing to see if the need for oxygen will be long term. He does have occasional chest tightness. Patient denies dyspnea at rest, HINOJOSA, PND, orthopnea, palpitations, lightheadedness, weight changes, changes in LE edema, and syncope. Patient reports compliance to his medications.      Past Medical History:   has a past medical history of Arthritis, Blood circulation, collateral, CAD (coronary artery disease), CAD (coronary artery disease), Cardiac arrest ([address] Cerebral artery occlusion with cerebral infarction ([address] Diabetes mellitus ([address] Diabetic peripheral neuropathy ([address] GERD (gastroesophageal reflux disease), Hypercholesteremia, Hyperlipidemia, Hypertension, Movement disorder, MRSA (methicillin resistant staph aureus) culture positive, Neuropathy, Other disorders of kidney and ureter in diseases classified elsewhere, POTS (postural orthostatic tachycardia syndrome), Psychiatric problem, Sleep apnea, Syncope, and Type II or unspecified type diabetes mellitus without mention of complication, not stated as uncontrolled. Surgical History:   has a past surgical history that includes Vasectomy; Cardiac catheterization; Finger surgery (Left); hernia repair; vascular surgery; Colonoscopy; joint replacement; Coronary angioplasty with stent (10/06/2018); Coronary angioplasty with stent (10/07/2018); and Coronary angioplasty with stent (10/03/2018). Social History:   reports that he has quit smoking. His smoking use included cigarettes and cigars. He has a 24.00 pack-year smoking history. He has never used smokeless tobacco. He reports that he does not drink alcohol or use drugs. Family History:  family history includes COPD in his mother; Cancer in his father and sister; Diabetes in his brother and sister; Heart Disease in his brother, father, and mother; High Blood Pressure in his mother; Stroke in his mother. Home Medications:  Were reviewed and are listed in nursing record and/or below  Prior to Admission medications    Medication Sig Start Date End Date Taking?  Authorizing Provider   Insulin Aspart (NOVOLOG SC) Inject into the skin   Yes Historical Provider, MD   sodium chloride (OCEAN, BABY AYR) 0.65 % nasal spray 1 spray by Nasal route as needed for Congestion 7/9/20  Yes Ghada Fernandes MD   prasugrel (EFFIENT) 10 MG TABS Take 1 tablet by mouth once for 1 dose  Patient taking differently: Take 10 mg by mouth daily  1/24/20 9/28/20 Yes Bravo Reaves MD   atorvastatin (LIPITOR) 40 MG tablet TAKE 1 TABLET BY MOUTH IN THE EVENING  3/19/19  Yes Bravo Reaves MD   aspirin 81 MG chewable tablet Take 81 mg by mouth daily   Yes Historical Provider, MD   Cholecalciferol (VITAMIN D3) 71734 units CAPS Take 1 capsule by mouth once a week    Yes Historical Provider, MD   Dulaglutide (TRULICITY) 1.5 VO/8.0BB SOPN Inject 1.5 mg into the skin once a week    Yes Historical Provider, MD   insulin detemir (LEVEMIR FLEXTOUCH) 100 UNIT/ML injection pen Inject 30 Units into the skin nightly  Patient taking differently: Inject 50 Units into the skin nightly  8/23/18  Yes Karmen Naqvi, DO      Allergies:  No known allergies     Review of Systems: all reviewed and refer to HPI   · Constitutional: no unanticipated weight loss. There's been no change in energy level, sleep pattern, or activity level. No fevers, chills. · Eyes: No visual changes or diplopia. No scleral icterus. · ENT: No Headaches, hearing loss or vertigo. No mouth sores or sore throat. · Cardiovascular: No Chest pain, tightness or discomfort.  No Shortness of breath.  No Palpitations.  No Syncope ('blackouts', 'faints', 'collapse') or dizziness.  No Dyspnea on exertion, Orthopnea, Paroxysmal nocturnal dyspnea or breathlessness at rest.   · No Wheezing, sweating, distress and cough with frothy or bloodstained sputum. · Respiratory: No cough or wheezing, no sputum production. No hematemesis. · Gastrointestinal: No abdominal pain, appetite loss, blood in stools. No change in bowel or bladder habits. · Genitourinary: No dysuria, trouble voiding, or hematuria. · Musculoskeletal:  No gait disturbance, no joint complaints. · Integumentary: No rash or pruritis. · Neurological: No headache, diplopia, change in muscle strength, numbness or tingling. · Psychiatric: No anxiety or depression. · Endocrine: No temperature intolerance. No excessive thirst, fluid intake, or urination. No tremor. · Hematologic/Lymphatic: No abnormal bruising or bleeding, blood clots or swollen lymph nodes. · Allergic/Immunologic: No nasal congestion or hives. Objective:     PHYSICAL EXAM:      Vitals:    09/28/20 0843 09/28/20 0845   BP: (!) 96/58 96/68   Pulse: 107    Temp: 97.5 °F (36.4 °C)    SpO2: 100%    Weight: 225 lb (102.1 kg)    Height: 6' 2\" (1.88 m)       Weight: 225 lb (102.1 kg)       General Appearance:  Alert, cooperative, no distress, appears stated age.    Head:  Normocephalic, without obvious abnormality, atraumatic. Eyes:  Pupils equal and round. No scleral icterus. Mouth: Moist mucosa, no pharyngeal erythema. Nose: Nares normal. No drainage or sinus tenderness. Neck: Supple, symmetrical, trachea midline. No adenopathy. No tenderness/mass/nodules. No carotid bruit or elevated JVD. Lungs:   Respiratory Effort: Normal   Auscultation: Clear to auscultation bilaterally, respirations unlabored. No wheeze, rales   Chest Wall:  No tenderness or deformity. Cardiovascular:    Pulses  Palpation: normal   Ascultation: Regular rate, S1/ S2 normal. No murmur, rub, or gallop. 2+ radial and pedal pulses, symmetric  Carotid  Femoral   Abdomen and Gastrointestinal:   Soft, non-tender, bowel sounds active. Liver and Spleen  Masses   Musculoskeletal: No muscle wasting  Back  Gait   Extremities: Extremities normal, atraumatic. No cyanosis or edema. No cyanosis clubbing       Skin: Inspection and palpation performed, no rashes or lesions. Pysch: Normal mood and affect.  Alert and oriented to time place person   Neurologic: Normal gross motor and sensory exam.       Labs     All available labs reviewed to date:      Lab Results   Component Value Date    WBC 11.6 07/09/2020    RBC 5.58 07/09/2020    HGB 15.6 07/09/2020    HCT 47.5 07/09/2020    MCV 85.0 07/09/2020    RDW 13.2 07/09/2020     07/09/2020     Lab Results   Component Value Date     07/09/2020    K 4.2 07/09/2020    K 5.2 07/01/2020    CL 95 07/09/2020    CO2 28 07/09/2020    BUN 18 07/09/2020    CREATININE <0.5 07/09/2020    GFRAA >60 07/09/2020    GFRAA >60 02/10/2013    AGRATIO 0.8 06/30/2020    LABGLOM >60 07/09/2020    GLUCOSE 221 07/09/2020    PROT 5.6 07/05/2020    PROT 8.3 11/12/2012    CALCIUM 8.3 07/09/2020    BILITOT 0.4 07/05/2020    ALKPHOS 66 07/05/2020    AST 28 07/05/2020    ALT 16 07/05/2020      No results found for: [facility]  Lab Results   Component Value Date    LABA1C 12.8 11/13/2019     Lab Results   Component Value Date TROPONINI <0.01 07/01/2020       Cardiac, Vascular and Imaging Data all Personally Reviewed in Detail by Myself      EKG:   10/31/18 SR, reviewed   2/7/19 SR, 99 bpm T wave inversion     Echocardiogram: 10/8/18  Summary  Left ventricular cavity size is mildly dilated. Normal left ventricular wall thickness. Ejection fraction is visually estimated to be 45%. There is moderate to severe inferior hypokinesis. Normal right ventricular size and function.     Stress Test: 10/9/17   Summary    Pharmacological Stress/MPI Results:        1. Technically a satisfactory study.    2. Normal pharmacological stress portion of the study.    3. No evidence of Ischemia by Myocardial Perfusion Imaging.    4. Gated Study shows normal LV size and Systolic function; EF is 61 %. Cath:   10/3/18   ANGIOGRAPHIC FINDINGS:  1. Left main is normal.  LYNNE 3 flow. No stenosis. 2.  Left anterior descending artery comes from the left main coronary  artery. LYNNE 3 flow. No stenosis present with patent diagonal branches. 3.  Left circumflex is occluded in the mid portion. 4.  Right coronary comes from the right coronary cusp. It has a PDA which  has 80% stenosis present. The patient also has an obtuse marginal 1 which  as 80% stenosis present.   In summary, successful revascularization with stent placement in the  circumflex artery. This was 2.5 x 38 mm, expanded up to 2.8 mm. This was  a drug-coated Synergy stent    10/7/18 Dr. Hugo Brunner stent patent; PCI of RPDA and PCI OM1, IABP placement    Imaging: All available imaging to date reviewed     ECHO:8/14/19  Suboptimal image quality. Definity contrast administered. Left ventricular cavity size is normal. Normal left ventricular wall  thickness. Overall left ventricular systolic function appears mildly  reduced. Ejection fraction is visually estimated to be 50-55%. No regional  wall motion abnormalities are noted.  Diastolic filling parameters suggest  grade I diastolic [address] [place], [place] [address]  Ph: [phone]  Fax: [phone]    This note was scribed in the presence of Dr Kasi Rogers, by Jose Enrique Keene RN  Physician Attestation:  The scribes documentation has been prepared under my direction and personally reviewed by me in its entirety. I confirm that the note above accurately reflects all work, treatment, procedures, and medical decision making performed by me.

## 2020-09-24 ENCOUNTER — HOSPITAL ENCOUNTER (OUTPATIENT)
Dept: CARDIAC REHAB | Age: 55
Setting detail: THERAPIES SERIES
Discharge: HOME | End: 2020-09-24
Payer: COMMERCIAL

## 2020-09-24 PROCEDURE — G0239 OTH RESP PROC, GROUP: HCPCS

## 2020-09-28 ENCOUNTER — OFFICE VISIT (OUTPATIENT)
Dept: CARDIOLOGY CLINIC | Age: 55
End: 2020-09-28
Payer: COMMERCIAL

## 2020-09-28 ENCOUNTER — NURSE ONLY (OUTPATIENT)
Dept: CARDIOLOGY CLINIC | Age: 55
End: 2020-09-28
Payer: COMMERCIAL

## 2020-09-28 VITALS
SYSTOLIC BLOOD PRESSURE: 96 MMHG | WEIGHT: 225 LBS | DIASTOLIC BLOOD PRESSURE: 68 MMHG | HEART RATE: 107 BPM | TEMPERATURE: 97.5 F | OXYGEN SATURATION: 100 % | BODY MASS INDEX: 28.88 KG/M2 | HEIGHT: 74 IN

## 2020-09-28 PROCEDURE — 99213 OFFICE O/P EST LOW 20 MIN: CPT | Performed by: INTERNAL MEDICINE

## 2020-09-28 PROCEDURE — G8419 CALC BMI OUT NRM PARAM NOF/U: HCPCS | Performed by: INTERNAL MEDICINE

## 2020-09-28 PROCEDURE — 93282 PRGRMG EVAL IMPLANTABLE DFB: CPT | Performed by: INTERNAL MEDICINE

## 2020-09-28 PROCEDURE — 1036F TOBACCO NON-USER: CPT | Performed by: INTERNAL MEDICINE

## 2020-09-28 PROCEDURE — 3017F COLORECTAL CA SCREEN DOC REV: CPT | Performed by: INTERNAL MEDICINE

## 2020-09-28 PROCEDURE — G8427 DOCREV CUR MEDS BY ELIG CLIN: HCPCS | Performed by: INTERNAL MEDICINE

## 2020-09-28 NOTE — PROGRESS NOTES
Pt seen in clinic today for cardiac device interrogation. Their device is a MDT 1 chamber ICD. Based on threshold, impedance, and intrinsic sensing tests run today, the device appears to be functioning normally. However, pt complains that his device frequently makes the default \"no level alert\" tone at him. That it has been doing so for years. Device does not show any indication of doing this. Pt is compliant with home monitoring, so all device alerts have been turned off. If the problem continues, will contact [employer] to open malfunction report. Remaining battery life is 10y3m                     0.00%      Other episodes:  Pt had optivol episode while taking extra sodium indicated by pt as prescribed by pcp. episode has ended since pt has dc'd. see pdf for dates    Rx:   prasugrel (EFFIENT) 10 MG TABS Take 1 tablet by mouth once for 1 dose  Patient taking differently: Take 10 mg by mouth daily     Pt was informed of findings today and general questions have been answered with regard to device. Home monitoring hardware is transmitting on schedule. Pt to see Dr. Aleyda Gutierrez in clinic today following their device check. Edit: Pt claims that device alerted as he was leaving Dr. Gioia Bernheim exam room today. Will contact [employer].

## 2020-09-28 NOTE — PATIENT INSTRUCTIONS
Patient Education        Learning About Coronary Artery Disease (CAD)  What is coronary artery disease? Coronary artery disease (CAD) occurs when plaque builds up in the arteries that bring oxygen-rich blood to your heart. Plaque is a fatty substance made of cholesterol, calcium, and other substances in the blood. This process is called hardening of the arteries, or atherosclerosis. What happens when you have coronary artery disease? · Plaque may narrow the coronary arteries. Narrowed arteries cause poor blood flow. This can lead to angina symptoms such as chest pain or discomfort. If blood flow is completely blocked, you could have a heart attack. · You can slow CAD and reduce the risk of future problems by making changes in your lifestyle. These include quitting smoking and eating heart-healthy foods. · Treatments for CAD, along with changes in your lifestyle, can help you live a longer and healthier life. How can you prevent coronary artery disease? · Do not smoke. It may be the best thing you can do to prevent heart disease. If you need help quitting, talk to your doctor about stop-smoking programs and medicines. These can increase your chances of quitting for good. · Be active. Get at least 30 minutes of exercise on most days of the week. Walking is a good choice. You also may want to do other activities, such as running, swimming, cycling, or playing tennis or team sports. · Eat heart-healthy foods. Eat more fruits and vegetables and less foods that contain saturated and trans fats. Limit alcohol, sodium, and sweets. · Stay at a healthy weight. Lose weight if you need to. · Manage other health problems such as diabetes, high blood pressure, and high cholesterol. How is coronary artery disease treated? · Your doctor will suggest that you make lifestyle changes. For example, your doctor may ask you to eat healthy foods, quit smoking, lose extra weight, and be more active.   · You will have to take medicines. · Your doctor may suggest a procedure to open narrowed or blocked arteries. This is called angioplasty. Or your doctor may suggest using healthy blood vessels to create detours around narrowed or blocked arteries. This is called bypass surgery. Follow-up care is a key part of your treatment and safety. Be sure to make and go to all appointments, and call your doctor if you are having problems. It's also a good idea to know your test results and keep a list of the medicines you take. Where can you learn more? Go to https://GemapeAditive.IngBoo. org and sign in to your Gig[employer] account. Enter [phone] in the Coty box to learn more about \"Learning About Coronary Artery Disease (CAD). \"     If you do not have an account, please click on the \"Sign Up Now\" link. Current as of: December 16, 2019               Content Version: 12.5  © 8820-8539 Healthwise, Incorporated. Care instructions adapted under license by [facility] ([facility]). If you have questions about a medical condition or this instruction, always ask your healthcare professional. [place] [address] any warranty or liability for your use of this information.

## 2020-09-29 ENCOUNTER — HOSPITAL ENCOUNTER (OUTPATIENT)
Dept: CARDIAC REHAB | Age: 55
Setting detail: THERAPIES SERIES
Discharge: HOME | End: 2020-09-29
Payer: COMMERCIAL

## 2020-09-29 PROCEDURE — G0239 OTH RESP PROC, GROUP: HCPCS

## 2020-10-01 ENCOUNTER — HOSPITAL ENCOUNTER (OUTPATIENT)
Dept: CARDIAC REHAB | Age: 55
Setting detail: THERAPIES SERIES
Discharge: HOME | End: 2020-10-01
Payer: COMMERCIAL

## 2020-10-01 ENCOUNTER — APPOINTMENT (OUTPATIENT)
Dept: CARDIAC REHAB | Age: 55
End: 2020-10-01
Payer: COMMERCIAL

## 2020-10-01 PROCEDURE — G0424 PULMONARY REHAB W EXER: HCPCS

## 2020-10-06 ENCOUNTER — HOSPITAL ENCOUNTER (OUTPATIENT)
Dept: CARDIAC REHAB | Age: 55
Setting detail: THERAPIES SERIES
Discharge: HOME | End: 2020-10-06
Payer: COMMERCIAL

## 2020-10-06 ENCOUNTER — APPOINTMENT (OUTPATIENT)
Dept: CARDIAC REHAB | Age: 55
End: 2020-10-06
Payer: COMMERCIAL

## 2020-10-06 PROCEDURE — G0239 OTH RESP PROC, GROUP: HCPCS

## 2020-10-08 ENCOUNTER — APPOINTMENT (OUTPATIENT)
Dept: CARDIAC REHAB | Age: 55
End: 2020-10-08
Payer: COMMERCIAL

## 2020-10-08 ENCOUNTER — HOSPITAL ENCOUNTER (OUTPATIENT)
Dept: CARDIAC REHAB | Age: 55
Setting detail: THERAPIES SERIES
Discharge: HOME | End: 2020-10-08
Payer: COMMERCIAL

## 2020-10-08 PROCEDURE — G0239 OTH RESP PROC, GROUP: HCPCS

## 2020-10-13 ENCOUNTER — APPOINTMENT (OUTPATIENT)
Dept: CARDIAC REHAB | Age: 55
End: 2020-10-13
Payer: COMMERCIAL

## 2020-10-13 ENCOUNTER — HOSPITAL ENCOUNTER (OUTPATIENT)
Dept: CARDIAC REHAB | Age: 55
Setting detail: THERAPIES SERIES
Discharge: HOME | End: 2020-10-13
Payer: COMMERCIAL

## 2020-10-13 PROCEDURE — G0239 OTH RESP PROC, GROUP: HCPCS

## 2020-10-15 ENCOUNTER — APPOINTMENT (OUTPATIENT)
Dept: CARDIAC REHAB | Age: 55
End: 2020-10-15
Payer: COMMERCIAL

## 2020-10-15 ENCOUNTER — HOSPITAL ENCOUNTER (OUTPATIENT)
Dept: CARDIAC REHAB | Age: 55
Setting detail: THERAPIES SERIES
Discharge: HOME | End: 2020-10-15
Payer: COMMERCIAL

## 2020-10-15 PROCEDURE — G0239 OTH RESP PROC, GROUP: HCPCS

## 2020-10-20 ENCOUNTER — TELEPHONE (OUTPATIENT)
Dept: CARDIOLOGY CLINIC | Age: 55
End: 2020-10-20

## 2020-10-20 ENCOUNTER — APPOINTMENT (OUTPATIENT)
Dept: CARDIAC REHAB | Age: 55
End: 2020-10-20
Payer: COMMERCIAL

## 2020-10-20 ENCOUNTER — HOSPITAL ENCOUNTER (OUTPATIENT)
Dept: CARDIAC REHAB | Age: 55
Setting detail: THERAPIES SERIES
Discharge: HOME | End: 2020-10-20
Payer: COMMERCIAL

## 2020-10-20 PROCEDURE — G0239 OTH RESP PROC, GROUP: HCPCS

## 2020-10-20 NOTE — TELEPHONE ENCOUNTER
Trudy called from [facility] to see if we received the fax to send appointment notes in regards to Dr Syeda Back and Dr Stephanie Hernándezer the encounter to both MA'devaughn Yates # [phone]

## 2020-10-20 NOTE — TELEPHONE ENCOUNTER
Forms received and have been completed and faxed 10/13/20. Appears last office note was sent along with the completed forms but the fax transmission sheet states the results as \"busy. \" Forms need re-faxed.

## 2020-10-22 ENCOUNTER — APPOINTMENT (OUTPATIENT)
Dept: CARDIAC REHAB | Age: 55
End: 2020-10-22
Payer: COMMERCIAL

## 2020-10-22 ENCOUNTER — TELEPHONE (OUTPATIENT)
Dept: CARDIOLOGY CLINIC | Age: 55
End: 2020-10-22

## 2020-10-22 ENCOUNTER — HOSPITAL ENCOUNTER (OUTPATIENT)
Dept: CARDIAC REHAB | Age: 55
Setting detail: THERAPIES SERIES
Discharge: HOME | End: 2020-10-22
Payer: COMMERCIAL

## 2020-10-22 PROCEDURE — G0239 OTH RESP PROC, GROUP: HCPCS

## 2020-10-22 NOTE — TELEPHONE ENCOUNTER
Pt stopped in to drop off disability paperwork to be filled out. He also wanted a dr's note for his Sept visit however I did not see one for Sept. Put the paperwork in the disability folder next to [employer].     Juni # [phone]

## 2020-10-22 NOTE — TELEPHONE ENCOUNTER
Paper work was filled out 10/13, I refaxed today and success confirmation. I let him know that I prepared a letter as well that he was in the office on 9/28. He said that he does not need a letter. I made him aware of the success confirmation of the fax that was sent today.

## 2020-10-22 NOTE — LETTER
[facility]  Phone: [phone]  Fax: [phone]    Jovanny Mejia MD        October 22, 2020     Patient: Sam Pelaez   YOB: 1965   Date of Visit: 10/22/2020       To Whom It May Concern:     Juni King was my office for a follow up appointment on 9/28/2020. If you have any questions or concerns, please don't hesitate to call.     Sincerely,        Jovanny Mejia MD

## 2020-10-26 ENCOUNTER — HOSPITAL ENCOUNTER (OUTPATIENT)
Dept: NON INVASIVE DIAGNOSTICS | Age: 55
Discharge: HOME | End: 2020-10-26
Payer: COMMERCIAL

## 2020-10-26 LAB
LV EF: 53 %
LV EF: 70 %
LVEF MODALITY: NORMAL
LVEF MODALITY: NORMAL

## 2020-10-26 PROCEDURE — 6360000002 HC RX W HCPCS: Performed by: INTERNAL MEDICINE

## 2020-10-26 PROCEDURE — 78452 HT MUSCLE IMAGE SPECT MULT: CPT

## 2020-10-26 PROCEDURE — 3430000000 HC RX DIAGNOSTIC RADIOPHARMACEUTICAL: Performed by: NURSE PRACTITIONER

## 2020-10-26 PROCEDURE — 93017 CV STRESS TEST TRACING ONLY: CPT

## 2020-10-26 PROCEDURE — A9502 TC99M TETROFOSMIN: HCPCS | Performed by: NURSE PRACTITIONER

## 2020-10-26 RX ADMIN — REGADENOSON 0.4 MG: 0.08 INJECTION, SOLUTION INTRAVENOUS at 11:33

## 2020-10-26 RX ADMIN — TETROFOSMIN 10 MILLICURIE: 1.38 INJECTION, POWDER, LYOPHILIZED, FOR SOLUTION INTRAVENOUS at 10:26

## 2020-10-26 RX ADMIN — TETROFOSMIN 30 MILLICURIE: 1.38 INJECTION, POWDER, LYOPHILIZED, FOR SOLUTION INTRAVENOUS at 11:45

## 2020-10-27 ENCOUNTER — HOSPITAL ENCOUNTER (OUTPATIENT)
Dept: CARDIAC REHAB | Age: 55
Setting detail: THERAPIES SERIES
Discharge: HOME | End: 2020-10-27
Payer: COMMERCIAL

## 2020-10-27 ENCOUNTER — APPOINTMENT (OUTPATIENT)
Dept: CARDIAC REHAB | Age: 55
End: 2020-10-27
Payer: COMMERCIAL

## 2020-10-27 ENCOUNTER — TELEPHONE (OUTPATIENT)
Dept: CARDIOLOGY CLINIC | Age: 55
End: 2020-10-27

## 2020-10-27 PROCEDURE — G0239 OTH RESP PROC, GROUP: HCPCS

## 2020-10-27 NOTE — LETTER
[facility]  Phone: [phone]  Fax: [phone]    Le Lombardi MD        October 27, 2020     Patient: Rene Tamayo   YOB: 1965   Date of Visit: 10/27/2020     Scheduled 11/18/20 at 1:00 and patient to arrive at 11:30    The morning of your procedure you will park in the hospital parking lot and report directly to the cath lab to check in.     Pre-Procedure Instructions   1. You will need to fast for at least 8 hours prior to procedure. No caffeine the morning of.   2. Hold all diabetic medications. If you take Lantus/Levemir only take ½ your normal dose the evening before. All other medications can be taken in the morning with sips of water. 3. Do not use any lotions, creams or perfume the morning of procedure. 4. Pre-procedure lab work will need to be completed 5-7 days prior to procedure. [address]   5. Please have a responsible adult to drive you home after procedure. We advise you have someone to stay with you for 24 hours following procedure for precautionary measures. Depending on procedure you may require an overnight stay. 6. Cath lab will provide you with all post procedure instructions. If you have any questions regarding the procedure itself or medications, please call [phone] and ask to speak with a nurse. If you have any questions or concerns, please don't hesitate to call.     Sincerely,        Le Lombardi MD

## 2020-10-27 NOTE — TELEPHONE ENCOUNTER
Per result note. Stress test abnormal and needs set up for cardiac catheterization. Left Eric a message to return call to the office to schedule procedure. Scheduled 11/18/20 at 1:00 and patient to arrive at 11:30    The morning of your procedure you will park in the hospital parking lot and report directly to the cath lab to check in.     Pre-Procedure Instructions   1. You will need to fast for at least 8 hours prior to procedure. No caffeine the morning of.   2. Hold all diabetic medications. If you take Lantus/Levemir only take ½ your normal dose the evening before. All other medications can be taken in the morning with sips of water. 3. Do not use any lotions, creams or perfume the morning of procedure. 4. Pre-procedure lab work will need to be completed 5-7 days prior to procedure. [address]   5. Please have a responsible adult to drive you home after procedure. We advise you have someone to stay with you for 24 hours following procedure for precautionary measures. Depending on procedure you may require an overnight stay. 6. Cath lab will provide you with all post procedure instructions.      If you have any questions regarding the procedure itself or medications, please call [phone] and ask to speak with a nurse

## 2020-10-27 NOTE — TELEPHONE ENCOUNTER
Eric returned call. Patient is agreeable to proceed with angiogram.     Procedure scheduled. Published on [employer] and e-mail to Nan Elizabeth.

## 2020-10-28 ENCOUNTER — TELEPHONE (OUTPATIENT)
Dept: CARDIOLOGY CLINIC | Age: 55
End: 2020-10-28

## 2020-10-28 NOTE — TELEPHONE ENCOUNTER
Dr. Trudi Queen, patient had abnormal stress test on 10/26/20. LHC scheduled for 11/18/20. Can patient continue with rehab while he waits for LHC? Fall Risk

## 2020-10-28 NOTE — TELEPHONE ENCOUNTER
Patient is in physical therapy for pulmonary and is wondering if he should put that on hold for the angiogram

## 2020-10-28 NOTE — TELEPHONE ENCOUNTER
I spoke to Dr. Collette Bertrand and he said okay for patient to continue with therapy. Please call patient to make him aware.

## 2020-10-28 NOTE — TELEPHONE ENCOUNTER
Juni called in this morning wanting to talk to Dr. Sera Fair RN. He has some questions regarding his upcoming procedure, on 11/8, concerning his physical therapy.      You can reach Juni at #[phone]

## 2020-10-28 NOTE — TELEPHONE ENCOUNTER
Trudy from [facility] called in for Juni for [employer] to fill out the questionnaire that she faxed over today.     Fax # [phone]  You can reach Brittni Harrell at [phone]

## 2020-10-29 ENCOUNTER — APPOINTMENT (OUTPATIENT)
Dept: CARDIAC REHAB | Age: 55
End: 2020-10-29
Payer: COMMERCIAL

## 2020-10-29 ENCOUNTER — HOSPITAL ENCOUNTER (OUTPATIENT)
Dept: CARDIAC REHAB | Age: 55
Setting detail: THERAPIES SERIES
Discharge: HOME | End: 2020-10-29
Payer: COMMERCIAL

## 2020-10-29 PROCEDURE — G0239 OTH RESP PROC, GROUP: HCPCS

## 2020-10-30 ENCOUNTER — TELEPHONE (OUTPATIENT)
Dept: PULMONOLOGY | Age: 55
End: 2020-10-30

## 2020-11-03 ENCOUNTER — APPOINTMENT (OUTPATIENT)
Dept: CARDIAC REHAB | Age: 55
End: 2020-11-03
Payer: COMMERCIAL

## 2020-11-03 ENCOUNTER — HOSPITAL ENCOUNTER (OUTPATIENT)
Dept: CARDIAC REHAB | Age: 55
Setting detail: THERAPIES SERIES
Discharge: HOME | End: 2020-11-03
Payer: COMMERCIAL

## 2020-11-03 PROBLEM — J18.9 PNEUMONIA: Status: RESOLVED | Noted: 2020-06-30 | Resolved: 2020-11-03

## 2020-11-03 PROCEDURE — G0239 OTH RESP PROC, GROUP: HCPCS

## 2020-11-05 ENCOUNTER — APPOINTMENT (OUTPATIENT)
Dept: CARDIAC REHAB | Age: 55
End: 2020-11-05
Payer: COMMERCIAL

## 2020-11-05 ENCOUNTER — HOSPITAL ENCOUNTER (OUTPATIENT)
Dept: CARDIAC REHAB | Age: 55
Setting detail: THERAPIES SERIES
Discharge: HOME | End: 2020-11-05
Payer: COMMERCIAL

## 2020-11-05 PROCEDURE — G0239 OTH RESP PROC, GROUP: HCPCS

## 2020-11-10 ENCOUNTER — HOSPITAL ENCOUNTER (OUTPATIENT)
Dept: CARDIAC REHAB | Age: 55
Setting detail: THERAPIES SERIES
Discharge: HOME | End: 2020-11-10
Payer: COMMERCIAL

## 2020-11-10 ENCOUNTER — APPOINTMENT (OUTPATIENT)
Dept: CARDIAC REHAB | Age: 55
End: 2020-11-10
Payer: COMMERCIAL

## 2020-11-10 PROCEDURE — G0239 OTH RESP PROC, GROUP: HCPCS

## 2020-11-10 NOTE — TELEPHONE ENCOUNTER
Patient is agreeable to 2:30 procedure with 1:00 arrival time. Published on [employer] and e-mail to Lucia Correia.

## 2020-11-10 NOTE — TELEPHONE ENCOUNTER
Called and left message for Juni to return call.  Need to change procedure time on 11/18 to 2:30 with an arrival time of 1:00

## 2020-11-12 ENCOUNTER — APPOINTMENT (OUTPATIENT)
Dept: CARDIAC REHAB | Age: 55
End: 2020-11-12
Payer: COMMERCIAL

## 2020-11-12 ENCOUNTER — HOSPITAL ENCOUNTER (OUTPATIENT)
Dept: CARDIAC REHAB | Age: 55
Setting detail: THERAPIES SERIES
Discharge: HOME | End: 2020-11-12
Payer: COMMERCIAL

## 2020-11-12 DIAGNOSIS — I25.10 CORONARY ARTERY DISEASE INVOLVING NATIVE CORONARY ARTERY OF NATIVE HEART WITHOUT ANGINA PECTORIS: ICD-10-CM

## 2020-11-12 DIAGNOSIS — R94.39 ABNORMAL STRESS TEST: ICD-10-CM

## 2020-11-12 LAB
ANION GAP SERPL CALCULATED.3IONS-SCNC: 11 MMOL/L (ref 3–16)
BUN BLDV-MCNC: 13 MG/DL (ref 7–20)
CALCIUM SERPL-MCNC: 9.3 MG/DL (ref 8.3–10.6)
CHLORIDE BLD-SCNC: 103 MMOL/L (ref 99–110)
CO2: 29 MMOL/L (ref 21–32)
CREAT SERPL-MCNC: 0.8 MG/DL (ref 0.9–1.3)
GFR AFRICAN AMERICAN: >60
GFR NON-AFRICAN AMERICAN: >60
GLUCOSE BLD-MCNC: 118 MG/DL (ref 70–99)
HCT VFR BLD CALC: 47.8 % (ref 40.5–52.5)
HEMOGLOBIN: 16 G/DL (ref 13.5–17.5)
MCH RBC QN AUTO: 27.2 PG (ref 26–34)
MCHC RBC AUTO-ENTMCNC: 33.5 G/DL (ref 31–36)
MCV RBC AUTO: 81.2 FL (ref 80–100)
PDW BLD-RTO: 13.2 % (ref 12.4–15.4)
PLATELET # BLD: 216 K/UL (ref 135–450)
PMV BLD AUTO: 8.9 FL (ref 5–10.5)
POTASSIUM SERPL-SCNC: 4.4 MMOL/L (ref 3.5–5.1)
RBC # BLD: 5.89 M/UL (ref 4.2–5.9)
SODIUM BLD-SCNC: 143 MMOL/L (ref 136–145)
WBC # BLD: 9.2 K/UL (ref 4–11)

## 2020-11-12 PROCEDURE — G0239 OTH RESP PROC, GROUP: HCPCS

## 2020-11-13 ENCOUNTER — OFFICE VISIT (OUTPATIENT)
Dept: PULMONOLOGY | Age: 55
End: 2020-11-13
Payer: COMMERCIAL

## 2020-11-13 VITALS
DIASTOLIC BLOOD PRESSURE: 70 MMHG | TEMPERATURE: 97.8 F | WEIGHT: 225.4 LBS | OXYGEN SATURATION: 97 % | HEART RATE: 103 BPM | RESPIRATION RATE: 12 BRPM | HEIGHT: 74 IN | SYSTOLIC BLOOD PRESSURE: 108 MMHG | BODY MASS INDEX: 28.93 KG/M2

## 2020-11-13 PROCEDURE — G8419 CALC BMI OUT NRM PARAM NOF/U: HCPCS | Performed by: INTERNAL MEDICINE

## 2020-11-13 PROCEDURE — 1036F TOBACCO NON-USER: CPT | Performed by: INTERNAL MEDICINE

## 2020-11-13 PROCEDURE — 99213 OFFICE O/P EST LOW 20 MIN: CPT | Performed by: INTERNAL MEDICINE

## 2020-11-13 PROCEDURE — G8427 DOCREV CUR MEDS BY ELIG CLIN: HCPCS | Performed by: INTERNAL MEDICINE

## 2020-11-13 PROCEDURE — G8484 FLU IMMUNIZE NO ADMIN: HCPCS | Performed by: INTERNAL MEDICINE

## 2020-11-13 PROCEDURE — 3017F COLORECTAL CA SCREEN DOC REV: CPT | Performed by: INTERNAL MEDICINE

## 2020-11-13 RX ORDER — BUPROPION HYDROCHLORIDE 100 MG/1
100 TABLET ORAL
Status: ON HOLD | COMMUNITY
End: 2020-11-19

## 2020-11-13 NOTE — LETTER
[facility]   [address] [facility]   Phone: [phone]  Fax: [phone]     11/13/2020    Dr. Jayla Johnson, APRN - CNP    I have seen your patient, Annmarie Martinez on follow up. Here is the assessment and plan for your patient. Any question, please do not hesitate to call. Problem List Items Addressed This Visit     COVID-19 virus infection     Continues to improve and now off of oxygen. Chronic hypoxemic respiratory failure (HCC)     This appears to be resolved. He is now off oxygen with saturations 95% at the lowest with exertion. Therefore, discontinue oxygen to this time. He will continue with physical therapy. At this point, he will follow-up as needed.                  Sincerely,    Anurag Rodriguez Pulmonary, Sleep and Critical Care  [phone]

## 2020-11-13 NOTE — ASSESSMENT & PLAN NOTE
This appears to be resolved. He is now off oxygen with saturations 95% at the lowest with exertion. Therefore, discontinue oxygen to this time. He will continue with physical therapy. At this point, he will follow-up as needed.

## 2020-11-13 NOTE — PROGRESS NOTES
REASON FOR CONSULTATION/CC:   Chief Complaint   Patient presents with    Shortness of Breath     chronic hypoxemic respiratory failure        PCP: SILVIO POLLOCK, APRN - CNP    HISTORY OF PRESENT ILLNESS: Cat Price is a 54y.o. year old male with a history of covid who presents :       COVID. He has now been off oxygen and doing well. Working with therapy with saturation > 95%.               PAST MEDICAL HISTORY:  Past Medical History:   Diagnosis Date    Arthritis     Blood circulation, collateral     CAD (coronary artery disease) 7/15/2014    CAD (coronary artery disease)     Cardiac arrest ([address] 10/05/2018    C[facility]     Diabetes mellitus ([facility]     Diabetic peripheral neuropathy ([facility] 6/8/2018    GERD (gastroesophageal reflux disease)     ulcers    Hypercholesteremia     Hyperlipidemia     Hypertension     Movement disorder     possible arthritis right hand    MRSA (methicillin resistant staph aureus) culture positive 07/13/2018    foot wound    Neuropathy     Other disorders of kidney and ureter in diseases classified elsewhere     POTS (postural orthostatic tachycardia syndrome)     Psychiatric problem     anxiety, depression, bipolar    Sleep apnea     Syncope     Type II or unspecified type diabetes mellitus without mention of complication, not stated as uncontrolled        PAST SURGICAL HISTORY:  Past Surgical History:   Procedure Laterality Date    CARDIAC CATHETERIZATION      COLONOSCOPY      CORONARY ANGIOPLASTY WITH STENT PLACEMENT  10/06/2018    Bare Metal Stent to PDA    CORONARY ANGIOPLASTY WITH STENT PLACEMENT  10/07/2018    Bare Metal Stent to OM    CORONARY ANGIOPLASTY WITH STENT PLACEMENT  10/03/2018    CECE to Circ    FINGER SURGERY Left     Left Thumb    HERNIA REPAIR      JOINT REPLACEMENT      VASCULAR SURGERY      VASECTOMY         FAMILY HISTORY:  family history includes COPD in his mother; Cancer in his father and sister; Diabetes in his brother and sister; Heart Disease in his brother, father, and mother; High Blood Pressure in his mother; Stroke in his mother. SOCIAL HISTORY:   reports that he has quit smoking. His smoking use included cigarettes and cigars. He has a 24.00 pack-year smoking history. He has never used smokeless tobacco.      ALLERGIES:  Patient is allergic to no known allergies. REVIEW OF SYSTEMS:  Constitutional: Negative for fever   HENT: Negative for sore throat  Eyes: Negative for redness   Respiratory: mild  for dyspnea, - cough  Cardiovascular: Negative for chest pain  Gastrointestinal: Negative for vomiting, diarrhea   Genitourinary: Negative for hematuria   Musculoskeletal: Negative for arthralgias   Skin: Negative for rash  Neurological: Negative for syncope  Hematological: Negative for adenopathy  Psychiatric/Behavorial: Negative for anxiety    Objective:   PHYSICAL EXAM:  Blood pressure 108/70, pulse 103, temperature 97.8 °F (36.6 °C), temperature source Oral, resp. rate 12, height 6' 2\" (1.88 m), weight 225 lb 6.4 oz (102.2 kg), SpO2 97 %.'  Gen: No distress. ENT:   Resp: No accessory muscle use. No crackles. No wheezes. No rhonchi. CV: Regular rate. Regular rhythm. No murmur or rub. No edema. Skin: Warm, dry, normal texture and turgor. No nodule on exposed extremities. M/S: No cyanosis. No clubbing. No joint deformity. Psych: Oriented x 3. No anxiety. Awake. Alert. Intact judgement and insight. Good Mood / Affect.   Memory appears in tact     Current Outpatient Medications   Medication Sig Dispense Refill    buPROPion (WELLBUTRIN) 100 MG tablet Take 100 mg by mouth      Insulin Aspart (NOVOLOG SC) Inject into the skin      sodium chloride (OCEAN, BABY AYR) 0.65 % nasal spray 1 spray by Nasal route as needed for Congestion 30 mL 0    atorvastatin (LIPITOR) 40 MG tablet TAKE 1 TABLET BY MOUTH IN THE EVENING  30 tablet 4    aspirin 81 MG chewable tablet Take 81 mg by mouth daily      Cholecalciferol (VITAMIN D3) 36692 units CAPS Take 1 capsule by mouth once a week       Dulaglutide (TRULICITY) 1.5 IU/7.4DK SOPN Inject 1.5 mg into the skin once a week       insulin detemir (LEVEMIR FLEXTOUCH) 100 UNIT/ML injection pen Inject 30 Units into the skin nightly (Patient taking differently: Inject 50 Units into the skin nightly ) 5 pen 3    prasugrel (EFFIENT) 10 MG TABS Take 1 tablet by mouth once for 1 dose (Patient taking differently: Take 10 mg by mouth daily ) 30 tablet 11     No current facility-administered medications for this visit. Data Reviewed:   CBC and Renal reviewed  Last CBC  Lab Results   Component Value Date    WBC 9.2 11/12/2020    RBC 5.89 11/12/2020    HGB 16.0 11/12/2020    MCV 81.2 11/12/2020     11/12/2020     Last Renal  Lab Results   Component Value Date     11/12/2020    K 4.4 11/12/2020    K 5.2 07/01/2020     11/12/2020    CO2 29 11/12/2020    CO2 28 10/06/2020    CO2 28 07/09/2020    BUN 13 11/12/2020    CREATININE 0.8 11/12/2020    GLUCOSE 118 11/12/2020    CALCIUM 9.3 11/12/2020       Last ABG  POC Blood Gas: No results found for: POCPH, POCPCO2, POCPO2, POCHCO3, NBEA, PBUV5GKD  No results for input(s): PH, PCO2, PO2, HCO3, BE, O2SAT in the last 72 hours. 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 Chronic hypoxemic respiratory failure (HCC)     This appears to be resolved. He is now off oxygen with saturations 95% at the lowest with exertion. Therefore, discontinue oxygen to this time. He will continue with physical therapy. At this point, he will follow-up as needed. This note was transcribed using [id number] [facility].  Please disregard any

## 2020-11-17 ENCOUNTER — APPOINTMENT (OUTPATIENT)
Dept: CARDIAC REHAB | Age: 55
End: 2020-11-17
Payer: COMMERCIAL

## 2020-11-17 ENCOUNTER — HOSPITAL ENCOUNTER (OUTPATIENT)
Dept: CARDIAC REHAB | Age: 55
Setting detail: THERAPIES SERIES
Discharge: HOME | End: 2020-11-17
Payer: COMMERCIAL

## 2020-11-17 PROCEDURE — G0239 OTH RESP PROC, GROUP: HCPCS

## 2020-11-18 ENCOUNTER — HOSPITAL ENCOUNTER (OUTPATIENT)
Dept: CARDIAC CATH/INVASIVE PROCEDURES | Age: 55
Setting detail: OBSERVATION
Discharge: HOME | End: 2020-11-19
Attending: INTERNAL MEDICINE | Admitting: INTERNAL MEDICINE
Payer: COMMERCIAL

## 2020-11-18 PROBLEM — Z98.61 CAD S/P PERCUTANEOUS CORONARY ANGIOPLASTY: Status: ACTIVE | Noted: 2020-11-18

## 2020-11-18 PROBLEM — I25.10 CAD S/P PERCUTANEOUS CORONARY ANGIOPLASTY: Status: ACTIVE | Noted: 2020-11-18

## 2020-11-18 PROBLEM — I20.0 UNSTABLE ANGINA (HCC): Status: ACTIVE | Noted: 2020-11-18

## 2020-11-18 LAB
EKG ATRIAL RATE: 99 BPM
EKG DIAGNOSIS: NORMAL
EKG P AXIS: 32 DEGREES
EKG P-R INTERVAL: 170 MS
EKG Q-T INTERVAL: 348 MS
EKG QRS DURATION: 80 MS
EKG QTC CALCULATION (BAZETT): 446 MS
EKG R AXIS: 0 DEGREES
EKG T AXIS: 33 DEGREES
EKG VENTRICULAR RATE: 99 BPM
GLUCOSE BLD-MCNC: 368 MG/DL (ref 70–99)
PERFORMED ON: ABNORMAL

## 2020-11-18 PROCEDURE — 93458 L HRT ARTERY/VENTRICLE ANGIO: CPT | Performed by: INTERNAL MEDICINE

## 2020-11-18 PROCEDURE — 92928 PRQ TCAT PLMT NTRAC ST 1 LES: CPT | Performed by: INTERNAL MEDICINE

## 2020-11-18 PROCEDURE — G0378 HOSPITAL OBSERVATION PER HR: HCPCS

## 2020-11-18 PROCEDURE — C1894 INTRO/SHEATH, NON-LASER: HCPCS

## 2020-11-18 PROCEDURE — 6360000004 HC RX CONTRAST MEDICATION: Performed by: INTERNAL MEDICINE

## 2020-11-18 PROCEDURE — C1725 CATH, TRANSLUMIN NON-LASER: HCPCS

## 2020-11-18 PROCEDURE — 93005 ELECTROCARDIOGRAM TRACING: CPT | Performed by: INTERNAL MEDICINE

## 2020-11-18 PROCEDURE — C1887 CATHETER, GUIDING: HCPCS

## 2020-11-18 PROCEDURE — 6370000000 HC RX 637 (ALT 250 FOR IP)

## 2020-11-18 PROCEDURE — 99152 MOD SED SAME PHYS/QHP 5/>YRS: CPT

## 2020-11-18 PROCEDURE — 92978 ENDOLUMINL IVUS OCT C 1ST: CPT

## 2020-11-18 PROCEDURE — 6360000002 HC RX W HCPCS

## 2020-11-18 PROCEDURE — 93010 ELECTROCARDIOGRAM REPORT: CPT | Performed by: INTERNAL MEDICINE

## 2020-11-18 PROCEDURE — 6360000002 HC RX W HCPCS: Performed by: INTERNAL MEDICINE

## 2020-11-18 PROCEDURE — 2709999900 HC NON-CHARGEABLE SUPPLY

## 2020-11-18 PROCEDURE — 2580000003 HC RX 258

## 2020-11-18 PROCEDURE — 93458 L HRT ARTERY/VENTRICLE ANGIO: CPT

## 2020-11-18 PROCEDURE — 99153 MOD SED SAME PHYS/QHP EA: CPT

## 2020-11-18 PROCEDURE — 92928 PRQ TCAT PLMT NTRAC ST 1 LES: CPT

## 2020-11-18 PROCEDURE — 6370000000 HC RX 637 (ALT 250 FOR IP): Performed by: INTERNAL MEDICINE

## 2020-11-18 PROCEDURE — C1753 CATH, INTRAVAS ULTRASOUND: HCPCS

## 2020-11-18 PROCEDURE — C1874 STENT, COATED/COV W/DEL SYS: HCPCS

## 2020-11-18 PROCEDURE — 2500000003 HC RX 250 WO HCPCS

## 2020-11-18 PROCEDURE — 2580000003 HC RX 258: Performed by: INTERNAL MEDICINE

## 2020-11-18 PROCEDURE — C1769 GUIDE WIRE: HCPCS

## 2020-11-18 PROCEDURE — 92978 ENDOLUMINL IVUS OCT C 1ST: CPT | Performed by: INTERNAL MEDICINE

## 2020-11-18 RX ORDER — ATORVASTATIN CALCIUM 40 MG/1
40 TABLET, FILM COATED ORAL DAILY
Status: DISCONTINUED | OUTPATIENT
Start: 2020-11-18 | End: 2020-11-19 | Stop reason: HOSPADM

## 2020-11-18 RX ORDER — EPTIFIBATIDE 2 MG/ML
180 INJECTION, SOLUTION INTRAVENOUS ONCE
Status: COMPLETED | OUTPATIENT
Start: 2020-11-18 | End: 2020-11-18

## 2020-11-18 RX ORDER — PRASUGREL 10 MG/1
10 TABLET, FILM COATED ORAL DAILY
Status: DISCONTINUED | OUTPATIENT
Start: 2020-11-19 | End: 2020-11-19 | Stop reason: HOSPADM

## 2020-11-18 RX ORDER — ASPIRIN 325 MG
325 TABLET ORAL ONCE
Status: DISCONTINUED | OUTPATIENT
Start: 2020-11-18 | End: 2020-11-19 | Stop reason: HOSPADM

## 2020-11-18 RX ORDER — SODIUM CHLORIDE 9 MG/ML
INJECTION, SOLUTION INTRAVENOUS CONTINUOUS
Status: DISCONTINUED | OUTPATIENT
Start: 2020-11-18 | End: 2020-11-19 | Stop reason: HOSPADM

## 2020-11-18 RX ORDER — SODIUM CHLORIDE 0.9 % (FLUSH) 0.9 %
10 SYRINGE (ML) INJECTION PRN
Status: DISCONTINUED | OUTPATIENT
Start: 2020-11-18 | End: 2020-11-19 | Stop reason: HOSPADM

## 2020-11-18 RX ORDER — ASPIRIN 81 MG/1
81 TABLET, CHEWABLE ORAL DAILY
Status: DISCONTINUED | OUTPATIENT
Start: 2020-11-18 | End: 2020-11-18 | Stop reason: SDUPTHER

## 2020-11-18 RX ORDER — DEXTROSE MONOHYDRATE 25 G/50ML
12.5 INJECTION, SOLUTION INTRAVENOUS PRN
Status: DISCONTINUED | OUTPATIENT
Start: 2020-11-18 | End: 2020-11-19 | Stop reason: HOSPADM

## 2020-11-18 RX ORDER — NICOTINE POLACRILEX 4 MG
15 LOZENGE BUCCAL PRN
Status: DISCONTINUED | OUTPATIENT
Start: 2020-11-18 | End: 2020-11-19 | Stop reason: HOSPADM

## 2020-11-18 RX ORDER — ACETAMINOPHEN 325 MG/1
650 TABLET ORAL EVERY 4 HOURS PRN
Status: DISCONTINUED | OUTPATIENT
Start: 2020-11-18 | End: 2020-11-19 | Stop reason: HOSPADM

## 2020-11-18 RX ORDER — DEXTROSE MONOHYDRATE 50 MG/ML
100 INJECTION, SOLUTION INTRAVENOUS PRN
Status: DISCONTINUED | OUTPATIENT
Start: 2020-11-18 | End: 2020-11-19 | Stop reason: HOSPADM

## 2020-11-18 RX ORDER — INSULIN GLARGINE 100 [IU]/ML
30 INJECTION, SOLUTION SUBCUTANEOUS NIGHTLY
Status: DISCONTINUED | OUTPATIENT
Start: 2020-11-18 | End: 2020-11-19 | Stop reason: HOSPADM

## 2020-11-18 RX ORDER — SODIUM CHLORIDE 0.9 % (FLUSH) 0.9 %
10 SYRINGE (ML) INJECTION EVERY 12 HOURS SCHEDULED
Status: DISCONTINUED | OUTPATIENT
Start: 2020-11-18 | End: 2020-11-19 | Stop reason: HOSPADM

## 2020-11-18 RX ORDER — ONDANSETRON 2 MG/ML
4 INJECTION INTRAMUSCULAR; INTRAVENOUS EVERY 6 HOURS PRN
Status: DISCONTINUED | OUTPATIENT
Start: 2020-11-18 | End: 2020-11-19 | Stop reason: HOSPADM

## 2020-11-18 RX ORDER — ASPIRIN 81 MG/1
81 TABLET, CHEWABLE ORAL DAILY
Status: DISCONTINUED | OUTPATIENT
Start: 2020-11-19 | End: 2020-11-19 | Stop reason: HOSPADM

## 2020-11-18 RX ORDER — PRASUGREL 10 MG/1
10 TABLET, FILM COATED ORAL DAILY
Status: DISCONTINUED | OUTPATIENT
Start: 2020-11-18 | End: 2020-11-18 | Stop reason: SDUPTHER

## 2020-11-18 RX ADMIN — IOPAMIDOL 75 ML: 755 INJECTION, SOLUTION INTRAVENOUS at 16:29

## 2020-11-18 RX ADMIN — INSULIN GLARGINE 30 UNITS: 100 INJECTION, SOLUTION SUBCUTANEOUS at 23:21

## 2020-11-18 RX ADMIN — ATORVASTATIN CALCIUM 40 MG: 40 TABLET, FILM COATED ORAL at 17:23

## 2020-11-18 RX ADMIN — SODIUM CHLORIDE, PRESERVATIVE FREE 10 ML: 5 INJECTION INTRAVENOUS at 20:06

## 2020-11-18 RX ADMIN — SODIUM CHLORIDE: 9 INJECTION, SOLUTION INTRAVENOUS at 17:03

## 2020-11-18 RX ADMIN — INSULIN LISPRO 4 UNITS: 100 INJECTION, SOLUTION INTRAVENOUS; SUBCUTANEOUS at 23:21

## 2020-11-18 RX ADMIN — EPTIFIBATIDE 17960 MCG: 2 INJECTION INTRAVENOUS at 16:08

## 2020-11-18 ASSESSMENT — PAIN SCALES - GENERAL
PAINLEVEL_OUTOF10: 0

## 2020-11-18 NOTE — H&P
Subjective:      History of Present Illness:     Juni Diaz is a 54 y.o. patient who seeing us today in follow up CAD-multivessel, ischemic cardiomyopathy, VT s/p AICD, anemia, HTN, HLD and DM. Patient had complicated course with NSTEMI, VT multi vessel PCI. He underwent heart catheterization on 10/3/18 resulting in CECE to LCX. He underwent repeat angiography with Dr. Veronique Miranda on 10/7/19 resulting in CECE to [address]. He did have to be placed on balloon pump for hemodynamic support. Due to continued episodes of sustained VT, Dr. Laurent Beaulieu placed MRI compatible Medtronic AICD. He presents today for follow up.      Today, he is here for follow up. He is currently on oxygen therapy due to having Covid in June. He is following with Dr Gabbie Mack and currently undergoing testing to see if the need for oxygen will be long term. He does have occasional chest tightness. Patient denies dyspnea at rest, HINOJOSA, PND, orthopnea, palpitations, lightheadedness, weight changes, changes in LE edema, and syncope.  Patient reports compliance to his medications.      Past Medical History:   has a past medical history of Arthritis, Blood circulation, collateral, CAD (coronary artery disease), CAD (coronary artery disease), Cardiac arrest ([address] Cerebral artery occlusion with cerebral infarction ([address] Diabetes mellitus ([address] Diabetic peripheral neuropathy ([address] GERD (gastroesophageal reflux disease), Hypercholesteremia, Hyperlipidemia, Hypertension, Movement disorder, MRSA (methicillin resistant staph aureus) culture positive, Neuropathy, Other disorders of kidney and ureter in diseases classified elsewhere, POTS (postural orthostatic tachycardia syndrome), Psychiatric problem, Sleep apnea, Syncope, and Type II or unspecified type diabetes mellitus without mention of complication, not stated as uncontrolled.     Surgical History:   has a past surgical history that includes Vasectomy; Cardiac catheterization; Finger surgery (Left); hernia repair; vascular surgery; Colonoscopy; joint replacement; Coronary angioplasty with stent (10/06/2018); Coronary angioplasty with stent (10/07/2018); and Coronary angioplasty with stent (10/03/2018).    Social History:   reports that he has quit smoking. His smoking use included cigarettes and cigars. He has a 24.00 pack-year smoking history. He has never used smokeless tobacco. He reports that he does not drink alcohol or use drugs.      Family History:  family history includes COPD in his mother; Cancer in his father and sister; Diabetes in his brother and sister; Heart Disease in his brother, father, and mother; High Blood Pressure in his mother; Stroke in his mother.     Home Medications:  Were reviewed and are listed in nursing record and/or below  Home Medications           Prior to Admission medications    Medication Sig Start Date End Date Taking?  Authorizing Provider   Insulin Aspart (NOVOLOG SC) Inject into the skin     Yes Historical Provider, MD   sodium chloride (OCEAN, BABY AYR) 0.65 % nasal spray 1 spray by Nasal route as needed for Congestion 7/9/20   Yes Guerrero Cortes MD   prasugrel (EFFIENT) 10 MG TABS Take 1 tablet by mouth once for 1 dose  Patient taking differently: Take 10 mg by mouth daily  1/24/20 9/28/20 Yes Gennaro Zhang MD   atorvastatin (LIPITOR) 40 MG tablet TAKE 1 TABLET BY MOUTH IN THE EVENING  3/19/19   Yes Gennaro Zhang MD   aspirin 81 MG chewable tablet Take 81 mg by mouth daily     Yes Historical Provider, MD   Cholecalciferol (VITAMIN D3) 21254 units CAPS Take 1 capsule by mouth once a week      Yes Historical Provider, MD   Dulaglutide (TRULICITY) 1.5 PA/2.5KI SOPN Inject 1.5 mg into the skin once a week      Yes Historical Provider, MD   insulin detemir (LEVEMIR FLEXTOUCH) 100 UNIT/ML injection pen Inject 30 Units into the skin nightly  Patient taking differently: Inject 50 Units into the skin nightly  8/23/18   Yes Karmen Naqvi,          Allergies:  No known allergies    Review of Systems: all reviewed and refer to HPI   · Constitutional: no unanticipated weight loss. There's been no change in energy level, sleep pattern, or activity level. No fevers, chills. · Eyes: No visual changes or diplopia. No scleral icterus. · ENT: No Headaches, hearing loss or vertigo. No mouth sores or sore throat. · Cardiovascular: No Chest pain, tightness or discomfort. · No Shortness of breath. · No Palpitations. · No Syncope ('blackouts', 'faints', 'collapse') or dizziness. · No Dyspnea on exertion, Orthopnea, Paroxysmal nocturnal dyspnea or breathlessness at rest.   · No Wheezing, sweating, distress and cough with frothy or bloodstained sputum. · Respiratory: No cough or wheezing, no sputum production. No hematemesis. · Gastrointestinal: No abdominal pain, appetite loss, blood in stools. No change in bowel or bladder habits. · Genitourinary: No dysuria, trouble voiding, or hematuria. · Musculoskeletal:  No gait disturbance, no joint complaints. · Integumentary: No rash or pruritis. · Neurological: No headache, diplopia, change in muscle strength, numbness or tingling. · Psychiatric: No anxiety or depression. · Endocrine: No temperature intolerance. No excessive thirst, fluid intake, or urination. No tremor. · Hematologic/Lymphatic: No abnormal bruising or bleeding, blood clots or swollen lymph nodes. · Allergic/Immunologic: No nasal congestion or hives.     Objective:      PHYSICAL EXAM:       Vitals        Vitals:     09/28/20 0843 09/28/20 0845   BP: (!) 96/58 96/68   Pulse: 107     Temp: 97.5 °F (36.4 °C)     SpO2: 100%     Weight: 225 lb (102.1 kg)     Height: 6' 2\" (1.88 m)           Weight: 225 lb (102.1 kg)       General Appearance:  Alert, cooperative, no distress, appears stated age. Head:  Normocephalic, without obvious abnormality, atraumatic. Eyes:  Pupils equal and round. No scleral icterus. Mouth: Moist mucosa, no pharyngeal erythema.    Nose: Nares normal. No drainage or sinus tenderness. Neck: Supple, symmetrical, trachea midline. No adenopathy. No tenderness/mass/nodules. No carotid bruit or elevated JVD. Lungs:   Respiratory Effort: Normal   Auscultation: Clear to auscultation bilaterally, respirations unlabored. No wheeze, rales   Chest Wall:  No tenderness or deformity. Cardiovascular:     Pulses  Palpation: normal   Ascultation: Regular rate, S1/ S2 normal. No murmur, rub, or gallop. 2+ radial and pedal pulses, symmetric  Carotid  Femoral   Abdomen and Gastrointestinal:   Soft, non-tender, bowel sounds active. Liver and Spleen  Masses   Musculoskeletal: No muscle wasting  Back  Gait   Extremities: Extremities normal, atraumatic. No cyanosis or edema. No cyanosis clubbing         Skin: Inspection and palpation performed, no rashes or lesions. Pysch: Normal mood and affect.  Alert and oriented to time place person   Neurologic: Normal gross motor and sensory exam.       Labs      All available labs reviewed to date:            Lab Results   Component Value Date     WBC 11.6 07/09/2020     RBC 5.58 07/09/2020     HGB 15.6 07/09/2020     HCT 47.5 07/09/2020     MCV 85.0 07/09/2020     RDW 13.2 07/09/2020      07/09/2020            Lab Results   Component Value Date      07/09/2020     K 4.2 07/09/2020     K 5.2 07/01/2020     CL 95 07/09/2020     CO2 28 07/09/2020     BUN 18 07/09/2020     CREATININE <0.5 07/09/2020     GFRAA >60 07/09/2020     GFRAA >60 02/10/2013     AGRATIO 0.8 06/30/2020     LABGLOM >60 07/09/2020     GLUCOSE 221 07/09/2020     PROT 5.6 07/05/2020     PROT 8.3 11/12/2012     CALCIUM 8.3 07/09/2020     BILITOT 0.4 07/05/2020     ALKPHOS 66 07/05/2020     AST 28 07/05/2020     ALT 16 07/05/2020       No results found for: PTINR        Lab Results   Component Value Date     LABA1C 12.8 11/13/2019            Lab Results   Component Value Date     TROPONINI <0.01 07/01/2020         Cardiac, Vascular and Imaging Data all Personally Reviewed in Detail by Myself       EKG:   10/31/18 SR, reviewed   2/7/19 SR, 99 bpm T wave inversion      Echocardiogram: 10/8/18  Summary  Left ventricular cavity size is mildly dilated. Normal left ventricular wall thickness. Ejection fraction is visually estimated to be 45%. There is moderate to severe inferior hypokinesis. Normal right ventricular size and function.     Stress Test: 10/9/17   Summary    Pharmacological Stress/MPI Results:        1. Technically a satisfactory study.    2. Normal pharmacological stress portion of the study.    3. No evidence of Ischemia by Myocardial Perfusion Imaging.    4. Gated Study shows normal LV size and Systolic function; EF is 61 %.      Cath:   10/3/18   ANGIOGRAPHIC FINDINGS:  1.  Left main is normal.  LYNNE 3 flow.  No stenosis. 2.  Left anterior descending artery comes from the left main coronary  artery.  LYNNE 3 flow.  No stenosis present with patent diagonal branches. 3.  Left circumflex is occluded in the mid portion. 4.  Right coronary comes from the right coronary cusp.  It has a PDA which  has 80% stenosis present.  The patient also has an obtuse marginal 1 which  as 80% stenosis present.   In summary, successful revascularization with stent placement in the  circumflex artery.  This was 2.5 x 38 mm, expanded up to 2.8 mm.  This was  a drug-coated Synergy stent     10/7/18 Dr. Alan May stent patent; PCI of RPDA and PCI OM1, IABP placement     Imaging: All available imaging to date reviewed      ECHO:8/14/19  Suboptimal image quality. Definity contrast administered.     Left ventricular cavity size is normal. Normal left ventricular wall  thickness. Overall left ventricular systolic function appears mildly  reduced. Ejection fraction is visually estimated to be 50-55%. No regional  wall motion abnormalities are noted. Diastolic filling parameters suggest  grade I diastolic dysfunction. Mitral valve leaflets appear mildly thickened.   No benefits and the details of procedure and would like to go ahead with the procedure. The patient gave informed consent.       Neurogenic Syncope  Long standing hx of falls  + TTT 2005Unable to tolerate BB and ACEI due to hypotension and recurrent syncope. Follows with Dr Trevor Luna.      Hyperlipidemia, mixed   Continue high intensity statin therapy.      DM, II  Levamir, trulicity      Follow up in 6 months      Thank you for allowing us to participate in the care of Juni King.   Please do not hesitate to contact me if you have any questions.     Quyen Castle MD, MPH

## 2020-11-18 NOTE — PROGRESS NOTES
Pt up from Cath Lab. VSS at this time. Post Cath protocol started at this time. Radial Band in place without complications. Will continue to monitor.

## 2020-11-19 ENCOUNTER — APPOINTMENT (OUTPATIENT)
Dept: CARDIAC REHAB | Age: 55
End: 2020-11-19
Payer: COMMERCIAL

## 2020-11-19 VITALS
SYSTOLIC BLOOD PRESSURE: 124 MMHG | BODY MASS INDEX: 28.27 KG/M2 | OXYGEN SATURATION: 96 % | WEIGHT: 220.24 LBS | RESPIRATION RATE: 16 BRPM | TEMPERATURE: 97.9 F | HEIGHT: 74 IN | HEART RATE: 96 BPM | DIASTOLIC BLOOD PRESSURE: 76 MMHG

## 2020-11-19 LAB
ANION GAP SERPL CALCULATED.3IONS-SCNC: 7 MMOL/L (ref 3–16)
BUN BLDV-MCNC: 15 MG/DL (ref 7–20)
CALCIUM SERPL-MCNC: 9.1 MG/DL (ref 8.3–10.6)
CHLORIDE BLD-SCNC: 103 MMOL/L (ref 99–110)
CO2: 29 MMOL/L (ref 21–32)
CREAT SERPL-MCNC: 0.8 MG/DL (ref 0.9–1.3)
GFR AFRICAN AMERICAN: >60
GFR NON-AFRICAN AMERICAN: >60
GLUCOSE BLD-MCNC: 120 MG/DL (ref 70–99)
GLUCOSE BLD-MCNC: 143 MG/DL (ref 70–99)
GLUCOSE BLD-MCNC: 227 MG/DL (ref 70–99)
PERFORMED ON: ABNORMAL
PERFORMED ON: ABNORMAL
POTASSIUM SERPL-SCNC: 4.5 MMOL/L (ref 3.5–5.1)
SODIUM BLD-SCNC: 139 MMOL/L (ref 136–145)

## 2020-11-19 PROCEDURE — G0378 HOSPITAL OBSERVATION PER HR: HCPCS

## 2020-11-19 PROCEDURE — 2580000003 HC RX 258: Performed by: INTERNAL MEDICINE

## 2020-11-19 PROCEDURE — 36415 COLL VENOUS BLD VENIPUNCTURE: CPT

## 2020-11-19 PROCEDURE — 80048 BASIC METABOLIC PNL TOTAL CA: CPT

## 2020-11-19 PROCEDURE — 94760 N-INVAS EAR/PLS OXIMETRY 1: CPT

## 2020-11-19 PROCEDURE — 99217 PR OBSERVATION CARE DISCHARGE MANAGEMENT: CPT | Performed by: NURSE PRACTITIONER

## 2020-11-19 PROCEDURE — 6370000000 HC RX 637 (ALT 250 FOR IP): Performed by: INTERNAL MEDICINE

## 2020-11-19 RX ORDER — PRASUGREL 10 MG/1
10 TABLET, FILM COATED ORAL DAILY
Qty: 30 TABLET | Refills: 3 | Status: SHIPPED | OUTPATIENT
Start: 2020-11-19 | End: 2021-03-29

## 2020-11-19 RX ORDER — BUPROPION HYDROCHLORIDE 150 MG/1
150 TABLET ORAL EVERY MORNING
COMMUNITY
End: 2021-06-18

## 2020-11-19 RX ADMIN — SODIUM CHLORIDE, PRESERVATIVE FREE 10 ML: 5 INJECTION INTRAVENOUS at 09:10

## 2020-11-19 RX ADMIN — ASPIRIN 81 MG: 81 TABLET, CHEWABLE ORAL at 09:08

## 2020-11-19 RX ADMIN — ATORVASTATIN CALCIUM 40 MG: 40 TABLET, FILM COATED ORAL at 09:08

## 2020-11-19 RX ADMIN — PRASUGREL 10 MG: 10 TABLET, FILM COATED ORAL at 09:07

## 2020-11-19 ASSESSMENT — PAIN SCALES - GENERAL
PAINLEVEL_OUTOF10: 0

## 2020-11-19 NOTE — CARE COORDINATION
DISCHARGE PLAN: pt plans to return home with his spouse upon d/c. No d/c needs noted. ___________________________________  Julisa Guerrero w/pt to address barriers to dc. HOME: pt reported that he resides in a single family home with his spouse. [address]. No difficulty with the steps. Disease Specific: CAD S/P percutaneous coronary angioplasty     DME/O2: Pt stated that he was using O2 until last Friday when his MD stated that he no longer needed it. Pt stated that he also has a shower chair at home that he no longer uses or needs. ACTIVE SERVICES: Pt reported that he has no active services at home. Pt reported that he was independent with all self care TPA and velásquez snot anticipate the need for assistance upon d/c. TRANSPORTATION: pt reported that he does not drive, that his spouse transports him wherever he needs to go. PHARMACY: denies difficulty obtaining/taking meds. Pt has all meds filled at [facility]. PCP: Doug Madrid. Pt stated that he last saw his APRN 2 weeks ago. DEMOGRAPHICS: verified address/phone number as correct    INSURANCE: [address] [place] [address] Drive and [facility]    HD/PD: No    THERAPY RECS Not ordered    Discharge planning team will remain available for needs. Please consult for any specifics not addressed in this note.     [place], [place]  [phone]  Electronically signed by Luis Enrique Ceja on 11/19/2020 at 10:10 AM

## 2020-11-19 NOTE — DISCHARGE SUMMARY
intensity reversible defect that may suggest     ischemia involving the basal infero lateral wall.     4. Gated Study shows normal LV size and Systolic function; EF is 70 %.       10/26/2020 Echo:   Normal LV size wall thickness with basal inferior akinesis; EF   50-55%   Grade I diastolic dysfunction with normal LV filling pressures.   The left atrium is normal in size.   Normal right ventricular size and function.     11/18/2020 Cardiac cath/PCI:  ANGIOGRAPHIC FINDINGS:  1.  Left main normal.  LYNNE 3 flow.  No stenosis. 2.  Left anterior descending artery, has LYNNE 3 flow with 20% stenosis. 3.  Left circumflex has distally LYNNE 2 flow but no significant  stenosis, patent stents. 4.  Right coronary artery in the mid portion has 90% stenosis present  with unstable plaque.  LV ejection fraction 60%.    In summary, successful revascularization of the right coronary artery  and placement of stent, 4.0 x 26 mm.         Labs:   Lab Results   Component Value Date    CREATININE 0.8 (L) 11/19/2020    BUN 15 11/19/2020     11/19/2020    K 4.5 11/19/2020     11/19/2020    CO2 29 11/19/2020      Lab Results   Component Value Date    WBC 9.2 11/12/2020    HGB 16.0 11/12/2020    HCT 47.8 11/12/2020    MCV 81.2 11/12/2020     11/12/2020      Lab Results   Component Value Date    INR 1.22 (H) 10/03/2018    PROTIME 13.9 (H) 10/03/2018    No results found for: BNP    Disposition: home    Patient Instructions:    Nitza Morse   Home Medication Instructions OSM:[id number]    Printed on:11/19/20 9743   Medication Information                      aspirin 81 MG chewable tablet  Take 81 mg by mouth daily             atorvastatin (LIPITOR) 40 MG tablet  TAKE 1 TABLET BY MOUTH IN THE EVENING              buPROPion (WELLBUTRIN XL) 150 MG extended release tablet  Take 150 mg by mouth 2 times daily             Cholecalciferol (VITAMIN D3) 61449 units CAPS  Take 1 capsule by mouth once a week

## 2020-11-19 NOTE — PROCEDURES
[address] [address]                            CARDIAC CATHETERIZATION    PATIENT NAME: Gera Teague                    :        1965  MED REC NO:   [id number]                          ROOM:       5118  ACCOUNT NO:   [de-identified]                           ADMIT DATE: 2020  PROVIDER:     Virginia Schuster MD    DATE OF PROCEDURE:  2020    PROCEDURES PERFORMED:  1. Left heart catheterization. 2.  Percutaneous coronary intervention of the right coronary artery and  insertion of one stent, 4.0 x 26 mm expanding up to 4.5 mm with  intravascular ultrasound support. INDICATION:  Unstable angina. Informed consent obtained. ASA is II. Mallampati score of II. PROCEDURE IN DETAIL:  The patient brought to cardiac cath laboratory. Right radial prepped and draped in sterile fashion. Accessed left  radial artery, inserted a JL3.5 diagnostic catheter, used to select and  engage and left main coronary artery ostium, performed angiography of  the left system. JL removed. JR4 advanced, used to select and engage  the right coronary artery ostium, performed angiography of the right  system. JR4 removed. Pigtail advanced across the aortic valve into LV  cavity, performed LV gram and LV hemodynamics. Pigtail removed. JR4  guide catheter advanced. Performed selective angiography and noted  there was no reflow in the right coronary artery. _____, we quickly  advanced a coronary guidewire, Runthrough, placed in the distal right  coronary artery. Performed balloon angioplasty with a 2.5 mm balloon  cath and gave intraarterial nitroglycerin. Balloon cath removed and  intravascular ultrasound performed. Then we advanced a stent 4.0 x 26  mm drug-coated Resolute, deployed, and the stent was postdilated with a  4.5 mm noncompliant balloon. After that, final angiography performed.    Guide catheter and wires removed. Sheath removed. Radial band applied  to achieve hemostasis. No complications. Estimated blood loss less  than 20 mL. ANGIOGRAPHIC FINDINGS:  1. Left main normal.  LYNNE 3 flow. No stenosis. 2.  Left anterior descending artery, has LYNNE 3 flow with 20% stenosis. 3.  Left circumflex has distally LYNNE 2 flow but no significant  stenosis, patent stents. 4.  Right coronary artery in the mid portion has 90% stenosis present  with unstable plaque. LV ejection fraction 60%. In summary, successful revascularization of the right coronary artery  and placement of stent, 4.0 x 26 mm. RECOMMENDATIONS:  1. Continue postop post cath care. 2.  Site care. 3.  Risk factor modification. 4.  Dual antiplatelet therapy. 5.  Patient can be discharged tomorrow. Follow up with me in the  office.         Tish Carcamo MD    D: 11/18/2020 16:39:41       T: 11/18/2020 19:00:29     [id number]  Job#: [id number]     Doc#: [id number]    CC:

## 2020-11-19 NOTE — PROGRESS NOTES
[facility] 81   Daily Progress Note      Admit Date:  11/18/2020    Reason for follow up visit:  CAD; PCI    CC: \"I feel okay this AM.\"    53 y/o  Male with PMH significant for CAD/multivessel, ischemic cardiomyopathy, VT  And S/P AICD, HTN, HLP, anemia and diabetes mellitus who was admitted for cardiac catheterization d/t recent VT, chest pain and an abnormal Lexiscan-Myoview. He has had a rather complicated CAD course: He underwent heart catheterization on 10/3/18 resulting in CECE to LCX. He underwent repeat angiography with Dr. Crista Monae on 10/7/19 resulting in CECE to [address]. He did have to be placed on balloon pump for hemodynamic support. Due to continued episodes of sustained VT, Dr. Mei Wick placed MRI compatible Med[employer]onic AICD. He is O2 therapy at home and follows with Dr. Kervin Vargas. On 11/18/2020 he was admitted for coronary angiogram and PCI. He underwent stent to RCA. POst procedure he has been feeling well and is ready for discharge today. Interval History:  Pt. seen and examined; records reviewed  BP stable. Up in room without complaints  Denies chest pain or SOB this AM    Subjective:  Pt with no acute overnight cardiac events. Denies chest pain, SOB, cough, PND, palpitations or dizziness    Review of Systems:   · Constitutional: no unanticipated weight loss. There's been no change in energy level, sleep pattern, or activity level. No fevers, chills. · Eyes: No visual changes or diplopia. No scleral icterus. · ENT: No Headaches, hearing loss or vertigo. No mouth sores or sore throat. · Cardiovascular: as reviewed in HPI  · Respiratory: No cough or wheezing, no sputum production. No hematemesis. · Gastrointestinal: No abdominal pain, appetite loss, blood in stools. No change in bowel or bladder habits. · Genitourinary: No dysuria, trouble voiding, or hematuria. · Musculoskeletal:  No gait disturbance, no joint complaints.   · Integumentary: No rash or pruritis. · Neurological: No headache, diplopia, change in muscle strength, numbness or tingling. + recurrent syncope (longstanding issue)  · Psychiatric: No anxiety or depression. · Endocrine: No temperature intolerance. No excessive thirst, fluid intake, or urination. No tremor. · Hematologic/Lymphatic: No abnormal bruising or bleeding, blood clots or swollen lymph nodes. · Allergic/Immunologic: No nasal congestion or hives. Objective:   /80   Pulse 92   Temp 97.6 °F (36.4 °C) (Oral)   Resp 16   Ht 6' 2\" (1.88 m)   Wt 220 lb 3.8 oz (99.9 kg)   SpO2 98%   BMI 28.28 kg/m²       Intake/Output Summary (Last 24 hours) at 11/19/2020 0934  Last data filed at 11/19/2020 0600  Gross per 24 hour   Intake --   Output 600 ml   Net -600 ml     Wt Readings from Last 3 Encounters:   11/19/20 220 lb 3.8 oz (99.9 kg)   11/13/20 225 lb 6.4 oz (102.2 kg)   09/28/20 225 lb (102.1 kg)       Physical Exam:  General: In no acute distress. Awake, alert, and oriented X4. Resting in bed  Skin:  Warm and dry. No new appearing rashes or lesions. Neck:  Supple. No JVD or carotid bruit appreciated. Chest: Lungs clear to auscultation. No wheezes/rhonchi/rales  Cardiovascular:  RRR. Normal S1 and S2; no murmur/gallop or rub. Abdomen:  soft, nontender, nondistended, +bowel sounds. No hepatomegaly  Extremities:  No LE edema. No clubbing or cyanosis. 2+ bilateral radial/DP/PT pulses. R radial site unremarkable.  Cap refill brisk    Medications:    aspirin  325 mg Oral Once    sodium chloride flush  10 mL Intravenous 2 times per day    atorvastatin  40 mg Oral Daily    sodium chloride flush  10 mL Intravenous 2 times per day    aspirin  81 mg Oral Daily    prasugrel  10 mg Oral Daily    insulin lispro  0-6 Units Subcutaneous BID WC    insulin glargine  30 Units Subcutaneous Nightly      sodium chloride      sodium chloride Stopped (11/19/20 4995)    dextrose       sodium chloride flush, sodium chloride flush, acetaminophen, ondansetron, glucose, dextrose, glucagon (rDNA), dextrose    Lab Data:  CBC: No results for input(s): WBC, HGB, PLT in the last 72 hours. BMP:    Recent Labs     11/19/20  0532      K 4.5   CO2 29   BUN 15   CREATININE 0.8*     ECG:  Sinus rhythm    10/26/2020 Lexiscan-Myoview:  Pharmacological Stress/MPI Results:          1. Technically an equivocal study because of movement and adjacent bowel     uptake.     2. Normal pharmacological stress portion of the study.     3. Moderate size moderate intensity reversible defect that may suggest     ischemia involving the basal infero lateral wall.     4. Gated Study shows normal LV size and Systolic function; EF is 70 %. 10/26/2020 Echo:   Normal LV size wall thickness with basal inferior akinesis; EF   50-55%   Grade I diastolic dysfunction with normal LV filling pressures. The left atrium is normal in size. Normal right ventricular size and function. 11/18/2020 Cardiac cath/PCI:  ANGIOGRAPHIC FINDINGS:  1. Left main normal.  LYNNE 3 flow. No stenosis. 2.  Left anterior descending artery, has LYNNE 3 flow with 20% stenosis. 3.  Left circumflex has distally LYNNE 2 flow but no significant  stenosis, patent stents. 4.  Right coronary artery in the mid portion has 90% stenosis present  with unstable plaque. LV ejection fraction 60%.    In summary, successful revascularization of the right coronary artery  and placement of stent, 4.0 x 26 mm. Telemetry: Sinus rhythm    Assessment/Plan:    1. Coronary artery disease with recent abnormal stress test  -S/P CECE to RCA on 11/18/2020  -no recurrent angina this AM  -prior STEMI and multivessel PCI of LCX, OM1 and RPDA  -continue DAPT, statin  -not on BB d/t recurrent syncope/hypotension    2. S/P ICD  -recurrent VT  -follows with Itz Gilman    3. Neurogenic syncope  -follows with Dr. Apple Sims    4. Mixed hyperlipidemia  -goal LDL < 70  -on high intensity statin    5.  Type II diabetes mellitus  -Rx per PCP    Discharge home today    Follow up with cardiology: D. Enzweiler, CNP on 12/3/2020 @ 11:40 AM    Post angiogram/PCI instructions discussed. OK for him to return to pulmonary rehab on Tuesday, November 24th.     Discharge Meds:  See discharge summary  Electronically signed by LAURA Calderon CNP on 11/19/2020 at 9:34 AM

## 2020-11-19 NOTE — PROGRESS NOTES
Post procedure site check completed. Procedure site is within normal limits and appears to be free of complications. All concerns were reviewed and questions answered. Patient verbalized understanding.

## 2020-11-21 ENCOUNTER — HOSPITAL ENCOUNTER (OUTPATIENT)
Age: 55
Setting detail: OBSERVATION
Discharge: HOME | End: 2020-11-21
Attending: EMERGENCY MEDICINE | Admitting: STUDENT IN AN ORGANIZED HEALTH CARE EDUCATION/TRAINING PROGRAM
Payer: COMMERCIAL

## 2020-11-21 ENCOUNTER — APPOINTMENT (OUTPATIENT)
Dept: GENERAL RADIOLOGY | Age: 55
End: 2020-11-21
Payer: COMMERCIAL

## 2020-11-21 VITALS
SYSTOLIC BLOOD PRESSURE: 132 MMHG | HEART RATE: 100 BPM | WEIGHT: 226.41 LBS | OXYGEN SATURATION: 96 % | DIASTOLIC BLOOD PRESSURE: 90 MMHG | RESPIRATION RATE: 15 BRPM | TEMPERATURE: 97.9 F | BODY MASS INDEX: 29.06 KG/M2 | HEIGHT: 74 IN

## 2020-11-21 LAB
A/G RATIO: 1.3 (ref 1.1–2.2)
ALBUMIN SERPL-MCNC: 4 G/DL (ref 3.4–5)
ALP BLD-CCNC: 75 U/L (ref 40–129)
ALT SERPL-CCNC: 21 U/L (ref 10–40)
ANION GAP SERPL CALCULATED.3IONS-SCNC: 11 MMOL/L (ref 3–16)
AST SERPL-CCNC: 14 U/L (ref 15–37)
BASOPHILS ABSOLUTE: 0.1 K/UL (ref 0–0.2)
BASOPHILS RELATIVE PERCENT: 0.7 %
BILIRUB SERPL-MCNC: 0.4 MG/DL (ref 0–1)
BUN BLDV-MCNC: 15 MG/DL (ref 7–20)
CALCIUM SERPL-MCNC: 9.4 MG/DL (ref 8.3–10.6)
CHLORIDE BLD-SCNC: 101 MMOL/L (ref 99–110)
CO2: 26 MMOL/L (ref 21–32)
CREAT SERPL-MCNC: 0.7 MG/DL (ref 0.9–1.3)
EKG ATRIAL RATE: 98 BPM
EKG DIAGNOSIS: NORMAL
EKG P AXIS: 25 DEGREES
EKG P-R INTERVAL: 170 MS
EKG Q-T INTERVAL: 354 MS
EKG QRS DURATION: 90 MS
EKG QTC CALCULATION (BAZETT): 451 MS
EKG R AXIS: 1 DEGREES
EKG T AXIS: 42 DEGREES
EKG VENTRICULAR RATE: 98 BPM
EOSINOPHILS ABSOLUTE: 0.2 K/UL (ref 0–0.6)
EOSINOPHILS RELATIVE PERCENT: 2.1 %
GFR AFRICAN AMERICAN: >60
GFR NON-AFRICAN AMERICAN: >60
GLOBULIN: 3 G/DL
GLUCOSE BLD-MCNC: 298 MG/DL (ref 70–99)
HCT VFR BLD CALC: 43.9 % (ref 40.5–52.5)
HEMOGLOBIN: 15.2 G/DL (ref 13.5–17.5)
LIPASE: 56 U/L (ref 13–60)
LYMPHOCYTES ABSOLUTE: 3.4 K/UL (ref 1–5.1)
LYMPHOCYTES RELATIVE PERCENT: 39.5 %
MCH RBC QN AUTO: 27.5 PG (ref 26–34)
MCHC RBC AUTO-ENTMCNC: 34.7 G/DL (ref 31–36)
MCV RBC AUTO: 79.4 FL (ref 80–100)
MONOCYTES ABSOLUTE: 0.8 K/UL (ref 0–1.3)
MONOCYTES RELATIVE PERCENT: 8.7 %
NEUTROPHILS ABSOLUTE: 4.3 K/UL (ref 1.7–7.7)
NEUTROPHILS RELATIVE PERCENT: 49 %
PDW BLD-RTO: 13.2 % (ref 12.4–15.4)
PLATELET # BLD: 171 K/UL (ref 135–450)
PMV BLD AUTO: 8.5 FL (ref 5–10.5)
POTASSIUM SERPL-SCNC: 4.2 MMOL/L (ref 3.5–5.1)
PRO-BNP: 72 PG/ML (ref 0–124)
RBC # BLD: 5.53 M/UL (ref 4.2–5.9)
SODIUM BLD-SCNC: 138 MMOL/L (ref 136–145)
TOTAL PROTEIN: 7 G/DL (ref 6.4–8.2)
TROPONIN: 0.03 NG/ML
TROPONIN: 0.04 NG/ML
TROPONIN: 0.05 NG/ML
WBC # BLD: 8.7 K/UL (ref 4–11)

## 2020-11-21 PROCEDURE — 36415 COLL VENOUS BLD VENIPUNCTURE: CPT

## 2020-11-21 PROCEDURE — G0378 HOSPITAL OBSERVATION PER HR: HCPCS

## 2020-11-21 PROCEDURE — 71045 X-RAY EXAM CHEST 1 VIEW: CPT

## 2020-11-21 PROCEDURE — 99284 EMERGENCY DEPT VISIT MOD MDM: CPT

## 2020-11-21 PROCEDURE — 83690 ASSAY OF LIPASE: CPT

## 2020-11-21 PROCEDURE — 99244 OFF/OP CNSLTJ NEW/EST MOD 40: CPT | Performed by: INTERNAL MEDICINE

## 2020-11-21 PROCEDURE — 96372 THER/PROPH/DIAG INJ SC/IM: CPT

## 2020-11-21 PROCEDURE — 6370000000 HC RX 637 (ALT 250 FOR IP): Performed by: INTERNAL MEDICINE

## 2020-11-21 PROCEDURE — 84484 ASSAY OF TROPONIN QUANT: CPT

## 2020-11-21 PROCEDURE — 93005 ELECTROCARDIOGRAM TRACING: CPT | Performed by: EMERGENCY MEDICINE

## 2020-11-21 PROCEDURE — 93010 ELECTROCARDIOGRAM REPORT: CPT | Performed by: INTERNAL MEDICINE

## 2020-11-21 PROCEDURE — 94760 N-INVAS EAR/PLS OXIMETRY 1: CPT

## 2020-11-21 PROCEDURE — 6370000000 HC RX 637 (ALT 250 FOR IP): Performed by: EMERGENCY MEDICINE

## 2020-11-21 PROCEDURE — 83880 ASSAY OF NATRIURETIC PEPTIDE: CPT

## 2020-11-21 PROCEDURE — 85025 COMPLETE CBC W/AUTO DIFF WBC: CPT

## 2020-11-21 PROCEDURE — 80053 COMPREHEN METABOLIC PANEL: CPT

## 2020-11-21 RX ORDER — SODIUM CHLORIDE 0.9 % (FLUSH) 0.9 %
10 SYRINGE (ML) INJECTION EVERY 12 HOURS SCHEDULED
Status: DISCONTINUED | OUTPATIENT
Start: 2020-11-21 | End: 2020-11-21 | Stop reason: HOSPADM

## 2020-11-21 RX ORDER — PRASUGREL 10 MG/1
10 TABLET, FILM COATED ORAL DAILY
Status: DISCONTINUED | OUTPATIENT
Start: 2020-11-21 | End: 2020-11-21 | Stop reason: HOSPADM

## 2020-11-21 RX ORDER — ATORVASTATIN CALCIUM 40 MG/1
40 TABLET, FILM COATED ORAL NIGHTLY
Status: DISCONTINUED | OUTPATIENT
Start: 2020-11-21 | End: 2020-11-21 | Stop reason: HOSPADM

## 2020-11-21 RX ORDER — DEXTROSE MONOHYDRATE 50 MG/ML
100 INJECTION, SOLUTION INTRAVENOUS PRN
Status: DISCONTINUED | OUTPATIENT
Start: 2020-11-21 | End: 2020-11-21 | Stop reason: HOSPADM

## 2020-11-21 RX ORDER — ASPIRIN 81 MG/1
81 TABLET, CHEWABLE ORAL DAILY
Status: DISCONTINUED | OUTPATIENT
Start: 2020-11-22 | End: 2020-11-21 | Stop reason: HOSPADM

## 2020-11-21 RX ORDER — ACETAMINOPHEN 650 MG/1
650 SUPPOSITORY RECTAL EVERY 6 HOURS PRN
Status: DISCONTINUED | OUTPATIENT
Start: 2020-11-21 | End: 2020-11-21 | Stop reason: HOSPADM

## 2020-11-21 RX ORDER — DEXTROSE MONOHYDRATE 25 G/50ML
12.5 INJECTION, SOLUTION INTRAVENOUS PRN
Status: DISCONTINUED | OUTPATIENT
Start: 2020-11-21 | End: 2020-11-21 | Stop reason: HOSPADM

## 2020-11-21 RX ORDER — ASPIRIN 325 MG
325 TABLET ORAL ONCE
Status: COMPLETED | OUTPATIENT
Start: 2020-11-21 | End: 2020-11-21

## 2020-11-21 RX ORDER — NICOTINE POLACRILEX 4 MG
15 LOZENGE BUCCAL PRN
Status: DISCONTINUED | OUTPATIENT
Start: 2020-11-21 | End: 2020-11-21 | Stop reason: HOSPADM

## 2020-11-21 RX ORDER — ONDANSETRON 2 MG/ML
4 INJECTION INTRAMUSCULAR; INTRAVENOUS EVERY 6 HOURS PRN
Status: DISCONTINUED | OUTPATIENT
Start: 2020-11-21 | End: 2020-11-21 | Stop reason: HOSPADM

## 2020-11-21 RX ORDER — ACETAMINOPHEN 325 MG/1
650 TABLET ORAL EVERY 6 HOURS PRN
Status: DISCONTINUED | OUTPATIENT
Start: 2020-11-21 | End: 2020-11-21 | Stop reason: HOSPADM

## 2020-11-21 RX ORDER — SODIUM CHLORIDE 0.9 % (FLUSH) 0.9 %
10 SYRINGE (ML) INJECTION PRN
Status: DISCONTINUED | OUTPATIENT
Start: 2020-11-21 | End: 2020-11-21 | Stop reason: HOSPADM

## 2020-11-21 RX ADMIN — PRASUGREL 10 MG: 10 TABLET, FILM COATED ORAL at 11:31

## 2020-11-21 RX ADMIN — ASPIRIN 325 MG ORAL TABLET 325 MG: 325 PILL ORAL at 00:48

## 2020-11-21 ASSESSMENT — PAIN - FUNCTIONAL ASSESSMENT: PAIN_FUNCTIONAL_ASSESSMENT: ACTIVITIES ARE NOT PREVENTED

## 2020-11-21 ASSESSMENT — ENCOUNTER SYMPTOMS
VOMITING: 0
COLOR CHANGE: 0
PHOTOPHOBIA: 0
BACK PAIN: 0
NAUSEA: 0
WHEEZING: 0
SHORTNESS OF BREATH: 0
RHINORRHEA: 0

## 2020-11-21 ASSESSMENT — PAIN SCALES - GENERAL
PAINLEVEL_OUTOF10: 0
PAINLEVEL_OUTOF10: 7
PAINLEVEL_OUTOF10: 3

## 2020-11-21 ASSESSMENT — PAIN DESCRIPTION - PROGRESSION: CLINICAL_PROGRESSION: GRADUALLY WORSENING

## 2020-11-21 ASSESSMENT — PAIN DESCRIPTION - FREQUENCY: FREQUENCY: CONTINUOUS

## 2020-11-21 ASSESSMENT — PAIN DESCRIPTION - ORIENTATION: ORIENTATION: LEFT

## 2020-11-21 ASSESSMENT — PAIN DESCRIPTION - LOCATION
LOCATION: CHEST
LOCATION: CHEST

## 2020-11-21 ASSESSMENT — PAIN DESCRIPTION - PAIN TYPE
TYPE: ACUTE PAIN
TYPE: ACUTE PAIN

## 2020-11-21 ASSESSMENT — PAIN DESCRIPTION - DESCRIPTORS
DESCRIPTORS: STABBING
DESCRIPTORS: ACHING

## 2020-11-21 ASSESSMENT — PAIN DESCRIPTION - ONSET: ONSET: ON-GOING

## 2020-11-21 NOTE — PROGRESS NOTES
Patient to room 3130 from ED via stretcher. Patient is able to ambulate without any assistance. Admission complete and charted. Patient denies any chest pain at this time. Patient placed on telemetry and is NSR. Vital signs are stable. No other needs voiced at this time. Call light is within reach.      Electronically signed by Marcy Grier RN on 11/21/2020 at 6:21 AM

## 2020-11-21 NOTE — ED PROVIDER NOTES
[facility]  [facility]      Pt Name: Willis Hernandez  MRN: [id number]  [place] 1965  Date ofevaluation: 11/21/2020  Provider: Mathew Odonnell MD    CHIEF COMPLAINT       Chief Complaint   Patient presents with    Chest Pain     patient reports l arm pain started about ap hours ago, and progressed to left chest pain that started ~ 30minutes ago. Had cardiac stents placed on wednesday. HISTORY OF PRESENT ILLNESS   (Location/Symptom, Timing/Onset,Context/Setting, Quality, Duration, Modifying Factors, Severity)  Note limiting factors. Willis Hernandez is a 54 y.o. male  who  has a past medical history of Arthritis, Blood circulation, collateral, CAD (coronary artery disease), CAD (coronary artery disease), Cardiac arrest ([address] Cerebral artery occlusion with cerebral infarction ([address] Diabetes mellitus ([address] Diabetic peripheral neuropathy ([address] GERD (gastroesophageal reflux disease), Hypercholesteremia, Hyperlipidemia, Hypertension, Movement disorder, MRSA (methicillin resistant staph aureus) culture positive, Neuropathy, Other disorders of kidney and ureter in diseases classified elsewhere, POTS (postural orthostatic tachycardia syndrome), Psychiatric problem, Sleep apnea, Syncope, and Type II or unspecified type diabetes mellitus without mention of complication, not stated as uncontrolled. who presents to the emergency department for evaluation of chest pain. Patient recently had a stent placed for an 80% occlusion of the right RCA on the 18th. He states that he has been doing well but today noticed some pain in the left arm. He states that afterwards been developing substernal chest pain with radiation into the neck and jaw with associated diaphoresis. He states he has had similar previous events when he is had cardiac events in the past.  He denies shortness of breath fevers nausea vomiting or cough.   He states he is been compliant with all his medications. I did review the patient's previous catheterization, and he has no other critical stenosis in the other vessels of the heart. On arrival to the ED the patient states his pain is 6 out of 10 he is not taking medications. HPI    NursingNotes were reviewed. REVIEW OF SYSTEMS    (2-9 systems for level 4, 10 or more for level 5)     Review of Systems   Constitutional: Positive for diaphoresis. Negative for activity change, chills and fever. HENT: Negative for congestion and rhinorrhea. Eyes: Negative for photophobia and visual disturbance. Respiratory: Negative for shortness of breath and wheezing. Cardiovascular: Positive for chest pain. Negative for palpitations and leg swelling. Gastrointestinal: Negative for nausea and vomiting. Endocrine: Negative for polydipsia and polyuria. Genitourinary: Negative for difficulty urinating and frequency. Musculoskeletal: Negative for back pain and gait problem. Skin: Negative for color change and rash. Neurological: Negative for light-headedness and headaches. Psychiatric/Behavioral: Negative for confusion. The patient is not nervous/anxious. All other systems reviewed and are negative. Except as noted above the remainder of the review of systems was reviewed and negative.        PAST MEDICAL HISTORY     Past Medical History:   Diagnosis Date    Arthritis     Blood circulation, collateral     CAD (coronary artery disease) 7/15/2014    CAD (coronary artery disease)     [address]iac arrest ([facility] 10/05/2018    Cerebral artery occlusion with cerebral infarction ([facility]     Diabetes mellitus ([address]     Diabetic peripheral neuropathy ([facility] 6/8/2018    GERD (gastroesophageal reflux disease)     ulcers    Hypercholesteremia     Hyperlipidemia     Hypertension     Movement disorder     possible arthritis right hand    MRSA (methicillin resistant staph aureus) culture positive 07/13/2018    foot wound    Neuropathy     Other disorders of kidney and ureter in diseases classified elsewhere     POTS (postural orthostatic tachycardia syndrome)     Psychiatric problem     anxiety, depression, bipolar    Sleep apnea     Syncope     Type II or unspecified type diabetes mellitus without mention of complication, not stated as uncontrolled          SURGICALHISTORY       Past Surgical History:   Procedure Laterality Date    CARDIAC CATHETERIZATION      COLONOSCOPY      CORONARY ANGIOPLASTY WITH STENT PLACEMENT  10/06/2018    Bare Metal Stent to PDA    CORONARY ANGIOPLASTY WITH STENT PLACEMENT  10/07/2018    Bare Metal Stent to OM    CORONARY ANGIOPLASTY WITH STENT PLACEMENT  10/03/2018    CECE to Circ    FINGER SURGERY Left     Left Thumb    HERNIA REPAIR      JOINT REPLACEMENT      VASCULAR SURGERY      VASECTOMY           CURRENT MEDICATIONS       Previous Medications    ASPIRIN 81 MG CHEWABLE TABLET    Take 81 mg by mouth daily    ATORVASTATIN (LIPITOR) 40 MG TABLET    TAKE 1 TABLET BY MOUTH IN THE EVENING     BUPROPION (WELLBUTRIN XL) 150 MG EXTENDED RELEASE TABLET    Take 150 mg by mouth 2 times daily    CHOLECALCIFEROL (VITAMIN D3) 43478 UNITS CAPS    Take 1 capsule by mouth once a week     DULAGLUTIDE (TRULICITY) 1.5 DK/7.5LK SOPN    Inject 1.5 mg into the skin once a week     INSULIN ASPART (NOVOLOG SC)    Inject into the skin    INSULIN DETEMIR (LEVEMIR FLEXTOUCH) 100 UNIT/ML INJECTION PEN    Inject 30 Units into the skin nightly    PRASUGREL (EFFIENT) 10 MG TABS    Take 1 tablet by mouth once for 1 dose    PRASUGREL (EFFIENT) 10 MG TABS    Take 1 tablet by mouth daily    SODIUM CHLORIDE (OCEAN, BABY AYR) 0.65 % NASAL SPRAY    1 spray by Nasal route as needed for Congestion            No known allergies    FAMILY HISTORY       Family History   Problem Relation Age of Onset    High Blood Pressure Mother     Stroke Mother     Heart Disease Mother     COPD Mother     Heart Disease Father     Cancer Father         skin    Diabetes Sister     Cancer Sister     Heart Disease Brother     Diabetes Brother           SOCIAL HISTORY       Social History     Socioeconomic History    Marital status:      Spouse name: None    Number of children: None    Years of education: None    Highest education level: None   Occupational History    None   Social Needs    Financial resource strain: None    Food insecurity     Worry: None     Inability: None    Transportation needs     Medical: None     Non-medical: None   Tobacco Use    Smoking status: Former Smoker     Packs/day: 2.00     Years: 12.00     Pack years: 24.00     Types: Cigarettes, Cigars    Smokeless tobacco: Never Used   Substance and Sexual Activity    Alcohol use: No    Drug use: Not Currently     Types: Marijuana     Comment: Quit 33 years ago    Sexual activity: Yes     Partners: Female   Lifestyle    Physical activity     Days per week: None     Minutes per session: None    Stress: None   Relationships    Social connections     Talks on phone: None     Gets together: None     Attends [employer] service: None     Active member of club or organization: None     Attends meetings of clubs or organizations: None     Relationship status: None    Intimate partner violence     Fear of current or ex partner: None     Emotionally abused: None     Physically abused: None     Forced sexual activity: None   Other Topics Concern    None   Social History Narrative    ** Merged History Encounter **            SCREENINGS             PHYSICAL EXAM    (up to 7 for level 4, 8 or more for level 5)     ED Triage Vitals   BP Temp Temp Source Pulse Resp SpO2 Height Weight   11/21/20 0021 11/21/20 0027 11/21/20 0027 11/21/20 0021 11/21/20 0021 11/21/20 0021 -- 11/21/20 0021   (!) 150/96 98.5 °F (36.9 °C) Oral 96 16 96 %  228 lb 13.4 oz (103.8 kg)       Physical Exam  Vitals signs and nursing note reviewed. Constitutional:       General: He is not in acute distress.      Appearance: He is well-developed. HENT:      Head: Normocephalic and atraumatic. Eyes:      Extraocular Movements: Extraocular movements intact. Conjunctiva/sclera: Conjunctivae normal.      Pupils: Pupils are equal, round, and reactive to light. Neck:      Musculoskeletal: Normal range of motion. Trachea: No tracheal deviation. Cardiovascular:      Rate and Rhythm: Normal rate and regular rhythm. Pulmonary:      Effort: Pulmonary effort is normal.      Breath sounds: Normal breath sounds. No wheezing or rales. Abdominal:      General: There is no distension. Palpations: Abdomen is soft. Tenderness: There is no abdominal tenderness. Musculoskeletal: Normal range of motion. General: No deformity. Skin:     General: Skin is warm and dry. Capillary Refill: Capillary refill takes less than 2 seconds. Neurological:      General: No focal deficit present. Mental Status: He is alert and oriented to person, place, and time. RESULTS     EKG: All EKG's are interpreted by the Emergency Department Physician who either signs or Co-signsthis chart in the absence of a cardiologist.    EKG shows a sinus rhythm with a ventricular 95 bpm.  Patient is a prolonged DE interval and QTc intervals within normal limits. Patient has normal axis. There are no significant ST elevations or depressions EKG is nondiagnostic for ACS. RADIOLOGY:   Non-plain filmimages such as CT, Ultrasound and MRI are read by the radiologist. Plain radiographic images are visualized and preliminarily interpreted by the emergency physician with the below findings:      Interpretation per the Radiologist below, if available at the time ofthis note:    XR CHEST PORTABLE   Final Result   Low lung volume with bibasilar subsegmental atelectasis. No lobar   consolidation. Stable left pectoral trans venous cardiac pacer/ICD.                ED BEDSIDE ULTRASOUND:   Performed by ED Physician - none    LABS:  Labs Reviewed   CBC WITH AUTO DIFFERENTIAL - Abnormal; Notable for the following components:       Result Value    MCV 79.4 (*)     All other components within normal limits    Narrative:     Performed at:  [address] [place] [address]   Phone [phone]   COMPREHENSIVE METABOLIC PANEL - Abnormal; Notable for the following components:    Glucose 298 (*)     CREATININE 0.7 (*)     AST 14 (*)     All other components within normal limits    Narrative:     Performed at:  [address] [place] [address]   Phone [phone]   TROPONIN - Abnormal; Notable for the following components:    Troponin 0.03 (*)     All other components within normal limits    Narrative:     Performed at:  [address] [place] [address]   Phone [phone]   LIPASE    Narrative:     Performed at:  [facility]  [address] [place] [address] [place] [address]   Phone [phone]   BRAIN NATRIURETIC PEPTIDE    Narrative:     Performed at:  [facility]  [address] [place] [address] [place] [address]   Phone [phone]   TROPONIN       All other labs were within normal range or not returned as of this dictation. EMERGENCY DEPARTMENT COURSE and DIFFERENTIAL DIAGNOSIS/MDM:   Vitals:    Vitals:    11/21/20 0132 11/21/20 0148 11/21/20 0201 11/21/20 0217   BP: 126/79 119/69 117/74 107/73   Pulse: 98 97 96 100   Resp: 19 19 18 21   Temp:       TempSrc:       SpO2: 97% 96% 95% 95%   Weight:           Patient was given thefollowing medications:  Medications   aspirin tablet 325 mg (325 mg Oral Given 11/21/20 0048)       ED COURSE & MEDICAL DECISION MAKING    Pertinent Labs & Imaging studies reviewed. (See chart for details)   -  Patient seen and evaluated in the emergency department.   -  Triage and nursing notes reviewed and incorporated. -  Old chart records reviewed and incorporated. -  Differential diagnosis includes: Differential Diagnosis: Acute Coronary Syndrome, Congestive Heart Failure, Thoracic Dissection, Pericarditis, Pericardial Effusion, Pulmonary Embolism, Pneumonia, Pneumothorax, Ischemic Bowel, Bowel Obstruction, PUD, GERD, Acute Cholecystitis, Pancreatitis, Hepatitis, Colitis, other    -  Work-up included:  See above  -  ED treatment included: See above  -  Results discussed with patient. Labs show a slightly elevated troponin. Laboratory work-up otherwise unremarkable imaging studies show no acute cardiopulmonary disease. KG is nondiagnostic for ACS. Given the patient's cardiac history and recent stenting I did contact cardiology and spoke with Dr. Coby Redman. He recommended repeating a troponin at the 3-hour armin. The plan would be to admit the patient if the troponin is trending upwards. Plan of care discussed with the patient Patient feels he feels well on reevaluation and states that his chest pain has resolved. Troponin is trending upwards. Patient will be admitted to the hospital for further medical management. The patient is agreeable with plan of care and disposition. REASSESSMENT     ED Course as of Nov 21 0409   Sat Nov 21, 2020   0404 Troponin(!): 0.04 [CS]      ED Course User Index  [CS] Leonela Yu MD         CRITICAL CARE TIME   Total Critical Care time was 35 minutes, excluding separatelyreportable procedures. There was a high probability ofclinically significant/life threatening deterioration in the patient's condition which required my urgent intervention. CONSULTS:  IP CONSULT TO CARDIOLOGY    PROCEDURES:  Unless otherwise noted below, none     Procedures    FINAL IMPRESSION      1. Chest pain, unspecified type    2.  Elevated troponin          DISPOSITION/PLAN   DISPOSITION        PATIENT REFERREDTO:  No follow-up provider specified.     DISCHARGEMEDICATIONS:  New Prescriptions    No medications on file          (Please note that portions of this note were completed with a voice recognition program.  Efforts were made to edit the dictations but occasionally words are mis-transcribed.)    Kirk Kline MD (electronically signed)  Attending Emergency Physician          Kirk Kline MD  11/21/20 8924

## 2020-11-21 NOTE — H&P
 CORONARY ANGIOPLASTY WITH STENT PLACEMENT  10/06/2018    Bare Metal Stent to PDA    CORONARY ANGIOPLASTY WITH STENT PLACEMENT  10/07/2018    Bare Metal Stent to OM    CORONARY ANGIOPLASTY WITH STENT PLACEMENT  10/03/2018    CECE to Circ    FINGER SURGERY Left     Left Thumb    HERNIA REPAIR      JOINT REPLACEMENT      VASCULAR SURGERY      VASECTOMY         Medications Prior to Admission:    Prior to Admission medications    Medication Sig Start Date End Date Taking? Authorizing Provider   buPROPion (WELLBUTRIN XL) 150 MG extended release tablet Take 150 mg by mouth 2 times daily    Historical Provider, MD   prasugrel (EFFIENT) 10 MG TABS Take 1 tablet by mouth daily 11/19/20   Carmen Hernandez MD   Insulin Aspart (NOVOLOG SC) Inject into the skin    Historical Provider, MD   sodium chloride (OCEAN, BABY AYR) 0.65 % nasal spray 1 spray by Nasal route as needed for Congestion 7/9/20   Letha Khan MD   prasugrel (EFFIENT) 10 MG TABS Take 1 tablet by mouth once for 1 dose  Patient taking differently: Take 10 mg by mouth daily  1/24/20 9/28/20  Carmen Hernandez MD   atorvastatin (LIPITOR) 40 MG tablet TAKE 1 TABLET BY MOUTH IN THE EVENING  3/19/19   Carmen Hernandez MD   aspirin 81 MG chewable tablet Take 81 mg by mouth daily    Historical Provider, MD   Cholecalciferol (VITAMIN D3) 98565 units CAPS Take 1 capsule by mouth once a week     Historical Provider, MD   Dulaglutide (TRULICITY) 1.5 DF/5.6FU SOPN Inject 1.5 mg into the skin once a week     Historical Provider, MD   insulin detemir (LEVEMIR FLEXTOUCH) 100 UNIT/ML injection pen Inject 30 Units into the skin nightly  Patient taking differently: Inject 50 Units into the skin nightly  8/23/18   Karmen Naqvi DO       Allergies:  No known allergies    Social History:  The patient currently lives at home    TOBACCO:   reports that he has quit smoking. His smoking use included cigarettes and cigars. He has a 24.00 pack-year smoking history.  He has never used smokeless tobacco.  ETOH:   reports no history of alcohol use. Family History:  Reviewed in detail and negative for DM, Early CAD, Cancer, CVA. Positive as follows:        Problem Relation Age of Onset    High Blood Pressure Mother     Stroke Mother     Heart Disease Mother     COPD Mother     Heart Disease Father     Cancer Father         skin    Diabetes Sister     Cancer Sister     Heart Disease Brother     Diabetes Brother        REVIEW OF SYSTEMS:   Positive for chest pain and as noted in the HPI. All other systems reviewed and negative. PHYSICAL EXAM:    BP (!) 132/90   Pulse 99   Temp 97.9 °F (36.6 °C)   Resp 15   Ht 6' 2\" (1.88 m)   Wt 226 lb 6.6 oz (102.7 kg)   SpO2 96%   BMI 29.07 kg/m²     General appearance: No apparent distress appears stated age and cooperative. HEENT Normal cephalic, atraumatic without obvious deformity. Pupils equal, round, and reactive to light. Extra ocular muscles intact. Conjunctivae/corneas clear. Neck: Supple, No jugular venous distention/bruits. Trachea midline without thyromegaly or adenopathy with full range of motion. Lungs: Clear to auscultation, bilaterally without Rales/Wheezes/Rhonchi with good respiratory effort. Heart: Regular rate and rhythm with Normal S1/S2 without murmurs, rubs or gallops, point of maximum impulse non-displaced  Abdomen: Soft, non-tender or non-distended without rigidity or guarding and positive bowel sounds all four quadrants. Extremities: No clubbing, cyanosis, or edema bilaterally. Full range of motion without deformity and normal gait intact. Skin: Skin color, texture, turgor normal.  No rashes or lesions. Neurologic: Alert and oriented X 3, neurovascularly intact with sensory/motor intact upper extremities/lower extremities, bilaterally. Cranial nerves: II-XII intact, grossly non-focal.  Mental status: Alert, oriented, thought content appropriate.   Capillary Refill: Acceptable  < 3 seconds  Peripheral Pulses: +3 me at [phone].

## 2020-11-21 NOTE — PROGRESS NOTES
Checking on patient Q2H for nutrition needs, hygiene needs, comfort measures, mobility, fall risk interventions, and safe environment. All precautions and interventions in place. Educated patient on use of call light and telephone. Patient verbalizes understanding. Call light/telephone in reach.   Electronically signed by Yoseph Dickens RN on 11/21/2020 at 9:00 AM

## 2020-11-21 NOTE — CONSULTS
History and Physical  [facility] [place]   Cardiology    Chief Complaint: sharp stabbing pain left chest and light aching lateral wrist.    HPI:     Patient is a 54 y.o. male underwent uneventful stent of rca sent home on effient 11/19/20. Last night he was thinking about his experience in 2018 when he \" faltlined\" and became quite anxious. He developed a 2/10 left medial wrist ache for two hours and intermittent sharp, 2 sec, stabbing pain left chest, about severn total pain events last night. These pains he had never experienced before. His wife brought him to er. His troponin was flat and equivocal. His ekg was unchanged. He was admitted for observation.        Past Medical History:   Diagnosis Date    Arthritis     Blood circulation, collateral     CAD (coronary artery disease) 7/15/2014    CAD (coronary artery disease)     Cardiac arrest ([facility] [address] 10/05/2018    Cerebral artery occlusion with cerebral infarction ([address]     Diabetes mellitus ([address]     Diabetic peripheral neuropathy ([facility] [address] 6/8/2018    GERD (gastroesophageal reflux disease)     ulcers    Hypercholesteremia     Hyperlipidemia     Hypertension     Movement disorder     possible arthritis right hand    MRSA (methicillin resistant staph aureus) culture positive 07/13/2018    foot wound    Neuropathy     Other disorders of kidney and ureter in diseases classified elsewhere     POTS (postural orthostatic tachycardia syndrome)     Psychiatric problem     anxiety, depression, bipolar    Sleep apnea     Syncope     Type II or unspecified type diabetes mellitus without mention of complication, not stated as uncontrolled       Past Surgical History:   Procedure Laterality Date    CARDIAC CATHETERIZATION      COLONOSCOPY      CORONARY ANGIOPLASTY WITH STENT PLACEMENT  10/06/2018    Bare Metal Stent to PDA    CORONARY ANGIOPLASTY WITH STENT PLACEMENT  10/07/2018    Bare Metal Stent to OM    CORONARY ANGIOPLASTY WITH STENT PLACEMENT 10/03/2018    CECE to Circ    FINGER SURGERY Left     Left Thumb    HERNIA REPAIR      JOINT REPLACEMENT      VASCULAR SURGERY      VASECTOMY          Medications Prior to Admission: buPROPion (WELLBUTRIN XL) 150 MG extended release tablet, Take 150 mg by mouth 2 times daily  prasugrel (EFFIENT) 10 MG TABS, Take 1 tablet by mouth daily  atorvastatin (LIPITOR) 40 MG tablet, TAKE 1 TABLET BY MOUTH IN THE EVENING   aspirin 81 MG chewable tablet, Take 81 mg by mouth daily  Cholecalciferol (VITAMIN D3) 72367 units CAPS, Take 1 capsule by mouth once a week   Dulaglutide (TRULICITY) 1.5 ZN/6.1AO SOPN, Inject 1.5 mg into the skin once a week   insulin detemir (LEVEMIR FLEXTOUCH) 100 UNIT/ML injection pen, Inject 30 Units into the skin nightly (Patient taking differently: Inject 50 Units into the skin nightly )  Insulin Aspart (NOVOLOG SC), Inject into the skin  sodium chloride (OCEAN, BABY AYR) 0.65 % nasal spray, 1 spray by Nasal route as needed for Congestion  prasugrel (EFFIENT) 10 MG TABS, Take 1 tablet by mouth once for 1 dose (Patient taking differently: Take 10 mg by mouth daily )    Allergies   Allergen Reactions    No Known Allergies       Social History     Tobacco Use    Smoking status: Former Smoker     Packs/day: 2.00     Years: 12.00     Pack years: 24.00     Types: Cigarettes, Cigars    Smokeless tobacco: Never Used   Substance Use Topics    Alcohol use: No      Family History   Problem Relation Age of Onset    High Blood Pressure Mother     Stroke Mother     Heart Disease Mother     COPD Mother     Heart Disease Father     Cancer Father         skin    Diabetes Sister     Cancer Sister     Heart Disease Brother     Diabetes Brother         Review of Systems:  · Constitutional: No Fever or Weight Loss  · Eyes: No decreased vision  · ENT: No Headaches, Hearing Loss or Vertigo  · Cardiovascular: No chest pain, dyspnea on exertion, palpitations or loss of consciousness  · Respiratory: No cough or wheezing    · Gastrointestinal: No abdominal pain, appetite loss, blood in stools, constipation, diarrhea or heartburn  · Genitourinary: No dysuria, trouble voiding, or hematuria  · Musculoskeletal:  No gait disturbance, weakness or joint complaints  · Integumentary: No rash or pruritis  · Neurological: No TIA or stroke symptoms  · Psychiatric: No anxiety or depression  · Endocrine: No malaise, fatigue or temperature intolerance  · Hematologic/Lymphatic: No bleeding problems, blood clots or swollen lymph nodes  · Allergic/Immunologic: No nasal congestion or hives      Objective Data:     BP (!) 132/90   Pulse 99   Temp 97.9 °F (36.6 °C)   Resp 15   Ht 6' 2\" (1.88 m)   Wt 226 lb 6.6 oz (102.7 kg)   SpO2 96%   BMI 29.07 kg/m²     General appearance: alert, appears stated age and cooperative  Alert, awake, oriented x 3  Eyes:  No erythema  Head: atraumatic  Neck: no JVD  Lungs: clear to auscultation bilaterally  Heart: regular rate and rhythm, S1, S2 normal, no murmur, click, rub or gallop  Abdomen: soft, non-tender; bowel sounds normal; no masses,  no organomegaly  Extremities: extremities normal, atraumatic, no cyanosis or edema  Skin: Skin color, texture, turgor normal. No rashes or lesions  Hematologic: no remarkable bruising       ECG: normal sinus rhythm     Data Review    Cath/pci 11/18/20  ANGIOGRAPHIC FINDINGS:  1. Left main normal.  LYNNE 3 flow. No stenosis. 2.  Left anterior descending artery, has LYNNE 3 flow with 20% stenosis. 3.  Left circumflex has distally LYNNE 2 flow but no significant  stenosis, patent stents. 4.  Right coronary artery in the mid portion has 90% stenosis present  with unstable plaque. LV ejection fraction 60%.      In summary, successful revascularization of the right coronary artery  and placement of stent, 4.0 x 26 mm.       Recent Labs     11/19/20  0532 11/21/20  0012    138   K 4.5 4.2    101   CO2 29 26   BUN 15 15   CREATININE 0.8* 0.7*     Recent Labs

## 2020-11-21 NOTE — ED TRIAGE NOTES
Pt arrives to the ER via private vehicle with complaints of chest pain. Pt states it started today around 2200. Pt states pain is 7/10. Pt states he had stents placed on Wednesday and is worried. NAD noted, respirations even and easy, skin warm and dry.

## 2020-11-21 NOTE — PLAN OF CARE
Problem: Pain:  Goal: Pain level will decrease  Description: Pain level will decrease  11/21/2020 0859 by Richi Norris RN  Outcome: Ongoing  Note: Pain /discomfort being managed with PRN analgesics per MD orders. Patient able to express presence and absence of pain and rate pain appropriately using numerical scale. '    11/21/2020 0619 by Chandni Villegas RN  Outcome: Ongoing  Goal: Control of acute pain  Description: Control of acute pain  11/21/2020 0859 by Rihci Norris RN  Outcome: Ongoing  11/21/2020 0619 by Chandni Villegas RN  Outcome: Ongoing  Goal: Control of chronic pain  Description: Control of chronic pain  11/21/2020 0859 by Richi Norris RN  Outcome: Ongoing  11/21/2020 0619 by Chandni Villegas RN  Outcome: Ongoing     Problem: Falls - Risk of:  Goal: Will remain free from falls  Description: Will remain free from falls  Outcome: Ongoing  Note: Fall risk assessment completed . Fall precautions in place, bed alarm on, side rails 2/4 up, call light in reach, educated pt on calling for assistance when needed, room clear of clutter. Pt verbalized understanding.

## 2020-11-21 NOTE — PROGRESS NOTES
Patient discharged with belongings via wheelchair via [facility] transportation to private residence. Patient given discharge instructions, patient verbalized understanding, no questions at this time. IV D/Cd patient tolerated well, no complications.        Electronically signed by Jose Berry RN on 11/21/2020 at 12:01 PM

## 2020-11-21 NOTE — PROGRESS NOTES
Patient stating \"I want to go home\". Patient refused lovenox shot this morning. Patient educated regarding the risks of blood clots while hospitalized. Patient verbalized an understanding and states again \"I want to go home\".

## 2020-11-22 LAB
EKG ATRIAL RATE: 95 BPM
EKG DIAGNOSIS: NORMAL
EKG P AXIS: 46 DEGREES
EKG P-R INTERVAL: 178 MS
EKG Q-T INTERVAL: 354 MS
EKG QRS DURATION: 92 MS
EKG QTC CALCULATION (BAZETT): 444 MS
EKG R AXIS: 15 DEGREES
EKG T AXIS: 49 DEGREES
EKG VENTRICULAR RATE: 95 BPM

## 2020-11-22 PROCEDURE — 93010 ELECTROCARDIOGRAM REPORT: CPT | Performed by: INTERNAL MEDICINE

## 2020-11-22 NOTE — DISCHARGE SUMMARY
Hospital Medicine Discharge Summary    Patient ID: Clovis Ochoa      Patient's PCP: Gualberto Mane, APRN - CNP    Admit Date: 11/21/2020     Discharge Date: 11/21/2020      Admitting Physician: Ryan Fonseca DO     Discharge Physician: Lon Jaquez MD     Discharge Diagnoses: Active Hospital Problems    Diagnosis Date Noted    Elevated troponin [R77.8]     Chest pain [R07.9] 09/11/2010       The patient was seen and examined on day of discharge and this discharge summary is in conjunction with any daily progress note from day of discharge. Hospital Course:     NSTEMI:   Negligible per cardiology as occurs in high percentage of patients post PCI  -Recent cardiac stent placed on 11/19  -Left-sided chest pain deemed atypical for ischemic chest pain per cardiology  -Cardiology consulted:  stent is not occluded  -Telemetry, troponin trend    History of CAD:   Recent stent on 11/19  Continue aspirin and effient      Exam:     BP (!) 132/90   Pulse 100   Temp 97.9 °F (36.6 °C)   Resp 15   Ht 6' 2\" (1.88 m)   Wt 226 lb 6.6 oz (102.7 kg)   SpO2 96%   BMI 29.07 kg/m²     General appearance: No apparent distress, appears stated age and cooperative. HEENT: Pupils equal, round, and reactive to light. Conjunctivae/corneas clear. Neck: Supple, with full range of motion. No jugular venous distention. Trachea midline. Respiratory:  Normal respiratory effort. Clear to auscultation, bilaterally without Rales/Wheezes/Rhonchi. Cardiovascular: Regular rate and rhythm with normal S1/S2 without murmurs, rubs or gallops. Abdomen: Soft, non-tender, non-distended with normal bowel sounds. Musculoskelatal: No clubbing, cyanosis or edema bilaterally. Full range of motion without deformity. Skin: Skin color, texture, turgor normal.  No rashes or lesions. Neurologic:  Neurovascularly intact without any focal sensory/motor deficits.  Cranial nerves: II-XII intact, grossly non-focal.  Psychiatric: Alert and oriented, thought content appropriate, normal insight      Consults:     IP CONSULT TO CARDIOLOGY  IP CONSULT TO CARDIOLOGY    Significant Diagnostic Studies:     Chest x-ray: No acute process    PCP/SNF to follow up: Follow-up chronic conditions    Disposition: Home    Condition on discharge: Stable    Discharge Instructions/Follow-up: Follow-up with PCP within 1 week. Follow-up with cardiology within 1 month    Code Status:  Prior     Activity: activity as tolerated    Diet: cardiac diet    Labs:  For convenience and continuity at follow-up the following most recent labs are provided:      CBC:    Lab Results   Component Value Date    WBC 8.7 11/21/2020    HGB 15.2 11/21/2020    HCT 43.9 11/21/2020     11/21/2020       Renal:    Lab Results   Component Value Date     11/21/2020    K 4.2 11/21/2020    K 5.2 07/01/2020     11/21/2020    CO2 26 11/21/2020    BUN 15 11/21/2020    CREATININE 0.7 11/21/2020    CALCIUM 9.4 11/21/2020    PHOS 4.0 07/09/2020       Discharge Medications:     Discharge Medication List as of 11/21/2020 11:31 AM           Details   buPROPion (WELLBUTRIN XL) 150 MG extended release tablet Take 150 mg by mouth 2 times dailyHistorical Med      prasugrel (EFFIENT) 10 MG TABS Take 1 tablet by mouth daily, Disp-30 tablet,R-3Normal      Insulin Aspart (NOVOLOG SC) Inject into the skinHistorical Med      sodium chloride (OCEAN, BABY AYR) 0.65 % nasal spray 1 spray by Nasal route as needed for Congestion, Disp-30 mL, R-0Normal      atorvastatin (LIPITOR) 40 MG tablet TAKE 1 TABLET BY MOUTH IN THE EVENING , Disp-30 tablet, R-4Normal      aspirin 81 MG chewable tablet Take 81 mg by mouth dailyHistorical Med      Cholecalciferol (VITAMIN D3) 42144 units CAPS Take 1 capsule by mouth once a week Historical Med      Dulaglutide (TRULICITY) 1.5 JM/5.0BR SOPN Inject 1.5 mg into the skin once a week Historical Med      insulin detemir (LEVEMIR FLEXTOUCH) 100 UNIT/ML injection pen Inject 30 Units into the skin nightly, Disp-5 pen, R-3Normal             Time Spent on discharge is more than 15 minutes in the examination, evaluation, counseling and review of medications and discharge plan. Signed: Gita Babin MD   11/21/2020      Thank you LAURA MORLEY CNP for the opportunity to be involved in this patient's care. If you have any questions or concerns please feel free to contact me at [phone].

## 2020-11-24 ENCOUNTER — APPOINTMENT (OUTPATIENT)
Dept: CARDIAC REHAB | Age: 55
End: 2020-11-24
Payer: COMMERCIAL

## 2020-11-26 ENCOUNTER — APPOINTMENT (OUTPATIENT)
Dept: CARDIAC REHAB | Age: 55
End: 2020-11-26
Payer: COMMERCIAL

## 2020-12-01 ENCOUNTER — HOSPITAL ENCOUNTER (OUTPATIENT)
Dept: CARDIAC REHAB | Age: 55
Setting detail: THERAPIES SERIES
Discharge: HOME | End: 2020-12-01
Payer: COMMERCIAL

## 2020-12-01 ENCOUNTER — APPOINTMENT (OUTPATIENT)
Dept: CARDIAC REHAB | Age: 55
End: 2020-12-01
Payer: COMMERCIAL

## 2020-12-01 PROCEDURE — G0239 OTH RESP PROC, GROUP: HCPCS

## 2020-12-01 PROCEDURE — 93798 PHYS/QHP OP CAR RHAB W/ECG: CPT

## 2020-12-02 NOTE — PROGRESS NOTES
MG/0.5ML SOPN Inject 1.5 mg into the skin once a week     Historical Provider, MD   insulin detemir (LEVEMIR FLEXTOUCH) 100 UNIT/ML injection pen Inject 30 Units into the skin nightly  Patient taking differently: Inject 50 Units into the skin nightly  8/23/18   Karmen Naqvi DO      Allergies:  No known allergies     Review of Systems: all reviewed and refer to HPI   · Constitutional: no unanticipated weight loss. There's been no change in energy level, sleep pattern, or activity level. No fevers, chills. · Eyes: No visual changes or diplopia. No scleral icterus. · ENT: No Headaches, hearing loss or vertigo. No mouth sores or sore throat. · Cardiovascular: No Chest pain, tightness or discomfort.  No Shortness of breath.  No Palpitations.  No Syncope ('blackouts', 'faints', 'collapse') or dizziness.  No Dyspnea on exertion, Orthopnea, Paroxysmal nocturnal dyspnea or breathlessness at rest.   · No Wheezing, sweating, distress and cough with frothy or bloodstained sputum. · Respiratory: No cough or wheezing, no sputum production. No hematemesis. · Gastrointestinal: No abdominal pain, appetite loss, blood in stools. No change in bowel or bladder habits. · Genitourinary: No dysuria, trouble voiding, or hematuria. · Musculoskeletal:  No gait disturbance, no joint complaints. · Integumentary: No rash or pruritis. · Neurological: No headache, diplopia, change in muscle strength, numbness or tingling. · Psychiatric: No anxiety or depression. · Endocrine: No temperature intolerance. No excessive thirst, fluid intake, or urination. No tremor. · Hematologic/Lymphatic: No abnormal bruising or bleeding, blood clots or swollen lymph nodes. · Allergic/Immunologic: No nasal congestion or hives. Objective:     PHYSICAL EXAM:      There were no vitals filed for this visit. General Appearance:  Alert, cooperative, no distress, appears stated age.    Head:  Normocephalic, without obvious abnormality, atraumatic. Eyes:  Pupils equal and round. No scleral icterus. Mouth: Moist mucosa, no pharyngeal erythema. Nose: Nares normal. No drainage or sinus tenderness. Neck: Supple, symmetrical, trachea midline. No adenopathy. No tenderness/mass/nodules. No carotid bruit or elevated JVD. Lungs:   Respiratory Effort: Normal   Auscultation: Clear to auscultation bilaterally, respirations unlabored. No wheeze, rales   Chest Wall:  No tenderness or deformity. Cardiovascular:    Pulses  Palpation: normal   Ascultation: Regular rate, S1/ S2 normal. No murmur, rub, or gallop. 2+ radial and pedal pulses, symmetric  Carotid  Femoral   Abdomen and Gastrointestinal:   Soft, non-tender, bowel sounds active. Liver and Spleen  Masses   Musculoskeletal: No muscle wasting  Back  Gait   Extremities: Extremities normal, atraumatic. No cyanosis or edema. No cyanosis clubbing       Skin: Inspection and palpation performed, no rashes or lesions. Pysch: Normal mood and affect.  Alert and oriented to time place person   Neurologic: Normal gross motor and sensory exam.       Labs     All available labs reviewed to date:      Lab Results   Component Value Date    WBC 8.7 11/21/2020    RBC 5.53 11/21/2020    HGB 15.2 11/21/2020    HCT 43.9 11/21/2020    MCV 79.4 11/21/2020    RDW 13.2 11/21/2020     11/21/2020     Lab Results   Component Value Date     11/21/2020    K 4.2 11/21/2020    K 5.2 07/01/2020     11/21/2020    CO2 26 11/21/2020    BUN 15 11/21/2020    CREATININE 0.7 11/21/2020    GFRAA >60 11/21/2020    GFRAA >60 02/10/2013    AGRATIO 1.3 11/21/2020    LABGLOM >60 11/21/2020    GLUCOSE 298 11/21/2020    PROT 7.0 11/21/2020    PROT 8.3 11/12/2012    CALCIUM 9.4 11/21/2020    BILITOT 0.4 11/21/2020    ALKPHOS 75 11/21/2020    AST 14 11/21/2020    ALT 21 11/21/2020      No results found for: [facility]  Lab Results   Component Value Date    LABA1C 9.2 10/06/2020     Lab Results   Component Value Date TROPONINI 0.05 (H) 11/21/2020       Cardiac, Vascular and Imaging Data all Personally Reviewed in Detail by Myself      EKG:   10/31/18 SR, reviewed   2/7/19 SR, 99 bpm T wave inversion     Echocardiogram: 10/8/18  Summary  Left ventricular cavity size is mildly dilated. Normal left ventricular wall thickness. Ejection fraction is visually estimated to be 45%. There is moderate to severe inferior hypokinesis. Normal right ventricular size and function.     Stress Test: 10/9/17   Summary    Pharmacological Stress/MPI Results:        1. Technically a satisfactory study.    2. Normal pharmacological stress portion of the study.    3. No evidence of Ischemia by Myocardial Perfusion Imaging.    4. Gated Study shows normal LV size and Systolic function; EF is 61 %. Cath:   10/3/18   ANGIOGRAPHIC FINDINGS:  1. Left main is normal.  LYNNE 3 flow. No stenosis. 2.  Left anterior descending artery comes from the left main coronary  artery. LYNNE 3 flow. No stenosis present with patent diagonal branches. 3.  Left circumflex is occluded in the mid portion. 4.  Right coronary comes from the right coronary cusp. It has a PDA which  has 80% stenosis present. The patient also has an obtuse marginal 1 which  as 80% stenosis present.   In summary, successful revascularization with stent placement in the  circumflex artery. This was 2.5 x 38 mm, expanded up to 2.8 mm. This was  a drug-coated Synergy stent    10/7/18 Dr. Malcom Gomes stent patent; PCI of RPDA and PCI OM1, IABP placement    Imaging: All available imaging to date reviewed     ECHO:8/14/19  Suboptimal image quality. Definity contrast administered. Left ventricular cavity size is normal. Normal left ventricular wall  thickness. Overall left ventricular systolic function appears mildly  reduced. Ejection fraction is visually estimated to be 50-55%. No regional  wall motion abnormalities are noted.  Diastolic filling parameters suggest  grade I diastolic dysfunction. Mitral valve leaflets appear mildly thickened. No evidence of significant mitral regurgitation or stenosis. Normal right ventricular size and function. TAPSE measures 17mm. Pacer / ICD wire is visualized in the right ventricle. .  Trivial tricuspid regurgitation. ECHO 2/10/2020  Normal LV size, wall thickness and wall motion: EF is   60%. Grade I  diastolic dysfunction with normal LV filling pressures. Left atrium is of normal size. Normal right ventricular size and function. Trivial mitral & mild tricuspid regurgitation is present. Carotid Duplex:11/13/19  Right Impression   1. There are no focal elevated velocities involving the internal carotid   artery. 2. There is no gross plaque seen involving the internal carotid artery. 3. The right vertebral artery demonstrates normal antegrade flow. Left Impression   1. There are no focal elevated velocities involving the internal carotid   artery. 2. There is no gross plaque seen involving the internal carotid artery. 3. The left vertebral artery demonstrates normal antegrade flow. Cardiac cath 11/18/2020  1. Left main normal.  LYNNE 3 flow. No stenosis. 2.  Left anterior descending artery, has LYNNE 3 flow with 20% stenosis. 3.  Left circumflex has distally LYNNE 2 flow but no significant  stenosis, patent stents. 4.  Right coronary artery in the mid portion has 90% stenosis present  with unstable plaque. LV ejection fraction 60%.    In summary, successful revascularization of the right coronary artery  and placement of stent, 4.0 x 26 mm. Assessment and Plan       CAD, VT recurrent MI requiring repeat PCI  s/p AICD implant 10/12/18  --S/p NSTEMI, S/p Inferolateral STEMI, S/P multivessel PCI: LCX, EDUARD and RPDA Dr. Boucher Homes. -successful revascularization of the right coronary artery  and placement of stent, 4.0 x 26 mm. Asymptomatic. Continuue Asa, Effient, and statin therapy.   Encouraged regular exercise program. Neurogenic Syncope  Long standing hx of falls  + TTT 2005Unable to tolerate BB and ACEI due to hypotension and recurrent syncope. Follows with Dr Nasima Chairez. Hyperlipidemia, mixed   Increase Lipitor to 80 mg daily. Repeat lipid profile. DM, II  Levamir, trulicity     Follow up in 6 months     Thank you for allowing us to participate in the care of Juni King. Please do not hesitate to contact me if you have any questions. Joann Izquierdo MD, MPH    [facility], [address] [place] [address] Dhaval Oconnell [facility]  Ph: [phone]  Fax: [phone]    This note was scribed in the presence of Dr Jose Hawk, by Parveen Oliva RN  Physician Attestation:  The scribes documentation has been prepared under my direction and personally reviewed by me in its entirety. I confirm that the note above accurately reflects all work, treatment, procedures, and medical decision making performed by me.

## 2020-12-03 ENCOUNTER — HOSPITAL ENCOUNTER (OUTPATIENT)
Dept: CARDIAC REHAB | Age: 55
Setting detail: THERAPIES SERIES
Discharge: HOME | End: 2020-12-03
Payer: COMMERCIAL

## 2020-12-03 ENCOUNTER — OFFICE VISIT (OUTPATIENT)
Dept: CARDIOLOGY CLINIC | Age: 55
End: 2020-12-03
Payer: COMMERCIAL

## 2020-12-03 ENCOUNTER — APPOINTMENT (OUTPATIENT)
Dept: CARDIAC REHAB | Age: 55
End: 2020-12-03
Payer: COMMERCIAL

## 2020-12-03 VITALS
HEIGHT: 74 IN | OXYGEN SATURATION: 98 % | WEIGHT: 227.4 LBS | TEMPERATURE: 97.7 F | HEART RATE: 99 BPM | SYSTOLIC BLOOD PRESSURE: 128 MMHG | BODY MASS INDEX: 29.18 KG/M2 | DIASTOLIC BLOOD PRESSURE: 82 MMHG

## 2020-12-03 PROCEDURE — G0239 OTH RESP PROC, GROUP: HCPCS

## 2020-12-03 PROCEDURE — G8419 CALC BMI OUT NRM PARAM NOF/U: HCPCS | Performed by: INTERNAL MEDICINE

## 2020-12-03 PROCEDURE — G8427 DOCREV CUR MEDS BY ELIG CLIN: HCPCS | Performed by: INTERNAL MEDICINE

## 2020-12-03 PROCEDURE — G8484 FLU IMMUNIZE NO ADMIN: HCPCS | Performed by: INTERNAL MEDICINE

## 2020-12-03 PROCEDURE — 3017F COLORECTAL CA SCREEN DOC REV: CPT | Performed by: INTERNAL MEDICINE

## 2020-12-03 PROCEDURE — 1036F TOBACCO NON-USER: CPT | Performed by: INTERNAL MEDICINE

## 2020-12-03 PROCEDURE — 99213 OFFICE O/P EST LOW 20 MIN: CPT | Performed by: INTERNAL MEDICINE

## 2020-12-03 RX ORDER — ATORVASTATIN CALCIUM 80 MG/1
80 TABLET, FILM COATED ORAL DAILY
Qty: 90 TABLET | Refills: 3 | Status: SHIPPED | OUTPATIENT
Start: 2020-12-03 | End: 2021-09-13 | Stop reason: SDUPTHER

## 2020-12-03 NOTE — PATIENT INSTRUCTIONS
Patient Education        Heart-Healthy Diet: Care Instructions  Your Care Instructions     A heart-healthy diet has lots of vegetables, fruits, nuts, beans, and whole grains, and is low in salt. It limits foods that are high in saturated fat, such as meats, cheeses, and fried foods. It may be hard to change your diet, but even small changes can lower your risk of heart attack and heart disease. Follow-up care is a key part of your treatment and safety. Be sure to make and go to all appointments, and call your doctor if you are having problems. It's also a good idea to know your test results and keep a list of the medicines you take. How can you care for yourself at home? Watch your portions  · Learn what a serving is. A \"serving\" and a \"portion\" are not always the same thing. Make sure that you are not eating larger portions than are recommended. For example, a serving of pasta is ½ cup. A serving size of meat is 2 to 3 ounces. A 3-ounce serving is about the size of a deck of cards. Measure serving sizes until you are good at [place]" them. Keep in mind that restaurants often serve portions that are 2 or 3 times the size of one serving. · To keep your energy level up and keep you from feeling hungry, eat often but in smaller portions. · Eat only the number of calories you need to stay at a healthy weight. If you need to lose weight, eat fewer calories than your body burns (through exercise and other physical activity). Eat more fruits and vegetables  · Eat a variety of fruit and vegetables every day. Dark green, deep orange, red, or yellow fruits and vegetables are especially good for you. Examples include spinach, carrots, peaches, and berries. · Keep carrots, celery, and other veggies handy for snacks. Buy fruit that is in season and store it where you can see it so that you will be tempted to eat it. · Cook dishes that have a lot of veggies in them, such as stir-fries and soups.   Limit saturated and trans fat  · Read food labels, and try to avoid saturated and trans fats. They increase your risk of heart disease. · Use olive or canola oil when you cook. · Bake, broil, grill, or steam foods instead of frying them. · Choose lean meats instead of high-fat meats such as hot dogs and sausages. Cut off all visible fat when you prepare meat. · Eat fish, skinless poultry, and meat alternatives such as soy products instead of high-fat meats. Soy products, such as tofu, may be especially good for your heart. · Choose low-fat or fat-free milk and dairy products. Eat foods high in fiber  · Eat a variety of grain products every day. Include whole-grain foods that have lots of fiber and nutrients. Examples of whole-grain foods include oats, whole wheat bread, and brown rice. · Buy whole-grain breads and cereals, instead of white bread or pastries. Limit salt and sodium  · Limit how much salt and sodium you eat to help lower your blood pressure. · Taste food before you salt it. Add only a little salt when you think you need it. With time, your taste buds will adjust to less salt. · Eat fewer snack items, fast foods, and other high-salt, processed foods. Check food labels for the amount of sodium in packaged foods. · Choose low-sodium versions of canned goods (such as soups, vegetables, and beans). Limit sugar  · Limit drinks and foods with added sugar. These include candy, desserts, and soda pop. Limit alcohol  · Limit alcohol to no more than 2 drinks a day for men and 1 drink a day for women. Too much alcohol can cause health problems. When should you call for help? Watch closely for changes in your health, and be sure to contact your doctor if:    · You would like help planning heart-healthy meals. Where can you learn more? Go to https://cheileeneb.[facility]. org and sign in to your [facility] account.  Enter V137 in the ProtAb box to learn more about \"Heart-Healthy Diet: Care

## 2020-12-07 ENCOUNTER — TELEPHONE (OUTPATIENT)
Dept: CARDIOLOGY CLINIC | Age: 55
End: 2020-12-07

## 2020-12-07 NOTE — TELEPHONE ENCOUNTER
Spoke to patient after reviewing his last OV note with Dr. Jaja Schrader. Per last OV note, pt to continue taking asa, effient and statin therapy. Pt v/u.

## 2020-12-07 NOTE — TELEPHONE ENCOUNTER
Juni called in this afternoon wanting to know if he should still be taking the medication effient. States he knew about the lipitor changing at his last visit, but wasn't aware about the effient until he looked at the paper. Juni can be reached at [phone].

## 2020-12-08 ENCOUNTER — HOSPITAL ENCOUNTER (OUTPATIENT)
Dept: CARDIAC REHAB | Age: 55
Setting detail: THERAPIES SERIES
Discharge: HOME | End: 2020-12-08
Payer: COMMERCIAL

## 2020-12-08 ENCOUNTER — APPOINTMENT (OUTPATIENT)
Dept: CARDIAC REHAB | Age: 55
End: 2020-12-08
Payer: COMMERCIAL

## 2020-12-08 PROCEDURE — G0239 OTH RESP PROC, GROUP: HCPCS

## 2020-12-10 ENCOUNTER — HOSPITAL ENCOUNTER (OUTPATIENT)
Dept: CARDIAC REHAB | Age: 55
Setting detail: THERAPIES SERIES
Discharge: HOME | End: 2020-12-10
Payer: COMMERCIAL

## 2020-12-10 ENCOUNTER — APPOINTMENT (OUTPATIENT)
Dept: CARDIAC REHAB | Age: 55
End: 2020-12-10
Payer: COMMERCIAL

## 2020-12-10 PROCEDURE — G0239 OTH RESP PROC, GROUP: HCPCS

## 2020-12-15 ENCOUNTER — HOSPITAL ENCOUNTER (OUTPATIENT)
Dept: CARDIAC REHAB | Age: 55
Setting detail: THERAPIES SERIES
Discharge: HOME | End: 2020-12-15
Payer: COMMERCIAL

## 2020-12-15 ENCOUNTER — APPOINTMENT (OUTPATIENT)
Dept: CARDIAC REHAB | Age: 55
End: 2020-12-15
Payer: COMMERCIAL

## 2020-12-15 PROCEDURE — G0239 OTH RESP PROC, GROUP: HCPCS

## 2020-12-17 ENCOUNTER — HOSPITAL ENCOUNTER (OUTPATIENT)
Dept: CARDIAC REHAB | Age: 55
Setting detail: THERAPIES SERIES
Discharge: HOME | End: 2020-12-17
Payer: COMMERCIAL

## 2020-12-17 ENCOUNTER — APPOINTMENT (OUTPATIENT)
Dept: CARDIAC REHAB | Age: 55
End: 2020-12-17
Payer: COMMERCIAL

## 2020-12-17 PROCEDURE — G0239 OTH RESP PROC, GROUP: HCPCS

## 2020-12-17 RX ORDER — CHLORAL HYDRATE 500 MG
1000 CAPSULE ORAL DAILY
Status: ON HOLD | COMMUNITY
End: 2021-04-19 | Stop reason: HOSPADM

## 2020-12-22 ENCOUNTER — APPOINTMENT (OUTPATIENT)
Dept: CARDIAC REHAB | Age: 55
End: 2020-12-22
Payer: COMMERCIAL

## 2020-12-22 ENCOUNTER — HOSPITAL ENCOUNTER (OUTPATIENT)
Dept: CARDIAC REHAB | Age: 55
Setting detail: THERAPIES SERIES
Discharge: HOME | End: 2020-12-22
Payer: COMMERCIAL

## 2020-12-22 PROCEDURE — G0239 OTH RESP PROC, GROUP: HCPCS

## 2020-12-24 ENCOUNTER — APPOINTMENT (OUTPATIENT)
Dept: CARDIAC REHAB | Age: 55
End: 2020-12-24
Payer: COMMERCIAL

## 2020-12-28 ENCOUNTER — TELEPHONE (OUTPATIENT)
Dept: CARDIOLOGY CLINIC | Age: 55
End: 2020-12-28

## 2020-12-28 NOTE — TELEPHONE ENCOUNTER
Juni called in this afternoon stating that he needs his medical records , limitations and restrictions from [address] [place] [address]. PT. Also needs them before the end of the year so he can still get his disability.       You can reach Juni at [phone]

## 2020-12-28 NOTE — TELEPHONE ENCOUNTER
Spoke with pt's benefits office and having an attending physicians statement faxed over. Notified patient I will fill it out as soon as I receive it and can have Dr. Winston Jarrell sign it if he is still here. Pt v/u.

## 2020-12-29 ENCOUNTER — APPOINTMENT (OUTPATIENT)
Dept: CARDIAC REHAB | Age: 55
End: 2020-12-29
Payer: COMMERCIAL

## 2020-12-29 ENCOUNTER — HOSPITAL ENCOUNTER (OUTPATIENT)
Dept: CARDIAC REHAB | Age: 55
Setting detail: THERAPIES SERIES
End: 2020-12-29
Payer: COMMERCIAL

## 2020-12-29 NOTE — TELEPHONE ENCOUNTER
Spoke to patient and let him know forms were faxed successfully and to follow up with his disability insurance company.  Pt v/u

## 2020-12-31 ENCOUNTER — APPOINTMENT (OUTPATIENT)
Dept: CARDIAC REHAB | Age: 55
End: 2020-12-31
Payer: COMMERCIAL

## 2021-01-05 ENCOUNTER — HOSPITAL ENCOUNTER (OUTPATIENT)
Dept: CARDIAC REHAB | Age: 56
Setting detail: THERAPIES SERIES
Discharge: HOME | End: 2021-01-05
Payer: COMMERCIAL

## 2021-01-05 ENCOUNTER — APPOINTMENT (OUTPATIENT)
Dept: CARDIAC REHAB | Age: 56
End: 2021-01-05
Payer: COMMERCIAL

## 2021-01-05 PROCEDURE — G0239 OTH RESP PROC, GROUP: HCPCS

## 2021-01-06 ENCOUNTER — NURSE ONLY (OUTPATIENT)
Dept: CARDIOLOGY CLINIC | Age: 56
End: 2021-01-06
Payer: COMMERCIAL

## 2021-01-06 DIAGNOSIS — Z95.810 ICD (IMPLANTABLE CARDIOVERTER-DEFIBRILLATOR) IN PLACE: ICD-10-CM

## 2021-01-06 DIAGNOSIS — I47.20 VT (VENTRICULAR TACHYCARDIA): ICD-10-CM

## 2021-01-06 DIAGNOSIS — I46.9 CARDIAC ARREST (HCC): ICD-10-CM

## 2021-01-06 PROCEDURE — 93295 DEV INTERROG REMOTE 1/2/MLT: CPT | Performed by: INTERNAL MEDICINE

## 2021-01-06 PROCEDURE — 93296 REM INTERROG EVL PM/IDS: CPT | Performed by: INTERNAL MEDICINE

## 2021-01-06 NOTE — PROGRESS NOTES
We received remote transmission from patient's monitor at home. Transmission shows normal sensing and pacing function. EP physician will review. See interrogation under cardiology tab in the [address] [facility] [address] field for more details. Optivol is within normal range.

## 2021-01-07 ENCOUNTER — HOSPITAL ENCOUNTER (OUTPATIENT)
Dept: CARDIAC REHAB | Age: 56
Setting detail: THERAPIES SERIES
Discharge: HOME | End: 2021-01-07
Payer: COMMERCIAL

## 2021-01-07 ENCOUNTER — APPOINTMENT (OUTPATIENT)
Dept: CARDIAC REHAB | Age: 56
End: 2021-01-07
Payer: COMMERCIAL

## 2021-01-07 PROCEDURE — G0239 OTH RESP PROC, GROUP: HCPCS

## 2021-01-12 ENCOUNTER — APPOINTMENT (OUTPATIENT)
Dept: CARDIAC REHAB | Age: 56
End: 2021-01-12
Payer: COMMERCIAL

## 2021-01-14 ENCOUNTER — APPOINTMENT (OUTPATIENT)
Dept: CARDIAC REHAB | Age: 56
End: 2021-01-14
Payer: COMMERCIAL

## 2021-01-19 ENCOUNTER — HOSPITAL ENCOUNTER (OUTPATIENT)
Dept: CARDIAC REHAB | Age: 56
Setting detail: THERAPIES SERIES
Discharge: HOME | End: 2021-01-19
Payer: COMMERCIAL

## 2021-01-19 ENCOUNTER — APPOINTMENT (OUTPATIENT)
Dept: CARDIAC REHAB | Age: 56
End: 2021-01-19
Payer: COMMERCIAL

## 2021-01-19 PROCEDURE — G0239 OTH RESP PROC, GROUP: HCPCS

## 2021-01-21 ENCOUNTER — HOSPITAL ENCOUNTER (OUTPATIENT)
Dept: CARDIAC REHAB | Age: 56
Setting detail: THERAPIES SERIES
Discharge: HOME | End: 2021-01-21
Payer: COMMERCIAL

## 2021-01-21 ENCOUNTER — APPOINTMENT (OUTPATIENT)
Dept: CARDIAC REHAB | Age: 56
End: 2021-01-21
Payer: COMMERCIAL

## 2021-01-21 PROCEDURE — G0239 OTH RESP PROC, GROUP: HCPCS

## 2021-01-26 ENCOUNTER — APPOINTMENT (OUTPATIENT)
Dept: CARDIAC REHAB | Age: 56
End: 2021-01-26
Payer: COMMERCIAL

## 2021-01-26 ENCOUNTER — HOSPITAL ENCOUNTER (OUTPATIENT)
Dept: CARDIAC REHAB | Age: 56
Setting detail: THERAPIES SERIES
Discharge: HOME | End: 2021-01-26
Payer: COMMERCIAL

## 2021-01-26 PROCEDURE — G0239 OTH RESP PROC, GROUP: HCPCS

## 2021-01-28 ENCOUNTER — APPOINTMENT (OUTPATIENT)
Dept: CARDIAC REHAB | Age: 56
End: 2021-01-28
Payer: COMMERCIAL

## 2021-02-02 ENCOUNTER — APPOINTMENT (OUTPATIENT)
Dept: CARDIAC REHAB | Age: 56
End: 2021-02-02
Payer: COMMERCIAL

## 2021-02-04 ENCOUNTER — APPOINTMENT (OUTPATIENT)
Dept: CARDIAC REHAB | Age: 56
End: 2021-02-04
Payer: COMMERCIAL

## 2021-02-09 ENCOUNTER — HOSPITAL ENCOUNTER (OUTPATIENT)
Dept: CARDIAC REHAB | Age: 56
Setting detail: THERAPIES SERIES
Discharge: HOME | End: 2021-02-09
Payer: COMMERCIAL

## 2021-02-09 ENCOUNTER — APPOINTMENT (OUTPATIENT)
Dept: CARDIAC REHAB | Age: 56
End: 2021-02-09
Payer: COMMERCIAL

## 2021-02-09 PROCEDURE — G0239 OTH RESP PROC, GROUP: HCPCS

## 2021-02-11 ENCOUNTER — APPOINTMENT (OUTPATIENT)
Dept: CARDIAC REHAB | Age: 56
End: 2021-02-11
Payer: COMMERCIAL

## 2021-02-19 ENCOUNTER — TELEPHONE (OUTPATIENT)
Dept: CARDIOLOGY CLINIC | Age: 56
End: 2021-02-19

## 2021-02-19 NOTE — TELEPHONE ENCOUNTER
Attempted to call patient [phone]. Left message on voicemail, OK per HIPAA, requested patient to return call to our office.

## 2021-02-19 NOTE — TELEPHONE ENCOUNTER
Patient requested disability paperwork on 12/28/20. Please advise if patient can return to work full or part time.

## 2021-02-19 NOTE — TELEPHONE ENCOUNTER
Jenna Lyn from [address] called in this afternoon and requested Last OV notes for Juni. She will be faxing a MR request over today. She also needs to know whether or not Mr. King is able to return from part time work now over to Full time work?      She can be reached at [phone]

## 2021-02-22 NOTE — TELEPHONE ENCOUNTER
Patient advised of below. He stated he cannot left his hands above his head without passing out and is dizzy all the time. He stated he should not be working. He also stated he cannot drive with these spells. [facility] who advised patient to come in to be seen. Advised patient and he states he will call back tomorrow to schedule.

## 2021-02-23 ENCOUNTER — HOSPITAL ENCOUNTER (OUTPATIENT)
Dept: CARDIAC REHAB | Age: 56
Setting detail: THERAPIES SERIES
Discharge: HOME | End: 2021-02-23
Payer: COMMERCIAL

## 2021-02-23 PROCEDURE — G0239 OTH RESP PROC, GROUP: HCPCS

## 2021-02-25 ENCOUNTER — HOSPITAL ENCOUNTER (OUTPATIENT)
Dept: CARDIAC REHAB | Age: 56
Setting detail: THERAPIES SERIES
Discharge: HOME | End: 2021-02-25
Payer: COMMERCIAL

## 2021-02-25 PROCEDURE — G0239 OTH RESP PROC, GROUP: HCPCS

## 2021-03-01 ENCOUNTER — HOSPITAL ENCOUNTER (OUTPATIENT)
Dept: CARDIAC REHAB | Age: 56
Setting detail: THERAPIES SERIES
Discharge: HOME | End: 2021-03-01
Payer: COMMERCIAL

## 2021-03-01 PROCEDURE — G0239 OTH RESP PROC, GROUP: HCPCS

## 2021-03-02 ENCOUNTER — APPOINTMENT (OUTPATIENT)
Dept: CARDIAC REHAB | Age: 56
End: 2021-03-02
Payer: COMMERCIAL

## 2021-03-04 ENCOUNTER — HOSPITAL ENCOUNTER (OUTPATIENT)
Dept: CARDIAC REHAB | Age: 56
Setting detail: THERAPIES SERIES
Discharge: HOME | End: 2021-03-04
Payer: COMMERCIAL

## 2021-03-04 PROCEDURE — G0239 OTH RESP PROC, GROUP: HCPCS

## 2021-03-12 DIAGNOSIS — E78.2 MIXED HYPERLIPIDEMIA: ICD-10-CM

## 2021-03-12 LAB
CHOLESTEROL, FASTING: 149 MG/DL (ref 0–199)
HDLC SERPL-MCNC: 42 MG/DL (ref 40–60)
LDL CHOLESTEROL CALCULATED: 72 MG/DL
TRIGLYCERIDE, FASTING: 177 MG/DL (ref 0–150)
VLDLC SERPL CALC-MCNC: 35 MG/DL

## 2021-03-15 NOTE — PROGRESS NOTES
[facility]  Cardiology Progress Note    Maksim Chacon  1965 March 16, 2021      Reason for Referral: CAD, HTN, HLD, ICD, syncope, hypotenson     CC: \"I am so,so. \"       Subjective:     History of Present Illness:    Herberth Walker is a 54 y.o. patient who seeing us today in follow up CAD-multivessel, ischemic cardiomyopathy, VT s/p AICD, anemia, HTN, HLD and DM. Patient had complicated course with NSTEMI, VT multi vessel PCI. He underwent heart catheterization on 10/3/18 resulting in CECE to LCX. He underwent repeat angiography with Dr. Juan Guerrero on 10/7/19 resulting in CECE to [address]. He did have to be placed on balloon pump for hemodynamic support. Due to continued episodes of sustained VT, Dr. Agnieszka Reagan placed MRI compatible Medtronic AICD. He presents today for follow up. Today, she says that he has been passing out due to his low blood pressure. He has been getting blisters on his legs then they burst and become scabs. He is unable to work due to the syncopal episodes. He has lost all of his teeth due frequent falls. Past Medical History:   has a past medical history of Arthritis, Blood circulation, collateral, CAD (coronary artery disease), CAD (coronary artery disease), Cardiac arrest ([address] Cerebral artery occlusion with cerebral infarction ([address] Diabetes mellitus ([address] Diabetic peripheral neuropathy ([address] GERD (gastroesophageal reflux disease), Hypercholesteremia, Hyperlipidemia, Hypertension, Movement disorder, MRSA (methicillin resistant staph aureus) culture positive, Neuropathy, Other disorders of kidney and ureter in diseases classified elsewhere, POTS (postural orthostatic tachycardia syndrome), Psychiatric problem, Sleep apnea, Syncope, and Type II or unspecified type diabetes mellitus without mention of complication, not stated as uncontrolled.     Surgical History:   has a past surgical history that includes Vasectomy; Cardiac catheterization; Finger surgery (Left); hernia repair; vascular surgery; Colonoscopy; joint replacement; Coronary angioplasty with stent (10/06/2018); Coronary angioplasty with stent (10/07/2018); Coronary angioplasty with stent (10/03/2018); and Diagnostic Cardiac Cath Lab Procedure. Social History:   reports that he has quit smoking. His smoking use included cigarettes and cigars. He has a 24.00 pack-year smoking history. He has never used smokeless tobacco. He reports previous drug use. Drug: Marijuana. He reports that he does not drink alcohol. Family History:  family history includes COPD in his mother; Cancer in his father and sister; Diabetes in his brother and sister; Heart Disease in his brother, father, and mother; High Blood Pressure in his mother; Stroke in his mother. Home Medications:  Were reviewed and are listed in nursing record and/or below  Prior to Admission medications    Medication Sig Start Date End Date Taking?  Authorizing Provider   Omega-3 Fatty Acids (FISH OIL) 1000 MG CAPS Take 1,000 mg by mouth daily   Yes Historical Provider, MD   Garlic (GARLIQUE PO) Take 1,000 mg by mouth daily   Yes Historical Provider, MD   atorvastatin (LIPITOR) 80 MG tablet Take 1 tablet by mouth daily 12/3/20  Yes Paty Roche MD   buPROPion (WELLBUTRIN XL) 150 MG extended release tablet Take 150 mg by mouth every morning    Yes Historical Provider, MD   prasugrel (EFFIENT) 10 MG TABS Take 1 tablet by mouth daily 11/19/20  Yes Paty Roche MD   Insulin Aspart (NOVOLOG SC) Inject into the skin   Yes Historical Provider, MD   sodium chloride (OCEAN, BABY AYR) 0.65 % nasal spray 1 spray by Nasal route as needed for Congestion 7/9/20  Yes Nola Parada MD   aspirin 81 MG chewable tablet Take 81 mg by mouth daily   Yes Historical Provider, MD   Cholecalciferol (VITAMIN D3) 97550 units CAPS Take 1 capsule by mouth once a week    Yes Historical Provider, MD   Dulaglutide (TRULICITY) 1.5 CF/3.2BY SOPN Inject 1.5 mg into the skin once a week    Yes Historical Provider, MD   insulin detemir (LEVEMIR FLEXTOUCH) 100 UNIT/ML injection pen Inject 30 Units into the skin nightly  Patient taking differently: Inject 50 Units into the skin nightly  8/23/18  Yes Karmen Naqvi,       Allergies:  No known allergies     Review of Systems: all reviewed and refer to HPI   · Constitutional: no unanticipated weight loss. There's been no change in energy level, sleep pattern, or activity level. No fevers, chills. · Eyes: No visual changes or diplopia. No scleral icterus. · ENT: No Headaches, hearing loss or vertigo. No mouth sores or sore throat. · Cardiovascular: No Chest pain, tightness or discomfort.  No Shortness of breath.  No Palpitations.  No Syncope ('blackouts', 'faints', 'collapse') or dizziness.  No Dyspnea on exertion, Orthopnea, Paroxysmal nocturnal dyspnea or breathlessness at rest.   · No Wheezing, sweating, distress and cough with frothy or bloodstained sputum. · Respiratory: No cough or wheezing, no sputum production. No hematemesis. · Gastrointestinal: No abdominal pain, appetite loss, blood in stools. No change in bowel or bladder habits. · Genitourinary: No dysuria, trouble voiding, or hematuria. · Musculoskeletal:  No gait disturbance, no joint complaints. · Integumentary: No rash or pruritis. · Neurological: No headache, diplopia, change in muscle strength, numbness or tingling. · Psychiatric: No anxiety or depression. · Endocrine: No temperature intolerance. No excessive thirst, fluid intake, or urination. No tremor. · Hematologic/Lymphatic: No abnormal bruising or bleeding, blood clots or swollen lymph nodes. · Allergic/Immunologic: No nasal congestion or hives.     Objective:     PHYSICAL EXAM:      Vitals:    03/16/21 0847   BP: 90/60   Site: Left Upper Arm   Position: Sitting   Cuff Size: Medium Adult   Pulse: 100   Temp: 97.5 °F (36.4 °C)   SpO2: 99%   Weight: 221 lb 12.8 oz (100.6 kg)   Height: 6' 2\" (1.88 m)      Weight: 221 lb 12.8 oz (100.6 kg)       General Appearance:  Alert, cooperative, no distress, appears stated age. Head:  Normocephalic, without obvious abnormality, atraumatic. Eyes:  Pupils equal and round. No scleral icterus. Mouth: Moist mucosa, no pharyngeal erythema. Nose: Nares normal. No drainage or sinus tenderness. Neck: Supple, symmetrical, trachea midline. No adenopathy. No tenderness/mass/nodules. No carotid bruit or elevated JVD. Lungs:   Respiratory Effort: Normal   Auscultation: Clear to auscultation bilaterally, respirations unlabored. No wheeze, rales   Chest Wall:  No tenderness or deformity. Cardiovascular:    Pulses  Palpation: normal   Ascultation: Regular rate, S1/ S2 normal. No murmur, rub, or gallop. 2+ radial and pedal pulses, symmetric  Carotid  Femoral   Abdomen and Gastrointestinal:   Soft, non-tender, bowel sounds active. Liver and Spleen  Masses   Musculoskeletal: No muscle wasting  Back  Gait   Extremities: Extremities normal, atraumatic. No cyanosis or edema. No cyanosis clubbing       Skin: Inspection and palpation performed, no rashes or lesions. Pysch: Normal mood and affect.  Alert and oriented to time place person   Neurologic: Normal gross motor and sensory exam.       Labs     All available labs reviewed to date:      Lab Results   Component Value Date    WBC 8.7 11/21/2020    RBC 5.53 11/21/2020    HGB 15.2 11/21/2020    HCT 43.9 11/21/2020    MCV 79.4 11/21/2020    RDW 13.2 11/21/2020     11/21/2020     Lab Results   Component Value Date     03/12/2021    K 5.1 03/12/2021    K 5.2 07/01/2020     03/12/2021    CO2 30 03/12/2021    BUN 15 03/12/2021    CREATININE 0.7 03/12/2021    GFRAA >60 03/12/2021    GFRAA >60 02/10/2013    AGRATIO 1.5 03/12/2021    LABGLOM >60 03/12/2021    GLUCOSE 184 03/12/2021    PROT 7.0 03/12/2021    PROT 8.3 11/12/2012    CALCIUM 10.2 03/12/2021    BILITOT 0.4 03/12/2021    ALKPHOS 79 03/12/2021    AST 19 03/12/2021    ALT 24 03/12/2021      No results found for: Parrable  Lab Results   Component Value Date    LABA1C 12.7 03/12/2021     Lab Results   Component Value Date    TROPONINI 0.05 (H) 11/21/2020       Cardiac, Vascular and Imaging Data all Personally Reviewed in Detail by Myself      EKG:   10/31/18 SR, reviewed   2/7/19 SR, 99 bpm T wave inversion     Echocardiogram: 10/8/18  Summary  Left ventricular cavity size is mildly dilated. Normal left ventricular wall thickness. Ejection fraction is visually estimated to be 45%. There is moderate to severe inferior hypokinesis. Normal right ventricular size and function.     Stress Test: 10/9/17   Summary    Pharmacological Stress/MPI Results:        1. Technically a satisfactory study.    2. Normal pharmacological stress portion of the study.    3. No evidence of Ischemia by Myocardial Perfusion Imaging.    4. Gated Study shows normal LV size and Systolic function; EF is 61 %. Cath:   10/3/18   ANGIOGRAPHIC FINDINGS:  1. Left main is normal.  LYNNE 3 flow. No stenosis. 2.  Left anterior descending artery comes from the left main coronary  artery. LYNNE 3 flow. No stenosis present with patent diagonal branches. 3.  Left circumflex is occluded in the mid portion. 4.  Right coronary comes from the right coronary cusp. It has a PDA which  has 80% stenosis present. The patient also has an obtuse marginal 1 which  as 80% stenosis present.   In summary, successful revascularization with stent placement in the  circumflex artery. This was 2.5 x 38 mm, expanded up to 2.8 mm. This was  a drug-coated Synergy stent    10/7/18 Dr. Quintin Jara stent patent; PCI of RPDA and PCI OM1, IABP placement    Imaging: All available imaging to date reviewed     ECHO:8/14/19  Suboptimal image quality. Definity contrast administered. Left ventricular cavity size is normal. Normal left ventricular wall  thickness. Overall left ventricular systolic function appears mildly  reduced.  Ejection fraction is visually estimated to be 50-55%. No regional  wall motion abnormalities are noted. Diastolic filling parameters suggest  grade I diastolic dysfunction. Mitral valve leaflets appear mildly thickened. No evidence of significant mitral regurgitation or stenosis. Normal right ventricular size and function. TAPSE measures 17mm. Pacer / ICD wire is visualized in the right ventricle. .  Trivial tricuspid regurgitation. ECHO 2/10/2020  Normal LV size, wall thickness and wall motion: EF is   60%. Grade I  diastolic dysfunction with normal LV filling pressures. Left atrium is of normal size. Normal right ventricular size and function. Trivial mitral & mild tricuspid regurgitation is present. Carotid Duplex:11/13/19  Right Impression   1. There are no focal elevated velocities involving the internal carotid   artery. 2. There is no gross plaque seen involving the internal carotid artery. 3. The right vertebral artery demonstrates normal antegrade flow. Left Impression   1. There are no focal elevated velocities involving the internal carotid   artery. 2. There is no gross plaque seen involving the internal carotid artery. 3. The left vertebral artery demonstrates normal antegrade flow. Cardiac cath 11/18/2020  1. Left main normal.  LYNNE 3 flow. No stenosis. 2.  Left anterior descending artery, has LYNNE 3 flow with 20% stenosis. 3.  Left circumflex has distally LYNNE 2 flow but no significant  stenosis, patent stents. 4.  Right coronary artery in the mid portion has 90% stenosis present  with unstable plaque. LV ejection fraction 60%.    In summary, successful revascularization of the right coronary artery  and placement of stent, 4.0 x 26 mm.     Ambulatory JEREMIE  Date: 3/16/2021    Right Ankle: 1.21  Left ankle: 1.22      Assessment and Plan       CAD, VT recurrent MI requiring repeat PCI  s/p AICD implant 10/12/18  --S/p NSTEMI, S/p Inferolateral STEMI, S/P multivessel PCI: LCX, EDUARD and RPCELINE Schmidt. -successful revascularization of the right coronary artery  and placement of stent, 4.0 x 26 mm. Continuue Asa, Effient, and statin therapy. Encouraged regular exercise program.     Autonomic dysfunction  Syncopal episode in the office. Unable to work at this time due to frequent syncopal episodes. Will consider midodrine after evaluated by EP. Neurogenic Syncope  Long standing hx of falls  + TTT 2005Unable to tolerate BB and ACEI due to hypotension and recurrent syncope. Follows with Dr Duy Christianson. Hyperlipidemia, mixed   Increase Lipitor to 80 mg daily. Repeat lipid profile. DM, II  Levamir, trulicity     Follow up in 6 months     Thank you for allowing us to participate in the care of Juni King. Please do not hesitate to contact me if you have any questions. Td Hassan MD, MPH    [place] [address]  Ph: [phone]  Fax: [phone]    This note was scribed in the presence of Dr Patricia Newell, by Oralia Gonzalez RN. Physician Attestation:  The scribes documentation has been prepared under my direction and personally reviewed by me in its entirety. I confirm that the note above accurately reflects all work, treatment, procedures, and medical decision making performed by me.

## 2021-03-16 ENCOUNTER — OFFICE VISIT (OUTPATIENT)
Dept: CARDIOLOGY CLINIC | Age: 56
End: 2021-03-16
Payer: MEDICARE

## 2021-03-16 ENCOUNTER — NURSE ONLY (OUTPATIENT)
Dept: CARDIOLOGY CLINIC | Age: 56
End: 2021-03-16
Payer: MEDICARE

## 2021-03-16 VITALS
HEART RATE: 100 BPM | BODY MASS INDEX: 28.47 KG/M2 | WEIGHT: 221.8 LBS | SYSTOLIC BLOOD PRESSURE: 90 MMHG | TEMPERATURE: 97.5 F | OXYGEN SATURATION: 99 % | DIASTOLIC BLOOD PRESSURE: 60 MMHG | HEIGHT: 74 IN

## 2021-03-16 VITALS — SYSTOLIC BLOOD PRESSURE: 78 MMHG | DIASTOLIC BLOOD PRESSURE: 42 MMHG | HEART RATE: 107 BPM | TEMPERATURE: 97.5 F

## 2021-03-16 DIAGNOSIS — R40.20 LOC (LOSS OF CONSCIOUSNESS) (HCC): Primary | ICD-10-CM

## 2021-03-16 DIAGNOSIS — G90.9 AUTONOMIC DYSFUNCTION: ICD-10-CM

## 2021-03-16 DIAGNOSIS — I25.5 ISCHEMIC CARDIOMYOPATHY: ICD-10-CM

## 2021-03-16 DIAGNOSIS — I47.20 VT (VENTRICULAR TACHYCARDIA): ICD-10-CM

## 2021-03-16 DIAGNOSIS — I25.10 CORONARY ARTERY DISEASE INVOLVING NATIVE CORONARY ARTERY OF NATIVE HEART WITHOUT ANGINA PECTORIS: Primary | ICD-10-CM

## 2021-03-16 DIAGNOSIS — E78.2 MIXED HYPERLIPIDEMIA: ICD-10-CM

## 2021-03-16 DIAGNOSIS — Z95.810 ICD (IMPLANTABLE CARDIOVERTER-DEFIBRILLATOR) IN PLACE: ICD-10-CM

## 2021-03-16 PROCEDURE — 99213 OFFICE O/P EST LOW 20 MIN: CPT | Performed by: INTERNAL MEDICINE

## 2021-03-16 PROCEDURE — G8484 FLU IMMUNIZE NO ADMIN: HCPCS | Performed by: INTERNAL MEDICINE

## 2021-03-16 PROCEDURE — G8428 CUR MEDS NOT DOCUMENT: HCPCS | Performed by: INTERNAL MEDICINE

## 2021-03-16 PROCEDURE — 1036F TOBACCO NON-USER: CPT | Performed by: INTERNAL MEDICINE

## 2021-03-16 PROCEDURE — 3017F COLORECTAL CA SCREEN DOC REV: CPT | Performed by: INTERNAL MEDICINE

## 2021-03-16 PROCEDURE — 99204 OFFICE O/P NEW MOD 45 MIN: CPT | Performed by: INTERNAL MEDICINE

## 2021-03-16 PROCEDURE — 93282 PRGRMG EVAL IMPLANTABLE DFB: CPT | Performed by: INTERNAL MEDICINE

## 2021-03-16 PROCEDURE — G8419 CALC BMI OUT NRM PARAM NOF/U: HCPCS | Performed by: INTERNAL MEDICINE

## 2021-03-16 PROCEDURE — G8427 DOCREV CUR MEDS BY ELIG CLIN: HCPCS | Performed by: INTERNAL MEDICINE

## 2021-03-16 NOTE — PROGRESS NOTES
[address]   Cardiac Consultation    Referring Provider:  LAURA Medina CNP     Chief Complaint   Patient presents with    Loss of Consciousness       HPI:  Maksim Chacon is a 54 y.o. male with a past medical history significant for multivessel CAD/prior STEMI/cardiac arrest, ischemic cardiomyopathy, VT s/p single chamber ICD (for secondary prevention 10/2018), HTN, HLD, DM anemia He is  typically followed by Dr. Erin Sparks and Sydnee Haskins NP for his general cardiac needs. I was asked to see immediately today in consultation for unanswered questions. He states he was previously seen by Dr. Soto Lea and he referred to neurology and he was seen at [facility]. Today he presents to office accompanied by his wife. He states he has been having syncopal episodes for 10 plus years. He states that he passed out when he was getting his BP checked here in office today. At that time the BP was noted to be 110/68. He reports that he has a BP cuff at home but has not been checking her BP when he was symptomatic with lightheadedness and dizziness. He states when it happened before \"20 years ago and he was diagnosed with POTS\". He states when he was diagnosed with POTS his blood pressure was not low. Wife states that after the angiogram about 48 hours later he passed out and has not had issues prior to this. She states that he was diagnosed with diabetes until after the angiogram as well. Wife states the the time he remain unconscious anywhere from 1 min to 17 mins. He states he has experiences falls in the past and has hit his head. His wife state she has found him on the floor in the past lying on vomit. She states he does not respond when he passed. He states the he is on social security & disability. Wife voice frustration. He states he had an EEG completed at [facility] in the past and recently [facility] did not think his loc was neurologic.   Wife states he has been having these symptoms for the past 12 years but at first it was not as bad at first and was on several medication (unable to recall names of medication) and when medication was discontinued he had some improvement but then after angiogram the episodes increased in frequency. He states that he just completed cardiac rehab and states BP was check three time after each. He states he was not sure if he passed out while in cardiac rehab. Today, in office he was lying supine and had three apparent episodes of loc that the wife said was typical. His pulse did seem to vary in strength, but was 251 mmHg systolic for the most part. It was maybe in 100 mmHg one time. He did not response for ~ a minute each time with deep breathing. He was supine and bp was 100 mmHg palpable systolic with pulse ~ 559 bpm by cuff, thus these episodes not cardiac. Past Medical History:   has a past medical history of Arthritis, Blood circulation, collateral, CAD (coronary artery disease), CAD (coronary artery disease), Cardiac arrest ([address] Cerebral artery occlusion with cerebral infarction ([address] Diabetes mellitus ([address] Diabetic peripheral neuropathy ([address] GERD (gastroesophageal reflux disease), Hypercholesteremia, Hyperlipidemia, Hypertension, Movement disorder, MRSA (methicillin resistant staph aureus) culture positive, Neuropathy, Other disorders of kidney and ureter in diseases classified elsewhere, POTS (postural orthostatic tachycardia syndrome), Psychiatric problem, Sleep apnea, Syncope, and Type II or unspecified type diabetes mellitus without mention of complication, not stated as uncontrolled. Surgical History:   has a past surgical history that includes Vasectomy; Cardiac catheterization; Finger surgery (Left); hernia repair; vascular surgery; Colonoscopy; joint replacement; Coronary angioplasty with stent (10/06/2018); Coronary angioplasty with stent (10/07/2018);  Coronary angioplasty with stent (10/03/2018); and Diagnostic Cardiac Cath Lab Procedure. Social History:   reports that he has quit smoking. His smoking use included cigarettes and cigars. He has a 24.00 pack-year smoking history. He has never used smokeless tobacco. He reports previous drug use. Drug: Marijuana. He reports that he does not drink alcohol. Family History:  family history includes COPD in his mother; Cancer in his father and sister; Diabetes in his brother and sister; Heart Disease in his brother, father, and mother; High Blood Pressure in his mother; Stroke in his mother. Home Medications:  Outpatient Encounter Medications as of 3/16/2021   Medication Sig Dispense Refill    Omega-3 Fatty Acids (FISH OIL) 1000 MG CAPS Take 1,000 mg by mouth daily      Garlic (GARLIQUE PO) Take 1,000 mg by mouth daily      atorvastatin (LIPITOR) 80 MG tablet Take 1 tablet by mouth daily 90 tablet 3    buPROPion (WELLBUTRIN XL) 150 MG extended release tablet Take 150 mg by mouth every morning       prasugrel (EFFIENT) 10 MG TABS Take 1 tablet by mouth daily 30 tablet 3    Insulin Aspart (NOVOLOG SC) Inject into the skin      sodium chloride (OCEAN, BABY AYR) 0.65 % nasal spray 1 spray by Nasal route as needed for Congestion 30 mL 0    aspirin 81 MG chewable tablet Take 81 mg by mouth daily      Cholecalciferol (VITAMIN D3) 49041 units CAPS Take 1 capsule by mouth once a week       Dulaglutide (TRULICITY) 1.5 LN/9.7FI SOPN Inject 1.5 mg into the skin once a week       insulin detemir (LEVEMIR FLEXTOUCH) 100 UNIT/ML injection pen Inject 30 Units into the skin nightly (Patient taking differently: Inject 50 Units into the skin nightly ) 5 pen 3     No facility-administered encounter medications on file as of 3/16/2021. Allergies:  No known allergies     Review of Systems   Constitutional: Negative for activity change and appetite change. HENT: Negative for facial swelling and neck pain. Eyes: Negative for discharge and itching.     Respiratory: Negative for chest tightness and shortness of breath. Cardiovascular: Negative for palpitations. Negative for chest pain. Negative for leg swelling. Gastrointestinal: Negative for abdominal pain and abdominal distention. Genitourinary: Negative. Musculoskeletal: Negative. Skin: Negative for color change and pallor. Neurological: dizziness, syncope? and light-headedness. apparent LOC  Hematological: Negative. Psychiatric/Behavioral: Negative for behavioral problems and agitation. BP (!) 78/42 (Position: Standing)   Pulse 107   Temp 97.5 °F (36.4 °C)         Objective:  Physical Exam   Nursing note and vitals reviewed. Constitutional: He appears well-developed and well-nourished. Head: atraumatic. Eyes: Right eye exhibits no discharge. Left eye exhibits no discharge. Neck: Neck supple. Cardiovascular: Normal rate, regular rhythm and normal heart sounds. Pulmonary/Chest: Effort normal and breath sounds normal.   Abdominal: Soft. Musculoskeletal: He exhibits no edema. Neurological: He is alert without any gross abnormalities. Skin: Skin is warm and dry. Psychiatric: He has a normal mood and affect. Studies:   1. Event monitor:   None     2. Echo: 10/8/18  Left ventricular cavity size is mildly dilated. Normal left ventricular wall thickness. Ejection fraction is visually estimated to be 45%. There is moderate to severe inferior hypokinesis. Normal right ventricular size and function.     3. Tilt Table Test: 9/27/05 ([facility])  Significantly positive tilt table test for vasodepressor response for neurocardiogenic syncope.        4. Stress Test: 7/14/14   No evidence of reversible ischemia. There is a moderate intensity fixed    defects of the basal inferolateral and basal to mid inferior wall with a    mild intensity fixed defect of the basal to mid anteroseptal walls. There is    artifact from motion, bowel, and liver.  The defects may represent scar or    artifact but no reversible mal or PNES, but   Favor PNES in view of longevity, episodes of 17 minute duration, and observations today. Admittedly, he may had hypotensive episodes also. They have been in this medical system with over a thousand episodes and are naturally frustrated as they do not perceive any answers. [facility] eval which we encouraged. They need a fresh view. Plan: They will pursue [address] eval in [place]. Divya Ramirez. Abdulkadir JOVEL Scribed by Ron Barker RN  The scribes documentation has been prepared under my direction and personally reviewed by me in its entirety. I confirm that the note above accurately reflects all work, treatment, procedures, and medical decision making performed by me.

## 2021-03-16 NOTE — PROGRESS NOTES
Pt seen in clinic today for cardiac device interrogation. Their device is a MDT 1 chamber ICD. Based on threshold, impedance, and intrinsic sensing tests run today, the device appears to be functioning consistently with displayed trends. Pt continues to complain that his device frequently makes the default \"no level alert\" tone. Device does not show any indication of doing this. Pt again instructed to call [employer] if the problem continues. Remaining battery life is 9y10m                     0.00%      optivol is stable and near baseline. Pt in clinic today for syncope. Device shows no correlating episodes. Rx:   prasugrel (EFFIENT) 10 MG TABS Take 1 tablet by mouth once for 1 dose  Patient taking differently: Take 10 mg by mouth daily     Pt was informed of findings today and general questions have been answered with regard to device. Home monitoring hardware is transmitting on schedule. Pt to see Dr. Madison Friend and Dr. Charlee House in clinic today following their device check.

## 2021-03-29 RX ORDER — PRASUGREL 10 MG/1
TABLET, FILM COATED ORAL
Qty: 60 TABLET | Refills: 3 | Status: ON HOLD | OUTPATIENT
Start: 2021-03-29 | End: 2021-04-19 | Stop reason: HOSPADM

## 2021-04-08 ENCOUNTER — NURSE ONLY (OUTPATIENT)
Dept: CARDIOLOGY CLINIC | Age: 56
End: 2021-04-08
Payer: MEDICARE

## 2021-04-08 DIAGNOSIS — I47.20 VT (VENTRICULAR TACHYCARDIA): ICD-10-CM

## 2021-04-08 DIAGNOSIS — Z95.810 ICD (IMPLANTABLE CARDIOVERTER-DEFIBRILLATOR) IN PLACE: ICD-10-CM

## 2021-04-08 DIAGNOSIS — I46.9 CARDIAC ARREST (HCC): ICD-10-CM

## 2021-04-08 PROCEDURE — 93295 DEV INTERROG REMOTE 1/2/MLT: CPT | Performed by: INTERNAL MEDICINE

## 2021-04-08 PROCEDURE — 93296 REM INTERROG EVL PM/IDS: CPT | Performed by: INTERNAL MEDICINE

## 2021-04-08 NOTE — LETTER
[address] [facility] [phone]  [address]    Pacemaker/Defibrillator Clinic          04/08/21        Reniat King  [address] [facility] [address]        Dear Miranda Hoffmann    This letter is to inform you that we received the transmission from your monitor at home that checks your implanted heart device. The next date your monitor will automatically transmit will be 7-12-21. If your report needs attention we will notify you. Your device and monitor are wireless and most transmit cellularly, but please periodically check your monitor is still plugged in to the electrical outlet. If you still use the telephone land line to send please ensure the connection to the phone maddie is secure. This will help to ensure successful automatic transmissions in the future. Also, the monitor needs to be close to you while sleeping at night. Please be aware that the remote device transmission sites are periodically monitored only during regular business hours during which simultaneous in-office device clinics are being run. If your transmission requires attention, we will contact you as soon as possible. Thank you.             [address]

## 2021-04-14 ENCOUNTER — HOSPITAL ENCOUNTER (INPATIENT)
Age: 56
LOS: 5 days | Discharge: HOME | DRG: 872 | End: 2021-04-19
Attending: EMERGENCY MEDICINE | Admitting: INTERNAL MEDICINE
Payer: MEDICARE

## 2021-04-14 ENCOUNTER — APPOINTMENT (OUTPATIENT)
Dept: GENERAL RADIOLOGY | Age: 56
DRG: 872 | End: 2021-04-14
Payer: MEDICARE

## 2021-04-14 ENCOUNTER — APPOINTMENT (OUTPATIENT)
Dept: CT IMAGING | Age: 56
DRG: 872 | End: 2021-04-14
Payer: MEDICARE

## 2021-04-14 DIAGNOSIS — R00.0 TACHYCARDIA: ICD-10-CM

## 2021-04-14 DIAGNOSIS — K81.9 CHOLECYSTITIS: Primary | ICD-10-CM

## 2021-04-14 DIAGNOSIS — D72.829 LEUKOCYTOSIS, UNSPECIFIED TYPE: ICD-10-CM

## 2021-04-14 PROBLEM — D72.9 NEUTROPHILIC LEUKOCYTOSIS: Status: ACTIVE | Noted: 2021-04-14

## 2021-04-14 PROBLEM — R65.10 SIRS (SYSTEMIC INFLAMMATORY RESPONSE SYNDROME) (HCC): Status: ACTIVE | Noted: 2021-04-14

## 2021-04-14 PROBLEM — D72.828 NEUTROPHILIC LEUKOCYTOSIS: Status: ACTIVE | Noted: 2021-04-14

## 2021-04-14 PROBLEM — K81.0 ACUTE CHOLECYSTITIS: Status: ACTIVE | Noted: 2021-04-14

## 2021-04-14 LAB
ALBUMIN SERPL-MCNC: 4.1 G/DL (ref 3.4–5)
ALP BLD-CCNC: 87 U/L (ref 40–129)
ALT SERPL-CCNC: 16 U/L (ref 10–40)
ANION GAP SERPL CALCULATED.3IONS-SCNC: 17 MMOL/L (ref 3–16)
AST SERPL-CCNC: 13 U/L (ref 15–37)
BANDED NEUTROPHILS RELATIVE PERCENT: 1 % (ref 0–7)
BASOPHILS ABSOLUTE: 0 K/UL (ref 0–0.2)
BASOPHILS RELATIVE PERCENT: 0 %
BILIRUB SERPL-MCNC: 1.1 MG/DL (ref 0–1)
BILIRUBIN DIRECT: <0.2 MG/DL (ref 0–0.3)
BILIRUBIN, INDIRECT: ABNORMAL MG/DL (ref 0–1)
BUN BLDV-MCNC: 14 MG/DL (ref 7–20)
CALCIUM SERPL-MCNC: 9.5 MG/DL (ref 8.3–10.6)
CHLORIDE BLD-SCNC: 91 MMOL/L (ref 99–110)
CO2: 22 MMOL/L (ref 21–32)
CREAT SERPL-MCNC: 0.9 MG/DL (ref 0.9–1.3)
EKG ATRIAL RATE: 109 BPM
EKG DIAGNOSIS: NORMAL
EKG P AXIS: 38 DEGREES
EKG P-R INTERVAL: 164 MS
EKG Q-T INTERVAL: 332 MS
EKG QRS DURATION: 84 MS
EKG QTC CALCULATION (BAZETT): 447 MS
EKG R AXIS: 6 DEGREES
EKG T AXIS: 38 DEGREES
EKG VENTRICULAR RATE: 109 BPM
EOSINOPHILS ABSOLUTE: 0 K/UL (ref 0–0.6)
EOSINOPHILS RELATIVE PERCENT: 0 %
GFR AFRICAN AMERICAN: >60
GFR NON-AFRICAN AMERICAN: >60
GLUCOSE BLD-MCNC: 297 MG/DL (ref 70–99)
GLUCOSE BLD-MCNC: 484 MG/DL (ref 70–99)
HCT VFR BLD CALC: 48.8 % (ref 40.5–52.5)
HEMOGLOBIN: 16.6 G/DL (ref 13.5–17.5)
INR BLD: 1.06 (ref 0.86–1.14)
LACTIC ACID: 1.9 MMOL/L (ref 0.4–2)
LYMPHOCYTES ABSOLUTE: 1.8 K/UL (ref 1–5.1)
LYMPHOCYTES RELATIVE PERCENT: 10 %
MCH RBC QN AUTO: 28.1 PG (ref 26–34)
MCHC RBC AUTO-ENTMCNC: 34.1 G/DL (ref 31–36)
MCV RBC AUTO: 82.5 FL (ref 80–100)
MONOCYTES ABSOLUTE: 1.1 K/UL (ref 0–1.3)
MONOCYTES RELATIVE PERCENT: 6 %
NEUTROPHILS ABSOLUTE: 15.1 K/UL (ref 1.7–7.7)
NEUTROPHILS RELATIVE PERCENT: 83 %
PDW BLD-RTO: 13.4 % (ref 12.4–15.4)
PERFORMED ON: ABNORMAL
PLATELET # BLD: 189 K/UL (ref 135–450)
PLATELET SLIDE REVIEW: ADEQUATE
PMV BLD AUTO: 8.8 FL (ref 5–10.5)
POTASSIUM REFLEX MAGNESIUM: 4.6 MMOL/L (ref 3.5–5.1)
PROTHROMBIN TIME: 12.3 SEC (ref 10–13.2)
RBC # BLD: 5.91 M/UL (ref 4.2–5.9)
SLIDE REVIEW: ABNORMAL
SODIUM BLD-SCNC: 130 MMOL/L (ref 136–145)
TEAR DROP CELLS: ABNORMAL
TOTAL PROTEIN: 7.4 G/DL (ref 6.4–8.2)
WBC # BLD: 18 K/UL (ref 4–11)

## 2021-04-14 PROCEDURE — 6360000004 HC RX CONTRAST MEDICATION: Performed by: EMERGENCY MEDICINE

## 2021-04-14 PROCEDURE — 94760 N-INVAS EAR/PLS OXIMETRY 1: CPT

## 2021-04-14 PROCEDURE — 96360 HYDRATION IV INFUSION INIT: CPT

## 2021-04-14 PROCEDURE — 6370000000 HC RX 637 (ALT 250 FOR IP): Performed by: INTERNAL MEDICINE

## 2021-04-14 PROCEDURE — 1200000000 HC SEMI PRIVATE

## 2021-04-14 PROCEDURE — 80048 BASIC METABOLIC PNL TOTAL CA: CPT

## 2021-04-14 PROCEDURE — 2700000000 HC OXYGEN THERAPY PER DAY

## 2021-04-14 PROCEDURE — 83605 ASSAY OF LACTIC ACID: CPT

## 2021-04-14 PROCEDURE — 71260 CT THORAX DX C+: CPT

## 2021-04-14 PROCEDURE — 6370000000 HC RX 637 (ALT 250 FOR IP): Performed by: EMERGENCY MEDICINE

## 2021-04-14 PROCEDURE — 93010 ELECTROCARDIOGRAM REPORT: CPT | Performed by: INTERNAL MEDICINE

## 2021-04-14 PROCEDURE — 93005 ELECTROCARDIOGRAM TRACING: CPT | Performed by: EMERGENCY MEDICINE

## 2021-04-14 PROCEDURE — 2580000003 HC RX 258: Performed by: INTERNAL MEDICINE

## 2021-04-14 PROCEDURE — 85025 COMPLETE CBC W/AUTO DIFF WBC: CPT

## 2021-04-14 PROCEDURE — 99284 EMERGENCY DEPT VISIT MOD MDM: CPT

## 2021-04-14 PROCEDURE — 71046 X-RAY EXAM CHEST 2 VIEWS: CPT

## 2021-04-14 PROCEDURE — 6360000002 HC RX W HCPCS: Performed by: EMERGENCY MEDICINE

## 2021-04-14 PROCEDURE — 2580000003 HC RX 258: Performed by: EMERGENCY MEDICINE

## 2021-04-14 PROCEDURE — 94150 VITAL CAPACITY TEST: CPT

## 2021-04-14 PROCEDURE — 36415 COLL VENOUS BLD VENIPUNCTURE: CPT

## 2021-04-14 PROCEDURE — 80076 HEPATIC FUNCTION PANEL: CPT

## 2021-04-14 PROCEDURE — 87040 BLOOD CULTURE FOR BACTERIA: CPT

## 2021-04-14 PROCEDURE — 85610 PROTHROMBIN TIME: CPT

## 2021-04-14 RX ORDER — ATORVASTATIN CALCIUM 80 MG/1
80 TABLET, FILM COATED ORAL DAILY
Status: DISCONTINUED | OUTPATIENT
Start: 2021-04-14 | End: 2021-04-19 | Stop reason: HOSPADM

## 2021-04-14 RX ORDER — ASPIRIN 81 MG/1
81 TABLET, CHEWABLE ORAL DAILY
Status: DISCONTINUED | OUTPATIENT
Start: 2021-04-14 | End: 2021-04-19 | Stop reason: HOSPADM

## 2021-04-14 RX ORDER — ONDANSETRON 2 MG/ML
4 INJECTION INTRAMUSCULAR; INTRAVENOUS EVERY 4 HOURS PRN
Status: DISCONTINUED | OUTPATIENT
Start: 2021-04-14 | End: 2021-04-19 | Stop reason: HOSPADM

## 2021-04-14 RX ORDER — SODIUM CHLORIDE 0.9 % (FLUSH) 0.9 %
10 SYRINGE (ML) INJECTION PRN
Status: DISCONTINUED | OUTPATIENT
Start: 2021-04-14 | End: 2021-04-16

## 2021-04-14 RX ORDER — NICOTINE POLACRILEX 4 MG
15 LOZENGE BUCCAL PRN
Status: DISCONTINUED | OUTPATIENT
Start: 2021-04-14 | End: 2021-04-19 | Stop reason: HOSPADM

## 2021-04-14 RX ORDER — POLYETHYLENE GLYCOL 3350 17 G/17G
17 POWDER, FOR SOLUTION ORAL DAILY PRN
Status: DISCONTINUED | OUTPATIENT
Start: 2021-04-14 | End: 2021-04-19 | Stop reason: HOSPADM

## 2021-04-14 RX ORDER — BUPROPION HYDROCHLORIDE 150 MG/1
150 TABLET ORAL EVERY MORNING
Status: DISCONTINUED | OUTPATIENT
Start: 2021-04-15 | End: 2021-04-19 | Stop reason: HOSPADM

## 2021-04-14 RX ORDER — LIDOCAINE 4 G/G
1 PATCH TOPICAL DAILY
Status: DISCONTINUED | OUTPATIENT
Start: 2021-04-14 | End: 2021-04-19 | Stop reason: HOSPADM

## 2021-04-14 RX ORDER — DEXTROSE MONOHYDRATE 50 MG/ML
100 INJECTION, SOLUTION INTRAVENOUS PRN
Status: DISCONTINUED | OUTPATIENT
Start: 2021-04-14 | End: 2021-04-19 | Stop reason: HOSPADM

## 2021-04-14 RX ORDER — SODIUM CHLORIDE 9 MG/ML
INJECTION, SOLUTION INTRAVENOUS CONTINUOUS
Status: DISCONTINUED | OUTPATIENT
Start: 2021-04-14 | End: 2021-04-19 | Stop reason: HOSPADM

## 2021-04-14 RX ORDER — ACETAMINOPHEN 325 MG/1
650 TABLET ORAL EVERY 4 HOURS PRN
Status: DISCONTINUED | OUTPATIENT
Start: 2021-04-14 | End: 2021-04-19 | Stop reason: HOSPADM

## 2021-04-14 RX ORDER — 0.9 % SODIUM CHLORIDE 0.9 %
1000 INTRAVENOUS SOLUTION INTRAVENOUS ONCE
Status: COMPLETED | OUTPATIENT
Start: 2021-04-14 | End: 2021-04-14

## 2021-04-14 RX ORDER — PRASUGREL 10 MG/1
10 TABLET, FILM COATED ORAL DAILY
Status: DISCONTINUED | OUTPATIENT
Start: 2021-04-14 | End: 2021-04-19 | Stop reason: HOSPADM

## 2021-04-14 RX ORDER — INSULIN GLARGINE 100 [IU]/ML
30 INJECTION, SOLUTION SUBCUTANEOUS 2 TIMES DAILY
Status: DISCONTINUED | OUTPATIENT
Start: 2021-04-14 | End: 2021-04-19 | Stop reason: HOSPADM

## 2021-04-14 RX ORDER — INSULIN DETEMIR 100 [IU]/ML
30 INJECTION, SOLUTION SUBCUTANEOUS 2 TIMES DAILY
Status: ON HOLD | COMMUNITY
End: 2022-09-05 | Stop reason: HOSPADM

## 2021-04-14 RX ORDER — DEXTROSE MONOHYDRATE 25 G/50ML
12.5 INJECTION, SOLUTION INTRAVENOUS PRN
Status: DISCONTINUED | OUTPATIENT
Start: 2021-04-14 | End: 2021-04-19 | Stop reason: HOSPADM

## 2021-04-14 RX ORDER — SODIUM CHLORIDE 0.9 % (FLUSH) 0.9 %
10 SYRINGE (ML) INJECTION EVERY 12 HOURS SCHEDULED
Status: DISCONTINUED | OUTPATIENT
Start: 2021-04-14 | End: 2021-04-16

## 2021-04-14 RX ORDER — ACETAMINOPHEN 650 MG/1
650 SUPPOSITORY RECTAL EVERY 4 HOURS PRN
Status: DISCONTINUED | OUTPATIENT
Start: 2021-04-14 | End: 2021-04-19 | Stop reason: HOSPADM

## 2021-04-14 RX ORDER — METRONIDAZOLE 500 MG/1
500 TABLET ORAL ONCE
Status: COMPLETED | OUTPATIENT
Start: 2021-04-14 | End: 2021-04-14

## 2021-04-14 RX ORDER — OXYCODONE HYDROCHLORIDE 5 MG/1
5 TABLET ORAL ONCE
Status: COMPLETED | OUTPATIENT
Start: 2021-04-14 | End: 2021-04-14

## 2021-04-14 RX ORDER — SODIUM CHLORIDE 9 MG/ML
25 INJECTION, SOLUTION INTRAVENOUS PRN
Status: DISCONTINUED | OUTPATIENT
Start: 2021-04-14 | End: 2021-04-16

## 2021-04-14 RX ADMIN — ACETAMINOPHEN 650 MG: 325 TABLET ORAL at 22:14

## 2021-04-14 RX ADMIN — Medication 10 ML: at 22:05

## 2021-04-14 RX ADMIN — INSULIN LISPRO 6 UNITS: 100 INJECTION, SOLUTION INTRAVENOUS; SUBCUTANEOUS at 21:58

## 2021-04-14 RX ADMIN — SODIUM CHLORIDE: 9 INJECTION, SOLUTION INTRAVENOUS at 22:19

## 2021-04-14 RX ADMIN — SODIUM CHLORIDE 1000 ML: 9 INJECTION, SOLUTION INTRAVENOUS at 16:58

## 2021-04-14 RX ADMIN — INSULIN GLARGINE 30 UNITS: 100 INJECTION, SOLUTION SUBCUTANEOUS at 21:58

## 2021-04-14 RX ADMIN — IOPAMIDOL 75 ML: 755 INJECTION, SOLUTION INTRAVENOUS at 16:21

## 2021-04-14 RX ADMIN — ASPIRIN 81 MG: 81 TABLET, CHEWABLE ORAL at 22:04

## 2021-04-14 RX ADMIN — ATORVASTATIN CALCIUM 80 MG: 80 TABLET, FILM COATED ORAL at 22:05

## 2021-04-14 RX ADMIN — METRONIDAZOLE 500 MG: 500 TABLET ORAL at 17:48

## 2021-04-14 RX ADMIN — PRASUGREL 10 MG: 10 TABLET, FILM COATED ORAL at 22:04

## 2021-04-14 RX ADMIN — CEFTRIAXONE 1000 MG: 1 INJECTION, POWDER, FOR SOLUTION INTRAMUSCULAR; INTRAVENOUS at 17:48

## 2021-04-14 RX ADMIN — OXYCODONE HYDROCHLORIDE 5 MG: 5 TABLET ORAL at 16:58

## 2021-04-14 ASSESSMENT — PAIN DESCRIPTION - ORIENTATION
ORIENTATION: RIGHT;LOWER
ORIENTATION: RIGHT
ORIENTATION: RIGHT;LOWER

## 2021-04-14 ASSESSMENT — PAIN SCALES - GENERAL
PAINLEVEL_OUTOF10: 8
PAINLEVEL_OUTOF10: 1

## 2021-04-14 ASSESSMENT — PAIN DESCRIPTION - PAIN TYPE: TYPE: ACUTE PAIN

## 2021-04-14 ASSESSMENT — ENCOUNTER SYMPTOMS
SHORTNESS OF BREATH: 1
CHEST TIGHTNESS: 0
BACK PAIN: 0
EYES NEGATIVE: 1

## 2021-04-14 ASSESSMENT — PAIN DESCRIPTION - LOCATION
LOCATION: ABDOMEN
LOCATION: RIB CAGE

## 2021-04-14 ASSESSMENT — PAIN - FUNCTIONAL ASSESSMENT: PAIN_FUNCTIONAL_ASSESSMENT: ACTIVITIES ARE NOT PREVENTED

## 2021-04-14 ASSESSMENT — PAIN DESCRIPTION - DESCRIPTORS
DESCRIPTORS: ACHING;SHARP
DESCRIPTORS: SHARP

## 2021-04-14 ASSESSMENT — PAIN DESCRIPTION - ONSET: ONSET: ON-GOING

## 2021-04-14 NOTE — ED NOTES
ED SBAR report provider to [facility] [place]. Patient to be transported to Room 4114 via stretcher by transport tech. Patient transported with bedside cardiac monitor and with IV medications infusing. IV site clean, dry, and intact. MEWS score and pain assessed as 0 and documented. Updated patient on plan of care.        Ar Yepez RN  04/14/21 0184

## 2021-04-14 NOTE — ED NOTES
Pharmacy Medication Reconciliation Note     List of medications patient is currently taking is complete. Source of information:   1. patient  2. EMR    Notes regarding home medications:   1.  Reports no medications taken today    Denies taking any other OTC or herbal medications    Jerome Vallejo PharmD, BCPS  4/14/2021  6:26 PM

## 2021-04-14 NOTE — H&P
Hospital Medicine  History and Physical    PCP: SILVIO POLLOCK, APRN - CNP  Patient Name: Ekaterina Reed    Date of Service: Pt seen/examined on 04/14/2021 and admitted to Inpatient with expected LOS greater than two midnights due to medical therapy    CHIEF COMPLAINT:  Pt c/o right sided rib pain s/p syncope 2 days ago (has already been evaluated for syncope)  HISTORY OF PRESENT ILLNESS: Pt is an 54y.o. year-old male with a history of hypertension, hyperlipidemia, diabetes mellitus type II and coronary artery disease presents to the emergency room for evaluation of right sided rib pain which began after he passed out 2 nights ago. He thinks that he may have hit his right side on a piece of furniture. Certain movements such as bending over, twisting or coughing makes the pain worse. He was concerned that he may have broken a rib. In the course of evaluation in the emergency room a CT revealed acute cholecystitis. Was noted to have a positive Irwin's sign, and meets SIRS criteria. He is being admitted for further evaluation and treatment. Associated signs and symptoms do not include fever or chills, nausea, vomiting, hemoptysis, hematochezia, diarrhea, constipation or urinary symptoms.       Past Medical History:        Diagnosis Date    Arthritis     Blood circulation, collateral     CAD (coronary artery disease) 7/15/2014    CAD (coronary artery disease)     Cardiac arrest ([address] 10/05/2018    Cerebral artery occlusion with cerebral infarction ([address]     Diabetes mellitus ([address]     Diabetic peripheral neuropathy ([facility] 6/8/2018    GERD (gastroesophageal reflux disease)     ulcers    Hypercholesteremia     Hyperlipidemia     Hypertension     Movement disorder     possible arthritis right hand    MRSA (methicillin resistant staph aureus) culture positive 07/13/2018    foot wound    Neuropathy     Other disorders of kidney and ureter in diseases classified elsewhere     POTS (postural insulin detemir (LEVEMIR FLEXTOUCH) 100 UNIT/ML injection pen Inject 30 Units into the skin nightly  Patient taking differently: Inject 50 Units into the skin nightly  8/23/18   Karmen Naqvi DO       Family History:       Problem Relation Age of Onset    High Blood Pressure Mother     Stroke Mother     Heart Disease Mother     COPD Mother     Heart Disease Father     Cancer Father         skin    Diabetes Sister     Cancer Sister     Heart Disease Brother     Diabetes Brother      Social History:   TOBACCO:   reports that he has quit smoking. His smoking use included cigarettes and cigars. He has a 24.00 pack-year smoking history. He has never used smokeless tobacco.  ETOH:   reports no history of alcohol use. OCCUPATION:      REVIEW OF SYSTEMS:  A full review of systems was performed and is negative except for that which appears in the HPI    Physical Exam:    Vitals: /71   Pulse 115   Temp 99.1 °F (37.3 °C) (Oral)   Resp 16   Ht 6' 2\" (1.88 m)   Wt 219 lb 12.8 oz (99.7 kg)   SpO2 95%   BMI 28.22 kg/m²   General appearance: WD/WN 54y.o. year-old male who is alert, appears stated age and is cooperative  HEENT: Head: Normocephalic, no lesions, without obvious abnormality. Eye: Normal external eye, conjunctiva, lids cornea, PEERL. Ears: Normal external ears. Non-tender. Nose: Normal external nose, mucus membranes and septum. Pharynx: Dental Hygiene adequate. Normal buccal mucosa. Normal pharynx. Neck: no adenopathy, no carotid bruit, no JVD, supple, symmetrical, trachea midline and thyroid not enlarged, symmetric, no tenderness/mass/nodules  Lungs: clear to auscultation bilaterally and no use of accessory muscles.   Heart: regular rate and rhythm, S1, S2 normal, no murmur, click, rub or gallop and normal apical impulse  Abdomen: soft, + Irwin's sign; bowel sounds normal; no masses, no organomegaly  Extremities: extremities atraumatic, no cyanosis or edema and Homans sign is negative, no sign of DVT. Capillary Refill: Acceptable < 3 seconds   Peripheral Pulses: +3 easily felt, not easily obliterated with pressures   Skin: Skin color, texture, turgor normal. No rashes or lesions on exposed skin  Neurologic: Neurovascularly intact without any focal sensory/motor deficits. Cranial nerves: II-XII intact, grossly non-focal. Gait was not tested. Mental Status: Alert and oriented, thought content appropriate, normal insight    CBC:   Recent Labs     04/14/21  1200   WBC 18.0*   HGB 16.6        BMP:    Recent Labs     04/14/21  1200   *   K 4.6   CL 91*   CO2 22   BUN 14   CREATININE 0.9   GLUCOSE 484*     Troponin: No results for input(s): TROPONINI in the last 72 hours. PT/INR:  No results found for: PTINR  U/A:    Lab Results   Component Value Date    LEUKOCYTESUR Negative 10/06/2020    RBCUA 9 10/06/2020    SPECGRAV >1.030 10/06/2020    UROBILINOGEN 0.2 10/06/2020    BILIRUBINUR SMALL 10/06/2020    BILIRUBINUR NEGATIVE 09/10/2010    BLOODU Negative 10/06/2020    GLUCOSEU >=1000 10/06/2020    GLUCOSEU >=1000 09/10/2010    PROTEINU 30 10/06/2020         RAD:   I have independently reviewed and interpreted the imaging studies below and based my recommendations to the patient on those findings. Xr Chest (2 Vw)    Result Date: 4/14/2021  EXAMINATION: TWO XRAY VIEWS OF THE CHEST 4/14/2021 12:17 pm COMPARISON: Chest radiograph November 21, 2020 impression. HISTORY: ORDERING SYSTEM PROVIDED HISTORY: Rib pain in the R side TECHNOLOGIST PROVIDED HISTORY: Reason for exam:->Rib pain in the R side Reason for Exam: right ant rib pain after fall Acuity: Acute Type of Exam: Initial FINDINGS: The lung volumes are low. There is again noted to be elevation right hemidiaphragm. Linear opacity is seen within the right base. No pneumothorax or pleural effusion identified. Left subclavian approach single lead pacer/AICD is again seen. Cardiac and mediastinal contours without acute process.   No acute osseous abnormality. Low lung volume study with mild right basilar atelectasis. Ct Chest Abdomen Pelvis W Contrast    Result Date: 4/14/2021  EXAMINATION: CT OF THE CHEST, ABDOMEN, AND PELVIS WITH CONTRAST 4/14/2021 4:20 pm TECHNIQUE: CT of the chest, abdomen and pelvis was performed with the administration of intravenous contrast. Multiplanar reformatted images are provided for review. Dose modulation, iterative reconstruction, and/or weight based adjustment of the mA/kV was utilized to reduce the radiation dose to as low as reasonably achievable. COMPARISON: June 2012 HISTORY: ORDERING SYSTEM PROVIDED HISTORY: right side chest wall trauma, with right sided abd pain as well, fall two days ago TECHNOLOGIST PROVIDED HISTORY: Reason for exam:->right side chest wall trauma, with right sided abd pain as well, fall two days ago Additional Contrast?->None Decision Support Exception->Emergency Medical Condition (MA) Reason for Exam: right side chest wall trauma, with right sided abd pain as well, fall two days ago Acuity: Acute FINDINGS: Chest: Mediastinum: Streak artifact is seen from pacer. No pericardial effusion is seen. Small mediastinal nodes are noted. Atherosclerotic change is seen in the aorta. Coronary artery calcification is seen. Lungs/pleura: Respiratory motion artifact limits evaluation of fine pulmonary parenchymal change. A few hazy ground-glass opacities are seen in the left lung. Bandlike opacities are seen in the lingula and left lower lobe. On the right, a few hazy ground-glass opacities are seen in the right upper lobe, right middle lobe and right lower lobe. Bandlike opacities are seen in the right middle lobe and right lower lobe with associated bronchiectatic changes. No pleural effusion. No obvious pneumothorax. Soft Tissues/Bones: Spurring is seen in the spine. Spurring is seen in the shoulder joints. There is a subtle area of cortical irregularity of right anterior 2nd rib. Abdomen/Pelvis: Organs: No perisplenic fluid Adrenal glands appear normal There is lobular contour left kidney. No stones or hydronephrosis. There is lobular contour the right kidney. No stones or hydronephrosis noted No intrahepatic ductal dilatation seen. There is trace perihepatic fluid Gallbladder is abnormally dilated. There is surrounding pericholecystic fluid in injection of the fat seen. No obvious gallstones noted on CT. No significant intrahepatic biliary ductal dilatation No peripancreatic fluid. No peripancreatic inflammatory change. GI/Bowel: No significant small bowel distention noted. Mild stool load seen in the colon. Appendix is identified and normal in caliber. .  Duodenal diverticulum is seen. Pelvis: No free fluid in pelvis. No pelvic adenopathy. Peritoneum/Retroperitoneum: No retroperitoneal adenopathy. No aortic aneurysm. Bones/Soft Tissues: Spurring is seen in the spine. Spurring is seen in the hips. .  A few subtle sclerotic foci are seen in the pubic rami, most likely bone islands in the absence of a known primary. Abnormal inflammatory change involving and surrounding the gallbladder with trace pericholecystic fluid, compatible with acute cholecystitis by CT scan. Scattered areas of ground-glass opacity seen throughout the lungs,, possibly postinflammatory-infectious in etiology. Bronchiectatic changes are seen on the right. Superimposed bandlike opacities are seen, likely atelectasis or scarring Age indeterminate right 2nd rib fracture. No acute traumatic solid organ injury identified in abdomen or pelvis. Assessment:   Principal Problem:    Acute cholecystitis  Active Problems:    Essential hypertension    HLD (hyperlipidemia)    DMII (diabetes mellitus, type 2) ([facility])    CAD, multiple vessel    Tachycardia    Tachypnea    SIRS (systemic inflammatory response syndrome) ([facility])    Neutrophilic leukocytosis  Resolved Problems:    * No resolved hospital problems. *      Plan:       Acute cholecystitis -we will keep n.p.o., consult surgery. Maintain fluids and electrolytes. SIRS ([address] due to above with tachycardia, tachypnea, neutrophilic leukocytosis. Initial Lactic Acid is pending  - Concern for the possibility of early ascending cholangitis. Rocephin and Flagyl have been started  - Blood cultures x 2 have been ordered    CAD, multiple vessel - Pt denies chest pain. Continue Statin and Aspirin    Diabetes mellitus II - Lantus, SSI and when able to eat, start a carb control diet    Hyperlipidemia - No current evidence of Rhabdomyolysis or other adverse effects. Continue statin therapy while in the hospital              DVT Prophylaxis: Lovenox  Diet: DIET LOW FAT;  Code Status: Full Code  (Advanced care planning has been discussed with patient and/or responsible family member and is reflected in the code status.  Further orders associated with this have been entered if appropriate)    Disposition: Anticipate that patient will remain in the hospital for 2 to 3 days depending on further evaluation and clinical course    Please note that over 50 minutes was spent in evaluating the patient, review of records and results, discussion with staff/family, etc.    Adolph Barksdale MD

## 2021-04-14 NOTE — ED NOTES
Pt placed on 2 liters o2, shallow breathing noted. Pt states it hurts right rib to take deep breaths.  Pt oxygen at 88% on RA      Ar Yepez RN  04/14/21 8134

## 2021-04-14 NOTE — ED PROVIDER NOTES
Cardiovascular: Positive for chest pain (right side with cough). Genitourinary: Negative for difficulty urinating. Musculoskeletal: Negative for back pain. Neurological: Positive for syncope (chronic).        PAST MEDICAL HISTORY     Past Medical History:   Diagnosis Date    Arthritis     Blood circulation, collateral     CAD (coronary artery disease) 7/15/2014    CAD (coronary artery disease)     Cardiac arrest ([facility] 10/05/2018    Cerebral artery occlusion with cerebral infarction ([facility]     Diabetes mellitus ([facility]     Diabetic peripheral neuropathy ([facility] 6/8/2018    GERD (gastroesophageal reflux disease)     ulcers    Hypercholesteremia     Hyperlipidemia     Hypertension     Movement disorder     possible arthritis right hand    MRSA (methicillin resistant staph aureus) culture positive 07/13/2018    foot wound    Neuropathy     Other disorders of kidney and ureter in diseases classified elsewhere     POTS (postural orthostatic tachycardia syndrome)     Psychiatric problem     anxiety, depression, bipolar    Sleep apnea     Syncope     Type II or unspecified type diabetes mellitus without mention of complication, not stated as uncontrolled        SURGICALHISTORY       Past Surgical History:   Procedure Laterality Date    CARDIAC CATHETERIZATION      COLONOSCOPY      CORONARY ANGIOPLASTY WITH STENT PLACEMENT  10/06/2018    Bare Metal Stent to PDA    CORONARY ANGIOPLASTY WITH STENT PLACEMENT  10/07/2018    Bare Metal Stent to OM    CORONARY ANGIOPLASTY WITH STENT PLACEMENT  10/03/2018    CECE to Circ    DIAGNOSTIC CARDIAC CATH LAB PROCEDURE      FINGER SURGERY Left     Left Thumb    HERNIA REPAIR      JOINT REPLACEMENT      VASCULAR SURGERY      VASECTOMY       CURRENT MEDICATIONS       Previous Medications    ASPIRIN 81 MG CHEWABLE TABLET    Take 81 mg by mouth daily    ATORVASTATIN (LIPITOR) 80 MG TABLET    Take 1 tablet by mouth daily    BUPROPION (WELLBUTRIN XL) 150 MG EXTENDED RELEASE TABLET    Take 150 mg by mouth every morning     CHOLECALCIFEROL (VITAMIN D3) 92549 UNITS CAPS    Take 1 capsule by mouth once a week     DULAGLUTIDE (TRULICITY) 1.5 CF/0.3NQ SOPN    Inject 1.5 mg into the skin once a week     GARLIC (GARLIQUE PO)    Take 1,000 mg by mouth daily    INSULIN ASPART (NOVOLOG SC)    Inject into the skin    INSULIN DETEMIR (LEVEMIR FLEXTOUCH) 100 UNIT/ML INJECTION PEN    Inject 30 Units into the skin nightly    OMEGA-3 FATTY ACIDS (FISH OIL) 1000 MG CAPS    Take 1,000 mg by mouth daily    PRASUGREL (EFFIENT) 10 MG TABS    TAKE 1 TABLET BY MOUTH EVERY DAY     SODIUM CHLORIDE (OCEAN, BABY AYR) 0.65 % NASAL SPRAY    1 spray by Nasal route as needed for Congestion      ALLERGIES     No known allergies  FAMILY HISTORY       Family History   Problem Relation Age of Onset    High Blood Pressure Mother     Stroke Mother     Heart Disease Mother     COPD Mother     Heart Disease Father     Cancer Father         skin    Diabetes Sister     Cancer Sister     Heart Disease Brother     Diabetes Brother      SOCIAL HISTORY       Social History     Socioeconomic History    Marital status:      Spouse name: None    Number of children: None    Years of education: None    Highest education level: None   Occupational History    None   Social Needs    Financial resource strain: None    Food insecurity     Worry: None     Inability: None    Transportation needs     Medical: None     Non-medical: None   Tobacco Use    Smoking status: Former Smoker     Packs/day: 2.00     Years: 12.00     Pack years: 24.00     Types: Cigarettes, Cigars    Smokeless tobacco: Never Used   Substance and Sexual Activity    Alcohol use: No    Drug use: Not Currently     Types: Marijuana     Comment: Quit 33 years ago    Sexual activity: Yes     Partners: Female   Lifestyle    Physical activity     Days per week: None     Minutes per session: None    Stress: None   Relationships    Social connections     Talks on phone: None     Gets together: None     Attends Sabianism service: None     Active member of club or organization: None     Attends meetings of clubs or organizations: None     Relationship status: None    Intimate partner violence     Fear of current or ex partner: None     Emotionally abused: None     Physically abused: None     Forced sexual activity: None   Other Topics Concern    None   Social History Narrative    ** Merged History Encounter **          SCREENINGS         PHYSICAL EXAM  (up to 7 for level 4, 8 or more for level 5)   INITIAL VITALS: BP: (!) 141/85, Temp: 98.6 °F (37 °C), Pulse: 112, Resp: 18, SpO2: 95 %   Physical Exam  Vitals signs reviewed. Constitutional:       Appearance: He is ill-appearing (appears to feel unwell) and toxic-appearing. HENT:      Head: Normocephalic and atraumatic. Right Ear: External ear normal.      Left Ear: External ear normal.      Nose: Nose normal.   Eyes:      General:         Right eye: No discharge. Left eye: No discharge. Extraocular Movements: Extraocular movements intact. Conjunctiva/sclera: Conjunctivae normal.      Pupils: Pupils are equal, round, and reactive to light. Neck:      Musculoskeletal: Normal range of motion. Trachea: No tracheal deviation. Cardiovascular:      Rate and Rhythm: Tachycardia present. Pulses: Normal pulses. Pulmonary:      Effort: Pulmonary effort is normal. No respiratory distress. Breath sounds: No decreased breath sounds, wheezing, rhonchi or rales. Comments: No significant tenderness to palpation of his right side chest wall. No overlying skin changes. No crepitus. Chest wall is stable to AP/lateral compression. Abdominal:      Tenderness: There is abdominal tenderness (RUQ). There is no guarding or rebound. Comments: Positive murphys   Musculoskeletal:      Right lower leg: No edema. Left lower leg: No edema.    Skin:     General: - Abnormal; Notable for the following components:    Sodium 130 (*)     Chloride 91 (*)     Anion Gap 17 (*)     Glucose 484 (*)     All other components within normal limits    Narrative:     Performed at:  [facility]  [address] [place], [place] [address]   Phone [phone]   HEPATIC FUNCTION PANEL - Abnormal; Notable for the following components:    AST 13 (*)     Total Bilirubin 1.1 (*)     All other components within normal limits    Narrative:     Performed at:  [facility]  [address] [place], [place] [address]   Phone [phone]   PROTIME-INR    Narrative:     Performed at:  [facility]  [address] [place], [place] [address]   Phone [phone]       All other labs were within normal range or not returned as of this dictation. EMERGENCY DEPARTMENT COURSE and DIFFERENTIAL DIAGNOSIS/MDM:   Patient was given the following medications:  Orders Placed This Encounter   Medications    0.9 % sodium chloride bolus    lidocaine 4 % external patch 1 patch    oxyCODONE (ROXICODONE) immediate release tablet 5 mg    iopamidol (ISOVUE-370) 76 % injection 75 mL    cefTRIAXone (ROCEPHIN) 1000 mg IVPB in 50 mL D5W minibag     Order Specific Question:   Antimicrobial Indications     Answer: Other     Order Specific Question:   Other Abx Indication     Answer:   cholecystitis    metroNIDAZOLE (FLAGYL) tablet 500 mg     Order Specific Question:   Antimicrobial Indications     Answer:    Other     Order Specific Question:   Other Abx Indication     Answer:   cholecystitis     CONSULTS:  None    INITIAL VITALS: BP: (!) 141/85, Temp: 98.6 °F (37 °C), Pulse: 112, Resp: 18, SpO2: 95 %   RECENT VITALS:  BP: (!) 141/85,Temp: 98.6 °F (37 °C), Pulse: 113, Resp: 26, SpO2: (!) 89 %     Juni W Max Arthur is a 54 y.o. male who presents to the emergency department secondary to concern for chest pain right side status post fall Tuesday morning. On arrival he is awake, alert, oriented. Vitals are notable for blood pressure 141/85, tachycardia 112. He appears to feel unwell is laying on the right side stating this helps his pain feel better. On exam his chest wall has no overlying abrasions, bruising noted, he has no significant tenderness to palpation. He does however have significant tenderness palpation in the right upper quadrant with a positive Irwin's. A peripheral IV was placed, labs were ordered along with EKG, CXR. EKG woithout acute ischemic changes. Chest XR unremarkable. On my assessment labs show mild hyponatremia 130, chloride 91 (most likely due to dehydration from not eating and drinking), elevated blood sugar 44 with a normal bicarb. Hepatic panel with elevated bilirubin 1.1 however direct less than 0.2. White count elevated 18 with left shift. Ordered CT chest/abd/pelvis given physical exam findings of mild tenderness to palpation lower right side ribs associated with fall and right side upper quadrant tenderness to palpation with positive Irwin's and mild guarding. CT scan shows findings consistent with acute cholecystitis. Ordered ceftriaxone and Flagyl. On reassessment he reported his pain was better since getting the medication. Discussed results with patient and findings of cholecystitis. At that time his vitals were notable for hypoxia 88% however this was when I walked into the room and initially he was sleeping - once woken up he came up to 93%. Still tachycardic to 1teens had received approximately 400ml IVF at that time. Added on a lactate which is pending at time of admission. Hospitalist notified through [address] [place] [address] of admission. CRITICAL CARE TIME   Due to the immediate potential for life-threatening deterioration due to acute cholecystitis requiring IV antibiotics, I spent 35 minutes providing critical care.  There was a high probability of clinically significant/life threatening deterioration in the patient's condition which required my urgent intervention. This time excludes time spent performing procedures but includes time spent on direct patient care, history retrieval, review of the chart, and discussions with patient, family, and consultant(s). FINAL IMPRESSION      1. Cholecystitis    2. Leukocytosis, unspecified type    3.  Tachycardia        DISPOSITION/PLAN   DISPOSITION Decision To Admit 04/14/2021 05:33:19 PM           (Please note that portions of this note were completed with a voice recognition program. Efforts were made to edit the dictations but occasionally words are mis-transcribed.)    Amandeep Smith MD (electronically signed)  Attending Emergency Physician      Amandeep Smith MD  04/14/21 0133

## 2021-04-15 ENCOUNTER — APPOINTMENT (OUTPATIENT)
Dept: ULTRASOUND IMAGING | Age: 56
DRG: 872 | End: 2021-04-15
Payer: MEDICARE

## 2021-04-15 PROBLEM — K81.9 CHOLECYSTITIS: Status: ACTIVE | Noted: 2021-04-14

## 2021-04-15 LAB
ALBUMIN SERPL-MCNC: 3.4 G/DL (ref 3.4–5)
ALP BLD-CCNC: 69 U/L (ref 40–129)
ALT SERPL-CCNC: 11 U/L (ref 10–40)
ANION GAP SERPL CALCULATED.3IONS-SCNC: 8 MMOL/L (ref 3–16)
AST SERPL-CCNC: 9 U/L (ref 15–37)
BASOPHILS ABSOLUTE: 0 K/UL (ref 0–0.2)
BASOPHILS RELATIVE PERCENT: 0 %
BILIRUB SERPL-MCNC: 0.8 MG/DL (ref 0–1)
BILIRUBIN DIRECT: 0.3 MG/DL (ref 0–0.3)
BILIRUBIN, INDIRECT: 0.5 MG/DL (ref 0–1)
BUN BLDV-MCNC: 18 MG/DL (ref 7–20)
CALCIUM SERPL-MCNC: 8.9 MG/DL (ref 8.3–10.6)
CHLORIDE BLD-SCNC: 100 MMOL/L (ref 99–110)
CO2: 27 MMOL/L (ref 21–32)
CREAT SERPL-MCNC: 1.1 MG/DL (ref 0.9–1.3)
EOSINOPHILS ABSOLUTE: 0 K/UL (ref 0–0.6)
EOSINOPHILS RELATIVE PERCENT: 0 %
GFR AFRICAN AMERICAN: >60
GFR NON-AFRICAN AMERICAN: >60
GLUCOSE BLD-MCNC: 200 MG/DL (ref 70–99)
GLUCOSE BLD-MCNC: 224 MG/DL (ref 70–99)
GLUCOSE BLD-MCNC: 231 MG/DL (ref 70–99)
GLUCOSE BLD-MCNC: 237 MG/DL (ref 70–99)
GLUCOSE BLD-MCNC: 243 MG/DL (ref 70–99)
GLUCOSE BLD-MCNC: 273 MG/DL (ref 70–99)
GLUCOSE BLD-MCNC: 283 MG/DL (ref 70–99)
GLUCOSE BLD-MCNC: 293 MG/DL (ref 70–99)
HCT VFR BLD CALC: 42.7 % (ref 40.5–52.5)
HEMOGLOBIN: 14.7 G/DL (ref 13.5–17.5)
LACTIC ACID, SEPSIS: 1.4 MMOL/L (ref 0.4–1.9)
LACTIC ACID, SEPSIS: 1.5 MMOL/L (ref 0.4–1.9)
LYMPHOCYTES ABSOLUTE: 1.1 K/UL (ref 1–5.1)
LYMPHOCYTES RELATIVE PERCENT: 6 %
MCH RBC QN AUTO: 28.2 PG (ref 26–34)
MCHC RBC AUTO-ENTMCNC: 34.3 G/DL (ref 31–36)
MCV RBC AUTO: 82.1 FL (ref 80–100)
MONOCYTES ABSOLUTE: 1.9 K/UL (ref 0–1.3)
MONOCYTES RELATIVE PERCENT: 10 %
NEUTROPHILS ABSOLUTE: 16 K/UL (ref 1.7–7.7)
NEUTROPHILS RELATIVE PERCENT: 84 %
PDW BLD-RTO: 13.3 % (ref 12.4–15.4)
PERFORMED ON: ABNORMAL
PLATELET # BLD: 164 K/UL (ref 135–450)
PMV BLD AUTO: 8.4 FL (ref 5–10.5)
POTASSIUM REFLEX MAGNESIUM: 4.4 MMOL/L (ref 3.5–5.1)
RBC # BLD: 5.2 M/UL (ref 4.2–5.9)
RBC # BLD: NORMAL 10*6/UL
SODIUM BLD-SCNC: 135 MMOL/L (ref 136–145)
TOTAL PROTEIN: 6.4 G/DL (ref 6.4–8.2)
WBC # BLD: 19.1 K/UL (ref 4–11)

## 2021-04-15 PROCEDURE — APPSS15 APP SPLIT SHARED TIME 0-15 MINUTES: Performed by: NURSE PRACTITIONER

## 2021-04-15 PROCEDURE — 2500000003 HC RX 250 WO HCPCS: Performed by: INTERNAL MEDICINE

## 2021-04-15 PROCEDURE — 2580000003 HC RX 258: Performed by: INTERNAL MEDICINE

## 2021-04-15 PROCEDURE — 6370000000 HC RX 637 (ALT 250 FOR IP): Performed by: INTERNAL MEDICINE

## 2021-04-15 PROCEDURE — 80048 BASIC METABOLIC PNL TOTAL CA: CPT

## 2021-04-15 PROCEDURE — 83605 ASSAY OF LACTIC ACID: CPT

## 2021-04-15 PROCEDURE — 99223 1ST HOSP IP/OBS HIGH 75: CPT | Performed by: INTERNAL MEDICINE

## 2021-04-15 PROCEDURE — 80076 HEPATIC FUNCTION PANEL: CPT

## 2021-04-15 PROCEDURE — 87040 BLOOD CULTURE FOR BACTERIA: CPT

## 2021-04-15 PROCEDURE — 6360000002 HC RX W HCPCS: Performed by: INTERNAL MEDICINE

## 2021-04-15 PROCEDURE — 85025 COMPLETE CBC W/AUTO DIFF WBC: CPT

## 2021-04-15 PROCEDURE — 99222 1ST HOSP IP/OBS MODERATE 55: CPT | Performed by: SURGERY

## 2021-04-15 PROCEDURE — 36415 COLL VENOUS BLD VENIPUNCTURE: CPT

## 2021-04-15 PROCEDURE — 76705 ECHO EXAM OF ABDOMEN: CPT

## 2021-04-15 PROCEDURE — APPNB15 APP NON BILLABLE TIME 0-15 MINS: Performed by: NURSE PRACTITIONER

## 2021-04-15 PROCEDURE — 1200000000 HC SEMI PRIVATE

## 2021-04-15 RX ORDER — SODIUM CHLORIDE 0.9 % (FLUSH) 0.9 %
5-40 SYRINGE (ML) INJECTION EVERY 12 HOURS SCHEDULED
Status: DISCONTINUED | OUTPATIENT
Start: 2021-04-15 | End: 2021-04-19 | Stop reason: HOSPADM

## 2021-04-15 RX ORDER — SODIUM CHLORIDE 0.9 % (FLUSH) 0.9 %
5-40 SYRINGE (ML) INJECTION PRN
Status: DISCONTINUED | OUTPATIENT
Start: 2021-04-15 | End: 2021-04-19 | Stop reason: HOSPADM

## 2021-04-15 RX ORDER — SODIUM CHLORIDE 9 MG/ML
25 INJECTION, SOLUTION INTRAVENOUS PRN
Status: DISCONTINUED | OUTPATIENT
Start: 2021-04-15 | End: 2021-04-19 | Stop reason: HOSPADM

## 2021-04-15 RX ADMIN — METRONIDAZOLE 500 MG: 500 INJECTION, SOLUTION INTRAVENOUS at 01:45

## 2021-04-15 RX ADMIN — METRONIDAZOLE 500 MG: 500 INJECTION, SOLUTION INTRAVENOUS at 09:59

## 2021-04-15 RX ADMIN — ASPIRIN 81 MG: 81 TABLET, CHEWABLE ORAL at 08:40

## 2021-04-15 RX ADMIN — MEROPENEM 500 MG: 500 INJECTION, POWDER, FOR SOLUTION INTRAVENOUS at 16:11

## 2021-04-15 RX ADMIN — ACETAMINOPHEN 650 MG: 325 TABLET ORAL at 04:03

## 2021-04-15 RX ADMIN — INSULIN LISPRO 6 UNITS: 100 INJECTION, SOLUTION INTRAVENOUS; SUBCUTANEOUS at 21:05

## 2021-04-15 RX ADMIN — INSULIN GLARGINE 30 UNITS: 100 INJECTION, SOLUTION SUBCUTANEOUS at 08:42

## 2021-04-15 RX ADMIN — INSULIN LISPRO 4 UNITS: 100 INJECTION, SOLUTION INTRAVENOUS; SUBCUTANEOUS at 04:20

## 2021-04-15 RX ADMIN — MEROPENEM 500 MG: 500 INJECTION, POWDER, FOR SOLUTION INTRAVENOUS at 09:08

## 2021-04-15 RX ADMIN — ACETAMINOPHEN 650 MG: 325 TABLET ORAL at 21:05

## 2021-04-15 RX ADMIN — INSULIN LISPRO 6 UNITS: 100 INJECTION, SOLUTION INTRAVENOUS; SUBCUTANEOUS at 17:51

## 2021-04-15 RX ADMIN — MEROPENEM 500 MG: 500 INJECTION, POWDER, FOR SOLUTION INTRAVENOUS at 21:05

## 2021-04-15 RX ADMIN — ACETAMINOPHEN 650 MG: 325 TABLET ORAL at 09:08

## 2021-04-15 RX ADMIN — INSULIN LISPRO 6 UNITS: 100 INJECTION, SOLUTION INTRAVENOUS; SUBCUTANEOUS at 00:47

## 2021-04-15 RX ADMIN — CEFTRIAXONE 1000 MG: 1 INJECTION, POWDER, FOR SOLUTION INTRAMUSCULAR; INTRAVENOUS at 08:41

## 2021-04-15 RX ADMIN — INSULIN GLARGINE 30 UNITS: 100 INJECTION, SOLUTION SUBCUTANEOUS at 21:05

## 2021-04-15 RX ADMIN — ACETAMINOPHEN 650 MG: 325 TABLET ORAL at 16:16

## 2021-04-15 RX ADMIN — BUPROPION HYDROCHLORIDE 150 MG: 150 TABLET, EXTENDED RELEASE ORAL at 08:40

## 2021-04-15 RX ADMIN — SODIUM CHLORIDE: 9 INJECTION, SOLUTION INTRAVENOUS at 08:41

## 2021-04-15 RX ADMIN — ATORVASTATIN CALCIUM 80 MG: 80 TABLET, FILM COATED ORAL at 08:40

## 2021-04-15 RX ADMIN — INSULIN LISPRO 4 UNITS: 100 INJECTION, SOLUTION INTRAVENOUS; SUBCUTANEOUS at 12:03

## 2021-04-15 RX ADMIN — METRONIDAZOLE 500 MG: 500 INJECTION, SOLUTION INTRAVENOUS at 17:51

## 2021-04-15 RX ADMIN — PRASUGREL 10 MG: 10 TABLET, FILM COATED ORAL at 08:54

## 2021-04-15 ASSESSMENT — PAIN DESCRIPTION - ORIENTATION
ORIENTATION: RIGHT
ORIENTATION: RIGHT
ORIENTATION: RIGHT;LOWER

## 2021-04-15 ASSESSMENT — PAIN SCALES - GENERAL
PAINLEVEL_OUTOF10: 4
PAINLEVEL_OUTOF10: 4
PAINLEVEL_OUTOF10: 2
PAINLEVEL_OUTOF10: 3
PAINLEVEL_OUTOF10: 5
PAINLEVEL_OUTOF10: 5

## 2021-04-15 ASSESSMENT — PAIN DESCRIPTION - LOCATION
LOCATION: ABDOMEN

## 2021-04-15 ASSESSMENT — PAIN DESCRIPTION - ONSET
ONSET: ON-GOING

## 2021-04-15 ASSESSMENT — PAIN DESCRIPTION - DESCRIPTORS
DESCRIPTORS: SHARP

## 2021-04-15 ASSESSMENT — PAIN DESCRIPTION - FREQUENCY
FREQUENCY: CONTINUOUS

## 2021-04-15 ASSESSMENT — PAIN DESCRIPTION - PAIN TYPE
TYPE: ACUTE PAIN
TYPE: ACUTE PAIN

## 2021-04-15 ASSESSMENT — PAIN DESCRIPTION - PROGRESSION
CLINICAL_PROGRESSION: NOT CHANGED
CLINICAL_PROGRESSION: NOT CHANGED

## 2021-04-15 NOTE — PLAN OF CARE
Problem: Falls - Risk of:  Goal: Will remain free from falls  Description: Will remain free from falls  4/15/2021 1005 by Saida Barrientos RN  Outcome: Ongoing  Note: Fall risk preventions in place. Bed in lowest position, call light within reach, non-skid socks on when ambulating, bed alarm on, bed wheels locked. Pt. Educated and agreeable on usage of call light before ambulation. Problem: Falls - Risk of:  Goal: Absence of physical injury  Description: Absence of physical injury  4/15/2021 1005 by Saida Barrientos RN  Outcome: Ongoing     Problem: Pain:  Goal: Pain level will decrease  Description: Pain level will decrease  4/15/2021 1005 by Siada Barrientos RN  Outcome: Ongoing  Note:   Will continue to use the pain scale (0-10) to measure pt's pain and monitor their needs. Will assess patients pain with assessments and interactions. Pt will notify RN of any changes in pain and where. Will continue to perform therapeutic comfort measures and give pain medications as prescribed/needed.        Problem: Pain:  Goal: Control of acute pain  Description: Control of acute pain  4/15/2021 1005 by Saida Barrientos RN  Outcome: Ongoing     Problem: Pain:  Goal: Control of chronic pain  Description: Control of chronic pain  4/15/2021 1005 by Saida Barrientos RN  Outcome: Ongoing

## 2021-04-15 NOTE — PROGRESS NOTES
Physician Progress Note      Kumar Echeverria  CSN #:                  [id number]  :                       1965  ADMIT DATE:       2021 3:33 PM  [address] [place] DATE:  Macel Nyhan  PROVIDER #:        Zurdo Robert MD          QUERY TEXT:    Dear Dr Santy Diaz,    Pt admitted with acute cholecystitis. Pt noted to have leukocytosis,   tachycardia,,tachypnea . If possible, please document in the progress notes   and discharge summary if you are evaluating and /or treating any of the   following: The medical record reflects the following:  Risk Factors: acute cholecystitis  Clinical Indicators: On admission wbc-18, hr-112, 89%ra , rr-26. US   GB-Cholelithiasis, with mild gallbladder wall thickening, suggesting  superimposed cholecystitis. Documentation per H&P of \"SIRS\"  Treatment: ns bolus, ivf, Tylenol, iv Rocephin, iv merrem, iv flagyl, Surgery   consult, monitor labs    Thank You, Oralia Russell RN CDS CRCR  [email or web address]  [phone]  Options provided:  -- Sepsis, present on admission  -- No Sepsis, acute cholecystitis only  -- Sepsis was ruled out  -- Other - I will add my own diagnosis  -- Disagree - Not applicable / Not valid  -- Disagree - Clinically unable to determine / Unknown  -- Refer to Clinical Documentation Reviewer    PROVIDER RESPONSE TEXT:    This patient has sepsis which was present on admission. Query created by:  Valerie Russell on 4/15/2021 9:18 AM      Electronically signed by:  Zurdo Robert MD 4/15/2021 2:01 PM

## 2021-04-15 NOTE — CONSULTS
[address] Surgery        [phone]        Surgery Consult      Pt Name: Kenrick Zarate  MRN: [id number]  YOB: 1965  Date of evaluation: 4/15/2021  Primary Care Physician: LAURA Olivares - CNP    Chief Complaint   Patient presents with    Loss of Consciousness     Pt states he blacked out monday night into tuesday, states when he fell he hurt the R side of his ribs, states he is already being seen for the syncope issues        Assessment/Plan   Acute cholecystitis with cholelithiasis  -Possible passed stone with normalized bilirubin  -With cardiac history and being on Effient, will have cardiology evaluate and clear for possible surgery. Would like to wait about 7 days from last dose of Effient.  -Alternatively, if not deemed a surgical candidate, could treat with antibiotics, and possibly percutaneous cholecystostomy tube if he fails to improve with just antibiotics. FELIBERTO NEWTON Christine 55-year-old man with a history of hypertension hyperlipidemia type 2 diabetes and coronary artery disease with a history of stents on Effient being seen in general surgical consultation for cholecystitis. 2-day history of right flank pain. Frequent falls due to syncope, fell a few days ago that resulted in a rib fracture. Last received Effient last night. Work-up in ED revealed a leukocytosis of 18 and up to 19.1 today. Initial bilirubin was mildly elevated at 1.1 down to 0.8 today. He denies history of postprandial abdominal pain, nausea, or vomiting. No changes in bowel or bladder habits. Prior abdominal surgeries umbilical and inguinal hernia repairs. CT 4/14/2021 chest abdomen pelvis with contrast showed abnormal inflammation around the gallbladder with trace pericholecystic fluid. Follow-up ultrasound with cholelithiasis, mild gallbladder wall thickening. CBD within normal limits.           Past Medical History   has a past medical history of Arthritis, Blood circulation, collateral, CAD (coronary artery disease), CAD (coronary artery disease), Cardiac arrest [facility]), Cerebral artery occlusion with cerebral infarction ([facility] [address] Diabetes mellitus ([facility] [address] Diabetic peripheral neuropathy ([facility] [address] GERD (gastroesophageal reflux disease), Hypercholesteremia, Hyperlipidemia, Hypertension, Movement disorder, MRSA (methicillin resistant staph aureus) culture positive, Neuropathy, Other disorders of kidney and ureter in diseases classified elsewhere, POTS (postural orthostatic tachycardia syndrome), Psychiatric problem, Sleep apnea, Syncope, and Type II or unspecified type diabetes mellitus without mention of complication, not stated as uncontrolled. Past Surgical History   has a past surgical history that includes Vasectomy; Cardiac catheterization; Finger surgery (Left); hernia repair; vascular surgery; Colonoscopy; joint replacement; Coronary angioplasty with stent (10/06/2018); Coronary angioplasty with stent (10/07/2018); Coronary angioplasty with stent (10/03/2018); and Diagnostic Cardiac Cath Lab Procedure. Medications  Prior to Admission medications    Medication Sig Start Date End Date Taking?  Authorizing Provider   insulin detemir (LEVEMIR) 100 UNIT/ML injection vial Inject 30 Units into the skin 2 times daily   Yes Historical Provider, MD   prasugrel (EFFIENT) 10 MG TABS TAKE 1 TABLET BY MOUTH EVERY DAY  3/29/21  Yes LAURA Wheeler - CNP   Omega-3 Fatty Acids (FISH OIL) 1000 MG CAPS Take 1,000 mg by mouth daily   Yes Historical Provider, MD   Garlic (GARLIQUE PO) Take 1,000 mg by mouth daily   Yes Historical Provider, MD   atorvastatin (LIPITOR) 80 MG tablet Take 1 tablet by mouth daily 12/3/20  Yes Max Grimm MD   buPROPion (WELLBUTRIN XL) 150 MG extended release tablet Take 150 mg by mouth every morning    Yes Historical Provider, MD   insulin aspart (NOVOLOG) 100 UNIT/ML injection vial Inject 13 Units into the skin 3 times daily (with meals) Plus sliding scale of 1-2 units additional   Yes Historical Provider, MD   aspirin 81 MG chewable tablet Take 81 mg by mouth daily   Yes Historical Provider, MD   Cholecalciferol (VITAMIN D3) 89871 units CAPS Take 1 capsule by mouth once a week    Yes Historical Provider, MD   Dulaglutide (TRULICITY) 3 ZD/2.3GL SOPN Inject 3 mg into the skin once a week Indications: Friday    Yes Historical Provider, MD    Scheduled Meds:   meropenem  500 mg Intravenous Q6H    lidocaine  1 patch Transdermal Daily    metroNIDAZOLE  500 mg Intravenous Q8H    prasugrel  10 mg Oral Daily    buPROPion  150 mg Oral QAM    atorvastatin  80 mg Oral Daily    aspirin  81 mg Oral Daily    insulin lispro  0-12 Units Subcutaneous Q4H    sodium chloride flush  10 mL Intravenous 2 times per day    enoxaparin  40 mg Subcutaneous Daily    insulin glargine  30 Units Subcutaneous BID     Continuous Infusions:   dextrose      sodium chloride      sodium chloride 75 mL/hr at 04/15/21 0841     PRN Meds:.glucose, dextrose, glucagon (rDNA), dextrose, sodium chloride flush, sodium chloride, ondansetron, polyethylene glycol, acetaminophen **OR** acetaminophen    Allergies  is allergic to no known allergies. Family History  Reviewed, non contribtory  family history includes COPD in his mother; Cancer in his father and sister; Diabetes in his brother and sister; Heart Disease in his brother, father, and mother; High Blood Pressure in his mother; Stroke in his mother. Social History  Reviewed, non contributory   reports that he has quit smoking. His smoking use included cigarettes and cigars. He has a 24.00 pack-year smoking history. He has never used smokeless tobacco. He reports previous drug use. Drug: Marijuana. He reports that he does not drink alcohol. Review of Systems:  12 point review of systems was reviewed and negative except for that listed in HPI    OBJECTIVE:   VITALS:  height is 6' 2\" (1.88 m) and weight is 219 lb 12.8 oz (99.7 kg).  His oral temperature is 98.6 °F (37 °C). His blood pressure is 103/68 and his pulse is 103. His respiration is 18 and oxygen saturation is 94%. CONSTITUTIONAL: Alert and oriented times 3, no acute distress and cooperative to examination with proper mood and affect. SKIN: Skin color, texture, turgor normal. No rashes or lesions. LYMPH: no cervical nodes, no inguinal nodes  HEENT: Head is normocephalic, atraumatic. EOMI, PERRLA. NECK: Supple, symmetrical, trachea midline, no adenopathy, thyroid symmetric, not enlarged and no tenderness, skin normal.  CHEST/LUNGS: chest symmetric with normal A/P diameter, normal respiratory rate and rhythm, lungs clear to auscultation without wheezes, rales or rhonchi. No accessory muscle use. CARDIOVASCULAR: Heart sounds are normal.  Regular rate and rhythm without murmur, gallop or rub. ABDOMEN: Soft, nontender, nondistended. Scars consistent with surgical history. RECTAL: deferred, not clinically indicated  NEUROLOGIC: There are no focalizing motor or sensory deficits. CN II-XII are grossly intact. EXTREMITIES: no cyanosis, no clubbing and no edema. LABS:     Recent Labs     04/14/21  1200 04/15/21  0542   WBC 18.0* 19.1*   HGB 16.6 14.7   HCT 48.8 42.7    164   * 135*   K 4.6 4.4   CL 91* 100   CO2 22 27   BUN 14 18   CREATININE 0.9 1.1   CALCIUM 9.5 8.9   INR 1.06  --    AST 13* 9*   ALT 16 11   BILITOT 1.1* 0.8   BILIDIR <0.2 0.3     Recent Labs     04/15/21  0542   ALKPHOS 69   ALT 11   AST 9*   BILITOT 0.8   BILIDIR 0.3   LABALBU 3.4         RADIOLOGY:   I have personally reviewed the following films:  US GALLBLADDER RUQ   Final Result   Cholelithiasis, with mild gallbladder wall thickening, suggesting   superimposed cholecystitis. No pericholecystic fluid. No sonographic Irwin   sign.   No biliary ductal dilatation         CT CHEST ABDOMEN PELVIS W CONTRAST   Final Result   Abnormal inflammatory change involving and surrounding the gallbladder with trace pericholecystic fluid, compatible with acute cholecystitis by CT scan. Scattered areas of ground-glass opacity seen throughout the lungs,, possibly   postinflammatory-infectious in etiology. Bronchiectatic changes are seen on   the right. Superimposed bandlike opacities are seen, likely atelectasis or   scarring      Age indeterminate right 2nd rib fracture. No acute traumatic solid organ injury identified in abdomen or pelvis. XR CHEST (2 VW)   Final Result   Low lung volume study with mild right basilar atelectasis. Thank you for the interesting evaluation. Further recommendations to follow. EDUCATION  Patient educated about the following as pertinent to medical/surgical problems: Disease Process, Medications, Smoking Cessation, Oxygenation, Incentive Spirometry and Deep Breath and Cough, Diabetes, Hyperlipidemia, Smoking Cessation, Nutrition, Exercise and Hypertension    Electronically signed by LAURA Casey CNP on 4/15/2021 at 12:43 PM    The note may have been completed using Dragon voice recognition transcription. Every effort was made to ensure accuracy; however, inadvertent transcription errors may be present despite my best efforts to edit errors.     Attending    As per note above by Mary Hernandez  Patient was personally seen and examined by me today  Chart, labs and imaging reviewed    A/P  Cholecystitis   Stable but tender RUQ   Thought initially this was trauma related to recent fall   History of \"black out\" episodes   CT and ultrasound with Gallbladder wall thickening   Given cardiac history and Effient will not pursue cholecystectomy at this time   Will ask for Cardiology input regarding operative risk and risk of holding Effient     Electronically signed by Vicky Brice MD on 4/15/2021 at 2:51 PM

## 2021-04-15 NOTE — CONSULTS
[facility]  Cardiology Consult Note        CC:    Management of dual antiplatelet therapy before noncardiac surgery      HPI:   Pernell Quijano is a 54 y.o. patient who seeing us today in follow up CAD-multivessel, ischemic cardiomyopathy, VT s/p AICD, anemia, HTN, HLD and DM. His LV function is normal. In mid November of last year which is 4 months ago he had 4 x 33 mm long CECE stent placed in the right coronary artery. The patient's has acute cholecystitis and the surgeons are contemplating surgery.     Past Medical History:   Diagnosis Date    Arthritis     Blood circulation, collateral     CAD (coronary artery disease) 7/15/2014    CAD (coronary artery disease)     Cardiac arrest ([facility] [address] 10/05/2018    Cerebral artery occlusion with cerebral infarction ([facility] [address]     Diabetes mellitus ([facility] [address]     Diabetic peripheral neuropathy ([facility] [address] 6/8/2018    GERD (gastroesophageal reflux disease)     ulcers    Hypercholesteremia     Hyperlipidemia     Hypertension     Movement disorder     possible arthritis right hand    MRSA (methicillin resistant staph aureus) culture positive 07/13/2018    foot wound    Neuropathy     Other disorders of kidney and ureter in diseases classified elsewhere     POTS (postural orthostatic tachycardia syndrome)     Psychiatric problem     anxiety, depression, bipolar    Sleep apnea     Syncope     Type II or unspecified type diabetes mellitus without mention of complication, not stated as uncontrolled       Past Surgical History:   Procedure Laterality Date    CARDIAC CATHETERIZATION      COLONOSCOPY      CORONARY ANGIOPLASTY WITH STENT PLACEMENT  10/06/2018    Bare Metal Stent to PDA    CORONARY ANGIOPLASTY WITH STENT PLACEMENT  10/07/2018    Bare Metal Stent to OM    CORONARY ANGIOPLASTY WITH STENT PLACEMENT  10/03/2018    CECE to Circ    DIAGNOSTIC CARDIAC CATH LAB PROCEDURE      FINGER SURGERY Left     Left Thumb    HERNIA REPAIR      JOINT REPLACEMENT      VASCULAR SURGERY      VASECTOMY        Family History   Problem Relation Age of Onset    High Blood Pressure Mother     Stroke Mother     Heart Disease Mother     COPD Mother     Heart Disease Father     Cancer Father         skin    Diabetes Sister     Cancer Sister     Heart Disease Brother     Diabetes Brother       Social History     Tobacco Use    Smoking status: Former Smoker     Packs/day: 2.00     Years: 12.00     Pack years: 24.00     Types: Cigarettes, Cigars    Smokeless tobacco: Never Used   Substance Use Topics    Alcohol use: No    Drug use: Not Currently     Types: Marijuana     Comment: Quit 33 years ago     Allergies   Allergen Reactions    No Known Allergies       meropenem  500 mg Intravenous Q6H    lidocaine  1 patch Transdermal Daily    metroNIDAZOLE  500 mg Intravenous Q8H    prasugrel  10 mg Oral Daily    buPROPion  150 mg Oral QAM    atorvastatin  80 mg Oral Daily    aspirin  81 mg Oral Daily    insulin lispro  0-12 Units Subcutaneous Q4H    sodium chloride flush  10 mL Intravenous 2 times per day    enoxaparin  40 mg Subcutaneous Daily    insulin glargine  30 Units Subcutaneous BID       Review of Systems -   Constitutional: Negative for weight gain/loss; malaise, fever  Respiratory: Negative for Asthma;  cough and hemoptysis  Cardiovascular: Negative for palpitations,dizziness   Gastrointestinal: Negative for abd.pain; constipation/diarrhea;    Genitourinary: Negative for stones; hematuria; frequency hesitancy  Integumentt: Negative for rash or pruritis  Hematologic/lymphatic: Negative for blood dyscrasia; leukemia/lymphoma  Musculoskeletal: Negative for Connective tissue disease  Neurological:  Negative for Seizure   Behavioral/Psych:Negative for Bipolar disorder, Schizophrenia; Dementia  Endocrine: negative for thyroid, parathyroid disease      Intake/Output Summary (Last 24 hours) at 4/15/2021 1753  Last data filed at 4/15/2021 0404  Gross per 24 hour   Intake 498.34 ml   Output 475 ml   Net 23.34 ml       Physical Examination:    /71   Pulse 112   Temp 99.5 °F (37.5 °C) (Oral)   Resp 17   Ht 6' 2\" (1.88 m)   Wt 219 lb 12.8 oz (99.7 kg)   SpO2 94%   BMI 28.22 kg/m²    HEENT:  Face: Atraumatic, Conjunctiva: Pink; non icteric,  Mucous Memb:  Moist, No thyromegaly or Lymphadenopathy  Respiratory:  Resp Assessment: normal, Resp Auscultation: clear   Cardiovascular: Auscultation: nl S1 & S2, Palpation:  Nl PMI;  No heaves or thrills, JVP:  normal  Abdomen:  RUQ tenderness,     Extremities: No Cyanosis or Clubbing; Edema none  Neurological: Oriented to time, place, and person, Non-anxious  Psychiatric: Normal mood and affect  Skin: Warm and dry,  No rash seen      Current Facility-Administered Medications: meropenem (MERREM) 500 mg IVPB (mini-bag), 500 mg, Intravenous, Q6H  lidocaine 4 % external patch 1 patch, 1 patch, Transdermal, Daily  metronidazole (FLAGYL) 500 mg in NaCl 100 mL IVPB premix, 500 mg, Intravenous, Q8H  prasugrel (EFFIENT) tablet 10 mg, 10 mg, Oral, Daily  buPROPion (WELLBUTRIN XL) extended release tablet 150 mg, 150 mg, Oral, QAM  atorvastatin (LIPITOR) tablet 80 mg, 80 mg, Oral, Daily  aspirin chewable tablet 81 mg, 81 mg, Oral, Daily  insulin lispro (HUMALOG) injection vial 0-12 Units, 0-12 Units, Subcutaneous, Q4H  glucose (GLUTOSE) 40 % oral gel 15 g, 15 g, Oral, PRN  dextrose 50 % IV solution, 12.5 g, Intravenous, PRN  glucagon (rDNA) injection 1 mg, 1 mg, Intramuscular, PRN  dextrose 5 % solution, 100 mL/hr, Intravenous, PRN  sodium chloride flush 0.9 % injection 10 mL, 10 mL, Intravenous, 2 times per day  sodium chloride flush 0.9 % injection 10 mL, 10 mL, Intravenous, PRN  0.9 % sodium chloride infusion, 25 mL, Intravenous, PRN  ondansetron (ZOFRAN) injection 4 mg, 4 mg, Intravenous, Q4H PRN  polyethylene glycol (GLYCOLAX) packet 17 g, 17 g, Oral, Daily PRN  acetaminophen (TYLENOL) tablet 650 mg, 650 mg, Oral, Q4H PRN **OR** stents  His last PCI was a on 11/18/2020. This involved a 4 mm x 33 mm long stent in the mid RCA. This was approximately 4 months ago. The patient is on aspirin and Effient    I think if it is medically necessary for the patient to have cholecystectomy, we can hold the Effient.    He should continue aspirin        Daljit Bailey M.D  4/15/2021

## 2021-04-15 NOTE — PROGRESS NOTES
4 Eyes Skin Assessment     NAME:  Juni King  YOB: 1965  MEDICAL RECORD NUMBER:  [id number]    The patient is being assess for  Admission    I agree that 2 RN's have performed a thorough Head to Toe Skin Assessment on the patient. ALL assessment sites listed below have been assessed. Areas assessed by both nurses:    Head, Face, Ears, Shoulders, Back, Chest, Arms, Elbows, Hands, Sacrum. Buttock, Coccyx, Ischium and Legs. Feet and Heels        Does the Patient have a Wound? No noted wound(s). Patient has scattered bruising on all limbs from past falls. Patient has scabs on BLE due to past falls.         Michael Prevention initiated:  No   Wound Care Orders initiated:  No    Pressure Injury (Stage 3,4, Unstageable, DTI, NWPT, and Complex wounds) if present place consult order under [de-identified] No    New and Established Ostomies if present place consult order under : No      Nurse 1 eSignature: Electronically signed by Claudell Labrum, RN on 4/15/21 at 3:02 AM EDT    **SHARE this note so that the co-signing nurse is able to place an eSignature**    Nurse 2 eSignature: Electronically signed by George Perera RN on 4/15/21 at 9:12 PM EDT

## 2021-04-16 LAB
ANION GAP SERPL CALCULATED.3IONS-SCNC: 11 MMOL/L (ref 3–16)
BANDED NEUTROPHILS RELATIVE PERCENT: 2 % (ref 0–7)
BASOPHILS ABSOLUTE: 0 K/UL (ref 0–0.2)
BASOPHILS RELATIVE PERCENT: 0 %
BUN BLDV-MCNC: 19 MG/DL (ref 7–20)
CALCIUM SERPL-MCNC: 8.3 MG/DL (ref 8.3–10.6)
CHLORIDE BLD-SCNC: 99 MMOL/L (ref 99–110)
CO2: 27 MMOL/L (ref 21–32)
CREAT SERPL-MCNC: 0.8 MG/DL (ref 0.9–1.3)
EOSINOPHILS ABSOLUTE: 0 K/UL (ref 0–0.6)
EOSINOPHILS RELATIVE PERCENT: 0 %
GFR AFRICAN AMERICAN: >60
GFR NON-AFRICAN AMERICAN: >60
GLUCOSE BLD-MCNC: 180 MG/DL (ref 70–99)
GLUCOSE BLD-MCNC: 185 MG/DL (ref 70–99)
GLUCOSE BLD-MCNC: 195 MG/DL (ref 70–99)
GLUCOSE BLD-MCNC: 198 MG/DL (ref 70–99)
GLUCOSE BLD-MCNC: 214 MG/DL (ref 70–99)
GLUCOSE BLD-MCNC: 247 MG/DL (ref 70–99)
HCT VFR BLD CALC: 40.8 % (ref 40.5–52.5)
HEMOGLOBIN: 13.7 G/DL (ref 13.5–17.5)
LYMPHOCYTES ABSOLUTE: 1.1 K/UL (ref 1–5.1)
LYMPHOCYTES RELATIVE PERCENT: 6 %
MCH RBC QN AUTO: 27.8 PG (ref 26–34)
MCHC RBC AUTO-ENTMCNC: 33.6 G/DL (ref 31–36)
MCV RBC AUTO: 82.9 FL (ref 80–100)
MONOCYTES ABSOLUTE: 1.1 K/UL (ref 0–1.3)
MONOCYTES RELATIVE PERCENT: 6 %
NEUTROPHILS ABSOLUTE: 16.5 K/UL (ref 1.7–7.7)
NEUTROPHILS RELATIVE PERCENT: 86 %
PDW BLD-RTO: 13.2 % (ref 12.4–15.4)
PERFORMED ON: ABNORMAL
PLATELET # BLD: 165 K/UL (ref 135–450)
PLATELET SLIDE REVIEW: ADEQUATE
PMV BLD AUTO: 8.7 FL (ref 5–10.5)
POTASSIUM REFLEX MAGNESIUM: 4.3 MMOL/L (ref 3.5–5.1)
RBC # BLD: 4.92 M/UL (ref 4.2–5.9)
RBC # BLD: NORMAL 10*6/UL
SLIDE REVIEW: ABNORMAL
SODIUM BLD-SCNC: 137 MMOL/L (ref 136–145)
WBC # BLD: 18.7 K/UL (ref 4–11)

## 2021-04-16 PROCEDURE — 6360000002 HC RX W HCPCS: Performed by: INTERNAL MEDICINE

## 2021-04-16 PROCEDURE — 36415 COLL VENOUS BLD VENIPUNCTURE: CPT

## 2021-04-16 PROCEDURE — 2500000003 HC RX 250 WO HCPCS: Performed by: INTERNAL MEDICINE

## 2021-04-16 PROCEDURE — 6370000000 HC RX 637 (ALT 250 FOR IP): Performed by: INTERNAL MEDICINE

## 2021-04-16 PROCEDURE — 2580000003 HC RX 258: Performed by: FAMILY MEDICINE

## 2021-04-16 PROCEDURE — APPNB15 APP NON BILLABLE TIME 0-15 MINS: Performed by: NURSE PRACTITIONER

## 2021-04-16 PROCEDURE — 1200000000 HC SEMI PRIVATE

## 2021-04-16 PROCEDURE — APPNB30 APP NON BILLABLE TIME 0-30 MINS: Performed by: NURSE PRACTITIONER

## 2021-04-16 PROCEDURE — 94761 N-INVAS EAR/PLS OXIMETRY MLT: CPT

## 2021-04-16 PROCEDURE — 2580000003 HC RX 258: Performed by: INTERNAL MEDICINE

## 2021-04-16 PROCEDURE — 85025 COMPLETE CBC W/AUTO DIFF WBC: CPT

## 2021-04-16 PROCEDURE — 99232 SBSQ HOSP IP/OBS MODERATE 35: CPT | Performed by: NURSE PRACTITIONER

## 2021-04-16 PROCEDURE — 80048 BASIC METABOLIC PNL TOTAL CA: CPT

## 2021-04-16 PROCEDURE — 6360000002 HC RX W HCPCS: Performed by: FAMILY MEDICINE

## 2021-04-16 RX ADMIN — ONDANSETRON 4 MG: 2 INJECTION INTRAMUSCULAR; INTRAVENOUS at 18:53

## 2021-04-16 RX ADMIN — INSULIN LISPRO 2 UNITS: 100 INJECTION, SOLUTION INTRAVENOUS; SUBCUTANEOUS at 12:02

## 2021-04-16 RX ADMIN — METRONIDAZOLE 500 MG: 500 INJECTION, SOLUTION INTRAVENOUS at 17:21

## 2021-04-16 RX ADMIN — INSULIN LISPRO 2 UNITS: 100 INJECTION, SOLUTION INTRAVENOUS; SUBCUTANEOUS at 17:21

## 2021-04-16 RX ADMIN — CEFEPIME HYDROCHLORIDE 2000 MG: 2 INJECTION, POWDER, FOR SOLUTION INTRAVENOUS at 12:03

## 2021-04-16 RX ADMIN — ENOXAPARIN SODIUM 40 MG: 40 INJECTION SUBCUTANEOUS at 08:30

## 2021-04-16 RX ADMIN — ACETAMINOPHEN 650 MG: 325 TABLET ORAL at 18:53

## 2021-04-16 RX ADMIN — SODIUM CHLORIDE: 9 INJECTION, SOLUTION INTRAVENOUS at 04:36

## 2021-04-16 RX ADMIN — ONDANSETRON 4 MG: 2 INJECTION INTRAMUSCULAR; INTRAVENOUS at 04:36

## 2021-04-16 RX ADMIN — INSULIN LISPRO 4 UNITS: 100 INJECTION, SOLUTION INTRAVENOUS; SUBCUTANEOUS at 20:34

## 2021-04-16 RX ADMIN — INSULIN LISPRO 4 UNITS: 100 INJECTION, SOLUTION INTRAVENOUS; SUBCUTANEOUS at 04:49

## 2021-04-16 RX ADMIN — INSULIN LISPRO 4 UNITS: 100 INJECTION, SOLUTION INTRAVENOUS; SUBCUTANEOUS at 00:33

## 2021-04-16 RX ADMIN — INSULIN LISPRO 2 UNITS: 100 INJECTION, SOLUTION INTRAVENOUS; SUBCUTANEOUS at 08:32

## 2021-04-16 RX ADMIN — METRONIDAZOLE 500 MG: 500 INJECTION, SOLUTION INTRAVENOUS at 00:44

## 2021-04-16 RX ADMIN — ONDANSETRON 4 MG: 2 INJECTION INTRAMUSCULAR; INTRAVENOUS at 08:37

## 2021-04-16 RX ADMIN — INSULIN GLARGINE 30 UNITS: 100 INJECTION, SOLUTION SUBCUTANEOUS at 08:31

## 2021-04-16 RX ADMIN — MEROPENEM 500 MG: 500 INJECTION, POWDER, FOR SOLUTION INTRAVENOUS at 04:36

## 2021-04-16 RX ADMIN — METRONIDAZOLE 500 MG: 500 INJECTION, SOLUTION INTRAVENOUS at 10:32

## 2021-04-16 RX ADMIN — INSULIN GLARGINE 30 UNITS: 100 INJECTION, SOLUTION SUBCUTANEOUS at 20:35

## 2021-04-16 ASSESSMENT — PAIN SCALES - GENERAL
PAINLEVEL_OUTOF10: 0
PAINLEVEL_OUTOF10: 3

## 2021-04-16 ASSESSMENT — PAIN DESCRIPTION - PROGRESSION: CLINICAL_PROGRESSION: NOT CHANGED

## 2021-04-16 ASSESSMENT — PAIN DESCRIPTION - LOCATION: LOCATION: ABDOMEN

## 2021-04-16 ASSESSMENT — PAIN DESCRIPTION - ORIENTATION: ORIENTATION: LOWER;RIGHT

## 2021-04-16 ASSESSMENT — PAIN - FUNCTIONAL ASSESSMENT: PAIN_FUNCTIONAL_ASSESSMENT: ACTIVITIES ARE NOT PREVENTED

## 2021-04-16 NOTE — PROGRESS NOTES
Hospitalist   Progress Note    Patient Name: Ganga Daigle  PCP: Tara Viera, APRN - CNP  Date of Admission: 4/14/2021    Chief Complaint on Admission: Pt c/o right sided rib pain s/p syncope 2 days ago (has already been evaluated for syncope)  Chief diagnosis after evaluation: Acute Cholecystitis    Brief Synopsis: Patient 54 y.o. man with a history of hypertension, hyperlipidemia, diabetes mellitus type II and coronary artery disease who was admitted on 4/14/2021 for evaluation and treatment of acute Cholecystitis    Pt Seen/Examined and Chart Reviewed. Subjective: Pt S/E. No acute vents. t max 100 last night at 10pm. Still has nausea and vomiting. Objective:   Allergies  No known allergies    Medications    Scheduled Meds:   meropenem  500 mg Intravenous Q6H    sodium chloride flush  5-40 mL Intravenous 2 times per day    lidocaine  1 patch Transdermal Daily    metroNIDAZOLE  500 mg Intravenous Q8H    [Held by provider] prasugrel  10 mg Oral Daily    buPROPion  150 mg Oral QAM    atorvastatin  80 mg Oral Daily    aspirin  81 mg Oral Daily    insulin lispro  0-12 Units Subcutaneous Q4H    sodium chloride flush  10 mL Intravenous 2 times per day    enoxaparin  40 mg Subcutaneous Daily    insulin glargine  30 Units Subcutaneous BID     Infusions:   sodium chloride      dextrose      sodium chloride      sodium chloride 75 mL/hr at 04/16/21 0436     PRN Meds:  sodium chloride flush, sodium chloride, glucose, dextrose, glucagon (rDNA), dextrose, sodium chloride flush, sodium chloride, ondansetron, polyethylene glycol, acetaminophen **OR** acetaminophen    Physical    VITALS:  /66   Pulse 103   Temp 99.4 °F (37.4 °C) (Oral)   Resp 17   Ht 6' 2\" (1.88 m)   Wt 219 lb 12.8 oz (99.7 kg)   SpO2 90%   BMI 28.22 kg/m²   CONSTITUTIONAL:  WD/WN 54y.o. year-old male, nad  EYES:  Lids and lashes normal, PERRL, EOMI, sclera clear, conjunctiva normal  ENT:  NC/AT, MMM    NECK:  Supple, symmetrical, trachea midline  LUNGS:  clear to auscultation bilaterally, No increased work of breathing, good air exchange, no crackles or wheezing  CARDIOVASCULAR:  Regular rate and rhythm, normal S1 and S2, no S3 or S4, and no significant murmurs, rubs or gallops noted. Normal apical impulse. ABDOMEN:  Normal active bowel sounds, soft, tenderness in the epigastric and ruq areas, non-distended, no masses palpated, no organomegally  MUSCULOSKELETAL:  Full range of motion noted. MENTAL STATUS: Awake, alert, oriented to name, place and time. NEUROLOGIC:  Cranial nerves II-XII are grossly intact. SKIN:  normal skin color, texture, turgor for age.     Data    CBC with Differential:    Lab Results   Component Value Date    WBC 18.7 04/16/2021    HGB 13.7 04/16/2021    HCT 40.8 04/16/2021     04/16/2021    MCV 82.9 04/16/2021    RDW 13.2 04/16/2021    BANDSPCT 2 04/16/2021    LYMPHOPCT 6.0 04/16/2021    MONOPCT 6.0 04/16/2021    MYELOPCT 1 07/07/2020    EOSPCT 1.1 01/18/2011    BASOPCT 0.0 04/16/2021    MONOSABS 1.1 04/16/2021    LYMPHSABS 1.1 04/16/2021    EOSABS 0.0 04/16/2021    BASOSABS 0.0 04/16/2021    DIFFTYPE Auto 02/10/2013     BMP:    Lab Results   Component Value Date     04/16/2021    K 4.3 04/16/2021    CL 99 04/16/2021    CO2 27 04/16/2021    BUN 19 04/16/2021    CREATININE 0.8 04/16/2021    GLUCOSE 198 04/16/2021    CALCIUM 8.3 04/16/2021    GFRAA >60 04/16/2021    GFRAA >60 02/10/2013    LABGLOM >60 04/16/2021     LFT:   Lab Results   Component Value Date    ALKPHOS 69 04/15/2021    ALT 11 04/15/2021    AST 9 04/15/2021    PROT 6.4 04/15/2021    PROT 8.3 11/12/2012    BILITOT 0.8 04/15/2021    BILIDIR 0.3 04/15/2021    IBILI 0.5 04/15/2021     Magnesium:    Lab Results   Component Value Date    MG 2.50 10/06/2020     Phosphorus:    Lab Results   Component Value Date    PHOS 4.0 07/09/2020     PT/INR:  No results found for: PTINR  U/A:    Lab Results   Component Value Date LEUKOCYTESUR Negative 10/06/2020    WBCUA 2 10/06/2020    RBCUA 9 10/06/2020    SPECGRAV >1.030 10/06/2020    UROBILINOGEN 0.2 10/06/2020    BILIRUBINUR SMALL 10/06/2020    BILIRUBINUR NEGATIVE 09/10/2010    BLOODU Negative 10/06/2020    GLUCOSEU >=1000 10/06/2020    GLUCOSEU >=1000 09/10/2010    PROTEINU 30 10/06/2020     ABG:    Lab Results   Component Value Date    PHART 7.323 07/01/2020    DHM6BTU 33.7 07/01/2020    P4HZOFRZ 87.0 07/01/2020    SOI7WBO 17.5 07/01/2020    BEART -7.6 07/01/2020    PO2ART 51.4 07/01/2020    VDN5RHQ 18.5 07/01/2020           Intake/Output Summary (Last 24 hours) at 4/16/2021 0840  Last data filed at 4/16/2021 0046  Gross per 24 hour   Intake 120 ml   Output 750 ml   Net -630 ml       Consults:  IP CONSULT TO GENERAL SURGERY  IP CONSULT TO [address] Problems    Diagnosis Date Noted    Cholecystitis [K81.9] 04/14/2021    SIRS (systemic inflammatory response syndrome) (HCC) [R65.10] 01/52/8727    Neutrophilic leukocytosis [C53.8] 04/14/2021    Tachycardia [R00.0] 07/01/2020    Tachypnea [R06.82] 07/01/2020    CAD, multiple vessel [I25.10]     DMII (diabetes mellitus, type 2) ([facility] [E11.9] 09/18/2017    HLD (hyperlipidemia) [E78.5] 07/15/2014    Essential hypertension [I10] 09/11/2010       ASSESSMENT AND PLAN:    Acute cholecystitis   - Surgery consulted, plan for lap brian next wednesday  - need to hold effient for 7 days prior to surgery, last dose 4/15  - on low fat diet     SIRS ([facility] due to above with tachycardia, tachypnea, neutrophilic leukocytosis. - wbc still elevated and with low grade fevers  - Concern for the possibility of early ascending cholangitis. - merrem changed to cipro, flagyl  - Blood cultures      CAD, multiple vessel - Pt denies chest pain. Ischemic cardiomyopathy  vt s/p icd placement   Cardiology consulted due to cardiac history. He is on effient for a connie placed to the [facility] 11/2020.  Holding effient   Continue Statin and Aspirin     Diabetes mellitus II - Lantus, SSI and when able to eat, start a carb control diet     Hyperlipidemia - No current evidence of Rhabdomyolysis or other adverse effects.  Continue statin therapy while in the hospital          DVT Prophylaxis: Lovenox  Diet: DIET LOW FAT;  Code Status: Full Code    PT/OT Eval Status: Not Ordered    Dispo - Anticipated discharge date 1-2 days    Miri Maldonado MD

## 2021-04-16 NOTE — PLAN OF CARE
Problem: Falls - Risk of:  Goal: Will remain free from falls  Description: Will remain free from falls  1/81/7431 7584 by Angie Wise RN  Outcome: Ongoing  4/15/2021 2204 by Haydee Myles RN  Outcome: Ongoing  Goal: Absence of physical injury  Description: Absence of physical injury  5/30/1984 9234 by Angie Wise RN  Outcome: Ongoing  4/15/2021 2204 by Haydee Myles RN  Outcome: Ongoing     Problem: Pain:  Goal: Pain level will decrease  Description: Pain level will decrease  5/27/0390 1415 by Angie Wise RN  Outcome: Ongoing  4/15/2021 2204 by Haydee Myles RN  Outcome: Ongoing  Goal: Control of acute pain  Description: Control of acute pain  4/52/4360 2876 by Angie Wise RN  Outcome: Ongoing  4/15/2021 2204 by Haydee Myles RN  Outcome: Ongoing  Goal: Control of chronic pain  Description: Control of chronic pain  3/39/7991 8502 by Angie Wise RN  Outcome: Ongoing  4/15/2021 2204 by Haydee Myles RN  Outcome: Ongoing

## 2021-04-16 NOTE — PROGRESS NOTES
[facility]   Daily Progress Note      Admit Date:  4/14/2021    Reason for follow up visit: Cholecystitis; DAPT    CC: \"I feel a little better since I got the Zofran. I can;t really eat anything. 53 y/o male with PMH significant for multivessel CAD/prior PCI's, longstanding multiple recurrent episodes of syncope, ischemic cardiomyopathy , S/P ICD therapy, VT, HTN, HLP and diabetes mellitus who was admitted to [facility] with cholecystitis. Cardiology was asked to see regarding DAPT with impending cholecystectomy. Interval History:  Pt. seen and examined; records reviewed  Effient now on hold  BP noted  Currently on room air  + nausea and abdominal pain  Denies chest pain or SOB    Subjective:  Pt with no acute overnight cardiac events. Denies chest pain, SOB, cough, palpitations or dizziness    Review of Systems:   · Constitutional: no unanticipated weight loss. There's been no change in energy level, sleep pattern, or activity level. No fevers, chills. · Eyes: No visual changes or diplopia. No scleral icterus. · ENT: No Headaches, hearing loss or vertigo. No mouth sores or sore throat. · Cardiovascular: as reviewed in HPI  · Respiratory: No cough or wheezing, no sputum production. No hematemesis. · Gastrointestinal:  No change in bowel or bladder habits. + nausea and abdominal pain  · Genitourinary: No dysuria, trouble voiding, or hematuria. · Musculoskeletal:  No gait disturbance, no joint complaints. · Integumentary: No rash or pruritis. · Neurological: No headache, diplopia, change in muscle strength, numbness or tingling. · Psychiatric: No anxiety or depression. · Endocrine: No temperature intolerance. No excessive thirst, fluid intake, or urination. No tremor. · Hematologic/Lymphatic: No abnormal bruising or bleeding, blood clots or swollen lymph nodes. · Allergic/Immunologic: No nasal congestion or hives.     Objective:   /66   Pulse 103   Temp 99.4 °F (37.4 °C) (Oral)   Resp 17   Ht 6' 2\" (1.88 m)   Wt 219 lb 12.8 oz (99.7 kg)   SpO2 90%   BMI 28.22 kg/m²       Intake/Output Summary (Last 24 hours) at 4/16/2021 1001  Last data filed at 4/16/2021 0046  Gross per 24 hour   Intake 120 ml   Output 750 ml   Net -630 ml     Wt Readings from Last 3 Encounters:   04/16/21 219 lb 12.8 oz (99.7 kg)   03/16/21 221 lb 12.8 oz (100.6 kg)   12/03/20 227 lb 6.4 oz (103.1 kg)       Physical Exam:  General: In no acute distress. Resting in bed. Drowsy but awakens to verbal stimuli and oriented x 3. Skin:  Warm and dry. Neck:  Supple. No JVD appreciated. Carotids without bruits  Chest: Lungs cear to auscultation. No wheezes/rhonchi/rales  Cardiovascular:  RRR. Normal S1 and S2. No murmur/gallop or rub  Abdomen:  soft,  +bowel sounds. + RUQ tenderness  Extremities:  No LE edema. No clubbing or cyanosis.  2+ bilateral radial/DP/PT pulses    Medications:    cefepime  2,000 mg Intravenous Q12H    sodium chloride flush  5-40 mL Intravenous 2 times per day    lidocaine  1 patch Transdermal Daily    metroNIDAZOLE  500 mg Intravenous Q8H    [Held by provider] prasugrel  10 mg Oral Daily    buPROPion  150 mg Oral QAM    atorvastatin  80 mg Oral Daily    aspirin  81 mg Oral Daily    insulin lispro  0-12 Units Subcutaneous Q4H    sodium chloride flush  10 mL Intravenous 2 times per day    enoxaparin  40 mg Subcutaneous Daily    insulin glargine  30 Units Subcutaneous BID      sodium chloride      dextrose      sodium chloride      sodium chloride 75 mL/hr at 04/16/21 0436     sodium chloride flush, sodium chloride, glucose, dextrose, glucagon (rDNA), dextrose, sodium chloride flush, sodium chloride, ondansetron, polyethylene glycol, acetaminophen **OR** acetaminophen    Lab Data:  CBC:   Recent Labs     04/14/21  1200 04/15/21  0542 04/16/21  0557   WBC 18.0* 19.1* 18.7*   HGB 16.6 14.7 13.7    164 165     BMP:    Recent Labs     04/14/21  1200 04/15/21  0542 04/16/21  0557   * 135* 137   K 4.6 4.4 4.3   CO2 22 27 27   BUN 14 18 19   CREATININE 0.9 1.1 0.8*     LIVR:   Recent Labs     04/14/21  1200 04/15/21  0542   AST 13* 9*   ALT 16 11     INR:    Recent Labs     04/14/21  1200   INR 1.06     4/15/2021 Gallbladder US:  Cholelithiasis, with mild gallbladder wall thickening, suggesting   superimposed cholecystitis.  No pericholecystic fluid.  No sonographic Irwin   sign.  No biliary ductal dilatation     4/14/2021 CT abdomen  Abnormal inflammatory change involving and surrounding the gallbladder with   trace pericholecystic fluid, compatible with acute cholecystitis by CT scan.       Scattered areas of ground-glass opacity seen throughout the lungs,, possibly   postinflammatory-infectious in etiology.  Bronchiectatic changes are seen on   the right.  Superimposed bandlike opacities are seen, likely atelectasis or   scarring       Age indeterminate right 2nd rib fracture.       No acute traumatic solid organ injury identified in abdomen or pelvis. ECG:   Sinus tachycardia    11/18/2020 LHC/PCI:  1. Left main normal.  LYNNE 3 flow. No stenosis. 2.  Left anterior descending artery, has LYNNE 3 flow with 20% stenosis. 3.  Left circumflex has distally LYNNE 2 flow but no significant  stenosis, patent stents. 4.  Right coronary artery in the mid portion has 90% stenosis present  with unstable plaque. LV ejection fraction 60%.    In summary, successful revascularization of the right coronary artery  and placement of stent, 4.0 x 26 mm.    10/26/2020 Echo:   Normal LV size wall thickness with basal inferior akinesis; EF   50-55%   Grade I diastolic dysfunction with normal LV filling pressures. The left atrium is normal in size. Normal right ventricular size and function. Telemetry: not on telemetry    Assessment/Plan:    1.  Coronary artery disease without angina  -S/P PCI RCA ([facility]) in 11/2020  -Effient held d/t impending cholecystectomy  -continue ASA, statin    2. Cholecystitis and preop to cholecystectomy  -RX per surgery    3. Mixed hyperlipidemia  -on high intensity statin    No active cardiac issues at present. Effient on hold for cholecystectomy (would resume postop when ok with surgery) per Dr. Abdirahman Molina    Will follow peripherally as an inpatient. Please call if needed.      Electronically signed by LAURA Fuentes CNP on 4/16/2021 at 10:01 AM

## 2021-04-16 NOTE — PROGRESS NOTES
General Surgery    Overall improved but had nausea this AM    WBC remains elevated    A/P  Acute cholecystitis   Effient on hold per cardiology   Need to wait seven days prior to intervention   Scheduled for cholecystectomy on 4/21/2021   If he improves he can be discharged and follow up as an outpatient for surgery   If his symptoms persist will need to remain hospitalized for ongoing management    Will follow along    Electronically signed by Minnie Mendoza MD on 4/16/2021 at 1:53 PM

## 2021-04-16 NOTE — PROGRESS NOTES
Pt with one bout of emesis, states he rolled over in bed and had the urge to vomit. PRN zofran given, see eMAR. Pt states he feels relief from nausea at this time.  Electronically signed by Behzad Klein RN on 4/16/2021 at 5:07 AM

## 2021-04-17 ENCOUNTER — APPOINTMENT (OUTPATIENT)
Dept: GENERAL RADIOLOGY | Age: 56
DRG: 872 | End: 2021-04-17
Payer: MEDICARE

## 2021-04-17 LAB
ANION GAP SERPL CALCULATED.3IONS-SCNC: 11 MMOL/L (ref 3–16)
BASOPHILS ABSOLUTE: 0 K/UL (ref 0–0.2)
BASOPHILS RELATIVE PERCENT: 0.2 %
BUN BLDV-MCNC: 18 MG/DL (ref 7–20)
CALCIUM SERPL-MCNC: 8.2 MG/DL (ref 8.3–10.6)
CHLORIDE BLD-SCNC: 100 MMOL/L (ref 99–110)
CO2: 22 MMOL/L (ref 21–32)
CREAT SERPL-MCNC: 0.7 MG/DL (ref 0.9–1.3)
EOSINOPHILS ABSOLUTE: 0 K/UL (ref 0–0.6)
EOSINOPHILS RELATIVE PERCENT: 0.2 %
GFR AFRICAN AMERICAN: >60
GFR NON-AFRICAN AMERICAN: >60
GLUCOSE BLD-MCNC: 154 MG/DL (ref 70–99)
GLUCOSE BLD-MCNC: 158 MG/DL (ref 70–99)
GLUCOSE BLD-MCNC: 158 MG/DL (ref 70–99)
GLUCOSE BLD-MCNC: 165 MG/DL (ref 70–99)
GLUCOSE BLD-MCNC: 172 MG/DL (ref 70–99)
GLUCOSE BLD-MCNC: 183 MG/DL (ref 70–99)
GLUCOSE BLD-MCNC: 211 MG/DL (ref 70–99)
HCT VFR BLD CALC: 39 % (ref 40.5–52.5)
HEMOGLOBIN: 13.4 G/DL (ref 13.5–17.5)
LYMPHOCYTES ABSOLUTE: 0.7 K/UL (ref 1–5.1)
LYMPHOCYTES RELATIVE PERCENT: 5.3 %
MCH RBC QN AUTO: 28.6 PG (ref 26–34)
MCHC RBC AUTO-ENTMCNC: 34.4 G/DL (ref 31–36)
MCV RBC AUTO: 83.1 FL (ref 80–100)
MONOCYTES ABSOLUTE: 1.1 K/UL (ref 0–1.3)
MONOCYTES RELATIVE PERCENT: 7.5 %
NEUTROPHILS ABSOLUTE: 12.3 K/UL (ref 1.7–7.7)
NEUTROPHILS RELATIVE PERCENT: 86.8 %
PDW BLD-RTO: 13.3 % (ref 12.4–15.4)
PERFORMED ON: ABNORMAL
PLATELET # BLD: 175 K/UL (ref 135–450)
PMV BLD AUTO: 8.2 FL (ref 5–10.5)
POTASSIUM REFLEX MAGNESIUM: 3.9 MMOL/L (ref 3.5–5.1)
RBC # BLD: 4.69 M/UL (ref 4.2–5.9)
SODIUM BLD-SCNC: 133 MMOL/L (ref 136–145)
WBC # BLD: 14.1 K/UL (ref 4–11)

## 2021-04-17 PROCEDURE — 2580000003 HC RX 258: Performed by: FAMILY MEDICINE

## 2021-04-17 PROCEDURE — 80048 BASIC METABOLIC PNL TOTAL CA: CPT

## 2021-04-17 PROCEDURE — 6370000000 HC RX 637 (ALT 250 FOR IP): Performed by: INTERNAL MEDICINE

## 2021-04-17 PROCEDURE — 74018 RADEX ABDOMEN 1 VIEW: CPT

## 2021-04-17 PROCEDURE — 2500000003 HC RX 250 WO HCPCS: Performed by: INTERNAL MEDICINE

## 2021-04-17 PROCEDURE — 2580000003 HC RX 258: Performed by: INTERNAL MEDICINE

## 2021-04-17 PROCEDURE — 85025 COMPLETE CBC W/AUTO DIFF WBC: CPT

## 2021-04-17 PROCEDURE — 1200000000 HC SEMI PRIVATE

## 2021-04-17 PROCEDURE — 6360000002 HC RX W HCPCS: Performed by: INTERNAL MEDICINE

## 2021-04-17 PROCEDURE — 99232 SBSQ HOSP IP/OBS MODERATE 35: CPT | Performed by: SURGERY

## 2021-04-17 PROCEDURE — 6360000002 HC RX W HCPCS: Performed by: SURGERY

## 2021-04-17 PROCEDURE — 36415 COLL VENOUS BLD VENIPUNCTURE: CPT

## 2021-04-17 PROCEDURE — 6360000002 HC RX W HCPCS: Performed by: FAMILY MEDICINE

## 2021-04-17 PROCEDURE — 6370000000 HC RX 637 (ALT 250 FOR IP): Performed by: FAMILY MEDICINE

## 2021-04-17 RX ORDER — MORPHINE SULFATE 2 MG/ML
1 INJECTION, SOLUTION INTRAMUSCULAR; INTRAVENOUS
Status: DISCONTINUED | OUTPATIENT
Start: 2021-04-17 | End: 2021-04-19 | Stop reason: HOSPADM

## 2021-04-17 RX ORDER — MORPHINE SULFATE 2 MG/ML
2 INJECTION, SOLUTION INTRAMUSCULAR; INTRAVENOUS
Status: DISCONTINUED | OUTPATIENT
Start: 2021-04-17 | End: 2021-04-19 | Stop reason: HOSPADM

## 2021-04-17 RX ORDER — METOCLOPRAMIDE HYDROCHLORIDE 5 MG/ML
10 INJECTION INTRAMUSCULAR; INTRAVENOUS EVERY 6 HOURS
Status: DISCONTINUED | OUTPATIENT
Start: 2021-04-17 | End: 2021-04-19 | Stop reason: HOSPADM

## 2021-04-17 RX ORDER — PROCHLORPERAZINE EDISYLATE 5 MG/ML
10 INJECTION INTRAMUSCULAR; INTRAVENOUS EVERY 8 HOURS PRN
Status: DISCONTINUED | OUTPATIENT
Start: 2021-04-17 | End: 2021-04-17

## 2021-04-17 RX ORDER — PROCHLORPERAZINE EDISYLATE 5 MG/ML
10 INJECTION INTRAMUSCULAR; INTRAVENOUS EVERY 6 HOURS PRN
Status: DISCONTINUED | OUTPATIENT
Start: 2021-04-17 | End: 2021-04-19 | Stop reason: HOSPADM

## 2021-04-17 RX ADMIN — ONDANSETRON 4 MG: 2 INJECTION INTRAMUSCULAR; INTRAVENOUS at 09:41

## 2021-04-17 RX ADMIN — INSULIN LISPRO 2 UNITS: 100 INJECTION, SOLUTION INTRAVENOUS; SUBCUTANEOUS at 00:12

## 2021-04-17 RX ADMIN — PROCHLORPERAZINE EDISYLATE 10 MG: 5 INJECTION INTRAMUSCULAR; INTRAVENOUS at 21:50

## 2021-04-17 RX ADMIN — CEFEPIME HYDROCHLORIDE 2000 MG: 2 INJECTION, POWDER, FOR SOLUTION INTRAVENOUS at 11:43

## 2021-04-17 RX ADMIN — CEFEPIME HYDROCHLORIDE 2000 MG: 2 INJECTION, POWDER, FOR SOLUTION INTRAVENOUS at 22:02

## 2021-04-17 RX ADMIN — METOCLOPRAMIDE HYDROCHLORIDE 10 MG: 5 INJECTION INTRAMUSCULAR; INTRAVENOUS at 11:43

## 2021-04-17 RX ADMIN — METOCLOPRAMIDE HYDROCHLORIDE 10 MG: 5 INJECTION INTRAMUSCULAR; INTRAVENOUS at 17:27

## 2021-04-17 RX ADMIN — INSULIN LISPRO 2 UNITS: 100 INJECTION, SOLUTION INTRAVENOUS; SUBCUTANEOUS at 22:45

## 2021-04-17 RX ADMIN — METRONIDAZOLE 500 MG: 500 INJECTION, SOLUTION INTRAVENOUS at 09:38

## 2021-04-17 RX ADMIN — METRONIDAZOLE 500 MG: 500 INJECTION, SOLUTION INTRAVENOUS at 02:30

## 2021-04-17 RX ADMIN — INSULIN GLARGINE 30 UNITS: 100 INJECTION, SOLUTION SUBCUTANEOUS at 22:01

## 2021-04-17 RX ADMIN — METRONIDAZOLE 500 MG: 500 INJECTION, SOLUTION INTRAVENOUS at 17:27

## 2021-04-17 RX ADMIN — ACETAMINOPHEN 650 MG: 325 TABLET ORAL at 21:58

## 2021-04-17 RX ADMIN — ONDANSETRON 4 MG: 2 INJECTION INTRAMUSCULAR; INTRAVENOUS at 00:11

## 2021-04-17 RX ADMIN — PROCHLORPERAZINE EDISYLATE 10 MG: 5 INJECTION INTRAMUSCULAR; INTRAVENOUS at 12:08

## 2021-04-17 RX ADMIN — ACETAMINOPHEN 650 MG: 325 TABLET ORAL at 04:24

## 2021-04-17 RX ADMIN — Medication 10 ML: at 21:50

## 2021-04-17 RX ADMIN — PROCHLORPERAZINE EDISYLATE 10 MG: 5 INJECTION INTRAMUSCULAR; INTRAVENOUS at 05:30

## 2021-04-17 RX ADMIN — SODIUM CHLORIDE: 9 INJECTION, SOLUTION INTRAVENOUS at 21:51

## 2021-04-17 RX ADMIN — CEFEPIME HYDROCHLORIDE 2000 MG: 2 INJECTION, POWDER, FOR SOLUTION INTRAVENOUS at 00:11

## 2021-04-17 RX ADMIN — INSULIN GLARGINE 30 UNITS: 100 INJECTION, SOLUTION SUBCUTANEOUS at 09:41

## 2021-04-17 RX ADMIN — ONDANSETRON 4 MG: 2 INJECTION INTRAMUSCULAR; INTRAVENOUS at 04:25

## 2021-04-17 RX ADMIN — INSULIN LISPRO 2 UNITS: 100 INJECTION, SOLUTION INTRAVENOUS; SUBCUTANEOUS at 05:29

## 2021-04-17 RX ADMIN — MORPHINE SULFATE 2 MG: 2 INJECTION, SOLUTION INTRAMUSCULAR; INTRAVENOUS at 22:05

## 2021-04-17 ASSESSMENT — PAIN SCALES - GENERAL
PAINLEVEL_OUTOF10: 0
PAINLEVEL_OUTOF10: 0

## 2021-04-17 ASSESSMENT — PAIN DESCRIPTION - PROGRESSION
CLINICAL_PROGRESSION: NOT CHANGED

## 2021-04-17 ASSESSMENT — PAIN - FUNCTIONAL ASSESSMENT: PAIN_FUNCTIONAL_ASSESSMENT: ACTIVITIES ARE NOT PREVENTED

## 2021-04-17 ASSESSMENT — PAIN DESCRIPTION - PAIN TYPE: TYPE: ACUTE PAIN

## 2021-04-17 ASSESSMENT — PAIN DESCRIPTION - LOCATION: LOCATION: ABDOMEN

## 2021-04-17 ASSESSMENT — PAIN DESCRIPTION - ONSET: ONSET: ON-GOING

## 2021-04-17 ASSESSMENT — PAIN DESCRIPTION - DESCRIPTORS: DESCRIPTORS: SHARP

## 2021-04-17 NOTE — PLAN OF CARE
Bed in low position. Side rails up x 2. Call light within reach. Pt refusing bed alarm but calls out appropriately. Reminded Pt to call for assistance. Will continue to monitor.

## 2021-04-17 NOTE — PROGRESS NOTES
General Surgery  Daily Progress Note    Pt Name: Chandra Paniagua  Medical Record Number: [id number]  Date of Birth 1965   Today's Date: 4/17/2021    Chief Complaint   Patient presents with    Loss of Consciousness     Pt states he blacked out monday night into tuesday, states when he fell he hurt the R side of his ribs, states he is already being seen for the syncope issues       ASSESSMENT/PLAN  1. Acute calculous cholecystitis - WBC down to 14.1 from 18.7. Pain resolved, still with persistent N/V. Continue cefepime, flagyl. Will add scheduled reglan and make compazine more frequent. Continue zofran. Surgery planned for 4/21. 2. CAD s/p stent placement - effient on hold. 3. Hx of DM, CVA, HTN - per primary team      SUBJECTIVE  Juni is unchanged from yesterday. Denies any abdominal pain. He has nausea and vomiting. He has passed flatus and has not had a bowel movement. He is not tolerating low fat diet. Current activity is ad osiris    OBJECTIVE  VITALS:  height is 6' 2\" (1.88 m) and weight is 225 lb 12 oz (102.4 kg). His oral temperature is 99.2 °F (37.3 °C). His blood pressure is 120/72 and his pulse is 104. His respiration is 16 and oxygen saturation is 91%. GENERAL: alert, no distress  LUNGS: normal respiratory effort, no accessory muscle use  HEART: normal rate and regular rhythm  ABDOMEN: soft, non-tender and non-distended  EXTREMITY: no cyanosis, clubbing or edema  I/O last 3 completed shifts: In: 360 [P.O.:360]  Out: 500 [Urine:500]  No intake/output data recorded.     LABS  Recent Labs     04/14/21  1200 04/15/21  0542 04/15/21  0542 04/17/21  0617   WBC 18.0* 19.1*   < > 14.1*   HGB 16.6 14.7   < > 13.4*   HCT 48.8 42.7   < > 39.0*    164   < > 175   * 135*   < > 133*   K 4.6 4.4   < > 3.9   CL 91* 100   < > 100   CO2 22 27   < > 22   BUN 14 18   < > 18   CREATININE 0.9 1.1   < > 0.7*   CALCIUM 9.5 8.9   < > 8.2*   INR 1.06  --   --   --    AST 13* 9*  --   --    ALT 16 11  --   --    BILITOT 1.1* 0.8  --   --    BILIDIR <0.2 0.3  --   --     < > = values in this interval not displayed.      CBC with Differential:    Lab Results   Component Value Date    WBC 14.1 04/17/2021    RBC 4.69 04/17/2021    HGB 13.4 04/17/2021    HCT 39.0 04/17/2021     04/17/2021    MCV 83.1 04/17/2021    MCH 28.6 04/17/2021    MCHC 34.4 04/17/2021    RDW 13.3 04/17/2021    SEGSPCT 64.4 02/10/2013    BANDSPCT 2 04/16/2021    LYMPHOPCT 5.3 04/17/2021    MONOPCT 7.5 04/17/2021    MYELOPCT 1 07/07/2020    EOSPCT 1.1 01/18/2011    BASOPCT 0.2 04/17/2021    MONOSABS 1.1 04/17/2021    LYMPHSABS 0.7 04/17/2021    EOSABS 0.0 04/17/2021    BASOSABS 0.0 04/17/2021    DIFFTYPE Auto 02/10/2013     BMP:    Lab Results   Component Value Date     04/17/2021    K 3.9 04/17/2021     04/17/2021    CO2 22 04/17/2021    BUN 18 04/17/2021    LABALBU 3.4 04/15/2021    CREATININE 0.7 04/17/2021    CALCIUM 8.2 04/17/2021    GFRAA >60 04/17/2021    GFRAA >60 02/10/2013    LABGLOM >60 04/17/2021    GLUCOSE 172 04/17/2021     Hepatic Function Panel:    Lab Results   Component Value Date    ALKPHOS 69 04/15/2021    ALT 11 04/15/2021    AST 9 04/15/2021    PROT 6.4 04/15/2021    PROT 8.3 11/12/2012    BILITOT 0.8 04/15/2021    BILIDIR 0.3 04/15/2021    IBILI 0.5 04/15/2021    LABALBU 3.4 04/15/2021         Adam Geronimo MD  Electronically signed 4/17/2021 at 10:54 AM

## 2021-04-17 NOTE — PROGRESS NOTES
Hospitalist   Progress Note    Patient Name: Britton Gregory  PCP: LAURA Dooley - CNP  Date of Admission: 4/14/2021    Chief Complaint on Admission: Pt c/o right sided rib pain s/p syncope 2 days ago (has already been evaluated for syncope)  Chief diagnosis after evaluation: Acute Cholecystitis    Brief Synopsis: Patient 54 y.o. man with a history of hypertension, hyperlipidemia, diabetes mellitus type II and coronary artery disease who was admitted on 4/14/2021 for evaluation and treatment of acute Cholecystitis      Subjective: Pt S/E. Still c/o severe nausea, having abdominal pain this afternoon. Wbc coming down. Objective:   Allergies  No known allergies    Medications    Scheduled Meds:   cefepime  2,000 mg Intravenous Q12H    sodium chloride flush  5-40 mL Intravenous 2 times per day    lidocaine  1 patch Transdermal Daily    metroNIDAZOLE  500 mg Intravenous Q8H    [Held by provider] prasugrel  10 mg Oral Daily    buPROPion  150 mg Oral QAM    atorvastatin  80 mg Oral Daily    aspirin  81 mg Oral Daily    insulin lispro  0-12 Units Subcutaneous Q4H    enoxaparin  40 mg Subcutaneous Daily    insulin glargine  30 Units Subcutaneous BID     Infusions:   sodium chloride      dextrose      sodium chloride 75 mL/hr at 04/16/21 0436     PRN Meds:  prochlorperazine, sodium chloride flush, sodium chloride, glucose, dextrose, glucagon (rDNA), dextrose, ondansetron, polyethylene glycol, acetaminophen **OR** acetaminophen    Physical    VITALS:  /72   Pulse 104   Temp 100.6 °F (38.1 °C) (Oral)   Resp 16   Ht 6' 2\" (1.88 m)   Wt 225 lb 12 oz (102.4 kg)   SpO2 91%   BMI 28.98 kg/m²   CONSTITUTIONAL:  WD/WN 54y.o. year-old male, nad, sleeping comfortably in bed  EYES:  Lids and lashes normal, PERRL, EOMI, sclera clear, conjunctiva normal  ENT:  NC/AT, MMM    NECK:  Supple, symmetrical, trachea midline  LUNGS:  clear to auscultation bilaterally, No increased work of breathing, good air exchange, no crackles or wheezing  CARDIOVASCULAR:  Regular rate and rhythm, normal S1 and S2, no S3 or S4, and no significant murmurs, rubs or gallops noted  ABDOMEN:  Normal active bowel sounds, soft, tenderness in the epigastric and ruq areas, non-distended  MUSCULOSKELETAL:  No edema, or cyanosis  MENTAL STATUS: Awake, alert, oriented to name, place and time. NEUROLOGIC:  Cranial nerves II-XII are grossly intact. SKIN:  normal skin color, texture, turgor for age.     Data    CBC with Differential:    Lab Results   Component Value Date    WBC 14.1 04/17/2021    HGB 13.4 04/17/2021    HCT 39.0 04/17/2021     04/17/2021    MCV 83.1 04/17/2021    RDW 13.3 04/17/2021    BANDSPCT 2 04/16/2021    LYMPHOPCT 5.3 04/17/2021    MONOPCT 7.5 04/17/2021    MYELOPCT 1 07/07/2020    EOSPCT 1.1 01/18/2011    BASOPCT 0.2 04/17/2021    MONOSABS 1.1 04/17/2021    LYMPHSABS 0.7 04/17/2021    EOSABS 0.0 04/17/2021    BASOSABS 0.0 04/17/2021    DIFFTYPE Auto 02/10/2013     BMP:    Lab Results   Component Value Date     04/17/2021    K 3.9 04/17/2021     04/17/2021    CO2 22 04/17/2021    BUN 18 04/17/2021    CREATININE 0.7 04/17/2021    GLUCOSE 172 04/17/2021    CALCIUM 8.2 04/17/2021    GFRAA >60 04/17/2021    GFRAA >60 02/10/2013    LABGLOM >60 04/17/2021     LFT:   Lab Results   Component Value Date    ALKPHOS 69 04/15/2021    ALT 11 04/15/2021    AST 9 04/15/2021    PROT 6.4 04/15/2021    PROT 8.3 11/12/2012    BILITOT 0.8 04/15/2021    BILIDIR 0.3 04/15/2021    IBILI 0.5 04/15/2021     Magnesium:    Lab Results   Component Value Date    MG 2.50 10/06/2020     Phosphorus:    Lab Results   Component Value Date    PHOS 4.0 07/09/2020     PT/INR:  No results found for: PTINR  U/A:    Lab Results   Component Value Date    LEUKOCYTESUR Negative 10/06/2020    WBCUA 2 10/06/2020    RBCUA 9 10/06/2020    SPECGRAV >1.030 10/06/2020    UROBILINOGEN 0.2 10/06/2020    BILIRUBINUR SMALL 10/06/2020    BILIRUBINUR NEGATIVE 09/10/2010    BLOODU Negative 10/06/2020    GLUCOSEU >=1000 10/06/2020    GLUCOSEU >=1000 09/10/2010    PROTEINU 30 10/06/2020     ABG:    Lab Results   Component Value Date    PHART 7.323 07/01/2020    OHN5FUC 33.7 07/01/2020    X1RPXZBV 87.0 07/01/2020    XFW3DLL 17.5 07/01/2020    BEART -7.6 07/01/2020    PO2ART 51.4 07/01/2020    HWC3MXA 18.5 07/01/2020           Intake/Output Summary (Last 24 hours) at 4/17/2021 0825  Last data filed at 4/17/2021 3356  Gross per 24 hour   Intake 360 ml   Output 500 ml   Net -140 ml       Consults:  IP CONSULT TO GENERAL SURGERY  IP CONSULT TO [address] Problems    Diagnosis Date Noted    Cholecystitis [K81.9] 04/14/2021    SIRS (systemic inflammatory response syndrome) (HCC) [R65.10] 08/23/9395    Neutrophilic leukocytosis [D16.1] 04/14/2021    Tachycardia [R00.0] 07/01/2020    Tachypnea [R06.82] 07/01/2020    CAD, multiple vessel [I25.10]     [facility]II [facility] type [facility]) [facility] [E11.9] 09/18/2017    HLD (hyperlipidemia) [E78.5] 07/15/2014    Essential hypertension [I10] 09/11/2010       ASSESSMENT AND PLAN:    Acute cholecystitis   - Surgery consulted, plan for lap brian next wednesday  - need to hold effient for 7 days prior to surgery, last dose 4/15  - change to clear liquids due to severe nausea. - antiemetics increased  - pain control     SIRS (HCC) due to above with tachycardia, tachypnea, neutrophilic leukocytosis. - wbc down to 14 today, still with low grade fevers  - Concern for the possibility of early ascending cholangitis  - cont cipro, flagyl  - Blood cultures ngtd     CAD, multiple vessel - Pt denies chest pain. Ischemic cardiomyopathy  vt s/p icd placement   Cardiology consulted due to cardiac history. He is on effient for a connie placed to the [facility] 11/2020.  Holding effient   Continue Statin and Aspirin     Diabetes mellitus II - Lantus, SSI      Hyperlipidemia - No current evidence of Rhabdomyolysis or other adverse effects.  Continue statin therapy while in the hospital       DVT Prophylaxis: Lovenox  Diet: DIET LOW FAT;  Code Status: Full Code    PT/OT Eval Status: Not Ordered    Dispo - Anticipated discharge date 1-2 days    Safia Loredo MD

## 2021-04-17 NOTE — PROGRESS NOTES
Patient became more nauseated as the shift progressed, received two doses of zofran which did not help.  New order received for IV compazine

## 2021-04-17 NOTE — PROGRESS NOTES
Pt's fingerstick blood sugar is 154. Pt is actively vomiting and refusing breakfast.  Administered Pt's 30 units of Lantus as ordered. Held sliding scale insulin. Will continue to monitor.

## 2021-04-18 LAB
ANION GAP SERPL CALCULATED.3IONS-SCNC: 11 MMOL/L (ref 3–16)
ATYPICAL LYMPHOCYTE RELATIVE PERCENT: 1 % (ref 0–6)
BANDED NEUTROPHILS RELATIVE PERCENT: 2 % (ref 0–7)
BASOPHILS ABSOLUTE: 0 K/UL (ref 0–0.2)
BASOPHILS RELATIVE PERCENT: 0 %
BLOOD CULTURE, ROUTINE: NORMAL
BUN BLDV-MCNC: 17 MG/DL (ref 7–20)
CALCIUM SERPL-MCNC: 7.8 MG/DL (ref 8.3–10.6)
CHLORIDE BLD-SCNC: 102 MMOL/L (ref 99–110)
CO2: 26 MMOL/L (ref 21–32)
CREAT SERPL-MCNC: 0.8 MG/DL (ref 0.9–1.3)
CULTURE, BLOOD 2: NORMAL
EOSINOPHILS ABSOLUTE: 0 K/UL (ref 0–0.6)
EOSINOPHILS RELATIVE PERCENT: 0 %
GFR AFRICAN AMERICAN: >60
GFR NON-AFRICAN AMERICAN: >60
GLUCOSE BLD-MCNC: 157 MG/DL (ref 70–99)
GLUCOSE BLD-MCNC: 173 MG/DL (ref 70–99)
GLUCOSE BLD-MCNC: 175 MG/DL (ref 70–99)
GLUCOSE BLD-MCNC: 180 MG/DL (ref 70–99)
GLUCOSE BLD-MCNC: 194 MG/DL (ref 70–99)
GLUCOSE BLD-MCNC: 202 MG/DL (ref 70–99)
GLUCOSE BLD-MCNC: 239 MG/DL (ref 70–99)
HCT VFR BLD CALC: 40 % (ref 40.5–52.5)
HEMATOLOGY PATH CONSULT: YES
HEMOGLOBIN: 13.7 G/DL (ref 13.5–17.5)
LYMPHOCYTES ABSOLUTE: 1.1 K/UL (ref 1–5.1)
LYMPHOCYTES RELATIVE PERCENT: 7 %
MAGNESIUM: 2.4 MG/DL (ref 1.8–2.4)
MCH RBC QN AUTO: 28.2 PG (ref 26–34)
MCHC RBC AUTO-ENTMCNC: 34.2 G/DL (ref 31–36)
MCV RBC AUTO: 82.5 FL (ref 80–100)
MONOCYTES ABSOLUTE: 2.3 K/UL (ref 0–1.3)
MONOCYTES RELATIVE PERCENT: 16 %
NEUTROPHILS ABSOLUTE: 10.9 K/UL (ref 1.7–7.7)
NEUTROPHILS RELATIVE PERCENT: 74 %
PDW BLD-RTO: 13.3 % (ref 12.4–15.4)
PERFORMED ON: ABNORMAL
PLATELET # BLD: 174 K/UL (ref 135–450)
PLATELET SLIDE REVIEW: ADEQUATE
PMV BLD AUTO: 8 FL (ref 5–10.5)
POTASSIUM REFLEX MAGNESIUM: 3.5 MMOL/L (ref 3.5–5.1)
RBC # BLD: 4.85 M/UL (ref 4.2–5.9)
RBC # BLD: NORMAL 10*6/UL
SLIDE REVIEW: ABNORMAL
SODIUM BLD-SCNC: 139 MMOL/L (ref 136–145)
WBC # BLD: 14.3 K/UL (ref 4–11)

## 2021-04-18 PROCEDURE — 2580000003 HC RX 258: Performed by: FAMILY MEDICINE

## 2021-04-18 PROCEDURE — 1200000000 HC SEMI PRIVATE

## 2021-04-18 PROCEDURE — 6370000000 HC RX 637 (ALT 250 FOR IP): Performed by: FAMILY MEDICINE

## 2021-04-18 PROCEDURE — 80048 BASIC METABOLIC PNL TOTAL CA: CPT

## 2021-04-18 PROCEDURE — 6360000002 HC RX W HCPCS: Performed by: INTERNAL MEDICINE

## 2021-04-18 PROCEDURE — 36415 COLL VENOUS BLD VENIPUNCTURE: CPT

## 2021-04-18 PROCEDURE — 99231 SBSQ HOSP IP/OBS SF/LOW 25: CPT | Performed by: SURGERY

## 2021-04-18 PROCEDURE — 6360000002 HC RX W HCPCS: Performed by: SURGERY

## 2021-04-18 PROCEDURE — 6370000000 HC RX 637 (ALT 250 FOR IP): Performed by: INTERNAL MEDICINE

## 2021-04-18 PROCEDURE — 85025 COMPLETE CBC W/AUTO DIFF WBC: CPT

## 2021-04-18 PROCEDURE — 2500000003 HC RX 250 WO HCPCS: Performed by: INTERNAL MEDICINE

## 2021-04-18 PROCEDURE — 83735 ASSAY OF MAGNESIUM: CPT

## 2021-04-18 PROCEDURE — 6360000002 HC RX W HCPCS: Performed by: FAMILY MEDICINE

## 2021-04-18 PROCEDURE — 2580000003 HC RX 258: Performed by: INTERNAL MEDICINE

## 2021-04-18 RX ORDER — LACTOBACILLUS RHAMNOSUS GG 10B CELL
1 CAPSULE ORAL 2 TIMES DAILY WITH MEALS
Status: DISCONTINUED | OUTPATIENT
Start: 2021-04-18 | End: 2021-04-19 | Stop reason: HOSPADM

## 2021-04-18 RX ADMIN — METRONIDAZOLE 500 MG: 500 INJECTION, SOLUTION INTRAVENOUS at 17:38

## 2021-04-18 RX ADMIN — Medication 10 ML: at 22:06

## 2021-04-18 RX ADMIN — MORPHINE SULFATE 2 MG: 2 INJECTION, SOLUTION INTRAMUSCULAR; INTRAVENOUS at 17:38

## 2021-04-18 RX ADMIN — ONDANSETRON 4 MG: 2 INJECTION INTRAMUSCULAR; INTRAVENOUS at 22:06

## 2021-04-18 RX ADMIN — CEFEPIME HYDROCHLORIDE 2000 MG: 2 INJECTION, POWDER, FOR SOLUTION INTRAVENOUS at 11:52

## 2021-04-18 RX ADMIN — METRONIDAZOLE 500 MG: 500 INJECTION, SOLUTION INTRAVENOUS at 01:03

## 2021-04-18 RX ADMIN — METOCLOPRAMIDE HYDROCHLORIDE 10 MG: 5 INJECTION INTRAMUSCULAR; INTRAVENOUS at 05:52

## 2021-04-18 RX ADMIN — BUPROPION HYDROCHLORIDE 150 MG: 150 TABLET, EXTENDED RELEASE ORAL at 08:44

## 2021-04-18 RX ADMIN — CEFEPIME HYDROCHLORIDE 2000 MG: 2 INJECTION, POWDER, FOR SOLUTION INTRAVENOUS at 22:07

## 2021-04-18 RX ADMIN — INSULIN LISPRO 2 UNITS: 100 INJECTION, SOLUTION INTRAVENOUS; SUBCUTANEOUS at 12:01

## 2021-04-18 RX ADMIN — INSULIN LISPRO 1 UNITS: 100 INJECTION, SOLUTION INTRAVENOUS; SUBCUTANEOUS at 17:39

## 2021-04-18 RX ADMIN — METOCLOPRAMIDE HYDROCHLORIDE 10 MG: 5 INJECTION INTRAMUSCULAR; INTRAVENOUS at 01:03

## 2021-04-18 RX ADMIN — ENOXAPARIN SODIUM 40 MG: 40 INJECTION SUBCUTANEOUS at 08:45

## 2021-04-18 RX ADMIN — MORPHINE SULFATE 2 MG: 2 INJECTION, SOLUTION INTRAMUSCULAR; INTRAVENOUS at 05:55

## 2021-04-18 RX ADMIN — INSULIN GLARGINE 30 UNITS: 100 INJECTION, SOLUTION SUBCUTANEOUS at 22:15

## 2021-04-18 RX ADMIN — METOCLOPRAMIDE HYDROCHLORIDE 10 MG: 5 INJECTION INTRAMUSCULAR; INTRAVENOUS at 17:38

## 2021-04-18 RX ADMIN — ATORVASTATIN CALCIUM 80 MG: 80 TABLET, FILM COATED ORAL at 08:44

## 2021-04-18 RX ADMIN — Medication 1 CAPSULE: at 17:38

## 2021-04-18 RX ADMIN — INSULIN LISPRO 1 UNITS: 100 INJECTION, SOLUTION INTRAVENOUS; SUBCUTANEOUS at 22:15

## 2021-04-18 RX ADMIN — Medication 1 CAPSULE: at 11:52

## 2021-04-18 RX ADMIN — METRONIDAZOLE 500 MG: 500 INJECTION, SOLUTION INTRAVENOUS at 08:45

## 2021-04-18 RX ADMIN — METOCLOPRAMIDE HYDROCHLORIDE 10 MG: 5 INJECTION INTRAMUSCULAR; INTRAVENOUS at 11:52

## 2021-04-18 RX ADMIN — INSULIN GLARGINE 30 UNITS: 100 INJECTION, SOLUTION SUBCUTANEOUS at 08:49

## 2021-04-18 RX ADMIN — ASPIRIN 81 MG: 81 TABLET, CHEWABLE ORAL at 08:44

## 2021-04-18 RX ADMIN — MORPHINE SULFATE 2 MG: 2 INJECTION, SOLUTION INTRAMUSCULAR; INTRAVENOUS at 22:07

## 2021-04-18 ASSESSMENT — PAIN DESCRIPTION - LOCATION
LOCATION: ABDOMEN;RIB CAGE
LOCATION: ABDOMEN;RIB CAGE

## 2021-04-18 ASSESSMENT — PAIN SCALES - GENERAL
PAINLEVEL_OUTOF10: 7

## 2021-04-18 ASSESSMENT — PAIN DESCRIPTION - PROGRESSION
CLINICAL_PROGRESSION: NOT CHANGED

## 2021-04-18 ASSESSMENT — PAIN DESCRIPTION - ONSET: ONSET: ON-GOING

## 2021-04-18 ASSESSMENT — PAIN DESCRIPTION - FREQUENCY: FREQUENCY: CONTINUOUS

## 2021-04-18 ASSESSMENT — PAIN - FUNCTIONAL ASSESSMENT: PAIN_FUNCTIONAL_ASSESSMENT: PREVENTS OR INTERFERES SOME ACTIVE ACTIVITIES AND ADLS

## 2021-04-18 ASSESSMENT — PAIN DESCRIPTION - ORIENTATION: ORIENTATION: RIGHT

## 2021-04-18 ASSESSMENT — PAIN DESCRIPTION - DESCRIPTORS
DESCRIPTORS: SHARP
DESCRIPTORS: SHARP

## 2021-04-18 NOTE — PROGRESS NOTES
General Surgery  Daily Progress Note    Pt Name: Chidi Ramírez  Medical Record Number: [id number]  Date of Birth 1965   Today's Date: 4/18/2021    Chief Complaint   Patient presents with    Loss of Consciousness     Pt states he blacked out monday night into tuesday, states when he fell he hurt the R side of his ribs, states he is already being seen for the syncope issues       ASSESSMENT/PLAN  1. Acute calculous cholecystitis - WBC at 14.3 from 14.1. Minimal RUQ pain, nausea greatly improved with current regimen. Continue cefepime, flagyl. Continue reglan, compazine,and zofran. Surgery planned for 4/21. 2. CAD s/p stent placement - effient on hold. 3. Hx of DM, CVA, HTN - per primary team      SUBJECTIVE  Juni is improved from yesterday. Denies any abdominal pain. He has improved nausea and no vomiting. He has passed flatus and has had a bowel movement. He is not tolerating low fat diet. Current activity is ad osiris    OBJECTIVE  VITALS:  height is 6' 2\" (1.88 m) and weight is 222 lb 3.6 oz (100.8 kg). His oral temperature is 97.4 °F (36.3 °C). His blood pressure is 110/70 and his pulse is 101. His respiration is 17 and oxygen saturation is 94%. GENERAL: alert, no distress  LUNGS: normal respiratory effort, no accessory muscle use  HEART: normal rate and regular rhythm  ABDOMEN: soft, non-tender and non-distended  EXTREMITY: no cyanosis, clubbing or edema  I/O last 3 completed shifts: In: 1672 [P.O.:420; I.V.:800; IV Piggyback:200]  Out: 700 [Urine:700]  No intake/output data recorded.     LABS  Recent Labs     04/18/21  0634   WBC 14.3*   HGB 13.7   HCT 40.0*         K 3.5      CO2 26   BUN 17   CREATININE 0.8*   MG 2.40   CALCIUM 7.8*     CBC with Differential:    Lab Results   Component Value Date    WBC 14.3 04/18/2021    RBC 4.85 04/18/2021    HGB 13.7 04/18/2021    HCT 40.0 04/18/2021     04/18/2021    MCV 82.5 04/18/2021    MCH 28.2 04/18/2021    MCHC 34.2 04/18/2021    RDW 13.3 04/18/2021    SEGSPCT 64.4 02/10/2013    BANDSPCT 2 04/18/2021    LYMPHOPCT 7.0 04/18/2021    MONOPCT 16.0 04/18/2021    MYELOPCT 1 07/07/2020    EOSPCT 1.1 01/18/2011    BASOPCT 0.0 04/18/2021    MONOSABS 2.3 04/18/2021    LYMPHSABS 1.1 04/18/2021    EOSABS 0.0 04/18/2021    BASOSABS 0.0 04/18/2021    DIFFTYPE Auto 02/10/2013     BMP:    Lab Results   Component Value Date     04/18/2021    K 3.5 04/18/2021     04/18/2021    CO2 26 04/18/2021    BUN 17 04/18/2021    LABALBU 3.4 04/15/2021    CREATININE 0.8 04/18/2021    CALCIUM 7.8 04/18/2021    GFRAA >60 04/18/2021    GFRAA >60 02/10/2013    LABGLOM >60 04/18/2021    GLUCOSE 202 04/18/2021     Hepatic Function Panel:    Lab Results   Component Value Date    ALKPHOS 69 04/15/2021    ALT 11 04/15/2021    AST 9 04/15/2021    PROT 6.4 04/15/2021    PROT 8.3 11/12/2012    BILITOT 0.8 04/15/2021    BILIDIR 0.3 04/15/2021    IBILI 0.5 04/15/2021    LABALBU 3.4 04/15/2021         Chris Myers MD  Electronically signed 4/18/2021 at 10:38 AM

## 2021-04-18 NOTE — PROGRESS NOTES
Hospitalist   Progress Note    Patient Name: Abbi De La Cruz  PCP: Jon Clarke, LAURA - CNP  Date of Admission: 4/14/2021    Chief Complaint on Admission: Pt c/o right sided rib pain s/p syncope 2 days ago (has already been evaluated for syncope)  Chief diagnosis after evaluation: Acute Cholecystitis    Brief Synopsis: Patient 54 y.o. man with a history of hypertension, hyperlipidemia, diabetes mellitus type II and coronary artery disease who was admitted on 4/14/2021 for evaluation and treatment of acute Cholecystitis      Subjective: Pt S/E. Pt reports feeling a little better today. Has nausea and abdominal pain but improving. respiratory status: 2L nc    Objective:   Allergies  No known allergies    Medications    Scheduled Meds:   metoclopramide  10 mg Intravenous Q6H    insulin lispro  0-6 Units Subcutaneous Q4H    cefepime  2,000 mg Intravenous Q12H    sodium chloride flush  5-40 mL Intravenous 2 times per day    lidocaine  1 patch Transdermal Daily    metroNIDAZOLE  500 mg Intravenous Q8H    [Held by provider] prasugrel  10 mg Oral Daily    buPROPion  150 mg Oral QAM    atorvastatin  80 mg Oral Daily    aspirin  81 mg Oral Daily    enoxaparin  40 mg Subcutaneous Daily    insulin glargine  30 Units Subcutaneous BID     Infusions:   sodium chloride      dextrose      sodium chloride 100 mL/hr at 04/17/21 2151     PRN Meds:  prochlorperazine, morphine **OR** morphine, sodium chloride flush, sodium chloride, glucose, dextrose, glucagon (rDNA), dextrose, ondansetron, polyethylene glycol, acetaminophen **OR** acetaminophen    Physical    VITALS:  BP (!) 145/80   Pulse 102   Temp 98 °F (36.7 °C) (Oral)   Resp 18   Ht 6' 2\" (1.88 m)   Wt 222 lb 3.6 oz (100.8 kg)   SpO2 93%   BMI 28.53 kg/m²   CONSTITUTIONAL:  WD/WN 54y.o. year-old male, nad, appears more comfortable today  EYES:  Lids and lashes normal, PERRL, EOMI, sclera clear, conjunctiva normal  ENT:  NC/AT, MMM  NECK:  Supple, symmetrical, trachea midline  LUNGS:  clear to auscultation bilaterally, No increased work of breathing, good air exchange, no crackles or wheezing  CARDIOVASCULAR:  Regular rate and rhythm, normal S1 and S2, no S3 or S4, and no significant murmurs, rubs or gallops noted  ABDOMEN:  Normal active bowel sounds, soft, tenderness in the epigastric and ruq areas, non-distended  MUSCULOSKELETAL:  No edema, or cyanosis  MENTAL STATUS: alert, oriented x4, affect appropriate   NEUROLOGIC:  Cranial nerves II-XII are grossly intact. SKIN:  normal skin color, texture, turgor for age.     Data    CBC with Differential:    Lab Results   Component Value Date    WBC 14.3 04/18/2021    HGB 13.7 04/18/2021    HCT 40.0 04/18/2021     04/18/2021    MCV 82.5 04/18/2021    RDW 13.3 04/18/2021    BANDSPCT 2 04/16/2021    LYMPHOPCT 5.3 04/17/2021    MONOPCT 7.5 04/17/2021    MYELOPCT 1 07/07/2020    EOSPCT 1.1 01/18/2011    BASOPCT 0.2 04/17/2021    MONOSABS 1.1 04/17/2021    LYMPHSABS 0.7 04/17/2021    EOSABS 0.0 04/17/2021    BASOSABS 0.0 04/17/2021    DIFFTYPE Auto 02/10/2013     BMP:    Lab Results   Component Value Date     04/18/2021    K 3.5 04/18/2021     04/18/2021    CO2 26 04/18/2021    BUN 17 04/18/2021    CREATININE 0.8 04/18/2021    GLUCOSE 202 04/18/2021    CALCIUM 7.8 04/18/2021    GFRAA >60 04/18/2021    GFRAA >60 02/10/2013    LABGLOM >60 04/18/2021     LFT:   Lab Results   Component Value Date    ALKPHOS 69 04/15/2021    ALT 11 04/15/2021    AST 9 04/15/2021    PROT 6.4 04/15/2021    PROT 8.3 11/12/2012    BILITOT 0.8 04/15/2021    BILIDIR 0.3 04/15/2021    IBILI 0.5 04/15/2021     Magnesium:    Lab Results   Component Value Date    MG 2.40 04/18/2021     Phosphorus:    Lab Results   Component Value Date    PHOS 4.0 07/09/2020     PT/INR:  No results found for: PTINR  U/A:    Lab Results   Component Value Date    LEUKOCYTESUR Negative 10/06/2020    WBCUA 2 10/06/2020    RBCUA 9 10/06/2020    SPECGRAV

## 2021-04-18 NOTE — PROGRESS NOTES
Pt grimacing while moving in bed. This writer encouraged pt to take pain medicine so he could be more comfortable. Pt had also been vomiting. Compazine and Morphine given per prn order.

## 2021-04-19 VITALS
HEIGHT: 74 IN | SYSTOLIC BLOOD PRESSURE: 116 MMHG | OXYGEN SATURATION: 92 % | WEIGHT: 217.81 LBS | HEART RATE: 101 BPM | RESPIRATION RATE: 18 BRPM | DIASTOLIC BLOOD PRESSURE: 71 MMHG | BODY MASS INDEX: 27.95 KG/M2 | TEMPERATURE: 98.3 F

## 2021-04-19 LAB
ANION GAP SERPL CALCULATED.3IONS-SCNC: 10 MMOL/L (ref 3–16)
BLOOD CULTURE, ROUTINE: NORMAL
BUN BLDV-MCNC: 12 MG/DL (ref 7–20)
CALCIUM SERPL-MCNC: 7.8 MG/DL (ref 8.3–10.6)
CHLORIDE BLD-SCNC: 96 MMOL/L (ref 99–110)
CO2: 29 MMOL/L (ref 21–32)
CREAT SERPL-MCNC: 0.8 MG/DL (ref 0.9–1.3)
CULTURE, BLOOD 2: NORMAL
GFR AFRICAN AMERICAN: >60
GFR NON-AFRICAN AMERICAN: >60
GLUCOSE BLD-MCNC: 113 MG/DL (ref 70–99)
GLUCOSE BLD-MCNC: 121 MG/DL (ref 70–99)
GLUCOSE BLD-MCNC: 128 MG/DL (ref 70–99)
GLUCOSE BLD-MCNC: 130 MG/DL (ref 70–99)
GLUCOSE BLD-MCNC: 132 MG/DL (ref 70–99)
GLUCOSE BLD-MCNC: 185 MG/DL (ref 70–99)
HEMATOLOGY PATH CONSULT: NORMAL
MAGNESIUM: 2.7 MG/DL (ref 1.8–2.4)
PERFORMED ON: ABNORMAL
POTASSIUM REFLEX MAGNESIUM: 3.1 MMOL/L (ref 3.5–5.1)
SODIUM BLD-SCNC: 135 MMOL/L (ref 136–145)

## 2021-04-19 PROCEDURE — APPNB30 APP NON BILLABLE TIME 0-30 MINS: Performed by: PHYSICIAN ASSISTANT

## 2021-04-19 PROCEDURE — 80048 BASIC METABOLIC PNL TOTAL CA: CPT

## 2021-04-19 PROCEDURE — 2580000003 HC RX 258: Performed by: FAMILY MEDICINE

## 2021-04-19 PROCEDURE — 36415 COLL VENOUS BLD VENIPUNCTURE: CPT

## 2021-04-19 PROCEDURE — 6370000000 HC RX 637 (ALT 250 FOR IP): Performed by: FAMILY MEDICINE

## 2021-04-19 PROCEDURE — 83735 ASSAY OF MAGNESIUM: CPT

## 2021-04-19 PROCEDURE — 6360000002 HC RX W HCPCS: Performed by: SURGERY

## 2021-04-19 PROCEDURE — 85025 COMPLETE CBC W/AUTO DIFF WBC: CPT

## 2021-04-19 PROCEDURE — 6360000002 HC RX W HCPCS: Performed by: INTERNAL MEDICINE

## 2021-04-19 PROCEDURE — 6360000002 HC RX W HCPCS: Performed by: FAMILY MEDICINE

## 2021-04-19 PROCEDURE — 6370000000 HC RX 637 (ALT 250 FOR IP): Performed by: INTERNAL MEDICINE

## 2021-04-19 PROCEDURE — 2500000003 HC RX 250 WO HCPCS: Performed by: INTERNAL MEDICINE

## 2021-04-19 PROCEDURE — APPSS15 APP SPLIT SHARED TIME 0-15 MINUTES: Performed by: PHYSICIAN ASSISTANT

## 2021-04-19 RX ORDER — METRONIDAZOLE 500 MG/1
500 TABLET ORAL 3 TIMES DAILY
Qty: 9 TABLET | Refills: 0 | Status: SHIPPED | OUTPATIENT
Start: 2021-04-19 | End: 2021-04-29

## 2021-04-19 RX ORDER — POTASSIUM CHLORIDE 20 MEQ/1
40 TABLET, EXTENDED RELEASE ORAL ONCE
Status: COMPLETED | OUTPATIENT
Start: 2021-04-19 | End: 2021-04-19

## 2021-04-19 RX ORDER — PROMETHAZINE HYDROCHLORIDE 12.5 MG/1
12.5 TABLET ORAL EVERY 8 HOURS PRN
Qty: 9 TABLET | Refills: 0 | Status: SHIPPED | OUTPATIENT
Start: 2021-04-19 | End: 2021-04-26

## 2021-04-19 RX ORDER — CIPROFLOXACIN 500 MG/1
500 TABLET, FILM COATED ORAL 2 TIMES DAILY
Qty: 6 TABLET | Refills: 0 | Status: SHIPPED | OUTPATIENT
Start: 2021-04-19 | End: 2021-04-29

## 2021-04-19 RX ADMIN — CEFEPIME HYDROCHLORIDE 2000 MG: 2 INJECTION, POWDER, FOR SOLUTION INTRAVENOUS at 12:03

## 2021-04-19 RX ADMIN — ENOXAPARIN SODIUM 40 MG: 40 INJECTION SUBCUTANEOUS at 08:52

## 2021-04-19 RX ADMIN — POTASSIUM CHLORIDE 40 MEQ: 1500 TABLET, EXTENDED RELEASE ORAL at 08:52

## 2021-04-19 RX ADMIN — METOCLOPRAMIDE HYDROCHLORIDE 10 MG: 5 INJECTION INTRAMUSCULAR; INTRAVENOUS at 17:11

## 2021-04-19 RX ADMIN — METOCLOPRAMIDE HYDROCHLORIDE 10 MG: 5 INJECTION INTRAMUSCULAR; INTRAVENOUS at 13:39

## 2021-04-19 RX ADMIN — BUPROPION HYDROCHLORIDE 150 MG: 150 TABLET, EXTENDED RELEASE ORAL at 08:52

## 2021-04-19 RX ADMIN — METOCLOPRAMIDE HYDROCHLORIDE 10 MG: 5 INJECTION INTRAMUSCULAR; INTRAVENOUS at 06:20

## 2021-04-19 RX ADMIN — ATORVASTATIN CALCIUM 80 MG: 80 TABLET, FILM COATED ORAL at 08:52

## 2021-04-19 RX ADMIN — METOCLOPRAMIDE HYDROCHLORIDE 10 MG: 5 INJECTION INTRAMUSCULAR; INTRAVENOUS at 02:12

## 2021-04-19 RX ADMIN — Medication 1 CAPSULE: at 08:52

## 2021-04-19 RX ADMIN — INSULIN GLARGINE 30 UNITS: 100 INJECTION, SOLUTION SUBCUTANEOUS at 08:48

## 2021-04-19 RX ADMIN — METRONIDAZOLE 500 MG: 500 INJECTION, SOLUTION INTRAVENOUS at 10:47

## 2021-04-19 RX ADMIN — METRONIDAZOLE 500 MG: 500 INJECTION, SOLUTION INTRAVENOUS at 02:11

## 2021-04-19 RX ADMIN — METRONIDAZOLE 500 MG: 500 INJECTION, SOLUTION INTRAVENOUS at 17:11

## 2021-04-19 RX ADMIN — ASPIRIN 81 MG: 81 TABLET, CHEWABLE ORAL at 08:52

## 2021-04-19 RX ADMIN — SODIUM CHLORIDE: 9 INJECTION, SOLUTION INTRAVENOUS at 02:14

## 2021-04-19 RX ADMIN — PROCHLORPERAZINE EDISYLATE 10 MG: 5 INJECTION INTRAMUSCULAR; INTRAVENOUS at 06:20

## 2021-04-19 ASSESSMENT — PAIN DESCRIPTION - PROGRESSION
CLINICAL_PROGRESSION: NOT CHANGED

## 2021-04-19 NOTE — PROGRESS NOTES
Phone message left to call PAT dept at [phone]  for history review and surgery instructions on 4/19/21 @ 4587

## 2021-04-19 NOTE — DISCHARGE INSTR - DIET

## 2021-04-19 NOTE — PROGRESS NOTES
Hospitalist   Progress Note    Patient Name: Abbi De La Cruz  PCP: Jon Clarke, LAURA - CNP  Date of Admission: 4/14/2021    Chief Complaint on Admission: Pt c/o right sided rib pain s/p syncope 2 days ago (has already been evaluated for syncope)  Chief diagnosis after evaluation: Acute Cholecystitis    Brief Synopsis: Patient 54 y.o. man with a history of hypertension, hyperlipidemia, diabetes mellitus type II and coronary artery disease who was admitted on 4/14/2021 for evaluation and treatment of acute Cholecystitis      Subjective: Pt S/E. Pt reports his nausea and vomiting have improved, still having some mild abdominal pain. respiratory status: 2L nc, bt could probably come off O2    Objective:   Allergies  No known allergies    Medications    Scheduled Meds:   lactobacillus  1 capsule Oral BID WC    metoclopramide  10 mg Intravenous Q6H    insulin lispro  0-6 Units Subcutaneous Q4H    cefepime  2,000 mg Intravenous Q12H    sodium chloride flush  5-40 mL Intravenous 2 times per day    lidocaine  1 patch Transdermal Daily    metroNIDAZOLE  500 mg Intravenous Q8H    [Held by provider] prasugrel  10 mg Oral Daily    buPROPion  150 mg Oral QAM    atorvastatin  80 mg Oral Daily    aspirin  81 mg Oral Daily    enoxaparin  40 mg Subcutaneous Daily    insulin glargine  30 Units Subcutaneous BID     Infusions:   sodium chloride      dextrose      sodium chloride 75 mL/hr at 04/19/21 0214     PRN Meds:  prochlorperazine, morphine **OR** morphine, sodium chloride flush, sodium chloride, glucose, dextrose, glucagon (rDNA), dextrose, ondansetron, polyethylene glycol, acetaminophen **OR** acetaminophen    Physical    VITALS:  /69   Pulse 97   Temp 99.4 °F (37.4 °C) (Oral)   Resp 18   Ht 6' 2\" (1.88 m)   Wt 217 lb 13 oz (98.8 kg)   SpO2 95%   BMI 27.97 kg/m²   CONSTITUTIONAL:  WD/WN 54y.o. year-old male, nad, appears comfortable   EYES:  Lids and lashes normal, PERRL, EOMI, sclera clear, conjunctiva normal  ENT:  NC/AT, MMM  NECK:  Supple, symmetrical, trachea midline  LUNGS:  clear to auscultation bilaterally, No increased work of breathing, good air exchange, no crackles or wheezing  CARDIOVASCULAR:  Regular rate and rhythm, normal S1 and S2, no murmurs  ABDOMEN:  Normal active bowel sounds, soft, tenderness in the epigastric and ruq areas, non-distended  MUSCULOSKELETAL:  No edema, or cyanosis  MENTAL STATUS: alert, oriented x4, affect appropriate   NEUROLOGIC:  Cranial nerves II-XII are grossly intact. SKIN:  normal skin color, texture, turgor for age.     Data    CBC with Differential:    Lab Results   Component Value Date    WBC 16.6 04/19/2021    HGB 13.6 04/19/2021    HCT 41.1 04/19/2021     04/19/2021    MCV 83.5 04/19/2021    RDW 13.6 04/19/2021    BANDSPCT 2 04/18/2021    LYMPHOPCT 8.1 04/19/2021    MONOPCT 12.9 04/19/2021    MYELOPCT 1 07/07/2020    EOSPCT 1.1 01/18/2011    BASOPCT 0.5 04/19/2021    MONOSABS 2.1 04/19/2021    LYMPHSABS 1.3 04/19/2021    EOSABS 0.2 04/19/2021    BASOSABS 0.1 04/19/2021    DIFFTYPE Auto 02/10/2013     BMP:    Lab Results   Component Value Date     04/19/2021    K 3.1 04/19/2021    CL 96 04/19/2021    CO2 29 04/19/2021    BUN 12 04/19/2021    CREATININE 0.8 04/19/2021    GLUCOSE 130 04/19/2021    CALCIUM 7.8 04/19/2021    GFRAA >60 04/19/2021    GFRAA >60 02/10/2013    LABGLOM >60 04/19/2021     LFT:   Lab Results   Component Value Date    ALKPHOS 69 04/15/2021    ALT 11 04/15/2021    AST 9 04/15/2021    PROT 6.4 04/15/2021    PROT 8.3 11/12/2012    BILITOT 0.8 04/15/2021    BILIDIR 0.3 04/15/2021    IBILI 0.5 04/15/2021     Magnesium:    Lab Results   Component Value Date    MG 2.70 04/19/2021     Phosphorus:    Lab Results   Component Value Date    PHOS 4.0 07/09/2020     PT/INR:  No results found for: PTINR  U/A:    Lab Results   Component Value Date    LEUKOCYTESUR Negative 10/06/2020    WBCUA 2 10/06/2020    RBCUA 9 10/06/2020

## 2021-04-19 NOTE — PROGRESS NOTES
Written and verbal discharge instructions given. IV saline lock removed. Instructed to call Dr. Isaac Rodriguez office tomorrow 4/20/21 for pre-op instructions on surgery 4/21/21. Discharged to home with wife.

## 2021-04-19 NOTE — PROGRESS NOTES
Ok to discharge patient, order placed per Dr. Reggie Knox. Prescriptions for Cipro, Flagyl, and phenergan called into [facility],[phone].

## 2021-04-19 NOTE — PROGRESS NOTES
General Surgery  Daily Progress Note    Pt Name: Shagufta Cortes  Medical Record Number: [id number]  Date of Birth 1965   Today's Date: 4/19/2021    Chief Complaint   Patient presents with    Loss of Consciousness     Pt states he blacked out monday night into tuesday, states when he fell he hurt the R side of his ribs, states he is already being seen for the syncope issues       ASSESSMENT/PLAN  1. Acute calculous cholecystitis - WBC at  16.6 from 14.3. Minimal RUQ pain, +nausea . 2. Continue cefepime, flagyl. Continue reglan, compazine,and zofran. 3. Surgery planned for 4/21. 4. CAD s/p stent placement - effient on hold. 5. Hx of DM, CVA, HTN - per primary team    SUBJECTIVE  Juni is improved from yesterday. Denies any abdominal pain. He has improved nausea and no vomiting. He has passed flatus and has had a bowel movement. He is not tolerating low fat diet. Current activity is ad osiris    OBJECTIVE  VITALS:  height is 6' 2\" (1.88 m) and weight is 217 lb 13 oz (98.8 kg). His oral temperature is 99.4 °F (37.4 °C). His blood pressure is 109/69 and his pulse is 97. His respiration is 18 and oxygen saturation is 95%. GENERAL: alert, no distress  LUNGS: normal respiratory effort, no accessory muscle use  HEART: normal rate and regular rhythm  ABDOMEN: soft, Mild RUQ and non-distended  EXTREMITY: no cyanosis, clubbing or edema  I/O last 3 completed shifts: In: 1300 [P.O.:600; I.V.:600; IV Piggyback:100]  Out: 1550 [Urine:1550]  No intake/output data recorded.     LABS  Recent Labs     04/19/21  0656   WBC 16.6*   HGB 13.6   HCT 41.1      *   K 3.1*   CL 96*   CO2 29   BUN 12   CREATININE 0.8*   MG 2.70*   CALCIUM 7.8*     CBC with Differential:    Lab Results   Component Value Date    WBC 16.6 04/19/2021    RBC 4.92 04/19/2021    HGB 13.6 04/19/2021    HCT 41.1 04/19/2021     04/19/2021    MCV 83.5 04/19/2021    MCH 27.6 04/19/2021    MCHC 33.0 04/19/2021    RDW 13.6 04/19/2021    SEGSPCT 64.4 02/10/2013    BANDSPCT 2 04/18/2021    LYMPHOPCT 8.1 04/19/2021    MONOPCT 12.9 04/19/2021    MYELOPCT 1 07/07/2020    EOSPCT 1.1 01/18/2011    BASOPCT 0.5 04/19/2021    MONOSABS 2.1 04/19/2021    LYMPHSABS 1.3 04/19/2021    EOSABS 0.2 04/19/2021    BASOSABS 0.1 04/19/2021    DIFFTYPE Auto 02/10/2013     BMP:    Lab Results   Component Value Date     04/19/2021    K 3.1 04/19/2021    CL 96 04/19/2021    CO2 29 04/19/2021    BUN 12 04/19/2021    LABALBU 3.4 04/15/2021    CREATININE 0.8 04/19/2021    CALCIUM 7.8 04/19/2021    GFRAA >60 04/19/2021    GFRAA >60 02/10/2013    LABGLOM >60 04/19/2021    GLUCOSE 130 04/19/2021     Hepatic Function Panel:    Lab Results   Component Value Date    ALKPHOS 69 04/15/2021    ALT 11 04/15/2021    AST 9 04/15/2021    PROT 6.4 04/15/2021    PROT 8.3 11/12/2012    BILITOT 0.8 04/15/2021    BILIDIR 0.3 04/15/2021    IBILI 0.5 04/15/2021    LABALBU 3.4 04/15/2021         Radha Nichols PA-C  Electronically signed 4/19/2021 at 10:15 AM    As above  About the same overall    Plan for cholecystectomy on 4/21  OK to discharge and follow up for surgery as outpatient    Electronically signed by Luis Enrique Ruff MD on 4/19/2021 at 2:44 PM

## 2021-04-19 NOTE — PLAN OF CARE
Problem: Falls - Risk of:  Goal: Will remain free from falls  Description: Will remain free from falls  0/77/2620 1979 by Reema Rebolledo RN  Outcome: Ongoing  4/19/2021 0011 by Isha Moss RN  Outcome: Ongoing  Goal: Absence of physical injury  Description: Absence of physical injury  0/71/2822 1121 by Reema Rebolledo RN  Outcome: Ongoing  4/19/2021 0011 by Isha Moss RN  Outcome: Ongoing     Problem: Pain:  Goal: Pain level will decrease  Description: Pain level will decrease  0/51/9418 6448 by Reema Rebolledo RN  Outcome: Ongoing  4/19/2021 0011 by Isha Moss RN  Outcome: Ongoing  Goal: Control of acute pain  Description: Control of acute pain  6/39/1260 5127 by Reema Rebolledo RN  Outcome: Ongoing  4/19/2021 0011 by Isha Moss RN  Outcome: Ongoing  Goal: Control of chronic pain  Description: Control of chronic pain  0/55/2624 1320 by Reema Rebolledo RN  Outcome: Ongoing  4/19/2021 0011 by Isha Moss RN  Outcome: Ongoing     Problem: Discharge Planning:  Goal: Discharged to appropriate level of care  Description: Discharged to appropriate level of care  5/12/1510 5669 by Reema Rebolledo RN  Outcome: Ongoing  4/19/2021 0011 by Isha Moss RN  Outcome: Ongoing

## 2021-04-19 NOTE — PROGRESS NOTES
Pt awake resting in bed. Pt rates pain 4/10 and declines pain medication. Pt c/o mild nausea. Compazine given per prn order.

## 2021-04-20 ENCOUNTER — ANESTHESIA EVENT (OUTPATIENT)
Dept: OPERATING ROOM | Age: 56
End: 2021-04-20
Payer: MEDICARE

## 2021-04-20 LAB
BASOPHILS ABSOLUTE: 0.1 K/UL (ref 0–0.2)
BASOPHILS RELATIVE PERCENT: 0.5 %
EOSINOPHILS ABSOLUTE: 0.2 K/UL (ref 0–0.6)
EOSINOPHILS RELATIVE PERCENT: 1 %
HCT VFR BLD CALC: 41.1 % (ref 40.5–52.5)
HEMATOLOGY PATH CONSULT: NO
HEMOGLOBIN: 13.6 G/DL (ref 13.5–17.5)
LYMPHOCYTES ABSOLUTE: 1.3 K/UL (ref 1–5.1)
LYMPHOCYTES RELATIVE PERCENT: 8.1 %
MCH RBC QN AUTO: 27.6 PG (ref 26–34)
MCHC RBC AUTO-ENTMCNC: 33 G/DL (ref 31–36)
MCV RBC AUTO: 83.5 FL (ref 80–100)
MONOCYTES ABSOLUTE: 2.1 K/UL (ref 0–1.3)
MONOCYTES RELATIVE PERCENT: 12.9 %
NEUTROPHILS ABSOLUTE: 12.9 K/UL (ref 1.7–7.7)
NEUTROPHILS RELATIVE PERCENT: 77.5 %
PDW BLD-RTO: 13.6 % (ref 12.4–15.4)
PLATELET # BLD: 220 K/UL (ref 135–450)
PMV BLD AUTO: 7.9 FL (ref 5–10.5)
RBC # BLD: 4.92 M/UL (ref 4.2–5.9)
WBC # BLD: 16.6 K/UL (ref 4–11)

## 2021-04-20 RX ORDER — PRASUGREL 10 MG/1
10 TABLET, FILM COATED ORAL DAILY
COMMUNITY
Start: 2020-06-17 | End: 2021-07-21 | Stop reason: SDUPTHER

## 2021-04-20 RX ORDER — INSULIN ASPART 100 [IU]/ML
13 INJECTION, SOLUTION INTRAVENOUS; SUBCUTANEOUS
Status: ON HOLD | COMMUNITY
Start: 2021-02-28 | End: 2022-09-05 | Stop reason: HOSPADM

## 2021-04-20 NOTE — DISCHARGE SUMMARY
Hospital Medicine Discharge Summary    Patient: Gamaliel Pichardo     Gender: male  : 1965   Age: 54 y.o. MRN: [id number]    Code Status: Prior full code    Primary Care Provider: LAURA Olea CNP    Admit Date: 2021   Discharge Date: 2021      Admitting Physician: Mathew Rivas MD  Discharge Physician: Cordelia Chavez DO     Discharge Diagnoses: Active Hospital Problems    Diagnosis Date Noted    Cholecystitis [K81.9] 2021    SIRS (systemic inflammatory response syndrome) (HCC) [R65.10] 10/90/5390    Neutrophilic leukocytosis [Z23.4] 2021    Tachycardia [R00.0] 2020    Tachypnea [R06.82] 2020    CAD, multiple vessel [I25.10]     DMII (diabetes mellitus, type 2) ([facility] [address] [E11.9] 2017    HLD (hyperlipidemia) [E78.5] 07/15/2014    Essential hypertension [I10] 2010       Hospital Course: Patient 54 y.o. man with a history of hypertension, hyperlipidemia, diabetes mellitus type II and coronary artery disease who was admitted on 2021 for treatment of acute Cholecystitis    Work up completed, and improved with treatment as below. was discharged today in stable condition. Acute cholecystitis   - Surgery consulted, plan for lap brian Wednesday. He feels better now, no fevers, and can tolerate a general diet. He can be discharged and return on Wednesday for surgery  - need to hold effient for 7 days prior to surgery, last dose 4/15     SIRS (HCC) due to above with tachycardia, tachypnea, neutrophilic leukocytosis. - wbc still 16 today, fevers improved   - cefepime, flagyl -> cipro and flagyl at discharge  - Blood cultures negative     CAD, multiple vessel - Pt denies chest pain. Ischemic cardiomyopathy   vt s/p icd placement   Cardiology consulted due to cardiac history. He is on effient for a connie placed to the rca 2020.  Holding effient   Continue Statin and Aspirin     Diabetes mellitus II - Lantus, SSI        Disposition: daily, Disp-90 tablet,R-3Normal      buPROPion (WELLBUTRIN XL) 150 MG extended release tablet Take 150 mg by mouth every morning Historical Med      aspirin 81 MG chewable tablet Take 81 mg by mouth dailyHistorical Med      Cholecalciferol (VITAMIN D3) 30343 units CAPS Take 1 capsule by mouth once a week Historical Med      Dulaglutide (TRULICITY) 3 KF/1.0MZ SOPN Inject 3 mg into the skin once a week Indications: Friday Historical Med           Discharge Medication List as of 4/19/2021  6:12 PM      STOP taking these medications       prasugrel (EFFIENT) 10 MG TABS Comments:   Reason for Stopping:         Omega-3 Fatty Acids (FISH OIL) 1000 MG CAPS Comments:   Reason for Stopping:         Garlic (GARLIQUE PO) Comments:   Reason for Stopping:         insulin aspart (NOVOLOG) 100 UNIT/ML injection vial Comments:   Reason for Stopping:         sodium chloride (OCEAN, BABY AYR) 0.65 % nasal spray Comments:   Reason for Stopping:         insulin detemir (LEVEMIR FLEXTOUCH) 100 UNIT/ML injection pen Comments:   Reason for Stopping: Follow-up appointments:  one week    Provider Follow-up:    pcp    Condition at Discharge:  Stable    The patient was seen and examined on day of discharge and this discharge summary is in conjunction with any daily progress note from day of discharge. Time Spent on discharge is 45 minutes  in the examination, evaluation, counseling and review of medications and discharge plan. Signed:    Ollie Estrella DO   4/20/2021      Thank you LAURA MORLEY - ROMMEL for the opportunity to be involved in this patient's care. If you have any questions or concerns please feel free to contact me at [phone].

## 2021-04-20 NOTE — PROGRESS NOTES
[facility] [facility] time_____0730_______        Surgery time_____0855_______    Take the following medications with a sip of water: Follow your MD/Surgeons pre-procedure instructions regarding your medications    Do not eat or drink anything after 12:00 midnight prior to your surgery. This includes water chewing gum, mints and ice chips. You may brush your teeth and gargle the morning of your surgery, but do not swallow the water     Please see your family doctor/pediatrician for a history and physical and/or concerning medications. Bring any test results/reports from your physicians office. If you are under the care of a heart doctor or specialist doctor, please be aware that you may be asked to them for clearance    You may be asked to stop blood thinners such as Coumadin, Plavix, Fragmin, Lovenox, etc., or any anti-inflammatories such as:  Aspirin, Ibuprofen, Advil, Naproxen prior to your surgery. We also ask that you stop any OTC medications such as fish oil, vitamin E, glucosamine, garlic, Multivitamins, COQ 10, etc.    We ask that you do not smoke 24 hours prior to surgery  We ask that you do not  drink any alcoholic beverages 24 hours prior to surgery     You must make arrangements for a responsible adult to take you home after your surgery. For your safety you will not be allowed to leave alone or drive yourself home. Your surgery will be cancelled if you do not have a ride home. Also for your safety, it is strongly suggested that someone stay with you the first 24 hours after your surgery. A parent or legal guardian must accompany a child scheduled for surgery and plan to stay at the hospital until the child is discharged. Please do not bring other children with you. For your comfort, please wear simple loose fitting clothing to the hospital.  Please do not bring valuables.     Do not wear any make-up or nail polish on your fingers or toes      For your safety, please do not wear any jewelry or body piercing's on the day of surgery. All jewelry must be removed. If you have dentures, they will be removed before going to operating room. For your convenience, we will provide you with a container. If you wear contact lenses or glasses, they will be removed, please bring a case for them. If you have a living will and a durable power of  for healthcare, please bring in a copy. As part of our patient safety program to minimize surgical site infections, we ask you to do the following:    · Please notify your surgeon if you develop any illness between         now and the  day of your surgery. · This includes a cough, cold, fever, sore throat, nausea,         or vomiting, and diarrhea, etc.  ·  Please notify your surgeon if you experience dizziness, shortness         of breath or blurred vision between now and the time of your surgery. Do not shave your operative site 96 hours prior to surgery. For face and neck surgery, men may use an electric razor 48 hours   prior to surgery. You may shower the night before surgery or the morning of   your surgery with an antibacterial soap. You will need to bring a photo ID and insurance card    [facility] has an onsite pharmacy, would you like to utilize our pharmacy     If you will be staying overnight and use a C-pap machine, please bring   your C-pap to hospital     Our goal is to provide you with excellent care, therefore, visitors will be limited to two(2) in the room at a time so that we may focus on providing this care for you. Please contact pre-admission testing if you have any further questions. [facility] phone number:  [phone] PAT fax number:  [phone]  Please note these are generalized instructions for all surgical cases, you may be provided with more specific instructions according to your surgery.

## 2021-04-20 NOTE — PROGRESS NOTES
C-Difficile admission screening and protocol:     * Admitted with diarrhea? YES____    NO___X__     *Prior history of C-Diff. In last 3 months? YES____   NO__X__     *Antibiotic use in the past 6-8 weeks? NO______YES___X___                 If yes which  ANTIBIOTIC AND REASON__X--gallbladder issues____     *Prior hospitalization or nursing home in the last month?  YES____   NO__X__

## 2021-04-20 NOTE — PROGRESS NOTES
Physician Progress Note      Leann Chávez  CSN #:                  [id number]  :                       1965  ADMIT DATE:       2021 3:33 PM  [address] DATE:        2021 8:01 PM  RESPONDING  PROVIDER #:        ELLIS HUGGINS DO          QUERY TEXT:    Dear Dr Jenell Boast,    Pt admitted with acute cholecystitis. Documentation reflects Sepsis in note(s)   dated 4/15. If possible, please document in the progress notes and discharge   summary if Sepsis was: The medical record reflects the following:  Risk Factors: acute cholecystitis  Clinical Indicators: On admission wbc-18, hr-112, 89%ra , rr-26. US   GB-Cholelithiasis, with mild gallbladder wall thickening, suggesting    superimposed cholecystitis. Documentation per H&P of \"SIRS\". Documentation per   pn 4/15 of Sepsis poa. Treatment: ns bolus, ivf, Tylenol, iv Rocephin, iv merrem, iv flagyl, Surgery   consult, monitor labs    Thank You, Riya Russell RN CDS CRCR  [email or web address]  [phone]  Options provided:  -- Sepsis confirmed after study  -- Sepsis treated and resolved  -- Sepsis  ruled out after study  -- Other - I will add my own diagnosis  -- Disagree - Not applicable / Not valid  -- Disagree - Clinically unable to determine / Unknown  -- Refer to Clinical Documentation Reviewer    PROVIDER RESPONSE TEXT:    Sepsis treated and resolved. Query created by:  Work, Wendelin Hashimoto on 2021 2:24 PM      Electronically signed by:  ELLIS HUGGINS DO 2021 1:27 PM

## 2021-04-21 ENCOUNTER — ANESTHESIA (OUTPATIENT)
Dept: OPERATING ROOM | Age: 56
End: 2021-04-21
Payer: MEDICARE

## 2021-04-21 ENCOUNTER — HOSPITAL ENCOUNTER (OUTPATIENT)
Age: 56
Setting detail: OUTPATIENT SURGERY
Discharge: HOME | End: 2021-04-21
Attending: SURGERY | Admitting: SURGERY
Payer: MEDICARE

## 2021-04-21 ENCOUNTER — APPOINTMENT (OUTPATIENT)
Dept: GENERAL RADIOLOGY | Age: 56
End: 2021-04-21
Attending: SURGERY
Payer: MEDICARE

## 2021-04-21 VITALS
HEIGHT: 74 IN | RESPIRATION RATE: 16 BRPM | SYSTOLIC BLOOD PRESSURE: 130 MMHG | BODY MASS INDEX: 28.75 KG/M2 | OXYGEN SATURATION: 91 % | DIASTOLIC BLOOD PRESSURE: 84 MMHG | HEART RATE: 88 BPM | TEMPERATURE: 97.5 F | WEIGHT: 223.99 LBS

## 2021-04-21 VITALS
OXYGEN SATURATION: 94 % | DIASTOLIC BLOOD PRESSURE: 62 MMHG | SYSTOLIC BLOOD PRESSURE: 95 MMHG | RESPIRATION RATE: 2 BRPM

## 2021-04-21 DIAGNOSIS — K80.00 ACUTE CALCULOUS CHOLECYSTITIS: Primary | ICD-10-CM

## 2021-04-21 LAB
GLUCOSE BLD-MCNC: 192 MG/DL (ref 70–99)
GLUCOSE BLD-MCNC: 247 MG/DL (ref 70–99)
PERFORMED ON: ABNORMAL
PERFORMED ON: ABNORMAL
SARS-COV-2, NAAT: NOT DETECTED

## 2021-04-21 PROCEDURE — 7100000011 HC PHASE II RECOVERY - ADDTL 15 MIN: Performed by: SURGERY

## 2021-04-21 PROCEDURE — 6360000002 HC RX W HCPCS: Performed by: SURGERY

## 2021-04-21 PROCEDURE — 74300 X-RAY BILE DUCTS/PANCREAS: CPT

## 2021-04-21 PROCEDURE — 7100000001 HC PACU RECOVERY - ADDTL 15 MIN: Performed by: SURGERY

## 2021-04-21 PROCEDURE — 2580000003 HC RX 258: Performed by: ANESTHESIOLOGY

## 2021-04-21 PROCEDURE — 6360000004 HC RX CONTRAST MEDICATION: Performed by: SURGERY

## 2021-04-21 PROCEDURE — 87635 SARS-COV-2 COVID-19 AMP PRB: CPT

## 2021-04-21 PROCEDURE — 6360000002 HC RX W HCPCS: Performed by: ANESTHESIOLOGY

## 2021-04-21 PROCEDURE — 3600000014 HC SURGERY LEVEL 4 ADDTL 15MIN: Performed by: SURGERY

## 2021-04-21 PROCEDURE — 7100000010 HC PHASE II RECOVERY - FIRST 15 MIN: Performed by: SURGERY

## 2021-04-21 PROCEDURE — 2500000003 HC RX 250 WO HCPCS: Performed by: SURGERY

## 2021-04-21 PROCEDURE — 2580000003 HC RX 258: Performed by: SURGERY

## 2021-04-21 PROCEDURE — 2720000010 HC SURG SUPPLY STERILE: Performed by: SURGERY

## 2021-04-21 PROCEDURE — 2500000003 HC RX 250 WO HCPCS: Performed by: NURSE ANESTHETIST, CERTIFIED REGISTERED

## 2021-04-21 PROCEDURE — 3700000001 HC ADD 15 MINUTES (ANESTHESIA): Performed by: SURGERY

## 2021-04-21 PROCEDURE — 6360000002 HC RX W HCPCS: Performed by: NURSE ANESTHETIST, CERTIFIED REGISTERED

## 2021-04-21 PROCEDURE — 3700000000 HC ANESTHESIA ATTENDED CARE: Performed by: SURGERY

## 2021-04-21 PROCEDURE — 88304 TISSUE EXAM BY PATHOLOGIST: CPT

## 2021-04-21 PROCEDURE — 3600000004 HC SURGERY LEVEL 4 BASE: Performed by: SURGERY

## 2021-04-21 PROCEDURE — 2709999900 HC NON-CHARGEABLE SUPPLY: Performed by: SURGERY

## 2021-04-21 PROCEDURE — 47563 LAPARO CHOLECYSTECTOMY/GRAPH: CPT | Performed by: SURGERY

## 2021-04-21 PROCEDURE — 7100000000 HC PACU RECOVERY - FIRST 15 MIN: Performed by: SURGERY

## 2021-04-21 RX ORDER — LIDOCAINE HYDROCHLORIDE ANHYDROUS AND DEXTROSE MONOHYDRATE .4; 5 G/100ML; G/100ML
INJECTION, SOLUTION INTRAVENOUS CONTINUOUS PRN
Status: DISCONTINUED | OUTPATIENT
Start: 2021-04-21 | End: 2021-04-21 | Stop reason: SDUPTHER

## 2021-04-21 RX ORDER — PROPOFOL 10 MG/ML
INJECTION, EMULSION INTRAVENOUS PRN
Status: DISCONTINUED | OUTPATIENT
Start: 2021-04-21 | End: 2021-04-21 | Stop reason: SDUPTHER

## 2021-04-21 RX ORDER — FENTANYL CITRATE 50 UG/ML
25 INJECTION, SOLUTION INTRAMUSCULAR; INTRAVENOUS EVERY 5 MIN PRN
Status: DISCONTINUED | OUTPATIENT
Start: 2021-04-21 | End: 2021-04-21 | Stop reason: HOSPADM

## 2021-04-21 RX ORDER — ONDANSETRON 2 MG/ML
4 INJECTION INTRAMUSCULAR; INTRAVENOUS
Status: COMPLETED | OUTPATIENT
Start: 2021-04-21 | End: 2021-04-21

## 2021-04-21 RX ORDER — SODIUM CHLORIDE 0.9 % (FLUSH) 0.9 %
10 SYRINGE (ML) INJECTION PRN
Status: DISCONTINUED | OUTPATIENT
Start: 2021-04-21 | End: 2021-04-21 | Stop reason: HOSPADM

## 2021-04-21 RX ORDER — DEXAMETHASONE SODIUM PHOSPHATE 4 MG/ML
INJECTION, SOLUTION INTRA-ARTICULAR; INTRALESIONAL; INTRAMUSCULAR; INTRAVENOUS; SOFT TISSUE PRN
Status: DISCONTINUED | OUTPATIENT
Start: 2021-04-21 | End: 2021-04-21 | Stop reason: SDUPTHER

## 2021-04-21 RX ORDER — SODIUM CHLORIDE 9 MG/ML
INJECTION, SOLUTION INTRAVENOUS CONTINUOUS
Status: DISCONTINUED | OUTPATIENT
Start: 2021-04-21 | End: 2021-04-21 | Stop reason: HOSPADM

## 2021-04-21 RX ORDER — MIDAZOLAM HYDROCHLORIDE 1 MG/ML
INJECTION INTRAMUSCULAR; INTRAVENOUS PRN
Status: DISCONTINUED | OUTPATIENT
Start: 2021-04-21 | End: 2021-04-21 | Stop reason: SDUPTHER

## 2021-04-21 RX ORDER — ROCURONIUM BROMIDE 10 MG/ML
INJECTION, SOLUTION INTRAVENOUS PRN
Status: DISCONTINUED | OUTPATIENT
Start: 2021-04-21 | End: 2021-04-21 | Stop reason: SDUPTHER

## 2021-04-21 RX ORDER — OXYCODONE HYDROCHLORIDE 5 MG/1
5 TABLET ORAL EVERY 6 HOURS PRN
Qty: 20 TABLET | Refills: 0 | Status: SHIPPED | OUTPATIENT
Start: 2021-04-21 | End: 2021-04-26

## 2021-04-21 RX ORDER — PHENYLEPHRINE HCL IN 0.9% NACL 1 MG/10 ML
SYRINGE (ML) INTRAVENOUS PRN
Status: DISCONTINUED | OUTPATIENT
Start: 2021-04-21 | End: 2021-04-21 | Stop reason: SDUPTHER

## 2021-04-21 RX ORDER — LIDOCAINE HYDROCHLORIDE 20 MG/ML
INJECTION, SOLUTION EPIDURAL; INFILTRATION; INTRACAUDAL; PERINEURAL PRN
Status: DISCONTINUED | OUTPATIENT
Start: 2021-04-21 | End: 2021-04-21 | Stop reason: SDUPTHER

## 2021-04-21 RX ORDER — FENTANYL CITRATE 50 UG/ML
INJECTION, SOLUTION INTRAMUSCULAR; INTRAVENOUS PRN
Status: DISCONTINUED | OUTPATIENT
Start: 2021-04-21 | End: 2021-04-21 | Stop reason: SDUPTHER

## 2021-04-21 RX ORDER — ONDANSETRON 2 MG/ML
INJECTION INTRAMUSCULAR; INTRAVENOUS PRN
Status: DISCONTINUED | OUTPATIENT
Start: 2021-04-21 | End: 2021-04-21 | Stop reason: SDUPTHER

## 2021-04-21 RX ORDER — HEPARIN SODIUM 5000 [USP'U]/ML
5000 INJECTION, SOLUTION INTRAVENOUS; SUBCUTANEOUS ONCE
Status: COMPLETED | OUTPATIENT
Start: 2021-04-21 | End: 2021-04-21

## 2021-04-21 RX ORDER — OXYCODONE HYDROCHLORIDE AND ACETAMINOPHEN 5; 325 MG/1; MG/1
2 TABLET ORAL PRN
Status: DISCONTINUED | OUTPATIENT
Start: 2021-04-21 | End: 2021-04-21 | Stop reason: HOSPADM

## 2021-04-21 RX ORDER — BUPIVACAINE HYDROCHLORIDE 5 MG/ML
INJECTION, SOLUTION EPIDURAL; INTRACAUDAL
Status: COMPLETED | OUTPATIENT
Start: 2021-04-21 | End: 2021-04-21

## 2021-04-21 RX ORDER — KETAMINE HCL IN NACL, ISO-OSM 100MG/10ML
SYRINGE (ML) INJECTION PRN
Status: DISCONTINUED | OUTPATIENT
Start: 2021-04-21 | End: 2021-04-21 | Stop reason: SDUPTHER

## 2021-04-21 RX ORDER — CIPROFLOXACIN 2 MG/ML
400 INJECTION, SOLUTION INTRAVENOUS ONCE
Status: COMPLETED | OUTPATIENT
Start: 2021-04-21 | End: 2021-04-21

## 2021-04-21 RX ORDER — MAGNESIUM HYDROXIDE 1200 MG/15ML
LIQUID ORAL CONTINUOUS PRN
Status: COMPLETED | OUTPATIENT
Start: 2021-04-21 | End: 2021-04-21

## 2021-04-21 RX ORDER — SODIUM CHLORIDE 0.9 % (FLUSH) 0.9 %
10 SYRINGE (ML) INJECTION EVERY 12 HOURS SCHEDULED
Status: DISCONTINUED | OUTPATIENT
Start: 2021-04-21 | End: 2021-04-21 | Stop reason: HOSPADM

## 2021-04-21 RX ORDER — OXYCODONE HYDROCHLORIDE AND ACETAMINOPHEN 5; 325 MG/1; MG/1
1 TABLET ORAL PRN
Status: DISCONTINUED | OUTPATIENT
Start: 2021-04-21 | End: 2021-04-21 | Stop reason: HOSPADM

## 2021-04-21 RX ORDER — SODIUM CHLORIDE 9 MG/ML
25 INJECTION, SOLUTION INTRAVENOUS PRN
Status: DISCONTINUED | OUTPATIENT
Start: 2021-04-21 | End: 2021-04-21 | Stop reason: HOSPADM

## 2021-04-21 RX ADMIN — MIDAZOLAM 2 MG: 1 INJECTION INTRAMUSCULAR; INTRAVENOUS at 08:52

## 2021-04-21 RX ADMIN — PROPOFOL 150 MG: 10 INJECTION, EMULSION INTRAVENOUS at 08:57

## 2021-04-21 RX ADMIN — LIDOCAINE HYDROCHLORIDE 2 MG/MIN: 4 INJECTION, SOLUTION INTRAVENOUS at 08:54

## 2021-04-21 RX ADMIN — Medication 20 MG: at 08:57

## 2021-04-21 RX ADMIN — SUGAMMADEX 200 MG: 100 INJECTION, SOLUTION INTRAVENOUS at 10:22

## 2021-04-21 RX ADMIN — Medication 10 MG: at 09:12

## 2021-04-21 RX ADMIN — DEXAMETHASONE SODIUM PHOSPHATE 8 MG: 4 INJECTION, SOLUTION INTRAMUSCULAR; INTRAVENOUS at 09:16

## 2021-04-21 RX ADMIN — LIDOCAINE HYDROCHLORIDE 100 MG: 20 INJECTION, SOLUTION EPIDURAL; INFILTRATION; INTRACAUDAL; PERINEURAL at 08:57

## 2021-04-21 RX ADMIN — ROCURONIUM BROMIDE 30 MG: 10 INJECTION INTRAVENOUS at 09:05

## 2021-04-21 RX ADMIN — ONDANSETRON 4 MG: 2 INJECTION INTRAMUSCULAR; INTRAVENOUS at 10:17

## 2021-04-21 RX ADMIN — ONDANSETRON 4 MG: 2 INJECTION INTRAMUSCULAR; INTRAVENOUS at 11:27

## 2021-04-21 RX ADMIN — CIPROFLOXACIN 400 MG: 2 INJECTION, SOLUTION INTRAVENOUS at 08:44

## 2021-04-21 RX ADMIN — ROCURONIUM BROMIDE 10 MG: 10 INJECTION INTRAVENOUS at 08:57

## 2021-04-21 RX ADMIN — HEPARIN SODIUM 5000 UNITS: 5000 INJECTION INTRAVENOUS; SUBCUTANEOUS at 08:44

## 2021-04-21 RX ADMIN — Medication 100 MCG: at 09:44

## 2021-04-21 RX ADMIN — FENTANYL CITRATE 100 MCG: 50 INJECTION INTRAMUSCULAR; INTRAVENOUS at 08:54

## 2021-04-21 RX ADMIN — SODIUM CHLORIDE: 9 INJECTION, SOLUTION INTRAVENOUS at 08:50

## 2021-04-21 RX ADMIN — ROCURONIUM BROMIDE 10 MG: 10 INJECTION INTRAVENOUS at 09:47

## 2021-04-21 ASSESSMENT — PULMONARY FUNCTION TESTS
PIF_VALUE: 1
PIF_VALUE: 20
PIF_VALUE: 29
PIF_VALUE: 16
PIF_VALUE: 30
PIF_VALUE: 26
PIF_VALUE: 29
PIF_VALUE: 25
PIF_VALUE: 29
PIF_VALUE: 26
PIF_VALUE: 28
PIF_VALUE: 2
PIF_VALUE: 28
PIF_VALUE: 27
PIF_VALUE: 0
PIF_VALUE: 27
PIF_VALUE: 26
PIF_VALUE: 21
PIF_VALUE: 3
PIF_VALUE: 20
PIF_VALUE: 23
PIF_VALUE: 19
PIF_VALUE: 26
PIF_VALUE: 24
PIF_VALUE: 13
PIF_VALUE: 22
PIF_VALUE: 28
PIF_VALUE: 19
PIF_VALUE: 20
PIF_VALUE: 12
PIF_VALUE: 23
PIF_VALUE: 29
PIF_VALUE: 25
PIF_VALUE: 27
PIF_VALUE: 20
PIF_VALUE: 26
PIF_VALUE: 20
PIF_VALUE: 1
PIF_VALUE: 26
PIF_VALUE: 16
PIF_VALUE: 29
PIF_VALUE: 1
PIF_VALUE: 28
PIF_VALUE: 21
PIF_VALUE: 17
PIF_VALUE: 16
PIF_VALUE: 29
PIF_VALUE: 26
PIF_VALUE: 17
PIF_VALUE: 20
PIF_VALUE: 16
PIF_VALUE: 2
PIF_VALUE: 26
PIF_VALUE: 26
PIF_VALUE: 28
PIF_VALUE: 29
PIF_VALUE: 28

## 2021-04-21 ASSESSMENT — PAIN DESCRIPTION - LOCATION
LOCATION: ABDOMEN
LOCATION: ABDOMEN

## 2021-04-21 ASSESSMENT — PAIN DESCRIPTION - DESCRIPTORS
DESCRIPTORS: DISCOMFORT
DESCRIPTORS: ACHING

## 2021-04-21 ASSESSMENT — PAIN DESCRIPTION - ONSET: ONSET: ON-GOING

## 2021-04-21 ASSESSMENT — PAIN SCALES - GENERAL: PAINLEVEL_OUTOF10: 2

## 2021-04-21 ASSESSMENT — PAIN DESCRIPTION - FREQUENCY: FREQUENCY: CONTINUOUS

## 2021-04-21 ASSESSMENT — PAIN DESCRIPTION - PROGRESSION: CLINICAL_PROGRESSION: GRADUALLY IMPROVING

## 2021-04-21 ASSESSMENT — PAIN - FUNCTIONAL ASSESSMENT: PAIN_FUNCTIONAL_ASSESSMENT: ACTIVITIES ARE NOT PREVENTED

## 2021-04-21 NOTE — PROGRESS NOTES
CLINICAL PHARMACY NOTE: MEDS TO Pending sale to [facility] Select Patient?: No  Total # of Prescriptions Filled: 1   The following medications were delivered to the patient:  Discharge Medication List as of 4/21/2021 11:57 AM      START taking these medications    Details   oxyCODONE (ROXICODONE) 5 MG immediate release tablet Take 1 tablet by mouth every 6 hours as needed for Pain for up to 5 days. Intended supply: 5 days.  Take lowest dose possible to manage pain, Disp-20 tablet, R-0Print         ·   Total # of Interventions Completed: 0  Time Spent (min): 30    Additional Documentation:

## 2021-04-21 NOTE — ANESTHESIA PRE PROCEDURE
response syndrome) (HCC)    Neutrophilic leukocytosis     Past Medical History:   Diagnosis Date    Arthritis     Blood circulation, collateral     CAD (coronary artery disease) 7/15/2014    CAD (coronary artery disease)     Cardiac arrest ([facility] [address] 10/05/2018    Cerebral artery occlusion with cerebral infarction ([facility]     Diabetes mellitus ([facility] [address] [facility]     Diabetic peripheral neuropathy ([facility] [address] 6/8/2018    GERD (gastroesophageal reflux disease)     ulcers    Hypercholesteremia     Hyperlipidemia     Hypertension     ICD (implantable cardioverter-defibrillator) in place 2018    MRSA (methicillin resistant staph aureus) culture positive 07/13/2018    foot wound    Neuropathy     POTS (postural orthostatic tachycardia syndrome)     Psychiatric problem     anxiety, depression, bipolar    Sleep apnea     does not use Cpap    Syncope     Type II or unspecified type diabetes mellitus without mention of complication, not stated as uncontrolled     Wears glasses      Past Surgical History:   Procedure Laterality Date    CARDIAC CATHETERIZATION      COLONOSCOPY      CORONARY ANGIOPLASTY WITH STENT PLACEMENT  10/06/2018    Bare Metal Stent to PDA    CORONARY ANGIOPLASTY WITH STENT PLACEMENT  10/07/2018    Bare Metal Stent to OM    CORONARY ANGIOPLASTY WITH STENT PLACEMENT  10/03/2018    CECE to Circ    DIAGNOSTIC CARDIAC CATH LAB PROCEDURE      FINGER SURGERY Left     Left Thumb    HERNIA REPAIR      VASCULAR SURGERY      VASECTOMY       Social History     Tobacco Use    Smoking status: Former Smoker     Packs/day: 2.00     Years: 12.00     Pack years: 24.00     Types: Cigarettes, Cigars    Smokeless tobacco: Never Used    Tobacco comment: quit in the 80's   Substance Use Topics    Alcohol use: No    Drug use: Not Currently     Types: Marijuana     Comment: quit 80's     Medications  No current facility-administered medications on file prior to encounter.       Current Outpatient Medications on File Prior to Encounter   Medication Sig Dispense Refill    NOVOLOG FLEXPEN 100 UNIT/ML injection pen Inject 13 Units into the skin 2 times daily (with meals)      prasugrel (EFFIENT) 10 MG TABS Take 10 mg by mouth daily Pt states on hold for surgery per MD instructions      metroNIDAZOLE (FLAGYL) 500 MG tablet Take 1 tablet by mouth 3 times daily for 10 days 9 tablet 0    ciprofloxacin (CIPRO) 500 MG tablet Take 1 tablet by mouth 2 times daily for 10 days 6 tablet 0    promethazine (PHENERGAN) 12.5 MG tablet Take 1 tablet by mouth every 8 hours as needed for Nausea 9 tablet 0    insulin detemir (LEVEMIR) 100 UNIT/ML injection vial Inject 30 Units into the skin 2 times daily      atorvastatin (LIPITOR) 80 MG tablet Take 1 tablet by mouth daily 90 tablet 3    buPROPion (WELLBUTRIN XL) 150 MG extended release tablet Take 150 mg by mouth every morning       aspirin 81 MG chewable tablet Take 81 mg by mouth daily      Cholecalciferol (VITAMIN D3) 96033 units CAPS Take 1 capsule by mouth once a week       Dulaglutide (TRULICITY) 3 NR/6.1IS SOPN Inject 3 mg into the skin once a week Indications: Friday        Current Facility-Administered Medications   Medication Dose Route Frequency Provider Last Rate Last Admin    ciprofloxacin (CIPRO) IVPB 400 mg  400 mg Intravenous Once Jovana Sheldon MD        heparin (porcine) injection 5,000 Units  5,000 Units Subcutaneous Once Jovana Sheldon MD        0.9 % sodium chloride infusion   Intravenous Continuous Sally Terrell MD        sodium chloride flush 0.9 % injection 10 mL  10 mL Intravenous 2 times per day Sally Terrell MD        sodium chloride flush 0.9 % injection 10 mL  10 mL Intravenous PRN Sally Terrell MD        0.9 % sodium chloride infusion  25 mL Intravenous PRN Sally Terrell MD         Vital Signs (Current)   Vitals:    04/20/21 0905   Weight: 217 lb (98.4 kg)   Height: 6' 2\" (1.88 m)                                          BP Readings from Last 3 Encounters:   04/19/21 116/71   03/16/21 (!) 78/42 03/16/21 90/60     Vital Signs Statistics (for past 48 hrs)     No data recorded  BP Readings from Last 3 Encounters:   04/19/21 116/71   03/16/21 (!) 78/42   03/16/21 90/60       BMI  Body mass index is 27.86 kg/m². Estimated body mass index is 27.86 kg/m² as calculated from the following:    Height as of this encounter: 6' 2\" (1.88 m). Weight as of this encounter: 217 lb (98.4 kg).     CBC   Lab Results   Component Value Date    WBC 16.6 04/19/2021    RBC 4.92 04/19/2021    HGB 13.6 04/19/2021    HCT 41.1 04/19/2021    MCV 83.5 04/19/2021    RDW 13.6 04/19/2021     04/19/2021     CMP    Lab Results   Component Value Date     04/19/2021    K 3.1 04/19/2021    CL 96 04/19/2021    CO2 29 04/19/2021    BUN 12 04/19/2021    CREATININE 0.8 04/19/2021    GFRAA >60 04/19/2021    GFRAA >60 02/10/2013    AGRATIO 1.5 03/12/2021    LABGLOM >60 04/19/2021    GLUCOSE 130 04/19/2021    PROT 6.4 04/15/2021    PROT 8.3 11/12/2012    CALCIUM 7.8 04/19/2021    BILITOT 0.8 04/15/2021    ALKPHOS 69 04/15/2021    AST 9 04/15/2021    ALT 11 04/15/2021     BMP    Lab Results   Component Value Date     04/19/2021    K 3.1 04/19/2021    CL 96 04/19/2021    CO2 29 04/19/2021    BUN 12 04/19/2021    CREATININE 0.8 04/19/2021    CALCIUM 7.8 04/19/2021    GFRAA >60 04/19/2021    GFRAA >60 02/10/2013    LABGLOM >60 04/19/2021    GLUCOSE 130 04/19/2021     POCGlucose  Recent Labs     04/19/21  0656   GLUCOSE 130*      Coags    Lab Results   Component Value Date    PROTIME 12.3 04/14/2021    INR 1.06 04/14/2021    APTT 65.7 41/03/2264     HCG (If Applicable) No results found for: PREGTESTUR, PREGSERUM, HCG, HCGQUANT   ABGs   Lab Results   Component Value Date    PHART 7.323 07/01/2020    PO2ART 51.4 07/01/2020    YXU3KUU 33.7 07/01/2020    JCL9IGI 17.5 07/01/2020    BEART -7.6 07/01/2020    Y2AIRBGC 87.0 07/01/2020      Type & Screen (If Applicable)  No results found for: Mani Rodriguez                         BMI: Wt Readings from Last 3 Encounters:       NPO Status:                          Anesthesia Evaluation  Patient summary reviewed and Nursing notes reviewed no history of anesthetic complications:   Airway: Mallampati: II        Dental:    (+) edentulous      Pulmonary:   (+) sleep apnea:                             Cardiovascular:    (+) hypertension:, angina:, pacemaker: AICD, past MI:, CAD:, CABG/stent:, dysrhythmias: ventricular tachycardia, hyperlipidemia            Echocardiogram reviewed                  Neuro/Psych:   (+) CVA:, neuromuscular disease:, headaches:, psychiatric history:            GI/Hepatic/Renal:   (+) GERD:,      (-) bowel prep and no morbid obesity       Endo/Other:    (+) DiabetesType II DM, , : arthritis: OA., electrolyte abnormalities, . Abdominal:           Vascular:   + PVD, aortic or cerebral, .  - DVT and PE. Anesthesia Plan      general     ASA 4     (Heavy CV morbidity history; Norepi available, but don't expect he'll need it. Will run background lidocaine infusion )  Induction: intravenous. MIPS: Prophylactic antiemetics administered. Anesthetic plan and risks discussed with patient. Plan discussed with CRNA. Attending anesthesiologist reviewed and agrees with Pre Eval content            This pre-anesthesia assessment may be used as a history and physical.    DOS STAFF ADDENDUM:    Pt seen and examined, chart reviewed (including anesthesia, drug and allergy history). No interval changes to history and physical examination. Anesthetic plan, risks, benefits, alternatives, and personnel involved discussed with patient. Patient verbalized an understanding and agrees to proceed.       Ana Cristina Sun MD  April 21, 2021  7:38 AM

## 2021-04-21 NOTE — BRIEF OP NOTE
Brief Postoperative Note      Patient: General Blow  YOB: 1965  MRN: [id number]    Date of Procedure: 4/21/2021    Pre-Op Diagnosis: CHOLECYSTITIS    Post-Op Diagnosis: purulent, gangrenous cholecystitis       Procedure(s):  LAPAROSCOPIC CHOLECYSTECTOMY WITH CHOLANGIOGRAM, POSSIBLE OPEN    Surgeon(s):  Sudarshan Turner MD    Assistant:  Surgical Assistant: Charly Coley    Anesthesia: General    Estimated Blood Loss (mL): less than 580     Complications: None    Specimens:   ID Type Source Tests Collected by Time Destination   A : GALLBLADDER AND CONTENTS Tissue Tissue SURGICAL PATHOLOGY Sudarshan Turner MD 4/21/2021 4855        Implants:  * No implants in log *      Drains: * No LDAs found *    Findings: inflamed gallbladder with gangrenous change and purulent gallbladder contents, normal cholangiogram    Electronically signed by Mary Peacock MD on 4/21/2021 at 10:23 AM

## 2021-04-21 NOTE — ANESTHESIA POSTPROCEDURE EVALUATION
Department of Anesthesiology  Postprocedure Note    Patient: Pernell Quijano  MRN: [id number]  YOB: 1965  Date of evaluation: 4/21/2021  Time:  11:55 AM     Procedure Summary     Date: 04/21/21 Room / Location:  [facility] [address] [place] [address]    Anesthesia Start: 2801 Anesthesia Stop: 7409    Procedure: LAPAROSCOPIC CHOLECYSTECTOMY WITH CHOLANGIOGRAM, POSSIBLE OPEN (N/A Abdomen) Diagnosis: (CHOLECYSTITIS)    Surgeons: Martina Bailey MD Responsible Provider: Jazmine Ramos MD    Anesthesia Type: general ASA Status: 4          Anesthesia Type: general    Lauren Phase I: Lauren Score: 4    Lauren Phase II:      Last vitals: Reviewed and per EMR flowsheets.        Anesthesia Post Evaluation    Patient location during evaluation: bedside  Patient participation: complete - patient participated  Level of consciousness: awake  Pain score: 3  Airway patency: patent  Nausea & Vomiting: no nausea and no vomiting  Complications: no  Cardiovascular status: blood pressure returned to baseline  Respiratory status: acceptable  Hydration status: euvolemic

## 2021-04-21 NOTE — H&P
Preoperative History and Physical Update    H&P from 4/14/2021 was reviewed    No changes noted today    PE  Alert and oriented  Tender RUQ    A/P  Acute cholecystitis  Effient has been held  For Laparoscopic Cholecystectomy with Cholangiogram    The risks, benefits and alternatives to the planned procedure were discussed. Patient expressed an understanding and is willing to proceed.     Electronically signed by Sukumar Rodriguez MD on 4/21/2021 at 8:36 AM

## 2021-04-23 ENCOUNTER — TELEPHONE (OUTPATIENT)
Dept: CARDIOLOGY CLINIC | Age: 56
End: 2021-04-23

## 2021-04-23 ENCOUNTER — TELEPHONE (OUTPATIENT)
Dept: SURGERY | Age: 56
End: 2021-04-23

## 2021-04-23 NOTE — OP NOTE
[address]                                OPERATIVE REPORT    PATIENT NAME: Lyric Whaley                    :        1965  MED REC NO:   [id number]                          ROOM:  ACCOUNT NO:   [de-identified]                           ADMIT DATE: 2021  PROVIDER:     Yane Luevano MD    DATE OF PROCEDURE:  2021    PREOPERATIVE DIAGNOSIS:  Acute cholecystitis. POSTOPERATIVE DIAGNOSIS:  Acute cholecystitis. OPERATION PERFORMED:  Laparoscopic cholecystectomy with cholangiogram.    SURGEON:  Yane Luevano MD    SPECIMENS:  Gallbladder. ESTIMATED BLOOD LOSS:  Less than 50 mL. COMPLICATIONS:  None. DISPOSITION:  To recovery in stable condition. INDICATIONS:  The patient is a 57-year-old male, admitted one week ago  with acute cholecystitis. He is on antiplatelet therapy for recent  stent placement, so surgery was delayed one week to allow for that to  wear off. He was given clearance from his cardiologist to proceed and  after discussing the risks, benefits and alternatives, we proceed with  cholecystectomy today. OPERATIVE PROCEDURE:  The patient was brought to the operating room,  placed supine, general anesthesia intubation performed, and the abdomen  prepped and draped in a sterile fashion. A periumbilical incision was  made and dissection carried to the fascia and a trocar placed with the  Lauren technique. Insufflation was established, and three additional  trocars placed across the right upper quadrant. He was repositioned  into reverse Trendelenburg and the right upper quadrant inspected. There were omental adhesions around the gallbladder and these were taken  down with blunt dissection. The gallbladder was then retracted cephalad and the neck of the  gallbladder followed down to the cystic duct.   This was dissected from  lateral to medial, and the cystic duct

## 2021-04-23 NOTE — TELEPHONE ENCOUNTER
Pt called wondering if he should be on more antibiotics. When patient was discharged from the hospital he was given a few days worth of them. Pt has finished them. Per MRJ that's fine. No more needed. Pt was notified.

## 2021-05-06 ENCOUNTER — OFFICE VISIT (OUTPATIENT)
Dept: SURGERY | Age: 56
End: 2021-05-06

## 2021-05-06 DIAGNOSIS — K81.9 CHOLECYSTITIS: Primary | ICD-10-CM

## 2021-05-06 PROCEDURE — 99024 POSTOP FOLLOW-UP VISIT: CPT | Performed by: SURGERY

## 2021-05-06 NOTE — PROGRESS NOTES
Juni King (:  1965) is a 54 y.o. male,Established patient, here for evaluation of the following chief complaint(s):  Post-Op Check (Pt is here today for postop cholecystectomy. )      ASSESSMENT/PLAN:  1. Cholecystitis      Follow up with me as needed      SUBJECTIVE/OBJECTIVE:  HPI  Patient presents s/p Laparoscopic Cholecystectomy with Cholangiogram. Patient is two weeks post op. Pain level is minor. Incision appearance: well healed x 4. Post op complications: none. Pathology report reviewed with patient and showed cholecystitis. Follow up prn. Review of Systems    Physical Exam      Electronically signed by Bailey Guallpa MD on 2021 at 9:04 AM        An electronic signature was used to authenticate this note.     --Bailey Guallpa MD

## 2021-06-18 ENCOUNTER — OFFICE VISIT (OUTPATIENT)
Dept: CARDIOLOGY CLINIC | Age: 56
End: 2021-06-18
Payer: MEDICARE

## 2021-06-18 VITALS
BODY MASS INDEX: 27.23 KG/M2 | OXYGEN SATURATION: 99 % | HEART RATE: 99 BPM | WEIGHT: 212.2 LBS | DIASTOLIC BLOOD PRESSURE: 62 MMHG | HEIGHT: 74 IN | SYSTOLIC BLOOD PRESSURE: 108 MMHG

## 2021-06-18 DIAGNOSIS — I10 ESSENTIAL HYPERTENSION: Primary | ICD-10-CM

## 2021-06-18 DIAGNOSIS — E78.2 MIXED HYPERLIPIDEMIA: ICD-10-CM

## 2021-06-18 DIAGNOSIS — I25.10 CORONARY ARTERY DISEASE INVOLVING NATIVE CORONARY ARTERY OF NATIVE HEART WITHOUT ANGINA PECTORIS: ICD-10-CM

## 2021-06-18 DIAGNOSIS — R55 SYNCOPE, UNSPECIFIED SYNCOPE TYPE: ICD-10-CM

## 2021-06-18 DIAGNOSIS — G90.9 AUTONOMIC DYSFUNCTION: ICD-10-CM

## 2021-06-18 PROCEDURE — 99213 OFFICE O/P EST LOW 20 MIN: CPT | Performed by: INTERNAL MEDICINE

## 2021-06-18 PROCEDURE — 93000 ELECTROCARDIOGRAM COMPLETE: CPT | Performed by: INTERNAL MEDICINE

## 2021-06-18 NOTE — PATIENT INSTRUCTIONS
good for your heart. · Choose low-fat or fat-free milk and dairy products. Eat foods high in fiber  · Eat a variety of grain products every day. Include whole-grain foods that have lots of fiber and nutrients. Examples of whole-grain foods include oats, whole wheat bread, and brown rice. · Buy whole-grain breads and cereals, instead of white bread or pastries. Limit salt and sodium  · Limit how much salt and sodium you eat to help lower your blood pressure. · Taste food before you salt it. Add only a little salt when you think you need it. With time, your taste buds will adjust to less salt. · Eat fewer snack items, fast foods, and other high-salt, processed foods. Check food labels for the amount of sodium in packaged foods. · Choose low-sodium versions of canned goods (such as soups, vegetables, and beans). Limit sugar  · Limit drinks and foods with added sugar. These include candy, desserts, and soda pop. Limit alcohol  · Limit alcohol to no more than 2 drinks a day for men and 1 drink a day for women. Too much alcohol can cause health problems. When should you call for help? Watch closely for changes in your health, and be sure to contact your doctor if:    · You would like help planning heart-healthy meals. Where can you learn more? Go to https://[employer]. org and sign in to your [employer] account. Enter V137 in the [facility] box to learn more about \"Heart-Healthy Diet: Care Instructions. \"     If you do not have an account, please click on the \"Sign Up Now\" link. Current as of: December 17, 2020               Content Version: 12.9  © 6016-7725 [employer], [employer]. Care instructions adapted under license by [facility] ([facility]). If you have questions about a medical condition or this instruction, always ask your healthcare professional. [address] any warranty or liability for your use of this information.

## 2021-06-18 NOTE — PROGRESS NOTES
[facility]  Cardiology Progress Note    Ekaterina Reed  1965 June 18, 2021      Reason for Referral: CAD, HTN, HLD, ICD, syncope, hypotenson     CC: \"I am still dizzy. \"       Subjective:     History of Present Illness:    Kenny Leslie is a 64 y.o. patient who seeing us today in follow up CAD-multivessel, ischemic cardiomyopathy, VT s/p AICD, anemia, HTN, HLD and DM. Patient had complicated course with NSTEMI, VT multi vessel PCI. He underwent heart catheterization on 10/3/18 resulting in CECE to LCX. He underwent repeat angiography with Dr. Mervat Leong on 10/7/19 resulting in CECE to [address]. He did have to be placed on balloon pump for hemodynamic support. Due to continued episodes of sustained VT, Dr. Francisco Mcrae placed MRI compatible Medtronic AICD. He presents today for follow up. Today, he is her for follow up. He continues to have \"passing out\" episodes. He says that his Wellbutrin was stopped but he continues to have the episodes. He is wanting to restart the Wellbutrin since this continues to happen. He continues to follow with neurology. Patient denies exertional chest pain/pressure, dyspnea at rest, HINOJOSA, PND, orthopnea, palpitations,  weight changes, changes in LE edema, and syncope. Patient reports compliance to his medications.        Past Medical History:   has a past medical history of Arthritis, Blood circulation, collateral, CAD (coronary artery disease), CAD (coronary artery disease), Cardiac arrest [facility]), Cerebral artery occlusion with cerebral infarction ([facility] Diabetes mellitus ([facility] Diabetic peripheral neuropathy ([facility] GERD (gastroesophageal reflux disease), Hypercholesteremia, Hyperlipidemia, Hypertension, ICD (implantable cardioverter-defibrillator) in place, MRSA (methicillin resistant staph aureus) culture positive, Neuropathy, POTS (postural orthostatic tachycardia syndrome), Psychiatric problem, Sleep apnea, Syncope, Type II or unspecified type diabetes mellitus Myself      EKG:   10/31/18 SR, reviewed   2/7/19 SR, 99 bpm T wave inversion     Echocardiogram: 10/8/18  Summary  Left ventricular cavity size is mildly dilated. Normal left ventricular wall thickness. Ejection fraction is visually estimated to be 45%. There is moderate to severe inferior hypokinesis. Normal right ventricular size and function.     Stress Test: 10/9/17   Summary    Pharmacological Stress/MPI Results:        1. Technically a satisfactory study.    2. Normal pharmacological stress portion of the study.    3. No evidence of Ischemia by Myocardial Perfusion Imaging.    4. Gated Study shows normal LV size and Systolic function; EF is 61 %. Cath:   10/3/18   ANGIOGRAPHIC FINDINGS:  1. Left main is normal.  LYNNE 3 flow. No stenosis. 2.  Left anterior descending artery comes from the left main coronary  artery. LYNNE 3 flow. No stenosis present with patent diagonal branches. 3.  Left circumflex is occluded in the mid portion. 4.  Right coronary comes from the right coronary cusp. It has a PDA which  has 80% stenosis present. The patient also has an obtuse marginal 1 which  as 80% stenosis present.   In summary, successful revascularization with stent placement in the  circumflex artery. This was 2.5 x 38 mm, expanded up to 2.8 mm. This was  a drug-coated Synergy stent    10/7/18 Dr. Shanell Fitzpatrick stent patent; PCI of RPDA and PCI OM1, IABP placement    Imaging: All available imaging to date reviewed     ECHO:8/14/19  Suboptimal image quality. Definity contrast administered. Left ventricular cavity size is normal. Normal left ventricular wall  thickness. Overall left ventricular systolic function appears mildly  reduced. Ejection fraction is visually estimated to be 50-55%. No regional  wall motion abnormalities are noted. Diastolic filling parameters suggest  grade I diastolic dysfunction. Mitral valve leaflets appear mildly thickened.   No evidence of significant mitral regurgitation or stenosis. Normal right ventricular size and function. TAPSE measures 17mm. Pacer / ICD wire is visualized in the right ventricle. .  Trivial tricuspid regurgitation. ECHO 2/10/2020  Normal LV size, wall thickness and wall motion: EF is   60%. Grade I  diastolic dysfunction with normal LV filling pressures. Left atrium is of normal size. Normal right ventricular size and function. Trivial mitral & mild tricuspid regurgitation is present. Carotid Duplex:11/13/19  Right Impression   1. There are no focal elevated velocities involving the internal carotid   artery. 2. There is no gross plaque seen involving the internal carotid artery. 3. The right vertebral artery demonstrates normal antegrade flow. Left Impression   1. There are no focal elevated velocities involving the internal carotid   artery. 2. There is no gross plaque seen involving the internal carotid artery. 3. The left vertebral artery demonstrates normal antegrade flow. Cardiac cath 11/18/2020  1. Left main normal.  LYNNE 3 flow. No stenosis. 2.  Left anterior descending artery, has LYNNE 3 flow with 20% stenosis. 3.  Left circumflex has distally LYNNE 2 flow but no significant  stenosis, patent stents. 4.  Right coronary artery in the mid portion has 90% stenosis present  with unstable plaque. LV ejection fraction 60%.    In summary, successful revascularization of the right coronary artery  and placement of stent, 4.0 x 26 mm. Ambulatory JEREMIE  Date: 3/16/2021    Right Ankle: 1.21  Left ankle: 1.22      Assessment and Plan       CAD, VT recurrent MI requiring repeat PCI  s/p AICD implant 10/12/18  --S/p NSTEMI, S/p Inferolateral STEMI, S/P multivessel PCI: LCX, EDUARD and RPDA Dr. Jany Richardson. -successful revascularization of the right coronary artery  and placement of stent, 4.0 x 26 mm. Syncopal episodes. Continuue Asa, Effient, and statin therapy. Will order a nuclear stress test to assess for ischemia.      Autonomic dysfunction  Continues to have syncopal episodes. Neurogenic Syncope  Long standing hx of falls  + TTT 2005Unable to tolerate BB and ACEI due to hypotension and recurrent syncope. Follows with Dr Pardeep Jay. Hyperlipidemia, mixed   Continue high intensity statin therapy. DM, II  Levamir, trulicity     Follow up in 6 months     Thank you for allowing us to participate in the care of Juni King. Please do not hesitate to contact me if you have any questions. Marc Pleitez MD, MPH    [address]   Dhaval Segovia [facility] [address]  Ph: [phone]  Fax: [phone]    This note was scribed in the presence of Dr Roge Holt, by Shruthi Faulkner RN  Physician Attestation:  The scribes documentation has been prepared under my direction and personally reviewed by me in its entirety. I confirm that the note above accurately reflects all work, treatment, procedures, and medical decision making performed by me.

## 2021-07-07 ENCOUNTER — HOSPITAL ENCOUNTER (OUTPATIENT)
Dept: NON INVASIVE DIAGNOSTICS | Age: 56
Discharge: HOME | End: 2021-07-07
Payer: MEDICARE

## 2021-07-07 DIAGNOSIS — I25.10 CORONARY ARTERY DISEASE INVOLVING NATIVE CORONARY ARTERY OF NATIVE HEART WITHOUT ANGINA PECTORIS: ICD-10-CM

## 2021-07-07 DIAGNOSIS — R55 SYNCOPE, UNSPECIFIED SYNCOPE TYPE: ICD-10-CM

## 2021-07-07 LAB
LV EF: 56 %
LVEF MODALITY: NORMAL

## 2021-07-07 PROCEDURE — 93017 CV STRESS TEST TRACING ONLY: CPT

## 2021-07-07 PROCEDURE — 78452 HT MUSCLE IMAGE SPECT MULT: CPT

## 2021-07-07 PROCEDURE — A9502 TC99M TETROFOSMIN: HCPCS | Performed by: INTERNAL MEDICINE

## 2021-07-07 PROCEDURE — 3430000000 HC RX DIAGNOSTIC RADIOPHARMACEUTICAL: Performed by: INTERNAL MEDICINE

## 2021-07-07 PROCEDURE — 6360000002 HC RX W HCPCS: Performed by: INTERNAL MEDICINE

## 2021-07-07 RX ADMIN — TETROFOSMIN 30 MILLICURIE: 1.38 INJECTION, POWDER, LYOPHILIZED, FOR SOLUTION INTRAVENOUS at 12:17

## 2021-07-07 RX ADMIN — REGADENOSON 0.4 MG: 0.08 INJECTION, SOLUTION INTRAVENOUS at 12:14

## 2021-07-07 RX ADMIN — TETROFOSMIN 10 MILLICURIE: 1.38 INJECTION, POWDER, LYOPHILIZED, FOR SOLUTION INTRAVENOUS at 11:08

## 2021-07-12 ENCOUNTER — NURSE ONLY (OUTPATIENT)
Dept: CARDIOLOGY CLINIC | Age: 56
End: 2021-07-12
Payer: MEDICARE

## 2021-07-12 DIAGNOSIS — Z95.810 ICD (IMPLANTABLE CARDIOVERTER-DEFIBRILLATOR) IN PLACE: ICD-10-CM

## 2021-07-12 DIAGNOSIS — I47.20 VT (VENTRICULAR TACHYCARDIA): ICD-10-CM

## 2021-07-12 PROCEDURE — 93295 DEV INTERROG REMOTE 1/2/MLT: CPT | Performed by: INTERNAL MEDICINE

## 2021-07-12 PROCEDURE — 93296 REM INTERROG EVL PM/IDS: CPT | Performed by: INTERNAL MEDICINE

## 2021-07-12 NOTE — PROGRESS NOTES
We received remote transmission from patient's ICD monitor at home. Transmission shows normal sensing and pacing function. No new arrhythmias/events recorded. Possible Optivol fluid accumulation 04.19-05.14.2021; now resolved and within normal range. EP physician will review. See interrogation under cardiology tab in the [address] [place] [address] [address] field for more details.

## 2021-07-12 NOTE — LETTER
[address] [facility] [phone]  [address] [address] [facility] [phone]    Pacemaker/Defibrillator Clinic    07/12/21      Jocelyne King  [address] [facility] [address]      Dear Juwan Valentine    This letter is to inform you that we received the transmission from your monitor at home that checks your implanted heart device. The next date your monitor will automatically transmit will be 10/11. If your report needs attention we will notify you. Your device and monitor are wireless and most transmit cellularly, but please periodically check your monitor is still plugged in to the electrical outlet. If you still use the telephone land line to send please ensure the connection to the phone maddie is secure. This will help to ensure successful automatic transmissions in the future. Also, the monitor needs to be close to you while sleeping at night. Please be aware that the remote device transmission sites are periodically monitored only during regular business hours during which simultaneous in-office device clinics are being run. If your transmission requires attention, we will contact you as soon as possible. **PLEASE NOTE** that our [facility] policy and processes are changing to ensure a more seamless approach for all parties involved, allowing more time for our nurses to address patient issues and concerns. We will no longer be sending letters for NORMAL remote transmissions. You will be contacted by phone if your transmission requires attention (as previously done), and letters will only be sent regarding monitor disconnections or missed transmissions if you are unable to be reached by phone. Please do not be alarmed by this new process, as we will continue to contact you if your transmission report requires attention. This will be your final \"remote received\" letter.   From this point forward, the [facility] will be utilizing the no news is good news approach. As always, please feel free to contact your nurse with any questions or concerns. Thank you.     Obdulio

## 2021-07-21 NOTE — TELEPHONE ENCOUNTER
Last OV: 6/18/21  Next OV: 12/13/21  Last refill: 6/17/20  Most recent Labs:   Last PT/INR (if needed):  Last EKG (if needed):

## 2021-07-22 RX ORDER — PRASUGREL 10 MG/1
10 TABLET, FILM COATED ORAL DAILY
Qty: 30 TABLET | Refills: 1 | Status: SHIPPED | OUTPATIENT
Start: 2021-07-22 | End: 2021-09-13 | Stop reason: SDUPTHER

## 2021-09-13 RX ORDER — ATORVASTATIN CALCIUM 80 MG/1
80 TABLET, FILM COATED ORAL DAILY
Qty: 90 TABLET | Refills: 0 | Status: SHIPPED | OUTPATIENT
Start: 2021-09-13 | End: 2021-12-30

## 2021-09-13 RX ORDER — PRASUGREL 10 MG/1
10 TABLET, FILM COATED ORAL DAILY
Qty: 90 TABLET | Refills: 0 | Status: SHIPPED | OUTPATIENT
Start: 2021-09-13 | End: 2021-12-30

## 2021-09-13 NOTE — TELEPHONE ENCOUNTER
Medication Refill    Medication needing refilled:  Prasugrel  atorvastatin    Dosage of the medication:  10 mg tablet  80 mg tablet    How are you taking this medication (QD, BID, TID, QID, PRN):  Take 1 tablet by mouth daily  Take 1 tablet by mouth daily  30 or 90 day supply called in:  80  90      Which Pharmacy are we sending the medication to?:  [facility] [id number] [facility], [address] [facility] [phone] [facility] [phone]   [address] [place] [address]   Phone:  [phone]  Fax:  [phone]

## 2021-09-13 NOTE — TELEPHONE ENCOUNTER
Last OV: 06/18/2021  Next OV: 12/13/2021  Last refill:  Most recent Labs: cbc, bmp 04/19/2021  Last EKG (if needed):06/18/2021

## 2021-10-11 ENCOUNTER — NURSE ONLY (OUTPATIENT)
Dept: CARDIOLOGY CLINIC | Age: 56
End: 2021-10-11
Payer: MEDICARE

## 2021-10-11 DIAGNOSIS — Z95.810 ICD (IMPLANTABLE CARDIOVERTER-DEFIBRILLATOR) IN PLACE: ICD-10-CM

## 2021-10-11 DIAGNOSIS — I47.20 VENTRICULAR TACHYCARDIA: ICD-10-CM

## 2021-10-11 DIAGNOSIS — I46.9 CARDIAC ARREST (HCC): ICD-10-CM

## 2021-10-11 DIAGNOSIS — I25.5 ISCHEMIC CARDIOMYOPATHY: ICD-10-CM

## 2021-10-11 PROCEDURE — 93296 REM INTERROG EVL PM/IDS: CPT | Performed by: INTERNAL MEDICINE

## 2021-10-11 PROCEDURE — 93295 DEV INTERROG REMOTE 1/2/MLT: CPT | Performed by: INTERNAL MEDICINE

## 2021-10-11 NOTE — RESULT ENCOUNTER NOTE
Device interrogation reviewed. [place]afide VT episode, successfully treated with ATP x 1 burst with conversion to sinus rhythm. Please schedule clinic visit with MILE Chase NP, in the near future to discuss these findings and prescribe beta blocker, metoprolol 25mg po BID. If further recurrences of such, will need AAD. If still further episodes of such, will perform VT ablation.

## 2021-10-11 NOTE — PROGRESS NOTES
We received remote transmission from patient's single chamber ICD monitor at home. Transmission shows normal sensing and pacing function. 1 Treated VT/VF event on 09.02.21 @ 00:10, 13sec, avg V rate 231bpm, successfully pace-terminated w ATPx1. Pt does not appear to be on a beta blocker. Optivol is within normal range. EP physician will review. See interrogation under cardiology tab in the [address] [place] [address] [address] field for more details.

## 2021-10-12 PROBLEM — I21.4 NSTEMI (NON-ST ELEVATED MYOCARDIAL INFARCTION) (HCC): Status: RESOLVED | Noted: 2018-10-03 | Resolved: 2021-10-12

## 2021-10-12 PROBLEM — L97.522 SKIN ULCER OF LEFT FOOT WITH FAT LAYER EXPOSED (HCC): Status: RESOLVED | Noted: 2018-06-01 | Resolved: 2021-10-12

## 2021-10-12 PROBLEM — K81.9 CHOLECYSTITIS: Status: RESOLVED | Noted: 2021-04-14 | Resolved: 2021-10-12

## 2021-10-12 PROBLEM — R50.9 FEVER: Status: RESOLVED | Noted: 2020-07-01 | Resolved: 2021-10-12

## 2021-10-12 PROBLEM — R51.9 HEADACHE: Status: RESOLVED | Noted: 2017-10-09 | Resolved: 2021-10-12

## 2021-10-12 PROBLEM — G93.41 ACUTE METABOLIC ENCEPHALOPATHY: Status: RESOLVED | Noted: 2017-09-18 | Resolved: 2021-10-12

## 2021-10-12 PROBLEM — R00.0 TACHYCARDIA: Status: RESOLVED | Noted: 2020-07-01 | Resolved: 2021-10-12

## 2021-10-12 PROBLEM — R06.82 TACHYPNEA: Status: RESOLVED | Noted: 2020-07-01 | Resolved: 2021-10-12

## 2021-10-12 PROBLEM — I21.19 ST ELEVATION MYOCARDIAL INFARCTION (STEMI) OF INFEROLATERAL WALL (HCC): Status: RESOLVED | Noted: 2018-11-13 | Resolved: 2021-10-12

## 2021-10-12 PROBLEM — E87.29 HIGH ANION GAP METABOLIC ACIDOSIS: Status: RESOLVED | Noted: 2020-07-01 | Resolved: 2021-10-12

## 2021-10-12 PROBLEM — E13.621 ULCER OF FOOT DUE TO SECONDARY DIABETES (HCC): Status: RESOLVED | Noted: 2018-08-15 | Resolved: 2021-10-12

## 2021-10-12 PROBLEM — L97.509 ULCER OF FOOT DUE TO SECONDARY DIABETES (HCC): Status: RESOLVED | Noted: 2018-08-15 | Resolved: 2021-10-12

## 2021-10-12 PROBLEM — R73.9 HYPERGLYCEMIA: Status: RESOLVED | Noted: 2017-09-18 | Resolved: 2021-10-12

## 2021-10-12 PROBLEM — L02.414 ABSCESS OF ARM, LEFT: Status: RESOLVED | Noted: 2018-07-01 | Resolved: 2021-10-12

## 2021-10-12 PROBLEM — A41.9 SEPSIS (HCC): Status: RESOLVED | Noted: 2020-07-01 | Resolved: 2021-10-12

## 2021-10-12 PROBLEM — R65.10 SIRS (SYSTEMIC INFLAMMATORY RESPONSE SYNDROME) (HCC): Status: RESOLVED | Noted: 2021-04-14 | Resolved: 2021-10-12

## 2021-10-12 PROBLEM — E87.20 LACTIC ACIDOSIS: Status: RESOLVED | Noted: 2020-07-01 | Resolved: 2021-10-12

## 2021-10-12 PROBLEM — D72.9 NEUTROPHILIC LEUKOCYTOSIS: Status: RESOLVED | Noted: 2021-04-14 | Resolved: 2021-10-12

## 2021-10-12 PROBLEM — I20.0 UNSTABLE ANGINA (HCC): Status: RESOLVED | Noted: 2020-11-18 | Resolved: 2021-10-12

## 2021-10-12 PROBLEM — D72.828 NEUTROPHILIC LEUKOCYTOSIS: Status: RESOLVED | Noted: 2021-04-14 | Resolved: 2021-10-12

## 2021-10-12 PROBLEM — I21.3 STEMI (ST ELEVATION MYOCARDIAL INFARCTION) (HCC): Status: RESOLVED | Noted: 2018-10-06 | Resolved: 2021-10-12

## 2021-10-12 PROBLEM — U07.1 COVID-19 VIRUS INFECTION: Status: RESOLVED | Noted: 2020-07-01 | Resolved: 2021-10-12

## 2021-10-12 NOTE — PROGRESS NOTES
[address]   Electrophysiology      Date: 10/13/2021    Primary Cardiologist: Christina Ca MD  PCP: LAURA Rothman CNP     Chief Complaint:   Chief Complaint   Patient presents with    Follow-up     chest pain      History of Present Illness:    I saw Deirdre Peterson in the office for electrophysiology follow up today. He is a 64 y.o. male with a past medical history of multivessel CAD/prior STEMI/cardiac arrest, ischemic cardiomyopathy, VT s/p single chamber ICD (for secondary prevention 10/2018), HTN, HLD, DM, recurrent syncope and anemia. He had a remote device check two days ago which showed VT on 9/2/21 with successful ATP. He was supposed to start on metoprolol 25mg BID but has not yet started it as it has caused syncope in the past. Dr. Natalia Lutz also mentioned possible AAD, pt says amiodarone has also caused syncope in the past. Ablation was mentioned if he has recurrence despite medical therapy. He does admit to chest pain if her \"over does it. \" He had an episode of this 3 days ago when he was doing laundry and walking up and down stairs. He got short of breath with this which is normal for him but he kept going and then started to have chest pain. This resolved within a few minutes with rest. Denies any recent syncope. No palpitations. No leg swelling. Allergies:  No Known Allergies  Home Medications:  Prior to Visit Medications    Medication Sig Taking?  Authorizing Provider   metoprolol tartrate (LOPRESSOR) 25 MG tablet Take 0.5 tablets by mouth 2 times daily Yes LAURA Blanco CNP   atorvastatin (LIPITOR) 80 MG tablet Take 1 tablet by mouth daily Yes Christina Ca MD   prasugrel (EFFIENT) 10 MG TABS Take 1 tablet by mouth daily Pt states on hold for surgery per MD instructions Yes Christina Ca MD   NOVOLOG FLEXPEN 100 UNIT/ML injection pen Inject 13 Units into the skin 3 times daily (before meals)  Yes Historical Provider, MD   insulin detemir (LEVEMIR) 100 UNIT/ML injection vial Inject 30 Units into the skin 2 times daily Yes Historical Provider, MD   aspirin 81 MG chewable tablet Take 81 mg by mouth daily Yes Historical Provider, MD   Cholecalciferol (VITAMIN D3) 48912 units CAPS Take 1 capsule by mouth once a week  Yes Historical Provider, MD   Dulaglutide (TRULICITY) 3 NM/6.2UW SOPN Inject 3 mg into the skin once a week Indications: Friday  Yes Historical Provider, MD        Past Medical History:  Past Medical History:   Diagnosis Date    Abscess of arm, left 7/1/2018    Acute metabolic encephalopathy 1/61/2245    Acute respiratory failure with hypoxia ([address]     Arthritis     Blood circulation, collateral     CAD (coronary artery disease) 7/15/2014    CAD (coronary artery disease)     Cardiac arrest ([address] 10/05/2018    Cerebral artery occlusion with cerebral infarction ([address]     Cholecystitis 4/14/2021    COVID-19 virus infection 7/1/2020    Diabetes mellitus ([address]     Diabetic peripheral neuropathy ([address] 6/8/2018    Elbow contusion 3/24/2014    GERD (gastroesophageal reflux disease)     ulcers    High anion gap metabolic acidosis 3/9/7108    Hypercholesteremia     Hyperlipidemia     Hypertension     ICD (implantable cardioverter-defibrillator) in place 2018    Left arm numbness 9/11/2010    MRSA (methicillin resistant staph aureus) culture positive 07/13/2018    foot wound    Multifocal pneumonia     Neuropathy     Neutrophilic leukocytosis 4/39/2081    NSTEMI (non-ST elevated myocardial infarction) ([address] 10/3/2018    Pneumonia due to COVID-19 virus     POTS (postural orthostatic tachycardia syndrome)     Psychiatric problem     anxiety, depression, bipolar    Sepsis ([address] 7/1/2020    SIRS (systemic inflammatory response syndrome) ([address] 4/14/2021    Skin ulcer of left foot with fat layer exposed ([address] 6/1/2018    Sleep apnea     does not use Cpap    ST elevation myocardial infarction (STEMI) of inferolateral wall ([address] 11/13/2018    Syncope     Type II or unspecified type diabetes mellitus without mention of complication, not stated as uncontrolled     Ulcer of foot due to secondary diabetes ([facility] 8/15/2018    Wears glasses        Past Surgical History:   Past Surgical History:   Procedure Laterality Date    CARDIAC CATHETERIZATION      CHOLECYSTECTOMY, LAPAROSCOPIC N/A 4/21/2021    LAPAROSCOPIC CHOLECYSTECTOMY WITH CHOLANGIOGRAM performed by Xena Munoz MD at [facility]  10/06/2018    Bare Metal Stent to PDA    CORONARY ANGIOPLASTY WITH STENT PLACEMENT  10/07/2018    Bare Metal Stent to OM    CORONARY ANGIOPLASTY WITH STENT PLACEMENT  10/03/2018    CECE to Circ    DIAGNOSTIC CARDIAC CATH LAB PROCEDURE      FINGER SURGERY Left     Left Thumb    HERNIA REPAIR      VASCULAR SURGERY      VASECTOMY         Social History:   reports that he has quit smoking. His smoking use included cigarettes and cigars. He has a 24.00 pack-year smoking history. He has never used smokeless tobacco. He reports previous drug use. Drug: Marijuana. He reports that he does not drink alcohol. Family History:      Problem Relation Age of Onset    High Blood Pressure Mother     Stroke Mother     Heart Disease Mother     COPD Mother     Heart Disease Father     Cancer Father         skin    Diabetes Sister     Cancer Sister     Heart Disease Brother     Diabetes Brother        Review of Systems   Constitutional: Negative for chills, fatigue, fever and unexpected weight change. HENT: Negative for congestion, hearing loss, sinus pressure, sore throat and trouble swallowing. Respiratory: Positive for shortness of breath (HINOJOSA). Negative for cough and wheezing. Cardiovascular: Positive for chest pain. Negative for palpitations and leg swelling. Gastrointestinal: Negative for abdominal pain, blood in stool, constipation, diarrhea, nausea and vomiting.    Genitourinary: Negative for hematuria. Musculoskeletal: Negative for arthralgias, back pain, gait problem and myalgias. Skin: Negative for color change, rash and wound. Neurological: Negative for dizziness, seizures, syncope, speech difficulty, weakness and light-headedness. Hematological: Does not bruise/bleed easily. Physical Examination:  Vitals:    10/13/21 0833   BP: 100/60   Pulse: 105   SpO2: 97%      Wt Readings from Last 3 Encounters:   10/13/21 214 lb 12.8 oz (97.4 kg)   06/18/21 212 lb 3.2 oz (96.3 kg)   04/21/21 223 lb 15.8 oz (101.6 kg)       Physical Exam  Vitals reviewed. Constitutional:       General: He is not in acute distress. Appearance: Normal appearance. HENT:      Head: Normocephalic and atraumatic. Nose: Nose normal.      Mouth/Throat:      Mouth: Mucous membranes are moist.   Eyes:      Conjunctiva/sclera: Conjunctivae normal.      Pupils: Pupils are equal, round, and reactive to light. Cardiovascular:      Rate and Rhythm: Normal rate and regular rhythm. Heart sounds: No murmur heard. No friction rub. No gallop. Pulmonary:      Effort: No respiratory distress. Breath sounds: No wheezing, rhonchi or rales. Abdominal:      General: Abdomen is flat. Bowel sounds are normal.      Palpations: Abdomen is soft. Musculoskeletal:         General: Normal range of motion. Right lower leg: No edema. Left lower leg: No edema. Skin:     General: Skin is warm and dry. Findings: No bruising. Neurological:      General: No focal deficit present. Mental Status: He is alert and oriented to person, place, and time. Motor: No weakness.    Psychiatric:         Mood and Affect: Mood normal.         Behavior: Behavior normal.          Pertinent labs, diagnostic, device, and imaging results reviewed as a part of this visit    LABS    CBC:   Lab Results   Component Value Date    WBC 16.6 (H) 04/19/2021    HGB 13.6 04/19/2021    HCT 41.1 04/19/2021    MCV 83.5 04/19/2021     04/19/2021     BMP:   Lab Results   Component Value Date    CREATININE 0.8 (L) 04/19/2021    BUN 12 04/19/2021     (L) 04/19/2021    K 3.1 (L) 04/19/2021    CL 96 (L) 04/19/2021    CO2 29 04/19/2021     CrCl cannot be calculated (Patient's most recent lab result is older than the maximum 120 days allowed. ). No results found for: BNP    Thyroid:   Lab Results   Component Value Date    TSH 1.65 10/06/2020     Lipid Panel:   Lab Results   Component Value Date    CHOL 95 10/09/2018    HDL 42 03/12/2021    HDL 35 09/10/2010    TRIG 142 10/09/2018     LFTs:  Lab Results   Component Value Date    ALT 11 04/15/2021    AST 9 (L) 04/15/2021    ALKPHOS 69 04/15/2021    BILITOT 0.8 04/15/2021     Coags:   Lab Results   Component Value Date    PROTIME 12.3 04/14/2021    INR 1.06 04/14/2021    APTT 65.7 (H) 10/10/2018       ECG: 10/13/2021   ST at 101 BPM.     Echo: 10/26/20  Normal LV size wall thickness with basal inferior akinesis; EF   50-55%   Grade I diastolic dysfunction with normal LV filling pressures. The left atrium is normal in size. Normal right ventricular size and function. Stress test: 7/7/21   Abnormal study. There is a moderate sized, severe intensity, fixed defect of    the basal to mid inferior/inferolateral wall which is consistent with    scar/prior infarction.    No evidence of reversible ischemia.    LV function is normal with inferolateral akinesis and ejection fraction of    56 %.    Overall findings represent a low to intermediate risk study. Cath: 11/19/20  ANGIOGRAPHIC FINDINGS:  1. Left main normal.  LYNNE 3 flow. No stenosis. 2.  Left anterior descending artery, has LYNNE 3 flow with 20% stenosis. 3.  Left circumflex has distally LYNNE 2 flow but no significant  stenosis, patent stents. 4.  Right coronary artery in the mid portion has 90% stenosis present  with unstable plaque. LV ejection fraction 60%.      In summary, successful revascularization of the right coronary artery  and placement of stent, 4.0 x 26 mm. Device interrogation: 10/13/2021   Normal device function. Battery life 8.5 years  V paced <0.1%  VT on 9/2/21 with successful ATP x1. Assessment & Plan:    Ventricular tachycardia   - s/p Medtronic single chamber ICD implanted 10/18   - had recurrence of VT on 9/2/21 with successful ATP   - added metoprolol but has not yet started, after discussion he is willing to try 12.5mg BID and see if he tolerates, was advised to call if he does not   - may need AAD or possible ablation if has recurrence despite medical therapy   - device interrogation as above   - continue with routine follow up with device clinic    Ischemic cardiomyopathy   - with recovered EF   - continue metoprolol   - unable to tolerate ACEI/ARB due to hypotension/syncope    Neurocardiogenic syncope   - positive tilt table test in 2005   - had 3 syncopal episodes while in office when seen by Dr. Julisa Root in March 2021, BP and HR remained fairly normal, felt to not be cardiac in nature   - continue to stay well hydrated, use compression stockings, sit/lie down if symptomatic    CAD   - with previous multivessel PCI   - has what sounds like stable angina but with also recent episode of VT, will let Dr. Jessie Gaviria know in case he wants to pursue further ischemia eval - did have stress test in July with infarct, no ischemia   - on aspirin, Effient, metoprolol, statin   - follows with Dr. Jessie Gaviria    Thank you for allowing to us to participate in the care of Juni King. Return in about 6 months (around 4/13/2022) for an appointment with Divya Gomez NP.      LAURA Barajas  [facility] [address], [address] [place] [address]  Phone: [phone]  Fax: [phone]    Electronically signed by LAURA Freeman - CNP on 10/13/2021 at 10:05 AM

## 2021-10-13 ENCOUNTER — OFFICE VISIT (OUTPATIENT)
Dept: CARDIOLOGY CLINIC | Age: 56
End: 2021-10-13
Payer: MEDICARE

## 2021-10-13 ENCOUNTER — NURSE ONLY (OUTPATIENT)
Dept: CARDIOLOGY CLINIC | Age: 56
End: 2021-10-13
Payer: MEDICARE

## 2021-10-13 VITALS
HEART RATE: 105 BPM | WEIGHT: 214.8 LBS | SYSTOLIC BLOOD PRESSURE: 100 MMHG | HEIGHT: 74 IN | DIASTOLIC BLOOD PRESSURE: 60 MMHG | BODY MASS INDEX: 27.57 KG/M2 | OXYGEN SATURATION: 97 %

## 2021-10-13 DIAGNOSIS — I25.10 CORONARY ARTERY DISEASE INVOLVING NATIVE CORONARY ARTERY OF NATIVE HEART WITHOUT ANGINA PECTORIS: ICD-10-CM

## 2021-10-13 DIAGNOSIS — R55 NEUROGENIC SYNCOPE: ICD-10-CM

## 2021-10-13 DIAGNOSIS — I25.5 ISCHEMIC CARDIOMYOPATHY: ICD-10-CM

## 2021-10-13 DIAGNOSIS — Z95.810 ICD (IMPLANTABLE CARDIOVERTER-DEFIBRILLATOR) IN PLACE: ICD-10-CM

## 2021-10-13 DIAGNOSIS — I47.20 VENTRICULAR TACHYCARDIA: Primary | ICD-10-CM

## 2021-10-13 DIAGNOSIS — I25.10 CAD, MULTIPLE VESSEL: ICD-10-CM

## 2021-10-13 DIAGNOSIS — I47.20 VT (VENTRICULAR TACHYCARDIA): ICD-10-CM

## 2021-10-13 PROCEDURE — 93282 PRGRMG EVAL IMPLANTABLE DFB: CPT | Performed by: INTERNAL MEDICINE

## 2021-10-13 PROCEDURE — 99214 OFFICE O/P EST MOD 30 MIN: CPT | Performed by: NURSE PRACTITIONER

## 2021-10-13 ASSESSMENT — ENCOUNTER SYMPTOMS
NAUSEA: 0
SINUS PRESSURE: 0
TROUBLE SWALLOWING: 0
COUGH: 0
BACK PAIN: 0
VOMITING: 0
WHEEZING: 0
ABDOMINAL PAIN: 0
CONSTIPATION: 0
COLOR CHANGE: 0
DIARRHEA: 0
SORE THROAT: 0
BLOOD IN STOOL: 0
SHORTNESS OF BREATH: 1

## 2021-10-13 NOTE — Clinical Note
Saw pt in office today, c/o what sounds like stable angina - had stress in July 2021 with no ischemia however had VT on ICD in September with successful ATP x1, I told him I would let you know in case you wanted to do anything else. Starting him on low dose metoprolol if he can tolerate.

## 2021-10-13 NOTE — PROGRESS NOTES
Pt seen in clinic today for cardiac device interrogation. Their device is a MDT 1 chamber ICD. Based on threshold, impedance, and intrinsic sensing tests run today, the device appears to be functioning consistently with displayed trends. Remaining battery life is 9y10m                     0.00%      pt complains today that his device now makes no noises after he has received ATP therapy. Pt assured that such was not necessary and that his Low battery alert was turned back on. optivol is stable and near baseline. Pt was informed of findings today and general questions have been answered with regard to device. Home monitoring hardware is transmitting on schedule. Pt to see CNP Allaeys in clinic today following their device check.

## 2021-11-15 ENCOUNTER — CLINICAL DOCUMENTATION (OUTPATIENT)
Dept: OTHER | Age: 56
End: 2021-11-15

## 2021-12-03 NOTE — TELEPHONE ENCOUNTER
[facility] @ [facility]  [phone]    ROUTINE ID CONSULT   Room # [id number]    Please advise.
I just resent on [employer].
Please make sure they do not send the consult this way as we will not know as it come as telephone call and will not check until its end of the day
Clear bilaterally, pupils equal, round and reactive to light.

## 2021-12-10 NOTE — PROGRESS NOTES
[address]  Cardiology Progress Note    Matthew CUADRA  1965 December 13, 2021      Reason for Referral: CAD, HTN, HLD, ICD, syncope, hypotenson     CC: \"I am here. \"       Subjective:     History of Present Illness:    Matthew CUADRA is a 64 y.o. patient who seeing us today in follow up CAD-multivessel, ischemic cardiomyopathy, VT s/p AICD, anemia, HTN, HLD and DM. Patient had complicated course with NSTEMI, VT multi vessel PCI. He underwent heart catheterization on 10/3/18 resulting in CECE to LCX. He underwent repeat angiography with Dr. Negro Grubbs on 10/7/19 resulting in CECE to [address]. He did have to be placed on balloon pump for hemodynamic support. Due to continued episodes of sustained VT, Dr. Álvaro Coon placed MRI compatible Medtronic AICD. He presents today for follow up. Today, he is here for follow up. He says that he might have had a heart attack last week. He had an episode where he was under a lot of stress and experienced chest pain and shortness of breath. He says that he could not eat for a week. Patient denies exertional chest pain/pressure, dyspnea at rest,PND, orthopnea, palpitations, lightheadedness, weight changes, changes in LE edema, and syncope. Patient reports compliance to his medications.         Past Medical History:   has a past medical history of Abscess of arm, left, Acute metabolic encephalopathy, Acute respiratory failure with hypoxia ([address] Arthritis, Blood circulation, collateral, CAD (coronary artery disease), CAD (coronary artery disease), Cardiac arrest [facility]), Cerebral artery occlusion with cerebral infarction ([facility] [address] Cholecystitis, COVID-19 virus infection, Diabetes mellitus ([address] Diabetic peripheral neuropathy ([address] Elbow contusion, GERD (gastroesophageal reflux disease), High anion gap metabolic acidosis, Hypercholesteremia, Hyperlipidemia, Hypertension, ICD (implantable cardioverter-defibrillator) in place, Left arm numbness, MRSA (methicillin resistant staph aureus) culture positive, Multifocal pneumonia, Neuropathy, Neutrophilic leukocytosis, NSTEMI (non-ST elevated myocardial infarction) ([facility] [address] Pneumonia due to COVID-19 virus, POTS (postural orthostatic tachycardia syndrome), Psychiatric problem, Sepsis ([facility] [address] SIRS (systemic inflammatory response syndrome) ([address] Skin ulcer of left foot with fat layer exposed ([facility] [address] Sleep apnea, ST elevation myocardial infarction (STEMI) of inferolateral wall ([facility] [address] Syncope, Type II or unspecified type diabetes mellitus without mention of complication, not stated as uncontrolled, Ulcer of foot due to secondary diabetes ([facility] [address] and Wears glasses. Surgical History:   has a past surgical history that includes Vasectomy; Finger surgery (Left); hernia repair; vascular surgery; Colonoscopy; Coronary angioplasty with stent (10/06/2018); Coronary angioplasty with stent (10/07/2018); Coronary angioplasty with stent (10/03/2018); Diagnostic Cardiac Cath Lab Procedure; Cardiac catheterization; and Cholecystectomy, laparoscopic (N/A, 4/21/2021). Social History:   reports that he has quit smoking. His smoking use included cigarettes and cigars. He has a 24.00 pack-year smoking history. He has never used smokeless tobacco. He reports previous drug use. Drug: Marijuana Leonela Mcdonald). He reports that he does not drink alcohol. Family History:  family history includes COPD in his mother; Cancer in his father and sister; Diabetes in his brother and sister; Heart Disease in his brother, father, and mother; High Blood Pressure in his mother; Stroke in his mother. Home Medications:  Were reviewed and are listed in nursing record and/or below  Prior to Admission medications    Medication Sig Start Date End Date Taking?  Authorizing Provider   Omega-3 Fatty Acids (FISH OIL PO) Take by mouth daily   Yes Historical Provider, MD   GARLIC PO Take by mouth daily   Yes Historical Provider, MD   atorvastatin (LIPITOR) 80 MG tablet Take 1 tablet by mouth daily 9/13/21  Yes Elena Carter MD   prasugrel (EFFIENT) 10 MG TABS Take 1 tablet by mouth daily Pt states on hold for surgery per MD instructions 9/13/21  Yes Elena Carter MD   NOVOLOG FLEXPEN 100 UNIT/ML injection pen Inject 13 Units into the skin 3 times daily (before meals)  2/28/21  Yes Historical Provider, MD   insulin detemir (LEVEMIR) 100 UNIT/ML injection vial Inject 30 Units into the skin 2 times daily   Yes Historical Provider, MD   aspirin 81 MG chewable tablet Take 81 mg by mouth daily   Yes Historical Provider, MD   Cholecalciferol (VITAMIN D3) 85798 units CAPS Take 1 capsule by mouth once a week    Yes Historical Provider, MD   Dulaglutide (TRULICITY) 3 UR/2.3VN SOPN Inject 3 mg into the skin once a week Indications: Friday    Yes Historical Provider, MD      Allergies:  Patient has no known allergies. Review of Systems: all reviewed and refer to HPI   · Constitutional: no unanticipated weight loss. There's been no change in energy level, sleep pattern, or activity level. No fevers, chills. · Eyes: No visual changes or diplopia. No scleral icterus. · ENT: No Headaches, hearing loss or vertigo. No mouth sores or sore throat. · Cardiovascular: No Chest pain, tightness or discomfort.  No Shortness of breath.  No Palpitations.  No Syncope ('blackouts', 'faints', 'collapse') or dizziness.  No Dyspnea on exertion, Orthopnea, Paroxysmal nocturnal dyspnea or breathlessness at rest.   · No Wheezing, sweating, distress and cough with frothy or bloodstained sputum. · Respiratory: No cough or wheezing, no sputum production. No hematemesis. · Gastrointestinal: No abdominal pain, appetite loss, blood in stools. No change in bowel or bladder habits. · Genitourinary: No dysuria, trouble voiding, or hematuria. · Musculoskeletal:  No gait disturbance, no joint complaints. · Integumentary: No rash or pruritis.   · Neurological: No headache, diplopia, change in muscle strength, numbness or tingling. · Psychiatric: No anxiety or depression. · Endocrine: No temperature intolerance. No excessive thirst, fluid intake, or urination. No tremor. · Hematologic/Lymphatic: No abnormal bruising or bleeding, blood clots or swollen lymph nodes. · Allergic/Immunologic: No nasal congestion or hives. Objective:     PHYSICAL EXAM:      Vitals:    12/13/21 1149   BP: 102/64   Pulse: 94   SpO2: 97%   Weight: 214 lb (97.1 kg)   Height: 6' 2\" (1.88 m)      Weight: 214 lb (97.1 kg)       General Appearance:  Alert, cooperative, no distress, appears stated age. Head:  Normocephalic, without obvious abnormality, atraumatic. Eyes:  Pupils equal and round. No scleral icterus. Mouth: Moist mucosa, no pharyngeal erythema. Nose: Nares normal. No drainage or sinus tenderness. Neck: Supple, symmetrical, trachea midline. No adenopathy. No tenderness/mass/nodules. No carotid bruit or elevated JVD. Lungs:   Respiratory Effort: Normal   Auscultation: Clear to auscultation bilaterally, respirations unlabored. No wheeze, rales   Chest Wall:  No tenderness or deformity. Cardiovascular:    Pulses  Palpation: normal   Ascultation: Regular rate, S1/ S2 normal. No murmur, rub, or gallop. 2+ radial and pedal pulses, symmetric  Carotid  Femoral   Abdomen and Gastrointestinal:   Soft, non-tender, bowel sounds active. Liver and Spleen  Masses   Musculoskeletal: No muscle wasting  Back  Gait   Extremities: Extremities normal, atraumatic. No cyanosis or edema. No cyanosis clubbing       Skin: Inspection and palpation performed, no rashes or lesions. Pysch: Normal mood and affect.  Alert and oriented to time place person   Neurologic: Normal gross motor and sensory exam.       Labs     All available labs reviewed to date:      Lab Results   Component Value Date    WBC 16.6 04/19/2021    RBC 4.92 04/19/2021    HGB 13.6 04/19/2021    HCT 41.1 04/19/2021    MCV 83.5 04/19/2021    RDW 13.6 04/19/2021     04/19/2021     Lab Results   Component Value Date     04/19/2021    K 3.1 04/19/2021    CL 96 04/19/2021    CO2 29 04/19/2021    BUN 12 04/19/2021    CREATININE 0.8 04/19/2021    GFRAA >60 04/19/2021    GFRAA >60 02/10/2013    AGRATIO 1.5 03/12/2021    LABGLOM >60 04/19/2021    GLUCOSE 130 04/19/2021    PROT 6.4 04/15/2021    PROT 8.3 11/12/2012    CALCIUM 7.8 04/19/2021    BILITOT 0.8 04/15/2021    ALKPHOS 69 04/15/2021    AST 9 04/15/2021    ALT 11 04/15/2021      No results found for: PTINR  Lab Results   Component Value Date    LABA1C 12.7 03/12/2021     Lab Results   Component Value Date    TROPONINI 0.05 (H) 11/21/2020       Cardiac, Vascular and Imaging Data all Personally Reviewed in Detail by Myself      EKG:   10/31/18 SR, reviewed   2/7/19 SR, 99 bpm T wave inversion   12/13/2021 Sinus rhythm     Echocardiogram: 10/8/18  Summary  Left ventricular cavity size is mildly dilated. Normal left ventricular wall thickness. Ejection fraction is visually estimated to be 45%. There is moderate to severe inferior hypokinesis. Normal right ventricular size and function.     Stress Test: 10/9/17   Summary    Pharmacological Stress/MPI Results:        1. Technically a satisfactory study.    2. Normal pharmacological stress portion of the study.    3. No evidence of Ischemia by Myocardial Perfusion Imaging.    4. Gated Study shows normal LV size and Systolic function; EF is 61 %. Stress test 7/7/2021   Abnormal study. There is a moderate sized, severe intensity, fixed defect of    the basal to mid inferior/inferolateral wall which is consistent with    scar/prior infarction.    No evidence of reversible ischemia.    LV function is normal with inferolateral akinesis and ejection fraction of    56 %.    Overall findings represent a low to intermediate risk study. Cath:   10/3/18   ANGIOGRAPHIC FINDINGS:  1. Left main is normal.  LYNNE 3 flow. No stenosis.   2.  Left anterior descending artery comes from the left main coronary  artery. LYNNE 3 flow. No stenosis present with patent diagonal branches. 3.  Left circumflex is occluded in the mid portion. 4.  Right coronary comes from the right coronary cusp. It has a PDA which  has 80% stenosis present. The patient also has an obtuse marginal 1 which  as 80% stenosis present.   In summary, successful revascularization with stent placement in the  circumflex artery. This was 2.5 x 38 mm, expanded up to 2.8 mm. This was  a drug-coated Synergy stent    10/7/18 Dr. Bailey Traore stent patent; PCI of RPDA and PCI OM1, IABP placement    Imaging: All available imaging to date reviewed     ECHO:8/14/19  Suboptimal image quality. Definity contrast administered. Left ventricular cavity size is normal. Normal left ventricular wall  thickness. Overall left ventricular systolic function appears mildly  reduced. Ejection fraction is visually estimated to be 50-55%. No regional  wall motion abnormalities are noted. Diastolic filling parameters suggest  grade I diastolic dysfunction. Mitral valve leaflets appear mildly thickened. No evidence of significant mitral regurgitation or stenosis. Normal right ventricular size and function. TAPSE measures 17mm. Pacer / ICD wire is visualized in the right ventricle. .  Trivial tricuspid regurgitation. ECHO 2/10/2020  Normal LV size, wall thickness and wall motion: EF is   60%. Grade I  diastolic dysfunction with normal LV filling pressures. Left atrium is of normal size. Normal right ventricular size and function. Trivial mitral & mild tricuspid regurgitation is present. Carotid Duplex:11/13/19  Right Impression   1. There are no focal elevated velocities involving the internal carotid   artery. 2. There is no gross plaque seen involving the internal carotid artery. 3. The right vertebral artery demonstrates normal antegrade flow. Left Impression   1.  There are no focal elevated velocities involving the internal carotid   artery. 2. There is no gross plaque seen involving the internal carotid artery. 3. The left vertebral artery demonstrates normal antegrade flow. Cardiac cath 11/18/2020  1. Left main normal.  LYNNE 3 flow. No stenosis. 2.  Left anterior descending artery, has LYNNE 3 flow with 20% stenosis. 3.  Left circumflex has distally LYNNE 2 flow but no significant  stenosis, patent stents. 4.  Right coronary artery in the mid portion has 90% stenosis present  with unstable plaque. LV ejection fraction 60%.    In summary, successful revascularization of the right coronary artery  and placement of stent, 4.0 x 26 mm. Ambulatory [facility]  Date: 3/16/2021    Right Ankle: 1.21  Left ankle: 1.22      Assessment and Plan       CAD, VT recurrent MI requiring repeat PCI  s/p AICD implant 10/12/18  --S/p NSTEMI, S/p Inferolateral STEMI, S/P multivessel PCI: LCX, EDUARD and RPDA Dr. Carlos Crane. -successful revascularization of the right coronary artery and placement of stent, 4.0 x 26 mm. Reported episode of chest discomfort with increased dyspnea. Will order stat echocardiogram, CBC, CMP and troponin. Continuue Asa, Effient, and statin therapy. Autonomic dysfunction  No recent syncopal episode. Neurogenic Syncope  Long standing hx of falls  + TTT 2005Unable to tolerate BB and ACEI due to hypotension and recurrent syncope. Follows with Dr Jacy Segovia. Hyperlipidemia, mixed   Continue high intensity statin therapy. DM, II  Williamr, trulicity     Follow up in 6 months     Thank you for allowing us to participate in the care of Juni King. Please do not hesitate to contact me if you have any questions.     Milagro Tran MD, MPH    [address] [place] [address]  Ph: [phone]  Fax: [phone]    This note was scribed in the presence of Dr Jessie Gaviria, by Mariana De León RN  Physician Attestation:  The scribes documentation has been prepared under my direction and personally reviewed by me in its entirety. I confirm that the note above accurately reflects all work, treatment, procedures, and medical decision making performed by me.

## 2021-12-13 ENCOUNTER — OFFICE VISIT (OUTPATIENT)
Dept: CARDIOLOGY CLINIC | Age: 56
End: 2021-12-13
Payer: MEDICARE

## 2021-12-13 ENCOUNTER — HOSPITAL ENCOUNTER (OUTPATIENT)
Dept: NON INVASIVE DIAGNOSTICS | Age: 56
Discharge: HOME | End: 2021-12-13
Payer: MEDICARE

## 2021-12-13 VITALS
WEIGHT: 214 LBS | OXYGEN SATURATION: 97 % | HEIGHT: 74 IN | SYSTOLIC BLOOD PRESSURE: 102 MMHG | HEART RATE: 94 BPM | DIASTOLIC BLOOD PRESSURE: 64 MMHG | BODY MASS INDEX: 27.46 KG/M2

## 2021-12-13 DIAGNOSIS — G90.9 AUTONOMIC DYSFUNCTION: ICD-10-CM

## 2021-12-13 DIAGNOSIS — Z98.61 CAD S/P PERCUTANEOUS CORONARY ANGIOPLASTY: Primary | ICD-10-CM

## 2021-12-13 DIAGNOSIS — I25.10 CAD S/P PERCUTANEOUS CORONARY ANGIOPLASTY: ICD-10-CM

## 2021-12-13 DIAGNOSIS — R55 SYNCOPE, UNSPECIFIED SYNCOPE TYPE: ICD-10-CM

## 2021-12-13 DIAGNOSIS — I47.20 VENTRICULAR TACHYCARDIA: ICD-10-CM

## 2021-12-13 DIAGNOSIS — E78.2 MIXED HYPERLIPIDEMIA: ICD-10-CM

## 2021-12-13 DIAGNOSIS — Z98.61 CAD S/P PERCUTANEOUS CORONARY ANGIOPLASTY: ICD-10-CM

## 2021-12-13 DIAGNOSIS — I25.10 CAD S/P PERCUTANEOUS CORONARY ANGIOPLASTY: Primary | ICD-10-CM

## 2021-12-13 LAB
LV EF: 53 %
LVEF MODALITY: NORMAL

## 2021-12-13 PROCEDURE — 93000 ELECTROCARDIOGRAM COMPLETE: CPT | Performed by: INTERNAL MEDICINE

## 2021-12-13 PROCEDURE — 99214 OFFICE O/P EST MOD 30 MIN: CPT | Performed by: INTERNAL MEDICINE

## 2021-12-13 PROCEDURE — 93306 TTE W/DOPPLER COMPLETE: CPT

## 2021-12-13 NOTE — PATIENT INSTRUCTIONS

## 2021-12-15 DIAGNOSIS — I25.10 CAD S/P PERCUTANEOUS CORONARY ANGIOPLASTY: ICD-10-CM

## 2021-12-15 DIAGNOSIS — I47.20 VENTRICULAR TACHYCARDIA (HCC): ICD-10-CM

## 2021-12-15 DIAGNOSIS — Z98.61 CAD S/P PERCUTANEOUS CORONARY ANGIOPLASTY: ICD-10-CM

## 2021-12-15 LAB
A/G RATIO: 1.6 (ref 1.1–2.2)
ALBUMIN SERPL-MCNC: 4.3 G/DL (ref 3.4–5)
ALP BLD-CCNC: 73 U/L (ref 40–129)
ALT SERPL-CCNC: 27 U/L (ref 10–40)
ANION GAP SERPL CALCULATED.3IONS-SCNC: 13 MMOL/L (ref 3–16)
AST SERPL-CCNC: 18 U/L (ref 15–37)
BILIRUB SERPL-MCNC: 0.4 MG/DL (ref 0–1)
BUN BLDV-MCNC: 16 MG/DL (ref 7–20)
CALCIUM SERPL-MCNC: 10 MG/DL (ref 8.3–10.6)
CHLORIDE BLD-SCNC: 101 MMOL/L (ref 99–110)
CHOLESTEROL, FASTING: 142 MG/DL (ref 0–199)
CO2: 27 MMOL/L (ref 21–32)
CREAT SERPL-MCNC: 1 MG/DL (ref 0.9–1.3)
GFR AFRICAN AMERICAN: >60
GFR NON-AFRICAN AMERICAN: >60
GLUCOSE BLD-MCNC: 88 MG/DL (ref 70–99)
HCT VFR BLD CALC: 47 % (ref 40.5–52.5)
HDLC SERPL-MCNC: 36 MG/DL (ref 40–60)
HEMOGLOBIN: 15.8 G/DL (ref 13.5–17.5)
LDL CHOLESTEROL CALCULATED: 50 MG/DL
MCH RBC QN AUTO: 28.4 PG (ref 26–34)
MCHC RBC AUTO-ENTMCNC: 33.7 G/DL (ref 31–36)
MCV RBC AUTO: 84.2 FL (ref 80–100)
PDW BLD-RTO: 12.6 % (ref 12.4–15.4)
PLATELET # BLD: 212 K/UL (ref 135–450)
PMV BLD AUTO: 8.8 FL (ref 5–10.5)
POTASSIUM SERPL-SCNC: 3.7 MMOL/L (ref 3.5–5.1)
RBC # BLD: 5.58 M/UL (ref 4.2–5.9)
SODIUM BLD-SCNC: 141 MMOL/L (ref 136–145)
TOTAL PROTEIN: 7 G/DL (ref 6.4–8.2)
TRIGLYCERIDE, FASTING: 282 MG/DL (ref 0–150)
TROPONIN: 0.04 NG/ML
VLDLC SERPL CALC-MCNC: 56 MG/DL
WBC # BLD: 10.1 K/UL (ref 4–11)

## 2022-01-10 ENCOUNTER — NURSE ONLY (OUTPATIENT)
Dept: CARDIOLOGY CLINIC | Age: 57
End: 2022-01-10
Payer: MEDICARE

## 2022-01-10 DIAGNOSIS — I46.9 CARDIAC ARREST (HCC): ICD-10-CM

## 2022-01-10 DIAGNOSIS — Z95.810 ICD (IMPLANTABLE CARDIOVERTER-DEFIBRILLATOR) IN PLACE: ICD-10-CM

## 2022-01-10 DIAGNOSIS — I47.20 VENTRICULAR TACHYCARDIA: ICD-10-CM

## 2022-01-10 DIAGNOSIS — I25.5 ISCHEMIC CARDIOMYOPATHY: ICD-10-CM

## 2022-01-10 PROCEDURE — 93296 REM INTERROG EVL PM/IDS: CPT | Performed by: INTERNAL MEDICINE

## 2022-01-10 PROCEDURE — 93295 DEV INTERROG REMOTE 1/2/MLT: CPT | Performed by: INTERNAL MEDICINE

## 2022-01-10 NOTE — PROGRESS NOTES
We received remote transmission from patient's single chamber ICD monitor at home. Transmission shows normal sensing and pacing function. No new arrhythmias/events recorded. Optivol is within normal range. EP physician will review. See interrogation under cardiology tab in the [address] [place] [address] [address] field for more details. Will continue to monitor remotely.

## 2022-01-16 ENCOUNTER — APPOINTMENT (OUTPATIENT)
Dept: GENERAL RADIOLOGY | Age: 57
End: 2022-01-16
Payer: MEDICARE

## 2022-01-16 ENCOUNTER — HOSPITAL ENCOUNTER (EMERGENCY)
Age: 57
Discharge: HOME | End: 2022-01-17
Payer: MEDICARE

## 2022-01-16 VITALS
BODY MASS INDEX: 27.87 KG/M2 | OXYGEN SATURATION: 97 % | SYSTOLIC BLOOD PRESSURE: 125 MMHG | HEIGHT: 74 IN | DIASTOLIC BLOOD PRESSURE: 78 MMHG | WEIGHT: 217.15 LBS | HEART RATE: 110 BPM | TEMPERATURE: 97.7 F | RESPIRATION RATE: 18 BRPM

## 2022-01-16 DIAGNOSIS — M25.512 LEFT SHOULDER PAIN, UNSPECIFIED CHRONICITY: ICD-10-CM

## 2022-01-16 DIAGNOSIS — S20.212A RIB CONTUSION, LEFT, INITIAL ENCOUNTER: ICD-10-CM

## 2022-01-16 DIAGNOSIS — S50.02XA CONTUSION OF LEFT ELBOW, INITIAL ENCOUNTER: ICD-10-CM
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
## 2022-01-16 PROCEDURE — 73080 X-RAY EXAM OF ELBOW: CPT

## 2022-01-16 PROCEDURE — 71101 X-RAY EXAM UNILAT RIBS/CHEST: CPT

## 2022-01-16 PROCEDURE — 99284 EMERGENCY DEPT VISIT MOD MDM: CPT

## 2022-01-16 PROCEDURE — 6370000000 HC RX 637 (ALT 250 FOR IP): Performed by: PHYSICIAN ASSISTANT

## 2022-01-16 PROCEDURE — 73030 X-RAY EXAM OF SHOULDER: CPT

## 2022-01-16 RX ORDER — OXYCODONE HYDROCHLORIDE AND ACETAMINOPHEN 5; 325 MG/1; MG/1
1 TABLET ORAL EVERY 6 HOURS PRN
Qty: 6 TABLET | Refills: 0 | Status: SHIPPED | OUTPATIENT
Start: 2022-01-16 | End: 2022-01-18

## 2022-01-16 RX ORDER — OXYCODONE HYDROCHLORIDE AND ACETAMINOPHEN 5; 325 MG/1; MG/1
1 TABLET ORAL ONCE
Status: COMPLETED | OUTPATIENT
Start: 2022-01-16 | End: 2022-01-16

## 2022-01-16 RX ADMIN — OXYCODONE AND ACETAMINOPHEN 1 TABLET: 5; 325 TABLET ORAL at 20:48

## 2022-01-16 ASSESSMENT — ENCOUNTER SYMPTOMS
SORE THROAT: 0
VOMITING: 0
EYE PAIN: 0
COUGH: 0
SHORTNESS OF BREATH: 0
NAUSEA: 0
ABDOMINAL PAIN: 0
BACK PAIN: 0

## 2022-01-16 ASSESSMENT — PAIN DESCRIPTION - FREQUENCY: FREQUENCY: CONTINUOUS

## 2022-01-16 ASSESSMENT — PAIN DESCRIPTION - ONSET: ONSET: SUDDEN

## 2022-01-16 ASSESSMENT — PAIN DESCRIPTION - ORIENTATION: ORIENTATION: LEFT

## 2022-01-16 ASSESSMENT — PAIN DESCRIPTION - LOCATION: LOCATION: RIB CAGE

## 2022-01-16 ASSESSMENT — PAIN SCALES - GENERAL
PAINLEVEL_OUTOF10: 8
PAINLEVEL_OUTOF10: 5

## 2022-01-16 ASSESSMENT — PAIN DESCRIPTION - PAIN TYPE: TYPE: ACUTE PAIN

## 2022-01-17 NOTE — ED NOTES
Discharge and education instructions reviewed. Patient verbalized understanding, teach-back successful. Patient denied questions at this time. No acute distress noted. Patient instructed to follow-up as noted - return to emergency department if symptoms worsen. Patient verbalized understanding. Discharged per EDMD with discharge instructions.         Ana ColemanJefferson [facility] Hospital  01/17/22 0244

## 2022-01-17 NOTE — ED PROVIDER NOTES
**ADVANCED PRACTICE PROVIDER, I HAVE EVALUATED THIS PATIENT**        [address]      Pt Name: Rudean Landau  [facility]:[id number]  [place] 1965  Date of evaluation: 1/16/2022  Provider: Leslie Malik PA-C      Chief Complaint:    Chief Complaint   Patient presents with   Garry Prime     states has low blood pressure. stood up too quickly and fell to ground. pain in L shoulder, elbow, and ribs. States he did hit head on ground. a/ox4. Nursing Notes, Past Medical Hx, Past Surgical Hx, Social Hx, Allergies, and Family Hx were all reviewed and agreed with or any disagreements were addressed in the HPI.    HPI: (Location, Duration, Timing, Severity, Quality, Assoc Sx, Context, Modifying factors)    Chief Complaint of fall. This occurred this morning. This is a  64 y.o. male who presen to the emergency room with complaint of fall. He states that he has chronic syncope episodes. He has been worked up over the years and no one can figure out why. So he is does not drive anymore. He says he even did not find a cardiologist and they cannot figure out what was causing it. He denies any chest pain. Said he got up this morning and a just got up too fast and he fell down and lost consciousness on his left side hit a box and ran into his left side rib and felt like it pushed his shoulder up. Complaint of pain to his left shoulder left elbow left side rib. Rib pain when he takes a deep breath. Denies coughing up blood. No abdominal pain. No other complaints. He is ambulatory.       PastMedical/Surgical History:      Diagnosis Date    Abscess of arm, left 7/1/2018    Acute metabolic encephalopathy 5/12/5873    Acute respiratory failure with hypoxia (HCC)     Arthritis     Blood circulation, collateral     CAD (coronary artery disease) 7/15/2014    CAD (coronary artery disease)     Cardiac arrest ([facility] [address] 10/05/2018    Cerebral artery occlusion with cerebral infarction ([facility] [address]     Cholecystitis 4/14/2021    COVID-19 virus infection 7/1/2020    Diabetes mellitus ([address]     Diabetic peripheral neuropathy ([address] 6/8/2018    Elbow contusion 3/24/2014    GERD (gastroesophageal reflux disease)     ulcers    High anion gap metabolic acidosis 4/3/9317    Hypercholesteremia     Hyperlipidemia     Hypertension     ICD (implantable cardioverter-defibrillator) in place 2018    Left arm numbness 9/11/2010    MRSA (methicillin resistant staph aureus) culture positive 07/13/2018    foot wound    Multifocal pneumonia     Neuropathy     Neutrophilic leukocytosis 6/91/8103    NSTEMI (non-ST elevated myocardial infarction) ([address] 10/3/2018    Pneumonia due to COVID-19 virus     POTS (postural orthostatic tachycardia syndrome)     Psychiatric problem     anxiety, depression, bipolar    Sepsis ([address] 7/1/2020    SIRS (systemic inflammatory response syndrome) ([address] 4/14/2021    Skin ulcer of left foot with fat layer exposed ([address] 6/1/2018    Sleep apnea     does not use Cpap    ST elevation myocardial infarction (STEMI) of inferolateral wall (HCC) 11/13/2018    Syncope     Type II or unspecified type diabetes mellitus without mention of complication, not stated as uncontrolled     Ulcer of foot due to secondary diabetes ([address] 8/15/2018    Wears glasses          Procedure Laterality Date    CARDIAC CATHETERIZATION      CHOLECYSTECTOMY, LAPAROSCOPIC N/A 4/21/2021    LAPAROSCOPIC CHOLECYSTECTOMY WITH CHOLANGIOGRAM performed by Latisha Ghotra MD at [facility]  10/06/2018    Bare Metal Stent to PDA    CORONARY ANGIOPLASTY WITH STENT PLACEMENT  10/07/2018    Bare Metal Stent to OM    CORONARY ANGIOPLASTY WITH STENT PLACEMENT  10/03/2018    CECE to Circ    DIAGNOSTIC CARDIAC CATH LAB PROCEDURE      FINGER SURGERY Left     Left Thumb    HERNIA REPAIR      VASCULAR SURGERY      VASECTOMY         Medications:  Previous Medications    ASPIRIN 81 MG CHEWABLE TABLET    Take 81 mg by mouth daily    ATORVASTATIN (LIPITOR) 80 MG TABLET    TAKE 1 TABLET BY MOUTH EVERY DAY    CHOLECALCIFEROL (VITAMIN D3) 78887 UNITS CAPS    Take 1 capsule by mouth once a week     DULAGLUTIDE (TRULICITY) 3 LW/9.9BC SOPN    Inject 3 mg into the skin once a week Indications: Friday     GARLIC PO    Take by mouth daily    INSULIN DETEMIR (LEVEMIR) 100 UNIT/ML INJECTION VIAL    Inject 30 Units into the skin 2 times daily    NOVOLOG FLEXPEN 100 UNIT/ML INJECTION PEN    Inject 13 Units into the skin 3 times daily (before meals)     OMEGA-3 FATTY ACIDS (FISH OIL PO)    Take by mouth daily    PRASUGREL (EFFIENT) 10 MG TABS    TAKE 1 TABLET BY MOUTH EVERY DAY         Review of Systems:  (2-9 systems needed)  Review of Systems   Constitutional: Negative for chills and fever. HENT: Negative for congestion and sore throat. Eyes: Negative for pain and visual disturbance. Respiratory: Negative for cough and shortness of breath. Cardiovascular: Negative for chest pain and leg swelling. Gastrointestinal: Negative for abdominal pain, nausea and vomiting. Genitourinary: Negative for dysuria and frequency. Musculoskeletal: Negative for back pain and neck pain. Skin: Negative for rash and wound. Neurological: Negative for dizziness and light-headedness. \"Positives and Pertinent negatives as per HPI\"    Physical Exam:  Physical Exam  Vitals and nursing note reviewed. Cardiovascular:      Rate and Rhythm: Normal rate and regular rhythm. Heart sounds: Normal heart sounds. No murmur heard. No friction rub. No gallop. Pulmonary:      Effort: Pulmonary effort is normal. No respiratory distress. Breath sounds: Normal breath sounds. No wheezing or rales. Chest:      Chest wall: No tenderness. Abdominal:      General: Abdomen is flat. Bowel sounds are normal. There is no distension.       Palpations: Abdomen is soft. There is no mass. Tenderness: There is no abdominal tenderness. There is no guarding or rebound. Musculoskeletal:      Left shoulder: Tenderness and bony tenderness present. No swelling, deformity or crepitus. Decreased range of motion. Normal strength. Normal pulse. Left elbow: No swelling or deformity. Normal range of motion. Tenderness present. Cervical back: Normal, full passive range of motion without pain and normal range of motion. No spinous process tenderness or muscular tenderness. Normal range of motion. Thoracic back: Normal.      Lumbar back: Normal.         MEDICAL DECISION MAKING    Vitals:    Vitals:    01/16/22 1944   BP: 125/78   Pulse: 110   Resp: 18   Temp: 97.7 °F (36.5 °C)   TempSrc: Oral   SpO2: 97%   Weight: 217 lb 2.5 oz (98.5 kg)   Height: 6' 2\" (1.88 m)       LABS:Labs Reviewed - No data to display     Remainder of labs reviewed and were negative at this time or not returned at the time of this note. RADIOLOGY:   Non-plain film images such as CT, Ultrasound and MRI are read by the radiologist. Shashank Dempsey PA-C have directly visualized the radiologic plain film image(s) with the below findings:      Interpretation per the Radiologist below, if available at the time of this note:    XR ELBOW LEFT (MIN 3 VIEWS)   Final Result   No evidence of an acute injury. Olecranon spur. XR SHOULDER LEFT (MIN 2 VIEWS)   Final Result   No evidence of an acute injury. XR RIBS LEFT INCLUDE CHEST (MIN 3 VIEWS)   Final Result   No acute abnormality of the ribs. No acute process in the visualized lungs. No results found.        MEDICAL DECISION MAKING / ED COURSE:      PROCEDURES:   Procedures    None    Patient was given:  Medications   oxyCODONE-acetaminophen (PERCOCET) 5-325 MG per tablet 1 tablet (1 tablet Oral Given 1/16/22 2048)       Emergency room course: Patient on exam pupils are equal round and reactive to light extraocular movement is intact. Cardiovascular regular rate rhythm, lungs are clear. No wheeze, rales or rhonchi noted. Abdomen is soft nontender. Chest wall does show some mild tenderness on the left lateral rib with palpation. There is no step-off or crepitus noted. No bruising noted. Full range of motion all EXTR extremities at the left upper extremity shoulder some mild tenderness no obvious deformity noted. Nontender at the [facility] joint. No crepitus. He is only able to raise his arm laterally just a little bit above 45 degree before started having pain. The left elbow some mild tenderness noted with palpation and we tried a fully extended. There is no deformity noted. No swelling noted. Wrist show full range of motion without tenderness. Radial pulse good at 2+ capillary refill less than 2 seconds all digits on the left.  strength 5+ equal bilaterally. X-ray of the left ribs show no acute osseous abnormalities  X-ray of the left shoulder showed no acute finding. X-ray of the left elbow showed no acute finding. Discussed patient x-ray results with him. Discussed discharge plan. He was given Percocet here 5 mg p.o. I will give him 6 tablets for home. Informed him to ice his shoulder and elbow. Informed him to take deep breaths several every hour. Return for any worsening otherwise follow his primary care physician. He was okay with this plan he will be discharged stable condition. The patient tolerated their visit well. I evaluated the patient. The physician was available for consultation as needed. The patient and / or the family were informed of the results of any tests, a time was given to answer questions, a plan was proposed and they agreed with plan. CLINICAL IMPRESSION:  1. Fall, initial encounter    2. Rib contusion, left, initial encounter    3. Left shoulder pain, unspecified chronicity    4.  Contusion of left elbow, initial encounter        DISPOSITION DISPOSITION Decision To Discharge 01/16/2022 11:36:11 PM        PATIENT REFERRED TO:  [facility]  [id number]  [phone]    Call   As needed    Zuleika Lopez MD  [address] [place] [address]  [phone]    Call in 1 week  If symptoms worsen      DISCHARGE MEDICATIONS:  New Prescriptions    OXYCODONE-ACETAMINOPHEN (PERCOCET) 5-325 MG PER TABLET    Take 1 tablet by mouth every 6 hours as needed for Pain for up to 2 days.        DISCONTINUED MEDICATIONS:  Discontinued Medications    No medications on file              (Please note the MDM and HPI sections of this note were completed with a voice recognition program.  Efforts were made to edit the dictations but occasionally words are mis-transcribed.)    Electronically signed, Xiomara Helm PA-C,          Xiomara Helm PA-C  01/16/22 [id number]

## 2022-03-18 ENCOUNTER — HOSPITAL ENCOUNTER (OUTPATIENT)
Dept: GENERAL RADIOLOGY | Age: 57
Discharge: HOME | End: 2022-03-18
Payer: MEDICARE

## 2022-03-18 ENCOUNTER — APPOINTMENT (OUTPATIENT)
Dept: GENERAL RADIOLOGY | Age: 57
End: 2022-03-18
Payer: MEDICARE

## 2022-03-18 ENCOUNTER — HOSPITAL ENCOUNTER (OUTPATIENT)
Age: 57
Discharge: HOME | End: 2022-03-18
Payer: MEDICARE

## 2022-03-18 ENCOUNTER — HOSPITAL ENCOUNTER (OUTPATIENT)
Age: 57
Setting detail: OBSERVATION
Discharge: HOME | End: 2022-03-19
Attending: EMERGENCY MEDICINE | Admitting: INTERNAL MEDICINE
Payer: MEDICARE

## 2022-03-18 DIAGNOSIS — R07.9 CHEST PAIN, UNSPECIFIED TYPE: Primary | ICD-10-CM

## 2022-03-18 DIAGNOSIS — R52 PAIN: ICD-10-CM

## 2022-03-18 DIAGNOSIS — M54.2 CERVICALGIA: ICD-10-CM

## 2022-03-18 LAB
A/G RATIO: 1.5 (ref 1.1–2.2)
ALBUMIN SERPL-MCNC: 4.2 G/DL (ref 3.4–5)
ALP BLD-CCNC: 79 U/L (ref 40–129)
ALT SERPL-CCNC: 21 U/L (ref 10–40)
ANION GAP SERPL CALCULATED.3IONS-SCNC: 10 MMOL/L (ref 3–16)
AST SERPL-CCNC: 17 U/L (ref 15–37)
BASOPHILS ABSOLUTE: 0.1 K/UL (ref 0–0.2)
BASOPHILS RELATIVE PERCENT: 0.6 %
BILIRUB SERPL-MCNC: 0.6 MG/DL (ref 0–1)
BUN BLDV-MCNC: 12 MG/DL (ref 7–20)
CALCIUM SERPL-MCNC: 9.3 MG/DL (ref 8.3–10.6)
CHLORIDE BLD-SCNC: 99 MMOL/L (ref 99–110)
CO2: 27 MMOL/L (ref 21–32)
CREAT SERPL-MCNC: 0.8 MG/DL (ref 0.9–1.3)
EKG ATRIAL RATE: 95 BPM
EKG DIAGNOSIS: NORMAL
EKG P AXIS: 40 DEGREES
EKG P-R INTERVAL: 178 MS
EKG Q-T INTERVAL: 364 MS
EKG QRS DURATION: 90 MS
EKG QTC CALCULATION (BAZETT): 457 MS
EKG R AXIS: 13 DEGREES
EKG T AXIS: 47 DEGREES
EKG VENTRICULAR RATE: 95 BPM
EOSINOPHILS ABSOLUTE: 0.2 K/UL (ref 0–0.6)
EOSINOPHILS RELATIVE PERCENT: 1.9 %
GFR AFRICAN AMERICAN: >60
GFR NON-AFRICAN AMERICAN: >60
GLUCOSE BLD-MCNC: 238 MG/DL (ref 70–99)
GLUCOSE BLD-MCNC: 271 MG/DL (ref 70–99)
GLUCOSE BLD-MCNC: 298 MG/DL (ref 70–99)
HCT VFR BLD CALC: 45.8 % (ref 40.5–52.5)
HEMOGLOBIN: 16.1 G/DL (ref 13.5–17.5)
LYMPHOCYTES ABSOLUTE: 2.4 K/UL (ref 1–5.1)
LYMPHOCYTES RELATIVE PERCENT: 26.5 %
MCH RBC QN AUTO: 28.9 PG (ref 26–34)
MCHC RBC AUTO-ENTMCNC: 35.2 G/DL (ref 31–36)
MCV RBC AUTO: 82.2 FL (ref 80–100)
MONOCYTES ABSOLUTE: 0.8 K/UL (ref 0–1.3)
MONOCYTES RELATIVE PERCENT: 8.3 %
NEUTROPHILS ABSOLUTE: 5.7 K/UL (ref 1.7–7.7)
NEUTROPHILS RELATIVE PERCENT: 62.7 %
PDW BLD-RTO: 12.4 % (ref 12.4–15.4)
PERFORMED ON: ABNORMAL
PERFORMED ON: ABNORMAL
PLATELET # BLD: 187 K/UL (ref 135–450)
PMV BLD AUTO: 8 FL (ref 5–10.5)
POTASSIUM SERPL-SCNC: 4 MMOL/L (ref 3.5–5.1)
PRO-BNP: 108 PG/ML (ref 0–124)
RBC # BLD: 5.57 M/UL (ref 4.2–5.9)
SODIUM BLD-SCNC: 136 MMOL/L (ref 136–145)
TOTAL PROTEIN: 7 G/DL (ref 6.4–8.2)
TROPONIN: 0.03 NG/ML
TROPONIN: 0.03 NG/ML
TROPONIN: 0.05 NG/ML
WBC # BLD: 9.1 K/UL (ref 4–11)

## 2022-03-18 PROCEDURE — 83880 ASSAY OF NATRIURETIC PEPTIDE: CPT

## 2022-03-18 PROCEDURE — 72040 X-RAY EXAM NECK SPINE 2-3 VW: CPT

## 2022-03-18 PROCEDURE — 93010 ELECTROCARDIOGRAM REPORT: CPT | Performed by: INTERNAL MEDICINE

## 2022-03-18 PROCEDURE — 2580000003 HC RX 258: Performed by: INTERNAL MEDICINE

## 2022-03-18 PROCEDURE — 84484 ASSAY OF TROPONIN QUANT: CPT

## 2022-03-18 PROCEDURE — 99283 EMERGENCY DEPT VISIT LOW MDM: CPT

## 2022-03-18 PROCEDURE — 99214 OFFICE O/P EST MOD 30 MIN: CPT | Performed by: INTERNAL MEDICINE

## 2022-03-18 PROCEDURE — 93308 TTE F-UP OR LMTD: CPT

## 2022-03-18 PROCEDURE — G0378 HOSPITAL OBSERVATION PER HR: HCPCS

## 2022-03-18 PROCEDURE — 6370000000 HC RX 637 (ALT 250 FOR IP): Performed by: INTERNAL MEDICINE

## 2022-03-18 PROCEDURE — 93005 ELECTROCARDIOGRAM TRACING: CPT | Performed by: NURSE PRACTITIONER

## 2022-03-18 PROCEDURE — 85025 COMPLETE CBC W/AUTO DIFF WBC: CPT

## 2022-03-18 PROCEDURE — 36415 COLL VENOUS BLD VENIPUNCTURE: CPT

## 2022-03-18 PROCEDURE — 6370000000 HC RX 637 (ALT 250 FOR IP): Performed by: NURSE PRACTITIONER

## 2022-03-18 PROCEDURE — 80053 COMPREHEN METABOLIC PANEL: CPT

## 2022-03-18 PROCEDURE — 71046 X-RAY EXAM CHEST 2 VIEWS: CPT

## 2022-03-18 RX ORDER — INSULIN GLARGINE 100 [IU]/ML
25 INJECTION, SOLUTION SUBCUTANEOUS 2 TIMES DAILY
Status: DISCONTINUED | OUTPATIENT
Start: 2022-03-18 | End: 2022-03-19 | Stop reason: HOSPADM

## 2022-03-18 RX ORDER — ONDANSETRON 2 MG/ML
4 INJECTION INTRAMUSCULAR; INTRAVENOUS EVERY 6 HOURS PRN
Status: DISCONTINUED | OUTPATIENT
Start: 2022-03-18 | End: 2022-03-19 | Stop reason: HOSPADM

## 2022-03-18 RX ORDER — ATORVASTATIN CALCIUM 40 MG/1
40 TABLET, FILM COATED ORAL NIGHTLY
Status: DISCONTINUED | OUTPATIENT
Start: 2022-03-18 | End: 2022-03-18 | Stop reason: SDUPTHER

## 2022-03-18 RX ORDER — SODIUM CHLORIDE 0.9 % (FLUSH) 0.9 %
5-40 SYRINGE (ML) INJECTION EVERY 12 HOURS SCHEDULED
Status: DISCONTINUED | OUTPATIENT
Start: 2022-03-18 | End: 2022-03-19 | Stop reason: HOSPADM

## 2022-03-18 RX ORDER — ACETAMINOPHEN 650 MG/1
650 SUPPOSITORY RECTAL EVERY 6 HOURS PRN
Status: DISCONTINUED | OUTPATIENT
Start: 2022-03-18 | End: 2022-03-19 | Stop reason: HOSPADM

## 2022-03-18 RX ORDER — POLYETHYLENE GLYCOL 3350 17 G/17G
17 POWDER, FOR SOLUTION ORAL DAILY PRN
Status: DISCONTINUED | OUTPATIENT
Start: 2022-03-18 | End: 2022-03-19 | Stop reason: HOSPADM

## 2022-03-18 RX ORDER — ASPIRIN 81 MG/1
81 TABLET, CHEWABLE ORAL DAILY
Status: DISCONTINUED | OUTPATIENT
Start: 2022-03-19 | End: 2022-03-18 | Stop reason: SDUPTHER

## 2022-03-18 RX ORDER — ATORVASTATIN CALCIUM 80 MG/1
80 TABLET, FILM COATED ORAL NIGHTLY
Status: DISCONTINUED | OUTPATIENT
Start: 2022-03-18 | End: 2022-03-19 | Stop reason: HOSPADM

## 2022-03-18 RX ORDER — ASPIRIN 81 MG/1
324 TABLET, CHEWABLE ORAL ONCE
Status: COMPLETED | OUTPATIENT
Start: 2022-03-18 | End: 2022-03-18

## 2022-03-18 RX ORDER — ONDANSETRON 4 MG/1
4 TABLET, ORALLY DISINTEGRATING ORAL EVERY 8 HOURS PRN
Status: DISCONTINUED | OUTPATIENT
Start: 2022-03-18 | End: 2022-03-19 | Stop reason: HOSPADM

## 2022-03-18 RX ORDER — PRASUGREL 10 MG/1
10 TABLET, FILM COATED ORAL DAILY
Status: DISCONTINUED | OUTPATIENT
Start: 2022-03-18 | End: 2022-03-19 | Stop reason: HOSPADM

## 2022-03-18 RX ORDER — ASPIRIN 81 MG/1
81 TABLET, CHEWABLE ORAL DAILY
Status: DISCONTINUED | OUTPATIENT
Start: 2022-03-19 | End: 2022-03-19 | Stop reason: HOSPADM

## 2022-03-18 RX ORDER — SODIUM CHLORIDE 0.9 % (FLUSH) 0.9 %
5-40 SYRINGE (ML) INJECTION PRN
Status: DISCONTINUED | OUTPATIENT
Start: 2022-03-18 | End: 2022-03-19 | Stop reason: HOSPADM

## 2022-03-18 RX ORDER — SODIUM CHLORIDE 9 MG/ML
25 INJECTION, SOLUTION INTRAVENOUS PRN
Status: DISCONTINUED | OUTPATIENT
Start: 2022-03-18 | End: 2022-03-19 | Stop reason: HOSPADM

## 2022-03-18 RX ORDER — ACETAMINOPHEN 325 MG/1
650 TABLET ORAL EVERY 6 HOURS PRN
Status: DISCONTINUED | OUTPATIENT
Start: 2022-03-18 | End: 2022-03-19 | Stop reason: HOSPADM

## 2022-03-18 RX ADMIN — INSULIN LISPRO 10 UNITS: 100 INJECTION, SOLUTION INTRAVENOUS; SUBCUTANEOUS at 17:53

## 2022-03-18 RX ADMIN — INSULIN GLARGINE 25 UNITS: 100 INJECTION, SOLUTION SUBCUTANEOUS at 21:54

## 2022-03-18 RX ADMIN — ASPIRIN 81 MG 324 MG: 81 TABLET ORAL at 12:28

## 2022-03-18 RX ADMIN — PRASUGREL 10 MG: 10 TABLET, FILM COATED ORAL at 17:45

## 2022-03-18 RX ADMIN — Medication 10 ML: at 21:54

## 2022-03-18 RX ADMIN — ATORVASTATIN CALCIUM 80 MG: 80 TABLET, FILM COATED ORAL at 21:54

## 2022-03-18 ASSESSMENT — HEART SCORE: ECG: 1

## 2022-03-18 ASSESSMENT — PAIN SCALES - GENERAL: PAINLEVEL_OUTOF10: 0

## 2022-03-18 NOTE — ED PROVIDER NOTES
Date of evaluation: 3/18/2022    ED Attending Attestation Note     CHIEF COMPLAINT     Every morning I get up I'm dizzy and this morning I was coming here for a neck xray and my lightheadedness got worse, I thought I might pass out. Then I started having chest pain that went up around my neck and I broke out in a sweat. HISTORY OF PRESENT ILLNESS  (Location/Symptom, Timing/Onset,Context/Setting, Quality, Duration, Modifying Factors, Severity). Note limiting factors. This patient was seen by the advance practice provider. I have seen and examined the patient, agree with the workup, evaluation, management and diagnosis. The care plan has been discussed. Chief Complaint   Patient presents with    Chest Pain     chest pain and diaphoresis this am.  denies n/v.  tightness in both shoulders, HA, left arm pain        Juni LAMAR Rosado is a 64 y.o. male who presents to the emergency department secondary to concern for chest pain radiated around the neck and broke out in a sweat. Has a history of prior MIs x 2 and states that his pain this morning felt similar. Past medical history significant for acute respiratory failure with hypoxia, coronary artery disease, cardiac arrest, CVA, cholecystitis, COVID-19, diabetes, GERD, hyperlipidemia, hypertension, ICD, pots, psychiatric problem, sepsis, Sirs, sleep apnea, diabetes.    Social History     Socioeconomic History    Marital status:      Spouse name: Not on file    Number of children: Not on file    Years of education: Not on file    Highest education level: Not on file   Occupational History    Not on file   Tobacco Use    Smoking status: Former Smoker     Packs/day: 2.00     Years: 12.00     Pack years: 24.00     Types: Cigarettes, Cigars    Smokeless tobacco: Never Used    Tobacco comment: quit in the 80's   Vaping Use    Vaping Use: Never used   Substance and Sexual Activity    Alcohol use: No    Drug use: Not Currently     Types: Marijuana Megan Pineda     Comment: quit [de-identified]    Sexual activity: Yes     Partners: Female   Other Topics Concern    Not on file   Social History Narrative    ** Merged History Encounter **          Social Determinants of Health     Financial Resource Strain:     Difficulty of Paying Living Expenses: Not on file   Food Insecurity:     Worried About Running Out of Food in the Last Year: Not on file    Joie of Food in the Last Year: Not on file   Transportation Needs:     Lack of Transportation (Medical): Not on file    Lack of Transportation (Non-Medical): Not on file   Physical Activity:     Days of Exercise per Week: Not on file    Minutes of Exercise per Session: Not on file   Stress:     Feeling of Stress : Not on file   Social Connections:     Frequency of Communication with Friends and Family: Not on file    Frequency of Social Gatherings with Friends and Family: Not on file    Attends Evangelical Services: Not on file    Active Member of [address] [place] [address] or Organizations: Not on file    Attends Club or Organization Meetings: Not on file    Marital Status: Not on file   Intimate Partner Violence:     Fear of Current or Ex-Partner: Not on file    Emotionally Abused: Not on file    Physically Abused: Not on file    Sexually Abused: Not on file   Housing Stability:     Unable to Pay for Housing in the Last Year: Not on file    Number of [place] in the Last Year: Not on file    Unstable Housing in the Last Year: Not on file     Aside from what is stated above denies any other symptoms or modifying factors. Nursing Notes reviewed.     Past Surgical History:   Procedure Laterality Date    CARDIAC CATHETERIZATION      CHOLECYSTECTOMY, LAPAROSCOPIC N/A 4/21/2021    LAPAROSCOPIC CHOLECYSTECTOMY WITH CHOLANGIOGRAM performed by Tabitha Killian MD at [facility] [place] [facility]  10/06/2018    Bare Metal Stent to PDA    CORONARY ANGIOPLASTY WITH STENT PLACEMENT 10/07/2018    Bare Metal Stent to OM    CORONARY ANGIOPLASTY WITH STENT PLACEMENT  10/03/2018    CECE to Circ    DIAGNOSTIC CARDIAC CATH LAB PROCEDURE      FINGER SURGERY Left     Left Thumb    HERNIA REPAIR      VASCULAR SURGERY      VASECTOMY         Family History   Problem Relation Age of Onset    High Blood Pressure Mother     Stroke Mother     Heart Disease Mother     COPD Mother     Heart Disease Father     Cancer Father         skin    Diabetes Sister     Cancer Sister     Heart Disease Brother     Diabetes Brother        CURRENT MEDICATIONS       Current Discharge Medication List      CONTINUE these medications which have NOT CHANGED    Details   mupirocin (BACTROBAN) 2 % ointment Apply 1 each topically in the morning, at noon, and at bedtime To AA      prasugrel (EFFIENT) 10 MG TABS TAKE 1 TABLET BY MOUTH EVERY DAY  Qty: 90 tablet, Refills: 1      atorvastatin (LIPITOR) 80 MG tablet TAKE 1 TABLET BY MOUTH EVERY DAY  Qty: 90 tablet, Refills: 1      Omega-3 Fatty Acids (FISH OIL PO) Take 1 capsule by mouth daily       GARLIC PO Take 1 capsule by mouth daily       NOVOLOG FLEXPEN 100 UNIT/ML injection pen Inject 13 Units into the skin 3 times daily (before meals) Plus sliding scale - 0-3 units more      insulin detemir (LEVEMIR) 100 UNIT/ML injection vial Inject 30 Units into the skin 2 times daily      aspirin 81 MG chewable tablet Take 81 mg by mouth daily      Cholecalciferol (VITAMIN D3) 56385 units CAPS Take 1 capsule by mouth once a week       Dulaglutide (TRULICITY) 3 VZ/6.2BS SOPN Inject 3 mg into the skin once a week Indications: Friday             DIAGNOSTIC RESULTS   EKG: interpreted by the Emergency Department Physician who either signs or Co-signs this chart in the absence of a cardiologist.  EKG Indication: chest pain  EKG Interpretation: Rate 95, rhythm sinus, axis normal.  LA/QRS/QTc within normal limits. No T/ST changes consistent with acute ischemia.   Comparison to a prior EKG from December 13, 2021 shows no acute ischemic changes are noted. RADIOLOGY:   Interpretation per Radiologist below, if available at the time of this note:  XR CHEST (2 VW)   Final Result   No acute process. Patient was given the following medications:  Orders Placed This Encounter   Medications    aspirin chewable tablet 324 mg       INITIAL VITALS: BP: (!) 154/101, Temp: 97.2 °F (36.2 °C), Pulse: 89, Resp: 18, SpO2: 98 %   RECENT VITALS:  BP: (!) 148/96,Temp: 97.5 °F (36.4 °C), Pulse: 98, Resp: 14, SpO2: 98 %     I personally saw the patient and performed a substantive portion of the visit including all aspects of the medical decision making. My assessment reveals a chronically ill-appearing male sitting up in bed who appears to be in no acute medical or respiratory distress. Vitals on arrival are notable for blood pressure 154/101 and otherwise hemodynamically stable. At the time of my assessment he answers questions appropriately, in full sentences. No increased work of breathing. No abdominal tenderness. No peripheral edema. He had been evaluated by the SHANNEN and recommended for admission however he had not wanted to stay and asked to see a physician. It was at this point the SHANNEN got me involved. Given his history including his cardiac arrest status post stents and ICD, stroke, hypertension, hyperlipidemia, family history -along with his history of present illness today I agree with the SHANNEN his presentation is very concerning. I discussed these points with him and let the patient know I was in full agreement with SHANNEN regarding admission for further cardiac work-up and monitoring. At that time the patient did state he was amenable to staying for further evaluation. Critical Care:  I personally saw the patient and independently provided 8 minutes of nonconcurrent critical care out of the total shared critical care time provided.  Due to the immediate potential for life-threatening deterioration due to ACS, I spent 8 minutes providing critical care. This time excludes time spent performing procedures but includes time spent on direct patient care, history retrieval, review of the chart, and discussions with patient, family, and consultant(s). FINAL IMPRESSION      1.  Chest pain, unspecified type        DISPOSITION/PLAN   DISPOSITION admission    (Please note that portions of this note were completed with a voice recognition program. Efforts were made to edit the dictations but occasionally words are mis-transcribed.)    Stephen Vidal MD (electronically signed)  Attending Emergency Physician        Stephen Vidal MD  03/18/22 [id number]

## 2022-03-18 NOTE — PROGRESS NOTES
4 Eyes Skin Assessment     NAME:  Juni King  YOB: 1965  MEDICAL RECORD NUMBER:  [id number]    The patient is being assess for  Admission    I agree that 2 RN's have performed a thorough Head to Toe Skin Assessment on the patient. ALL assessment sites listed below have been assessed. Areas assessed by both nurses:    Head, Face, Ears, Shoulders, Back, Chest, Arms, Elbows, Hands, Sacrum. Buttock, Coccyx, Ischium and Legs. Feet and Heels        Does the Patient have a Wound? Yes wound(s) were present on assessment.  LDA wound assessment was Initiated and completed scattered abrasion on bilateral lower extremities        Michael Prevention initiated:  No   Wound Care Orders initiated:  No    Pressure Injury (Stage 3,4, Unstageable, DTI, NWPT, and Complex wounds) if present place consult order under [de-identified] No    New and Established Ostomies if present place consult order under : No      Nurse 1 eSignature: Electronically signed by Demarco Garcia RN on 3/18/22 at 6:00 PM EDT    **SHARE this note so that the co-signing nurse is able to place an eSignature**    Nurse 2 eSignature: Electronically signed by Beatris Benavidez RN on 3/18/22 at 6:43 PM EDT

## 2022-03-18 NOTE — ED PROVIDER NOTES
[address]  [address] [address]  Dept: [phone]  Loc: [facility]    Chief Complaint   Patient presents with    Chest Pain     chest pain and diaphoresis this am.  denies n/v.  tightness in both shoulders, HA, left arm pain       HPI    Luke Christian is a 64 y.o. male who presents to the emergency department with chest tightness that radiated around his shoulders and behind his neck. He broke out into a cold sweat. He states when he got up this morning he had a little bit of lightheadedness which is normal for him because he has a history of low blood pressure. States he went about his morning. He came up to the hospital to have outpatient imaging for his neck. He states the lightheadedness got worse. He states he went home and he started with chest tightness and broke out into a sweat. He has had a couple of myocardial infarctions in the past.  He states this presentation is similar to those that he has had in the past.  He also has a history of cardiac arrest from lethal arrhythmia and has a defibrillator. He denies shortness of breath, nausea or vomiting. Lizzie Arboleda REVIEW OF SYSTEMS    Cardiac: see HPI, no syncope  Respiratory: no shortness of breath, no cough, no hemoptysis  GI: No vomiting or diarrhea  : No dysuria or hematuria  General: No fever or chills  All other systems reviewed and are negative.     PAST MEDICAL & SURGICAL HISTORY    Past Medical History:   Diagnosis Date    Abscess of arm, left 7/1/2018    Acute metabolic encephalopathy 3/03/8306    Acute respiratory failure with hypoxia (HCC)     Arthritis     Blood circulation, collateral     CAD (coronary artery disease) 7/15/2014    CAD (coronary artery disease)     Cardiac arrest ([facility] [address] 10/05/2018    Cerebral artery occlusion with cerebral infarction (HCC)     Cholecystitis 4/14/2021    COVID-19 virus infection 7/1/2020    Diabetes mellitus ([address]     Diabetic peripheral neuropathy ([address] 6/8/2018    Elbow contusion 3/24/2014    GERD (gastroesophageal reflux disease)     ulcers    High anion gap metabolic acidosis 7/1/5351    Hypercholesteremia     Hyperlipidemia     Hypertension     ICD (implantable cardioverter-defibrillator) in place 2018    Left arm numbness 9/11/2010    MRSA (methicillin resistant staph aureus) culture positive 07/13/2018    foot wound    Multifocal pneumonia     Neuropathy     Neutrophilic leukocytosis 3/17/7956    NSTEMI (non-ST elevated myocardial infarction) ([address] 10/3/2018    Pneumonia due to COVID-19 virus     POTS (postural orthostatic tachycardia syndrome)     Psychiatric problem     anxiety, depression, bipolar    Sepsis ([address] 7/1/2020    SIRS (systemic inflammatory response syndrome) ([address] 4/14/2021    Skin ulcer of left foot with fat layer exposed ([address] 6/1/2018    Sleep apnea     does not use Cpap    ST elevation myocardial infarction (STEMI) of inferolateral wall (HCC) 11/13/2018    Syncope     Type II or unspecified type diabetes mellitus without mention of complication, not stated as uncontrolled     Ulcer of foot due to secondary diabetes ([address] 8/15/2018    Wears glasses      Past Surgical History:   Procedure Laterality Date    CARDIAC CATHETERIZATION      CHOLECYSTECTOMY, LAPAROSCOPIC N/A 4/21/2021    LAPAROSCOPIC CHOLECYSTECTOMY WITH CHOLANGIOGRAM performed by Latisha Ghotra MD at [facility]  10/06/2018    Bare Metal Stent to PDA    CORONARY ANGIOPLASTY WITH STENT PLACEMENT  10/07/2018    Bare Metal Stent to OM    CORONARY ANGIOPLASTY WITH STENT PLACEMENT  10/03/2018    CECE to Circ    DIAGNOSTIC CARDIAC CATH LAB PROCEDURE      FINGER SURGERY Left     Left Thumb    HERNIA REPAIR      VASCULAR SURGERY      VASECTOMY         CURRENT MEDICATIONS (may include discharge medications prescribed in the ED)  Current Outpatient Rx   Medication Sig Dispense Refill    mupirocin (BACTROBAN) 2 % ointment Apply 1 each topically in the morning, at noon, and at bedtime To AA      prasugrel (EFFIENT) 10 MG TABS TAKE 1 TABLET BY MOUTH EVERY DAY 90 tablet 1    atorvastatin (LIPITOR) 80 MG tablet TAKE 1 TABLET BY MOUTH EVERY DAY 90 tablet 1    Omega-3 Fatty Acids (FISH OIL PO) Take 1 capsule by mouth daily       GARLIC PO Take 1 capsule by mouth daily       NOVOLOG FLEXPEN 100 UNIT/ML injection pen Inject 13 Units into the skin 3 times daily (before meals) Plus sliding scale - 0-3 units more      insulin detemir (LEVEMIR) 100 UNIT/ML injection vial Inject 30 Units into the skin 2 times daily      aspirin 81 MG chewable tablet Take 81 mg by mouth daily      Cholecalciferol (VITAMIN D3) 33925 units CAPS Take 1 capsule by mouth once a week       Dulaglutide (TRULICITY) 3 JW/9.0SA SOPN Inject 3 mg into the skin once a week Indications: Friday          ALLERGIES    No Known Allergies    SOCIAL & FAMILY HISTORY    Social History     Socioeconomic History    Marital status:      Spouse name: Not on file    Number of children: Not on file    Years of education: Not on file    Highest education level: Not on file   Occupational History    Not on file   Tobacco Use    Smoking status: Former Smoker     Packs/day: 2.00     Years: 12.00     Pack years: 24.00     Types: Cigarettes, Cigars    Smokeless tobacco: Never Used    Tobacco comment: quit in the 80's   Vaping Use    Vaping Use: Never used   Substance and Sexual Activity    Alcohol use: No    Drug use: Not Currently     Types: Marijuana Lanis Darrian)     Comment: quit 80's    Sexual activity: Yes     Partners: Female   Other Topics Concern    Not on file   Social History Narrative    ** Merged History Encounter **          Social Determinants of Health     Financial Resource Strain:     Difficulty of Paying Living Expenses: Not on file   Food Insecurity:     Worried About [address] in the Last Year: Not on file    Joie of Food in the Last Year: Not on file   Transportation Needs:     Lack of Transportation (Medical): Not on file    Lack of Transportation (Non-Medical):  Not on file   Physical Activity:     Days of Exercise per Week: Not on file    Minutes of Exercise per Session: Not on file   Stress:     Feeling of Stress : Not on file   Social Connections:     Frequency of Communication with Friends and Family: Not on file    Frequency of Social Gatherings with Friends and Family: Not on file    Attends [employer] Services: Not on file    Active Member of [address] [place] [address] or Organizations: Not on file    Attends Club or Organization Meetings: Not on file    Marital Status: Not on file   Intimate Partner Violence:     Fear of Current or Ex-Partner: Not on file    Emotionally Abused: Not on file    Physically Abused: Not on file    Sexually Abused: Not on file   Housing Stability:     Unable to Pay for Housing in the Last Year: Not on file    Number of [place] in the Last Year: Not on file    Unstable Housing in the Last Year: Not on file     Family History   Problem Relation Age of Onset    High Blood Pressure Mother     Stroke Mother     Heart Disease Mother     COPD Mother     Heart Disease Father     Cancer Father         skin    Diabetes Sister     Cancer Sister     Heart Disease Brother     Diabetes Brother        PHYSICAL EXAM    VITAL SIGNS: BP (!) 144/95   Pulse 88   Temp 97.2 °F (36.2 °C) (Tympanic)   Resp 16   Ht 5' 11\" (1.803 m)   SpO2 98%   BMI 30.29 kg/m²    Constitutional:  Well developed, well nourished, no acute distress   HENT:  Atraumatic, moist mucus membranes  Neck: supple, no JVD   Respiratory:  Lungs clear to auscultation bilaterally, no retractions   Cardiovascular: Regular rhythm and rate, S1-S2  Vascular: Radial and DP pulses 2+ and equal bilaterally  GI:  Soft, nontender, normal bowel sounds  Musculoskeletal:  no lower extremity edema, no lower extremity asymmetry, no calf tenderness, no thigh tenderness, no acute deformities  Integument:  Skin warm and dry, no petechiae   Neurologic:  Alert & oriented, no slurred speech  Psych: anxious affect, no hallucinations    EKG    EKG reviewed by myself and interpreted by my attending physician Dr. Zayad Atkins. Ventricular rate 95 bpm, NJ interval 178 ms, QRS duration 90 ms,  ms  No ST elevation or depression. Interpretation is a normal sinus rhythm. Today's tracing was compared to the one on April 14, 2021 with no significant changes noted    RADIOLOGY/PROCEDURES    XR CHEST (2 VW)   Final Result   No acute process. Labs Reviewed   COMPREHENSIVE METABOLIC PANEL - Abnormal; Notable for the following components:       Result Value    Glucose 298 (*)     CREATININE 0.8 (*)     All other components within normal limits   TROPONIN - Abnormal; Notable for the following components:    Troponin 0.05 (*)     All other components within normal limits   CBC WITH AUTO DIFFERENTIAL   BRAIN NATRIURETIC PEPTIDE       ED COURSE & MEDICAL DECISION MAKING    Pertinent Labs & Imaging studies reviewed and interpreted. (See chart for details)  The patient was immediately placed on the cardiac monitor. IV access obtained. ASA was given. See chart for details of medications given during the ED stay. Vitals:    03/18/22 1130 03/18/22 1200 03/18/22 1218 03/18/22 1224   BP: 137/89 (!) 144/95     Pulse: 89 88     Resp: 16 (!) 0 16 16   Temp:       TempSrc:       SpO2:       Height:           Medications   aspirin chewable tablet 324 mg (has no administration in time range)     This patient was seen evaluated by myself my attending physician Dr. Zayda Atkins. Differential diagnosis includes but is not limited to ACS, MI, PE, thoracic aortic dissection, heart failure, pneumonia, other    He is nontoxic in appearance. He arrives with a blood pressure of 154/101. He does not have a history of hypertension but rather hypotension. He has had worsening lightheadedness since upon waking this morning and then developed mid center chest tightness radiating up his chest into his neck and behind his neck with diaphoresis. He states his symptoms remind him of when he had his myocardial infarction's in the past.  He has stents. He has lethal arrhythmia history and now has a defibrillator as well. No acute changes on EKG. Troponin is right around his baseline at 0.05. Patient's HEART score is 7. Blood sugar is 298. Chest x-ray interpreted by radiology reviewed by myself showed no acute cardiopulmonary process. I recommended admission for this patient in which he declined stating that he felt better and he wanted to go home. At that point I asked Dr. Verónica Colbert one of the ED attendings to see the patient with me. After her evaluation of the patient the patient did agree to be admitted. Perfect serve out to the hospitalist service for admission. FINAL IMPRESSION    1.  Chest pain, unspecified type        PLAN  Admission to the hospital    (Please note that this note was completed with a voice recognition program.  Every attempt was made to edit the dictations, but inevitably there remain words that are mis-transcribed.)        Tracy Novak, LAURA - ROMMEL  03/18/22 1829

## 2022-03-18 NOTE — CONSULTS
[facility]    Sharan King  1965 March 18, 2022    Reason for Consult: Chest Pain    CC: Chest Pain    HPI:  The patient is 64 y.o. male with a past medical history significant for type 2 DM and CAD with prior PCI to the Cx/OM and RCA who presented to [facility] with chest pain. I was asked to see him for this an mildly elevated troponin assays. He has a hisotry of a VT arrest and is s/p AICD. He follows with Dr. Tl Box in the office. He states that he woke up this morning and felt bad. He went to some appoint,ents and and went home. He felt neck stiffness. He was cold and clammy. This reminded him of his prior cardiac arrest in 2018. He started to develop chest pain so he came to the hospital. He felt this as a pressure. The pain resolved here prior to any intervention. The pain lasted about an hour. He has had no further pain. Review of Systems:  Constitutional: No fatigue, weakness, night sweats or fever. HEENT: No new vision difficulties or ringing in the ears. Respiratory: No new SOB, PND, orthopnea or cough. Cardiovascular: See HPI   GI: No n/v, diarrhea, constipation, abdominal pain or changes in bowel habits. No melena, no hematochezia  : No urinary frequency, urgency, incontinence, hematuria or dysuria. Skin: No cyanosis or skin lesions. Musculoskeletal: No new muscle or joint pain. Neurological: No syncope or TIA-like symptoms.   Psychiatric: No anxiety, insomnia or depression     Past Medical History:   Diagnosis Date    Abscess of arm, left 7/1/2018    Acute metabolic encephalopathy 3/95/4860    Acute respiratory failure with hypoxia (HCC)     Arthritis     Blood circulation, collateral     CAD (coronary artery disease) 7/15/2014    CAD (coronary artery disease)     Cardiac arrest ([facility] [address] 10/05/2018    Cerebral artery occlusion with cerebral infarction ([facility] [address]     Cholecystitis 4/14/2021    COVID-19 virus infection 7/1/2020    Diabetes mellitus ([address]     Diabetic peripheral neuropathy ([address] 6/8/2018    Elbow contusion 3/24/2014    GERD (gastroesophageal reflux disease)     ulcers    High anion gap metabolic acidosis 6/8/0867    Hypercholesteremia     Hyperlipidemia     Hypertension     ICD (implantable cardioverter-defibrillator) in place 2018    Left arm numbness 9/11/2010    MRSA (methicillin resistant staph aureus) culture positive 07/13/2018    foot wound    Multifocal pneumonia     Neuropathy     Neutrophilic leukocytosis 2/85/1768    NSTEMI (non-ST elevated myocardial infarction) ([address] 10/3/2018    Pneumonia due to COVID-19 virus     POTS (postural orthostatic tachycardia syndrome)     Psychiatric problem     anxiety, depression, bipolar    Sepsis ([address] 7/1/2020    SIRS (systemic inflammatory response syndrome) ([address] 4/14/2021    Skin ulcer of left foot with fat layer exposed ([address] 6/1/2018    Sleep apnea     does not use Cpap    ST elevation myocardial infarction (STEMI) of inferolateral wall (HCC) 11/13/2018    Syncope     Type II or unspecified type diabetes mellitus without mention of complication, not stated as uncontrolled     Ulcer of foot due to secondary diabetes ([address] 8/15/2018    Wears glasses      Past Surgical History:   Procedure Laterality Date    CARDIAC CATHETERIZATION      CHOLECYSTECTOMY, LAPAROSCOPIC N/A 4/21/2021    LAPAROSCOPIC CHOLECYSTECTOMY WITH CHOLANGIOGRAM performed by Aida Garcias [facility] at [facility]  10/06/2018    Bare Metal Stent to PDA    CORONARY ANGIOPLASTY WITH STENT PLACEMENT  10/07/2018    Bare Metal Stent to OM    CORONARY ANGIOPLASTY WITH STENT PLACEMENT  10/03/2018    CECE to Circ    DIAGNOSTIC CARDIAC CATH LAB PROCEDURE      FINGER SURGERY Left     Left Thumb    HERNIA REPAIR      VASCULAR SURGERY      VASECTOMY       Family History   Problem Relation Age of Onset    High Blood Pressure Mother    Jayme Garcia Stroke Mother     Heart Disease Mother     COPD Mother     Heart Disease Father     Cancer Father         skin    Diabetes Sister     Cancer Sister     Heart Disease Brother     Diabetes Brother      Social History     Tobacco Use    Smoking status: Former Smoker     Packs/day: 2.00     Years: 12.00     Pack years: 24.00     Types: Cigarettes, Cigars    Smokeless tobacco: Never Used    Tobacco comment: quit in the 80's   Vaping Use    Vaping Use: Never used   Substance Use Topics    Alcohol use: No    Drug use: Not Currently     Types: Marijuana Ardia Gilpin)     Comment: quit 80's       No Known Allergies  Current Facility-Administered Medications   Medication Dose Route Frequency Provider Last Rate Last Admin    [START ON 3/19/2022] aspirin chewable tablet 81 mg  81 mg Oral Daily Karen Rios MD        atorvastatin (LIPITOR) tablet 80 mg  80 mg Oral Nightly Karen Rios MD        insulin glargine (LANTUS) injection vial 25 Units  25 Units SubCUTAneous BID Karen Rios MD        insulin lispro (HUMALOG) injection vial 10 Units  10 Units SubCUTAneous TID WC Karen Rios MD        prasugrel (EFFIENT) tablet 10 mg  10 mg Oral Daily Karen Rios MD        sodium chloride flush 0.9 % injection 5-40 mL  5-40 mL IntraVENous 2 times per day Son El MD        sodium chloride flush 0.9 % injection 5-40 mL  5-40 mL IntraVENous PRN Karen Velasquez MD        0.9 % sodium chloride infusion  25 mL IntraVENous PRN Karen Rios MD        ondansetron (ZOFRAN-ODT) disintegrating tablet 4 mg  4 mg Oral Q8H PRN Karen Rios MD        Or    ondansetron (ZOFRAN) injection 4 mg  4 mg IntraVENous Q6H PRN Karen Rios MD        acetaminophen (TYLENOL) tablet 650 mg  650 mg Oral Q6H PRN Karen Rios MD        Or    acetaminophen (TYLENOL) suppository 650 mg  650 mg Rectal Q6H PRN Karen Rios MD        polyethylene glycol (Lori Sages) packet 17 g  17 g Oral Daily PRN Hayley Bernabe MD        perflutren lipid microspheres (DEFINITY) injection 1.65 mg  1.5 mL IntraVENous ONCE PRN Hayley Bernabe MD        enoxaparin (LOVENOX) injection 40 mg  40 mg SubCUTAneous Nightly Karen Triana MD           Physical Exam:   BP (!) 148/96   Pulse 98   Temp 97.5 °F (36.4 °C) (Oral)   Resp 14   Ht 5' 10.98\" (1.803 m)   Wt 217 lb (98.4 kg)   SpO2 98%   BMI 30.28 kg/m²   No intake or output data in the 24 hours ending 03/18/22 1552  Wt Readings from Last 2 Encounters:   03/18/22 217 lb (98.4 kg)   01/16/22 217 lb 2.5 oz (98.5 kg)     Constitutional: He is oriented to person, place, and time. He appears well-developed and well-nourished. In no acute distress. Head: Normocephalic and atraumatic. Neck: Neck supple. No JVD present. Carotid bruit is not present. No mass and no thyromegaly present. No lymphadenopathy present. Cardiovascular: Normal rate, regular rhythm, normal heart sounds and intact distal pulses. Exam reveals no gallop and no friction rub. No murmur heard. Pulmonary/Chest: Effort normal and breath sounds normal. No respiratory distress. He has no wheezes, rhonchi or rales. Abdominal: Soft, non-tender. Bowel sounds and aorta are normal. He exhibits no organomegaly, mass or bruit. Extremities: No edema, cyanosis, or clubbing. Pulses are 2+ radial/carotid/dorsalis pedis and posterior tibial bilaterally. Neurological: He is alert and oriented to person, place, and time. He has normal reflexes. No cranial nerve deficit. Coordination normal.   Skin: Skin is warm and dry. There is no rash or diaphoresis. Psychiatric: He has a normal mood and affect.  His speech is normal and behavior is normal.     Personally reviewed and interpreted   EKG Interpretation: Sinus rhythm with no significant changes    Lab Review:   Lab Results   Component Value Date    TRIG 142 10/09/2018    HDL 36 12/15/2021    HDL 35 09/10/2010 LDLCALC 50 12/15/2021    LDLDIRECT 167 07/14/2014    LABVLDL 56 12/15/2021     Lab Results   Component Value Date     03/18/2022    K 4.0 03/18/2022    K 3.1 04/19/2021    BUN 12 03/18/2022    CREATININE 0.8 03/18/2022     Recent Labs     03/18/22  0953   WBC 9.1   HGB 16.1   HCT 45.8        Left Heart Catheterization 11/18/20 [facility]):  1. Left main normal.  LYNNE 3 flow. No stenosis. 2.  Left anterior descending artery, has LYNNE 3 flow with 20% stenosis. 3.  Left circumflex has distally LYNNE 2 flow but no significant  stenosis, patent stents. 4.  Right coronary artery in the mid portion has 90% stenosis present  with unstable plaque. LV ejection fraction 60%.    In summary, successful revascularization of the right coronary artery  and placement of stent, 4.0 x 26 mm. Assessment / Plan:    1. Unstable Angina  Juni had chest pain similar to his prior but feels fine now. His troponin assays are up a little but are never normal. His ECG has no ischemic changes present. I think that I would order a Lexiscan perfusion study to assess for any ischemia for him. No need for heparin. Continue aspirin, beta blockade and statin therapy for him. If this has no definite reversible perfusion defects I would feel better about him being discharged. Continue DAPT. 2. Hyperlipidemia with goal LDL<70mg/dL  Continue high dose statin therapy    3. H/o VT arrest S/p AICD placement   AICD in place with no discharges. Continue telemetry.

## 2022-03-18 NOTE — ED NOTES
Pharmacy Medication Reconciliation Note     List of medications patient is currently taking is complete. Source of information:   1. EMR  2. patient    Notes regarding home medications:   1. Reports no medication taken yet today  2. He was recently prescribed some medications - cephalexin (only took one dose - didn't finish because URI symptoms resolved) gabapentin (never picked up or started and states he does not plan on it.  Same thing with montelukast    Denies taking any other OTC or herbal medications    Xu Jason PharmD, BCPS  3/18/2022  1:46 PM

## 2022-03-19 VITALS
SYSTOLIC BLOOD PRESSURE: 138 MMHG | BODY MASS INDEX: 30.46 KG/M2 | OXYGEN SATURATION: 98 % | HEIGHT: 71 IN | DIASTOLIC BLOOD PRESSURE: 90 MMHG | HEART RATE: 93 BPM | RESPIRATION RATE: 22 BRPM | TEMPERATURE: 97.3 F | WEIGHT: 217.59 LBS

## 2022-03-19 LAB
ANION GAP SERPL CALCULATED.3IONS-SCNC: 16 MMOL/L (ref 3–16)
BUN BLDV-MCNC: 16 MG/DL (ref 7–20)
CALCIUM SERPL-MCNC: 9.3 MG/DL (ref 8.3–10.6)
CHLORIDE BLD-SCNC: 101 MMOL/L (ref 99–110)
CO2: 22 MMOL/L (ref 21–32)
CREAT SERPL-MCNC: 0.8 MG/DL (ref 0.9–1.3)
GFR AFRICAN AMERICAN: >60
GFR NON-AFRICAN AMERICAN: >60
GLUCOSE BLD-MCNC: 204 MG/DL (ref 70–99)
GLUCOSE BLD-MCNC: 207 MG/DL (ref 70–99)
GLUCOSE BLD-MCNC: 221 MG/DL (ref 70–99)
GLUCOSE BLD-MCNC: 238 MG/DL (ref 70–99)
HCT VFR BLD CALC: 44.7 % (ref 40.5–52.5)
HEMOGLOBIN: 15.3 G/DL (ref 13.5–17.5)
LV EF: 61 %
LVEF MODALITY: NORMAL
MCH RBC QN AUTO: 28.4 PG (ref 26–34)
MCHC RBC AUTO-ENTMCNC: 34.3 G/DL (ref 31–36)
MCV RBC AUTO: 83 FL (ref 80–100)
PDW BLD-RTO: 12.4 % (ref 12.4–15.4)
PERFORMED ON: ABNORMAL
PLATELET # BLD: 191 K/UL (ref 135–450)
PMV BLD AUTO: 8.1 FL (ref 5–10.5)
POTASSIUM REFLEX MAGNESIUM: 3.7 MMOL/L (ref 3.5–5.1)
RBC # BLD: 5.38 M/UL (ref 4.2–5.9)
SODIUM BLD-SCNC: 139 MMOL/L (ref 136–145)
WBC # BLD: 8.9 K/UL (ref 4–11)

## 2022-03-19 PROCEDURE — 6370000000 HC RX 637 (ALT 250 FOR IP): Performed by: INTERNAL MEDICINE

## 2022-03-19 PROCEDURE — 85027 COMPLETE CBC AUTOMATED: CPT

## 2022-03-19 PROCEDURE — 3430000000 HC RX DIAGNOSTIC RADIOPHARMACEUTICAL: Performed by: INTERNAL MEDICINE

## 2022-03-19 PROCEDURE — 2580000003 HC RX 258: Performed by: INTERNAL MEDICINE

## 2022-03-19 PROCEDURE — 6360000002 HC RX W HCPCS: Performed by: INTERNAL MEDICINE

## 2022-03-19 PROCEDURE — A9502 TC99M TETROFOSMIN: HCPCS | Performed by: INTERNAL MEDICINE

## 2022-03-19 PROCEDURE — G0378 HOSPITAL OBSERVATION PER HR: HCPCS

## 2022-03-19 PROCEDURE — 36415 COLL VENOUS BLD VENIPUNCTURE: CPT

## 2022-03-19 PROCEDURE — 80048 BASIC METABOLIC PNL TOTAL CA: CPT

## 2022-03-19 PROCEDURE — 93017 CV STRESS TEST TRACING ONLY: CPT

## 2022-03-19 PROCEDURE — 93005 ELECTROCARDIOGRAM TRACING: CPT | Performed by: INTERNAL MEDICINE

## 2022-03-19 PROCEDURE — 78452 HT MUSCLE IMAGE SPECT MULT: CPT

## 2022-03-19 RX ADMIN — ASPIRIN 81 MG 81 MG: 81 TABLET ORAL at 12:56

## 2022-03-19 RX ADMIN — TETROFOSMIN 30 MILLICURIE: 1.38 INJECTION, POWDER, LYOPHILIZED, FOR SOLUTION INTRAVENOUS at 11:55

## 2022-03-19 RX ADMIN — REGADENOSON 0.4 MG: 0.08 INJECTION, SOLUTION INTRAVENOUS at 11:47

## 2022-03-19 RX ADMIN — TETROFOSMIN 10 MILLICURIE: 1.38 INJECTION, POWDER, LYOPHILIZED, FOR SOLUTION INTRAVENOUS at 09:15

## 2022-03-19 RX ADMIN — PRASUGREL 10 MG: 10 TABLET, FILM COATED ORAL at 12:57

## 2022-03-19 RX ADMIN — Medication 10 ML: at 12:58

## 2022-03-19 RX ADMIN — INSULIN LISPRO 10 UNITS: 100 INJECTION, SOLUTION INTRAVENOUS; SUBCUTANEOUS at 12:57

## 2022-03-19 RX ADMIN — INSULIN GLARGINE 25 UNITS: 100 INJECTION, SOLUTION SUBCUTANEOUS at 12:57

## 2022-03-19 ASSESSMENT — PAIN SCALES - GENERAL
PAINLEVEL_OUTOF10: 0
PAINLEVEL_OUTOF10: 0

## 2022-03-19 NOTE — H&P
Hospital Medicine History & Physical      PCP: SILVIO POLLOCK, APRN - CNP    Date of Admission: 3/18/2022    Date of Service: Pt seen/examined on 3/18/2022 and Placed in Observation. Chief Complaint: CP    History Of Present Illness: The patient is a 64 y.o. male who presents to [facility] with CP. Pt has extensive cardiac history, states he woke up today and felt dizzy. Went to get xrays done of cervical area. Went back home and was still feeling some chest pain and given his h/o CAD, presented to the ER. Admitted for ACS rule out.        Past Medical History:        Diagnosis Date    Abscess of arm, left 7/1/2018    Acute metabolic encephalopathy 3/46/9374    Acute respiratory failure with hypoxia (HCC)     Arthritis     Blood circulation, collateral     CAD (coronary artery disease) 7/15/2014    CAD (coronary artery disease)     Cardiac arrest ([address] 10/05/2018    Cerebral artery occlusion with cerebral infarction ([address]     Cholecystitis 4/14/2021    COVID-19 virus infection 7/1/2020    Diabetes mellitus ([address]     Diabetic peripheral neuropathy ([address] 6/8/2018    Elbow contusion 3/24/2014    GERD (gastroesophageal reflux disease)     ulcers    High anion gap metabolic acidosis 6/6/9464    Hypercholesteremia     Hyperlipidemia     Hypertension     ICD (implantable cardioverter-defibrillator) in place 2018    Left arm numbness 9/11/2010    MRSA (methicillin resistant staph aureus) culture positive 07/13/2018    foot wound    Multifocal pneumonia     Neuropathy     Neutrophilic leukocytosis 6/26/8039    NSTEMI (non-ST elevated myocardial infarction) ([address] 10/3/2018    Pneumonia due to COVID-19 virus     POTS (postural orthostatic tachycardia syndrome)     Psychiatric problem     anxiety, depression, bipolar    Sepsis ([address] 7/1/2020  SIRS (systemic inflammatory response syndrome) ([facility] 4/14/2021    Skin ulcer of left foot with fat layer exposed ([facility] 6/1/2018    Sleep apnea     does not use Cpap    ST elevation myocardial infarction (STEMI) of inferolateral wall (HCC) 11/13/2018    Syncope     Type II or unspecified type diabetes mellitus without mention of complication, not stated as uncontrolled     Ulcer of foot due to secondary diabetes ([facility] 8/15/2018    Wears glasses        Past Surgical History:        Procedure Laterality Date    CARDIAC CATHETERIZATION      CHOLECYSTECTOMY, LAPAROSCOPIC N/A 4/21/2021    LAPAROSCOPIC CHOLECYSTECTOMY WITH CHOLANGIOGRAM [facility]  10/06/2018    Bare Metal Stent to PDA    CORONARY ANGIOPLASTY WITH STENT PLACEMENT  10/07/2018    Bare Metal Stent to OM    CORONARY ANGIOPLASTY WITH STENT PLACEMENT  10/03/2018    CECE to Circ    DIAGNOSTIC CARDIAC CATH LAB PROCEDURE      FINGER SURGERY Left     Left Thumb    HERNIA REPAIR      VASCULAR SURGERY      VASECTOMY         Medications Prior to Admission:    Prior to Admission medications    Medication Sig Start Date End Date Taking?  Authorizing Provider   mupirocin (BACTROBAN) 2 % ointment Apply 1 each topically in the morning, at noon, and at bedtime To AA 3/9/22   Historical Provider, MD   prasugrel (EFFIENT) 10 MG TABS TAKE 1 TABLET BY MOUTH EVERY DAY 12/30/21   LAURA Blanco CNP   atorvastatin (LIPITOR) 80 MG tablet TAKE 1 TABLET BY MOUTH EVERY DAY 12/30/21   LAURA Blanco CNP   Omega-3 Fatty Acids (FISH OIL PO) Take 1 capsule by mouth daily     Historical Provider, MD   GARLIC PO Take 1 capsule by mouth daily     Historical Provider, MD   NOVOLOG FLEXPEN 100 UNIT/ML injection pen Inject 13 Units into the skin 3 times daily (before meals) Plus sliding scale - 0-3 units more 2/28/21   Historical Provider, MD   insulin detemir (LEVEMIR) 100 UNIT/ML injection vial Inject 30 Units into the skin 2 times daily    Historical Provider, MD   aspirin 81 MG chewable tablet Take 81 mg by mouth daily    Historical Provider, MD   Cholecalciferol (VITAMIN D3) 21445 units CAPS Take 1 capsule by mouth once a week     Historical Provider, MD   Dulaglutide (TRULICITY) 3 BB/8.3AY SOPN Inject 3 mg into the skin once a week Indications: Friday     Historical Provider, MD       Allergies:  Patient has no known allergies. Social History:  The patient currently lives at home    TOBACCO:   reports that he has quit smoking. His smoking use included cigarettes and cigars. He has a 24.00 pack-year smoking history. He has never used smokeless tobacco.  ETOH:   reports no history of alcohol use. Family History:  Reviewed in detail and negative for DM, Early CAD, Cancer, CVA. Positive as follows:        Problem Relation Age of Onset    High Blood Pressure Mother     Stroke Mother     Heart Disease Mother     COPD Mother     Heart Disease Father     Cancer Father         skin    Diabetes Sister     Cancer Sister     Heart Disease Brother     Diabetes Brother        REVIEW OF SYSTEMS:   Positive for chest pain and as noted in the HPI. All other systems reviewed and negative. PHYSICAL EXAM:    /76   Pulse 101   Temp 97.4 °F (36.3 °C) (Oral)   Resp 11   Ht 5' 10.98\" (1.803 m)   Wt 217 lb (98.4 kg)   SpO2 96%   BMI 30.28 kg/m²     General appearance: No apparent distress appears stated age and cooperative. HEENT Normal cephalic, atraumatic without obvious deformity. Pupils equal, round, and reactive to light. Extra ocular muscles intact. Conjunctivae/corneas clear. Neck: Supple, No jugular venous distention/bruits. Trachea midline without thyromegaly or adenopathy with full range of motion. Lungs: Clear to auscultation, bilaterally without Rales/Wheezes/Rhonchi with good respiratory effort.   Heart: Regular rate and rhythm with Normal S1/S2 without murmurs, rubs or gallops, point of maximum impulse non-displaced  Abdomen: Soft, non-tender or non-distended without rigidity or guarding and positive bowel sounds all four quadrants. Extremities: No clubbing, cyanosis, or edema bilaterally. Skin: Skin color, texture, turgor normal.  No rashes or lesions. Neurologic: Alert and oriented X 3, neurovascularly intact with sensory/motor intact upper extremities/lower extremities, bilaterally. Cranial nerves: II-XII intact, grossly non-focal.  Mental status: Alert, oriented, thought content appropriate. Capillary Refill: Acceptable  < 3 seconds  Peripheral Pulses: +3 Easily felt, not easily obliterated with pressure          CBC   Recent Labs     03/18/22  0953   WBC 9.1   HGB 16.1   HCT 45.8         RENAL  Recent Labs     03/18/22  0953      K 4.0   CL 99   CO2 27   BUN 12   CREATININE 0.8*     LFT'S  Recent Labs     03/18/22  0953   AST 17   ALT 21   BILITOT 0.6   ALKPHOS 79     COAG  No results for input(s): INR in the last 72 hours.   CARDIAC ENZYMES  Recent Labs     03/18/22  0953 03/18/22  1521 03/18/22  1821   TROPONINI 0.05* 0.03* 0.03*       U/A:    Lab Results   Component Value Date    COLORU DK YELLOW 10/06/2020    WBCUA 2 10/06/2020    RBCUA 9 10/06/2020    CLARITYU CLOUDY 10/06/2020    SPECGRAV >1.030 10/06/2020    LEUKOCYTESUR Negative 10/06/2020    BLOODU Negative 10/06/2020    GLUCOSEU >=1000 10/06/2020    GLUCOSEU >=1000 09/10/2010       ABG    Lab Results   Component Value Date    EEG9IQS 17.5 07/01/2020    BEART -7.6 07/01/2020    U5ZGIQAN 87.0 07/01/2020    PHART 7.323 07/01/2020    HJE8DVJ 33.7 07/01/2020    PO2ART 51.4 07/01/2020    KUO5ZCD 18.5 07/01/2020           Active Hospital Problems    Diagnosis Date Noted    Chest pain [R07.9] 03/18/2022         PHYSICIANS CERTIFICATION:    I certify that Nola Espinoza is expected to be hospitalized for < than 2 midnights based on the following assessment and plan:      ASSESSMENT/PLAN:    Chest pain - with h/o CAD, cont home meds. Trop elevated at 0.05, trend trops but pt states trops are chronically elevated. Cardio consulted, check echo      DVT Prophylaxis: lovenox  Diet: ADULT DIET; Regular; 4 carb choices (60 gm/meal); Low Fat/Low Chol/High Fiber/BRIDGET; No Caffeine  Diet NPO  Code Status: Full Code  PT/OT Eval Status: not indicated    Laron Jackson MD    Thank you LAURA MORLEY - ROMMEL for the opportunity to be involved in this patient's care. If you have any questions or concerns please feel free to contact me at [phone].

## 2022-03-19 NOTE — PROGRESS NOTES
Data- discharge order received, pt verbalized agreement to discharge, disposition to previous residence, no needs for HHC/DME. Action- discharge instructions prepared and given to PATIENT, pt verbalized understanding. Medication information packet given r/t NEW and/or CHANGED prescriptions emphasizing name/purpose/side effects, pt verbalized understanding. Discharge instruction summary: Diet- CARB CONTROL, Activity- AS TOLERATED, Primary Care Physician as follows: SILVIO POLLOCK, APRYORDY - [phone] f/u appointment TO BE MADE IF NEEDED, immunizations reviewed and UP TO DATE, prescription medications filled N/A. CHF Education reviewed. Pt/ Family has had a total of 60 minutes CHF education this admission encounter. Response- Pt belongings gathered, IV removed. Disposition is home (no HHC/DME needs), transported with [facility] PCA, taken to lobby via w/c w/, no complications.

## 2022-03-19 NOTE — PROGRESS NOTES
Pt was awakened for care. States has had no chest pain throughout the night. Remains NPO for stress test.  Had verbalized understanding and denied any questions.

## 2022-03-19 NOTE — DISCHARGE INSTR - COC
Continuity of Care Form    Patient Name: Iggy Paulino   :  1965  MRN:  [id number]    Admit date:  3/18/2022  Discharge date:  ***    Code Status Order: Full Code   Advance Directives:      Admitting Physician:  Ubaldo Vincent MD  PCP: LAURA Lovelace CNP    Discharging Nurse: [facility] Unit/Room#: B5R-5256/9163-44  Discharging Unit Phone Number: ***    Emergency Contact:   Extended Emergency Contact Information  Primary Emergency Contact: Ivan King  Address: [facility], [address] [place] [address],[address] Phone: [phone]  Mobile Phone: [phone]  Relation: Spouse  Secondary Emergency Contact: Janece [address] Phone: [phone]  Mobile Phone: [phone]  Relation: Child    Past Surgical History:  Past Surgical History:   Procedure Laterality Date    CARDIAC CATHETERIZATION      CHOLECYSTECTOMY, LAPAROSCOPIC N/A 2021    LAPAROSCOPIC CHOLECYSTECTOMY WITH CHOLANGIOGRAM performed by Lorri Munoz MD at [place] [address]  10/06/2018    Bare Metal Stent to PDA    CORONARY ANGIOPLASTY WITH STENT PLACEMENT  10/07/2018    Bare Metal Stent to OM    CORONARY ANGIOPLASTY WITH STENT PLACEMENT  10/03/2018    CECE to Circ    DIAGNOSTIC CARDIAC CATH LAB PROCEDURE      FINGER SURGERY Left     Left Thumb    HERNIA REPAIR      VASCULAR SURGERY      VASECTOMY         Immunization History:   Immunization History   Administered Date(s) Administered    Pneumococcal Polysaccharide (Rztiulpum12) 2010    Tdap (Boostrix, Adacel) 2018       Active Problems:  Patient Active Problem List   Diagnosis Code    Diabetes mellitus out of control ([facility] E11.65    Essential hypertension I10    HLD (hyperlipidemia) E78.5    Abnormal stress test R94.39    DMII (diabetes mellitus, type 2) (HCC) E11.9    POTS (postural orthostatic tachycardia syndrome) I49.8 Diabetic peripheral neuropathy (HCC) E11.42    Abnormal EKG R94.31    Syncope and collapse R55    VT (ventricular tachycardia) (HCC) I47.2    Idiopathic hypotension I95.0    Abnormal ECG R94.31    Ventricular tachycardia (HCC) I47.2    CAD, multiple vessel I25.10    ICD (implantable cardioverter-defibrillator) in place Z95.810    Anemia D64.9    Lightheadedness R42    Cardiac arrest (HCC) I46.9    Ischemic cardiomyopathy I25.5    Poor appetite R63.0    Chronic hypoxemic respiratory failure (HCC) J96.11    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    CAD S/P percutaneous coronary angioplasty I25.10, Z98.61    Chest pain R07.9       Isolation/Infection:   Isolation            No Isolation          Patient Infection Status       Infection Onset Added Last Indicated Last Indicated By Review Planned Expiration Resolved Resolved By    None active    Resolved    COVID-19 20 COVID-19   20     COVID-19 (Rule Out) 20 COVID-19 (Ordered)   20 Rule-Out Test Resulted    MRSA  18 Vinie Ovens, RN   10/04/18 Vinie Ovens, RN    Foot wound + 18            Nurse Assessment:  Last Vital Signs: BP (!) 138/90   Pulse 93   Temp 97.3 °F (36.3 °C) (Oral)   Resp 22   Ht 5' 10.98\" (1.803 m)   Wt 217 lb 9.5 oz (98.7 kg)   SpO2 98%   BMI 30.36 kg/m²     Last documented pain score (0-10 scale): Pain Level: 0  Last Weight:   Wt Readings from Last 1 Encounters:   22 217 lb 9.5 oz (98.7 kg)     Mental Status:  {IP PT MENTAL STATUS:}    IV Access:  {[facility] IV ACCESS:358369046}    Nursing Mobility/ADLs:  Walking   {P DME IHK}  Transfer  {P DME NKIR:389813859}  Bathing  {[facility] DME GKZW:718835856}  Dressing  {[facility] DME BTHB:964075179}  Toileting  {P DME OJDF:077028596}  Feeding  {[facility] ELISHA RZLA:708944766}  Med Admin  {[facility] MVGP:438178820}  Med Delivery   {[facility] MED Delivery:656312144}    Wound Care Documentation and Therapy:  Wound (Active)   Number of days:         Elimination:  Continence: Bowel: {YES / FE:70732}  Bladder: {YES / LA:62177}  Urinary Catheter: {Urinary Catheter:211506031}   Colostomy/Ileostomy/Ileal Conduit: {YES / XX:47268}       Date of Last BM: ***    Intake/Output Summary (Last 24 hours) at 3/19/2022 1308  Last data filed at 3/18/2022 2317  Gross per 24 hour   Intake 720 ml   Output --   Net 720 ml     I/O last 3 completed shifts:   In: 5 [P.O.:720]  Out: -     Safety Concerns:     [address] Concerns:785810321}    Impairments/Disabilities:      [facility] Impairments/Disabilities:337190658}    Nutrition Therapy:  Current Nutrition Therapy:   [facility] Diet List:298886511}    Routes of Feeding: {CHP DME Other Feedings:820796126}  Liquids: {Slp liquid thickness:85128}  Daily Fluid Restriction: {CHP DME Yes amt example:879363956}  Last Modified Barium Swallow with Video (Video Swallowing Test): {Done Not Done XTCB:258007099}    Treatments at the Time of Hospital Discharge:   Respiratory Treatments: ***  Oxygen Therapy:  {Therapy; copd oxygen:65747}  Ventilator:    {MH CC Vent TXKB:920421096}    Rehab Therapies: {THERAPEUTIC INTERVENTION:4225604065}  Weight Bearing Status/Restrictions: [address] [employer]  Weight Bearin}  Other Medical Equipment (for information only, NOT a DME order):  {EQUIPMENT:827983995}  Other Treatments: ***    Patient's personal belongings (please select all that are sent with patient):  {CHP DME Belongings:178197474}    RN SIGNATURE:  {Esignature:506051816}    CASE MANAGEMENT/SOCIAL WORK SECTION    Inpatient Status Date: ***    Readmission Risk Assessment Score:  Readmission Risk              Risk of Unplanned Readmission:  0           Discharging to Facility/ Agency   Name:   Address:  Phone:  Fax:    Dialysis Facility (if applicable)   Name:  Address:  Dialysis Schedule:  Phone:  Fax:    / signature: {Esignature:725305702}    PHYSICIAN SECTION    Prognosis: {Prognosis:9411956743}    Condition at Discharge: Kaley Persaud Patient Condition:857907323}    Rehab Potential (if transferring to Rehab): {Prognosis:4363996386}    Recommended Labs or Other Treatments After Discharge: ***    Physician Certification: I certify the above information and transfer of Nilson Cole  is necessary for the continuing treatment of the diagnosis listed and that he requires {Admit to Appropriate Level of Care:49929} for {GREATER/LESS:764492719} 30 days.      Update Admission H&P: {CHP DME Changes in SKUCA:890580700}    PHYSICIAN SIGNATURE:  {Esignature:441081161}

## 2022-03-19 NOTE — PLAN OF CARE
Problem: Falls - Risk of:  Goal: Will remain free from falls  Description: Will remain free from falls  Outcome: Ongoing  Goal: Absence of physical injury  Description: Absence of physical injury  Outcome: Ongoing     Problem: Cardiac:  Goal: Ability to maintain an adequate cardiac output will improve  Description: Ability to maintain an adequate cardiac output will improve  Outcome: Ongoing  Goal: Complications related to the disease process, condition or treatment will be avoided or minimized  Description: Complications related to the disease process, condition or treatment will be avoided or minimized  Outcome: Ongoing  Goal: Hemodynamic stability will improve  Description: Hemodynamic stability will improve  Outcome: Ongoing  Goal: Risk factors for ineffective tissue perfusion will decrease  Description: Risk factors for ineffective tissue perfusion will decrease  Outcome: Ongoing     Problem: Fluid Volume:  Goal: Will show no signs or symptoms of fluid imbalance  Description: Will show no signs or symptoms of fluid imbalance  Outcome: Ongoing     Problem: KNOWLEDGE DEFICIT  Goal: Patient/S.O. demonstrates understanding of disease process, treatment plan, medications, and discharge instructions.   Outcome: Ongoing     Problem: DISCHARGE BARRIERS  Goal: Patient's continuum of care needs are met  Outcome: Ongoing

## 2022-03-19 NOTE — CARE COORDINATION
INITIAL CASE MANAGEMENT ASSESSMENT    Reviewed chart, called patient to assess possible discharge needs. Explained Case Management role/services. Living Situation: Lives in a house with [address]. ADLs: IPTA     DME: Pt has a [employer] device to check internal defibrillator. No other DME. PT/OT Recs: not ordered     Active Services: None     Transportation: Spouse provides transportation     Medications: Fills at [facility] on [facility]. No barriers    PCP: LAURA Salinas CNP      HD/PD: n/a    PLAN/COMMENTS: Plan is to return home with spouse. SW/CM provided contact information for patient or family to call with any questions. SW/CM will follow and assist as needed.     Electronically signed by Cr Lara RN MSN on 3/19/2022 at 12:09 PM

## 2022-03-21 ENCOUNTER — TELEPHONE (OUTPATIENT)
Dept: CARDIOLOGY CLINIC | Age: 57
End: 2022-03-21

## 2022-03-21 LAB
EKG ATRIAL RATE: 90 BPM
EKG DIAGNOSIS: NORMAL
EKG P AXIS: 31 DEGREES
EKG P-R INTERVAL: 130 MS
EKG Q-T INTERVAL: 380 MS
EKG QRS DURATION: 74 MS
EKG QTC CALCULATION (BAZETT): 464 MS
EKG R AXIS: 13 DEGREES
EKG T AXIS: 32 DEGREES
EKG VENTRICULAR RATE: 90 BPM

## 2022-03-21 PROCEDURE — 93010 ELECTROCARDIOGRAM REPORT: CPT | Performed by: INTERNAL MEDICINE

## 2022-04-06 ENCOUNTER — TELEPHONE (OUTPATIENT)
Dept: CARDIOLOGY CLINIC | Age: 57
End: 2022-04-06

## 2022-04-06 NOTE — TELEPHONE ENCOUNTER
LVM with the following infoformation: This call is to inform you that your remote monitor for your pacemaker and/or defibrillator, or implanted heart monitor is disconnected. At your earliest convenience, please check your home monitor so we can ensure your cardiac device is functioning normally. Your monitor is wireless and checks your device nightly and automatically transmits. If you are having problems with the connection or machine, please call the toll free number on your equipment or below for assistance.      [address] [place] [address] [address]

## 2022-04-18 NOTE — DISCHARGE SUMMARY
Hospital Medicine Discharge Summary    Patient ID: Speedy Fortune      Patient's PCP: Jeannie Hector APRN - CNP    Admit Date: 3/18/2022     Discharge Date: 3/19/2022    Admitting Provider: Charlotte Latham MD     Discharge Provider: Charlotte Latham MD     Discharge Diagnoses: Active Hospital Problems    Diagnosis     Chest pain [R07.9]        The patient was seen and examined on day of discharge and this discharge summary is in conjunction with any daily progress note from day of discharge. Hospital Course: The patient is a 64 y.o. male who presents to [facility] with CP. Pt has extensive cardiac history, states he woke up today and felt dizzy. Went to get xrays done of cervical area. Went back home and was still feeling some chest pain and given his h/o CAD, presented to the ER. Admitted for ACS rule out.        Chest pain - with h/o CAD, cont home meds. Trop elevated at 0.05, trend trops but pt states trops are chronically elevated. Cardio consulted         Physical Exam Performed:     BP (!) 138/90   Pulse 93   Temp 97.3 °F (36.3 °C) (Oral)   Resp 22   Ht 5' 10.98\" (1.803 m)   Wt 217 lb 9.5 oz (98.7 kg)   SpO2 98%   BMI 30.36 kg/m²     General appearance: No apparent distress appears stated age and cooperative. HEENT Normal cephalic, atraumatic without obvious deformity. Pupils equal, round, and reactive to light. Extra ocular muscles intact. Conjunctivae/corneas clear. Neck: Supple, No jugular venous distention/bruits. Trachea midline without thyromegaly or adenopathy with full range of motion. Lungs: Clear to auscultation, bilaterally without Rales/Wheezes/Rhonchi with good respiratory effort. Heart: Regular rate and rhythm with Normal S1/S2 without murmurs, rubs or gallops, point of maximum impulse non-displaced  Abdomen: Soft, non-tender or non-distended without rigidity or guarding and positive bowel sounds all four quadrants.   Extremities: No clubbing, cyanosis, or edema bilaterally. Skin: Skin color, texture, turgor normal.  No rashes or lesions. Neurologic: Alert and oriented X 3, neurovascularly intact with sensory/motor intact upper extremities/lower extremities, bilaterally. Cranial nerves: II-XII intact, grossly non-focal.  Mental status: Alert, oriented, thought content appropriate. Capillary Refill: Acceptable  < 3 seconds  Peripheral Pulses: +3 Easily felt, not easily obliterated with pressure       Labs: For convenience and continuity at follow-up the following most recent labs are provided:      CBC:    Lab Results   Component Value Date    WBC 8.9 03/19/2022    HGB 15.3 03/19/2022    HCT 44.7 03/19/2022     03/19/2022       Renal:    Lab Results   Component Value Date     03/19/2022    K 3.7 03/19/2022     03/19/2022    CO2 22 03/19/2022    BUN 16 03/19/2022    CREATININE 0.8 03/19/2022    CALCIUM 9.3 03/19/2022    PHOS 4.0 07/09/2020         Significant Diagnostic Studies    Radiology:   NM MYOCARDIAL SPECT REST EXERCISE OR RX   Final Result      XR CHEST (2 VW)   Final Result   No acute process.                 Consults:     IP CONSULT TO HOSPITALIST  IP CONSULT TO CARDIOLOGY    Disposition:  home     Condition at Discharge: Stable    Discharge Instructions/Follow-up:  pcp in 1 week    Code Status:  Prior     Activity: activity as tolerated    Diet: regular diet      Discharge Medications:     Discharge Medication List as of 3/19/2022  4:05 PM           Details   mupirocin (BACTROBAN) 2 % ointment Apply 1 each topically in the morning, at noon, and at bedtime To AA, Topical, 3 times daily Starting Wed 3/9/2022, Historical Med      prasugrel (EFFIENT) 10 MG TABS TAKE 1 TABLET BY MOUTH EVERY DAY, Disp-90 tablet, R-1Normal      atorvastatin (LIPITOR) 80 MG tablet TAKE 1 TABLET BY MOUTH EVERY DAY, Disp-90 tablet, R-1Normal      Omega-3 Fatty Acids (FISH OIL PO) Take 1 capsule by mouth daily Historical Med      GARLIC PO Take 1 capsule by mouth daily Historical Med      NOVOLOG FLEXPEN 100 UNIT/ML injection pen Inject 13 Units into the skin 3 times daily (before meals) Plus sliding scale - 0-3 units more, DAWHistorical Med      insulin detemir (LEVEMIR) 100 UNIT/ML injection vial Inject 30 Units into the skin 2 times dailyHistorical Med      aspirin 81 MG chewable tablet Take 81 mg by mouth dailyHistorical Med      Cholecalciferol (VITAMIN D3) 19280 units CAPS Take 1 capsule by mouth once a week Historical Med      Dulaglutide (TRULICITY) 3 UD/2.9EK SOPN Inject 3 mg into the skin once a week Indications: Friday Historical Med             Time Spent on discharge is more than 30 minutes in the examination, evaluation, counseling and review of medications and discharge plan. Signed:    Cris Payton MD   4/17/2022      Thank you LAURA MORLEY - ROMMEL for the opportunity to be involved in this patient's care. If you have any questions or concerns please feel free to contact me at [phone].

## 2022-04-26 NOTE — PROGRESS NOTES
[facility]  Cardiology Progress Note    Jazmyn Osullivan  1965 April 28, 2022      Reason for Referral: CAD, HTN, HLD, ICD, syncope, hypotenson     CC: \"I am doing okay. \"       Subjective:     History of Present Illness:    Fiona Cox is a 64 y.o. patient who seeing us today in follow up CAD-multivessel, ischemic cardiomyopathy, VT s/p AICD, anemia, HTN, HLD and DM. Patient had complicated course with NSTEMI, VT multi vessel PCI. He underwent heart catheterization on 10/3/18 resulting in CECE to LCX. He underwent repeat angiography with Dr. Jorja Cheadle on 10/7/19 resulting in CECE to [address]. He did have to be placed on balloon pump for hemodynamic support. Due to continued episodes of sustained VT, Dr. Madrid Killer placed MRI compatible Medtronic AICD. He presents today for follow up. Today, he is here for follow after being in the hospital 3/18/2022-3/19/2022. He came in with cold sweats and chest pain. He is feeling better now and denies any recurrent symptoms. Patient denies exertional chest pain/pressure, dyspnea at rest, HINOJOSA, PND, orthopnea, palpitations, lightheadedness, weight changes, changes in LE edema, and syncope. Patient reports compliance to his medications.         Past Medical History:   has a past medical history of Abscess of arm, left, Acute metabolic encephalopathy, Acute respiratory failure with hypoxia ([address] Arthritis, Blood circulation, collateral, CAD (coronary artery disease), CAD (coronary artery disease), Cardiac arrest [facility]), Cerebral artery occlusion with cerebral infarction ([address] Cholecystitis, COVID-19 virus infection, Diabetes mellitus ([address] Diabetic peripheral neuropathy ([address] Elbow contusion, GERD (gastroesophageal reflux disease), High anion gap metabolic acidosis, Hypercholesteremia, Hyperlipidemia, Hypertension, ICD (implantable cardioverter-defibrillator) in place, Left arm numbness, MRSA (methicillin resistant staph aureus) culture positive, Multifocal pneumonia, Neuropathy, Neutrophilic leukocytosis, NSTEMI (non-ST elevated myocardial infarction) ([facility] Pneumonia due to COVID-19 virus, POTS (postural orthostatic tachycardia syndrome), Psychiatric problem, Sepsis ([facility] SIRS (systemic inflammatory response syndrome) ([facility] Skin ulcer of left foot with fat layer exposed ([facility] Sleep apnea, ST elevation myocardial infarction (STEMI) of inferolateral wall ([facility] Syncope, Type II or unspecified type diabetes mellitus without mention of complication, not stated as uncontrolled, Ulcer of foot due to secondary diabetes ([facility] and Wears glasses. Surgical History:   has a past surgical history that includes Vasectomy; Finger surgery (Left); hernia repair; vascular surgery; Colonoscopy; Coronary angioplasty with stent (10/06/2018); Coronary angioplasty with stent (10/07/2018); Coronary angioplasty with stent (10/03/2018); Diagnostic Cardiac Cath Lab Procedure; Cardiac catheterization; and Cholecystectomy, laparoscopic (N/A, 4/21/2021). Social History:   reports that he has quit smoking. His smoking use included cigarettes and cigars. He has a 24.00 pack-year smoking history. He has never used smokeless tobacco. He reports previous drug use. Drug: Marijuana Elfida Rosenberg). He reports that he does not drink alcohol. Family History:  family history includes COPD in his mother; Cancer in his father and sister; Diabetes in his brother and sister; Heart Disease in his brother, father, and mother; High Blood Pressure in his mother; Stroke in his mother. Home Medications:  Were reviewed and are listed in nursing record and/or below  Prior to Admission medications    Medication Sig Start Date End Date Taking?  Authorizing Provider   mupirocin (BACTROBAN) 2 % ointment Apply 1 each topically in the morning, at noon, and at bedtime To AA 3/9/22  Yes Historical Provider, MD   prasugrel (EFFIENT) 10 MG TABS TAKE 1 TABLET BY MOUTH EVERY DAY 12/30/21  Yes Selene Do, APRN - CNP atorvastatin (LIPITOR) 80 MG tablet TAKE 1 TABLET BY MOUTH EVERY DAY 12/30/21  Yes Dayana Shi, APRN - CNP   Omega-3 Fatty Acids (FISH OIL PO) Take 1 capsule by mouth daily    Yes Historical Provider, MD   GARLIC PO Take 1 capsule by mouth daily    Yes Historical Provider, MD   NOVOLOG FLEXPEN 100 UNIT/ML injection pen Inject 13 Units into the skin 3 times daily (before meals) Plus sliding scale - 0-3 units more 2/28/21  Yes Historical Provider, MD   insulin detemir (LEVEMIR) 100 UNIT/ML injection vial Inject 30 Units into the skin 2 times daily   Yes Historical Provider, MD   aspirin 81 MG chewable tablet Take 81 mg by mouth daily   Yes Historical Provider, MD   Cholecalciferol (VITAMIN D3) 84066 units CAPS Take 1 capsule by mouth once a week    Yes Historical Provider, MD   Dulaglutide (TRULICITY) 3 CR/0.2BA SOPN Inject 3 mg into the skin once a week Indications: Friday    Yes Historical Provider, MD      Allergies:  Patient has no known allergies. Review of Systems: all reviewed and refer to HPI   · Constitutional: no unanticipated weight loss. There's been no change in energy level, sleep pattern, or activity level. No fevers, chills. · Eyes: No visual changes or diplopia. No scleral icterus. · ENT: No Headaches, hearing loss or vertigo. No mouth sores or sore throat. · Cardiovascular: No Chest pain, tightness or discomfort.  No Shortness of breath.  No Palpitations.  No Syncope ('blackouts', 'faints', 'collapse') or dizziness.  No Dyspnea on exertion, Orthopnea, Paroxysmal nocturnal dyspnea or breathlessness at rest.   · No Wheezing, sweating, distress and cough with frothy or bloodstained sputum. · Respiratory: No cough or wheezing, no sputum production. No hematemesis. · Gastrointestinal: No abdominal pain, appetite loss, blood in stools. No change in bowel or bladder habits. · Genitourinary: No dysuria, trouble voiding, or hematuria.   · Musculoskeletal:  No gait disturbance, no joint complaints. · Integumentary: No rash or pruritis. · Neurological: No headache, diplopia, change in muscle strength, numbness or tingling. · Psychiatric: No anxiety or depression. · Endocrine: No temperature intolerance. No excessive thirst, fluid intake, or urination. No tremor. · Hematologic/Lymphatic: No abnormal bruising or bleeding, blood clots or swollen lymph nodes. · Allergic/Immunologic: No nasal congestion or hives. Objective:     PHYSICAL EXAM:      Vitals:    04/28/22 0815   BP: 124/68   Pulse: 94   SpO2: 97%   Weight: 217 lb (98.4 kg)   Height: 5' 11\" (1.803 m)      Weight: 217 lb (98.4 kg)       General Appearance:  Alert, cooperative, no distress, appears stated age. Head:  Normocephalic, without obvious abnormality, atraumatic. Eyes:  Pupils equal and round. No scleral icterus. Mouth: Moist mucosa, no pharyngeal erythema. Nose: Nares normal. No drainage or sinus tenderness. Neck: Supple, symmetrical, trachea midline. No adenopathy. No tenderness/mass/nodules. No carotid bruit or elevated JVD. Lungs:   Respiratory Effort: Normal   Auscultation: Clear to auscultation bilaterally, respirations unlabored. No wheeze, rales   Chest Wall:  No tenderness or deformity. Cardiovascular:    Pulses  Palpation: normal   Ascultation: Regular rate, S1/ S2 normal. No murmur, rub, or gallop. 2+ radial and pedal pulses, symmetric  Carotid  Femoral   Abdomen and Gastrointestinal:   Soft, non-tender, bowel sounds active. Liver and Spleen  Masses   Musculoskeletal: No muscle wasting  Back  Gait   Extremities: Extremities normal, atraumatic. No cyanosis or edema. No cyanosis clubbing       Skin: Inspection and palpation performed, no rashes or lesions. Pysch: Normal mood and affect.  Alert and oriented to time place person   Neurologic: Normal gross motor and sensory exam.       Labs     All available labs reviewed to date:      Lab Results   Component Value Date    WBC 8.9 03/19/2022 RBC 5.38 03/19/2022    HGB 15.3 03/19/2022    HCT 44.7 03/19/2022    MCV 83.0 03/19/2022    RDW 12.4 03/19/2022     03/19/2022     Lab Results   Component Value Date     03/19/2022    K 3.7 03/19/2022     03/19/2022    CO2 22 03/19/2022    BUN 16 03/19/2022    CREATININE 0.8 03/19/2022    GFRAA >60 03/19/2022    GFRAA >60 02/10/2013    AGRATIO 1.5 03/18/2022    LABGLOM >60 03/19/2022    GLUCOSE 221 03/19/2022    PROT 7.0 03/18/2022    PROT 8.3 11/12/2012    CALCIUM 9.3 03/19/2022    BILITOT 0.6 03/18/2022    ALKPHOS 79 03/18/2022    AST 17 03/18/2022    ALT 21 03/18/2022      No results found for: [email or web address] [facility]  Lab Results   Component Value Date    LABA1C 12.7 03/12/2021     Lab Results   Component Value Date    TROPONINI 0.03 (H) 03/18/2022       Cardiac, Vascular and Imaging Data all Personally Reviewed in Detail by Myself      EKG:   10/31/18 SR, reviewed   2/7/19 SR, 99 bpm T wave inversion   12/13/2021 Sinus rhythm     Echocardiogram: 10/8/18  Summary  Left ventricular cavity size is mildly dilated. Normal left ventricular wall thickness. Ejection fraction is visually estimated to be 45%. There is moderate to severe inferior hypokinesis. Normal right ventricular size and function.     ECHO 3/18/2022  Left ventricular cavity size is normal. Normal left ventricular wall  thickness. Overall left ventricular systolic function appears mildly reduced  with an estimated ejection fraction of 50%. There is hypokinesis of the  basal inferolateral walls    Stress Test: 10/9/17   Summary    Pharmacological Stress/MPI Results:        1. Technically a satisfactory study.    2. Normal pharmacological stress portion of the study.    3. No evidence of Ischemia by Myocardial Perfusion Imaging.    4. Gated Study shows normal LV size and Systolic function; EF is 61 %. Stress test 7/7/2021   Abnormal study.  There is a moderate sized, severe intensity, fixed defect of    the basal to mid inferior/inferolateral wall which is consistent with    scar/prior infarction.    No evidence of reversible ischemia.    LV function is normal with inferolateral akinesis and ejection fraction of    56 %.    Overall findings represent a low to intermediate risk study. Stress test 3/18/2022  moderate size severe inferior lateral wall fixed defect consistent with old    MI. no evidence of ischemia.    Overall findings represent a low risk scan.        Recommendation    Aggressive medical therapy for coronary artery disease.    Patient may be discharged. Cath:   10/3/18   ANGIOGRAPHIC FINDINGS:  1. Left main is normal.  LYNNE 3 flow. No stenosis. 2.  Left anterior descending artery comes from the left main coronary  artery. LYNNE 3 flow. No stenosis present with patent diagonal branches. 3.  Left circumflex is occluded in the mid portion. 4.  Right coronary comes from the right coronary cusp. It has a PDA which  has 80% stenosis present. The patient also has an obtuse marginal 1 which  as 80% stenosis present.   In summary, successful revascularization with stent placement in the  circumflex artery. This was 2.5 x 38 mm, expanded up to 2.8 mm. This was  a drug-coated Synergy stent    10/7/18 Dr. Simone High stent patent; PCI of RPDA and PCI OM1, IABP placement    Imaging: All available imaging to date reviewed     ECHO:8/14/19  Suboptimal image quality. Definity contrast administered. Left ventricular cavity size is normal. Normal left ventricular wall  thickness. Overall left ventricular systolic function appears mildly  reduced. Ejection fraction is visually estimated to be 50-55%. No regional  wall motion abnormalities are noted. Diastolic filling parameters suggest  grade I diastolic dysfunction. Mitral valve leaflets appear mildly thickened. No evidence of significant mitral regurgitation or stenosis. Normal right ventricular size and function. TAPSE measures 17mm.   Pacer / ICD wire is visualized in the right ventricle. .  Trivial tricuspid regurgitation. ECHO 2/10/2020  Normal LV size, wall thickness and wall motion: EF is   60%. Grade I  diastolic dysfunction with normal LV filling pressures. Left atrium is of normal size. Normal right ventricular size and function. Trivial mitral & mild tricuspid regurgitation is present. Carotid Duplex:11/13/19  Right Impression   1. There are no focal elevated velocities involving the internal carotid   artery. 2. There is no gross plaque seen involving the internal carotid artery. 3. The right vertebral artery demonstrates normal antegrade flow. Left Impression   1. There are no focal elevated velocities involving the internal carotid   artery. 2. There is no gross plaque seen involving the internal carotid artery. 3. The left vertebral artery demonstrates normal antegrade flow. Cardiac cath 11/18/2020  1. Left main normal.  LYNNE 3 flow. No stenosis. 2.  Left anterior descending artery, has LYNNE 3 flow with 20% stenosis. 3.  Left circumflex has distally LYNNE 2 flow but no significant  stenosis, patent stents. 4.  Right coronary artery in the mid portion has 90% stenosis present  with unstable plaque. LV ejection fraction 60%.    In summary, successful revascularization of the right coronary artery  and placement of stent, 4.0 x 26 mm. Ambulatory JEREMIE  Date: 3/16/2021    Right Ankle: 1.21  Left ankle: 1.22      Assessment and Plan       CAD, VT recurrent MI requiring repeat PCI  s/p AICD implant 10/12/18  --S/p NSTEMI, S/p Inferolateral STEMI, S/P multivessel PCI: LCX, EDUARD and RPDA Dr. Stephania Rich. -successful revascularization of the right coronary artery and placement of stent, 4.0 x 26 mm. Asymptomatic today. Continuue Asa, Effient, and statin therapy. Ischemic cardiomyopathy  Appears compensated on exam.     Autonomic dysfunction  No recent syncopal episode.       Neurogenic Syncope  Long standing hx of falls  + TTT 2005Unable to tolerate BB and ACEI due to hypotension and recurrent syncope. Follows with Dr Davi Jc. Hyperlipidemia, mixed   Continue high intensity statin therapy. DM, II  Levamir, trulicity     Follow up in 7 months     Thank you for allowing us to participate in the care of Juni King. Please do not hesitate to contact me if you have any questions. Madiha Acevedo MD, MPH    [facility], [address] [place] [address]  Ph: [phone]  Fax: [phone]    This note was scribed in the presence of Dr Poonam Lima, by Mala Durand RN. Physician Attestation:  The scribes documentation has been prepared under my direction and personally reviewed by me in its entirety. I confirm that the note above accurately reflects all work, treatment, procedures, and medical decision making performed by me.

## 2022-04-28 ENCOUNTER — OFFICE VISIT (OUTPATIENT)
Dept: CARDIOLOGY CLINIC | Age: 57
End: 2022-04-28
Payer: MEDICARE

## 2022-04-28 VITALS
HEART RATE: 94 BPM | DIASTOLIC BLOOD PRESSURE: 68 MMHG | OXYGEN SATURATION: 97 % | WEIGHT: 217 LBS | BODY MASS INDEX: 30.38 KG/M2 | HEIGHT: 71 IN | SYSTOLIC BLOOD PRESSURE: 124 MMHG

## 2022-04-28 DIAGNOSIS — I25.5 ISCHEMIC CARDIOMYOPATHY: ICD-10-CM

## 2022-04-28 DIAGNOSIS — E78.2 MIXED HYPERLIPIDEMIA: ICD-10-CM

## 2022-04-28 DIAGNOSIS — I25.10 CORONARY ARTERY DISEASE INVOLVING NATIVE CORONARY ARTERY OF NATIVE HEART WITHOUT ANGINA PECTORIS: Primary | ICD-10-CM

## 2022-04-28 PROCEDURE — 99213 OFFICE O/P EST LOW 20 MIN: CPT | Performed by: INTERNAL MEDICINE

## 2022-04-28 RX ORDER — PRASUGREL 10 MG/1
TABLET, FILM COATED ORAL
Qty: 90 TABLET | Refills: 3 | Status: ON HOLD | OUTPATIENT
Start: 2022-04-28 | End: 2022-07-22 | Stop reason: SDUPTHER

## 2022-04-28 NOTE — PATIENT INSTRUCTIONS
Patient Education        Heart-Healthy Diet: Care Instructions  Your Care Instructions     A heart-healthy diet has lots of vegetables, fruits, nuts, beans, and whole grains, and is low in salt. It limits foods that are high in saturated fat, such as meats, cheeses, and fried foods. It may be hard to change your diet,but even small changes can lower your risk of heart attack and heart disease. Follow-up care is a key part of your treatment and safety. Be sure to make and go to all appointments, and call your doctor if you are having problems. It's also a good idea to know your test results and keep alist of the medicines you take. How can you care for yourself at home? Watch your portions   Use food labels to learn what the recommended servings are for the foods you eat.  Eat only the number of calories you need to stay at a healthy weight. If you need to lose weight, eat fewer calories than your body burns (through exercise and other physical activity). Eat more fruits and vegetables   Eat a variety of fruit and vegetables every day. Dark green, deep orange, red, or yellow fruits and vegetables are especially good for you. Examples include spinach, carrots, peaches, and berries.  Keep carrots, celery, and other veggies handy for snacks. Buy fruit that is in season and store it where you can see it so that you will be tempted to eat it.  Cook dishes that have a lot of veggies in them, such as stir-fries and soups. Limit saturated fat   Read food labels, and try to avoid saturated fats. They increase your risk of heart disease.  Use olive or canola oil when you cook.  Bake, broil, grill, or steam foods instead of frying them.  Choose lean meats instead of high-fat meats such as hot dogs and sausages. Cut off all visible fat when you prepare meat.  Eat fish, skinless poultry, and meat alternatives such as soy products instead of high-fat meats.  Soy products, such as tofu, may be especially good for your heart.  Choose low-fat or fat-free milk and dairy products. Eat foods high in fiber   Eat a variety of grain products every day. Include whole-grain foods that have lots of fiber and nutrients. Examples of whole-grain foods include oats, whole wheat bread, and brown rice.  Buy whole-grain breads and cereals, instead of white bread or pastries. Limit salt and sodium   Limit how much salt and sodium you eat to help lower your blood pressure.  Taste food before you salt it. Add only a little salt when you think you need it. With time, your taste buds will adjust to less salt.  Eat fewer snack items, fast foods, and other high-salt, processed foods. Check food labels for the amount of sodium in packaged foods.  Choose low-sodium versions of canned goods (such as soups, vegetables, and beans). Limit sugar   Limit drinks and foods with added sugar. These include candy, desserts, and soda pop. Limit alcohol   Limit alcohol to no more than 2 drinks a day for men and 1 drink a day for women. Too much alcohol can cause health problems. When should you call for help? Watch closely for changes in your health, and be sure to contact your doctor if:     You would like help planning heart-healthy meals. Where can you learn more? Go to https://[employer]. org and sign in to your [employer] account. Enter V137 in the [facility] box to learn more about \"Heart-Healthy Diet: Care Instructions. \"     If you do not have an account, please click on the \"Sign Up Now\" link. Current as of: September 8, 2021               Content Version: 13.2  © 8651-6796 Healthwise, Incorporated. Care instructions adapted under license by [facility] ([facility]). If you have questions about a medical condition or this instruction, always ask your healthcare professional. [place] [address] any warranty or liability for your use of this information.

## 2022-04-29 NOTE — PROGRESS NOTES
[facility]   Electrophysiology      Date: 5/3/2022    Primary Cardiologist: Anju Perla MD  PCP: LAURA Graff CNP     Chief Complaint:   Chief Complaint   Patient presents with    Follow-up     Ventricular tachycardia - device check - SOB     History of Present Illness:    I saw Damian Tony in the office for electrophysiology follow up today. He is a 64 y.o. male with a past medical history of multivessel CAD/prior STEMI/cardiac arrest, ischemic cardiomyopathy, VT s/p single chamber ICD (for secondary prevention 10/2018), HTN, HLD, DM, recurrent syncope and anemia. He presents today for device follow up. He was unable to tolerate the metoprolol due to recurrent syncope. He is hypotensive today but says he feels okay, just a \"little\" lightheaded. Denies any palpitations or chest pain. No dyspnea worse than normal. No edema. Allergies:  No Known Allergies  Home Medications:  Prior to Visit Medications    Medication Sig Taking?  Authorizing Provider   prasugrel (EFFIENT) 10 MG TABS TAKE 1 TABLET BY MOUTH EVERY DAY Yes Anju Perla MD   mupirocin (BACTROBAN) 2 % ointment Apply 1 each topically in the morning, at noon, and at bedtime To AA Yes Historical Provider, MD   atorvastatin (LIPITOR) 80 MG tablet TAKE 1 TABLET BY MOUTH EVERY DAY Yes LAURA Blanco CNP   Omega-3 Fatty Acids (FISH OIL PO) Take 1 capsule by mouth daily  Yes Historical Provider, MD   GARLIC PO Take 1 capsule by mouth daily  Yes Historical Provider, MD   NOVOLOG FLEXPEN 100 UNIT/ML injection pen Inject 13 Units into the skin 3 times daily (before meals) Plus sliding scale - 0-3 units more Yes Historical Provider, MD   insulin detemir (LEVEMIR) 100 UNIT/ML injection vial Inject 30 Units into the skin 2 times daily Yes Historical Provider, MD   aspirin 81 MG chewable tablet Take 81 mg by mouth daily Yes Historical Provider, MD   Cholecalciferol (VITAMIN D3) 24963 units CAPS Take 1 capsule by mouth once a week Yes Historical Provider, MD   Dulaglutide (TRULICITY) 3 WD/9.9OQ SOPN Inject 3 mg into the skin once a week Indications: Friday  Yes Historical Provider, MD        Past Medical History:  Past Medical History:   Diagnosis Date    Abscess of arm, left 7/1/2018    Acute metabolic encephalopathy 0/17/2047    Acute respiratory failure with hypoxia (HCC)     Arthritis     Blood circulation, collateral     CAD (coronary artery disease) 7/15/2014    CAD (coronary artery disease)     Cardiac arrest ([address] 10/05/2018    Cerebral artery occlusion with cerebral infarction ([address]     Cholecystitis 4/14/2021    COVID-19 virus infection 7/1/2020    Diabetes mellitus ([address]     Diabetic peripheral neuropathy ([address] 6/8/2018    Elbow contusion 3/24/2014    GERD (gastroesophageal reflux disease)     ulcers    High anion gap metabolic acidosis 1/0/0892    Hypercholesteremia     Hyperlipidemia     Hypertension     ICD (implantable cardioverter-defibrillator) in place 2018    Left arm numbness 9/11/2010    MRSA (methicillin resistant staph aureus) culture positive 07/13/2018    foot wound    Multifocal pneumonia     Neuropathy     Neutrophilic leukocytosis 7/84/4593    NSTEMI (non-ST elevated myocardial infarction) ([address] 10/3/2018    Pneumonia due to COVID-19 virus     POTS (postural orthostatic tachycardia syndrome)     Psychiatric problem     anxiety, depression, bipolar    Sepsis ([address] 7/1/2020    SIRS (systemic inflammatory response syndrome) ([address] 4/14/2021    Skin ulcer of left foot with fat layer exposed ([address] 6/1/2018    Sleep apnea     does not use Cpap    ST elevation myocardial infarction (STEMI) of inferolateral wall ([address] 11/13/2018    Syncope     Type II or unspecified type diabetes mellitus without mention of complication, not stated as uncontrolled     Ulcer of foot due to secondary diabetes ([address] 8/15/2018    Wears glasses        Past Surgical History:   Past Surgical History: Procedure Laterality Date    CARDIAC CATHETERIZATION      CHOLECYSTECTOMY, LAPAROSCOPIC N/A 4/21/2021    LAPAROSCOPIC CHOLECYSTECTOMY WITH CHOLANGIOGRAM performed by Luis Blevins MD at [facility]  10/06/2018    Bare Metal Stent to PDA    CORONARY ANGIOPLASTY WITH STENT PLACEMENT  10/07/2018    Bare Metal Stent to OM    CORONARY ANGIOPLASTY WITH STENT PLACEMENT  10/03/2018    CECE to Circ    DIAGNOSTIC CARDIAC CATH LAB PROCEDURE      FINGER SURGERY Left     Left Thumb    HERNIA REPAIR      VASCULAR SURGERY      VASECTOMY         Social History:   reports that he has quit smoking. His smoking use included cigarettes and cigars. He has a 24.00 pack-year smoking history. He has never used smokeless tobacco. He reports previous drug use. Drug: Marijuana Camille Crawford). He reports that he does not drink alcohol. Family History:      Problem Relation Age of Onset    High Blood Pressure Mother     Stroke Mother     Heart Disease Mother     COPD Mother     Heart Disease Father     Cancer Father         skin    Diabetes Sister     Cancer Sister     Heart Disease Brother     Diabetes Brother        Review of Systems   Constitutional: Negative for chills, fatigue, fever and unexpected weight change. HENT: Negative for congestion, hearing loss, sinus pressure, sore throat and trouble swallowing. Respiratory: Negative for cough, shortness of breath and wheezing. Cardiovascular: Negative for chest pain, palpitations and leg swelling. Gastrointestinal: Negative for abdominal pain, blood in stool, constipation, diarrhea, nausea and vomiting. Genitourinary: Negative for hematuria. Musculoskeletal: Negative for arthralgias, back pain, gait problem and myalgias. Skin: Negative for color change, rash and wound. Neurological: Positive for light-headedness. Negative for dizziness, seizures, syncope, speech difficulty and weakness. Hematological: Does not bruise/bleed easily. Physical Examination:  Vitals:    05/03/22 1359   BP: (!) 72/40   Pulse:    SpO2:       Wt Readings from Last 3 Encounters:   05/03/22 212 lb (96.2 kg)   04/28/22 217 lb (98.4 kg)   03/19/22 217 lb 9.5 oz (98.7 kg)       Physical Exam  Vitals reviewed. Constitutional:       General: He is not in acute distress. Appearance: Normal appearance. HENT:      Head: Normocephalic and atraumatic. Nose: Nose normal.      Mouth/Throat:      Mouth: Mucous membranes are moist.   Eyes:      Conjunctiva/sclera: Conjunctivae normal.      Pupils: Pupils are equal, round, and reactive to light. Cardiovascular:      Rate and Rhythm: Normal rate and regular rhythm. Heart sounds: No murmur heard. No friction rub. No gallop. Pulmonary:      Effort: No respiratory distress. Breath sounds: No wheezing, rhonchi or rales. Abdominal:      General: Abdomen is flat. Bowel sounds are normal.      Palpations: Abdomen is soft. Musculoskeletal:         General: Normal range of motion. Right lower leg: No edema. Left lower leg: No edema. Skin:     General: Skin is warm and dry. Findings: No bruising. Neurological:      General: No focal deficit present. Mental Status: He is alert and oriented to person, place, and time. Motor: No weakness.    Psychiatric:         Mood and Affect: Mood normal.         Behavior: Behavior normal.          Pertinent labs, diagnostic, device, and imaging results reviewed as a part of this visit    LABS    CBC:   Lab Results   Component Value Date    WBC 8.9 03/19/2022    HGB 15.3 03/19/2022    HCT 44.7 03/19/2022    MCV 83.0 03/19/2022     03/19/2022     BMP:   Lab Results   Component Value Date    CREATININE 0.8 (L) 03/19/2022    BUN 16 03/19/2022     03/19/2022    K 3.7 03/19/2022     03/19/2022    CO2 22 03/19/2022     Estimated Creatinine Clearance: 122 mL/min (A) (based on SCr of 0.8 mg/dL (L)). No results found for: BNP    Thyroid:   Lab Results   Component Value Date    TSH 1.65 10/06/2020     Lipid Panel:   Lab Results   Component Value Date    CHOL 95 10/09/2018    HDL 36 12/15/2021    HDL 35 09/10/2010    TRIG 142 10/09/2018     LFTs:  Lab Results   Component Value Date    ALT 21 2022    AST 17 2022    ALKPHOS 79 2022    BILITOT 0.6 2022     Coags:   Lab Results   Component Value Date    PROTIME 12.3 2021    INR 1.06 2021    APTT 65.7 (H) 10/10/2018       EC/3/2022   ST at 101 BPM. Inferior infarct. Echo: 10/26/20  Normal LV size wall thickness with basal inferior akinesis; EF   50-55%   Grade I diastolic dysfunction with normal LV filling pressures. The left atrium is normal in size. Normal right ventricular size and function. Limited echo: 3/18/22   Left ventricular cavity size is normal. Normal left ventricular wall   thickness. Overall left ventricular systolic function appears mildly reduced   with an estimated ejection fraction of 50%. There is hypokinesis of the   basal inferolateral walls    Stress test: 3/19/22   moderate size severe inferior lateral wall fixed defect consistent with old    MI. no evidence of ischemia.    Overall findings represent a low risk scan. Cath: 20  ANGIOGRAPHIC FINDINGS:  1. Left main normal.  LYNNE 3 flow. No stenosis. 2.  Left anterior descending artery, has LYNNE 3 flow with 20% stenosis. 3.  Left circumflex has distally LYNNE 2 flow but no significant  stenosis, patent stents. 4.  Right coronary artery in the mid portion has 90% stenosis present  with unstable plaque. LV ejection fraction 60%.    In summary, successful revascularization of the right coronary artery  and placement of stent, 4.0 x 26 mm. Device interrogation: 5/3/2022   Normal device function. Battery life 7.7 years  V paced <0.1%  One NSVT.     Assessment & Plan:    Ventricular tachycardia   - s/p [employer] single chamber ICD implanted 10/18   - had recurrence of VT on 9/2/21 with successful ATP   - has been unable to tolerate metoprolol due to hypotension   - device interrogation today with one brief NSVT   - continue to monitor, if longer or more frequent episodes, consider amiodarone or ablation   - continue with routine follow up with device clinic    Ischemic cardiomyopathy   - with recovered EF   - unable to tolerate beta blocker, ACEI/ARB due to hypotension/syncope    Neurocardiogenic syncope   - positive tilt table test in 2005   - had 3 syncopal episodes while in office when seen by Dr. Tiffany Moss in March 2021, BP and HR remained fairly normal, felt to not be cardiac in nature   - continue to stay well hydrated, use compression stockings, sit/lie down if symptomatic    CAD   - with previous multivessel PCI   - on aspirin, Effient, statin   - follows with Dr. Sammy Traore    Thank you for allowing to us to participate in the care of Juni King. Return in about 1 year (around 5/3/2023) for an appointment with Rangel Keene NP.      LAURA Hay  [address]  Phone: [phone]  Fax: [phone]    Electronically signed by LAURA Celaya - CNP on 5/3/2022 at 2:06 PM

## 2022-05-03 ENCOUNTER — OFFICE VISIT (OUTPATIENT)
Dept: CARDIOLOGY CLINIC | Age: 57
End: 2022-05-03
Payer: MEDICARE

## 2022-05-03 ENCOUNTER — NURSE ONLY (OUTPATIENT)
Dept: CARDIOLOGY CLINIC | Age: 57
End: 2022-05-03
Payer: MEDICARE

## 2022-05-03 VITALS
HEART RATE: 100 BPM | BODY MASS INDEX: 29.68 KG/M2 | OXYGEN SATURATION: 97 % | HEIGHT: 71 IN | WEIGHT: 212 LBS | SYSTOLIC BLOOD PRESSURE: 72 MMHG | DIASTOLIC BLOOD PRESSURE: 40 MMHG

## 2022-05-03 DIAGNOSIS — Z95.810 ICD (IMPLANTABLE CARDIOVERTER-DEFIBRILLATOR) IN PLACE: ICD-10-CM

## 2022-05-03 DIAGNOSIS — I25.10 CORONARY ARTERY DISEASE INVOLVING NATIVE CORONARY ARTERY OF NATIVE HEART WITHOUT ANGINA PECTORIS: ICD-10-CM

## 2022-05-03 DIAGNOSIS — I47.20 VT (VENTRICULAR TACHYCARDIA): Primary | ICD-10-CM

## 2022-05-03 DIAGNOSIS — I47.20 VT (VENTRICULAR TACHYCARDIA): ICD-10-CM

## 2022-05-03 DIAGNOSIS — R55 NEUROGENIC SYNCOPE: ICD-10-CM

## 2022-05-03 DIAGNOSIS — I25.5 ISCHEMIC CARDIOMYOPATHY: ICD-10-CM

## 2022-05-03 DIAGNOSIS — I95.0 IDIOPATHIC HYPOTENSION: ICD-10-CM

## 2022-05-03 PROCEDURE — 99214 OFFICE O/P EST MOD 30 MIN: CPT | Performed by: NURSE PRACTITIONER

## 2022-05-03 PROCEDURE — 93282 PRGRMG EVAL IMPLANTABLE DFB: CPT | Performed by: INTERNAL MEDICINE

## 2022-05-03 PROCEDURE — 93290 INTERROG DEV EVAL ICPMS IP: CPT | Performed by: INTERNAL MEDICINE

## 2022-05-03 ASSESSMENT — ENCOUNTER SYMPTOMS
DIARRHEA: 0
COLOR CHANGE: 0
BLOOD IN STOOL: 0
COUGH: 0
BACK PAIN: 0
NAUSEA: 0
VOMITING: 0
WHEEZING: 0
TROUBLE SWALLOWING: 0
SHORTNESS OF BREATH: 0
SORE THROAT: 0
CONSTIPATION: 0
SINUS PRESSURE: 0
ABDOMINAL PAIN: 0

## 2022-05-03 NOTE — PROGRESS NOTES
Pt seen in clinic today for cardiac device interrogation. Their device is a MDT 1 chamber ICD. Based on threshold, impedance, and intrinsic sensing tests run today, the device appears to be functioning consistently with displayed trends. Remaining battery life is 7y9m                     0.00%      optivol is stable and near baseline. Pt was informed of findings today and general questions have been answered with regard to device. Home monitoring hardware is not working according to patient. [employer] to call patient and troubleshoot today. Pt to see ROMMEL Shi in clinic today following their device check.

## 2022-06-16 NOTE — PROGRESS NOTES
[address]  Cardiology Progress Note    Ramiro Wilde  1965 June 17, 2022      Reason for Referral: CAD, HTN, HLD, ICD, syncope, hypotenson     CC: \"I am doing okay. \"       Subjective:     History of Present Illness:    Rosales Yi is a 62 y.o. patient who seeing us today in follow up CAD-multivessel, ischemic cardiomyopathy, VT s/p AICD, anemia, HTN, HLD and DM. Patient had complicated course with NSTEMI, VT multi vessel PCI. He underwent heart catheterization on 10/3/18 resulting in CECE to LCX. He underwent repeat angiography with Dr. Fernandez Gutierrez on 10/7/19 resulting in CECE to [address]. He did have to be placed on balloon pump for hemodynamic support. Due to continued episodes of sustained VT, Dr. Mira Humphreys placed MRI compatible Medtronic AICD. He presents today for follow up. Today, he is here for follow up. He states that he is doing okay. He continues to have shortness of breath but states that is a chronic issue. Patient denies exertional chest pain/pressure, dyspnea at rest, HINOJOSA, PND, orthopnea, palpitations, lightheadedness, weight changes, changes in LE edema, and syncope. Patient reports compliance to his medications.       Past Medical History:   has a past medical history of Abscess of arm, left, Acute metabolic encephalopathy, Acute respiratory failure with hypoxia ([address] Arthritis, Blood circulation, collateral, CAD (coronary artery disease), CAD (coronary artery disease), Cardiac arrest [facility]), Cerebral artery occlusion with cerebral infarction ([address] Cholecystitis, COVID-19 virus infection, Diabetes mellitus ([address] Diabetic peripheral neuropathy ([address] Elbow contusion, GERD (gastroesophageal reflux disease), High anion gap metabolic acidosis, Hypercholesteremia, Hyperlipidemia, Hypertension, ICD (implantable cardioverter-defibrillator) in place, Left arm numbness, MRSA (methicillin resistant staph aureus) culture positive, Multifocal pneumonia, Neuropathy, Neutrophilic leukocytosis, NSTEMI (non-ST elevated myocardial infarction) ([facility] Pneumonia due to COVID-19 virus, POTS (postural orthostatic tachycardia syndrome), Psychiatric problem, Sepsis ([facility] SIRS (systemic inflammatory response syndrome) ([facility] Skin ulcer of left foot with fat layer exposed ([facility] Sleep apnea, ST elevation myocardial infarction (STEMI) of inferolateral wall ([facility] Syncope, Type II or unspecified type diabetes mellitus without mention of complication, not stated as uncontrolled, Ulcer of foot due to secondary diabetes ([facility] and Wears glasses. Surgical History:   has a past surgical history that includes Vasectomy; Finger surgery (Left); hernia repair; vascular surgery; Colonoscopy; Coronary angioplasty with stent (10/06/2018); Coronary angioplasty with stent (10/07/2018); Coronary angioplasty with stent (10/03/2018); Diagnostic Cardiac Cath Lab Procedure; Cardiac catheterization; and Cholecystectomy, laparoscopic (N/A, 4/21/2021). Social History:   reports that he has quit smoking. His smoking use included cigarettes and cigars. He has a 24.00 pack-year smoking history. He has never used smokeless tobacco. He reports previous drug use. Drug: Marijuana Zollie Axe). He reports that he does not drink alcohol. Family History:  family history includes COPD in his mother; Cancer in his father and sister; Diabetes in his brother and sister; Heart Disease in his brother, father, and mother; High Blood Pressure in his mother; Stroke in his mother. Home Medications:  Were reviewed and are listed in nursing record and/or below  Prior to Admission medications    Medication Sig Start Date End Date Taking?  Authorizing Provider   prasugrel (EFFIENT) 10 MG TABS TAKE 1 TABLET BY MOUTH EVERY DAY 4/28/22  Yes Марина Willard MD   atorvastatin (LIPITOR) 80 MG tablet TAKE 1 TABLET BY MOUTH EVERY DAY 12/30/21  Yes LAURA Blanco - CNP   Omega-3 Fatty Acids (FISH OIL PO) Take 1 capsule by mouth daily    Yes Historical Provider, MD   GARLIC PO Take 1 capsule by mouth daily    Yes Historical Provider, MD   NOVOLOG FLEXPEN 100 UNIT/ML injection pen Inject 13 Units into the skin 3 times daily (before meals) Plus sliding scale - 0-3 units more 2/28/21  Yes Historical Provider, MD   insulin detemir (LEVEMIR) 100 UNIT/ML injection vial Inject 30 Units into the skin 2 times daily   Yes Historical Provider, MD   aspirin 81 MG chewable tablet Take 81 mg by mouth daily   Yes Historical Provider, MD   Cholecalciferol (VITAMIN D3) 26268 units CAPS Take 1 capsule by mouth once a week    Yes Historical Provider, MD   Dulaglutide (TRULICITY) 3 YW/5.4BE SOPN Inject 3 mg into the skin once a week Indications: Friday    Yes Historical Provider, MD      Allergies:  Patient has no known allergies. Review of Systems: all reviewed and refer to HPI   · Constitutional: no unanticipated weight loss. There's been no change in energy level, sleep pattern, or activity level. No fevers, chills. · Eyes: No visual changes or diplopia. No scleral icterus. · ENT: No Headaches, hearing loss or vertigo. No mouth sores or sore throat. · Cardiovascular: No Chest pain, tightness or discomfort.  No Shortness of breath.  No Palpitations.  No Syncope ('blackouts', 'faints', 'collapse') or dizziness.  No Dyspnea on exertion, Orthopnea, Paroxysmal nocturnal dyspnea or breathlessness at rest.   · No Wheezing, sweating, distress and cough with frothy or bloodstained sputum. · Respiratory: No cough or wheezing, no sputum production. No hematemesis. · Gastrointestinal: No abdominal pain, appetite loss, blood in stools. No change in bowel or bladder habits. · Genitourinary: No dysuria, trouble voiding, or hematuria. · Musculoskeletal:  No gait disturbance, no joint complaints. · Integumentary: No rash or pruritis. · Neurological: No headache, diplopia, change in muscle strength, numbness or tingling.     · Psychiatric: No anxiety or depression. · Endocrine: No temperature intolerance. No excessive thirst, fluid intake, or urination. No tremor. · Hematologic/Lymphatic: No abnormal bruising or bleeding, blood clots or swollen lymph nodes. · Allergic/Immunologic: No nasal congestion or hives. Objective:     PHYSICAL EXAM:      Vitals:    06/17/22 0913 06/17/22 0920   BP: (!) 78/64 94/64   Site: Left Upper Arm Right Upper Arm   Pulse: 66    SpO2: 98%    Weight: 209 lb 12.8 oz (95.2 kg)    Height: 5' 11\" (1.803 m)       Weight: 209 lb 12.8 oz (95.2 kg)       General Appearance:  Alert, cooperative, no distress, appears stated age. Head:  Normocephalic, without obvious abnormality, atraumatic. Eyes:  Pupils equal and round. No scleral icterus. Mouth: Moist mucosa, no pharyngeal erythema. Nose: Nares normal. No drainage or sinus tenderness. Neck: Supple, symmetrical, trachea midline. No adenopathy. No tenderness/mass/nodules. No carotid bruit or elevated JVD. Lungs:   Respiratory Effort: Normal   Auscultation: Clear to auscultation bilaterally, respirations unlabored. No wheeze, rales   Chest Wall:  No tenderness or deformity. Cardiovascular:    Pulses  Palpation: normal   Ascultation: Regular rate, S1/ S2 normal. No murmur, rub, or gallop. 2+ radial and pedal pulses, symmetric  Carotid  Femoral   Abdomen and Gastrointestinal:   Soft, non-tender, bowel sounds active. Liver and Spleen  Masses   Musculoskeletal: No muscle wasting  Back  Gait   Extremities: Extremities normal, atraumatic. No cyanosis or edema. No cyanosis clubbing       Skin: Inspection and palpation performed, no rashes or lesions. Pysch: Normal mood and affect.  Alert and oriented to time place person   Neurologic: Normal gross motor and sensory exam.       Labs     All available labs reviewed to date:      Lab Results   Component Value Date    WBC 8.9 03/19/2022    RBC 5.38 03/19/2022    HGB 15.3 03/19/2022    HCT 44.7 03/19/2022    MCV 83.0 03/19/2022    RDW 12.4 03/19/2022     03/19/2022     Lab Results   Component Value Date     03/19/2022    K 3.7 03/19/2022     03/19/2022    CO2 22 03/19/2022    BUN 16 03/19/2022    CREATININE 0.8 03/19/2022    GFRAA >60 03/19/2022    GFRAA >60 02/10/2013    AGRATIO 1.5 03/18/2022    LABGLOM >60 03/19/2022    GLUCOSE 221 03/19/2022    PROT 7.0 03/18/2022    PROT 8.3 11/12/2012    CALCIUM 9.3 03/19/2022    BILITOT 0.6 03/18/2022    ALKPHOS 79 03/18/2022    AST 17 03/18/2022    ALT 21 03/18/2022      No results found for: Qik  Lab Results   Component Value Date    LABA1C 12.7 03/12/2021     Lab Results   Component Value Date    TROPONINI 0.03 (H) 03/18/2022       Cardiac, Vascular and Imaging Data all Personally Reviewed in Detail by Myself      EKG:   10/31/18 SR, reviewed   2/7/19 SR, 99 bpm T wave inversion   12/13/2021 Sinus rhythm     Echocardiogram: 10/8/18  Summary  Left ventricular cavity size is mildly dilated. Normal left ventricular wall thickness. Ejection fraction is visually estimated to be 45%. There is moderate to severe inferior hypokinesis. Normal right ventricular size and function.     ECHO 3/18/2022  Left ventricular cavity size is normal. Normal left ventricular wall  thickness. Overall left ventricular systolic function appears mildly reduced  with an estimated ejection fraction of 50%. There is hypokinesis of the  basal inferolateral walls    Stress Test: 10/9/17   Summary    Pharmacological Stress/MPI Results:        1. Technically a satisfactory study.    2. Normal pharmacological stress portion of the study.    3. No evidence of Ischemia by Myocardial Perfusion Imaging.    4. Gated Study shows normal LV size and Systolic function; EF is 61 %. Stress test 7/7/2021   Abnormal study. There is a moderate sized, severe intensity, fixed defect of    the basal to mid inferior/inferolateral wall which is consistent with    scar/prior infarction.    No evidence of reversible ischemia.  LV function is normal with inferolateral akinesis and ejection fraction of    56 %.    Overall findings represent a low to intermediate risk study. Stress test 3/18/2022  moderate size severe inferior lateral wall fixed defect consistent with old    MI. no evidence of ischemia.    Overall findings represent a low risk scan.        Recommendation    Aggressive medical therapy for coronary artery disease.    Patient may be discharged. Cath:   10/3/18   ANGIOGRAPHIC FINDINGS:  1. Left main is normal.  LYNNE 3 flow. No stenosis. 2.  Left anterior descending artery comes from the left main coronary  artery. LYNNE 3 flow. No stenosis present with patent diagonal branches. 3.  Left circumflex is occluded in the mid portion. 4.  Right coronary comes from the right coronary cusp. It has a PDA which  has 80% stenosis present. The patient also has an obtuse marginal 1 which  as 80% stenosis present.   In summary, successful revascularization with stent placement in the  circumflex artery. This was 2.5 x 38 mm, expanded up to 2.8 mm. This was  a drug-coated Synergy stent    10/7/18 Dr. Jay Kyle stent patent; PCI of RPDA and PCI OM1, IABP placement    Imaging: All available imaging to date reviewed     ECHO:8/14/19  Suboptimal image quality. Definity contrast administered. Left ventricular cavity size is normal. Normal left ventricular wall  thickness. Overall left ventricular systolic function appears mildly  reduced. Ejection fraction is visually estimated to be 50-55%. No regional  wall motion abnormalities are noted. Diastolic filling parameters suggest  grade I diastolic dysfunction. Mitral valve leaflets appear mildly thickened. No evidence of significant mitral regurgitation or stenosis. Normal right ventricular size and function. TAPSE measures 17mm. Pacer / ICD wire is visualized in the right ventricle. .  Trivial tricuspid regurgitation.     ECHO 2/10/2020  Normal LV size, wall thickness and wall motion: EF is   60%. Grade I  diastolic dysfunction with normal LV filling pressures. Left atrium is of normal size. Normal right ventricular size and function. Trivial mitral & mild tricuspid regurgitation is present. Carotid Duplex:11/13/19  Right Impression   1. There are no focal elevated velocities involving the internal carotid   artery. 2. There is no gross plaque seen involving the internal carotid artery. 3. The right vertebral artery demonstrates normal antegrade flow. Left Impression   1. There are no focal elevated velocities involving the internal carotid   artery. 2. There is no gross plaque seen involving the internal carotid artery. 3. The left vertebral artery demonstrates normal antegrade flow. Cardiac cath 11/18/2020  1. Left main normal.  LYNNE 3 flow. No stenosis. 2.  Left anterior descending artery, has LYNNE 3 flow with 20% stenosis. 3.  Left circumflex has distally LYNNE 2 flow but no significant  stenosis, patent stents. 4.  Right coronary artery in the mid portion has 90% stenosis present  with unstable plaque. LV ejection fraction 60%.    In summary, successful revascularization of the right coronary artery  and placement of stent, 4.0 x 26 mm. Ambulatory JEREMIE  Date: 3/16/2021    Right Ankle: 1.21  Left ankle: 1.22      Assessment and Plan       CAD, VT recurrent MI requiring repeat PCI  s/p AICD implant 10/12/18  --S/p NSTEMI, S/p Inferolateral STEMI, S/P multivessel PCI: LCX, EDUARD and RPDA Dr. Parag Palomo. -successful revascularization of the right coronary artery and placement of stent, 4.0 x 26 mm. Asymptomatic. Continuue Asa, Effient, and statin therapy. No b-blocker due to hypotension. Ischemic cardiomyopathy  Appears compensated on exam. Will order echocardiogram during next office visit. Autonomic dysfunction  No recent syncopal episode.       Neurogenic Syncope  Long standing hx of falls  + TTT 2005Unable to tolerate BB and ACEI due to hypotension and recurrent syncope. Follows with Dr Gonzalez Grimm. Hyperlipidemia, mixed   Continue high intensity statin therapy. DM, II  Levamir, trulicity     Follow up in 6-7 months     Thank you for allowing us to participate in the care of Juni King. Please do not hesitate to contact me if you have any questions. Jose Vila MD, MPH    [facility], [address] [place], [place] [address]ard Dhaval Oconnell [facility]  Ph: [phone]  Fax: [phone]      This note was scribed in the presence of Dr Reyna Godoy, by Aristeo Guerra RN  Physician Attestation:  The scribes documentation has been prepared under my direction and personally reviewed by me in its entirety. I confirm that the note above accurately reflects all work, treatment, procedures, and medical decision making performed by me.

## 2022-06-17 ENCOUNTER — OFFICE VISIT (OUTPATIENT)
Dept: CARDIOLOGY CLINIC | Age: 57
End: 2022-06-17
Payer: MEDICARE

## 2022-06-17 VITALS
OXYGEN SATURATION: 98 % | BODY MASS INDEX: 29.37 KG/M2 | HEIGHT: 71 IN | HEART RATE: 66 BPM | SYSTOLIC BLOOD PRESSURE: 94 MMHG | WEIGHT: 209.8 LBS | DIASTOLIC BLOOD PRESSURE: 64 MMHG

## 2022-06-17 DIAGNOSIS — I25.10 CORONARY ARTERY DISEASE INVOLVING NATIVE CORONARY ARTERY OF NATIVE HEART WITHOUT ANGINA PECTORIS: Primary | ICD-10-CM

## 2022-06-17 DIAGNOSIS — E78.2 MIXED HYPERLIPIDEMIA: ICD-10-CM

## 2022-06-17 DIAGNOSIS — G90.9 AUTONOMIC DYSFUNCTION: ICD-10-CM

## 2022-06-17 DIAGNOSIS — I25.5 ISCHEMIC CARDIOMYOPATHY: ICD-10-CM

## 2022-06-17 PROCEDURE — 99213 OFFICE O/P EST LOW 20 MIN: CPT | Performed by: INTERNAL MEDICINE

## 2022-06-17 NOTE — PATIENT INSTRUCTIONS
Patient Education        Learning About Coronary Artery Disease (CAD)  What is coronary artery disease? Coronary artery disease is a condition that occurs when plaque builds up in the arteries that bring oxygen-rich blood to your heart. Plaque is a fatty substance made of cholesterol, calcium, and other substances in the blood. Thisprocess is called hardening of the arteries, or atherosclerosis. What happens when you have coronary artery disease?  Plaque may narrow the coronary arteries. Narrowed arteries cause poor blood flow. This can lead to angina symptoms such as chest pain or discomfort. If blood flow is completely blocked, you could have a heart attack.  You can slow and reduce the risk of future problems by making changes in your lifestyle. These include quitting smoking and eating heart-healthy foods.  Treatment, along with changes in your lifestyle, can help you live a longer and healthier life. How can you prevent coronary artery disease?  Do not smoke. It may be the best thing you can do to prevent coronary artery disease. If you need help quitting, talk to your doctor about stop-smoking programs and medicines. These can increase your chances of quitting for good.  Be active. Try to do moderate activity at least 2½ hours a week. Or try to do vigorous activity at least 1¼ hours a week. You may want to walk or try other activities, such as running, swimming, cycling, or playing tennis or team sports.  Eat heart-healthy foods. Eat more fruits and vegetables and less food that contains saturated and trans fats. Limit alcohol, sodium, and sweets.  Stay at a healthy weight. Lose weight if you need to.  Manage other health problems such as diabetes, high blood pressure, and high cholesterol. How is coronary artery disease treated?  Your doctor will suggest that you make lifestyle changes.  For example, your doctor may ask you to eat healthy foods, quit smoking, lose extra weight, and be more active.  You will take medicines that help prevent a heart attack.  Your doctor may suggest a procedure to open narrowed or blocked arteries. This is called angioplasty. Or your doctor may suggest using healthy blood vessels to create detours around narrowed or blocked arteries. This is called bypass surgery. Follow-up care is a key part of your treatment and safety. Be sure to make and go to all appointments, and call your doctor if you are having problems. It's also a good idea to know your test results and keep alist of the medicines you take. Where can you learn more? Go to https://[employer] Revolution.pMDsoft. org and sign in to your [employer]Me account. Enter [phone] in the EasyPost box to learn more about \"Learning About Coronary Artery Disease (CAD). \"     If you do not have an account, please click on the \"Sign Up Now\" link. Current as of: January 10, 2022               Content Version: 13.2  © 2006-2022 Healthwise, Incorporated. Care instructions adapted under license by [facility] ([facility]). If you have questions about a medical condition or this instruction, always ask your healthcare professional. [place] [address] any warranty or liability for your use of this information.

## 2022-07-06 RX ORDER — ATORVASTATIN CALCIUM 80 MG/1
TABLET, FILM COATED ORAL
Qty: 90 TABLET | Refills: 3 | Status: SHIPPED | OUTPATIENT
Start: 2022-07-06

## 2022-07-20 ENCOUNTER — HOSPITAL ENCOUNTER (INPATIENT)
Age: 57
LOS: 1 days | Discharge: HOME | DRG: 247 | End: 2022-07-22
Attending: EMERGENCY MEDICINE | Admitting: INTERNAL MEDICINE
Payer: MEDICARE

## 2022-07-20 ENCOUNTER — APPOINTMENT (OUTPATIENT)
Dept: GENERAL RADIOLOGY | Age: 57
DRG: 247 | End: 2022-07-20
Payer: MEDICARE

## 2022-07-20 DIAGNOSIS — I20.0 UNSTABLE ANGINA (HCC): ICD-10-CM

## 2022-07-20 DIAGNOSIS — R77.8 ELEVATED TROPONIN: ICD-10-CM

## 2022-07-20 DIAGNOSIS — R07.9 CHEST PAIN, UNSPECIFIED TYPE: Primary | ICD-10-CM

## 2022-07-20 LAB
A/G RATIO: 1.4 (ref 1.1–2.2)
ALBUMIN SERPL-MCNC: 4.2 G/DL (ref 3.4–5)
ALP BLD-CCNC: 79 U/L (ref 40–129)
ALT SERPL-CCNC: 20 U/L (ref 10–40)
ANION GAP SERPL CALCULATED.3IONS-SCNC: 10 MMOL/L (ref 3–16)
AST SERPL-CCNC: 15 U/L (ref 15–37)
BASOPHILS ABSOLUTE: 0.1 K/UL (ref 0–0.2)
BASOPHILS RELATIVE PERCENT: 0.7 %
BILIRUB SERPL-MCNC: 0.5 MG/DL (ref 0–1)
BUN BLDV-MCNC: 16 MG/DL (ref 7–20)
CALCIUM SERPL-MCNC: 9.4 MG/DL (ref 8.3–10.6)
CHLORIDE BLD-SCNC: 99 MMOL/L (ref 99–110)
CO2: 27 MMOL/L (ref 21–32)
CREAT SERPL-MCNC: 1 MG/DL (ref 0.9–1.3)
EOSINOPHILS ABSOLUTE: 0.1 K/UL (ref 0–0.6)
EOSINOPHILS RELATIVE PERCENT: 1.4 %
GFR AFRICAN AMERICAN: >60
GFR NON-AFRICAN AMERICAN: >60
GLUCOSE BLD-MCNC: 248 MG/DL (ref 70–99)
GLUCOSE BLD-MCNC: 304 MG/DL (ref 70–99)
HCT VFR BLD CALC: 44.3 % (ref 40.5–52.5)
HEMOGLOBIN: 15.4 G/DL (ref 13.5–17.5)
LYMPHOCYTES ABSOLUTE: 3 K/UL (ref 1–5.1)
LYMPHOCYTES RELATIVE PERCENT: 35 %
MCH RBC QN AUTO: 28.5 PG (ref 26–34)
MCHC RBC AUTO-ENTMCNC: 34.9 G/DL (ref 31–36)
MCV RBC AUTO: 81.8 FL (ref 80–100)
MONOCYTES ABSOLUTE: 0.8 K/UL (ref 0–1.3)
MONOCYTES RELATIVE PERCENT: 9.3 %
NEUTROPHILS ABSOLUTE: 4.6 K/UL (ref 1.7–7.7)
NEUTROPHILS RELATIVE PERCENT: 53.6 %
PDW BLD-RTO: 12.7 % (ref 12.4–15.4)
PERFORMED ON: ABNORMAL
PLATELET # BLD: 206 K/UL (ref 135–450)
PMV BLD AUTO: 8 FL (ref 5–10.5)
POTASSIUM REFLEX MAGNESIUM: 4.2 MMOL/L (ref 3.5–5.1)
PRO-BNP: 36 PG/ML (ref 0–124)
RBC # BLD: 5.41 M/UL (ref 4.2–5.9)
SODIUM BLD-SCNC: 136 MMOL/L (ref 136–145)
TOTAL PROTEIN: 7.2 G/DL (ref 6.4–8.2)
TROPONIN: 0.03 NG/ML
TROPONIN: 0.06 NG/ML
WBC # BLD: 8.6 K/UL (ref 4–11)

## 2022-07-20 PROCEDURE — 2580000003 HC RX 258: Performed by: INTERNAL MEDICINE

## 2022-07-20 PROCEDURE — G0378 HOSPITAL OBSERVATION PER HR: HCPCS

## 2022-07-20 PROCEDURE — 6370000000 HC RX 637 (ALT 250 FOR IP): Performed by: INTERNAL MEDICINE

## 2022-07-20 PROCEDURE — 99285 EMERGENCY DEPT VISIT HI MDM: CPT

## 2022-07-20 PROCEDURE — 84484 ASSAY OF TROPONIN QUANT: CPT

## 2022-07-20 PROCEDURE — 83880 ASSAY OF NATRIURETIC PEPTIDE: CPT

## 2022-07-20 PROCEDURE — 71046 X-RAY EXAM CHEST 2 VIEWS: CPT

## 2022-07-20 PROCEDURE — 80053 COMPREHEN METABOLIC PANEL: CPT

## 2022-07-20 PROCEDURE — 85025 COMPLETE CBC W/AUTO DIFF WBC: CPT

## 2022-07-20 PROCEDURE — 36415 COLL VENOUS BLD VENIPUNCTURE: CPT

## 2022-07-20 PROCEDURE — 93005 ELECTROCARDIOGRAM TRACING: CPT | Performed by: EMERGENCY MEDICINE

## 2022-07-20 RX ORDER — ASPIRIN 81 MG/1
81 TABLET, CHEWABLE ORAL DAILY
Status: DISCONTINUED | OUTPATIENT
Start: 2022-07-21 | End: 2022-07-22 | Stop reason: HOSPADM

## 2022-07-20 RX ORDER — ACETAMINOPHEN 325 MG/1
650 TABLET ORAL EVERY 4 HOURS PRN
Status: DISCONTINUED | OUTPATIENT
Start: 2022-07-20 | End: 2022-07-22 | Stop reason: HOSPADM

## 2022-07-20 RX ORDER — SODIUM CHLORIDE 9 MG/ML
INJECTION, SOLUTION INTRAVENOUS PRN
Status: DISCONTINUED | OUTPATIENT
Start: 2022-07-20 | End: 2022-07-21 | Stop reason: SDUPTHER

## 2022-07-20 RX ORDER — ACETAMINOPHEN 650 MG/1
650 SUPPOSITORY RECTAL EVERY 4 HOURS PRN
Status: DISCONTINUED | OUTPATIENT
Start: 2022-07-20 | End: 2022-07-22 | Stop reason: HOSPADM

## 2022-07-20 RX ORDER — ONDANSETRON 2 MG/ML
4 INJECTION INTRAMUSCULAR; INTRAVENOUS EVERY 4 HOURS PRN
Status: DISCONTINUED | OUTPATIENT
Start: 2022-07-20 | End: 2022-07-22 | Stop reason: HOSPADM

## 2022-07-20 RX ORDER — PRASUGREL 10 MG/1
10 TABLET, FILM COATED ORAL DAILY
Status: DISCONTINUED | OUTPATIENT
Start: 2022-07-21 | End: 2022-07-22 | Stop reason: HOSPADM

## 2022-07-20 RX ORDER — SODIUM CHLORIDE 0.9 % (FLUSH) 0.9 %
10 SYRINGE (ML) INJECTION EVERY 12 HOURS SCHEDULED
Status: DISCONTINUED | OUTPATIENT
Start: 2022-07-20 | End: 2022-07-21 | Stop reason: SDUPTHER

## 2022-07-20 RX ORDER — SODIUM CHLORIDE 9 MG/ML
INJECTION, SOLUTION INTRAVENOUS CONTINUOUS
Status: DISCONTINUED | OUTPATIENT
Start: 2022-07-20 | End: 2022-07-22 | Stop reason: HOSPADM

## 2022-07-20 RX ORDER — ATORVASTATIN CALCIUM 80 MG/1
80 TABLET, FILM COATED ORAL DAILY
Status: DISCONTINUED | OUTPATIENT
Start: 2022-07-21 | End: 2022-07-22 | Stop reason: HOSPADM

## 2022-07-20 RX ORDER — INSULIN GLARGINE 100 [IU]/ML
30 INJECTION, SOLUTION SUBCUTANEOUS 2 TIMES DAILY
Status: DISCONTINUED | OUTPATIENT
Start: 2022-07-20 | End: 2022-07-22 | Stop reason: HOSPADM

## 2022-07-20 RX ORDER — DEXTROSE MONOHYDRATE 100 MG/ML
INJECTION, SOLUTION INTRAVENOUS CONTINUOUS PRN
Status: DISCONTINUED | OUTPATIENT
Start: 2022-07-20 | End: 2022-07-22 | Stop reason: HOSPADM

## 2022-07-20 RX ORDER — POLYETHYLENE GLYCOL 3350 17 G/17G
17 POWDER, FOR SOLUTION ORAL DAILY PRN
Status: DISCONTINUED | OUTPATIENT
Start: 2022-07-20 | End: 2022-07-22 | Stop reason: HOSPADM

## 2022-07-20 RX ORDER — SODIUM CHLORIDE 0.9 % (FLUSH) 0.9 %
10 SYRINGE (ML) INJECTION PRN
Status: DISCONTINUED | OUTPATIENT
Start: 2022-07-20 | End: 2022-07-21 | Stop reason: SDUPTHER

## 2022-07-20 RX ORDER — ENOXAPARIN SODIUM 100 MG/ML
40 INJECTION SUBCUTANEOUS DAILY
Status: DISCONTINUED | OUTPATIENT
Start: 2022-07-21 | End: 2022-07-22 | Stop reason: HOSPADM

## 2022-07-20 RX ADMIN — INSULIN GLARGINE 30 UNITS: 100 INJECTION, SOLUTION SUBCUTANEOUS at 22:17

## 2022-07-20 RX ADMIN — SODIUM CHLORIDE, PRESERVATIVE FREE 10 ML: 5 INJECTION INTRAVENOUS at 22:17

## 2022-07-20 RX ADMIN — SODIUM CHLORIDE: 9 INJECTION, SOLUTION INTRAVENOUS at 22:17

## 2022-07-20 ASSESSMENT — HEART SCORE: ECG: 1

## 2022-07-20 ASSESSMENT — ENCOUNTER SYMPTOMS
SHORTNESS OF BREATH: 1
VOMITING: 0
ABDOMINAL PAIN: 0
COUGH: 0
NAUSEA: 0

## 2022-07-20 ASSESSMENT — LIFESTYLE VARIABLES: HOW OFTEN DO YOU HAVE A DRINK CONTAINING ALCOHOL: NEVER

## 2022-07-20 NOTE — ED PROVIDER NOTES
I have personally performed a face to face diagnostic evaluation on this patient. I have fully participated in the care of this patient I personally saw the patient and performed a substantive portion of the visit including all aspects of the medical decision making. I have reviewed and agree with all pertinent clinical information including history, physical exam, diagnostic tests, and the plan. HPI: Ranjith King presented with chest pain intermittently for the last 10 days. Extensive heart history including CAD, prior NSTEMI. Patient has intermittent chest pain for the last 10 days worse with exertion better with rest however now presenting at rest and not necessarily going away easily. Worse than previous sharp and occasionally pressure squeezing-like in nature. See PT note for further details  Chief Complaint   Patient presents with    Chest Pain     Chest pain starting 10 days ago. Patient with extensive hear history      Review of Systems: See SHANNEN note  Vital Signs: /80   Pulse 100   Temp 96.9 °F (36.1 °C) (Oral)   Resp 16   SpO2 100%     Alert 62 y.o. male who does not appear toxic or acutely ill  HENT: Atraumatic, oral mucosa moist  Neck: Grossly normal ROM  Chest/Lungs: respiratory effort normal   Abdomen: Soft nontender  Musculoskeletal: Grossly normal ROM  Skin: No palor or diaphoresis    Medical Decision Making and Plan:  Pertinent Labs & Imaging studies reviewed. (See SHANNEN chart for details)  I agree with SHANNEN assessment and plan. With highly concerning chest pain etiology given his past medical history. Opponent slightly elevated although similar to previous. Afebrile not tachycardic saturating well on room air. EKG without acute signs of ischemia. Will admit for further cardiac work-up.       EKG Interpretation    Interpreted by emergency department physician    Rhythm: sinus tachycardia  Rate: tachycardia and 100-110  Axis: left  Ectopy: none  Conduction: normal  ST Segments: nonspecific changes  T Waves: non specific changes  Q Waves: none    Clinical Impression: sinus tachycardia    MD Faustina Guajardo MD  07/20/22 1816       Faustina Castellanos MD  07/20/22 2020

## 2022-07-20 NOTE — ED PROVIDER NOTES
[address] [facility]        Pt Name: Deirdre Peterson  MRN: [id number]  [place] 1965  Date of evaluation: 7/20/2022  Provider: MARSHALL Prince  PCP: Joanie Snyder, LAURA - CNP  Note Started: 6:19 PM EDT        I have seen and evaluated this patient with my supervising physician Karla Mata MD.    [address] [place] [address]       Chief Complaint   Patient presents with    Chest Pain     Chest pain starting 10 days ago. Patient with extensive hear history       HISTORY OF PRESENT ILLNESS   (Location, Timing/Onset, Context/Setting, Quality, Duration, Modifying Factors, Severity, Associated Signs and Symptoms)  Note limiting factors. Chief Complaint: Chest pain     Juni Gordillo is a 62 y.o. male with extensive cardiac history who presents to the emergency department today with complaints of chest pain. He states it is intermittent. He has a pretty extensive history of heart disease, and has had multiple stents, cardiac arrest, stroke. He also has a history of diabetes. Patient states that intermittently for the last 10 days he has had episodes of chest pain, shortness of breath, dizziness. He states he is concerned because it feels like when he has had previous episodes that have been heart attacks. He follows with Dr. Amira Can. He states he did take his daily medications today, to include aspirin. He has no further complaints at this time. Nursing Notes were all reviewed and agreed with or any disagreements were addressed in the HPI. REVIEW OF SYSTEMS    (2-9 systems for level 4, 10 or more for level 5)     Review of Systems   Constitutional:  Negative for chills and fever. Respiratory:  Positive for shortness of breath. Negative for cough. Cardiovascular:  Positive for chest pain. Negative for palpitations. Gastrointestinal:  Negative for abdominal pain, nausea and vomiting.    Neurological:  Positive for dizziness and light-headedness. Negative for syncope, weakness and numbness. Positives and Pertinent negatives as per HPI. Except as noted above in the ROS, all other systems were reviewed and negative.        PAST MEDICAL HISTORY     Past Medical History:   Diagnosis Date    Abscess of arm, left 3/1/4304    Acute metabolic encephalopathy 3/90/3549    Acute respiratory failure with hypoxia (HCC)     Arthritis     Blood circulation, collateral     CAD (coronary artery disease) 7/15/2014    CAD (coronary artery disease)     Cardiac arrest ([address] 10/05/2018    Cerebral artery occlusion with cerebral infarction ([address]     Cholecystitis 4/14/2021    COVID-19 virus infection 7/1/2020    Diabetes mellitus ([address]     Diabetic peripheral neuropathy ([address] 6/8/2018    Elbow contusion 3/24/2014    GERD (gastroesophageal reflux disease)     ulcers    High anion gap metabolic acidosis 9/2/3793    Hypercholesteremia     Hyperlipidemia     Hypertension     ICD (implantable cardioverter-defibrillator) in place 2018    Left arm numbness 9/11/2010    MRSA (methicillin resistant staph aureus) culture positive 07/13/2018    foot wound    Multifocal pneumonia     Neuropathy     Neutrophilic leukocytosis 4/42/7468    NSTEMI (non-ST elevated myocardial infarction) ([address] 10/3/2018    Pneumonia due to COVID-19 virus     POTS (postural orthostatic tachycardia syndrome)     Psychiatric problem     anxiety, depression, bipolar    Sepsis ([address] 7/1/2020    SIRS (systemic inflammatory response syndrome) ([address] 4/14/2021    Skin ulcer of left foot with fat layer exposed ([address] 6/1/2018    Sleep apnea     does not use Cpap    ST elevation myocardial infarction (STEMI) of inferolateral wall ([address] 11/13/2018    Syncope     Type II or unspecified type diabetes mellitus without mention of complication, not stated as uncontrolled     Ulcer of foot due to secondary diabetes ([address] 8/15/2018    Wears glasses          SURGICAL HISTORY     Past Surgical History:   Procedure Laterality Date    CARDIAC CATHETERIZATION      CHOLECYSTECTOMY, LAPAROSCOPIC N/A 4/21/2021    LAPAROSCOPIC CHOLECYSTECTOMY WITH CHOLANGIOGRAM performed by Young Marina MD at [facility]  10/06/2018    Bare Metal Stent to PDA    CORONARY ANGIOPLASTY WITH STENT PLACEMENT  10/07/2018    Bare Metal Stent to OM    CORONARY ANGIOPLASTY WITH STENT PLACEMENT  10/03/2018    CECE to Circ    DIAGNOSTIC CARDIAC CATH LAB PROCEDURE      FINGER SURGERY Left     Left Thumb    HERNIA REPAIR      VASCULAR SURGERY      VASECTOMY           CURRENTMEDICATIONS       Previous Medications    ASPIRIN 81 MG CHEWABLE TABLET    Take 81 mg by mouth daily    ATORVASTATIN (LIPITOR) 80 MG TABLET    TAKE 1 TABLET BY MOUTH EVERY DAY    CHOLECALCIFEROL (VITAMIN D3) 69656 UNITS CAPS    Take 1 capsule by mouth once a week     DULAGLUTIDE (TRULICITY) 3 VJ/1.5VF SOPN    Inject 3 mg into the skin once a week Indications: Friday     GARLIC PO    Take 1 capsule by mouth daily     INSULIN DETEMIR (LEVEMIR) 100 UNIT/ML INJECTION VIAL    Inject 30 Units into the skin 2 times daily    NOVOLOG FLEXPEN 100 UNIT/ML INJECTION PEN    Inject 13 Units into the skin 3 times daily (before meals) Plus sliding scale - 0-3 units more    OMEGA-3 FATTY ACIDS (FISH OIL PO)    Take 1 capsule by mouth daily     PRASUGREL (EFFIENT) 10 MG TABS    TAKE 1 TABLET BY MOUTH EVERY DAY         ALLERGIES     Patient has no known allergies.     FAMILYHISTORY       Family History   Problem Relation Age of Onset    High Blood Pressure Mother     Stroke Mother     Heart Disease Mother     COPD Mother     Heart Disease Father     Cancer Father         skin    Diabetes Sister     Cancer Sister     Heart Disease Brother     Diabetes Brother           SOCIAL HISTORY       Social History     Tobacco Use    Smoking status: Former     Packs/day: 2.00     Years: 12.00     Pack years: 24.00     Types: Cigarettes, Cigars    Smokeless tobacco: Never    Tobacco comments:     quit in the 80's   Vaping Use    Vaping Use: Never used   Substance Use Topics    Alcohol use: No    Drug use: Not Currently     Types: Marijuana Juanetta Parke)     Comment: quit 80's       SCREENINGS    [place] Coma Scale  Eye Opening: Spontaneous  Best Verbal Response: Oriented  Best Motor Response: Obeys commands  [place] Coma Scale Score: 15 Heart Score for chest pain patients  History: Highly Suspicious  ECG: Non-Specifc repolarization disturbance/LBTB/PM  Patient Age: > 39 and < 65 years  *Risk factors for Atherosclerotic disease: Diabetes Mellitus, Hypercholesterolemia, Positive family History, Coronary Artery Disease  Risk Factors: > 3 Risk factors or history of atherosclerotic disease*  Troponin: > 1 and < 3X normal limit  Heart Score Total: 7      PHYSICAL EXAM    (up to 7 for level 4, 8 or more for level 5)     ED Triage Vitals [07/20/22 1813]   BP Temp Temp Source Heart Rate Resp SpO2 Height Weight   111/80 96.9 °F (36.1 °C) Oral 100 16 100 % -- --       Physical Exam  Vitals and nursing note reviewed. Constitutional:       General: He is not in acute distress. Appearance: Normal appearance. He is well-developed. He is not ill-appearing, toxic-appearing or diaphoretic. HENT:      Head: Normocephalic and atraumatic. Eyes:      Conjunctiva/sclera: Conjunctivae normal.      Pupils: Pupils are equal, round, and reactive to light. Cardiovascular:      Rate and Rhythm: Normal rate and regular rhythm. Pulses: Normal pulses. Pulmonary:      Effort: Pulmonary effort is normal. No respiratory distress. Breath sounds: Normal breath sounds. Abdominal:      General: Bowel sounds are normal. There is no distension. Palpations: Abdomen is soft. Tenderness: There is no abdominal tenderness. Musculoskeletal:      Cervical back: Normal range of motion and neck supple. Right lower leg: No edema. Left lower leg: No edema.    Skin:     General: Skin is warm and dry. Neurological:      Mental Status: He is alert. Psychiatric:         Mood and Affect: Mood normal.         Behavior: Behavior normal. Behavior is cooperative. DIAGNOSTIC RESULTS   LABS:    Labs Reviewed   COMPREHENSIVE METABOLIC PANEL W/ REFLEX TO MG FOR LOW K - Abnormal; Notable for the following components:       Result Value    Glucose 304 (*)     All other components within normal limits   TROPONIN - Abnormal; Notable for the following components:    Troponin 0.06 (*)     All other components within normal limits   CBC WITH AUTO DIFFERENTIAL   BRAIN NATRIURETIC PEPTIDE       When ordered only abnormal lab results are displayed. All other labs were within normal range or not returned as of this dictation. EKG: When ordered, EKG's are interpreted by the Emergency Department Physician in the absence of a cardiologist.  Please see their note for interpretation of EKG. RADIOLOGY:   Non-plain film images such as CT, Ultrasound and MRI are read by the radiologist. Plain radiographic images are visualized and preliminarily interpreted by the ED Provider with the below findings:    Interpretation per the Radiologist below, if available at the time of this note:    XR CHEST (2 VW)   Final Result   No acute abnormality           No results found. PROCEDURES   Unless otherwise noted below, none     Procedures      CONSULTS:  IP CONSULT TO HOSPITALIST      EMERGENCY DEPARTMENT COURSE and DIFFERENTIAL DIAGNOSIS/MDM:   Vitals:    Vitals:    07/20/22 1813   BP: 111/80   Pulse: 100   Resp: 16   Temp: 96.9 °F (36.1 °C)   TempSrc: Oral   SpO2: 100%       Patient was given the following medications:  Medications - No data to display      Is this patient to be included in the SEP-1 Core Measure due to severe sepsis or septic shock? No   Exclusion criteria - the patient is NOT to be included for SEP-1 Core Measure due to:   Infection is not suspected    ED COURSE & MEDICAL DECISION MAKING - The patient presented to the ER with complaints of chest pain, that initially was exertional, but now is occurring even at rest. Vital signs were reviewed. Exam as above. Peripheral IV placed. Labs, Imaging ordered. - Pertinent Labs & Imaging studies reviewed. (See chart for details)   -  Patient seen and evaluated in the emergency department. -  Triage and nursing notes reviewed and incorporated. -  Old chart records reviewed and incorporated. -   I have seen and evaluated this patient with my supervising physician David Cameron MD.  -  Differential diagnosis includes: acute coronary syndrome, pulmonary embolism, COPD/asthma, pneumonia, musculoskeletal, reflux/PUD/gastritis, pneumothorax, CHF, thoracic aortic dissection, anxiety  -  Work-up included:  See above  -  ED treatment included: Patient took aspirin prior to arrival  - Consults: Hospitalist for admission  -  Results discussed with patient and/or family. Labs show no leukocytosis or concerning anemia, his metabolic panel with glucose of 304. BNP is not elevated at 36. His troponin is 0.06, higher than it has been previous. Imaging studies show no acute process on chest x-ray. Please see attending physician's note for EKG interpretation. At this time, we recommend admission, as the patient has a heart score of 7, and his most recent stress test in March was not normal, he has signs and symptoms concerning for unstable angina. The patient and/or family is agreeable with plan of care and disposition.  -  Disposition: Admission  - Critical Care: The total critical care time I independently spent while evaluating and treating this patient was 12 minutes. This excludes time spent doing separately billable procedures. This includes time at the bedside, data interpretation, medication management, obtaining critical history from collateral sources if the patient is unable to provide it directly, and physician consultation.   Specifics of interventions taken and potentially life-threatening diagnostic considerations are listed above in the medical decision making. If this was a shared visit with an physician, the time in this attestation is non-concurrent critical care time out of the total shared critical care time provided by the physician and myself. FINAL IMPRESSION      1. Chest pain, unspecified type    2. Unstable angina (HCC)    3. Elevated troponin          DISPOSITION/PLAN   DISPOSITION Decision To Admit 07/20/2022 07:22:14 PM      PATIENT REFERRED TO:  No follow-up provider specified.     DISCHARGE MEDICATIONS:  New Prescriptions    No medications on file       DISCONTINUED MEDICATIONS:  Discontinued Medications    No medications on file              (Please note that portions of this note were completed with a voice recognition program.  Efforts were made to edit the dictations but occasionally words are mis-transcribed.)    MARSHALL Verde (electronically signed)           Pavel Mendoza, [address]  07/20/22 Raeann, [address]  07/20/22 1926

## 2022-07-21 ENCOUNTER — NURSE ONLY (OUTPATIENT)
Dept: CARDIOLOGY CLINIC | Age: 57
End: 2022-07-21
Payer: MEDICARE

## 2022-07-21 ENCOUNTER — APPOINTMENT (OUTPATIENT)
Dept: CARDIAC CATH/INVASIVE PROCEDURES | Age: 57
DRG: 247 | End: 2022-07-21
Payer: MEDICARE

## 2022-07-21 DIAGNOSIS — I47.20 VT (VENTRICULAR TACHYCARDIA): ICD-10-CM

## 2022-07-21 DIAGNOSIS — I25.5 ISCHEMIC CARDIOMYOPATHY: ICD-10-CM

## 2022-07-21 DIAGNOSIS — Z95.810 ICD (IMPLANTABLE CARDIOVERTER-DEFIBRILLATOR) IN PLACE: Primary | ICD-10-CM

## 2022-07-21 LAB
ANION GAP SERPL CALCULATED.3IONS-SCNC: 8 MMOL/L (ref 3–16)
BASOPHILS ABSOLUTE: 0.1 K/UL (ref 0–0.2)
BASOPHILS RELATIVE PERCENT: 0.8 %
BUN BLDV-MCNC: 14 MG/DL (ref 7–20)
CALCIUM SERPL-MCNC: 8.6 MG/DL (ref 8.3–10.6)
CHLORIDE BLD-SCNC: 101 MMOL/L (ref 99–110)
CO2: 28 MMOL/L (ref 21–32)
CREAT SERPL-MCNC: 0.8 MG/DL (ref 0.9–1.3)
EKG ATRIAL RATE: 103 BPM
EKG DIAGNOSIS: NORMAL
EKG P AXIS: 52 DEGREES
EKG P-R INTERVAL: 166 MS
EKG Q-T INTERVAL: 352 MS
EKG QRS DURATION: 82 MS
EKG QTC CALCULATION (BAZETT): 461 MS
EKG R AXIS: -3 DEGREES
EKG T AXIS: 33 DEGREES
EKG VENTRICULAR RATE: 103 BPM
EOSINOPHILS ABSOLUTE: 0.2 K/UL (ref 0–0.6)
EOSINOPHILS RELATIVE PERCENT: 2.4 %
GFR AFRICAN AMERICAN: >60
GFR NON-AFRICAN AMERICAN: >60
GLUCOSE BLD-MCNC: 188 MG/DL (ref 70–99)
GLUCOSE BLD-MCNC: 200 MG/DL (ref 70–99)
GLUCOSE BLD-MCNC: 207 MG/DL (ref 70–99)
GLUCOSE BLD-MCNC: 222 MG/DL (ref 70–99)
GLUCOSE BLD-MCNC: 239 MG/DL (ref 70–99)
GLUCOSE BLD-MCNC: 294 MG/DL (ref 70–99)
HCT VFR BLD CALC: 41.4 % (ref 40.5–52.5)
HEMOGLOBIN: 14.8 G/DL (ref 13.5–17.5)
LYMPHOCYTES ABSOLUTE: 3.5 K/UL (ref 1–5.1)
LYMPHOCYTES RELATIVE PERCENT: 39.7 %
MCH RBC QN AUTO: 29 PG (ref 26–34)
MCHC RBC AUTO-ENTMCNC: 35.7 G/DL (ref 31–36)
MCV RBC AUTO: 81.2 FL (ref 80–100)
MONOCYTES ABSOLUTE: 0.7 K/UL (ref 0–1.3)
MONOCYTES RELATIVE PERCENT: 7.9 %
NEUTROPHILS ABSOLUTE: 4.4 K/UL (ref 1.7–7.7)
NEUTROPHILS RELATIVE PERCENT: 49.2 %
PDW BLD-RTO: 12.9 % (ref 12.4–15.4)
PERFORMED ON: ABNORMAL
PLATELET # BLD: 185 K/UL (ref 135–450)
PMV BLD AUTO: 7.9 FL (ref 5–10.5)
POC ACT LR: 323 SEC
POTASSIUM REFLEX MAGNESIUM: 4 MMOL/L (ref 3.5–5.1)
RBC # BLD: 5.1 M/UL (ref 4.2–5.9)
SODIUM BLD-SCNC: 137 MMOL/L (ref 136–145)
TROPONIN: 0.04 NG/ML
WBC # BLD: 8.9 K/UL (ref 4–11)

## 2022-07-21 PROCEDURE — 2580000003 HC RX 258: Performed by: INTERNAL MEDICINE

## 2022-07-21 PROCEDURE — 6370000000 HC RX 637 (ALT 250 FOR IP): Performed by: INTERNAL MEDICINE

## 2022-07-21 PROCEDURE — 92978 ENDOLUMINL IVUS OCT C 1ST: CPT | Performed by: INTERNAL MEDICINE

## 2022-07-21 PROCEDURE — 84484 ASSAY OF TROPONIN QUANT: CPT

## 2022-07-21 PROCEDURE — 93282 PRGRMG EVAL IMPLANTABLE DFB: CPT | Performed by: INTERNAL MEDICINE

## 2022-07-21 PROCEDURE — 93005 ELECTROCARDIOGRAM TRACING: CPT | Performed by: INTERNAL MEDICINE

## 2022-07-21 PROCEDURE — 93458 L HRT ARTERY/VENTRICLE ANGIO: CPT

## 2022-07-21 PROCEDURE — C1725 CATH, TRANSLUMIN NON-LASER: HCPCS

## 2022-07-21 PROCEDURE — 92928 PRQ TCAT PLMT NTRAC ST 1 LES: CPT | Performed by: INTERNAL MEDICINE

## 2022-07-21 PROCEDURE — C1894 INTRO/SHEATH, NON-LASER: HCPCS

## 2022-07-21 PROCEDURE — 85347 COAGULATION TIME ACTIVATED: CPT

## 2022-07-21 PROCEDURE — 92978 ENDOLUMINL IVUS OCT C 1ST: CPT

## 2022-07-21 PROCEDURE — 93010 ELECTROCARDIOGRAM REPORT: CPT | Performed by: INTERNAL MEDICINE

## 2022-07-21 PROCEDURE — 93571 IV DOP VEL&/PRESS C FLO 1ST: CPT | Performed by: INTERNAL MEDICINE

## 2022-07-21 PROCEDURE — 85025 COMPLETE CBC W/AUTO DIFF WBC: CPT

## 2022-07-21 PROCEDURE — 4A033BC MEASUREMENT OF ARTERIAL PRESSURE, CORONARY, PERCUTANEOUS APPROACH: ICD-10-PCS | Performed by: INTERNAL MEDICINE

## 2022-07-21 PROCEDURE — C9600 PERC DRUG-EL COR STENT SING: HCPCS

## 2022-07-21 PROCEDURE — 99153 MOD SED SAME PHYS/QHP EA: CPT

## 2022-07-21 PROCEDURE — B240ZZ3 ULTRASONOGRAPHY OF SINGLE CORONARY ARTERY, INTRAVASCULAR: ICD-10-PCS | Performed by: INTERNAL MEDICINE

## 2022-07-21 PROCEDURE — 93290 INTERROG DEV EVAL ICPMS IP: CPT | Performed by: INTERNAL MEDICINE

## 2022-07-21 PROCEDURE — 80048 BASIC METABOLIC PNL TOTAL CA: CPT

## 2022-07-21 PROCEDURE — 6360000002 HC RX W HCPCS

## 2022-07-21 PROCEDURE — 2060000000 HC ICU INTERMEDIATE R&B

## 2022-07-21 PROCEDURE — 4A023N7 MEASUREMENT OF CARDIAC SAMPLING AND PRESSURE, LEFT HEART, PERCUTANEOUS APPROACH: ICD-10-PCS | Performed by: INTERNAL MEDICINE

## 2022-07-21 PROCEDURE — 99152 MOD SED SAME PHYS/QHP 5/>YRS: CPT | Performed by: INTERNAL MEDICINE

## 2022-07-21 PROCEDURE — 6360000002 HC RX W HCPCS: Performed by: NURSE PRACTITIONER

## 2022-07-21 PROCEDURE — 2580000003 HC RX 258

## 2022-07-21 PROCEDURE — 93454 CORONARY ARTERY ANGIO S&I: CPT | Performed by: INTERNAL MEDICINE

## 2022-07-21 PROCEDURE — 99222 1ST HOSP IP/OBS MODERATE 55: CPT | Performed by: NURSE PRACTITIONER

## 2022-07-21 PROCEDURE — 2500000003 HC RX 250 WO HCPCS

## 2022-07-21 PROCEDURE — C1887 CATHETER, GUIDING: HCPCS

## 2022-07-21 PROCEDURE — 94760 N-INVAS EAR/PLS OXIMETRY 1: CPT

## 2022-07-21 PROCEDURE — 99152 MOD SED SAME PHYS/QHP 5/>YRS: CPT

## 2022-07-21 PROCEDURE — 2709999900 HC NON-CHARGEABLE SUPPLY

## 2022-07-21 PROCEDURE — 027034Z DILATION OF CORONARY ARTERY, ONE ARTERY WITH DRUG-ELUTING INTRALUMINAL DEVICE, PERCUTANEOUS APPROACH: ICD-10-PCS | Performed by: INTERNAL MEDICINE

## 2022-07-21 PROCEDURE — B2111ZZ FLUOROSCOPY OF MULTIPLE CORONARY ARTERIES USING LOW OSMOLAR CONTRAST: ICD-10-PCS | Performed by: INTERNAL MEDICINE

## 2022-07-21 PROCEDURE — C1874 STENT, COATED/COV W/DEL SYS: HCPCS

## 2022-07-21 PROCEDURE — C1769 GUIDE WIRE: HCPCS

## 2022-07-21 PROCEDURE — C1753 CATH, INTRAVAS ULTRASOUND: HCPCS

## 2022-07-21 PROCEDURE — 36415 COLL VENOUS BLD VENIPUNCTURE: CPT

## 2022-07-21 PROCEDURE — 94761 N-INVAS EAR/PLS OXIMETRY MLT: CPT

## 2022-07-21 PROCEDURE — 6360000004 HC RX CONTRAST MEDICATION: Performed by: FAMILY MEDICINE

## 2022-07-21 PROCEDURE — 6370000000 HC RX 637 (ALT 250 FOR IP): Performed by: NURSE PRACTITIONER

## 2022-07-21 PROCEDURE — 93571 IV DOP VEL&/PRESS C FLO 1ST: CPT

## 2022-07-21 RX ORDER — SODIUM CHLORIDE 0.9 % (FLUSH) 0.9 %
5-40 SYRINGE (ML) INJECTION PRN
Status: CANCELLED | OUTPATIENT
Start: 2022-07-21

## 2022-07-21 RX ORDER — ACETAMINOPHEN 325 MG/1
650 TABLET ORAL EVERY 4 HOURS PRN
Status: DISCONTINUED | OUTPATIENT
Start: 2022-07-21 | End: 2022-07-21 | Stop reason: SDUPTHER

## 2022-07-21 RX ORDER — INSULIN LISPRO 100 [IU]/ML
0-4 INJECTION, SOLUTION INTRAVENOUS; SUBCUTANEOUS NIGHTLY
Status: DISCONTINUED | OUTPATIENT
Start: 2022-07-21 | End: 2022-07-22 | Stop reason: HOSPADM

## 2022-07-21 RX ORDER — SODIUM CHLORIDE 0.9 % (FLUSH) 0.9 %
5-40 SYRINGE (ML) INJECTION EVERY 12 HOURS SCHEDULED
Status: DISCONTINUED | OUTPATIENT
Start: 2022-07-21 | End: 2022-07-22 | Stop reason: HOSPADM

## 2022-07-21 RX ORDER — INSULIN LISPRO 100 [IU]/ML
0-8 INJECTION, SOLUTION INTRAVENOUS; SUBCUTANEOUS
Status: DISCONTINUED | OUTPATIENT
Start: 2022-07-21 | End: 2022-07-22 | Stop reason: HOSPADM

## 2022-07-21 RX ORDER — SODIUM CHLORIDE 0.9 % (FLUSH) 0.9 %
5-40 SYRINGE (ML) INJECTION EVERY 12 HOURS SCHEDULED
Status: CANCELLED | OUTPATIENT
Start: 2022-07-21

## 2022-07-21 RX ORDER — SODIUM CHLORIDE 0.9 % (FLUSH) 0.9 %
5-40 SYRINGE (ML) INJECTION PRN
Status: DISCONTINUED | OUTPATIENT
Start: 2022-07-21 | End: 2022-07-22 | Stop reason: HOSPADM

## 2022-07-21 RX ORDER — DIPHENHYDRAMINE HYDROCHLORIDE 50 MG/ML
25 INJECTION INTRAMUSCULAR; INTRAVENOUS ONCE
Status: COMPLETED | OUTPATIENT
Start: 2022-07-21 | End: 2022-07-21

## 2022-07-21 RX ORDER — DEXTROSE MONOHYDRATE 100 MG/ML
INJECTION, SOLUTION INTRAVENOUS CONTINUOUS PRN
Status: DISCONTINUED | OUTPATIENT
Start: 2022-07-21 | End: 2022-07-22 | Stop reason: HOSPADM

## 2022-07-21 RX ORDER — KETOROLAC TROMETHAMINE 15 MG/ML
15 INJECTION, SOLUTION INTRAMUSCULAR; INTRAVENOUS ONCE
Status: COMPLETED | OUTPATIENT
Start: 2022-07-21 | End: 2022-07-21

## 2022-07-21 RX ORDER — SODIUM CHLORIDE 9 MG/ML
INJECTION, SOLUTION INTRAVENOUS PRN
Status: DISCONTINUED | OUTPATIENT
Start: 2022-07-21 | End: 2022-07-22 | Stop reason: HOSPADM

## 2022-07-21 RX ORDER — SODIUM CHLORIDE 9 MG/ML
INJECTION, SOLUTION INTRAVENOUS PRN
Status: CANCELLED | OUTPATIENT
Start: 2022-07-21

## 2022-07-21 RX ORDER — PROCHLORPERAZINE EDISYLATE 5 MG/ML
10 INJECTION INTRAMUSCULAR; INTRAVENOUS ONCE
Status: COMPLETED | OUTPATIENT
Start: 2022-07-21 | End: 2022-07-21

## 2022-07-21 RX ADMIN — INSULIN LISPRO 4 UNITS: 100 INJECTION, SOLUTION INTRAVENOUS; SUBCUTANEOUS at 18:59

## 2022-07-21 RX ADMIN — PROCHLORPERAZINE EDISYLATE 10 MG: 5 INJECTION INTRAMUSCULAR; INTRAVENOUS at 23:32

## 2022-07-21 RX ADMIN — KETOROLAC TROMETHAMINE 15 MG: 15 INJECTION, SOLUTION INTRAMUSCULAR; INTRAVENOUS at 23:32

## 2022-07-21 RX ADMIN — DIPHENHYDRAMINE HYDROCHLORIDE 25 MG: 50 INJECTION, SOLUTION INTRAMUSCULAR; INTRAVENOUS at 23:32

## 2022-07-21 RX ADMIN — INSULIN GLARGINE 30 UNITS: 100 INJECTION, SOLUTION SUBCUTANEOUS at 20:58

## 2022-07-21 RX ADMIN — SODIUM CHLORIDE, PRESERVATIVE FREE 10 ML: 5 INJECTION INTRAVENOUS at 07:30

## 2022-07-21 RX ADMIN — ASPIRIN 81 MG: 81 TABLET, CHEWABLE ORAL at 11:48

## 2022-07-21 RX ADMIN — ACETAMINOPHEN 650 MG: 325 TABLET ORAL at 20:58

## 2022-07-21 RX ADMIN — IOPAMIDOL 155 ML: 755 INJECTION, SOLUTION INTRAVENOUS at 14:07

## 2022-07-21 RX ADMIN — PRASUGREL 10 MG: 10 TABLET, FILM COATED ORAL at 11:48

## 2022-07-21 RX ADMIN — SODIUM CHLORIDE: 9 INJECTION, SOLUTION INTRAVENOUS at 11:54

## 2022-07-21 RX ADMIN — ACETAMINOPHEN 650 MG: 325 TABLET ORAL at 15:56

## 2022-07-21 RX ADMIN — SODIUM CHLORIDE, PRESERVATIVE FREE 10 ML: 5 INJECTION INTRAVENOUS at 21:05

## 2022-07-21 ASSESSMENT — PAIN SCALES - GENERAL: PAINLEVEL_OUTOF10: 4

## 2022-07-21 ASSESSMENT — PAIN DESCRIPTION - DESCRIPTORS: DESCRIPTORS: ACHING

## 2022-07-21 ASSESSMENT — PAIN DESCRIPTION - LOCATION: LOCATION: HEAD

## 2022-07-21 NOTE — ACP (ADVANCE CARE PLANNING)
Advance Care Planning     Advance Care Planning Activator (Inpatient)  Conversation Note      Date of ACP Conversation: 7/21/2022     Conversation Conducted with: Patient with Decision Making Capacity    [facility] Activ[facility] Decision Maker:     Current Designated Health Care Decision Maker:     Primary Decision Maker: Estevan Carrillo Spouse - [phone]    Care Preferences    Ventilation: \"If you were in your present state of health and suddenly became very ill and were unable to breathe on your own, what would your preference be about the use of a ventilator (breathing machine) if it were available to you? \"      Would the patient desire the use of ventilator (breathing machine)?: yes    \"If your health worsens and it becomes clear that your chance of recovery is unlikely, what would your preference be about the use of a ventilator (breathing machine) if it were available to you? \"     Would the patient desire the use of ventilator (breathing machine)?: \"unsure\"      Resuscitation  \"CPR works best to restart the heart when there is a sudden event, like a heart attack, in someone who is otherwise healthy. Unfortunately, CPR does not typically restart the heart for people who have serious health conditions or who are very sick. \"    \"In the event your heart stopped as a result of an underlying serious health condition, would you want attempts to be made to restart your heart (answer \"yes\" for attempt to resuscitate) or would you prefer a natural death (answer \"no\" for do not attempt to resuscitate)? \" yes       [] Yes   [x] No   Educated Patient / Olga List regarding differences between Advance Directives and portable DNR orders.     Length of ACP Conversation in minutes:  5    Conversation Outcomes:  [x] ACP discussion completed  [] Existing advance directive reviewed with patient; no changes to patient's previously recorded wishes  [] New Advance Directive completed  [] Portable Do Not Rescitate prepared for Provider review and signature  [] POLST/POST/MOLST/MOST prepared for Provider review and signature      Follow-up plan:    [] Schedule follow-up conversation to continue planning  [] Referred individual to Provider for additional questions/concerns   [] Advised patient/agent/surrogate to review completed ACP document and update if needed with changes in condition, patient preferences or care setting    [x] This note routed to one or more involved healthcare providers  Electronically signed by Shanae Shaffer on 7/21/2022 at 10:52 AM

## 2022-07-21 NOTE — OP NOTE
[address]   Procedure Note    CLINICAL HISTORY:       Daniela Bucio is a 62 y.o. male with a history of coronary artery disease. He was admitted with complaints of chest pain. Cardiac markers were positive. EKG showed nonspecific ST-T wave abnormality. Patient Active Problem List   Diagnosis    Diabetes mellitus out of control ([facility]    Essential hypertension    HLD (hyperlipidemia)    Abnormal stress test    DMII (diabetes mellitus, type 2) (HCC)    POTS (postural orthostatic tachycardia syndrome)    Diabetic peripheral neuropathy (HCC)    Abnormal EKG    Elevated troponin (chronically elevated)    Syncope and collapse    VT (ventricular tachycardia) (HCC)    Idiopathic hypotension    Abnormal ECG    Ventricular tachycardia (HCC)    CAD, multiple vessel    ICD (implantable cardioverter-defibrillator) in place    Anemia    Lightheadedness    Cardiac arrest ([facility]    Ischemic cardiomyopathy    Poor appetite    Chronic hypoxemic respiratory failure (HCC)    Postinflammatory pulmonary fibrosis ([facility])    CAD S/P percutaneous coronary angioplasty    Chest pain       Prior to Admission medications    Medication Sig Start Date End Date Taking?  Authorizing Provider   atorvastatin (LIPITOR) 80 MG tablet TAKE 1 TABLET BY MOUTH EVERY DAY 7/6/22   Dayana Shi APRN - CNP   prasugrel (EFFIENT) 10 MG TABS TAKE 1 TABLET BY MOUTH EVERY DAY 4/28/22   Velma Webb MD   Omega-3 Fatty Acids (FISH OIL PO) Take 1 capsule by mouth daily     Historical Provider, MD   GARLIC PO Take 1 capsule by mouth daily     Historical Provider, MD   NOVOLOG FLEXPEN 100 UNIT/ML injection pen Inject 14 Units into the skin 3 times daily (before meals) Plus sliding scale - 0-3 units more 2/28/21   Historical Provider, MD   insulin detemir (LEVEMIR) 100 UNIT/ML injection vial Inject 30 Units into the skin 2 times daily    Historical Provider, MD   aspirin 81 MG chewable tablet Take 81 mg by mouth daily    Historical Provider, MD Cholecalciferol (VITAMIN D3) 27494 units CAPS Take 1 capsule by mouth once a week     Historical Provider, MD   Dulaglutide 3 MG/0.5ML SOPN Inject 3 mg into the skin once a week Indications: Friday     Historical Provider, MD          The risks, benefits, and details of the procedure were explained to the patient. The patient verbalized understanding and wanted to proceed. Informed written consent was obtained. INDICATION:  NSTEMI    PROCEDURES PERFORMED:   Coronary angiogram   FFR  IVUS   PCI     PROCEDURE TECHNIQUE:  The patient was approached from the right radial artery using a 5-6  Mongolian slender sheath. Left coronary angiography was done using a [place] L3.5 diagnostic catheter. Right coronary angiography was done using a [place]ito R4 guide catheter. CONTRAST:  Total of 155 cc. COMPLICATIONS:  None. At the end of the procedure a TR device was used for hemostasis. EBL: 5 cc    Moderate Sedation:  Start time: 1300  Stop time: 1350  2 mg versed   100 mcg fentanyl   An independent trained observer pushed medications at my direction. We monitored the patient's level of consciousness and vital signs/physiologic status throughout the procedure duration (see start and start times above). HEMODYNAMICS:  Aortic pressure was 130/80    ANGIOGRAM/CORONARY ARTERIOGRAM:       The left main coronary artery is normal .    The left anterior descending artery has moderate diffuse disease, mid 80% . The left circumflex artery has a widely patent stent    OM1 stent is widely patent .     The right coronary artery has a proximal stent widely patent ( dominant vessel)     INTERVENTION  Guide catheter: XB 3.0 Guide   COMET II wire used to cross LAD   FFR 0.78   IVUS passed to distal LAD, reference diameter 3 mm  Predilation performed with 3.0 regular balloon   George 3.0 x 30 / 3.5 x 18 mm CECE   Post dilated performed with 3.5 NC balloon to 15 YENNI     SUMMARY:   Single Vessel CAD   Patent Lcx/OM1/RCA stents   S/p successful IVUS guided PCI mid LAD X 2 CECE     RECOMMENDATION:      1. Recommend beta blockade, high potency statin, aspirin and effient or 12 months  2. Referral to cardiac rehab placed  3. Patient has been advised on the importance of regular exercise of at least 20-30 minutes daily. 4. D/c tomorrow   5.  Follow up in 1-2 weeks with cardiology    Lisbeth Coy MD [address] [facility], Interventional Cardiology, and Peripheral Vascular [address] [facility]   (C): [phone]  (O): [phone]

## 2022-07-21 NOTE — PROGRESS NOTES
[employer] transmission received 07.21.22 @ 10:34am from [address] [facility] [address] [facility] for patient's single chamber ICD. REASON FOR TRANSMISSION  Presence of cardiac device  TECHNICAL FINDINGS  No device or lead performance issue(s) observed  ADDITIONAL NOTES  Patient Name: Lauri Arndt. PATTI   Floor Device Check: Single Chamber ICD   Date of last device interrogation/device cleared: 3 May 2022   Percentage Paced:  0.1%   DEVICE ASSESSMENT: Remaining longevity: 7.2 yrs. Available daily battery/lead measurements within expected range. Lead trends stable. ARRHYTHMIA SUMMARY: No Arrhythmias detected since device was last checked. OBSERVATIONS: Device appears to be currently functioning as programmed. EP physician will review. See interrogation under cardiology tab in the [address] [place] [address] [address] field for more details.

## 2022-07-21 NOTE — PRE SEDATION
glasses. Past Surgical History:   has a past surgical history that includes Vasectomy; Finger surgery (Left); hernia repair; vascular surgery; Colonoscopy; Coronary angioplasty with stent (10/06/2018); Coronary angioplasty with stent (10/07/2018); Coronary angioplasty with stent (10/03/2018); Diagnostic Cardiac Cath Lab Procedure; Cardiac catheterization; and Cholecystectomy, laparoscopic (N/A, 4/21/2021). Medications:   Scheduled Meds:    prasugrel  10 mg Oral Daily    insulin glargine  30 Units SubCUTAneous BID    atorvastatin  80 mg Oral Daily    aspirin  81 mg Oral Daily    sodium chloride flush  10 mL IntraVENous 2 times per day    enoxaparin  40 mg SubCUTAneous Daily     Continuous Infusions:    dextrose      sodium chloride      sodium chloride 75 mL/hr at 07/21/22 1154     PRN Meds: glucose, dextrose bolus **OR** dextrose bolus, glucagon (rDNA), dextrose, sodium chloride flush, sodium chloride, ondansetron, polyethylene glycol, acetaminophen **OR** acetaminophen  Home Meds:   Prior to Admission medications    Medication Sig Start Date End Date Taking?  Authorizing Provider   atorvastatin (LIPITOR) 80 MG tablet TAKE 1 TABLET BY MOUTH EVERY DAY 7/6/22   LAURA Blanco - CNP   prasugrel (EFFIENT) 10 MG TABS TAKE 1 TABLET BY MOUTH EVERY DAY 4/28/22   Flash Conroy MD   Omega-3 Fatty Acids (FISH OIL PO) Take 1 capsule by mouth daily     Historical Provider, MD   GARLIC PO Take 1 capsule by mouth daily     Historical Provider, MD   NOVOLOG FLEXPEN 100 UNIT/ML injection pen Inject 14 Units into the skin 3 times daily (before meals) Plus sliding scale - 0-3 units more 2/28/21   Historical Provider, MD   insulin detemir (LEVEMIR) 100 UNIT/ML injection vial Inject 30 Units into the skin 2 times daily    Historical Provider, MD   aspirin 81 MG chewable tablet Take 81 mg by mouth daily    Historical Provider, MD   Cholecalciferol (VITAMIN D3) 87543 units CAPS Take 1 capsule by mouth once a week

## 2022-07-21 NOTE — PROGRESS NOTES
Hospital Medicine Progress Note      Admit Date: 7/20/2022       CC: F/U for chest pain     HPI:  Pt is an 62y.o. year-old male with a history of hypertension, hyperlipidemia, type II diabetes mellitus, multivessel coronary artery disease, ischemic cardiomyopathy with an ICD in place and a chronically elevated troponin. He is a patient of Dr Onur Gallagher. He presents to the emergency room for evaluation following a 10-day history of chest pain. He reports intermittent chest pain which has become more frequent, last longer and has become accompanied by shortness of breath over the past few days. He points to his left lateral chest wall as being the origin of his symptoms. He states that it feels like he is getting small shocks several times a day. He states that at times he will get 8 or 9 shocks at a time and did not have any 4 hours. They are not made better or worse with exertion. He saw his primary care provider for this who advised him to come to the emergency room. In the emergency room his EKG did not show ischemic changes. He had an elevated troponin of 0.06. However, his troponin is chronically elevated. Associated signs and symptoms do not include typical chest pain, shortness of breath, lightheaded, dizziness, diaphoresis, edema, orthopnea, paroxysmal nocturnal dyspnea, fever or chills. No recent medication changes. He is being admitted for further evaluation and treatment          Interval History/Subjective: cardiology consulted for chest pain, multiple shocks felt from AICD, hx elevated trop     Describes it as a pulsating pain intermittently on the left side of his chest over the last 10-12 days not necessarily. Last episode 2 days ago. Cath today with Dr. Lindsay Rollins of Systems:       The patient denied headaches, visual changes, LOC, SOB, CP, ABD pain, N/V/D, skin changes, new or worsening weakness or neuromuscular deficits.     Comprehensive ROS negative except as mentioned above.     Past Medical History:        Diagnosis Date    Abscess of arm, left 3/1/7877    Acute metabolic encephalopathy 8/05/8414    Acute respiratory failure with hypoxia (HCC)     Arthritis     Blood circulation, collateral     CAD (coronary artery disease) 7/15/2014    CAD (coronary artery disease)     Cardiac arrest ([address] 10/05/2018    Cerebral artery occlusion with cerebral infarction ([address]     Cholecystitis 4/14/2021    COVID-19 virus infection 7/1/2020    Diabetes mellitus ([address]     Diabetic peripheral neuropathy ([address] 6/8/2018    Elbow contusion 3/24/2014    GERD (gastroesophageal reflux disease)     ulcers    High anion gap metabolic acidosis 8/6/8349    Hypercholesteremia     Hyperlipidemia     Hypertension     ICD (implantable cardioverter-defibrillator) in place 2018    Left arm numbness 9/11/2010    MRSA (methicillin resistant staph aureus) culture positive 07/13/2018    foot wound    Multifocal pneumonia     Neuropathy     Neutrophilic leukocytosis 3/12/1343    NSTEMI (non-ST elevated myocardial infarction) ([address] 10/3/2018    Pneumonia due to COVID-19 virus     POTS (postural orthostatic tachycardia syndrome)     Psychiatric problem     anxiety, depression, bipolar    Sepsis ([address] 7/1/2020    SIRS (systemic inflammatory response syndrome) ([address] 4/14/2021    Skin ulcer of left foot with fat layer exposed ([address] 6/1/2018    Sleep apnea     does not use Cpap    ST elevation myocardial infarction (STEMI) of inferolateral wall ([address] 11/13/2018    Syncope     Type II or unspecified type diabetes mellitus without mention of complication, not stated as uncontrolled     Ulcer of foot due to secondary diabetes ([address] 8/15/2018    Wears glasses        Past Surgical History:        Procedure Laterality Date    CARDIAC CATHETERIZATION      CHOLECYSTECTOMY, LAPAROSCOPIC N/A 4/21/2021    LAPAROSCOPIC CHOLECYSTECTOMY WITH CHOLANGIOGRAM performed by Matthew Marques MD at [address] [place] [address] ANGIOPLASTY WITH STENT PLACEMENT  10/06/2018    Bare Metal Stent to PDA    CORONARY ANGIOPLASTY WITH STENT PLACEMENT  10/07/2018    Bare Metal Stent to OM    CORONARY ANGIOPLASTY WITH STENT PLACEMENT  10/03/2018    CECE to Circ    DIAGNOSTIC CARDIAC CATH LAB PROCEDURE      FINGER SURGERY Left     Left Thumb    HERNIA REPAIR      VASCULAR SURGERY      VASECTOMY         Allergies:  Patient has no known allergies. Past medical and surgical history reviewed. Any changes have been noted. PHYSICAL EXAM:  BP (!) 144/82   Pulse 91   Temp 98.1 °F (36.7 °C) (Oral)   Resp 18   Wt 211 lb 3.2 oz (95.8 kg)   SpO2 95%   BMI 29.46 kg/m²       Intake/Output Summary (Last 24 hours) at 7/21/2022 2747  Last data filed at 7/20/2022 2124  Gross per 24 hour   Intake 240 ml   Output --   Net 240 ml        General appearance:   No apparent distress, appears stated age. Cooperative. HEENT:  Normocephalic, atraumatic. PERRLA. EOMi. Conjunctivae/corneas clear, no icterus, non-injected. Neck: Supple, with full range of motion. No jugular venous distention. Trachea midline. Respiratory:  Normal respiratory effort. Clear to auscultation, bilaterally without Rales/Wheezes/Rhonchi. Cardiovascular:  Regular rate and rhythm without murmurs, rubs or gallops. Abdomen: Soft, non-tender, non-distended, without rebound or guarding. Normal bowel sounds. Musculoskeletal:  No clubbing, cyanosis or edema bilaterally. Full range of motion without deformity. Skin: Skin color, texture, turgor normal.  No rashes or lesions. Neurologic:  Neurovascularly intact without any focal sensory/motor deficits. Cranial nerves: II-XII intact, grossly intact. No facial asymmetry, tongue midline.    Psychiatric:  Alert and oriented, thought content appropriate  Capillary Refill: Brisk,< 3 seconds   Peripheral Pulses: +2 palpable, equal bilaterally       LABS:    Lab Results   Component Value Date    WBC 8.9 07/21/2022    HGB 14.8 07/21/2022    HCT 41.4 07/21/2022    MCV 81.2 07/21/2022     07/21/2022    LYMPHOPCT 39.7 07/21/2022    RBC 5.10 07/21/2022    MCH 29.0 07/21/2022    MCHC 35.7 07/21/2022    RDW 12.9 07/21/2022       Lab Results   Component Value Date    CREATININE 0.8 (L) 07/21/2022    BUN 14 07/21/2022     07/21/2022    K 4.0 07/21/2022     07/21/2022    CO2 28 07/21/2022       Lab Results   Component Value Date/Time    MG 2.70 04/19/2021 06:56 AM       Lab Results   Component Value Date    ALT 20 07/20/2022    AST 15 07/20/2022    ALKPHOS 79 07/20/2022    BILITOT 0.5 07/20/2022        No flowsheet data found. Lab Results   Component Value Date    LABA1C 12.7 03/12/2021       Imaging:  XR CHEST (2 VW)   Final Result   No acute abnormality             Scheduled and prn Medications:    Scheduled Meds:   prasugrel  10 mg Oral Daily    insulin glargine  30 Units SubCUTAneous BID    atorvastatin  80 mg Oral Daily    aspirin  81 mg Oral Daily    sodium chloride flush  10 mL IntraVENous 2 times per day    enoxaparin  40 mg SubCUTAneous Daily     Continuous Infusions:   dextrose      sodium chloride      sodium chloride 75 mL/hr at 07/20/22 2217     PRN Meds:.glucose, dextrose bolus **OR** dextrose bolus, glucagon (rDNA), dextrose, sodium chloride flush, sodium chloride, ondansetron, polyethylene glycol, acetaminophen **OR** acetaminophen    Assessment & Plan:             Chest pain - given his cardiac history, there is concern for possible unstable angina. We will keep him n.p.o. and follow serial cardiac enzymes. He will be monitored on telemetry. Cardiology has been consulted to determine what if any further testing should be done at this time. - cath today with cardiology with Dr. Elsy Sullivan       Elevated troponin (chronically elevated) - his initial troponin is 0.06. We will follow serial troponin levels. CAD, multiple vessel -he reports a history of 4 stent placements.   We will continue statin, aspirin, Effient and monitor as stated above     Ischemic cardiomyopathy -echocardiogram dated 3/18/2022 has a left ventricle ejection fraction of 50%. - ICD (implantable cardioverter-defibrillator) in place. Device check ok. - both stress tests recently neg but still with pain, will get cath today       Diabetes mellitus II - Lantus, SSI and when able to eat, start a carb control diet     Essential (primary) hypertension - continue home meds and monitor blood pressure     Hyperlipidemia - No current evidence of Rhabdomyolysis or other adverse effects. Continue statin therapy while in the hospital          Continue current regimen/therapies. Monitor. Adjust medical regimen as appropriate. Body mass index is 29.46 kg/m². The patient and / or the family were informed of the results of any tests, a time was given to answer questions, a plan was proposed and they agreed with plan.       DVT ppx: lovenox       Diet: Diet NPO Exceptions are: Ice Chips, Sips of Water with Meds    Consults:  IP CONSULT TO HOSPITALIST  IP CONSULT TO CARDIOLOGY    DISPO/placement plan: pending     Code Status: Full Code      LAURA Jin - ROMMEL  07/21/22

## 2022-07-21 NOTE — CONSULTS
Attending Supervising Physician's Attestation Statement  I performed a history and physical examination on the patient and discussed the management with the nurse practitioner. I reviewed and agree with the findings and plan as documented in her note . Electronically signed by Cecilia Triplett MD on 7/22/22 at 10:49 AM EDT                  [address] Cardiology Consult    Juni King  1965 July 21, 2022    Reason for Consult: Chest Pain    HPI:  The patient is 62 y.o. male with a past medical history significant for CAD, HTN, HLP, syncope, hypotension, ischemic cardiomyopathy, type II diabetes mellitus and S/P ICD placement who was admitted to [facility] after presenting with chest pain. He presents after 10-11 day H/O decreased appetite, nausea, intermittent diarrhea, increased lightheadedness and recurrent episodes of L lateral chest pain described as a pulsating sharp pain lasting few minutes and occurring up to multiple times a day. Denies pleuritic or positional component. He was seen by his PCP yesterday who did ECG in office (not available to review) and advised to proceed to ED because per pt, \"she didn't like what she saw. \"  In ED initial evaluation demonstrated initial troponin assay of 0.6 and admitted. He has continued to have intermittent episodes of L lateral chest pain that has come and gone since admit. His nausea and appetite seem to be improved this AM after receiving IV fluids. He relates this pain is somewhat different from his prior cardiac pain. He has noted increasing SOBOE and has chronic recurrent syncope (has had extensive work up in the past). His device interrogation this AM demonstrates no evidence of arrhythmia and Optivol stable. Denies orthopnea/PND, cough, fatigue, palpitations, edema, weight gain/loss or claudication. Review of Systems:   Constitutional: no unanticipated weight loss. There's been no change in energy level, sleep pattern, or activity level. No fevers, chills. Eyes: No visual changes or diplopia. No scleral icterus. ENT: No Headaches, hearing loss or vertigo. No mouth sores or sore throat. Cardiovascular: as reviewed in HPI  Respiratory: No cough or wheezing, no sputum production. No hematemesis. Gastrointestinal: No abdominal pain, blood in stools. No change in bowel or bladder habits. + appetite loss, nausea  Genitourinary: No dysuria, trouble voiding, or hematuria. Musculoskeletal:  No gait disturbance, no joint complaints. Integumentary: No rash or pruritis. Neurological: No headache, diplopia, change in muscle strength, numbness or tingling. + chronic episodes of syncope  Psychiatric: No anxiety or depression. Endocrine: No temperature intolerance. No excessive thirst, fluid intake, or urination. No tremor. Hematologic/Lymphatic: No abnormal bruising or bleeding, blood clots or swollen lymph nodes. Allergic/Immunologic: No nasal congestion or hives.         Home Meds:  Atorvastatin 80 mg daily, Effient 10 mg daily, Fish oil, garlic, Novolog 3x daily before meals, Levemir, ASA 81 mg daily, Vitamin D 3 and Dulaglutide weekly     Past Medical History:   Diagnosis Date    Abscess of arm, left 8/0/2600    Acute metabolic encephalopathy 1/01/3095    Acute respiratory failure with hypoxia (HCC)     Arthritis     Blood circulation, collateral     CAD (coronary artery disease) 7/15/2014    CAD (coronary artery disease)     Cardiac arrest ([address] 10/05/2018    Cerebral artery occlusion with cerebral infarction ([address]     Cholecystitis 4/14/2021    COVID-19 virus infection 7/1/2020    Diabetes mellitus ([address]     Diabetic peripheral neuropathy ([address] 6/8/2018    Elbow contusion 3/24/2014    GERD (gastroesophageal reflux disease)     ulcers    High anion gap metabolic acidosis 1/7/6547    Hypercholesteremia     Hyperlipidemia     Hypertension     ICD (implantable cardioverter-defibrillator) in place 2018    Left arm numbness 9/11/2010    MRSA (methicillin resistant staph aureus) culture positive 07/13/2018    foot wound    Multifocal pneumonia     Neuropathy     Neutrophilic leukocytosis 3/44/7224    NSTEMI (non-ST elevated myocardial infarction) ([address] 10/3/2018    Pneumonia due to COVID-19 virus     POTS (postural orthostatic tachycardia syndrome)     Psychiatric problem     anxiety, depression, bipolar    Sepsis ([address] 7/1/2020    SIRS (systemic inflammatory response syndrome) ([address] 4/14/2021    Skin ulcer of left foot with fat layer exposed ([address] 6/1/2018    Sleep apnea     does not use Cpap    ST elevation myocardial infarction (STEMI) of inferolateral wall ([address] 11/13/2018    Syncope     Type II or unspecified type diabetes mellitus without mention of complication, not stated as uncontrolled     Ulcer of foot due to secondary diabetes ([address] 8/15/2018    Wears glasses      Past Surgical History:   Procedure Laterality Date    CARDIAC CATHETERIZATION      CHOLECYSTECTOMY, LAPAROSCOPIC N/A 4/21/2021    LAPAROSCOPIC CHOLECYSTECTOMY WITH CHOLANGIOGRAM performed by Verónica Feng MD at [place] [address]  10/06/2018    Bare Metal Stent to PDA    CORONARY ANGIOPLASTY WITH STENT PLACEMENT  10/07/2018    Bare Metal Stent to OM    CORONARY ANGIOPLASTY WITH STENT PLACEMENT  10/03/2018    CECE to Circ    DIAGNOSTIC CARDIAC CATH LAB PROCEDURE      FINGER SURGERY Left     Left Thumb    HERNIA REPAIR      VASCULAR SURGERY      VASECTOMY       Family History   Problem Relation Age of Onset    High Blood Pressure Mother     Stroke Mother     Heart Disease Mother     COPD Mother     Heart Disease Father     Cancer Father         skin    Diabetes Sister     Cancer Sister     Heart Disease Brother     Diabetes Brother      Social History     Tobacco Use    Smoking status: Former     Packs/day: 2.00     Years: 12.00     Pack years: 24.00     Types: Cigarettes, Cigars    Smokeless tobacco: Never    Tobacco comments:     quit in the 80's   Vaping Use    Vaping Use: Never used   Substance Use Topics    Alcohol use: No    Drug use: Not Currently     Types: Marijuana [employer] [place]in)     Comment: quit 80's       No Known Allergies  Current Facility-Administered Medications   Medication Dose Route Frequency Provider Last Rate Last Admin    prasugrel (EFFIENT) tablet 10 mg  10 mg Oral Daily Susan Kenny MD        insulin glargine (LANTUS) injection vial 30 Units  30 Units SubCUTAneous BID Susan Kenny MD   30 Units at 07/20/22 2217    atorvastatin (LIPITOR) tablet 80 mg  80 mg Oral Daily Susan Kenny MD        aspirin chewable tablet 81 mg  81 mg Oral Daily Susan Kenny MD        glucose chewable tablet 16 g  4 tablet Oral PRN Susan Kenny MD        dextrose bolus 10% 125 mL  125 mL IntraVENous PRN Susan Kenny MD        Or    dextrose bolus 10% 250 mL  250 mL IntraVENous PRN Susan Kenny MD        glucagon (rDNA) injection 1 mg  1 mg SubCUTAneous PRN Susan Kenny MD        dextrose 10 % infusion   IntraVENous Continuous PRN Susan Kenny MD        sodium chloride flush 0.9 % injection 10 mL  10 mL IntraVENous 2 times per day Susan Kenny MD   10 mL at 07/21/22 0730    sodium chloride flush 0.9 % injection 10 mL  10 mL IntraVENous PRN Susan Kenny MD        0.9 % sodium chloride infusion   IntraVENous PRN Susan Kenny MD        enoxaparin (LOVENOX) injection 40 mg  40 mg SubCUTAneous Daily Susan Kenny MD        ondansetron [facility]) injection 4 mg  4 mg IntraVENous Q4H PRN Susan Kenny MD        polyethylene glycol (GLYCOLAX) packet 17 g  17 g Oral Daily PRN Suasn Kenny MD        acetaminophen (TYLENOL) tablet 650 mg  650 mg Oral Q4H PRN Susan Kenny MD        Or    acetaminophen (TYLENOL) suppository 650 mg  650 mg Rectal Q4H PRN Susan Kenny MD        0.9 % sodium chloride infusion   IntraVENous Continuous Susan Kenny MD 75 mL/hr at 07/20/22 2217 New Bag at 07/20/22 2217       Physical Exam:   BP (!) 144/82   Pulse 91   Temp 98.1 °F (36.7 °C) (Oral)   Resp 18   Wt 211 lb 3.2 oz (95.8 kg)   SpO2 97%   BMI 29.46 kg/m²     Intake/Output Summary (Last 24 hours) at 7/21/2022 0831  Last data filed at 7/20/2022 2124  Gross per 24 hour   Intake 240 ml   Output --   Net 240 ml     Wt Readings from Last 2 Encounters:   07/21/22 211 lb 3.2 oz (95.8 kg)   06/17/22 209 lb 12.8 oz (95.2 kg)     Constitutional: He is oriented to person, place, and time. He appears well-developed and well-nourished. In no acute distress. Resting in bed  HEENT: Normocephalic and atraumatic. Sclerae anicteric. No xanthelasmas. EOM's intact. AGNES  Neck: Supple. No JVD present. Carotids without bruits. No thyromegaly present. No lymphadenopathy present. Cardiovascular: RRR; normal S1 and S2. No murmur/gallop or rub  Pulmonary/Chest: Lungs clear to auscultation. No wheezes/rhonchi/rales. Chest wall nontender. Abdominal: soft, nontender, nondistended with + bowel sounds. No hepatomegaly or bruits. Extremities: No edema, cyanosis, or clubbing. Pulses are 2+ radial/carotid/DP/PT bilaterally. Neurological: He is alert and oriented to person, place, and time. No focal deficit. Skin: Skin is warm and dry. There is no rash or diaphoresis. Psychiatric: He has a normal mood and affect.  His speech is normal and behavior is normal.     EKG Interpretation:   Sinus tachycardia with age indeterminate inferior infarct    Lab Review:   Lab Results   Component Value Date/Time    TRIG 142 10/09/2018 04:45 AM    HDL 36 12/15/2021 08:00 AM    HDL 35 09/10/2010 02:32 PM    LDLCALC 50 12/15/2021 08:00 AM    LDLDIRECT 167 07/14/2014 09:36 AM    LABVLDL 56 12/15/2021 08:00 AM     Lab Results   Component Value Date/Time     07/21/2022 04:47 AM    K 4.0 07/21/2022 04:47 AM    BUN 14 07/21/2022 04:47 AM    CREATININE 0.8 07/21/2022 04:47 AM     Recent Labs     07/20/22  1824 07/21/22  0447   WBC 8.6 8.9   HGB 15.4 14.8   HCT 44.3 41.4    185      Latest Reference Range & Units 7/20/22 18:24 7/20/22 21:50 7/21/22 04:47   Troponin <0.01 ng/mL 0.06 (H) 0.03 (H) 0.04 (H)   (H): Data is abnormally high     Latest Reference Range & Units 7/20/22 18:24   Pro-BNP 0 - 124 pg/mL 36       7/21/2022 Device interrogation ([facility])  No evidence of arrhythmia  Normal functioning device    CXR: No acute abnormality    Lexiscan-Myoview 3/18/2022:  Summary  moderate size severe inferior lateral wall fixed defect consistent with old MI. no evidence of ischemia. Overall findings represent a low risk scan. 3/18/2022 Echo:  Left ventricular cavity size is normal. Normal left ventricular wall thickness. Overall left ventricular systolic function appears mildly  reduced with an estimated ejection fraction of 50%. There is hypokinesis of the basal inferolateral walls    12/13/2021 Echo:  Left ventricular cavity size is normal with normal left ventricular wall thickness. Ejection fraction is visually estimated to be 50-55%. There is mild hypokinesis of the inferior walls. Normal diastolic function. The right ventricle is normal in size and function. LHC/PCI 11/18/2020:  ANGIOGRAPHIC FINDINGS:  1. Left main normal.  LYNNE 3 flow. No stenosis. 2.  Left anterior descending artery, has LYNNE 3 flow with 20% stenosis. 3.  Left circumflex has distally LYNNE 2 flow but no significant  stenosis, patent stents. 4.  Right coronary artery in the mid portion has 90% stenosis present  with unstable plaque. LV ejection fraction 60%. In summary, successful revascularization of the right coronary artery  and placement of stent, 4.0 x 26 mm. 10/7/2018: CECE to OM1, IABP placed    10/6/2018: CECE to RPLB    10/3/2018 LHC/PCI:  ANGIOGRAPHIC FINDINGS:  1. Left main is normal.  LYNNE 3 flow. No stenosis. 2.  Left anterior descending artery comes from the left main coronary  artery. LYNNE 3 flow.   No stenosis present with patent diagonal branches. 3.  Left circumflex is occluded in the mid portion. 4.  Right coronary comes from the right coronary cusp. It has a PDA which  has 80% stenosis present. The patient also has an obtuse marginal 1 which  as 80% stenosis present. In summary, successful revascularization with stent placement in the  circumflex artery. This was 2.5 x 38 mm, expanded up to 2.8 mm. This was  a drug-coated Synergy stent. Telemetry: Sinus rhythm-sinus tachycardia; no ectopy (personally reviewed)    Assessment/Plan:    Coronary artery disease of native coronary artery with unstable angina  -known multivessel disease   -initially slight elevation in troponin assay  -chest pain atypical, but with known CAD and multiple prior stents cannot exclude ischemia  -given his increasing episodes of chest pain; proceed with cardiac cath to R/O progression of disease  -continue DAPT and statin  -risk factor modification    2. Ischemic cardiomyopathy   -last LVEF 50%  -no overt symptoms of CHF  -Optivol acceptable on interrogation today  -S/P ICD    3. Mixed hyperlipidemia  -on high intensity statin    4. Primary hypertension  -goal BP < 130/80  -continue medical management    5. H/O VT  -S/P ICD placement in 2018  -no evidence of recurrence on device check today    6. H/O recurrent syncope  -neurogenic  -unable to tolerate BB or ACE/ARB d/t hypotension  -follows with neurology    Discussed cardiac cath/angiogram/PCI with patient. Discussed indications for procedure, risks and complications (including bleeding, infection, kidney injury/failure, CVA, MI, dissection and death). Verbalizes understanding and agrees to proceed. Will arrange for procedure today.       LAURA Hodge - CNP on 7/21/2022 at 8:31 AM    Keith Bourgeois, [address], [address], [address] [facility]  Office: [phone]  Fax: [phone]

## 2022-07-21 NOTE — PLAN OF CARE
Problem: Safety - Adult  Goal: Free from fall injury  Outcome: Progressing     Problem: Cardiovascular - Adult  Goal: Maintains optimal cardiac output and hemodynamic stability  Outcome: Progressing     Problem: Cardiovascular - Adult  Goal: Absence of cardiac dysrhythmias or at baseline  Outcome: Progressing     Problem: Metabolic/Fluid and Electrolytes - Adult  Goal: Electrolytes maintained within normal limits  Outcome: Progressing     Problem: Metabolic/Fluid and Electrolytes - Adult  Goal: Glucose maintained within prescribed range  Outcome: Progressing

## 2022-07-21 NOTE — PROGRESS NOTES
Medication Reconciliation     List of medications patient is currently taking is complete. Source of information:   1. Conversation with patient at bedside  2. EPIC records        Notes regarding home medications:  1. Patient received all of his home medications today. 2. Confirmed Levemir is 30 units BID and Novolog is 14 units TID WM. Pt is unsure of sliding scale.    Denies other otc/herbal use  Pérez Rodriguez, Pharmacy Intern 7/20/2022 8:04 PM

## 2022-07-21 NOTE — PROGRESS NOTES
Pt admitted to 4111. Pt is A&O x 4, denies pain but states he does get winded at times. Pt oriented to room, call light, diet order, fall precautions and POC. Tele box #[id number] placed and verified with [facility]. Call light and needs in reach.    Electronically signed by Syed Winn RN on 7/20/2022 at 10:59 PM

## 2022-07-21 NOTE — PROGRESS NOTES
Face to face handoff report given to [address] [place] [address] on PCU. Full report given. All questions answered, no further questions at this time. Belongings taken to room by this nurse. Wife instructed to go to recovery room. No further needs by this nurse at this time.

## 2022-07-21 NOTE — ED NOTES
Report called to bishnu SCHERER on 4w and patient sent to room 4111     Minus GILMER Flores  07/20/22 [id number]

## 2022-07-22 VITALS
RESPIRATION RATE: 16 BRPM | HEART RATE: 93 BPM | TEMPERATURE: 97.9 F | WEIGHT: 209.44 LBS | OXYGEN SATURATION: 96 % | SYSTOLIC BLOOD PRESSURE: 119 MMHG | DIASTOLIC BLOOD PRESSURE: 78 MMHG | BODY MASS INDEX: 29.21 KG/M2

## 2022-07-22 LAB
ANION GAP SERPL CALCULATED.3IONS-SCNC: 7 MMOL/L (ref 3–16)
BASOPHILS ABSOLUTE: 0.1 K/UL (ref 0–0.2)
BASOPHILS RELATIVE PERCENT: 0.7 %
BUN BLDV-MCNC: 14 MG/DL (ref 7–20)
CALCIUM SERPL-MCNC: 8.8 MG/DL (ref 8.3–10.6)
CHLORIDE BLD-SCNC: 104 MMOL/L (ref 99–110)
CO2: 28 MMOL/L (ref 21–32)
CREAT SERPL-MCNC: 0.9 MG/DL (ref 0.9–1.3)
EKG ATRIAL RATE: 89 BPM
EKG DIAGNOSIS: NORMAL
EKG P AXIS: 52 DEGREES
EKG P-R INTERVAL: 168 MS
EKG Q-T INTERVAL: 370 MS
EKG QRS DURATION: 86 MS
EKG QTC CALCULATION (BAZETT): 450 MS
EKG R AXIS: -2 DEGREES
EKG T AXIS: 27 DEGREES
EKG VENTRICULAR RATE: 89 BPM
EOSINOPHILS ABSOLUTE: 0.2 K/UL (ref 0–0.6)
EOSINOPHILS RELATIVE PERCENT: 2.5 %
GFR AFRICAN AMERICAN: >60
GFR NON-AFRICAN AMERICAN: >60
GLUCOSE BLD-MCNC: 137 MG/DL (ref 70–99)
GLUCOSE BLD-MCNC: 173 MG/DL (ref 70–99)
GLUCOSE BLD-MCNC: 180 MG/DL (ref 70–99)
HCT VFR BLD CALC: 42.9 % (ref 40.5–52.5)
HEMOGLOBIN: 15.1 G/DL (ref 13.5–17.5)
LYMPHOCYTES ABSOLUTE: 2.7 K/UL (ref 1–5.1)
LYMPHOCYTES RELATIVE PERCENT: 34.5 %
MCH RBC QN AUTO: 29.1 PG (ref 26–34)
MCHC RBC AUTO-ENTMCNC: 35.2 G/DL (ref 31–36)
MCV RBC AUTO: 82.8 FL (ref 80–100)
MONOCYTES ABSOLUTE: 0.7 K/UL (ref 0–1.3)
MONOCYTES RELATIVE PERCENT: 8.3 %
NEUTROPHILS ABSOLUTE: 4.3 K/UL (ref 1.7–7.7)
NEUTROPHILS RELATIVE PERCENT: 54 %
PDW BLD-RTO: 13 % (ref 12.4–15.4)
PERFORMED ON: ABNORMAL
PERFORMED ON: ABNORMAL
PLATELET # BLD: 179 K/UL (ref 135–450)
PMV BLD AUTO: 7.7 FL (ref 5–10.5)
POTASSIUM REFLEX MAGNESIUM: 4 MMOL/L (ref 3.5–5.1)
POTASSIUM SERPL-SCNC: 4 MMOL/L (ref 3.5–5.1)
RBC # BLD: 5.17 M/UL (ref 4.2–5.9)
SODIUM BLD-SCNC: 139 MMOL/L (ref 136–145)
WBC # BLD: 7.9 K/UL (ref 4–11)

## 2022-07-22 PROCEDURE — 6370000000 HC RX 637 (ALT 250 FOR IP): Performed by: INTERNAL MEDICINE

## 2022-07-22 PROCEDURE — 99233 SBSQ HOSP IP/OBS HIGH 50: CPT | Performed by: NURSE PRACTITIONER

## 2022-07-22 PROCEDURE — 80048 BASIC METABOLIC PNL TOTAL CA: CPT

## 2022-07-22 PROCEDURE — 93010 ELECTROCARDIOGRAM REPORT: CPT | Performed by: INTERNAL MEDICINE

## 2022-07-22 PROCEDURE — 6360000002 HC RX W HCPCS: Performed by: INTERNAL MEDICINE

## 2022-07-22 PROCEDURE — 94760 N-INVAS EAR/PLS OXIMETRY 1: CPT

## 2022-07-22 PROCEDURE — 85025 COMPLETE CBC W/AUTO DIFF WBC: CPT

## 2022-07-22 PROCEDURE — 36415 COLL VENOUS BLD VENIPUNCTURE: CPT

## 2022-07-22 RX ORDER — METOPROLOL SUCCINATE 50 MG/1
50 TABLET, EXTENDED RELEASE ORAL DAILY
Qty: 30 TABLET | Refills: 3 | Status: SHIPPED | OUTPATIENT
Start: 2022-07-22 | End: 2022-07-22 | Stop reason: HOSPADM

## 2022-07-22 RX ORDER — PRASUGREL 10 MG/1
TABLET, FILM COATED ORAL
Qty: 30 TABLET | Refills: 3 | Status: SHIPPED | OUTPATIENT
Start: 2022-07-22

## 2022-07-22 RX ADMIN — PRASUGREL 10 MG: 10 TABLET, FILM COATED ORAL at 09:19

## 2022-07-22 RX ADMIN — ENOXAPARIN SODIUM 40 MG: 100 INJECTION SUBCUTANEOUS at 09:16

## 2022-07-22 RX ADMIN — ASPIRIN 81 MG: 81 TABLET, CHEWABLE ORAL at 09:16

## 2022-07-22 RX ADMIN — INSULIN GLARGINE 30 UNITS: 100 INJECTION, SOLUTION SUBCUTANEOUS at 09:15

## 2022-07-22 NOTE — DISCHARGE SUMMARY
Hospital Medicine Discharge Summary    Patient ID: Darren Stoddard      Patient's PCP: LAURA Abebe CNP    Admit Date: 7/20/2022     Discharge Date:   7/22/22    Admitting Physician: Nydia Irene MD     Discharge Physician: LAURA Pearl CNP       Discharge Diagnoses: Active Hospital Problems    Diagnosis Date Noted    Chest pain [R07.9] 03/18/2022    Ischemic cardiomyopathy [I25.5] 11/13/2018    CAD, multiple vessel [I25.10]     ICD (implantable cardioverter-defibrillator) in place [Z95.810]     Elevated troponin (chronically elevated) [R77.8]     DMII (diabetes mellitus, type 2) ([facility] [address] [E11.9] 09/18/2017    HLD (hyperlipidemia) [E78.5] 07/15/2014    Essential hypertension [I10] 09/11/2010       The patient was seen and examined on day of discharge and this discharge summary is in conjunction with any daily progress note from day of discharge. Disposition:  [x] Home  [] Home with home health [] Rehab [] Psych [] SNF  [] LTAC  [] Long term nursing home or group home [] Transfer to ICU  [] Transfer to PCU [] Other:    Hospital Course:  Pt is an 62y.o. year-old male with a history of hypertension, hyperlipidemia, type II diabetes mellitus, multivessel coronary artery disease, ischemic cardiomyopathy with an ICD in place and a chronically elevated troponin. He is a patient of Dr Shruthi Grant. He presents to the emergency room for evaluation following a 10-day history of chest pain. He reports intermittent chest pain which has become more frequent, last longer and has become accompanied by shortness of breath over the past few days. He points to his left lateral chest wall as being the origin of his symptoms. He states that it feels like he is getting small shocks several times a day. He states that at times he will get 8 or 9 shocks at a time and did not have any 4 hours. They are not made better or worse with exertion.   He saw his primary care provider for this who advised ejection fraction of 50%. - ICD (implantable cardioverter-defibrillator) in place. Device check ok. - both stress tests recently neg but still with pain, will get cath today       Diabetes mellitus II - Lantus, SSI and when able to eat, start a carb control diet     Essential (primary) hypertension - continue home meds and monitor blood pressure     Hyperlipidemia - No current evidence of Rhabdomyolysis or other adverse effects. Continue statin therapy while in the hospital          Exam:     /78   Pulse 93   Temp 97.9 °F (36.6 °C) (Oral)   Resp 16   Wt 209 lb 7 oz (95 kg)   SpO2 96%   BMI 29.21 kg/m²   General appearance: No apparent distress, appears stated age and cooperative. HEENT: Pupils equal, round, and reactive to light. Conjunctivae/corneas clear. Neck: Supple, with full range of motion. No jugular venous distention. Trachea midline. Respiratory:  Normal respiratory effort. Clear to auscultation, bilaterally without Rales/Wheezes/Rhonchi. Cardiovascular: Regular rate and rhythm with normal S1/S2 without murmurs, rubs or gallops. Abdomen: Soft, non-tender, non-distended with normal bowel sounds. Musculoskelatal: No clubbing, cyanosis or edema bilaterally. Full range of motion without deformity. Skin: Skin color, texture, turgor normal.  No rashes or lesions. Neurologic:  Neurovascularly intact without any focal sensory/motor deficits. Cranial nerves: II-XII intact, grossly non-focal.  Psychiatric: Alert and oriented, thought content appropriate, normal insight      Consults:     IP CONSULT TO HOSPITALIST  IP CONSULT TO CARDIOLOGY  IP CONSULT TO CARDIAC REHAB  IP CONSULT TO CARDIAC REHAB    Diagnostic tests: Imaging:  XR CHEST (2 VW)   Final Result   No acute abnormality                    Labs:  For convenience and continuity at follow-up the following most recent labs are provided:      CBC:    Lab Results   Component Value Date/Time    WBC 7.9 07/22/2022 05:14 AM    HGB 15.1 07/22/2022 05:14 AM    HCT 42.9 07/22/2022 05:14 AM     07/22/2022 05:14 AM       Renal:    Lab Results   Component Value Date/Time     07/22/2022 05:14 AM    K 4.0 07/22/2022 05:14 AM     07/22/2022 05:14 AM    CO2 28 07/22/2022 05:14 AM    BUN 14 07/22/2022 05:14 AM    CREATININE 0.9 07/22/2022 05:14 AM    CALCIUM 8.8 07/22/2022 05:14 AM    PHOS 4.0 07/09/2020 05:49 AM           Discharge Instructions/Follow-up: effient  10mg daily, high dose statin, aspirin 81mg daily     PCP/SNF to follow up: cardiology f/u 1-2 wks ; PCP 1 wk     D/C condition: stable    Code status: full        Discharge Medications:     Current Discharge Medication List             Details   atorvastatin (LIPITOR) 80 MG tablet TAKE 1 TABLET BY MOUTH EVERY DAY  Qty: 90 tablet, Refills: 3      prasugrel (EFFIENT) 10 MG TABS TAKE 1 TABLET BY MOUTH EVERY DAY  Qty: 90 tablet, Refills: 3      Omega-3 Fatty Acids (FISH OIL PO) Take 1 capsule by mouth daily       GARLIC PO Take 1 capsule by mouth daily       NOVOLOG FLEXPEN 100 UNIT/ML injection pen Inject 14 Units into the skin 3 times daily (before meals) Plus sliding scale - 0-3 units more      insulin detemir (LEVEMIR) 100 UNIT/ML injection vial Inject 30 Units into the skin 2 times daily      aspirin 81 MG chewable tablet Take 81 mg by mouth daily      Cholecalciferol (VITAMIN D3) 40786 units CAPS Take 1 capsule by mouth once a week       Dulaglutide 3 MG/0.5ML SOPN Inject 3 mg into the skin once a week Indications: Friday              Time Spent on discharge is more than 30 minutes in the examination, evaluation, counseling and review of medications and discharge plan. Signed:    LAURA Kearns CNP   7/22/2022      Thank you LAURA MORLEY CNP for the opportunity to be involved in this patient's care. If you have any questions or concerns please feel free to contact me at [phone].

## 2022-07-22 NOTE — PROGRESS NOTES
[facility] [address]   Daily Progress Note      Admit Date:  7/20/2022    Reason for follow up visit: Chest pain, CAD    CC: \"I feel a lot better. My chest pain is gone. \"    62 y.o. male with a past medical history significant for CAD, HTN, HLP, syncope, hypotension, ischemic cardiomyopathy, type II diabetes mellitus and S/P ICD placement who was admitted to [facility] after presenting with chest pain. he underwent cardiac cath with resultant CECE  2 to LAD. Post procedure he has been ambulated and without further complaints. Plan is for discharge today. Interval History:  Pt. seen and examined; records reviewed  BP stable. Denies chest pain or SOB  Ambulated without complaints  S/P CECE  2 to LAD on 7/21/2022    Subjective:  Pt with no acute overnight cardiac events. Denies chest pain, SOB, cough, palpitations, dizziness    Review of Systems:   Constitutional: no unanticipated weight loss. There's been no change in energy level, sleep pattern, or activity level. No fevers, chills. Eyes: No visual changes or diplopia. No scleral icterus. ENT: No Headaches, hearing loss or vertigo. No mouth sores or sore throat. Cardiovascular: as reviewed in HPI  Respiratory: No cough or wheezing, no sputum production. No hematemesis. Gastrointestinal: No abdominal pain, appetite loss, blood in stools. No change in bowel or bladder habits. Genitourinary: No dysuria, trouble voiding, or hematuria. Musculoskeletal:  No gait disturbance, no joint complaints. Integumentary: No rash or pruritis. Neurological: No headache, diplopia, change in muscle strength, numbness or tingling. Psychiatric: No anxiety or depression. Endocrine: No temperature intolerance. No excessive thirst, fluid intake, or urination. No tremor. Hematologic/Lymphatic: No abnormal bruising or bleeding, blood clots or swollen lymph nodes. Allergic/Immunologic: No nasal congestion or hives.     Objective:   /78   Pulse 93   Temp 97.9 °F (36.6 °C) (Oral)   Resp 16   Wt 209 lb 7 oz (95 kg)   SpO2 96%   BMI 29.21 kg/m²     Intake/Output Summary (Last 24 hours) at 7/22/2022 1122  Last data filed at 7/22/2022 1122  Gross per 24 hour   Intake 300 ml   Output --   Net 300 ml     Wt Readings from Last 3 Encounters:   07/22/22 209 lb 7 oz (95 kg)   06/17/22 209 lb 12.8 oz (95.2 kg)   05/03/22 212 lb (96.2 kg)       Physical Exam:  General: In no acute distress. Awake, alert, and oriented X4. Up in room  Skin:  Warm and dry. No new appearing rashes or lesions. Neck:  Supple. No JVD or carotid bruit appreciated. Chest: Lungs clear to auscultation. No wheezes/rhonchi/rales  Cardiovascular:  RRR. Normal S1 and S2. No murmur/gallop or rub  Abdomen:  soft, nontender, nondistended, +bowel sounds. No hepatomegaly  Extremities:  No LE edema. No clubbing or cyanosis. R radial site unremarkable. 2+ bilateral radial/carotid/DP/PT pulses.  Cap refill brisk    Medications:    sodium chloride flush  5-40 mL IntraVENous 2 times per day    insulin lispro  0-8 Units SubCUTAneous TID WC    insulin lispro  0-4 Units SubCUTAneous Nightly    prasugrel  10 mg Oral Daily    insulin glargine  30 Units SubCUTAneous BID    atorvastatin  80 mg Oral Daily    aspirin  81 mg Oral Daily    enoxaparin  40 mg SubCUTAneous Daily      sodium chloride      dextrose      dextrose      sodium chloride 75 mL/hr at 07/21/22 1154     sodium chloride flush, sodium chloride, glucose, dextrose bolus **OR** dextrose bolus, glucagon (rDNA), dextrose, dextrose, ondansetron, polyethylene glycol, acetaminophen **OR** acetaminophen    Lab Data:  CBC:   Recent Labs     07/20/22  1824 07/21/22  0447 07/22/22  0514   WBC 8.6 8.9 7.9   HGB 15.4 14.8 15.1    185 179     BMP:    Recent Labs     07/20/22  1824 07/21/22  0447 07/22/22  0514    137 139   K 4.2 4.0 4.0   CO2 27 28 28   BUN 16 14 14   CREATININE 1.0 0.8* 0.9     LIVR:   Recent Labs     07/20/22  1824   AST 15   ALT 20      Latest Reference Range & Units 7/20/22 18:24 7/20/22 21:50 7/21/22 04:47   Troponin <0.01 ng/mL 0.06 (H) 0.03 (H) 0.04 (H)   (H): Data is abnormally high    ECG 7/21/2022:  Sinus rhythm with inferior infarct    7/21/2022 LHC/PCI:  HEMODYNAMICS:  Aortic pressure was 130/80     ANGIOGRAM/CORONARY ARTERIOGRAM:     The left main coronary artery is normal .  The left anterior descending artery has moderate diffuse disease, mid 80% . The left circumflex artery has a widely patent stent              OM1 stent is widely patent . The right coronary artery has a proximal stent widely patent ( dominant vessel)      INTERVENTION  Guide catheter: XB 3.0 Guide  COMET II wire used to cross LAD  FFR 0.78  IVUS passed to distal LAD, reference diameter 3 mm  Predilation performed with 3.0 regular balloon  Jonny 3.0 x 30 / 3.5 x 18 mm CECE  Post dilated performed with 3.5 NC balloon to 15 YENNI      SUMMARY:   Single Vessel CAD  Patent Lcx/OM1/RCA stents  S/p successful IVUS guided PCI mid LAD X 2 CECE     Lexiscan-Myoview 3/18/2022:  Summary  moderate size severe inferior lateral wall fixed defect consistent with old MI. no evidence of ischemia. Overall findings represent a low risk scan. 3/18/2022 Echo:  Left ventricular cavity size is normal. Normal left ventricular wall thickness. Overall left ventricular systolic function appears mildly  reduced with an estimated ejection fraction of 50%. There is hypokinesis of the basal inferolateral walls     12/13/2021 Echo:  Left ventricular cavity size is normal with normal left ventricular wall thickness. Ejection fraction is visually estimated to be 50-55%. There is mild hypokinesis of the inferior walls. Normal diastolic function. The right ventricle is normal in size and function. LHC/PCI 11/18/2020:  ANGIOGRAPHIC FINDINGS:  1. Left main normal.  LYNNE 3 flow. No stenosis. 2.  Left anterior descending artery, has LYNNE 3 flow with 20% stenosis.   3.  Left circumflex has distally LYNNE 2 flow but no significant  stenosis, patent stents. 4.  Right coronary artery in the mid portion has 90% stenosis present  with unstable plaque. LV ejection fraction 60%. In summary, successful revascularization of the right coronary artery  and placement of stent, 4.0 x 26 mm. 10/7/2018: CECE to OM1, IABP placed     10/6/2018: CECE to RPLB     10/3/2018 LHC/PCI:  ANGIOGRAPHIC FINDINGS:  1. Left main is normal.  LYNNE 3 flow. No stenosis. 2.  Left anterior descending artery comes from the left main coronary  artery. LYNNE 3 flow. No stenosis present with patent diagonal branches. 3.  Left circumflex is occluded in the mid portion. 4.  Right coronary comes from the right coronary cusp. It has a PDA which  has 80% stenosis present. The patient also has an obtuse marginal 1 which  as 80% stenosis present. In summary, successful revascularization with stent placement in the  circumflex artery. This was 2.5 x 38 mm, expanded up to 2.8 mm. This was  a drug-coated Synergy stent. Telemetry:Sinus rhythm  (Personally reviewed)    Assessment/Plan:    Coronary artery disease of native coronary artery with unstable angina  -known multivessel disease  -initially slight elevation in troponin assay (has chronically elevated troponins)  -S/P CECE x 2 to LAD on 7/21/2022  -S/P CECE to RCA in 11/2020  -10/7/2018: CECE to [address], IABP placed  -10/6/2018: CECE to RPLB  -10/3/2018 : CECE to LCX  -continue DAPT and statin  -risk factor modification     2. Ischemic cardiomyopathy  -last LVEF 50%  -no overt symptoms of CHF  -Optivol level acceptable on device check on 7/21/2022  -S/P ICD     3. Mixed hyperlipidemia  -continue high intensity statin     4. Primary hypertension  -goal BP < 130/80  -controlled  -continue medical management     5. H/O VT  -S/P ICD placement in 2018  -no evidence of recurrence on device check today     6.  H/O recurrent syncope  -neurogenic  -unable to tolerate BB or ACE/ARB d/t hypotension  -follows with neurology    OK for discharge today. Scheduled for follow up on 7/29/2022 @ 9AM with D. Enzweiler, CNP    Routine post angiogram instructions discussed and provided    Discharge Cardiac meds:  ASA 81 mg daily  Atorvastatin 80 mg daily  Effient 10 mg daily      Electronically signed by LAURA Gimenez CNP on 7/22/2022 at 11:22 AM

## 2022-07-22 NOTE — DISCHARGE INSTRUCTIONS
Post Radial Angiogram / Intervention Discharge Instructions    Activity:  You may drive in [id number] unless instructed by your doctor   Resume normal activity in one week  Avoid lifting, pushing, or pulling more than 10 lbs. For one week. (A gallon of milk is 7 lbs)  You may walk at an easy pace on ground level. Plan rest periods during the day. Avoid tension and stress. Learn to manage your stress. Avoid work that increases muscle tension.        (straining with bowel movements, moving furniture)    Wound Care: You may shower, but no bathtubs, pools, or hot tubs for one week. Inspect area daily. Normal observations:  Soreness and tenderness that may last for one week, mild pink to red oozing from incision site for up to 24 hrs after discharge,    bruising that could last up to two weeks. Clean with soap and water. NO lotion or powder. Dry area thoroughly. Apply band    aide for 48 hrs. Nutrition:   Low fat, low salt diet (guidelines for Heart Healthy eating)  Limit caffeine to 1-2 cups per day (coffee, tea, chocolate, soda)  Eat high fiber to avoid constipation and straining during bowel movements (fresh veggies and fruit, whole grain)  Limit alcohol to two servings a day ( 8 oz beer, 1 oz liquor, 4 oz wine)    Call your Doctor if you have:   Increased shortness of breath, weakness, or increased fatigue  A fast or a slow heartbeat  Problems or questions with your medications  Bleeding that is not stopped after holding pressure for 10 min  Feeling lightheaded or dizzy  Increased swelling or bruising of the wrist  Unusual pain, numbness or tingling at the hand or down the arm  Any signs of infection ( redness, yellow drainage, swelling, pain, fever)  Unusual swelling of lower legs/feet, chest discomfort, unusual weakness or fatigue

## 2022-07-22 NOTE — CARE COORDINATION
CASE MANAGEMENT DISCHARGE SUMMARY:    DISCHARGE DATE: 7/22/22    DISCHARGED TO: Home w/ family support     TRANSPORTATION: Family             TIME: later today    PREFERRED PHARMACY: [facility]             NUMBER: [phone]    COMMENTS: Patient will return home. Denies home needs.     Electronically signed by Blaire Millan RN Case Management [phone] on 7/22/2022 at 10:32 AM

## 2022-07-22 NOTE — PROGRESS NOTES
[facility]  Office Visit    Pierre King  1965 July 29, 2022    CC:   Chief Complaint   Patient presents with    Follow-Up from Hospital     Fatigue      HPI:  The patient is 62 y.o. male with a past medical history significant for CAD, HTN, HLP, syncope, hypotension, ischemic cardiomyopathy, type II diabetes mellitus and S/P ICD placement who was admitted to [facility] from 7/20/2022-7/22/2022 after presenting with chest pain. He underwent cardiac cath with resultant CECE  2 to LAD. Post procedure he was ambulated and without further complaints. He is here for follow up. Overall feeling fairly well except for fatigue. Denies chest pain/discomfort, SOB, orthopnea/PND, cough, palpitations, edema , weight change or claudication. + chronic dizziness and syncope. No regular exercise but walks on occasion. Review of Systems:  Constitutional: Denies weakness, night sweats or fever. + chronic fatigue  HEENT: Denies new visual changes, ringing in ears, nosebleeds,nasal congestion  Respiratory: Denies new or change in SOB, PND, orthopnea or cough. Cardiovascular: see HPI  GI: Denies N/V, diarrhea, constipation, abdominal pain, change in bowel habits, melena or hematochezia  : Denies urinary frequency, urgency, incontinence, hematuria or dysuria.   Skin: Denies rash, hives, or cyanosis  Musculoskeletal: Denies joint or muscle aches/pain  Neurological: + chronic recurrent syncope  Psychiatric: Denies anxiety, insomnia or depression      Past Medical History:   Diagnosis Date    Abscess of arm, left 1/8/9814    Acute metabolic encephalopathy 4/72/4004    Acute respiratory failure with hypoxia (HCC)     Arthritis     Blood circulation, collateral     CAD (coronary artery disease) 7/15/2014    CAD (coronary artery disease)     Cardiac arrest ([facility] [address] 10/05/2018    Cerebral artery occlusion with cerebral infarction ([facility] [address]     Cholecystitis 4/14/2021    COVID-19 virus infection 7/1/2020    Diabetes mellitus ([address]     Diabetic peripheral neuropathy ([address] 6/8/2018    Elbow contusion 3/24/2014    GERD (gastroesophageal reflux disease)     ulcers    High anion gap metabolic acidosis 3/0/9753    Hypercholesteremia     Hyperlipidemia     Hypertension     ICD (implantable cardioverter-defibrillator) in place 2018    Left arm numbness 9/11/2010    MRSA (methicillin resistant staph aureus) culture positive 07/13/2018    foot wound    Multifocal pneumonia     Neuropathy     Neutrophilic leukocytosis 2/36/5768    NSTEMI (non-ST elevated myocardial infarction) ([address] 10/3/2018    Pneumonia due to COVID-19 virus     POTS (postural orthostatic tachycardia syndrome)     Psychiatric problem     anxiety, depression, bipolar    Sepsis ([address] 7/1/2020    SIRS (systemic inflammatory response syndrome) ([address] 4/14/2021    Skin ulcer of left foot with fat layer exposed ([address] 6/1/2018    Sleep apnea     does not use Cpap    ST elevation myocardial infarction (STEMI) of inferolateral wall ([address] 11/13/2018    Syncope     Type II or unspecified type diabetes mellitus without mention of complication, not stated as uncontrolled     Ulcer of foot due to secondary diabetes ([address] 8/15/2018    Wears glasses      Past Surgical History:   Procedure Laterality Date    CARDIAC CATHETERIZATION      CHOLECYSTECTOMY, LAPAROSCOPIC N/A 4/21/2021    LAPAROSCOPIC CHOLECYSTECTOMY WITH CHOLANGIOGRAM performed by Hari Moreno MD at [place] [address]  10/06/2018    Bare Metal Stent to PDA    CORONARY ANGIOPLASTY WITH STENT PLACEMENT  10/07/2018    Bare Metal Stent to OM    CORONARY ANGIOPLASTY WITH STENT PLACEMENT  10/03/2018    CECE to Circ    DIAGNOSTIC CARDIAC CATH LAB PROCEDURE      FINGER SURGERY Left     Left Thumb    HERNIA REPAIR      VASCULAR SURGERY      VASECTOMY       Family History   Problem Relation Age of Onset    High Blood Pressure Mother     Stroke Mother     Heart Disease Mother COPD Mother     Heart Disease Father     Cancer Father         skin    Diabetes Sister     Cancer Sister     Heart Disease Brother     Diabetes Brother      Social History     Tobacco Use    Smoking status: Former     Packs/day: 2.00     Years: 12.00     Pack years: 24.00     Types: Cigarettes, Cigars    Smokeless tobacco: Never    Tobacco comments:     quit in the 80's   Vaping Use    Vaping Use: Never used   Substance Use Topics    Alcohol use: No    Drug use: Not Currently     Types: Marijuana Darryle Pimple)     Comment: quit 80's       No Known Allergies  Current Outpatient Medications   Medication Sig Dispense Refill    prasugrel (EFFIENT) 10 MG TABS TAKE 1 TABLET BY MOUTH EVERY DAY 30 tablet 3    atorvastatin (LIPITOR) 80 MG tablet TAKE 1 TABLET BY MOUTH EVERY DAY 90 tablet 3    Omega-3 Fatty Acids (FISH OIL PO) Take 1 capsule by mouth daily       GARLIC PO Take 1 capsule by mouth daily       NOVOLOG FLEXPEN 100 UNIT/ML injection pen Inject 14 Units into the skin 3 times daily (before meals) Plus sliding scale - 0-3 units more      insulin detemir (LEVEMIR) 100 UNIT/ML injection vial Inject 30 Units into the skin 2 times daily      aspirin 81 MG chewable tablet Take 81 mg by mouth daily      Cholecalciferol (VITAMIN D3) 41819 units CAPS Take 1 capsule by mouth once a week       Dulaglutide 3 MG/0.5ML SOPN Inject 3 mg into the skin once a week Indications: Friday        No current facility-administered medications for this visit. Physical Exam:   /72   Pulse 94   Ht 5' 11\" (1.803 m)   Wt 210 lb (95.3 kg)   SpO2 97%   BMI 29.29 kg/m²   Wt Readings from Last 2 Encounters:   07/29/22 210 lb (95.3 kg)   07/22/22 209 lb 7 oz (95 kg)     Constitutional: He is oriented to person, place, and time. He appears well-developed and well-nourished. In no acute distress. HEENT: Normocephalic and atraumatic. Sclerae anicteric. No xanthelasmas. Neck: Supple. No JVD present. Carotids without bruits.  No thyromegaly present. Cardiovascular: RRR, normal S1 and S2; soft systolic murmur  Pulmonary/Chest: Effort normal.  Lungs clear to auscultation. Chest wall nontender  Abdominal: soft, nontender, nondistended. + bowel sounds; no hepatomegaly   Extremities: No edema, cyanosis, or clubbing. Pulses are 2+ radial/carotid/DP/PT bilaterally. Cap refill brisk. Neurological: No focal deficit. Skin: Skin is warm and dry. Psychiatric: He has a normal mood and affect. His speech is normal and behavior is normal.      Lab Review:   Lab Results   Component Value Date/Time    TRIG 142 10/09/2018 04:45 AM    HDL 36 12/15/2021 08:00 AM    HDL 35 09/10/2010 02:32 PM    LDLCALC 50 12/15/2021 08:00 AM    LDLDIRECT 167 07/14/2014 09:36 AM    LABVLDL 56 12/15/2021 08:00 AM     Lab Results   Component Value Date/Time     07/22/2022 05:14 AM    K 4.0 07/22/2022 05:14 AM    K 4.0 07/22/2022 05:14 AM     07/22/2022 05:14 AM    CO2 28 07/22/2022 05:14 AM    BUN 14 07/22/2022 05:14 AM    CREATININE 0.9 07/22/2022 05:14 AM    GLUCOSE 180 07/22/2022 05:14 AM    CALCIUM 8.8 07/22/2022 05:14 AM      Lab Results   Component Value Date    WBC 7.9 07/22/2022    HGB 15.1 07/22/2022    HCT 42.9 07/22/2022    MCV 82.8 07/22/2022     07/22/2022     ECG 7/29/2022:  Sinus rhythm with age indeterminate inferior infarct    7/21/2022 LHC/PCI:  HEMODYNAMICS:  Aortic pressure was 130/80     ANGIOGRAM/CORONARY ARTERIOGRAM:     The left main coronary artery is normal .  The left anterior descending artery has moderate diffuse disease, mid 80% . The left circumflex artery has a widely patent stent              OM1 stent is widely patent .   The right coronary artery has a proximal stent widely patent ( dominant vessel)      INTERVENTION  Guide catheter: XB 3.0 Guide  COMET II wire used to cross LAD  FFR 0.78  IVUS passed to distal LAD, reference diameter 3 mm  Predilation performed with 3.0 regular balloon  [place] 3.0 x 30 / 3.5 x 18 mm CECE  Post dilated performed with 3.5 NC balloon to 15 YENNI      SUMMARY:   Single Vessel CAD  Patent Lcx/OM1/RCA stents  S/p successful IVUS guided PCI mid LAD X 2 CECE      Lexiscan-Myoview 3/18/2022:  Summary  moderate size severe inferior lateral wall fixed defect consistent with old MI. no evidence of ischemia. Overall findings represent a low risk scan. 3/18/2022 Echo:  Left ventricular cavity size is normal. Normal left ventricular wall thickness. Overall left ventricular systolic function appears mildly  reduced with an estimated ejection fraction of 50%. There is hypokinesis of the basal inferolateral walls     12/13/2021 Echo:  Left ventricular cavity size is normal with normal left ventricular wall thickness. Ejection fraction is visually estimated to be 50-55%. There is mild hypokinesis of the inferior walls. Normal diastolic function. The right ventricle is normal in size and function. LHC/PCI 11/18/2020:  ANGIOGRAPHIC FINDINGS:  1. Left main normal.  LYNNE 3 flow. No stenosis. 2.  Left anterior descending artery, has LYNNE 3 flow with 20% stenosis. 3.  Left circumflex has distally LYNNE 2 flow but no significant  stenosis, patent stents. 4.  Right coronary artery in the mid portion has 90% stenosis present  with unstable plaque. LV ejection fraction 60%. In summary, successful revascularization of the right coronary artery  and placement of stent, 4.0 x 26 mm. 10/7/2018: CECE to OM1, IABP placed     10/6/2018: CECE to RPLB     10/3/2018 LHC/PCI:  ANGIOGRAPHIC FINDINGS:  1. Left main is normal.  LYNNE 3 flow. No stenosis. 2.  Left anterior descending artery comes from the left main coronary  artery. LYNNE 3 flow. No stenosis present with patent diagonal branches. 3.  Left circumflex is occluded in the mid portion. 4.  Right coronary comes from the right coronary cusp. It has a PDA which  has 80% stenosis present.   The patient also has an obtuse marginal 1 which  as 80% stenosis

## 2022-07-29 ENCOUNTER — OFFICE VISIT (OUTPATIENT)
Dept: CARDIOLOGY CLINIC | Age: 57
End: 2022-07-29
Payer: MEDICARE

## 2022-07-29 VITALS
HEIGHT: 71 IN | WEIGHT: 210 LBS | SYSTOLIC BLOOD PRESSURE: 104 MMHG | OXYGEN SATURATION: 97 % | HEART RATE: 94 BPM | BODY MASS INDEX: 29.4 KG/M2 | DIASTOLIC BLOOD PRESSURE: 72 MMHG

## 2022-07-29 DIAGNOSIS — Z98.61 CAD S/P PERCUTANEOUS CORONARY ANGIOPLASTY: Primary | ICD-10-CM

## 2022-07-29 DIAGNOSIS — Z95.810 ICD (IMPLANTABLE CARDIOVERTER-DEFIBRILLATOR) IN PLACE: ICD-10-CM

## 2022-07-29 DIAGNOSIS — E78.2 MIXED HYPERLIPIDEMIA: ICD-10-CM

## 2022-07-29 DIAGNOSIS — I25.10 CAD S/P PERCUTANEOUS CORONARY ANGIOPLASTY: Primary | ICD-10-CM

## 2022-07-29 DIAGNOSIS — I25.10 CAD, MULTIPLE VESSEL: ICD-10-CM

## 2022-07-29 DIAGNOSIS — I25.5 ISCHEMIC CARDIOMYOPATHY: ICD-10-CM

## 2022-07-29 PROCEDURE — 99214 OFFICE O/P EST MOD 30 MIN: CPT | Performed by: NURSE PRACTITIONER

## 2022-07-29 PROCEDURE — 93000 ELECTROCARDIOGRAM COMPLETE: CPT | Performed by: NURSE PRACTITIONER

## 2022-09-01 ENCOUNTER — HOSPITAL ENCOUNTER (INPATIENT)
Age: 57
LOS: 4 days | Discharge: HOME | DRG: 638 | End: 2022-09-05
Attending: STUDENT IN AN ORGANIZED HEALTH CARE EDUCATION/TRAINING PROGRAM | Admitting: STUDENT IN AN ORGANIZED HEALTH CARE EDUCATION/TRAINING PROGRAM
Payer: MEDICARE

## 2022-09-01 ENCOUNTER — APPOINTMENT (OUTPATIENT)
Dept: GENERAL RADIOLOGY | Age: 57
DRG: 638 | End: 2022-09-01
Payer: MEDICARE

## 2022-09-01 DIAGNOSIS — L97.528 DIABETIC ULCER OF TOE OF LEFT FOOT ASSOCIATED WITH TYPE 2 DIABETES MELLITUS, WITH OTHER ULCER SEVERITY (HCC): Primary | ICD-10-CM

## 2022-09-01 DIAGNOSIS — E11.621 DIABETIC ULCER OF TOE OF LEFT FOOT ASSOCIATED WITH TYPE 2 DIABETES MELLITUS, WITH OTHER ULCER SEVERITY (HCC): Primary | ICD-10-CM

## 2022-09-01 DIAGNOSIS — E11.65 HYPERGLYCEMIA DUE TO DIABETES MELLITUS (HCC): ICD-10-CM

## 2022-09-01 PROBLEM — E11.628 TYPE 2 DIABETES MELLITUS WITH LEFT DIABETIC FOOT INFECTION (HCC): Status: ACTIVE | Noted: 2022-09-01

## 2022-09-01 PROBLEM — L08.9 TYPE 2 DIABETES MELLITUS WITH LEFT DIABETIC FOOT INFECTION (HCC): Status: ACTIVE | Noted: 2022-09-01

## 2022-09-01 LAB
A/G RATIO: 1.2 (ref 1.1–2.2)
ALBUMIN SERPL-MCNC: 4.3 G/DL (ref 3.4–5)
ALP BLD-CCNC: 99 U/L (ref 40–129)
ALT SERPL-CCNC: 18 U/L (ref 10–40)
ANION GAP SERPL CALCULATED.3IONS-SCNC: 11 MMOL/L (ref 3–16)
AST SERPL-CCNC: 13 U/L (ref 15–37)
BASOPHILS ABSOLUTE: 0.1 K/UL (ref 0–0.2)
BASOPHILS RELATIVE PERCENT: 0.9 %
BILIRUB SERPL-MCNC: 0.5 MG/DL (ref 0–1)
BUN BLDV-MCNC: 17 MG/DL (ref 7–20)
CALCIUM SERPL-MCNC: 10.7 MG/DL (ref 8.3–10.6)
CHLORIDE BLD-SCNC: 92 MMOL/L (ref 99–110)
CO2: 28 MMOL/L (ref 21–32)
CREAT SERPL-MCNC: 1 MG/DL (ref 0.9–1.3)
EOSINOPHILS ABSOLUTE: 0.2 K/UL (ref 0–0.6)
EOSINOPHILS RELATIVE PERCENT: 1.7 %
GFR AFRICAN AMERICAN: >60
GFR NON-AFRICAN AMERICAN: >60
GLUCOSE BLD-MCNC: 313 MG/DL (ref 70–99)
GLUCOSE BLD-MCNC: 342 MG/DL (ref 70–99)
GLUCOSE BLD-MCNC: 370 MG/DL (ref 70–99)
GLUCOSE BLD-MCNC: 496 MG/DL (ref 70–99)
HCT VFR BLD CALC: 47.2 % (ref 40.5–52.5)
HEMOGLOBIN: 16.5 G/DL (ref 13.5–17.5)
LACTIC ACID: 1.6 MMOL/L (ref 0.4–2)
LYMPHOCYTES ABSOLUTE: 2.5 K/UL (ref 1–5.1)
LYMPHOCYTES RELATIVE PERCENT: 25.2 %
MCH RBC QN AUTO: 28.7 PG (ref 26–34)
MCHC RBC AUTO-ENTMCNC: 34.9 G/DL (ref 31–36)
MCV RBC AUTO: 82.1 FL (ref 80–100)
MONOCYTES ABSOLUTE: 0.8 K/UL (ref 0–1.3)
MONOCYTES RELATIVE PERCENT: 7.9 %
NEUTROPHILS ABSOLUTE: 6.3 K/UL (ref 1.7–7.7)
NEUTROPHILS RELATIVE PERCENT: 64.3 %
PDW BLD-RTO: 12.4 % (ref 12.4–15.4)
PERFORMED ON: ABNORMAL
PLATELET # BLD: 216 K/UL (ref 135–450)
PMV BLD AUTO: 8.3 FL (ref 5–10.5)
POTASSIUM REFLEX MAGNESIUM: 4.8 MMOL/L (ref 3.5–5.1)
RBC # BLD: 5.75 M/UL (ref 4.2–5.9)
SODIUM BLD-SCNC: 131 MMOL/L (ref 136–145)
TOTAL PROTEIN: 7.9 G/DL (ref 6.4–8.2)
WBC # BLD: 9.8 K/UL (ref 4–11)

## 2022-09-01 PROCEDURE — 96367 TX/PROPH/DG ADDL SEQ IV INF: CPT

## 2022-09-01 PROCEDURE — 96365 THER/PROPH/DIAG IV INF INIT: CPT

## 2022-09-01 PROCEDURE — 6370000000 HC RX 637 (ALT 250 FOR IP): Performed by: PHYSICIAN ASSISTANT

## 2022-09-01 PROCEDURE — 36415 COLL VENOUS BLD VENIPUNCTURE: CPT

## 2022-09-01 PROCEDURE — 80053 COMPREHEN METABOLIC PANEL: CPT

## 2022-09-01 PROCEDURE — 6360000002 HC RX W HCPCS

## 2022-09-01 PROCEDURE — 6360000002 HC RX W HCPCS: Performed by: STUDENT IN AN ORGANIZED HEALTH CARE EDUCATION/TRAINING PROGRAM

## 2022-09-01 PROCEDURE — 2580000003 HC RX 258: Performed by: PHYSICIAN ASSISTANT

## 2022-09-01 PROCEDURE — 6370000000 HC RX 637 (ALT 250 FOR IP): Performed by: STUDENT IN AN ORGANIZED HEALTH CARE EDUCATION/TRAINING PROGRAM

## 2022-09-01 PROCEDURE — 83605 ASSAY OF LACTIC ACID: CPT

## 2022-09-01 PROCEDURE — 1200000000 HC SEMI PRIVATE

## 2022-09-01 PROCEDURE — 96375 TX/PRO/DX INJ NEW DRUG ADDON: CPT

## 2022-09-01 PROCEDURE — 87040 BLOOD CULTURE FOR BACTERIA: CPT

## 2022-09-01 PROCEDURE — 85025 COMPLETE CBC W/AUTO DIFF WBC: CPT

## 2022-09-01 PROCEDURE — 6370000000 HC RX 637 (ALT 250 FOR IP): Performed by: PODIATRIST

## 2022-09-01 PROCEDURE — 83036 HEMOGLOBIN GLYCOSYLATED A1C: CPT

## 2022-09-01 PROCEDURE — 99285 EMERGENCY DEPT VISIT HI MDM: CPT

## 2022-09-01 PROCEDURE — 2580000003 HC RX 258

## 2022-09-01 PROCEDURE — 2580000003 HC RX 258: Performed by: STUDENT IN AN ORGANIZED HEALTH CARE EDUCATION/TRAINING PROGRAM

## 2022-09-01 PROCEDURE — 73660 X-RAY EXAM OF TOE(S): CPT

## 2022-09-01 PROCEDURE — 6360000002 HC RX W HCPCS: Performed by: PHYSICIAN ASSISTANT

## 2022-09-01 RX ORDER — INSULIN LISPRO 100 [IU]/ML
0-8 INJECTION, SOLUTION INTRAVENOUS; SUBCUTANEOUS
Status: DISCONTINUED | OUTPATIENT
Start: 2022-09-01 | End: 2022-09-05 | Stop reason: HOSPADM

## 2022-09-01 RX ORDER — ONDANSETRON 2 MG/ML
4 INJECTION INTRAMUSCULAR; INTRAVENOUS EVERY 6 HOURS PRN
Status: DISCONTINUED | OUTPATIENT
Start: 2022-09-01 | End: 2022-09-05 | Stop reason: HOSPADM

## 2022-09-01 RX ORDER — DEXTROSE MONOHYDRATE 100 MG/ML
INJECTION, SOLUTION INTRAVENOUS CONTINUOUS PRN
Status: DISCONTINUED | OUTPATIENT
Start: 2022-09-01 | End: 2022-09-05 | Stop reason: HOSPADM

## 2022-09-01 RX ORDER — ACETAMINOPHEN 325 MG/1
650 TABLET ORAL EVERY 6 HOURS PRN
Status: DISCONTINUED | OUTPATIENT
Start: 2022-09-01 | End: 2022-09-05 | Stop reason: HOSPADM

## 2022-09-01 RX ORDER — ONDANSETRON 4 MG/1
4 TABLET, ORALLY DISINTEGRATING ORAL EVERY 8 HOURS PRN
Status: DISCONTINUED | OUTPATIENT
Start: 2022-09-01 | End: 2022-09-05 | Stop reason: HOSPADM

## 2022-09-01 RX ORDER — SODIUM CHLORIDE 9 MG/ML
INJECTION, SOLUTION INTRAVENOUS PRN
Status: DISCONTINUED | OUTPATIENT
Start: 2022-09-01 | End: 2022-09-05 | Stop reason: HOSPADM

## 2022-09-01 RX ORDER — ACETAMINOPHEN 650 MG/1
650 SUPPOSITORY RECTAL EVERY 6 HOURS PRN
Status: DISCONTINUED | OUTPATIENT
Start: 2022-09-01 | End: 2022-09-05 | Stop reason: HOSPADM

## 2022-09-01 RX ORDER — SODIUM CHLORIDE 0.9 % (FLUSH) 0.9 %
5-40 SYRINGE (ML) INJECTION PRN
Status: DISCONTINUED | OUTPATIENT
Start: 2022-09-01 | End: 2022-09-05 | Stop reason: HOSPADM

## 2022-09-01 RX ORDER — INSULIN LISPRO 100 [IU]/ML
0-4 INJECTION, SOLUTION INTRAVENOUS; SUBCUTANEOUS NIGHTLY
Status: DISCONTINUED | OUTPATIENT
Start: 2022-09-01 | End: 2022-09-05 | Stop reason: HOSPADM

## 2022-09-01 RX ORDER — POLYETHYLENE GLYCOL 3350 17 G/17G
17 POWDER, FOR SOLUTION ORAL DAILY PRN
Status: DISCONTINUED | OUTPATIENT
Start: 2022-09-01 | End: 2022-09-05 | Stop reason: HOSPADM

## 2022-09-01 RX ORDER — ENOXAPARIN SODIUM 100 MG/ML
40 INJECTION SUBCUTANEOUS NIGHTLY
Status: DISCONTINUED | OUTPATIENT
Start: 2022-09-01 | End: 2022-09-05 | Stop reason: HOSPADM

## 2022-09-01 RX ORDER — SODIUM CHLORIDE 0.9 % (FLUSH) 0.9 %
5-40 SYRINGE (ML) INJECTION EVERY 12 HOURS SCHEDULED
Status: DISCONTINUED | OUTPATIENT
Start: 2022-09-01 | End: 2022-09-05 | Stop reason: HOSPADM

## 2022-09-01 RX ORDER — ERGOCALCIFEROL 1.25 MG/1
50000 CAPSULE ORAL WEEKLY
Status: DISCONTINUED | OUTPATIENT
Start: 2022-09-02 | End: 2022-09-05 | Stop reason: HOSPADM

## 2022-09-01 RX ORDER — METOPROLOL SUCCINATE 50 MG/1
50 TABLET, EXTENDED RELEASE ORAL DAILY
Status: DISCONTINUED | OUTPATIENT
Start: 2022-09-01 | End: 2022-09-01

## 2022-09-01 RX ORDER — 0.9 % SODIUM CHLORIDE 0.9 %
1000 INTRAVENOUS SOLUTION INTRAVENOUS ONCE
Status: COMPLETED | OUTPATIENT
Start: 2022-09-01 | End: 2022-09-01

## 2022-09-01 RX ORDER — ASPIRIN 81 MG/1
81 TABLET, CHEWABLE ORAL DAILY
Status: DISCONTINUED | OUTPATIENT
Start: 2022-09-01 | End: 2022-09-05 | Stop reason: HOSPADM

## 2022-09-01 RX ORDER — PRASUGREL 10 MG/1
10 TABLET, FILM COATED ORAL DAILY
Status: DISCONTINUED | OUTPATIENT
Start: 2022-09-01 | End: 2022-09-05 | Stop reason: HOSPADM

## 2022-09-01 RX ORDER — INSULIN LISPRO 100 [IU]/ML
13 INJECTION, SOLUTION INTRAVENOUS; SUBCUTANEOUS
Status: DISCONTINUED | OUTPATIENT
Start: 2022-09-01 | End: 2022-09-02

## 2022-09-01 RX ORDER — ATORVASTATIN CALCIUM 80 MG/1
80 TABLET, FILM COATED ORAL DAILY
Status: DISCONTINUED | OUTPATIENT
Start: 2022-09-01 | End: 2022-09-05 | Stop reason: HOSPADM

## 2022-09-01 RX ORDER — INSULIN GLARGINE 100 [IU]/ML
30 INJECTION, SOLUTION SUBCUTANEOUS NIGHTLY
Status: DISCONTINUED | OUTPATIENT
Start: 2022-09-01 | End: 2022-09-02

## 2022-09-01 RX ADMIN — PRASUGREL 10 MG: 10 TABLET, FILM COATED ORAL at 18:32

## 2022-09-01 RX ADMIN — ASPIRIN 81 MG 81 MG: 81 TABLET ORAL at 18:32

## 2022-09-01 RX ADMIN — INSULIN GLARGINE 30 UNITS: 100 INJECTION, SOLUTION SUBCUTANEOUS at 20:28

## 2022-09-01 RX ADMIN — INSULIN HUMAN 10 UNITS: 100 INJECTION, SOLUTION PARENTERAL at 12:06

## 2022-09-01 RX ADMIN — INSULIN LISPRO 6 UNITS: 100 INJECTION, SOLUTION INTRAVENOUS; SUBCUTANEOUS at 18:42

## 2022-09-01 RX ADMIN — Medication 1500 MG: at 12:13

## 2022-09-01 RX ADMIN — INSULIN LISPRO 13 UNITS: 100 INJECTION, SOLUTION INTRAVENOUS; SUBCUTANEOUS at 18:42

## 2022-09-01 RX ADMIN — INSULIN LISPRO 4 UNITS: 100 INJECTION, SOLUTION INTRAVENOUS; SUBCUTANEOUS at 20:28

## 2022-09-01 RX ADMIN — Medication: at 22:50

## 2022-09-01 RX ADMIN — VANCOMYCIN HYDROCHLORIDE 1000 MG: 1 INJECTION, POWDER, LYOPHILIZED, FOR SOLUTION INTRAVENOUS at 22:50

## 2022-09-01 RX ADMIN — CEFTRIAXONE 1000 MG: 1 INJECTION, POWDER, FOR SOLUTION INTRAMUSCULAR; INTRAVENOUS at 11:39

## 2022-09-01 RX ADMIN — CEFEPIME 2000 MG: 2 INJECTION, POWDER, FOR SOLUTION INTRAVENOUS at 18:41

## 2022-09-01 RX ADMIN — SODIUM CHLORIDE 1000 ML: 9 INJECTION, SOLUTION INTRAVENOUS at 11:40

## 2022-09-01 ASSESSMENT — PAIN SCALES - GENERAL
PAINLEVEL_OUTOF10: 0
PAINLEVEL_OUTOF10: 0

## 2022-09-01 ASSESSMENT — ENCOUNTER SYMPTOMS
COLOR CHANGE: 1
NAUSEA: 0
BACK PAIN: 0
VOMITING: 0
SHORTNESS OF BREATH: 0
SORE THROAT: 0

## 2022-09-01 NOTE — CONSULTS
Clinical Pharmacy Note  Vancomycin Consult    Pharmacy consult received for one-time dose of vancomycin in the Emergency Department per Ashby Gaucher, PA. Ht Readings from Last 1 Encounters:   07/29/22 5' 11\" (1.803 m)        Wt Readings from Last 1 Encounters:   09/01/22 208 lb 15.9 oz (94.8 kg)         Assessment/Plan:  Vancomycin 1500 mg x 1 in ED. If Vancomycin is to continue on admission and pharmacy is to manage dosing, please re-consult with admission orders.       Petar Terry [facility], PharmD, 9/1/2022 11:19 AM

## 2022-09-01 NOTE — PROGRESS NOTES
Clinical Pharmacy Note  Vancomycin Consult    Juni Jose is a 62 y.o. male ordered Vancomycin for diabetic foot infection; consult received from Dr. Juliette Arreguin to manage therapy. Also receiving cefepime. Allergies:  Patient has no known allergies. Temp max:  Temp (24hrs), Av.4 °F (36.3 °C), Min:97.1 °F (36.2 °C), Max:97.7 °F (36.5 °C)      Recent Labs     22  0931   WBC 9.8       Recent Labs     22  0931   BUN 17   CREATININE 1.0       No intake or output data in the 24 hours ending 22 1440    Culture Results:      Ht Readings from Last 1 Encounters:   22 5' 11\" (1.803 m)        Wt Readings from Last 1 Encounters:   22 208 lb 15.9 oz (94.8 kg)         Estimated Creatinine Clearance: 96 mL/min (based on SCr of 1 mg/dL). Assessment/Plan:  Patient received vancomycin 1500mg in the ED. Will initiate vancomycin 1000mg every 12 hours. This regimen predicts a trough of 15.1 and and AUC of 471. Trough ordered for 2200 on 22    Thank you for the consult,  Logan Navas [facility]  2022  2:43 PM            Thank you for the consult.

## 2022-09-01 NOTE — H&P
Hospital Medicine History & Physical      PCP: SILVIO POLLOCK, APRN - CNP    Date of Admission: 9/1/2022    Date of Service: Pt seen/examined on 09/01/22   and Admitted to Inpatient. Chief Complaint:  left toe wound      History Of Present Illness: The patient is a 62 y.o. male who presents to [facility] with left first toe drainage ans swelling.     62y.o. year-old male with a history of hypertension, hyperlipidemia, type II diabetes mellitus, multivessel coronary artery disease, ischemic cardiomyopathy with an ICD. Patient stated that he had went swimming mid August, patient had accidentally stepped on stones, patient has no sensation in his feet at baseline due to diabetic neuropathy, patient had noticed a small spot on the bottom of his first toe, over the next 3 weeks patient had noticed gradually worsening redness, patient had been self treating with antibiotic ointment. In the last 3 days patient noted worsening swelling as well as drainage on his socks, patient also reported redness. Patient has poor diabetic control at baseline, had not been taking insulin for the past week (when asked why patient said that he is tired of taking medication), non-smoker no alcohol use.      Past Medical History:        Diagnosis Date    Abscess of arm, left 2/9/6288    Acute metabolic encephalopathy 2/48/3794    Acute respiratory failure with hypoxia (HCC)     Arthritis     Blood circulation, collateral     CAD (coronary artery disease) 7/15/2014    CAD (coronary artery disease)     Cardiac arrest ([facility] [address] 10/05/2018    Cerebral artery occlusion with cerebral infarction ([address]     Cholecystitis 4/14/2021    COVID-19 virus infection 7/1/2020    Diabetes mellitus ([address]     Diabetic peripheral neuropathy ([address] 6/8/2018    Elbow contusion 3/24/2014    GERD (gastroesophageal reflux disease)     ulcers    High anion gap metabolic acidosis 7/9/6023    Hypercholesteremia     Hyperlipidemia     Hypertension     ICD (implantable cardioverter-defibrillator) in place 2018    Left arm numbness 9/11/2010    MRSA (methicillin resistant staph aureus) culture positive 07/13/2018    foot wound    Multifocal pneumonia     Neuropathy     Neutrophilic leukocytosis 4/74/4253    NSTEMI (non-ST elevated myocardial infarction) ([address] 10/3/2018    Pneumonia due to COVID-19 virus     POTS (postural orthostatic tachycardia syndrome)     Psychiatric problem     anxiety, depression, bipolar    Sepsis ([address] 7/1/2020    SIRS (systemic inflammatory response syndrome) ([address] 4/14/2021    Skin ulcer of left foot with fat layer exposed ([address] 6/1/2018    Sleep apnea     does not use Cpap    ST elevation myocardial infarction (STEMI) of inferolateral wall ([address] 11/13/2018    Syncope     Type II or unspecified type diabetes mellitus without mention of complication, not stated as uncontrolled     Ulcer of foot due to secondary diabetes ([address] 8/15/2018    Wears glasses        Past Surgical History:        Procedure Laterality Date    CARDIAC CATHETERIZATION      CHOLECYSTECTOMY, LAPAROSCOPIC N/A 4/21/2021    LAPAROSCOPIC CHOLECYSTECTOMY WITH CHOLANGIOGRAM performed by Javi Strange MD at [address] [place] [address]  10/06/2018    Bare Metal Stent to PDA    CORONARY ANGIOPLASTY WITH STENT PLACEMENT  10/07/2018    Bare Metal Stent to OM    CORONARY ANGIOPLASTY WITH STENT PLACEMENT  10/03/2018    CECE to Circ    DIAGNOSTIC CARDIAC CATH LAB PROCEDURE      FINGER SURGERY Left     Left Thumb    HERNIA REPAIR      VASCULAR SURGERY      VASECTOMY         Medications Prior to Admission:    Prior to Admission medications    Medication Sig Start Date End Date Taking?  Authorizing Provider   prasugrel (EFFIENT) 10 MG TABS TAKE 1 TABLET BY MOUTH EVERY DAY 7/22/22 Deidre Verduzco, APRN - CNP   metoprolol succinate (TOPROL XL) 50 MG extended release tablet Take 1 tablet by mouth in the morning. 7/22/22 7/22/22  Deidre Verduzco APRN - CNP   atorvastatin (LIPITOR) 80 MG tablet TAKE 1 TABLET BY MOUTH EVERY DAY 7/6/22   Dayana ShiLAURA - CNP   Omega-3 Fatty Acids (FISH OIL PO) Take 1 capsule by mouth daily     Historical Provider, MD   GARLIC PO Take 1 capsule by mouth daily     Historical Provider, MD   NOVOLOG FLEXPEN 100 UNIT/ML injection pen Inject 13 Units into the skin 3 times daily (before meals) Plus sliding scale - 0-3 units 2/28/21   Historical Provider, MD   insulin detemir (LEVEMIR) 100 UNIT/ML injection vial Inject 30 Units into the skin 2 times daily    Historical Provider, MD   aspirin 81 MG chewable tablet Take 81 mg by mouth daily    Historical Provider, MD   Cholecalciferol (VITAMIN D3) 99876 units CAPS Take 1 capsule by mouth once a week     Historical Provider, MD   Dulaglutide 3 MG/0.5ML SOPN Inject 3 mg into the skin once a week Indications: Friday     Historical Provider, MD       Allergies:  Patient has no known allergies. Social History:  The patient currently lives with his wife    TOBACCO:   reports that he has quit smoking. His smoking use included cigarettes and cigars. He has a 24.00 pack-year smoking history. He has never used smokeless tobacco.  ETOH:   reports no history of alcohol use. Family History:  Reviewed in detail and negative for DM, Early CAD, Cancer, CVA. Positive as follows:        Problem Relation Age of Onset    High Blood Pressure Mother     Stroke Mother     Heart Disease Mother     COPD Mother     Heart Disease Father     Cancer Father         skin    Diabetes Sister     Cancer Sister     Heart Disease Brother     Diabetes Brother        REVIEW OF SYSTEMS:   Positive for left toe swelling and as noted in the HPI. All other systems reviewed and negative.     PHYSICAL EXAM:    /73   Pulse 86   Temp 97.1 °F SPECGRAV >1.030 10/06/2020 11:10 AM    LEUKOCYTESUR Negative 10/06/2020 11:10 AM    BLOODU Negative 10/06/2020 11:10 AM    GLUCOSEU >=1000 10/06/2020 11:10 AM    GLUCOSEU >=1000 09/10/2010 08:37 AM       ABG    Lab Results   Component Value Date/Time    EZB3RHW 17.5 07/01/2020 05:30 PM    BEART -7.6 07/01/2020 05:30 PM    O6IVYXPU 87.0 07/01/2020 05:30 PM    PHART 7.323 07/01/2020 05:30 PM    JPK8RNQ 33.7 07/01/2020 05:30 PM    PO2ART 51.4 07/01/2020 05:30 PM    MIM2XZA 18.5 07/01/2020 05:30 PM           There are no active hospital problems to display for this patient. PHYSICIANS CERTIFICATION:    I certify that Natalie Rai is expected to be hospitalized for more  than 2 midnights based on the following assessment and plan:      ASSESSMENT/PLAN:    Left first toe diabetic foot infection. Following an injury 3 weeks ago patient noted worsening redness and drainage of left first toe. No fever, no systemic symptoms. Plan  -Cefepime/Vancomycin  -Appreciate podiatry input  - wound swab of drainage. Diabetes mellitus. Patient is on insulin detemir 30 units and 13 units 3 times daily. Reports poor compliance  Last hemoglobin A1c March 21 is 12.7  Plan  -Continue sliding scale  -Glargine 30 units  -Insulin lispro 13 units 3 times daily  -Update sliding scale    CAD  Cardiomyopathy  Continue home medications    DVT Prophylaxis: lovenox  Diet: No diet orders on file  Code Status: Prior  PT/OT Eval Status: pending clinical improvement      Dispo - pending clinical improvement         Tati Nichols MD    Thank you LAURA MORLEY - ROMMEL for the opportunity to be involved in this patient's care. If you have any questions or concerns please feel free to contact me at [phone].

## 2022-09-01 NOTE — PROGRESS NOTES
Medication Reconciliation    List of medications patient is currently taking is complete. Source of information: 1. Conversation with patient at bedside                                      2. EPIC records      Allergies  Patient has no known allergies. Notes regarding home medications:   1. Patient did not receive any of his home medications prior to arrival to the emergency department.

## 2022-09-01 NOTE — ED NOTES
Pt. to be admitted to 3 room 3103. Report called to GILMER [place] and transport to be initiated.       Radames Lopez RN  09/01/22 [id number]

## 2022-09-01 NOTE — PROGRESS NOTES
[facility]  Diabetes Education   Progress Note       NAME:  Johnny SCHULTE Holiday  MEDICAL RECORD NUMBER:  [id number]  AGE: 62 y.o. GENDER: male  : 1965  TODAY'S DATE:  2022    Subjective   Reason for Diabetic Education Evaluation and Assessment: hyperglycemia    Juni describes struggling with maintaining diabetes self care. He reports that he just gets \"tired of it\" and omits insulin, meal planning and monitoring.      Visit Type: evaluation      Juni Chilel is a 62 y.o. male referred by:     [x] Physician  [] Nursing  [] Chart Review   [] Other:     PAST MEDICAL HISTORY        Diagnosis Date    Abscess of arm, left     Acute metabolic encephalopathy 7995    Acute respiratory failure with hypoxia (HCC)     Arthritis     Blood circulation, collateral     CAD (coronary artery disease) 7/15/2014    CAD (coronary artery disease)     Cardiac arrest ([address] 10/05/2018    Cerebral artery occlusion with cerebral infarction ([address]     Cholecystitis 2021    COVID-19 virus infection 2020    Diabetes mellitus ([address]     Diabetic peripheral neuropathy ([address] 2018    Elbow contusion 3/24/2014    GERD (gastroesophageal reflux disease)     ulcers    High anion gap metabolic acidosis 6160    Hypercholesteremia     Hyperlipidemia     Hypertension     ICD (implantable cardioverter-defibrillator) in place     Left arm numbness 2010    MRSA (methicillin resistant staph aureus) culture positive 2018    foot wound    Multifocal pneumonia     Neuropathy     Neutrophilic leukocytosis     NSTEMI (non-ST elevated myocardial infarction) ([address] 10/3/2018    Pneumonia due to COVID-19 virus     POTS (postural orthostatic tachycardia syndrome)     Psychiatric problem     anxiety, depression, bipolar    Sepsis ([address] 2020    SIRS (systemic inflammatory response syndrome) ([address] 2021    Skin ulcer of left foot with fat layer exposed ([address] 2018    Sleep apnea does not use Cpap    ST elevation myocardial infarction (STEMI) of inferolateral wall ([facility] 11/13/2018    Syncope     Type II or unspecified type diabetes mellitus without mention of complication, not stated as uncontrolled     Ulcer of foot due to secondary diabetes ([facility] 8/15/2018    Wears glasses        PAST SURGICAL HISTORY    Past Surgical History:   Procedure Laterality Date    CARDIAC CATHETERIZATION      CHOLECYSTECTOMY, LAPAROSCOPIC N/A 4/21/2021    LAPAROSCOPIC CHOLECYSTECTOMY WITH CHOLANGIOGRAM performed by Major George MD at [place] [address]  10/06/2018    Bare Metal Stent to PDA    CORONARY ANGIOPLASTY WITH STENT PLACEMENT  10/07/2018    Bare Metal Stent to OM    CORONARY ANGIOPLASTY WITH STENT PLACEMENT  10/03/2018    CECE to Circ    DIAGNOSTIC CARDIAC CATH LAB PROCEDURE      FINGER SURGERY Left     Left Thumb    HERNIA REPAIR      VASCULAR SURGERY      VASECTOMY         FAMILY HISTORY    Family History   Problem Relation Age of Onset    High Blood Pressure Mother     Stroke Mother     Heart Disease Mother     COPD Mother     Heart Disease Father     Cancer Father         skin    Diabetes Sister     Cancer Sister     Heart Disease Brother     Diabetes Brother        SOCIAL HISTORY    Social History     Tobacco Use    Smoking status: Former     Packs/day: 2.00     Years: 12.00     Pack years: 24.00     Types: Cigarettes, Cigars    Smokeless tobacco: Never    Tobacco comments:     quit in the 80's   Vaping Use    Vaping Use: Never used   Substance Use Topics    Alcohol use: No    Drug use: Not Currently     Types: Marijuana Anastasia Coon     Comment: quit 80's       ALLERGIES    No Known Allergies    MEDICATIONS     aspirin  81 mg Oral Daily    atorvastatin  80 mg Oral Daily    [START ON 9/2/2022] vitamin D  50,000 Units Oral Weekly    insulin glargine  30 Units SubCUTAneous Nightly    prasugrel  10 mg Oral Daily    sodium chloride flush  5-40 mL IntraVENous 2 times per day    enoxaparin  40 mg SubCUTAneous Nightly    insulin lispro  0-8 Units SubCUTAneous TID     insulin lispro  0-4 Units SubCUTAneous Nightly    insulin lispro  13 Units SubCUTAneous TID     cefepime  2,000 mg IntraVENous Once    Followed by    cefepime  2,000 mg IntraVENous Q8H    vancomycin (VANCOCIN) intermittent dosing (placeholder)   Other RX Placeholder    vancomycin  1,000 mg IntraVENous Q12H       Objective        Patient Active Problem List   Diagnosis Code    Diabetes mellitus out of control ([facility] E11.65    Essential hypertension I10    HLD (hyperlipidemia) E78.5    Abnormal stress test R94.39    DMII (diabetes mellitus, type 2) ([facility]) E11.9    POTS (postural orthostatic tachycardia syndrome) I49.8    Diabetic peripheral neuropathy ([facility]) E11.42    Abnormal EKG R94.31    Syncope and collapse R55    VT (ventricular tachycardia) ([facility]) I47.2    Idiopathic hypotension I95.0    Abnormal ECG R94.31    Ventricular tachycardia ([facility]) I47.2    CAD, multiple vessel I25.10    ICD (implantable cardioverter-defibrillator) in place Z95.810    Anemia D64.9    Lightheadedness R42    Cardiac arrest ([facility]) I46.9    Ischemic cardiomyopathy I25.5    Poor appetite R63.0    Chronic hypoxemic respiratory failure ([facility]) J96.11    Postinflammatory pulmonary fibrosis ([facility]) J84.10    CAD S/P percutaneous coronary angioplasty I25.10, Z98.61    Chest pain R07.9    Type 2 diabetes mellitus with left diabetic foot infection ([facility]) E11.628, L08.9        /87   Pulse 86   Temp 97.7 °F (36.5 °C) (Oral)   Resp 17   Wt 208 lb 15.9 oz (94.8 kg)   SpO2 97%   BMI 29.15 kg/m²     HgBA1c:    Lab Results   Component Value Date/Time    LABA1C 12.7 03/12/2021 10:16 AM       Recent Labs     09/01/22  1327   POCGLU 370*       BUN/Creatinine:    Lab Results   Component Value Date/Time    BUN 17 09/01/2022 09:31 AM    CREATININE 1.0 09/01/2022 09:31 AM       Assessment        Diabetes Management and Education    Does the patient have a Primary Care Physician? Yes, LAURA MORLEY - CNP       Does the patient require new medication instruction? TBD - may need dose adjustments. Does the patient/caregiver monitor Blood Glucoses? No: Reports having meter and supplies at home. Does the patient/caregiver follow a Meal Plan? No:      Does the patient/caregiver understand S/S of Hypoglycemia? No: Reports having symptoms when less than 160. Reviewed symptoms, prevention and treatment. Level of patient/caregiver understanding able to:       [] Verbalized Understanding   [] Demonstrated Understanding       [] Teach Back       [x] Needs Reinforcement     [x]  Other:  agrees to notify staff if he has any symptoms. Does the patient/caregiver understand S/S of Hyperglycemia? No: Prefers to have blood sugars near 200. Reviewed symptoms, prevention and treatment. Level of patient/caregiver understanding able to:        [] Verbalized Understanding   [] Demonstrated Understanding       [] Teach Back       [x] Needs Reinforcement     []  Other:           Plan        Ongoing diabetes education and blood glucose monitoring.       Discharge Plan:  Discharge needs: no prescription needs identified       Teaching Time Diabetes Education:  20 minutes     Electronically signed by Heriberto Mtz on 9/1/2022 at 3:46 PM

## 2022-09-01 NOTE — ED PROVIDER NOTES
[address]      Pt Name: Fernandez Garcia  MRN: [id number]  [place] 1965  Date of evaluation: 9/1/2022  Provider: John Paul Lynch PA-C    This patient was not seen and evaluated by the attending physician Adams Marcelino MD.    [facility]      Chief Complaint: toe wound      CRITICAL CARE TIME   Total Critical Care time was 31 minutes, excluding separately reportable procedures. There was a high probability of clinically significant/life threatening deterioration in the patient's condition which required my urgent intervention. HISTORYOF PRESENT ILLNESS  (Location/Symptom, Timing/Onset, Context/Setting, Quality, Duration, Modifying Factors, Severity.)   Juni Lira is a 62 y.o. male who presents to the emergency department with wound to the base of left toe. The patient states about 3 weeks ago he went swimming and he noted a small abrasion on the bottom of his foot at that time. He since then has developed a significant wound that is draining. Now the toe looks swollen and red and he has some dark black spots as well. He is a diabetic. He denies any pain because he has neuropathy in that foot. Nothing makes his symptoms better or worse. No fevers or chills or vomiting. He has seen Dr. Magda Griggs in the past but has not made contact recently. Nursing Notes were reviewed and I agree. REVIEW OF SYSTEMS    (2-9 systems for level 4, 10 or more forlevel 5)     Review of Systems   Constitutional:  Negative for chills and fever. HENT:  Negative for sore throat. Respiratory:  Negative for shortness of breath. Cardiovascular:  Negative for chest pain. Gastrointestinal:  Negative for nausea and vomiting. Genitourinary:  Negative for difficulty urinating. Musculoskeletal:  Negative for arthralgias, back pain and neck pain. Skin:  Positive for color change and wound.    Neurological:  Negative for weakness and numbness. All other systems reviewed and are negative. Positives and Pertinent negatives as per HPI. Except as noted above the remainder of the review of systems was reviewed and negative.        PAST MEDICALHISTORY         Diagnosis Date    Abscess of arm, left 5/0/6996    Acute metabolic encephalopathy 6/09/3258    Acute respiratory failure with hypoxia (HCC)     Arthritis     Blood circulation, collateral     CAD (coronary artery disease) 7/15/2014    CAD (coronary artery disease)     Cardiac arrest ([address] 10/05/2018    Cerebral artery occlusion with cerebral infarction ([address]     Cholecystitis 4/14/2021    COVID-19 virus infection 7/1/2020    Diabetes mellitus ([address]     Diabetic peripheral neuropathy ([address] 6/8/2018    Elbow contusion 3/24/2014    GERD (gastroesophageal reflux disease)     ulcers    High anion gap metabolic acidosis 0/0/1490    Hypercholesteremia     Hyperlipidemia     Hypertension     ICD (implantable cardioverter-defibrillator) in place 2018    Left arm numbness 9/11/2010    MRSA (methicillin resistant staph aureus) culture positive 07/13/2018    foot wound    Multifocal pneumonia     Neuropathy     Neutrophilic leukocytosis 5/83/1442    NSTEMI (non-ST elevated myocardial infarction) ([address] 10/3/2018    Pneumonia due to COVID-19 virus     POTS (postural orthostatic tachycardia syndrome)     Psychiatric problem     anxiety, depression, bipolar    Sepsis ([address] 7/1/2020    SIRS (systemic inflammatory response syndrome) ([address] 4/14/2021    Skin ulcer of left foot with fat layer exposed ([address] 6/1/2018    Sleep apnea     does not use Cpap    ST elevation myocardial infarction (STEMI) of inferolateral wall ([address] 11/13/2018    Syncope     Type II or unspecified type diabetes mellitus without mention of complication, not stated as uncontrolled     Ulcer of foot due to secondary diabetes ([address] 8/15/2018    Wears glasses        SURGICAL HISTORY           Procedure Laterality Date    CARDIAC CATHETERIZATION      CHOLECYSTECTOMY, LAPAROSCOPIC N/A 2021    LAPAROSCOPIC CHOLECYSTECTOMY WITH CHOLANGIOGRAM performed by Javi Strange MD at [place] [address]  10/06/2018    Bare Metal Stent to PDA    CORONARY ANGIOPLASTY WITH STENT PLACEMENT  10/07/2018    Bare Metal Stent to OM    CORONARY ANGIOPLASTY WITH STENT PLACEMENT  10/03/2018    CECE to Circ    DIAGNOSTIC CARDIAC CATH LAB PROCEDURE      FINGER SURGERY Left     Left Thumb    HERNIA REPAIR      VASCULAR SURGERY      VASECTOMY         CURRENT MEDICATIONS       Previous Medications    ASPIRIN 81 MG CHEWABLE TABLET    Take 81 mg by mouth daily    ATORVASTATIN (LIPITOR) 80 MG TABLET    TAKE 1 TABLET BY MOUTH EVERY DAY    CHOLECALCIFEROL (VITAMIN D3) 62265 UNITS CAPS    Take 1 capsule by mouth once a week     DULAGLUTIDE 3 MG/0.5ML SOPN    Inject 3 mg into the skin once a week Indications: Friday     GARLIC PO    Take 1 capsule by mouth daily     INSULIN DETEMIR (LEVEMIR) 100 UNIT/ML INJECTION VIAL    Inject 30 Units into the skin 2 times daily    NOVOLOG FLEXPEN 100 UNIT/ML INJECTION PEN    Inject 13 Units into the skin 3 times daily (before meals) Plus sliding scale - 0-3 units    OMEGA-3 FATTY ACIDS (FISH OIL PO)    Take 1 capsule by mouth daily     PRASUGREL (EFFIENT) 10 MG TABS    TAKE 1 TABLET BY MOUTH EVERY DAY       ALLERGIES     Patient has no known allergies. FAMILY HISTORY           Problem Relation Age of Onset    High Blood Pressure Mother     Stroke Mother     Heart Disease Mother     COPD Mother     Heart Disease Father     Cancer Father         skin    Diabetes Sister     Cancer Sister     Heart Disease Brother     Diabetes Brother      Family Status   Relation Name Status    Mother      Father      Sister  Alive    Brother  Alive        SOCIAL HISTORY    reports that he has quit smoking. His smoking use included cigarettes and cigars.  He has a 24.00 pack-year smoking history. He has never used smokeless tobacco. He reports that he does not currently use drugs after having used the following drugs: Marijuana Hulon Moreno). He reports that he does not drink alcohol. PHYSICAL EXAM    (up to 7 for level 4, 8 or more for level 5)     ED Triage Vitals [09/01/22 0900]   BP Temp Temp Source Heart Rate Resp SpO2 Height Weight   112/73 97.1 °F (36.2 °C) Oral 86 16 98 % -- 208 lb 15.9 oz (94.8 kg)       Physical Exam  Vitals and nursing note reviewed. Constitutional:       General: He is not in acute distress. Appearance: He is well-developed. HENT:      Head: Normocephalic and atraumatic. Eyes:      Conjunctiva/sclera: Conjunctivae normal.   Cardiovascular:      Rate and Rhythm: Normal rate and regular rhythm. Pulses: Normal pulses. Dorsalis pedis pulses are 2+ on the left side. Posterior tibial pulses are 2+ on the left side. Heart sounds: Normal heart sounds. Pulmonary:      Effort: Pulmonary effort is normal. No respiratory distress. Abdominal:      General: There is no distension. Musculoskeletal:         General: Normal range of motion. Cervical back: Neck supple. Comments: As depicted, there is erythema and swelling at the base of the nail of the left great toe with an area of dark black discoloration as well as some erythema along the medial aspect of the second toe. On the plantar aspect, skin is macerated with open wound as well as smaller crack in the skin. Skin:     General: Skin is warm and dry. Neurological:      Mental Status: He is alert and oriented to person, place, and time.    Psychiatric:         Behavior: Behavior normal.                DIAGNOSTIC RESULTS     When ordered, EKGs are interpreted by the Emergency Department Physician in the absence of a cardiologist. Please see their note for interpretation of EKG    RADIOLOGY:         Interpretation per the Radiologist below, if available at the time of this note:    XR TOE LEFT (MIN 2 VIEWS)   Preliminary Result   No evidence of acute osseous abnormality involving the left great toe. LABS:  Labs Reviewed   COMPREHENSIVE METABOLIC PANEL W/ REFLEX TO MG FOR LOW K - Abnormal; Notable for the following components:       Result Value    Sodium 131 (*)     Chloride 92 (*)     Glucose 496 (*)     Calcium 10.7 (*)     AST 13 (*)     All other components within normal limits   CULTURE, BLOOD 1   CULTURE, BLOOD 2   CBC WITH AUTO DIFFERENTIAL   LACTIC ACID       When ordered, only abnormal lab results are displayed. All other labs are within normal range or not returned as of the dictation. EMERGENCY DEPARTMENT COURSE and DIFFERENTIAL DIAGNOSIS/MDM:   Vitals:    Vitals:    09/01/22 0900   BP: 112/73   Pulse: 86   Resp: 16   Temp: 97.1 °F (36.2 °C)   TempSrc: Oral   SpO2: 98%   Weight: 208 lb 15.9 oz (94.8 kg)        I have evaluated this patient. My supervising physician was available for consultation. The patient is neurovascular intact. He has ulcer as depicted above and there is some darker areas that are highly concerning. He is afebrile and nontoxic, has a normal white blood cell count and normal lactate but his sugar is 496. X-ray was negative. I discussed the patient with his podiatrist Dr. Chaparrita Grimm who was able to view the images and agreed with my plan for admission to the hospitalist for IV antibiotics. He was given fluids, 10 units of insulin, Vanco and Rocephin and the hospitalist has been reached via perfect serve for admission. Patient updated on results and plan for admission. Is this patient to be included in the SEP-1 Core Measure due to severe sepsis or septic shock? No   Exclusion criteria - the patient is NOT to be included for SEP-1 Core Measure due to:  2+ SIRS criteria are not met          PROCEDURES:  None    FINAL IMPRESSION      1.  Diabetic ulcer of toe of left foot associated with type 2 diabetes mellitus, with other ulcer severity ([address]    2. Hyperglycemia due to diabetes mellitus [facility])          DISPOSITION/PLAN   DISPOSITION Decision To Admit 09/01/2022 11:14:52 AM      PATIENT REFERRED TO:  No follow-up provider specified.     MEDICATIONS:  New Prescriptions    No medications on file       (Please note that portions of this note were completed with a voice recognition program.  Efforts were made toedit the dictations but occasionally words are mis-transcribed.)    MACIEL Etienne PA-C  09/01/22 5039

## 2022-09-01 NOTE — CONSULTS
Department of Podiatry Consult Note  Cathy Birch. Altagracia Booker DPM Attending       Reason for Consult:  LEFT hallux wounds with cellulitis  Requesting Physician:  Demetris Herrera MD    CHIEF COMPLAINT:  Open wound of 3 week's duration to plantar LEFT hallux, with new onsent erythema of cellulitis    HISTORY OF PRESENT ILLNESS:                The patient is a 62 y.o. male with significant past medical history of Diabetes with peripheral neuropathy , Coronary Artery disease of several vessels who is consulted for 3 week history of wound to plantar LEFT hallux with 2 day history of worsening erythema.  Patient relates blister 3 weeks ago from activities at [employer], that became much worse over last 48 hours    Past Medical History:        Diagnosis Date    Abscess of arm, left 9/1/9603    Acute metabolic encephalopathy 3/96/8802    Acute respiratory failure with hypoxia (HCC)     Arthritis     Blood circulation, collateral     CAD (coronary artery disease) 7/15/2014    CAD (coronary artery disease)     Cardiac arrest ([address] 10/05/2018    Cerebral artery occlusion with cerebral infarction ([address]     Cholecystitis 4/14/2021    COVID-19 virus infection 7/1/2020    Diabetes mellitus ([address]     Diabetic peripheral neuropathy ([address] 6/8/2018    Elbow contusion 3/24/2014    GERD (gastroesophageal reflux disease)     ulcers    High anion gap metabolic acidosis 9/0/2120    Hypercholesteremia     Hyperlipidemia     Hypertension     ICD (implantable cardioverter-defibrillator) in place 2018    Left arm numbness 9/11/2010    MRSA (methicillin resistant staph aureus) culture positive 07/13/2018    foot wound    Multifocal pneumonia     Neuropathy     Neutrophilic leukocytosis 8/11/0154    NSTEMI (non-ST elevated myocardial infarction) ([address] 10/3/2018    Pneumonia due to COVID-19 virus     POTS (postural orthostatic tachycardia syndrome)     Psychiatric problem     anxiety, depression, bipolar    Sepsis ([address] 7/1/2020    SIRS (systemic inflammatory response syndrome) ([facility] 4/14/2021    Skin ulcer of left foot with fat layer exposed ([email or web address] 6/1/2018    Sleep apnea     does not use Cpap    ST elevation myocardial infarction (STEMI) of inferolateral wall (HCC) 11/13/2018    Syncope     Type II or unspecified type diabetes mellitus without mention of complication, not stated as uncontrolled     Ulcer of foot due to secondary diabetes ([email or web address] 8/15/2018    Wears glasses      Past Surgical History:        Procedure Laterality Date    CARDIAC CATHETERIZATION      CHOLECYSTECTOMY, LAPAROSCOPIC N/A 4/21/2021    LAPAROSCOPIC CHOLECYSTECTOMY WITH CHOLANGIOGRAM performed by Fawad Dennis MD at [place] [address]  10/06/2018    Bare Metal Stent to PDA    CORONARY ANGIOPLASTY WITH STENT PLACEMENT  10/07/2018    Bare Metal Stent to OM    CORONARY ANGIOPLASTY WITH STENT PLACEMENT  10/03/2018    CECE to Circ    DIAGNOSTIC CARDIAC CATH LAB PROCEDURE      FINGER SURGERY Left     Left Thumb    HERNIA REPAIR      VASCULAR SURGERY      VASECTOMY       Current Medications:    Current Facility-Administered Medications: aspirin chewable tablet 81 mg, 81 mg, Oral, Daily  atorvastatin (LIPITOR) tablet 80 mg, 80 mg, Oral, Daily  [START ON 9/2/2022] vitamin D (ERGOCALCIFEROL) capsule 50,000 Units, 50,000 Units, Oral, Weekly  insulin glargine (LANTUS) injection vial 30 Units, 30 Units, SubCUTAneous, Nightly  prasugrel (EFFIENT) tablet 10 mg, 10 mg, Oral, Daily  glucose chewable tablet 16 g, 4 tablet, Oral, PRN  dextrose bolus 10% 125 mL, 125 mL, IntraVENous, PRN **OR** dextrose bolus 10% 250 mL, 250 mL, IntraVENous, PRN  glucagon (rDNA) injection 1 mg, 1 mg, SubCUTAneous, PRN  dextrose 10 % infusion, , IntraVENous, Continuous PRN  sodium chloride flush 0.9 % injection 5-40 mL, 5-40 mL, IntraVENous, 2 times per day  sodium chloride flush 0.9 % injection 5-40 mL, 5-40 mL, IntraVENous, PRN  0.9 % sodium chloride infusion, , (1.88 m)   Wt 209 lb (94.8 kg)   SpO2 92%   BMI 26.83 kg/m²     LABS:   Recent Labs     09/01/22  0931   WBC 9.8   HGB 16.5   HCT 47.2        Recent Labs     09/01/22  0931   *   K 4.8   CL 92*   CO2 28   BUN 17   CREATININE 1.0     Recent Labs     09/01/22  0931   PROT 7.9       LOWER EXTREMITY EXAMINATION   SKIN:    MUGS 3 ulcer of LEFT plantar hallux of 2.1 cm diameter with exposed, but beefy red sub Q fat, receding erythema from initial wound with accompanying fissure to lateral hallux and raised purple bulla of medial hallux    NEUROLOGIC:    Pain sensation isdecreased Bilateral.  Light touch is decreasedBilateral. positive history of paresthesia Bilateral. negativehistory of burning Bilateral. Deep tendon reflexes are 1 to include patellar and achilles Bilateral. [place]--Sarita 5.07 monofilament sensitivity is abnormal 3 to 5 spots tested Bilateral.    VASCULAR:    bilaterally Dorsalis pedis is 1. bilaterally Posterior tibial pulse is 1. 1+ edema left. mild Varicosities bilaterally. MUSCULOSKELETAL:  [place] Clarity is  untested due to wound of LEFT hallux . Muscle strength is 5/5 to all groups tested to include dorsiflexion, plantarflexion, inversion, eversion, and digital Bilateral . The ranges of motion of all joints tested from ankle distal is decreased there is no crepitus noted Bilateral.    Radiology of LEFT hallux: There is clinical history of infection involving the great toe. No evidence   of radiopaque foreign body. No evidence of subcutaneous emphysema. There   are degenerative changes of the 1st metatarsophalangeal joint. No evidence   of periosteal reaction or bone destruction to suggest plain film findings of   osteomyelitis. Focal well-corticated ossific densities along lateral border   of the calcaneocuboid joint most consistent with accessory ossicles. Impression   No evidence of acute osseous abnormality involving the left great toe. IMPRESSION/RECOMMENDATIONS    1. Ulceration of LEFT hallux secondary to diabetic neuropathy with accompanying cellulitis  +Afebrile, absence of leukocytosis      2. Diabetes with peripheral neuropathy, and previous ulceration of same toe  +Denial as to his care, without appropriate use of insulin, shoe gear or concern for diet / control of glucose. Need for greater education of his medical condition    Disposition: No immediate surgical needs, and will order Santyl for chemical wound debridement, with need for continued care with Dr. Cathryn Walker at [address] [facility]      Thank you for the opportunity to take part in the patient's care.     Dorcas Rm DPM  [facility]  [phone]

## 2022-09-01 NOTE — CARE COORDINATION
INITIAL CASE MANAGEMENT ASSESSMENT    Reviewed chart, met with patient to assess possible discharge needs. Explained Case Management role/services. Living Situation: Patient lives in a [place] home with spouse and grandson. ADLs: manages on his own     DME: none    PT/OT Recs: not ordered at this time     Active Services: none     Transportation: spouse or family     Medications: takes on his own; Jenny Hernandez 26    PCP: LAURA Robles CNP    PLAN/COMMENTS: Patient plans to return home and denied needs at the present time. SW/CM will follow and assist as needed.     Electronically signed by JACKIE Landaverde, JESSE, Case Management on 9/1/2022 at 4:37 PM  [place] [phone]

## 2022-09-01 NOTE — ACP (ADVANCE CARE PLANNING)
Advance Care Planning     Advance Care Planning Activator (Inpatient)  Conversation Note      Date of ACP Conversation: 9/1/2022     Conversation Conducted with: Patient with Decision Making Capacity    ACP Activator: [facility] Decision Maker:     Current Designated Health Care Decision Maker:     Primary Decision Maker: Hong Jackson Spouse - [phone]    Care Preferences    Ventilation: \"If you were in your present state of health and suddenly became very ill and were unable to breathe on your own, what would your preference be about the use of a ventilator (breathing machine) if it were available to you? \"      Would the patient desire the use of ventilator (breathing machine)?: yes    \"If your health worsens and it becomes clear that your chance of recovery is unlikely, what would your preference be about the use of a ventilator (breathing machine) if it were available to you? \"     Would the patient desire the use of ventilator (breathing machine)?: \"I don't know\"      Resuscitation  \"CPR works best to restart the heart when there is a sudden event, like a heart attack, in someone who is otherwise healthy. Unfortunately, CPR does not typically restart the heart for people who have serious health conditions or who are very sick. \"    \"In the event your heart stopped as a result of an underlying serious health condition, would you want attempts to be made to restart your heart (answer \"yes\" for attempt to resuscitate) or would you prefer a natural death (answer \"no\" for do not attempt to resuscitate)? \" yes       [] Yes   [] No   Educated Patient / Renata Vargas regarding differences between Advance Directives and portable DNR orders.     Length of ACP Conversation in minutes:      Conversation Outcomes:  [x] ACP discussion completed  [] Existing advance directive reviewed with patient; no changes to patient's previously recorded wishes  [] New Advance Directive completed  [] Portable Do Not

## 2022-09-01 NOTE — PROGRESS NOTES
Pt arrived to room 3103 from ED. A/Ox4; VSS. Pt oriented to room, bed, phone, and call light. Call light, bedside table and phone within reach. Pt verbalized understanding of fall precautions and unit policies at this time. Pt remains UAL but instructed to wear non-skid socks when ambulating. Blood sugar elevated at 370 after 10 units of insulin administered in ED. Will monitor and reassess as ordered. Pt reports to take insulin at home 13 units of rapid acting insulin plus a sliding scale with meals and 30 units of long lasting insulin 2x daily. Bed locked and in lowest position, side rails up x2. Call light and all belongings within reach. Will continue to check on pt. every 2 hours to monitor pt's safety and assess pt needs.  Electronically signed by Aki Donnelly RN on 9/1/2022 at 2:48 PM

## 2022-09-01 NOTE — PLAN OF CARE
Problem: Discharge Planning  Goal: Discharge to home or other facility with appropriate resources  Outcome: Progressing  Flowsheets (Taken 9/1/2022 1806)  Discharge to home or other facility with appropriate resources:   Identify barriers to discharge with patient and caregiver   Arrange for needed discharge resources and transportation as appropriate   Identify discharge learning needs (meds, wound care, etc)   Refer to discharge planning if patient needs post-hospital services based on physician order or complex needs related to functional status, cognitive ability or social support system     Problem: Pain  Goal: Verbalizes/displays adequate comfort level or baseline comfort level  Outcome: Progressing  Flowsheets (Taken 9/1/2022 1806)  Verbalizes/displays adequate comfort level or baseline comfort level:   Encourage patient to monitor pain and request assistance   Assess pain using appropriate pain scale   Administer analgesics based on type and severity of pain and evaluate response   Implement non-pharmacological measures as appropriate and evaluate response     Problem: Skin/Tissue Integrity  Goal: Absence of new skin breakdown  Description: 1. Monitor for areas of redness and/or skin breakdown  2. Assess vascular access sites hourly  3. Every 4-6 hours minimum:  Change oxygen saturation probe site  4. Every 4-6 hours:  If on nasal continuous positive airway pressure, respiratory therapy assess nares and determine need for appliance change or resting period. Outcome: Progressing  Note: Patient skin condition and mucus membrane integrity remain unchanged during this shift. Skin breakdown prevention interventions are in place. Will continue to monitor and assess.         Problem: ABCDS Injury Assessment  Goal: Absence of physical injury  Outcome: Progressing  Flowsheets (Taken 9/1/2022 1806)  Absence of Physical Injury: Implement safety measures based on patient assessment     Problem: Safety - Adult  Goal: Free from fall injury  Outcome: Progressing  Flowsheets (Taken 9/1/2022 1806)  Free From Fall Injury: Instruct family/caregiver on patient safety

## 2022-09-02 LAB
CREAT SERPL-MCNC: 0.9 MG/DL (ref 0.9–1.3)
ESTIMATED AVERAGE GLUCOSE: 312.1 MG/DL
GFR AFRICAN AMERICAN: >60
GFR NON-AFRICAN AMERICAN: >60
GLUCOSE BLD-MCNC: 202 MG/DL (ref 70–99)
GLUCOSE BLD-MCNC: 203 MG/DL (ref 70–99)
GLUCOSE BLD-MCNC: 246 MG/DL (ref 70–99)
GLUCOSE BLD-MCNC: 297 MG/DL (ref 70–99)
HBA1C MFR BLD: 12.5 %
PERFORMED ON: ABNORMAL
VANCOMYCIN TROUGH: 10.7 UG/ML (ref 10–20)

## 2022-09-02 PROCEDURE — 6370000000 HC RX 637 (ALT 250 FOR IP): Performed by: STUDENT IN AN ORGANIZED HEALTH CARE EDUCATION/TRAINING PROGRAM

## 2022-09-02 PROCEDURE — 94760 N-INVAS EAR/PLS OXIMETRY 1: CPT

## 2022-09-02 PROCEDURE — 2580000003 HC RX 258: Performed by: STUDENT IN AN ORGANIZED HEALTH CARE EDUCATION/TRAINING PROGRAM

## 2022-09-02 PROCEDURE — 1200000000 HC SEMI PRIVATE

## 2022-09-02 PROCEDURE — 6360000002 HC RX W HCPCS

## 2022-09-02 PROCEDURE — 80202 ASSAY OF VANCOMYCIN: CPT

## 2022-09-02 PROCEDURE — 36415 COLL VENOUS BLD VENIPUNCTURE: CPT

## 2022-09-02 PROCEDURE — 2580000003 HC RX 258

## 2022-09-02 PROCEDURE — 6360000002 HC RX W HCPCS: Performed by: STUDENT IN AN ORGANIZED HEALTH CARE EDUCATION/TRAINING PROGRAM

## 2022-09-02 PROCEDURE — 82565 ASSAY OF CREATININE: CPT

## 2022-09-02 RX ORDER — INSULIN GLARGINE 100 [IU]/ML
34 INJECTION, SOLUTION SUBCUTANEOUS NIGHTLY
Status: DISCONTINUED | OUTPATIENT
Start: 2022-09-02 | End: 2022-09-03

## 2022-09-02 RX ORDER — INSULIN GLARGINE 100 [IU]/ML
33 INJECTION, SOLUTION SUBCUTANEOUS NIGHTLY
Status: DISCONTINUED | OUTPATIENT
Start: 2022-09-02 | End: 2022-09-02

## 2022-09-02 RX ORDER — INSULIN LISPRO 100 [IU]/ML
16 INJECTION, SOLUTION INTRAVENOUS; SUBCUTANEOUS
Status: DISCONTINUED | OUTPATIENT
Start: 2022-09-02 | End: 2022-09-03

## 2022-09-02 RX ADMIN — VANCOMYCIN HYDROCHLORIDE 1000 MG: 1 INJECTION, POWDER, LYOPHILIZED, FOR SOLUTION INTRAVENOUS at 12:07

## 2022-09-02 RX ADMIN — INSULIN LISPRO 4 UNITS: 100 INJECTION, SOLUTION INTRAVENOUS; SUBCUTANEOUS at 12:03

## 2022-09-02 RX ADMIN — Medication: at 08:48

## 2022-09-02 RX ADMIN — ASPIRIN 81 MG 81 MG: 81 TABLET ORAL at 08:49

## 2022-09-02 RX ADMIN — INSULIN LISPRO 2 UNITS: 100 INJECTION, SOLUTION INTRAVENOUS; SUBCUTANEOUS at 16:21

## 2022-09-02 RX ADMIN — INSULIN LISPRO 2 UNITS: 100 INJECTION, SOLUTION INTRAVENOUS; SUBCUTANEOUS at 08:52

## 2022-09-02 RX ADMIN — PRASUGREL 10 MG: 10 TABLET, FILM COATED ORAL at 08:49

## 2022-09-02 RX ADMIN — INSULIN LISPRO 13 UNITS: 100 INJECTION, SOLUTION INTRAVENOUS; SUBCUTANEOUS at 08:52

## 2022-09-02 RX ADMIN — CEFEPIME 2000 MG: 2 INJECTION, POWDER, FOR SOLUTION INTRAVENOUS at 02:09

## 2022-09-02 RX ADMIN — INSULIN LISPRO 16 UNITS: 100 INJECTION, SOLUTION INTRAVENOUS; SUBCUTANEOUS at 12:03

## 2022-09-02 RX ADMIN — Medication 1250 MG: at 23:57

## 2022-09-02 RX ADMIN — CEFEPIME 2000 MG: 2 INJECTION, POWDER, FOR SOLUTION INTRAVENOUS at 17:35

## 2022-09-02 RX ADMIN — ERGOCALCIFEROL 50000 UNITS: 1.25 CAPSULE ORAL at 08:49

## 2022-09-02 RX ADMIN — CEFEPIME 2000 MG: 2 INJECTION, POWDER, FOR SOLUTION INTRAVENOUS at 09:58

## 2022-09-02 RX ADMIN — INSULIN GLARGINE 34 UNITS: 100 INJECTION, SOLUTION SUBCUTANEOUS at 21:26

## 2022-09-02 RX ADMIN — ATORVASTATIN CALCIUM 80 MG: 80 TABLET, FILM COATED ORAL at 08:49

## 2022-09-02 RX ADMIN — INSULIN LISPRO 16 UNITS: 100 INJECTION, SOLUTION INTRAVENOUS; SUBCUTANEOUS at 16:22

## 2022-09-02 ASSESSMENT — PAIN SCALES - GENERAL
PAINLEVEL_OUTOF10: 0
PAINLEVEL_OUTOF10: 0

## 2022-09-02 NOTE — PLAN OF CARE
Problem: Discharge Planning  Goal: Discharge to home or other facility with appropriate resources  9/2/2022 0939 by Dawn Davis RN  Outcome: Progressing  Flowsheets (Taken 9/1/2022 1811 by Keon Hill, RN)  Discharge to home or other facility with appropriate resources:   Identify barriers to discharge with patient and caregiver   Identify discharge learning needs (meds, wound care, etc)   Refer to discharge planning if patient needs post-hospital services based on physician order or complex needs related to functional status, cognitive ability or social support system   Arrange for needed discharge resources and transportation as appropriate     Problem: Pain  Goal: Verbalizes/displays adequate comfort level or baseline comfort level  9/2/2022 0939 by Dawn Davis RN  Outcome: Progressing  Flowsheets (Taken 9/1/2022 1806 by Keon Hill, RN)  Verbalizes/displays adequate comfort level or baseline comfort level:   Encourage patient to monitor pain and request assistance   Assess pain using appropriate pain scale   Administer analgesics based on type and severity of pain and evaluate response   Implement non-pharmacological measures as appropriate and evaluate response     Problem: Skin/Tissue Integrity  Goal: Absence of new skin breakdown  Description: 1. Monitor for areas of redness and/or skin breakdown  2. Assess vascular access sites hourly  3. Every 4-6 hours minimum:  Change oxygen saturation probe site  4. Every 4-6 hours:  If on nasal continuous positive airway pressure, respiratory therapy assess nares and determine need for appliance change or resting period.   9/2/2022 0939 by Dawn Davis RN  Outcome: Progressing  Note: Pt absent from new skin breakdown      Problem: ABCDS Injury Assessment  Goal: Absence of physical injury  9/2/2022 0939 by Dawn Davis RN  Outcome: Progressing  Flowsheets (Taken 9/1/2022 2212 by Susanne Snider, RN)  Absence of Physical Injury: Implement safety measures based on patient assessment     Problem: Safety - Adult  Goal: Free from fall injury  9/2/2022 0939 by Antoine Gibbs RN  Outcome: Progressing  Flowsheets (Taken 9/1/2022 2212 by Gabriela Walker RN)  Free From Fall Injury: Instruct family/caregiver on patient safety

## 2022-09-02 NOTE — PLAN OF CARE
Problem: Pain  Goal: Verbalizes/displays adequate comfort level or baseline comfort level  9/2/2022 0148 by Cherry Alarcon RN  Outcome: Progressing     Problem: Discharge Planning  Goal: Discharge to home or other facility with appropriate resources  9/2/2022 0148 by Cherry Alarcon RN  Outcome: Progressing    Problem: Skin/Tissue Integrity  Goal: Absence of new skin breakdown  Description: 1. Monitor for areas of redness and/or skin breakdown  2. Assess vascular access sites hourly  3. Every 4-6 hours minimum:  Change oxygen saturation probe site  4. Every 4-6 hours:  If on nasal continuous positive airway pressure, respiratory therapy assess nares and determine need for appliance change or resting period.   9/2/2022 0148 by Cherry Alarcon RN  Outcome: Progressing     Problem: ABCDS Injury Assessment  Goal: Absence of physical injury  9/2/2022 0148 by Cherry Alarcon RN  Outcome: Progressing  Flowsheets (Taken 9/1/2022 2212)  Absence of Physical Injury: Implement safety measures based on patient assessment     Problem: Safety - Adult  Goal: Free from fall injury  9/2/2022 0148 by Cherry Alarcon RN  Outcome: Progressing  Flowsheets (Taken 9/1/2022 2212)  Free From Fall Injury: Instruct family/caregiver on patient safety   Discharge to home or other facility with appropriate resources:   Identify barriers to discharge with patient and caregiver   Identify discharge learning needs (meds, wound care, etc)   Refer to discharge planning if patient needs post-hospital services based on physician order or complex needs related to functional status, cognitive ability or social support system   Arrange for needed discharge resources and transportation as appropriate

## 2022-09-02 NOTE — PROGRESS NOTES
Podiatric Surgery Daily Progress Note  Juni King      Subjective :   Patient seen and examined this am at the bedside. Patient denies any acute overnight events. Patient denies N/V/F/C/SOB. Patient denies calf pain, thigh pain, chest pain. Review of Systems: A 12 point review of symptoms is unremarkable with the exception of the chief complaint. Patient specifically denies nausea, fever, vomiting, chills, shortness of breath, chest pain, abdominal pain, constipation or difficulty urinating. Objective     /78   Pulse 86   Temp 97.5 °F (36.4 °C) (Oral)   Resp 18   Ht 6' 2\" (1.88 m)   Wt 209 lb 14.1 oz (95.2 kg)   SpO2 94%   BMI 26.95 kg/m²      I/O:  Intake/Output Summary (Last 24 hours) at 9/2/2022 1713  Last data filed at 9/2/2022 1207  Gross per 24 hour   Intake 1307.29 ml   Output --   Net 1307.29 ml              Wt Readings from Last 3 Encounters:   09/02/22 209 lb 14.1 oz (95.2 kg)   07/29/22 210 lb (95.3 kg)   07/22/22 209 lb 7 oz (95 kg)       LABS:    Recent Labs     09/01/22  0931   WBC 9.8   HGB 16.5   HCT 47.2           Recent Labs     09/01/22  0931 09/02/22  0441   *  --    K 4.8  --    CL 92*  --    CO2 28  --    BUN 17  --    CREATININE 1.0 0.9        Recent Labs     09/01/22  0931   PROT 7.9           LOWER EXTREMITY EXAMINATION    SKIN:MUGS 3 ulcer of LEFT plantar hallux of 2.1 cm diameter with exposed, but beefy red sub Q fat, receding erythema from initial wound with accompanying fissure to lateral hallux and raised purple bulla of medial hallux. Bulla on medial aspect of left hallux lysed expressing about 1 cc of sanguinous fluid. Underlying wound bed was healthy epidermal tissue with some areas of deep tissue injury.      Patient provided verbal consent for pictures obtained today:                 NEUROLOGIC:  Pain sensation is decreased Bilateral. Light touch is decreased Bilateral. positive history of paresthesia Bilateral. Negative history of burning Bilateral. Deep tendon reflexes are 1 to include patellar and achilles Bilateral. [place]--Sarita 5.07 monofilament sensitivity is abnormal 3 to 5 spots tested Bilateral.     VASCULAR:    Bilaterally Dorsalis pedis is +1/4. Bilaterally Posterior tibial pulse is +1/4. 1+ edema left with mild erythema noted around left hallux Mild varicosities bilaterally. MUSCULOSKELETAL:  Gait is  untested due to wound of LEFT hallux . Muscle strength is 5/5 to all groups tested to include dorsiflexion, plantarflexion, inversion, eversion, and digital Bilateral . The ranges of motion of all joints tested from ankle distal is decreased there is no crepitus noted Bilateral.    IMAGING:     XR L Toe (9/1/2022)  Narrative   EXAMINATION:   3 X-RAY VIEWS OF THE LEFT TOE       9/1/2022 9:20 am       COMPARISON:   None. HISTORY:   ORDERING SYSTEM PROVIDED HISTORY: toe infection   TECHNOLOGIST PROVIDED HISTORY:   Reason for exam:->toe infection   Reason for exam:->great toe   Reason for Exam: toe infection; great toe       FINDINGS:   There is clinical history of infection involving the great toe. No evidence   of radiopaque foreign body. No evidence of subcutaneous emphysema. There   are degenerative changes of the 1st metatarsophalangeal joint. No evidence   of periosteal reaction or bone destruction to suggest plain film findings of   osteomyelitis. Focal well-corticated ossific densities along lateral border   of the calcaneocuboid joint most consistent with accessory ossicles. Impression   No evidence of acute osseous abnormality involving the left great toe. ASSESSMENT/PLAN    1. Ulceration of LEFT hallux secondary to diabetic neuropathy with accompanying cellulitis   +VSS. No AM CBC  +Cellulitis improving   +Medial hallux bulla lysed. 1 cc of sanguinous drainage expressed. No purulence noted. +Dressing applied to left LE consisting of betadine soaked gauze, dry gauze, and Kerlix.    +Recommend patient continue on broad spectrum antibiotics. 2. Diabetes with peripheral neuropathy, and previous ulceration of same toe  +Denial as to his care, without appropriate use of insulin, shoe gear or concern for diet / control of glucose. Need for greater education of his medical condition    Dispo:Left LE cellulitis (improving) with left hallux ulceration. Continue broad spectrum antibiotics. Medial hallux bulla lysed. Podiatry will continue to follow while patient is in house.     - The patient assessment and plan was discussed with Dr. Kaylene Mercedes, [address] [place] [address] DPM  Podiatric Resident, PGY-2

## 2022-09-02 NOTE — PROGRESS NOTES
Pt AAO x4. No c/o pain. Wound noted to left great toe. Awaiting Santyl from pharmacy so dressing can be placed. Assessment completed and charted. Pt does have neuropathy in feet. Denies needs at this time. Call light in reach. Will monitor.

## 2022-09-02 NOTE — PROGRESS NOTES
Hospitalist Progress Note      PCP: SILVIO POLLOCK, APRN - CNP    Date of Admission: 9/1/2022    Chief Complaint: left toe wound. Hospital Course:    62y.o. year-old male with a history of hypertension, hyperlipidemia, type II diabetes mellitus, multivessel coronary artery disease, ischemic cardiomyopathy with an ICD. Patient admitted with diabetic left toe infection and hyperglycemia    Subjective:   Denies any new complaints, patient frustrated with carb restriction. Medications:  Reviewed    Infusion Medications    dextrose      sodium chloride       Scheduled Medications    insulin glargine  33 Units SubCUTAneous Nightly    insulin lispro  16 Units SubCUTAneous TID WC    aspirin  81 mg Oral Daily    atorvastatin  80 mg Oral Daily    vitamin D  50,000 Units Oral Weekly    prasugrel  10 mg Oral Daily    sodium chloride flush  5-40 mL IntraVENous 2 times per day    enoxaparin  40 mg SubCUTAneous Nightly    insulin lispro  0-8 Units SubCUTAneous TID WC    insulin lispro  0-4 Units SubCUTAneous Nightly    cefepime  2,000 mg IntraVENous Q8H    vancomycin (VANCOCIN) intermittent dosing (placeholder)   Other RX Placeholder    vancomycin  1,000 mg IntraVENous Q12H    collagenase   Topical Daily     PRN Meds: glucose, dextrose bolus **OR** dextrose bolus, glucagon (rDNA), dextrose, sodium chloride flush, sodium chloride, ondansetron **OR** ondansetron, polyethylene glycol, acetaminophen **OR** acetaminophen      Intake/Output Summary (Last 24 hours) at 9/2/2022 1158  Last data filed at 9/2/2022 0651  Gross per 24 hour   Intake 707.29 ml   Output --   Net 707.29 ml       Physical Exam Performed:    /78   Pulse 86   Temp 97.5 °F (36.4 °C) (Oral)   Resp 18   Ht 6' 2\" (1.88 m)   Wt 209 lb 14.1 oz (95.2 kg)   SpO2 94%   BMI 26.95 kg/m²     General appearance: No apparent distress, appears stated age and cooperative. HEENT: Pupils equal, round, and reactive to light.  Conjunctivae/corneas clear.  Neck: Supple, with full range of motion. No jugular venous distention. Trachea midline. Respiratory:  Normal respiratory effort. Clear to auscultation, bilaterally without Rales/Wheezes/Rhonchi. Cardiovascular: Regular rate and rhythm with normal S1/S2 without murmurs, rubs or gallops. Abdomen: Soft, non-tender, non-distended with normal bowel sounds. Musculoskeletal: Left first toe with discoloration and drainage noted. Skin: Skin color, texture, turgor normal.  No rashes or lesions. Neurologic:  Neurovascularly intact without any focal sensory/motor deficits. Cranial nerves: II-XII intact, grossly non-focal.  Psychiatric: Alert and oriented, thought content appropriate, normal insight  Capillary Refill: Brisk, 3 seconds, normal   Peripheral Pulses: +2 palpable, equal bilaterally       Labs:   Recent Labs     09/01/22  0931   WBC 9.8   HGB 16.5   HCT 47.2        Recent Labs     09/01/22  0931 09/02/22  0441   *  --    K 4.8  --    CL 92*  --    CO2 28  --    BUN 17  --    CREATININE 1.0 0.9   CALCIUM 10.7*  --      Recent Labs     09/01/22  0931   AST 13*   ALT 18   BILITOT 0.5   ALKPHOS 99     No results for input(s): INR in the last 72 hours. No results for input(s): Kaleen Hope in the last 72 hours. Urinalysis:      Lab Results   Component Value Date/Time    NITRU Negative 10/06/2020 11:10 AM    WBCUA 2 10/06/2020 11:10 AM    RBCUA 9 10/06/2020 11:10 AM    BLOODU Negative 10/06/2020 11:10 AM    SPECGRAV >1.030 10/06/2020 11:10 AM    GLUCOSEU >=1000 10/06/2020 11:10 AM    GLUCOSEU >=1000 09/10/2010 08:37 AM       Radiology:  XR TOE LEFT (MIN 2 VIEWS)   Preliminary Result   No evidence of acute osseous abnormality involving the left great toe. Assessment/Plan:    Active Hospital Problems    Diagnosis     Type 2 diabetes mellitus with left diabetic foot infection ([facility] [U87.237, L08.9]      Priority: Medium     Left first toe diabetic foot infection. Following an injury 3 weeks ago patient noted worsening redness and drainage of left first toe. No fever, no systemic symptoms. Seen by podiatry, will continue antibiotics for today  Plan  -Cefepime/Vancomycin  -Appreciate podiatry input  - wound swab of drainage. Diabetes mellitus. Patient is on insulin detemir 30 units and 13 units 3 times daily. Reports poor compliance  Hemoglobin A1c 12.5    I had spent time explaining to the patient importance of diabetes management control, wife at bedside, she stated that the patient is compliant for 1 to 2 months and then gets frustrated and discouraged, I had explained to both importance of diabetes control. Plan  -Continue sliding scale  -Increase glargine 34 units  -Increase insulin lispro to 16 units 3 times daily  -Update sliding scale     CAD  Cardiomyopathy  Continue home medications       DVT Prophylaxis: lovenox  Diet: ADULT DIET;  Regular; 4 carb choices (60 gm/meal)  Code Status: Full Code  PT/OT Eval Status: ambulatory     Ramy Goins MD

## 2022-09-02 NOTE — PROGRESS NOTES
Patient is alert & oriented x4, pt is UAL, 2/4 bed rails up, bed in lowest position, fall precautions in place, call light within reach. Morning medications given. Morning assessment complete. Dressing changed to right foot. Will cont to monitor and reassess.   Electronically signed by Antoine Gibbs RN on 9/2/2022 at 9:39 AM

## 2022-09-02 NOTE — PROGRESS NOTES
Pt resting in bed. No complaints of pain. IV antibiotic is infusing. Foot has remained wrapped. No further needs at this time.    Electronically signed by Wing Novak RN on 9/2/2022 at 6:07 PM

## 2022-09-03 LAB
CREAT SERPL-MCNC: 0.8 MG/DL (ref 0.9–1.3)
GFR AFRICAN AMERICAN: >60
GFR NON-AFRICAN AMERICAN: >60
GLUCOSE BLD-MCNC: 195 MG/DL (ref 70–99)
GLUCOSE BLD-MCNC: 244 MG/DL (ref 70–99)
GLUCOSE BLD-MCNC: 259 MG/DL (ref 70–99)
GLUCOSE BLD-MCNC: 325 MG/DL (ref 70–99)
PERFORMED ON: ABNORMAL

## 2022-09-03 PROCEDURE — 94760 N-INVAS EAR/PLS OXIMETRY 1: CPT

## 2022-09-03 PROCEDURE — 82565 ASSAY OF CREATININE: CPT

## 2022-09-03 PROCEDURE — 36415 COLL VENOUS BLD VENIPUNCTURE: CPT

## 2022-09-03 PROCEDURE — 6360000002 HC RX W HCPCS: Performed by: STUDENT IN AN ORGANIZED HEALTH CARE EDUCATION/TRAINING PROGRAM

## 2022-09-03 PROCEDURE — 2580000003 HC RX 258: Performed by: STUDENT IN AN ORGANIZED HEALTH CARE EDUCATION/TRAINING PROGRAM

## 2022-09-03 PROCEDURE — 1200000000 HC SEMI PRIVATE

## 2022-09-03 PROCEDURE — 6370000000 HC RX 637 (ALT 250 FOR IP): Performed by: STUDENT IN AN ORGANIZED HEALTH CARE EDUCATION/TRAINING PROGRAM

## 2022-09-03 RX ORDER — INSULIN GLARGINE 100 [IU]/ML
37 INJECTION, SOLUTION SUBCUTANEOUS NIGHTLY
Status: DISCONTINUED | OUTPATIENT
Start: 2022-09-03 | End: 2022-09-05 | Stop reason: HOSPADM

## 2022-09-03 RX ORDER — INSULIN LISPRO 100 [IU]/ML
19 INJECTION, SOLUTION INTRAVENOUS; SUBCUTANEOUS
Status: DISCONTINUED | OUTPATIENT
Start: 2022-09-03 | End: 2022-09-05 | Stop reason: HOSPADM

## 2022-09-03 RX ADMIN — ASPIRIN 81 MG 81 MG: 81 TABLET ORAL at 08:33

## 2022-09-03 RX ADMIN — PRASUGREL 10 MG: 10 TABLET, FILM COATED ORAL at 08:33

## 2022-09-03 RX ADMIN — CEFEPIME 2000 MG: 2 INJECTION, POWDER, FOR SOLUTION INTRAVENOUS at 02:03

## 2022-09-03 RX ADMIN — INSULIN LISPRO 19 UNITS: 100 INJECTION, SOLUTION INTRAVENOUS; SUBCUTANEOUS at 18:55

## 2022-09-03 RX ADMIN — ACETAMINOPHEN 650 MG: 325 TABLET, FILM COATED ORAL at 18:53

## 2022-09-03 RX ADMIN — Medication 1250 MG: at 15:22

## 2022-09-03 RX ADMIN — INSULIN LISPRO 6 UNITS: 100 INJECTION, SOLUTION INTRAVENOUS; SUBCUTANEOUS at 08:34

## 2022-09-03 RX ADMIN — ATORVASTATIN CALCIUM 80 MG: 80 TABLET, FILM COATED ORAL at 08:33

## 2022-09-03 RX ADMIN — INSULIN LISPRO 19 UNITS: 100 INJECTION, SOLUTION INTRAVENOUS; SUBCUTANEOUS at 13:40

## 2022-09-03 RX ADMIN — CEFEPIME 2000 MG: 2 INJECTION, POWDER, FOR SOLUTION INTRAVENOUS at 18:52

## 2022-09-03 RX ADMIN — CEFEPIME 2000 MG: 2 INJECTION, POWDER, FOR SOLUTION INTRAVENOUS at 09:46

## 2022-09-03 RX ADMIN — INSULIN GLARGINE 37 UNITS: 100 INJECTION, SOLUTION SUBCUTANEOUS at 21:30

## 2022-09-03 RX ADMIN — INSULIN LISPRO 19 UNITS: 100 INJECTION, SOLUTION INTRAVENOUS; SUBCUTANEOUS at 08:35

## 2022-09-03 RX ADMIN — INSULIN LISPRO 4 UNITS: 100 INJECTION, SOLUTION INTRAVENOUS; SUBCUTANEOUS at 18:55

## 2022-09-03 ASSESSMENT — PAIN SCALES - GENERAL: PAINLEVEL_OUTOF10: 0

## 2022-09-03 NOTE — PROGRESS NOTES
Pt AAO x4. No c/o pain. Assessment completed and charted. Dressing to left foot CDI. Pedal pulses palpable. Pt does have neuropathy in feet. Denies needs at this time. Call light in reach. Will monitor.

## 2022-09-03 NOTE — PROGRESS NOTES
reflexes are 1 to include patellar and achilles Bilateral. [place]--Sarita 5.07 monofilament sensitivity is abnormal 3 to 5 spots tested Bilateral.     VASCULAR:    Bilaterally Dorsalis pedis is +1/4. Bilaterally Posterior tibial pulse is +1/4. 1+ edema left with mild erythema noted around left hallux Mild varicosities bilaterally. MUSCULOSKELETAL:  Gait is  untested due to wound of LEFT hallux . Muscle strength is 5/5 to all groups tested to include dorsiflexion, plantarflexion, inversion, eversion, and digital Bilateral . The ranges of motion of all joints tested from ankle distal is decreased there is no crepitus noted Bilateral.    IMAGING:     XR L Toe (9/1/2022)  Narrative   EXAMINATION:   3 X-RAY VIEWS OF THE LEFT TOE       9/1/2022 9:20 am       COMPARISON:   None. HISTORY:   ORDERING SYSTEM PROVIDED HISTORY: toe infection   TECHNOLOGIST PROVIDED HISTORY:   Reason for exam:->toe infection   Reason for exam:->great toe   Reason for Exam: toe infection; great toe       FINDINGS:   There is clinical history of infection involving the great toe. No evidence   of radiopaque foreign body. No evidence of subcutaneous emphysema. There   are degenerative changes of the 1st metatarsophalangeal joint. No evidence   of periosteal reaction or bone destruction to suggest plain film findings of   osteomyelitis. Focal well-corticated ossific densities along lateral border   of the calcaneocuboid joint most consistent with accessory ossicles. Impression   No evidence of acute osseous abnormality involving the left great toe. ASSESSMENT/PLAN    1. Ulceration of LEFT hallux secondary to diabetic neuropathy with accompanying cellulitis   +VSS. +Cellulitis improving   +Medial hallux bulla lysed. 1 cc of sanguinous drainage expressed. No purulence noted. +Dressing applied to left LE consisting of betadine soaked gauze, dry gauze, and Kerlix.    +Recommend patient continue on broad spectrum antibiotics. 2. Diabetes with peripheral neuropathy, and previous ulceration of same toe  +Denial as to his care, without appropriate use of insulin, shoe gear or concern for diet / control of glucose. Need for greater education of his medical condition  +peter discussion with patient that if he does not make managing his DM a priority he is at risk for complications including amputation. Dispo:Left LE cellulitis (improving) with left hallux ulceration. Continue broad spectrum antibiotics. Podiatry will continue to follow while patient is in house.     NGHIA Chaves [facility]  [phone]

## 2022-09-03 NOTE — PROGRESS NOTES
Hospitalist Progress Note      PCP: SILVIO POLLOCK, APRN - CNP    Date of Admission: 9/1/2022    Chief Complaint: left toe wound. Hospital Course:    62y.o. year-old male with a history of hypertension, hyperlipidemia, type II diabetes mellitus, multivessel coronary artery disease, ischemic cardiomyopathy with an ICD. Patient admitted with diabetic left toe infection and hyperglycemia    Subjective:   Denies any complaints. Blood sugar continues to be elevated    Medications:  Reviewed    Infusion Medications    dextrose      sodium chloride       Scheduled Medications    insulin lispro  19 Units SubCUTAneous TID WC    insulin glargine  37 Units SubCUTAneous Nightly    vancomycin  1,250 mg IntraVENous Q12H    aspirin  81 mg Oral Daily    atorvastatin  80 mg Oral Daily    vitamin D  50,000 Units Oral Weekly    prasugrel  10 mg Oral Daily    sodium chloride flush  5-40 mL IntraVENous 2 times per day    enoxaparin  40 mg SubCUTAneous Nightly    insulin lispro  0-8 Units SubCUTAneous TID WC    insulin lispro  0-4 Units SubCUTAneous Nightly    cefepime  2,000 mg IntraVENous Q8H    vancomycin (VANCOCIN) intermittent dosing (placeholder)   Other RX Placeholder    collagenase   Topical Daily     PRN Meds: glucose, dextrose bolus **OR** dextrose bolus, glucagon (rDNA), dextrose, sodium chloride flush, sodium chloride, ondansetron **OR** ondansetron, polyethylene glycol, acetaminophen **OR** acetaminophen      Intake/Output Summary (Last 24 hours) at 9/3/2022 1250  Last data filed at 9/3/2022 0659  Gross per 24 hour   Intake 590 ml   Output --   Net 590 ml         Physical Exam Performed:    /71   Pulse 75   Temp 97.7 °F (36.5 °C) (Oral)   Resp 16   Ht 6' 2\" (1.88 m)   Wt 215 lb 9.8 oz (97.8 kg)   SpO2 97%   BMI 27.68 kg/m²     General appearance: No apparent distress, appears stated age and cooperative. HEENT: Pupils equal, round, and reactive to light. Conjunctivae/corneas clear.   Neck: Supple, with full range of motion. No jugular venous distention. Trachea midline. Respiratory:  Normal respiratory effort. Clear to auscultation, bilaterally without Rales/Wheezes/Rhonchi. Cardiovascular: Regular rate and rhythm with normal S1/S2 without murmurs, rubs or gallops. Abdomen: Soft, non-tender, non-distended with normal bowel sounds. Musculoskeletal: Left first toe with discoloration and drainage noted. Skin: Skin color, texture, turgor normal.  No rashes or lesions. Neurologic:  Neurovascularly intact without any focal sensory/motor deficits. Cranial nerves: II-XII intact, grossly non-focal.  Psychiatric: Alert and oriented, thought content appropriate, normal insight  Capillary Refill: Brisk, 3 seconds, normal   Peripheral Pulses: +2 palpable, equal bilaterally       Labs:   Recent Labs     09/01/22  0931   WBC 9.8   HGB 16.5   HCT 47.2          Recent Labs     09/01/22  0931 09/02/22  0441 09/03/22  0539   *  --   --    K 4.8  --   --    CL 92*  --   --    CO2 28  --   --    BUN 17  --   --    CREATININE 1.0 0.9 0.8*   CALCIUM 10.7*  --   --        Recent Labs     09/01/22  0931   AST 13*   ALT 18   BILITOT 0.5   ALKPHOS 99       No results for input(s): INR in the last 72 hours. No results for input(s): Tay Steviees in the last 72 hours. Urinalysis:      Lab Results   Component Value Date/Time    NITRU Negative 10/06/2020 11:10 AM    WBCUA 2 10/06/2020 11:10 AM    RBCUA 9 10/06/2020 11:10 AM    BLOODU Negative 10/06/2020 11:10 AM    SPECGRAV >1.030 10/06/2020 11:10 AM    GLUCOSEU >=1000 10/06/2020 11:10 AM    GLUCOSEU >=1000 09/10/2010 08:37 AM       Radiology:  XR TOE LEFT (MIN 2 VIEWS)   Preliminary Result   No evidence of acute osseous abnormality involving the left great toe.                  Assessment/Plan:    Active Hospital Problems    Diagnosis     Type 2 diabetes mellitus with left diabetic foot infection ([facility] [L49.282, L08.9]      Priority: Medium     Left first toe diabetic foot infection. Following an injury 3 weeks ago patient noted worsening redness and drainage of left first toe. No fever, no systemic symptoms. Seen by podiatry, will continue antibiotics for today  Plan  -Cefepime/Vancomycin  -Appreciate podiatry input  - wound swab of drainage.   - blood cultures negative to date      Diabetes mellitus. Patient is on insulin detemir 30 units and 13 units 3 times daily. Reports poor compliance  Hemoglobin A1c 12.5    I had spent time explaining to the patient importance of diabetes management control, wife at bedside, she stated that the patient is compliant for 1 to 2 months and then gets frustrated and discouraged, I had explained to both importance of diabetes control. Plan  -Continue sliding scale  -Increase glargine 34--> 37 units  -Increase insulin lispro to 16--> 19 units 3 times daily     CAD  Cardiomyopathy  Continue home medications       DVT Prophylaxis: lovenox  Diet: ADULT DIET;  Regular; 4 carb choices (60 gm/meal)  Code Status: Full Code  PT/OT Eval Status: ambulatory     Dispo - pending clinical improvement ( BG control )         Pearlene Lesch, MD

## 2022-09-03 NOTE — PROGRESS NOTES
Clinical Pharmacy Note  Vancomycin Consult    Juni Fotrune is a 62 y.o. male ordered Vancomycin for diabetic foot infection; consult received from Dr. Francis Hugo to manage therapy. Also receiving cefepime. Allergies:  Patient has no known allergies. Temp max:  Temp (24hrs), Av.9 °F (36.6 °C), Min:97.5 °F (36.4 °C), Max:98.3 °F (36.8 °C)      Recent Labs     22  0931   WBC 9.8         Recent Labs     22  0931 22  0441   BUN 17  --    CREATININE 1.0 0.9           Intake/Output Summary (Last 24 hours) at 9/3/2022 0016  Last data filed at 2022 2245  Gross per 24 hour   Intake 1137.29 ml   Output --   Net 1137.29 ml       Culture Results:  -Blood cultures: no growth to date    Ht Readings from Last 1 Encounters:   22 6' 2\" (1.88 m)          Wt Readings from Last 1 Encounters:   22 209 lb 14.1 oz (95.2 kg)         Estimated Creatinine Clearance: 105 mL/min (based on SCr of 0.9 mg/dL). Assessment/Plan-Day #3 vancomycin:   -Renal function stable (Scr 0.9 mg/dL, CrCl 105 mL/min)  -Received 1x dose of vancomycin 1500mg, then MD of 1000mg IV Q12H was started (received 2 doses-then level drawn). -Level: 10.7 mg/dL (10 hours after dose). Current MD showing subtherapeutic AUC (396).   -Based on kinetics, will adjust dose to vancomycin 1250mg IV [id number] trough of 15.2 mg/L and .  -Next level ordered for  at 1000 (after 3 doses)      Thank you for the consult,  Breana Livingston, [address] [facility]  9/3/2022  12:16 AM

## 2022-09-03 NOTE — PROGRESS NOTES
Pt. A/O x4 resting in bed with eyes closed. Assessment complete with changes as documented. Medication administered PO with no complications. Pt UAL; ambulated to the bathroom this AM with no complications. No complaints of N/V. No complaints of pain. Pt stated MD in this AM to change dressing. Pt. able to make needs known with no further questions or concerns at this time. Bed locked and in lowest position, side rails up x2. Call light and all belongings within reach. Will continue to check on pt. every 2 hours to monitor pt's safety and assess pt needs.  Electronically signed by Adan Avery RN on 9/3/2022 at 9:03 AM

## 2022-09-04 LAB
CREAT SERPL-MCNC: 0.8 MG/DL (ref 0.9–1.3)
GFR AFRICAN AMERICAN: >60
GFR NON-AFRICAN AMERICAN: >60
GLUCOSE BLD-MCNC: 162 MG/DL (ref 70–99)
GLUCOSE BLD-MCNC: 212 MG/DL (ref 70–99)
GLUCOSE BLD-MCNC: 247 MG/DL (ref 70–99)
GLUCOSE BLD-MCNC: 333 MG/DL (ref 70–99)
PERFORMED ON: ABNORMAL
VANCOMYCIN RANDOM: 14.3 UG/ML

## 2022-09-04 PROCEDURE — 6370000000 HC RX 637 (ALT 250 FOR IP): Performed by: STUDENT IN AN ORGANIZED HEALTH CARE EDUCATION/TRAINING PROGRAM

## 2022-09-04 PROCEDURE — 36415 COLL VENOUS BLD VENIPUNCTURE: CPT

## 2022-09-04 PROCEDURE — 2580000003 HC RX 258: Performed by: STUDENT IN AN ORGANIZED HEALTH CARE EDUCATION/TRAINING PROGRAM

## 2022-09-04 PROCEDURE — 80202 ASSAY OF VANCOMYCIN: CPT

## 2022-09-04 PROCEDURE — 1200000000 HC SEMI PRIVATE

## 2022-09-04 PROCEDURE — 82565 ASSAY OF CREATININE: CPT

## 2022-09-04 PROCEDURE — 6360000002 HC RX W HCPCS: Performed by: STUDENT IN AN ORGANIZED HEALTH CARE EDUCATION/TRAINING PROGRAM

## 2022-09-04 RX ADMIN — ATORVASTATIN CALCIUM 80 MG: 80 TABLET, FILM COATED ORAL at 09:09

## 2022-09-04 RX ADMIN — CEFEPIME 2000 MG: 2 INJECTION, POWDER, FOR SOLUTION INTRAVENOUS at 03:25

## 2022-09-04 RX ADMIN — INSULIN LISPRO 19 UNITS: 100 INJECTION, SOLUTION INTRAVENOUS; SUBCUTANEOUS at 12:47

## 2022-09-04 RX ADMIN — PRASUGREL 10 MG: 10 TABLET, FILM COATED ORAL at 10:25

## 2022-09-04 RX ADMIN — INSULIN LISPRO 19 UNITS: 100 INJECTION, SOLUTION INTRAVENOUS; SUBCUTANEOUS at 09:30

## 2022-09-04 RX ADMIN — INSULIN GLARGINE 37 UNITS: 100 INJECTION, SOLUTION SUBCUTANEOUS at 21:09

## 2022-09-04 RX ADMIN — Medication 1250 MG: at 00:32

## 2022-09-04 RX ADMIN — INSULIN LISPRO 2 UNITS: 100 INJECTION, SOLUTION INTRAVENOUS; SUBCUTANEOUS at 09:13

## 2022-09-04 RX ADMIN — CEFEPIME 2000 MG: 2 INJECTION, POWDER, FOR SOLUTION INTRAVENOUS at 18:34

## 2022-09-04 RX ADMIN — INSULIN LISPRO 4 UNITS: 100 INJECTION, SOLUTION INTRAVENOUS; SUBCUTANEOUS at 21:09

## 2022-09-04 RX ADMIN — Medication 1250 MG: at 15:39

## 2022-09-04 RX ADMIN — ASPIRIN 81 MG 81 MG: 81 TABLET ORAL at 09:09

## 2022-09-04 RX ADMIN — INSULIN LISPRO 2 UNITS: 100 INJECTION, SOLUTION INTRAVENOUS; SUBCUTANEOUS at 18:38

## 2022-09-04 RX ADMIN — CEFEPIME 2000 MG: 2 INJECTION, POWDER, FOR SOLUTION INTRAVENOUS at 10:25

## 2022-09-04 RX ADMIN — ACETAMINOPHEN 650 MG: 325 TABLET, FILM COATED ORAL at 21:08

## 2022-09-04 RX ADMIN — INSULIN LISPRO 19 UNITS: 100 INJECTION, SOLUTION INTRAVENOUS; SUBCUTANEOUS at 18:38

## 2022-09-04 ASSESSMENT — PAIN DESCRIPTION - FREQUENCY
FREQUENCY: INTERMITTENT
FREQUENCY: CONTINUOUS

## 2022-09-04 ASSESSMENT — PAIN SCALES - GENERAL
PAINLEVEL_OUTOF10: 0
PAINLEVEL_OUTOF10: 3
PAINLEVEL_OUTOF10: 3

## 2022-09-04 ASSESSMENT — PAIN DESCRIPTION - LOCATION
LOCATION: TOE (COMMENT WHICH ONE)
LOCATION: TOE (COMMENT WHICH ONE)

## 2022-09-04 ASSESSMENT — PAIN DESCRIPTION - PAIN TYPE
TYPE: ACUTE PAIN
TYPE: ACUTE PAIN

## 2022-09-04 ASSESSMENT — PAIN DESCRIPTION - DESCRIPTORS
DESCRIPTORS: THROBBING
DESCRIPTORS: THROBBING

## 2022-09-04 ASSESSMENT — PAIN DESCRIPTION - ONSET
ONSET: ON-GOING
ONSET: GRADUAL

## 2022-09-04 ASSESSMENT — PAIN DESCRIPTION - ORIENTATION
ORIENTATION: LEFT
ORIENTATION: LEFT

## 2022-09-04 NOTE — PROGRESS NOTES
Podiatric Surgery Daily Progress Note      Admit Date: 9/1/2022      Code:Full Code    Patient seen and examined, labs and records reviewed    Subjective:     Patient seen at bedside this AM.  Patient denies any overnight acute event. Patient denies f/c/n/v/sob/cp. Review of Systems: A 12 point review of symptoms is unremarkable with the exception of the chief complaint. Patient specifically denies nausea, fever, vomiting, chills, shortness of breath, chest pain, abdominal pain, constipation or difficulty urinating. Objective     /80   Pulse 88   Temp 97.7 °F (36.5 °C) (Oral)   Resp 16   Ht 6' 2\" (1.88 m)   Wt 218 lb 14.7 oz (99.3 kg)   SpO2 97%   BMI 28.11 kg/m²      I/O:  Intake/Output Summary (Last 24 hours) at 9/4/2022 1222  Last data filed at 9/4/2022 0900  Gross per 24 hour   Intake 1302.89 ml   Output --   Net 1302.89 ml              Wt Readings from Last 3 Encounters:   09/04/22 218 lb 14.7 oz (99.3 kg)   07/29/22 210 lb (95.3 kg)   07/22/22 209 lb 7 oz (95 kg)       LABS:  No results for input(s): WBC, HGB, HCT, PLT in the last 72 hours. Recent Labs     09/04/22  0510   CREATININE 0.8*      No results for input(s): PROT, INR, APTT in the last 72 hours. LOWER EXTREMITY EXAMINATION    Dressing to Left LE intact. No strikethrough noted to the external dressing. No drainage noted to the internal layers of the dressing. VASCULAR: DP and PT pulses are palpable 1/4 b/l. CFT is brisk to the digits of the foot b/l. Skin temperature is warm to cool from proximal to distal with  focal calor noted to the left great toe. No edema noted. No pain with calf compression b/l. NEUROLOGIC: Gross and epicritic sensation is diminished b/l. Protective sensation is diminished at all pedal sites b/l. DERMATOLOGIC:   Left lower extremity:    Full-thickness ulcerations noted to the plantar medial and plantar lateral aspect of the left great toe. The wound bases are granular in nature.   Slight erythema noted periwound. Erythema noted to be improving. No purulent drainage, tracking, tunneling noted. The wounds do not probe to bone. Right LE soft tissue envelope is closed without ulceration. MUSCULOSKELETAL: Muscle strength is 5/5 for all pedal groups tested. No pain with palpation of the foot or ankle b/l. Ankle joint ROM is decreased in dorsiflexion with the knee extended. No obvious biomechanical abnormalities. IMAGING:  XR L Toe (9/1/2022)  Narrative   EXAMINATION:   3 X-RAY VIEWS OF THE LEFT TOE       9/1/2022 9:20 am       COMPARISON:   None. HISTORY:   ORDERING SYSTEM PROVIDED HISTORY: toe infection   TECHNOLOGIST PROVIDED HISTORY:   Reason for exam:->toe infection   Reason for exam:->great toe   Reason for Exam: toe infection; great toe       FINDINGS:   There is clinical history of infection involving the great toe. No evidence   of radiopaque foreign body. No evidence of subcutaneous emphysema. There   are degenerative changes of the 1st metatarsophalangeal joint. No evidence   of periosteal reaction or bone destruction to suggest plain film findings of   osteomyelitis. Focal well-corticated ossific densities along lateral border   of the calcaneocuboid joint most consistent with accessory ossicles. Impression   No evidence of acute osseous abnormality involving the left great toe.            ASSESSMENT/PLAN  -Full-thickness ulcerations, left hallux-Moon stage II  -Cellulitis, left lower extremity- improving  -Uncontrolled type 2 diabetes mellitus with peripheral neuropathy    -Patient seen and examined at bedside this AM  -VSS, no updated CBC this a.m.  -HbA1c 12.5  -Blood cultures 1 & 2 NGTD  -XR images reviewed, impression noted above  -Continue IV antibiotics per primary team  -Left lower extremity dressed with Santyl, DSD, and Ace with gentle compression  -post-op shoe ordered to patient room  -Weightbearing as tolerated to left lower extremity in surgical shoe at all times  -Encourage patient to use rollabout knee walker at home after discharge  -Discussion was had again today with patient about managing his uncontrolled diabetes, and that he is at risk for more complications in the future including possible amputation. DISPO: Full-thickness ulceration with cellulitis, left hallux. Cellulitis noted to be improving. Continue antibiotics per primary team.  Patient okay to discharge from podiatric standpoint pending medical clearance and antibiotic recommendations. Patient seen and evaluated at the bedside with Dr. Robbie Hickman DPM.    Otelia Blizzard, DPM   Podiatric Resident PGY2  Pager [phone] or aKthy  9/4/2022, 12:22 PM      Patient was seen and evaluated at bedside. Agree with residents assessment and treatment plan.   Robbie Hickman DPM

## 2022-09-04 NOTE — PROGRESS NOTES
Hospitalist Progress Note      PCP: SILVIO POLLOCK, APRN - CNP    Date of Admission: 9/1/2022    Chief Complaint: left toe wound. Hospital Course:    62y.o. year-old male with a history of hypertension, hyperlipidemia, type II diabetes mellitus, multivessel coronary artery disease, ischemic cardiomyopathy with an ICD. Patient admitted with diabetic left toe infection and hyperglycemia    Subjective: Toe is slightly more indurated today, patient is complaining of new throbbing    Medications:  Reviewed    Infusion Medications    dextrose      sodium chloride       Scheduled Medications    insulin lispro  19 Units SubCUTAneous TID WC    insulin glargine  37 Units SubCUTAneous Nightly    vancomycin  1,250 mg IntraVENous Q12H    aspirin  81 mg Oral Daily    atorvastatin  80 mg Oral Daily    vitamin D  50,000 Units Oral Weekly    prasugrel  10 mg Oral Daily    sodium chloride flush  5-40 mL IntraVENous 2 times per day    enoxaparin  40 mg SubCUTAneous Nightly    insulin lispro  0-8 Units SubCUTAneous TID WC    insulin lispro  0-4 Units SubCUTAneous Nightly    cefepime  2,000 mg IntraVENous Q8H    vancomycin (VANCOCIN) intermittent dosing (placeholder)   Other RX Placeholder    collagenase   Topical Daily     PRN Meds: glucose, dextrose bolus **OR** dextrose bolus, glucagon (rDNA), dextrose, sodium chloride flush, sodium chloride, ondansetron **OR** ondansetron, polyethylene glycol, acetaminophen **OR** acetaminophen      Intake/Output Summary (Last 24 hours) at 9/4/2022 1222  Last data filed at 9/4/2022 0900  Gross per 24 hour   Intake 1302.89 ml   Output --   Net 1302.89 ml         Physical Exam Performed:    /80   Pulse 88   Temp 97.7 °F (36.5 °C) (Oral)   Resp 16   Ht 6' 2\" (1.88 m)   Wt 218 lb 14.7 oz (99.3 kg)   SpO2 97%   BMI 28.11 kg/m²     General appearance: No apparent distress, appears stated age and cooperative. HEENT: Pupils equal, round, and reactive to light. Conjunctivae/corneas clear. Neck: Supple, with full range of motion. No jugular venous distention. Trachea midline. Respiratory:  Normal respiratory effort. Clear to auscultation, bilaterally without Rales/Wheezes/Rhonchi. Cardiovascular: Regular rate and rhythm with normal S1/S2 without murmurs, rubs or gallops. Abdomen: Soft, non-tender, non-distended with normal bowel sounds. Musculoskeletal: Left first toe with discoloration and drainage noted. Skin: Skin color, texture, turgor normal.  No rashes or lesions. Neurologic:  Neurovascularly intact without any focal sensory/motor deficits. Cranial nerves: II-XII intact, grossly non-focal.  Psychiatric: Alert and oriented, thought content appropriate, normal insight  Capillary Refill: Brisk, 3 seconds, normal   Peripheral Pulses: +2 palpable, equal bilaterally       Labs:   No results for input(s): WBC, HGB, HCT, PLT in the last 72 hours. Recent Labs     09/02/22  0441 09/03/22  0539 09/04/22  0510   CREATININE 0.9 0.8* 0.8*       No results for input(s): AST, ALT, BILIDIR, BILITOT, ALKPHOS in the last 72 hours. No results for input(s): INR in the last 72 hours. No results for input(s): Nely Fuchs in the last 72 hours. Urinalysis:      Lab Results   Component Value Date/Time    NITRU Negative 10/06/2020 11:10 AM    WBCUA 2 10/06/2020 11:10 AM    RBCUA 9 10/06/2020 11:10 AM    BLOODU Negative 10/06/2020 11:10 AM    SPECGRAV >1.030 10/06/2020 11:10 AM    GLUCOSEU >=1000 10/06/2020 11:10 AM    GLUCOSEU >=1000 09/10/2010 08:37 AM       Radiology:  XR TOE LEFT (MIN 2 VIEWS)   Preliminary Result   No evidence of acute osseous abnormality involving the left great toe. Assessment/Plan:    Active Hospital Problems    Diagnosis     Type 2 diabetes mellitus with left diabetic foot infection [facility] [Q31.668, L08.9]      Priority: Medium     Left first toe diabetic foot infection.    Following an injury 3 weeks ago patient noted worsening redness and drainage of left first toe. No fever, no systemic symptoms. Seen by podiatry, will continue broad-spectrum antibiotics. Clinically seems slightly more red today  Plan  -Continue cefepime/Vancomycin  -Appreciate podiatry input  - blood cultures negative to date      Diabetes mellitus. Patient is on insulin detemir 30 units and 13 units 3 times daily. Reports poor compliance  Hemoglobin A1c 12.5    I had spent time explaining to the patient importance of diabetes management control, wife at bedside, she stated that the patient is compliant for 1 to 2 months and then gets frustrated and discouraged, I had explained to both importance of diabetes control. Plan  -Blood sugar better controlled  -Continue sliding scale  -I continue on glargine 37 units  -Continue insulin lispro 19 units 3 times daily     CAD  Cardiomyopathy  Continue home medications       DVT Prophylaxis: lovenox  Diet: ADULT DIET; Regular; 4 carb choices (60 gm/meal)  Code Status: Full Code  PT/OT Eval Status: ambulatory     Dispo - pending clinical improvement.          Fransisco Yip MD

## 2022-09-04 NOTE — PROGRESS NOTES
Pt. A/O x4 resting in bed. . Assessment complete with changes as documented. Medication administered PO with no complications. Pt UAL; ambulated to the bathroom this AM with no complications. No complaints of N/V. No complaints of pain. Pt stated MD in this AM to change dressing. Pt. able to make needs known with no further questions or concerns at this time. Bed locked and in lowest position, side rails up x2. Call light and all belongings within reach. Will continue to check on pt. every 2 hours to monitor pt's safety and assess pt needs.

## 2022-09-04 NOTE — PROGRESS NOTES
PT AAO x4 per this shift. VSS. Pt able to ambulate independently. Pt apoorva pain. Pt tolerating diet. Diet education completed. No further needs voiced per this shift. Dressing Fall precautions in place. Call light within reach. Will continue to round.  Electronically signed by To Nicole RN on 9/4/2022 at 7:39 AM

## 2022-09-04 NOTE — PLAN OF CARE
Problem: Discharge Planning  Goal: Discharge to home or other facility with appropriate resources  9/4/2022 1124 by Muriel Padron RN  Outcome: Progressing  Flowsheets (Taken 9/3/2022 2251 by Matt Addison RN)  Discharge to home or other facility with appropriate resources:   Identify barriers to discharge with patient and caregiver   Arrange for needed discharge resources and transportation as appropriate   Identify discharge learning needs (meds, wound care, etc)   Arrange for interpreters to assist at discharge as needed   Refer to discharge planning if patient needs post-hospital services based on physician order or complex needs related to functional status, cognitive ability or social support system  9/3/2022 2251 by Matt Addison RN  Outcome: Progressing  Flowsheets  Taken 9/3/2022 2251  Discharge to home or other facility with appropriate resources:   Identify barriers to discharge with patient and caregiver   Arrange for needed discharge resources and transportation as appropriate   Identify discharge learning needs (meds, wound care, etc)   Arrange for interpreters to assist at discharge as needed   Refer to discharge planning if patient needs post-hospital services based on physician order or complex needs related to functional status, cognitive ability or social support system  Taken 9/3/2022 2020  Discharge to home or other facility with appropriate resources: Identify barriers to discharge with patient and caregiver     Problem: Pain  Goal: Verbalizes/displays adequate comfort level or baseline comfort level  9/4/2022 1124 by Muriel Padron RN  Outcome: Progressing  Flowsheets (Taken 9/3/2022 0140 by Colleen Guerin RN)  Verbalizes/displays adequate comfort level or baseline comfort level:   Encourage patient to monitor pain and request assistance   Assess pain using appropriate pain scale   Administer analgesics based on type and severity of pain and evaluate response   Implement non-pharmacological measures as appropriate and evaluate response  9/3/2022 2251 by Justo Kramer RN  Outcome: Progressing  Flowsheets (Taken 9/3/2022 0140 by Gabriela Walker RN)  Verbalizes/displays adequate comfort level or baseline comfort level:   Encourage patient to monitor pain and request assistance   Assess pain using appropriate pain scale   Administer analgesics based on type and severity of pain and evaluate response   Implement non-pharmacological measures as appropriate and evaluate response     Problem: Skin/Tissue Integrity  Goal: Absence of new skin breakdown  Description: 1. Monitor for areas of redness and/or skin breakdown  2. Assess vascular access sites hourly  3. Every 4-6 hours minimum:  Change oxygen saturation probe site  4. Every 4-6 hours:  If on nasal continuous positive airway pressure, respiratory therapy assess nares and determine need for appliance change or resting period. 9/4/2022 1124 by Matilda Reddy RN  Outcome: Progressing  Note: Patient skin condition and mucus membrane integrity remain unchanged during this shift. Skin breakdown prevention interventions are in place. Will continue to monitor and assess. 9/3/2022 2251 by Justo Kramer RN  Outcome: Progressing  Note: Michael score assessed. Patient able to ambulate and turn self. Repositioned patient Q2H and assessed skin. Educated patient on importance of repositioning to prevent skin issues. Problem: ABCDS Injury Assessment  Goal: Absence of physical injury  9/4/2022 1124 by Matilda Reddy RN  Outcome: Progressing  Flowsheets (Taken 9/4/2022 1124)  Absence of Physical Injury: Implement safety measures based on patient assessment  9/3/2022 2251 by Justo Kramer RN  Outcome: Progressing  Flowsheets (Taken 9/3/2022 2247)  Absence of Physical Injury: Implement safety measures based on patient assessment  Note: Pt is free of injury. No injury noted. Fall precautions in place. Call light within reach.  Will monitor.         Problem: Safety - Adult  Goal: Free from fall injury  9/4/2022 1124 by Keon Hill RN  Outcome: Progressing  Flowsheets (Taken 9/3/2022 2251 by Chiquita Fuentes, RN)  Free From Fall Injury:   Instruct family/caregiver on patient safety   Based on caregiver fall risk screen, instruct family/caregiver to ask for assistance with transferring infant if caregiver noted to have fall risk factors  9/3/2022 2251 by Chiquita Fuentes, RN  Outcome: Progressing  Flowsheets  Taken 9/3/2022 2251  Free From Fall Injury:   Instruct family/caregiver on patient safety   Based on caregiver fall risk screen, instruct family/caregiver to ask for assistance with transferring infant if caregiver noted to have fall risk factors  Taken 9/3/2022 2247  Free From Fall Injury: Instruct family/caregiver on patient safety

## 2022-09-04 NOTE — PROGRESS NOTES
Clinical Pharmacy Note  Vancomycin Consult    Juni Mora is a 62 y.o. male ordered Vancomycin for diabetic foot infection; consult received from Dr. Rebekah Puente to manage therapy. Also receiving cefepime. Allergies:  Patient has no known allergies. Temp max:  Temp (24hrs), Av.8 °F (36.6 °C), Min:97.7 °F (36.5 °C), Max:97.9 °F (36.6 °C)      No results for input(s): WBC in the last 72 hours. Recent Labs     22  0441 22  0539 22  0510   CREATININE 0.9 0.8* 0.8*           Intake/Output Summary (Last 24 hours) at 2022 1134  Last data filed at 2022 0601  Gross per 24 hour   Intake 1038.89 ml   Output --   Net 1038.89 ml         Culture Results:  -Blood cultures: no growth to date    Ht Readings from Last 1 Encounters:   22 6' 2\" (1.88 m)          Wt Readings from Last 1 Encounters:   22 218 lb 14.7 oz (99.3 kg)         Estimated Creatinine Clearance: 128 mL/min (A) (based on SCr of 0.8 mg/dL (L)). Assessment/Plan-Day #4 vancomycin:   -Renal function stable (Scr 0.9 mg/dL, CrCl 105 mL/min)  Vanc Level: 14.3mg/l  Continue vancomycin 1250mg IV [id number] trough of 15.2 mg/L and .         Thank you for the consult,  Andrew Mcclain, [facility]  2022  11:34 AM

## 2022-09-04 NOTE — PLAN OF CARE
Problem: Discharge Planning  Goal: Discharge to home or other facility with appropriate resources  Outcome: Progressing  Flowsheets (Taken 9/3/2022 2251)  Discharge to home or other facility with appropriate resources:   Identify barriers to discharge with patient and caregiver   Arrange for needed discharge resources and transportation as appropriate   Identify discharge learning needs (meds, wound care, etc)   Arrange for interpreters to assist at discharge as needed   Refer to discharge planning if patient needs post-hospital services based on physician order or complex needs related to functional status, cognitive ability or social support system     Problem: Pain  Goal: Verbalizes/displays adequate comfort level or baseline comfort level  Outcome: Progressing  Flowsheets (Taken 9/3/2022 0140 by Reynaldo Rivas RN)  Verbalizes/displays adequate comfort level or baseline comfort level:   Encourage patient to monitor pain and request assistance   Assess pain using appropriate pain scale   Administer analgesics based on type and severity of pain and evaluate response   Implement non-pharmacological measures as appropriate and evaluate response     Problem: Skin/Tissue Integrity  Goal: Absence of new skin breakdown  Description: 1. Monitor for areas of redness and/or skin breakdown  2. Assess vascular access sites hourly  3. Every 4-6 hours minimum:  Change oxygen saturation probe site  4. Every 4-6 hours:  If on nasal continuous positive airway pressure, respiratory therapy assess nares and determine need for appliance change or resting period. Outcome: Progressing  Note: Michael score assessed. Patient able to ambulate and turn self. Repositioned patient Q2H and assessed skin. Educated patient on importance of repositioning to prevent skin issues.         Problem: Safety - Adult  Goal: Free from fall injury  Outcome: Progressing  Flowsheets  Taken 9/3/2022 2251  Free From Fall Injury:   Richard Patterson family/caregiver on patient safety   Based on caregiver fall risk screen, instruct family/caregiver to ask for assistance with transferring infant if caregiver noted to have fall risk factors  Taken 9/3/2022 2300  Free From Fall Injury: Instruct family/caregiver on patient safety

## 2022-09-04 NOTE — PROGRESS NOTES
Pt reported new pain in L big toe this shift. MD aware. No PRN pain medications requested at this time. Pt reports neuropathy at baseline. Pt stated he can not feel pain normally. Surgical shoe applied to L foot at this time per order. IV cefepime running at this time. Vancomycin due at 12. Call placed to pharmacy at this time. Cefepime and vanc not compatible per pharmacy. Will administer vanc when cefepime complete.  Electronically signed by Candi Cantor RN on 9/4/2022 at 1:17 PM

## 2022-09-05 VITALS
WEIGHT: 219.58 LBS | OXYGEN SATURATION: 95 % | RESPIRATION RATE: 17 BRPM | HEART RATE: 78 BPM | TEMPERATURE: 97.6 F | SYSTOLIC BLOOD PRESSURE: 123 MMHG | HEIGHT: 74 IN | BODY MASS INDEX: 28.18 KG/M2 | DIASTOLIC BLOOD PRESSURE: 78 MMHG

## 2022-09-05 LAB
BLOOD CULTURE, ROUTINE: NORMAL
CULTURE, BLOOD 2: NORMAL
GLUCOSE BLD-MCNC: 169 MG/DL (ref 70–99)
GLUCOSE BLD-MCNC: 195 MG/DL (ref 70–99)
GLUCOSE BLD-MCNC: 235 MG/DL (ref 70–99)
PERFORMED ON: ABNORMAL

## 2022-09-05 PROCEDURE — 2580000003 HC RX 258: Performed by: STUDENT IN AN ORGANIZED HEALTH CARE EDUCATION/TRAINING PROGRAM

## 2022-09-05 PROCEDURE — 6360000002 HC RX W HCPCS: Performed by: STUDENT IN AN ORGANIZED HEALTH CARE EDUCATION/TRAINING PROGRAM

## 2022-09-05 PROCEDURE — 6370000000 HC RX 637 (ALT 250 FOR IP): Performed by: STUDENT IN AN ORGANIZED HEALTH CARE EDUCATION/TRAINING PROGRAM

## 2022-09-05 PROCEDURE — 94760 N-INVAS EAR/PLS OXIMETRY 1: CPT

## 2022-09-05 RX ORDER — INSULIN GLARGINE 100 [IU]/ML
37 INJECTION, SOLUTION SUBCUTANEOUS NIGHTLY
Qty: 10 ML | Refills: 3 | Status: SHIPPED | OUTPATIENT
Start: 2022-09-05 | End: 2022-09-05 | Stop reason: SDUPTHER

## 2022-09-05 RX ORDER — INSULIN GLARGINE 100 [IU]/ML
37 INJECTION, SOLUTION SUBCUTANEOUS NIGHTLY
Qty: 10 ML | Refills: 3 | Status: SHIPPED | OUTPATIENT
Start: 2022-09-05

## 2022-09-05 RX ORDER — INSULIN LISPRO 100 [IU]/ML
0-8 INJECTION, SOLUTION INTRAVENOUS; SUBCUTANEOUS
Qty: 10 EACH | Refills: 1 | Status: SHIPPED | OUTPATIENT
Start: 2022-09-05 | End: 2022-10-05

## 2022-09-05 RX ORDER — INSULIN LISPRO 100 [IU]/ML
0-8 INJECTION, SOLUTION INTRAVENOUS; SUBCUTANEOUS
Qty: 10 EACH | Refills: 1 | Status: SHIPPED | OUTPATIENT
Start: 2022-09-05 | End: 2022-09-05 | Stop reason: SDUPTHER

## 2022-09-05 RX ORDER — AMOXICILLIN AND CLAVULANATE POTASSIUM 500; 125 MG/1; MG/1
1 TABLET, FILM COATED ORAL 3 TIMES DAILY
Qty: 30 TABLET | Refills: 0 | Status: SHIPPED | OUTPATIENT
Start: 2022-09-05 | End: 2022-09-05 | Stop reason: SDUPTHER

## 2022-09-05 RX ORDER — INSULIN LISPRO 100 [IU]/ML
19 INJECTION, SOLUTION INTRAVENOUS; SUBCUTANEOUS
Qty: 500 ML | Refills: 0 | Status: SHIPPED | OUTPATIENT
Start: 2022-09-05 | End: 2022-09-05 | Stop reason: SDUPTHER

## 2022-09-05 RX ORDER — INSULIN LISPRO 100 [IU]/ML
19 INJECTION, SOLUTION INTRAVENOUS; SUBCUTANEOUS
Qty: 500 ML | Refills: 0 | Status: SHIPPED | OUTPATIENT
Start: 2022-09-05 | End: 2022-10-05

## 2022-09-05 RX ORDER — AMOXICILLIN AND CLAVULANATE POTASSIUM 500; 125 MG/1; MG/1
1 TABLET, FILM COATED ORAL 3 TIMES DAILY
Qty: 30 TABLET | Refills: 0 | Status: SHIPPED | OUTPATIENT
Start: 2022-09-05 | End: 2022-09-15

## 2022-09-05 RX ADMIN — INSULIN LISPRO 2 UNITS: 100 INJECTION, SOLUTION INTRAVENOUS; SUBCUTANEOUS at 11:46

## 2022-09-05 RX ADMIN — ATORVASTATIN CALCIUM 80 MG: 80 TABLET, FILM COATED ORAL at 09:32

## 2022-09-05 RX ADMIN — Medication 1250 MG: at 14:31

## 2022-09-05 RX ADMIN — ACETAMINOPHEN 650 MG: 325 TABLET, FILM COATED ORAL at 09:31

## 2022-09-05 RX ADMIN — Medication 1250 MG: at 00:20

## 2022-09-05 RX ADMIN — CEFEPIME 2000 MG: 2 INJECTION, POWDER, FOR SOLUTION INTRAVENOUS at 01:58

## 2022-09-05 RX ADMIN — INSULIN LISPRO 19 UNITS: 100 INJECTION, SOLUTION INTRAVENOUS; SUBCUTANEOUS at 11:46

## 2022-09-05 RX ADMIN — PRASUGREL 10 MG: 10 TABLET, FILM COATED ORAL at 09:32

## 2022-09-05 RX ADMIN — Medication: at 09:35

## 2022-09-05 RX ADMIN — CEFEPIME 2000 MG: 2 INJECTION, POWDER, FOR SOLUTION INTRAVENOUS at 09:35

## 2022-09-05 RX ADMIN — INSULIN LISPRO 19 UNITS: 100 INJECTION, SOLUTION INTRAVENOUS; SUBCUTANEOUS at 09:36

## 2022-09-05 RX ADMIN — ASPIRIN 81 MG 81 MG: 81 TABLET ORAL at 09:32

## 2022-09-05 ASSESSMENT — PAIN DESCRIPTION - ORIENTATION
ORIENTATION: LEFT
ORIENTATION: LEFT

## 2022-09-05 ASSESSMENT — PAIN SCALES - GENERAL
PAINLEVEL_OUTOF10: 0
PAINLEVEL_OUTOF10: 5

## 2022-09-05 ASSESSMENT — PAIN DESCRIPTION - LOCATION
LOCATION: TOE (COMMENT WHICH ONE)
LOCATION: TOE (COMMENT WHICH ONE)

## 2022-09-05 ASSESSMENT — PAIN DESCRIPTION - ONSET
ONSET: ON-GOING
ONSET: ON-GOING

## 2022-09-05 ASSESSMENT — PAIN DESCRIPTION - PAIN TYPE
TYPE: ACUTE PAIN
TYPE: ACUTE PAIN

## 2022-09-05 ASSESSMENT — PAIN DESCRIPTION - FREQUENCY
FREQUENCY: CONTINUOUS
FREQUENCY: CONTINUOUS

## 2022-09-05 ASSESSMENT — PAIN DESCRIPTION - DESCRIPTORS
DESCRIPTORS: ACHING
DESCRIPTORS: ACHING

## 2022-09-05 NOTE — PROGRESS NOTES
Data- discharge order received, pt verbalized agreement to discharge, disposition to previous residence, no needs for HHC/DME. Action- discharge instructions prepared and given to patient, pt verbalized understanding. Medication information packet given r/t NEW and/or CHANGED prescriptions emphasizing name/purpose/side effects, pt verbalized understanding. Discharge instruction summary: Diet- CC, Activity- UAL, Primary Care Physician as followsLAURA Bliss - [phone] f/u appointment as needed, immunizations reviewed and are up to date, prescription medications filled by [facility]. Response- Pt belongings gathered, IV removed. Disposition is home (no HHC/DME needs), transported with belongings, taken to lobby via w/c w/ transportation, no complications.

## 2022-09-05 NOTE — PROGRESS NOTES
Podiatric Surgery Daily Progress Note      Admit Date: 9/1/2022      Code:Full Code    Patient seen and examined, labs and records reviewed    Subjective:     Patient seen at bedside this AM.  Patient denies any overnight acute event. Patient denies f/c/n/v/sob/cp. Review of Systems: A 12 point review of symptoms is unremarkable with the exception of the chief complaint. Patient specifically denies nausea, fever, vomiting, chills, shortness of breath, chest pain, abdominal pain, constipation or difficulty urinating. Objective     /78   Pulse 78   Temp 97.6 °F (36.4 °C) (Oral)   Resp 17   Ht 6' 2\" (1.88 m)   Wt 219 lb 9.3 oz (99.6 kg)   SpO2 95%   BMI 28.19 kg/m²      I/O:  Intake/Output Summary (Last 24 hours) at 9/5/2022 1205  Last data filed at 9/5/2022 0933  Gross per 24 hour   Intake 540 ml   Output --   Net 540 ml              Wt Readings from Last 3 Encounters:   09/05/22 219 lb 9.3 oz (99.6 kg)   07/29/22 210 lb (95.3 kg)   07/22/22 209 lb 7 oz (95 kg)       LABS:  No results for input(s): WBC, HGB, HCT, PLT in the last 72 hours. Recent Labs     09/04/22  0510   CREATININE 0.8*      No results for input(s): PROT, INR, APTT in the last 72 hours. LOWER EXTREMITY EXAMINATION    Dressing to Left LE intact. No strikethrough noted to the external dressing. No drainage noted to the internal layers of the dressing. VASCULAR: DP and PT pulses are palpable 1/4 b/l. CFT is brisk to the digits of the foot b/l. Skin temperature is warm to cool from proximal to distal with  focal calor noted to the left great toe. No edema noted. No pain with calf compression b/l. NEUROLOGIC: Gross and epicritic sensation is diminished b/l. Protective sensation is diminished at all pedal sites b/l. DERMATOLOGIC:   Left lower extremity:    Full-thickness ulcerations noted to the plantar medial and plantar lateral aspect of the left great toe. The wound bases are granular in nature.   Slight erythema noted periwound. Erythema noted to be improving. No purulent drainage, tracking, tunneling noted. The wounds do not probe to bone. Right LE soft tissue envelope is closed without ulceration. MUSCULOSKELETAL: Muscle strength is 5/5 for all pedal groups tested. No pain with palpation of the foot or ankle b/l. Ankle joint ROM is decreased in dorsiflexion with the knee extended. No obvious biomechanical abnormalities. IMAGING:  XR L Toe (9/1/2022)  Narrative   EXAMINATION:   3 X-RAY VIEWS OF THE LEFT TOE       9/1/2022 9:20 am       COMPARISON:   None. HISTORY:   ORDERING SYSTEM PROVIDED HISTORY: toe infection   TECHNOLOGIST PROVIDED HISTORY:   Reason for exam:->toe infection   Reason for exam:->great toe   Reason for Exam: toe infection; great toe       FINDINGS:   There is clinical history of infection involving the great toe. No evidence   of radiopaque foreign body. No evidence of subcutaneous emphysema. There   are degenerative changes of the 1st metatarsophalangeal joint. No evidence   of periosteal reaction or bone destruction to suggest plain film findings of   osteomyelitis. Focal well-corticated ossific densities along lateral border   of the calcaneocuboid joint most consistent with accessory ossicles. Impression   No evidence of acute osseous abnormality involving the left great toe.            ASSESSMENT/PLAN  -Full-thickness ulcerations, left hallux-Moon stage II  -Cellulitis, left lower extremity- improving  -Uncontrolled type 2 diabetes mellitus with peripheral neuropathy    -Patient seen and examined at bedside this AM  -VSS, no updated CBC this a.m.  -HbA1c 12.5  -Blood cultures 1 & 2 NGTD  -XR images reviewed, impression noted above  -Continue IV antibiotics per primary team  -Patient will discharge on Augmentin per primary team  -Left lower extremity dressed with Santyl, DSD, and Ace with gentle compression  -post-op shoe ordered to patient room  -Weightbearing as tolerated to left lower extremity in surgical shoe at all times  -Encourage patient to use rollabout knee walker at home after discharge      DISPO: Full-thickness ulceration with cellulitis, left hallux. Cellulitis noted to be improving. Continue antibiotics per primary team.  Patient okay to discharge from podiatric standpoint pending medical clearance and antibiotic recommendations. Patient seen and evaluated at the bedside with Dr. Clinton Elizabeth DPM.    Caren Bain DPM   Podiatric Resident PGY2  Pager [phone] or Kathy  9/5/2022, 12:05 PM      Patient was seen and evaluated at bedside. Agree with residents assessment and treatment plan.   Clinton Elizabeth DPM

## 2022-09-05 NOTE — DISCHARGE SUMMARY
Hospital Medicine Discharge Summary    Patient ID: Julio César Walden      Patient's PCP: LAURA Mays - CNP    Admit Date: 9/1/2022     Discharge Date:   09/05/22      Admitting Provider: Celio Ramirez MD     Discharge Provider: Celio Ramirez MD     Discharge Diagnoses: Active Hospital Problems    Diagnosis     Type 2 diabetes mellitus with left diabetic foot infection ([facility] [H94.210, L08.9]      Priority: Medium       The patient was seen and examined on day of discharge and this discharge summary is in conjunction with any daily progress note from day of discharge. Hospital Course:      62y.o. year-old male with a history of hypertension, hyperlipidemia, type II diabetes mellitus, multivessel coronary artery disease, ischemic cardiomyopathy with an ICD. Patient admitted with diabetic left toe infection and hyperglycemia  Left first toe diabetic foot infection. Following an injury 3 weeks ago patient noted worsening redness and drainage of left first toe. No fever, no systemic symptoms. Seen by podiatry, will continue broad-spectrum antibiotics. Will discharge on Augmentin, follow-up with podiatry     Diabetes mellitus. Patient is on insulin detemir 30 units and 13 units 3 times daily. Reports poor compliance  Hemoglobin A1c 12.5     I had spent time explaining to the patient importance of diabetes management control, wife at bedside, she stated that the patient is compliant for 1 to 2 months and then gets frustrated and discouraged, I had explained to both importance of diabetes control.    Plan  Will  discharged on 19 units of lispro 3 times daily as well as glargine 37 units  Follow-up with PCP     CAD  Cardiomyopathy  Continue home medications           Physical Exam Performed:     /78   Pulse 78   Temp 97.6 °F (36.4 °C) (Oral)   Resp 17   Ht 6' 2\" (1.88 m)   Wt 219 lb 9.3 oz (99.6 kg)   SpO2 95%   BMI 28.19 kg/m²       General appearance:  No apparent distress, appears stated age and cooperative. HEENT:  Normal cephalic, atraumatic without obvious deformity. Pupils equal, round, and reactive to light. Extra ocular muscles intact. Conjunctivae/corneas clear. Neck: Supple, with full range of motion. No jugular venous distention. Trachea midline. Respiratory:  Normal respiratory effort. Clear to auscultation, bilaterally without Rales/Wheezes/Rhonchi. Cardiovascular:  Regular rate and rhythm with normal S1/S2 without murmurs, rubs or gallops. Abdomen: Soft, non-tender, non-distended with normal bowel sounds. Musculoskeletal:  No clubbing, cyanosis or edema bilaterally. Full range of motion without deformity. Skin: Skin color, texture, turgor normal.  No rashes or lesions. Neurologic:  Neurovascularly intact without any focal sensory/motor deficits. Cranial nerves: II-XII intact, grossly non-focal.  Psychiatric:  Alert and oriented, thought content appropriate, normal insight  Capillary Refill: Brisk,< 3 seconds   Peripheral Pulses: +2 palpable, equal bilaterally       Labs: For convenience and continuity at follow-up the following most recent labs are provided:      CBC:    Lab Results   Component Value Date/Time    WBC 9.8 09/01/2022 09:31 AM    HGB 16.5 09/01/2022 09:31 AM    HCT 47.2 09/01/2022 09:31 AM     09/01/2022 09:31 AM       Renal:    Lab Results   Component Value Date/Time     09/01/2022 09:31 AM    K 4.8 09/01/2022 09:31 AM    CL 92 09/01/2022 09:31 AM    CO2 28 09/01/2022 09:31 AM    BUN 17 09/01/2022 09:31 AM    CREATININE 0.8 09/04/2022 05:10 AM    CALCIUM 10.7 09/01/2022 09:31 AM    PHOS 4.0 07/09/2020 05:49 AM         Significant Diagnostic Studies    Radiology:   XR TOE LEFT (MIN 2 VIEWS)   Preliminary Result   No evidence of acute osseous abnormality involving the left great toe.                 Consults:     PHARMACY TO DOSE VANCOMYCIN  IP CONSULT TO PODIATRY  IP CONSULT TO HOSPITALIST  PHARMACY TO DOSE VANCOMYCIN  IP CONSULT TO DIABETES EDUCATOR  IP CONSULT TO DIETITIAN  IP CONSULT TO DIABETES EDUCATOR    Disposition:  home      Condition at Discharge: Stable    Discharge Instructions/Follow-up:    -Glargine 37 units  -Lispro 19 units 3 times daily  -Insulin sliding scale  -Augmentin for 10 days  -Follow-up with podiatry and PCP    Code Status:  Full Code     Activity: activity as tolerated    Diet: diabetic diet      Discharge Medications:     Current Discharge Medication List             Details   amoxicillin-clavulanate (AUGMENTIN) 500-125 MG per tablet Take 1 tablet by mouth 3 times daily for 10 days  Qty: 30 tablet, Refills: 0      insulin glargine (LANTUS) 100 UNIT/ML injection vial Inject 37 Units into the skin nightly  Qty: 10 mL, Refills: 3      !! insulin lispro (HUMALOG) 100 UNIT/ML SOLN injection vial Inject 0-8 Units into the skin 3 times daily (with meals)   No Insulin 200-249 270-199  No Insulin 200-249 2 Units 250-299 4 Units 300-349 6 Units   Units 250-299 4 Units 300-349 6 Units  Qty: 10 each, Refills: 1      !! insulin lispro (HUMALOG) 100 UNIT/ML SOLN injection vial Inject 19 Units into the skin 3 times daily (with meals)  Qty: 500 mL, Refills: 0       !! - Potential duplicate medications found. Please discuss with provider.              Details   prasugrel (EFFIENT) 10 MG TABS TAKE 1 TABLET BY MOUTH EVERY DAY  Qty: 30 tablet, Refills: 3      atorvastatin (LIPITOR) 80 MG tablet TAKE 1 TABLET BY MOUTH EVERY DAY  Qty: 90 tablet, Refills: 3      Omega-3 Fatty Acids (FISH OIL PO) Take 1 capsule by mouth daily       GARLIC PO Take 1 capsule by mouth daily       aspirin 81 MG chewable tablet Take 81 mg by mouth daily      Cholecalciferol (VITAMIN D3) 11024 units CAPS Take 1 capsule by mouth once a week       Dulaglutide 3 MG/0.5ML SOPN Inject 3 mg into the skin once a week Indications: Friday              Time Spent on discharge is more than 30 minutes in the examination, evaluation, counseling and review of medications and discharge plan. Signed:    Sneha Eaton MD   9/5/2022      Thank you LAURA MORLEY CNP for the opportunity to be involved in this patient's care. If you have any questions or concerns, please feel free to contact me at [phone].

## 2022-09-05 NOTE — CARE COORDINATION
CASE MANAGEMENT DISCHARGE SUMMARY:    DISCHARGE DATE: 9/5/2022    DISCHARGED TO: Home with wife     TRANSPORTATION: Wife             TIME: TBD    COMMENTS: Patient will discharge home today. Denies home needs.      Electronically signed by Shashank Gallagher RN on 9/5/2022 at 11:48 AM

## 2022-09-05 NOTE — PLAN OF CARE
Problem: Discharge Planning  Goal: Discharge to home or other facility with appropriate resources  9/5/2022 0730 by Pranay Beckham RN  Outcome: Progressing  Flowsheets (Taken 9/5/2022 0730)  Discharge to home or other facility with appropriate resources:   Identify barriers to discharge with patient and caregiver   Identify discharge learning needs (meds, wound care, etc)   Refer to discharge planning if patient needs post-hospital services based on physician order or complex needs related to functional status, cognitive ability or social support system  9/4/2022 2310 by Guicho Oseguera RN  Outcome: Progressing  Flowsheets (Taken 9/4/2022 2310)  Discharge to home or other facility with appropriate resources: Identify barriers to discharge with patient and caregiver     Problem: Pain  Goal: Verbalizes/displays adequate comfort level or baseline comfort level  9/5/2022 0730 by Pranay Beckham RN  Outcome: Progressing  Flowsheets (Taken 9/5/2022 0730)  Verbalizes/displays adequate comfort level or baseline comfort level:   Encourage patient to monitor pain and request assistance   Administer analgesics based on type and severity of pain and evaluate response   Assess pain using appropriate pain scale   Implement non-pharmacological measures as appropriate and evaluate response  9/4/2022 2310 by Guicho Oseguera RN  Outcome: Progressing  Flowsheets (Taken 9/4/2022 2310)  Verbalizes/displays adequate comfort level or baseline comfort level: Encourage patient to monitor pain and request assistance     Problem: Skin/Tissue Integrity  Goal: Absence of new skin breakdown  Description: 1. Monitor for areas of redness and/or skin breakdown  2. Assess vascular access sites hourly  3. Every 4-6 hours minimum:  Change oxygen saturation probe site  4. Every 4-6 hours:  If on nasal continuous positive airway pressure, respiratory therapy assess nares and determine need for appliance change or resting period.   Outcome: Progressing  Note: No new areas of skin breakdown noted. Will monitor for changes.      Problem: ABCDS Injury Assessment  Goal: Absence of physical injury  9/5/2022 0730 by Janet Fuentes RN  Outcome: Progressing  Flowsheets (Taken 9/4/2022 2310 by Parveen Lechuga RN)  Absence of Physical Injury: Implement safety measures based on patient assessment  9/4/2022 2310 by Parveen Lechuga RN  Outcome: Progressing  Flowsheets (Taken 9/4/2022 2310)  Absence of Physical Injury: Implement safety measures based on patient assessment     Problem: Safety - Adult  Goal: Free from fall injury  9/5/2022 0730 by Janet Fuentes RN  Outcome: Progressing  Flowsheets (Taken 9/5/2022 0730)  Free From Fall Injury: Instruct family/caregiver on patient safety  9/4/2022 2310 by Parveen Lechuga RN  Outcome: Progressing  Flowsheets (Taken 9/4/2022 2310)  Free From Fall Injury: Instruct family/caregiver on patient safety

## 2022-09-05 NOTE — PLAN OF CARE
Problem: Discharge Planning  Goal: Discharge to home or other facility with appropriate resources  9/4/2022 2310 by Damian Fitzpatrick RN  Outcome: Progressing  Flowsheets (Taken 9/4/2022 2310)  Discharge to home or other facility with appropriate resources: Identify barriers to discharge with patient and caregiver  9/4/2022 1124 by Nikolas Do RN  Outcome: Progressing  Flowsheets (Taken 9/3/2022 2251 by Paula Williamson RN)  Discharge to home or other facility with appropriate resources:   Identify barriers to discharge with patient and caregiver   Arrange for needed discharge resources and transportation as appropriate   Identify discharge learning needs (meds, wound care, etc)   Arrange for interpreters to assist at discharge as needed   Refer to discharge planning if patient needs post-hospital services based on physician order or complex needs related to functional status, cognitive ability or social support system     Problem: Pain  Goal: Verbalizes/displays adequate comfort level or baseline comfort level  9/4/2022 2310 by Damian Fitzpatrick RN  Outcome: Progressing  Flowsheets (Taken 9/4/2022 2310)  Verbalizes/displays adequate comfort level or baseline comfort level: Encourage patient to monitor pain and request assistance  9/4/2022 1124 by Nikolas Do RN  Outcome: Progressing  Flowsheets (Taken 9/3/2022 0140 by Rolanda Hussein RN)  Verbalizes/displays adequate comfort level or baseline comfort level:   Encourage patient to monitor pain and request assistance   Assess pain using appropriate pain scale   Administer analgesics based on type and severity of pain and evaluate response   Implement non-pharmacological measures as appropriate and evaluate response     Problem: Skin/Tissue Integrity  Goal: Absence of new skin breakdown  Description: 1. Monitor for areas of redness and/or skin breakdown  2. Assess vascular access sites hourly  3.   Every 4-6 hours minimum:  Change oxygen saturation probe site  4. Every 4-6 hours:  If on nasal continuous positive airway pressure, respiratory therapy assess nares and determine need for appliance change or resting period. 9/4/2022 1124 by Candi Cantor RN  Outcome: Progressing  Note: Patient skin condition and mucus membrane integrity remain unchanged during this shift. Skin breakdown prevention interventions are in place. Will continue to monitor and assess.         Problem: ABCDS Injury Assessment  Goal: Absence of physical injury  9/4/2022 2310 by Daniela Bailey RN  Outcome: Progressing  Flowsheets (Taken 9/4/2022 2310)  Absence of Physical Injury: Implement safety measures based on patient assessment  9/4/2022 1124 by Candi Cantor RN  Outcome: Progressing  Flowsheets (Taken 9/4/2022 1124)  Absence of Physical Injury: Implement safety measures based on patient assessment     Problem: Safety - Adult  Goal: Free from fall injury  9/4/2022 2310 by Daniela Bailey RN  Outcome: Progressing  Flowsheets (Taken 9/4/2022 2310)  Free From Fall Injury: Instruct family/caregiver on patient safety  9/4/2022 1124 by Candi Cantor RN  Outcome: Progressing  Flowsheets (Taken 9/3/2022 2251 by William Michael RN)  Free From Fall Injury:   Instruct family/caregiver on patient safety   Based on caregiver fall risk screen, instruct family/caregiver to ask for assistance with transferring infant if caregiver noted to have fall risk factors

## 2022-09-15 ENCOUNTER — HOSPITAL ENCOUNTER (OUTPATIENT)
Dept: WOUND CARE | Age: 57
Discharge: HOME | End: 2022-09-15
Payer: MEDICARE

## 2022-09-15 VITALS
DIASTOLIC BLOOD PRESSURE: 75 MMHG | TEMPERATURE: 98.4 F | SYSTOLIC BLOOD PRESSURE: 110 MMHG | HEART RATE: 104 BPM | RESPIRATION RATE: 16 BRPM

## 2022-09-15 DIAGNOSIS — L97.522 ULCER OF LEFT FOOT, WITH FAT LAYER EXPOSED (HCC): Primary | ICD-10-CM

## 2022-09-15 PROCEDURE — 11042 DBRDMT SUBQ TIS 1ST 20SQCM/<: CPT

## 2022-09-15 PROCEDURE — 99213 OFFICE O/P EST LOW 20 MIN: CPT

## 2022-09-15 RX ORDER — LIDOCAINE 40 MG/G
CREAM TOPICAL ONCE
Status: CANCELLED | OUTPATIENT
Start: 2022-09-15 | End: 2022-09-15

## 2022-09-15 RX ORDER — BACITRACIN ZINC AND POLYMYXIN B SULFATE 500; 1000 [USP'U]/G; [USP'U]/G
OINTMENT TOPICAL ONCE
Status: CANCELLED | OUTPATIENT
Start: 2022-09-15 | End: 2022-09-15

## 2022-09-15 RX ORDER — GINSENG 100 MG
CAPSULE ORAL ONCE
Status: CANCELLED | OUTPATIENT
Start: 2022-09-15 | End: 2022-09-15

## 2022-09-15 RX ORDER — LIDOCAINE HYDROCHLORIDE 40 MG/ML
SOLUTION TOPICAL ONCE
Status: CANCELLED | OUTPATIENT
Start: 2022-09-15 | End: 2022-09-15

## 2022-09-15 RX ORDER — LIDOCAINE HYDROCHLORIDE 20 MG/ML
JELLY TOPICAL ONCE
Status: CANCELLED | OUTPATIENT
Start: 2022-09-15 | End: 2022-09-15

## 2022-09-15 RX ORDER — LIDOCAINE HYDROCHLORIDE 40 MG/ML
SOLUTION TOPICAL ONCE
Status: COMPLETED | OUTPATIENT
Start: 2022-09-15 | End: 2022-09-15

## 2022-09-15 RX ORDER — BETAMETHASONE DIPROPIONATE 0.05 %
OINTMENT (GRAM) TOPICAL ONCE
Status: CANCELLED | OUTPATIENT
Start: 2022-09-15 | End: 2022-09-15

## 2022-09-15 RX ORDER — CLOBETASOL PROPIONATE 0.5 MG/G
OINTMENT TOPICAL ONCE
Status: CANCELLED | OUTPATIENT
Start: 2022-09-15 | End: 2022-09-15

## 2022-09-15 RX ORDER — BACITRACIN, NEOMYCIN, POLYMYXIN B 400; 3.5; 5 [USP'U]/G; MG/G; [USP'U]/G
OINTMENT TOPICAL ONCE
Status: CANCELLED | OUTPATIENT
Start: 2022-09-15 | End: 2022-09-15

## 2022-09-15 RX ORDER — GENTAMICIN SULFATE 1 MG/G
OINTMENT TOPICAL ONCE
Status: CANCELLED | OUTPATIENT
Start: 2022-09-15 | End: 2022-09-15

## 2022-09-15 RX ORDER — LIDOCAINE 50 MG/G
OINTMENT TOPICAL ONCE
Status: CANCELLED | OUTPATIENT
Start: 2022-09-15 | End: 2022-09-15

## 2022-09-15 RX ADMIN — LIDOCAINE HYDROCHLORIDE: 40 SOLUTION TOPICAL at 08:01

## 2022-09-15 ASSESSMENT — PAIN SCALES - GENERAL
PAINLEVEL_OUTOF10: 0
PAINLEVEL_OUTOF10: 0

## 2022-09-15 NOTE — DISCHARGE INSTRUCTIONS
[address] Physician Orders and Discharge Instructions  [address], [address] [facility], [place] [address]  Telephone: [phone]  [address] 8:00 am - 4:30 pm and Friday 8:00 am - 12:00 pm.        NAME:  Juni King  YOB: 1965  MEDICAL RECORD NUMBER:  [id number]  DATE:  9/15/2022    Important Reminders:   Please wash hands with soap and water before and after every dressing change. Do not scrub wounds. Keep wounds dry in shower unless otherwise instructed by the physician. SMOKING can slow would healing. Stop smoking as soon as possible to improve healing and prevent further complications associated with smoking. Kaley-Wound Topical Treatments:  Do not apply lotions, creams, or ointments to wound bed unless directed. [] Apply moisturizing lotion to skin surrounding the wound prior to dressing change.  [] Apply antifungal ointment to skin surrounding the wound prior to dressing change.  [] Apply thin film of no sting moisture barrier ointment to skin immediately around      wound. [] Other:       Wound Location: : LEFT GREAT TOE WOUNDS    Wound Cleansing: Normal Saline    Primary Dressing:  [x] PLAIN COLLAGEN  []     Secondary Dressing:  [x] GAUZE THEN ROLL GAUZE  []       Dressing Frequency:  [x] THREE TIMES PER WEEK  [] Do Not Change Dressing        Compression and Edema Control:  [] Wear Home Compression Stockings   [] Spandagrip to:    Size: []Low compression 5-10 mm/Hg      []Medium compression 10-20 mm/Hg           []High compression  20-30 mm/Hg  [] Ace Wrap Toes to Knee to    [] Multilayer Compression Wrap:  to    Do not get leg(s) with wrap wet. If wraps become too tight call the center or completely remove the wrap. Contact Cast:  Apply: [] Total Contact Cast Applied in Clinic []RightLeg []Left Leg   [] Do not get cast wet.   Contact center or go to emergency room if there is a foul odor or becomes uncomfortable due to feeling tight or swelling. Do not use objects inside of cast to scratch. Pressure Relief and Off Loading: SURGICAL SHOE LEFT FOOT  [] Off-loading when [] walking  [] in bed [] sitting   Turn every 2 hours when in bed   Avoid putting direct pressure on the site of the wound. Limit side lying to 30 degree tilt. Limit elevating the head of the bed greater than 30 degrees. [] Assistive Devices     Use as instructed by the provider      Activity: Activity as Tolerated      Dietary:   Continue your diet as tolerated. Protein is a key nutrient in helping to repair damaged tissue and promote new tissue growth. Good sources of protein include milk, yogurt, cheese, fish, lean meat and beans. If you are DIABETIC, having diabetes can make it hard for wounds to heal. Try to keep your blood sugar within it's target range. Limit Sodium, Alcohol and Sugar. Pain:   Please Note some pain, drainage and/or bleeding might be expected after seeing the provider. TO HELP ALLEVIATE PAIN WE RECOMMEND THE FOLLOWING  Elevate the affected limb. Use over the counter medications as permitted by your family doctor. For Persistent Pain not relieved by the above interventions, please notify your family doctor.     Return Appointment:  [x] Return Appointment: With DR ARAUJO  in 1 Week(s)  [x] Wound and dressing supply provider: [facility]  [] ECF or Home Healthcare:  [] Wound Assessment: [] Physician or NP scheduled for Wound Assessment:   [] Orders placed during your visit:      : David Hernandez     Electronically signed by Chuy Jorgensen RN on 9/15/2022 at 8:26 [facility] Information: Should you experience any significant changes in your wound(s) or have questions about your wound care, please contact the [address] [place] [address] at [address] 8:00 am - 4:30 pm and Friday 8:00 am - 12:30 pm.  If you need help with your wound outside these hours and cannot wait until we are again available, contact your PCP or go to the hospital emergency room. PLEASE NOTE: IF YOU ARE UNABLE TO OBTAIN WOUND SUPPLIES, CONTINUE TO USE THE SUPPLIES YOU HAVE AVAILABLE UNTIL YOU ARE ABLE TO REACH US. IT IS MOST IMPORTANT TO KEEP THE WOUND COVERED AT ALL TIMES.      Physician Signature:_______________________    Date: ___________ Time:  ____________          Dr Colleen Murillo

## 2022-09-15 NOTE — LETTER
[address]  [address] OFFICE [place] [address] [address]  [phone]  Dept: [phone]   TODAYS DATE: 9/12/2022    [address] [place] [address],[address] Wound Care   Appointment Treatment Guidelines  Welcome Mr. Hattie Garcia to the [address] [place] [address] Court. We appreciate the confidence you have shown in choosing us as your wound care provider. Our goal is to heal your wound(s) as quickly as possible. Please read the items below regarding the nature of your appointments. 1. We will make every effort to schedule appointments that are convenient for you. Certain days and times may not be available, depending on your providers office hours and details of your care. 2. Patients will not necessarily be brought to an exam room in the order in which they arrive. Many providers work out of this office and patients are here for different procedures. Our goal is to serve you as quickly as possible. 3. We acknowledge that your time is valuable. Please remember that wound healing takes time and we appreciate your understanding that the length of each patients appointment will vary depending upon their need. 4. It is for your protection that we ask for insurance cards, photo ID, and new consent forms on your first visit and periodically throughout your treatment at all our facilities. 5. Wound Care treatment is known to be most effective when provided on a regular basis. Missed appointments, and not following the recommended plan of care can result in ineffective treatment and a poor outcome. If you find it difficult to keep appointments or to follow the recommended plan of care, it is your responsibility to let the staff know, so that we can work with you toward a solution. 6. If you need to miss an appointment, please call to let us know. We expect 24 hours notice for all cancellations.  We also expect missed visits to be rescheduled as soon as possible, preferably within the same week to promote the most effective healing time for your wound(s). 7. If you will be late for an appointment, please call our center to be sure that the provider can still see you when you arrive. If you are more than 15 minutes late your appointment may need to be rescheduled. 8. If two (2) appointments are missed without notifying us, your care plan may be discontinued. The same may happen if multiple visits are cancelled or rescheduled, even with notice. A missed visit is time when another patient, who also needs care, could have been seen. Thank you for your understanding and consideration.

## 2022-09-15 NOTE — DISCHARGE INSTRUCTIONS
[address] Physician Orders and Discharge Instructions  [facility]  [address] [facility], [address] [place] [address], [place] [address]  Telephone: [phone]  [address] 8:00 am - 4:30 pm and Friday 8:00 am - 12:00 pm.          NAME:  Juni King  YOB: 1965  MEDICAL RECORD NUMBER:  [id number]  DATE:  9/22/2022     Important Reminders:   Please wash hands with soap and water before and after every dressing change. Do not scrub wounds. Keep wounds dry in shower unless otherwise instructed by the physician. SMOKING can slow would healing. Stop smoking as soon as possible to improve healing and prevent further complications associated with smoking. Kaley-Wound Topical Treatments:  Do not apply lotions, creams, or ointments to wound bed unless directed. [] Apply moisturizing lotion to skin surrounding the wound prior to dressing change.  [] Apply antifungal ointment to skin surrounding the wound prior to dressing change.  [] Apply thin film of no sting moisture barrier ointment to skin immediately around      wound. [] Other:         Wound Location: : LEFT GREAT TOE WOUNDS     Wound Cleansing: Normal Saline     Primary Dressing:  [x] PLAIN COLLAGEN  []      Secondary Dressing:  [x] GAUZE THEN ROLL GAUZE  []         Dressing Frequency:  [x] THREE TIMES PER WEEK  [] Do Not Change Dressing           Compression and Edema Control:  [] Wear Home Compression Stockings   [] Spandagrip to:    Size: []Low compression 5-10 mm/Hg                 []Medium compression 10-20 mm/Hg           []High compression  20-30 mm/Hg  [] Ace Wrap Toes to Knee to    [] Multilayer Compression Wrap:  to               Do not get leg(s) with wrap wet. If wraps become too tight call the center or completely remove the wrap.          Pressure Relief and Off Loading: SURGICAL SHOE LEFT FOOT  [] Off-loading when   [] walking       [] in bed         [] sitting   Turn every 2 hours when in bed             Avoid putting direct pressure on the site of the wound. Limit side lying to 30 degree tilt. Limit elevating the head of the bed greater than 30 degrees. [] Assistive Devices     Use as instructed by the provider        Activity: Activity as Tolerated        Dietary:   Continue your diet as tolerated. Protein is a key nutrient in helping to repair damaged tissue and promote new tissue growth. Good sources of protein include milk, yogurt, cheese, fish, lean meat and beans. If you are DIABETIC, having diabetes can make it hard for wounds to heal. Try to keep your blood sugar within it's target range. Limit Sodium, Alcohol and Sugar. Pain:   Please Note some pain, drainage and/or bleeding might be expected after seeing the provider. TO HELP ALLEVIATE PAIN WE RECOMMEND THE FOLLOWING  Elevate the affected limb. Use over the counter medications as permitted by your family doctor. For Persistent Pain not relieved by the above interventions, please notify your family doctor. Return Appointment:  [x] Return Appointment: With DR ARAUJO  in 1 Week(s)  [x] Wound and dressing supply provider: [facility]  [] ECF or Home Healthcare:  [] Wound Assessment:         [] Physician or NP scheduled for Wound Assessment:   [] Orders placed during your visit:        : Junito Avitia   Electronically signed by Ephraim Aldridge RN on 9/22/2022 at 8:56 AM        [facility] Information: Should you experience any significant changes in your wound(s) or have questions about your wound care, please contact the [address] [place] [address] at [address] 8:00 am - 4:30 pm and Friday 8:00 am - 12:30 pm.  If you need help with your wound outside these hours and cannot wait until we are again available, contact your PCP or go to the hospital emergency room.       PLEASE NOTE: IF YOU ARE UNABLE TO OBTAIN WOUND SUPPLIES, CONTINUE TO USE THE SUPPLIES YOU HAVE AVAILABLE UNTIL YOU ARE ABLE TO REACH US. IT IS MOST IMPORTANT TO KEEP THE WOUND COVERED AT ALL TIMES.      Physician Signature:_______________________     Date: ___________ Time:  ____________                                  Dr Trisha Alvarez

## 2022-09-15 NOTE — PLAN OF CARE
Patient Name:  Tani Gordon  YOB: 1965  Today's Date:  September 15, 2022  Medical Record Number:  [id number]  Provider:    [address] [place] [address] [address]kw[address]   Appointment Treatment Guidelines        The [address] [place] [address] Pkw[address] Appointment Treatment Guidelines were reviewed on September 15, 2022 with the patient. Mr. Shanelle [place] understanding of the [address] [place] [address] Pkwy Appointment Treatment Guidelines.       Electronically signed by Teresa Valdez RN on 9/15/22 at 8:26 AM EDT

## 2022-09-15 NOTE — PLAN OF CARE
[address] [facility] [address]:     [employer] [id number] [facility], [address] [place] [address] p: [phone] f: [phone]     Ordering Center:      [address]  [address] [facility] [address]  [facility] [address]  [phone]  WOUND CARE Dept: [phone]   [facility] NUMBER [unfilled]    Patient Information:      [address] [place] [address]  [address]  [address] [facility] [address]   [phone]   : 1965  AGE: 62 y.o.      GENDER: male   TODAYS DATE:  9/15/2022    Insurance:      PRIMARY INSURANCE:  Plan: [facility]  Coverage: Jennifer Contreras  Effective Date: 2021  [id number] - (Medicare Managed)    SECONDARY INSURANCE:  Plan:  [place] [employer] JOB  Coverage: [place]  Effective Date: 3/18/2022  [unfilled]    [unfilled]   [unfilled]     Patient Wound Information:      Problem List Items Addressed This Visit          Other    Ulcer of left foot, with fat layer exposed ([address] - Primary    Relevant Orders    Initiate Outpatient Wound Care Protocol     ICD-10 codes: L97.522    WOUNDS REQUIRING DRESSING SUPPLIES:     Wound 09/15/22 Toe (Comment  which one) Lateral;Left #1  ( noted 22) (Active)   Wound Image   09/15/22 0801   Wound Etiology Diabetic Moon 3 09/15/22 0801   Dressing/Treatment Collagen;Moisten with saline;Gauze dressing/dressing sponge;Roll gauze 09/15/22 0917   Offloading for Diabetic Foot Ulcers Post op shoe 09/15/22 0917   Wound Length (cm) 0.3 cm 09/15/22 0801   Wound Width (cm) 0.4 cm 09/15/22 08   Wound Depth (cm) 0.1 cm 09/15/22 0801   Wound Surface Area (cm^2) 0.12 cm^2 09/15/22 0801   Wound Volume (cm^3) 0.012 cm^3 09/15/22 0801   Post-Procedure Length (cm) 0.5 cm 09/15/22 0857   Post-Procedure Width (cm) 0.6 cm 09/15/22 0857   Post-Procedure Depth (cm) 0.1 cm 09/15/22 0857   Post-Procedure Surface Area (cm^2) 0.3 cm^2 09/15/22 0857   Post-Procedure Volume (cm^3) 0.03 cm^3 09/15/22 0857   Wound Assessment Dry 09/15/22 0801   Drainage Amount None 09/15/22 0801   Kaley-wound Assessment Hyperkeratosis (callous); Dry/flaky 09/15/22 0801   Margins Undefined edges 09/15/22 0801   Wound Thickness Description not for Pressure Injury Full thickness 09/15/22 0801   Number of days: 0       Wound 09/15/22 Toe (Comment  which one) Left;Plantar #2, ( noted 7/20/22) (Active)   Wound Image   09/15/22 0801   Wound Etiology Diabetic Moon 3 09/15/22 0801   Dressing/Treatment Collagen;Moisten with saline;Gauze dressing/dressing sponge;Roll gauze 09/15/22 0917   Offloading for Diabetic Foot Ulcers Post op shoe 09/15/22 0917   Wound Length (cm) 0.4 cm 09/15/22 0801   Wound Width (cm) 0.3 cm 09/15/22 0801   Wound Depth (cm) 0.1 cm 09/15/22 0801   Wound Surface Area (cm^2) 0.12 cm^2 09/15/22 0801   Wound Volume (cm^3) 0.012 cm^3 09/15/22 0801   Post-Procedure Length (cm) 0.6 cm 09/15/22 0857   Post-Procedure Width (cm) 0.5 cm 09/15/22 0857   Post-Procedure Depth (cm) 0.1 cm 09/15/22 0857   Post-Procedure Surface Area (cm^2) 0.3 cm^2 09/15/22 0857   Post-Procedure Volume (cm^3) 0.03 cm^3 09/15/22 0857   Wound Assessment Dry 09/15/22 0801   Drainage Amount None 09/15/22 0801   Odor None 09/15/22 0801   Kaley-wound Assessment Hyperkeratosis (callous) 09/15/22 0801   Margins Undefined edges 09/15/22 0801   Wound Thickness Description not for Pressure Injury Full thickness 09/15/22 0801   Number of days: 0       Wound 09/15/22 Toe (Comment  which one) Left;Medial #3. ( noted 7/22/22) (Active)   Wound Image   09/15/22 0801   Wound Etiology Diabetic Moon 3 09/15/22 0801   Dressing/Treatment Collagen;Moisten with saline;Gauze dressing/dressing sponge;Roll gauze 09/15/22 0917   Offloading for Diabetic Foot Ulcers Post op shoe 09/15/22 0917   Wound Length (cm) 0.6 cm 09/15/22 0801   Wound Width (cm) 0.8 cm 09/15/22 0801   Wound Depth (cm) 0.2 cm 09/15/22 0801   Wound Surface Area (cm^2) 0.48 cm^2 09/15/22 0801   Wound Volume (cm^3) 0.096 cm^3 09/15/22 0801   Post-Procedure Length (cm) 0.8 cm 09/15/22 0857   Post-Procedure Width (cm) 1 cm 09/15/22 0857   Post-Procedure Depth (cm) 0.2 cm 09/15/22 0857   Post-Procedure Surface Area (cm^2) 0.8 cm^2 09/15/22 0857   Post-Procedure Volume (cm^3) 0.16 cm^3 09/15/22 0857   Wound Assessment Granulation tissue 09/15/22 0801   Drainage Amount Small 09/15/22 0801   Drainage Description Serosanguinous 09/15/22 0801   Odor None 09/15/22 0801   Kaley-wound Assessment Hyperkeratosis (callous); Dry/flaky 09/15/22 0801   Margins Attached edges 09/15/22 0801   Wound Thickness Description not for Pressure Injury Full thickness 09/15/22 0801   Number of days: 0          Supplies Requested :      WOUND #: 1, 2, and 3   PRIMARY DRESSING:  Collagen    Cover and Secure with: 4X4 gauze pad  Conforming roll gauze     FREQUENCY OF DRESSING CHANGES:  Three times per week           ADDITIONAL ITEMS:  [] Gloves Small  [] Gloves Medium [x] Gloves Large [] Gloves XLarge  [x] Tape 1\" [] Tape 2\" [] Tape 3\"  [] Medipore Tape  [x] Saline  [] Skin Prep   [] Adhesive Remover   [] Cotton Tip Applicators   [] Other:    Patient Wound(s) Debrided: [x] Yes   [] No    Debribement Type: subcutaneous tissue    Debridement Date: 9/15/2022    Patient currently being seen by Home Health: [] Yes   [x] No    Duration for needed supplies:  []15  []30  []60  [x]90 Days    Provider Information:      PROVIDER'S NAME: Demario Hartmann DPM     NPI: RA - NPI  [id number]

## 2022-09-19 NOTE — PROGRESS NOTES
[address]   Progress Note and Procedure Note      Adela Shi  MEDICAL RECORD NUMBER:  [id number]  AGE: 62 y.o. GENDER: male  : 1965  EPISODE DATE:  9/15/2022    Subjective:     Chief Complaint   Patient presents with    Wound Check     Initial evaluation of left great toe. HISTORY of PRESENT ILLNESS HPI     Juni Sandoval is a 62 y.o. male who presents today for wound/ulcer evaluation. History of Wound Context: Presents today with a chief complaint of a left hallux diabetic foot ulceration. He was recently hospitalized for cellulitis and has been doing daily dressing changes and ambulating in a surgical shoe as recommended. Patient admits his blood sugars are not well controlled but they are improving.   He has not yet followed up with his primary care physician since discharge from the hospital.  Wound/Ulcer Pain Timing/Severity: none  Quality of pain: N/A  Severity:  0 / 10   Modifying Factors: None  Associated Signs/Symptoms: none    Ulcer Identification:  Ulcer Type: diabetic    Contributing Factors: diabetes and poor glucose control    Acute Wound: N/A    PAST MEDICAL HISTORY        Diagnosis Date    Abscess of arm, left 3/8/7798    Acute metabolic encephalopathy     Acute respiratory failure with hypoxia (HCC)     Arthritis     Blood circulation, collateral     CAD (coronary artery disease) 7/15/2014    CAD (coronary artery disease)     Cardiac arrest ([address] 10/05/2018    Cerebral artery occlusion with cerebral infarction ([address]     Cholecystitis 2021    COVID-19 virus infection 2020    Diabetes mellitus ([address]     Diabetic peripheral neuropathy ([address] 2018    Elbow contusion 3/24/2014    GERD (gastroesophageal reflux disease)     ulcers    High anion gap metabolic acidosis 3/1/4038    Hypercholesteremia     Hyperlipidemia     Hypertension     ICD (implantable cardioverter-defibrillator) in place     Left arm numbness 2010 MRSA (methicillin resistant staph aureus) culture positive 07/13/2018    foot wound    Multifocal pneumonia     Neuropathy     Neutrophilic leukocytosis 0/25/3213    NSTEMI (non-ST elevated myocardial infarction) ([address] 10/3/2018    Pneumonia due to COVID-19 virus     POTS (postural orthostatic tachycardia syndrome)     Psychiatric problem     anxiety, depression, bipolar    Sepsis ([address] 7/1/2020    SIRS (systemic inflammatory response syndrome) ([address] 4/14/2021    Skin ulcer of left foot with fat layer exposed ([address] 6/1/2018    Sleep apnea     does not use Cpap    ST elevation myocardial infarction (STEMI) of inferolateral wall ([address] 11/13/2018    Syncope     Type II or unspecified type diabetes mellitus without mention of complication, not stated as uncontrolled     Ulcer of foot due to secondary diabetes ([address] 8/15/2018    Wears glasses        PAST SURGICAL HISTORY    Past Surgical History:   Procedure Laterality Date    CARDIAC CATHETERIZATION      CHOLECYSTECTOMY, LAPAROSCOPIC N/A 4/21/2021    LAPAROSCOPIC CHOLECYSTECTOMY WITH CHOLANGIOGRAM performed by Ricky Morales MD at [place] [address]  10/06/2018    Bare Metal Stent to PDA    CORONARY ANGIOPLASTY WITH STENT PLACEMENT  10/07/2018    Bare Metal Stent to OM    CORONARY ANGIOPLASTY WITH STENT PLACEMENT  10/03/2018    CECE to Circ    DIAGNOSTIC CARDIAC CATH LAB PROCEDURE      FINGER SURGERY Left     Left Thumb    HERNIA REPAIR      VASCULAR SURGERY      VASECTOMY         FAMILY HISTORY    Family History   Problem Relation Age of Onset    High Blood Pressure Mother     Stroke Mother     Heart Disease Mother     COPD Mother     Heart Disease Father     Cancer Father         skin    Diabetes Sister     Cancer Sister     Heart Disease Brother     Diabetes Brother        SOCIAL HISTORY    Social History     Tobacco Use    Smoking status: Former     Packs/day: 2.00     Years: 12.00     Pack years: 24.00 Types: Cigarettes, Cigars    Smokeless tobacco: Never    Tobacco comments:     quit in the 80's   Vaping Use    Vaping Use: Never used   Substance Use Topics    Alcohol use: No    Drug use: Not Currently     Types: Marijuana Chepe Ruano)     Comment: quit 80's       ALLERGIES    No Known Allergies    MEDICATIONS    Current Outpatient Medications on File Prior to Encounter   Medication Sig Dispense Refill    insulin glargine (LANTUS) 100 UNIT/ML injection vial Inject 37 Units into the skin nightly 10 mL 3    insulin lispro (HUMALOG) 100 UNIT/ML SOLN injection vial Inject 0-8 Units into the skin 3 times daily (with meals)   No Insulin 200-249 270-199  No Insulin 200-249 2 Units 250-299 4 Units 300-349 6 Units   Units 250-299 4 Units 300-349 6 Units 10 each 1    insulin lispro (HUMALOG) 100 UNIT/ML SOLN injection vial Inject 19 Units into the skin 3 times daily (with meals) 500 mL 0    prasugrel (EFFIENT) 10 MG TABS TAKE 1 TABLET BY MOUTH EVERY DAY 30 tablet 3    [DISCONTINUED] metoprolol succinate (TOPROL XL) 50 MG extended release tablet Take 1 tablet by mouth in the morning. 30 tablet 3    atorvastatin (LIPITOR) 80 MG tablet TAKE 1 TABLET BY MOUTH EVERY DAY 90 tablet 3    Omega-3 Fatty Acids (FISH OIL PO) Take 1 capsule by mouth daily       GARLIC PO Take 1 capsule by mouth daily       aspirin 81 MG chewable tablet Take 81 mg by mouth daily      Cholecalciferol (VITAMIN D3) 00926 units CAPS Take 1 capsule by mouth once a week       Dulaglutide 3 MG/0.5ML SOPN Inject 3 mg into the skin once a week Indications: Friday        No current facility-administered medications on file prior to encounter. REVIEW OF SYSTEMS  Review of Systems    Pertinent items are noted in HPI.     Objective:      /75   Pulse (!) 104   Temp 98.4 °F (36.9 °C) (Temporal)   Resp 16     Wt Readings from Last 3 Encounters:   09/05/22 219 lb 9.3 oz (99.6 kg)   07/29/22 210 lb (95.3 kg)   07/22/22 209 lb 7 oz (95 kg)       PHYSICAL EXAM  Physical Exam    DP/PT pulses palpable bilaterally. CFT brisk to all digits. Digits are pink and warm to the touch. Hair growth is absent in appearance. No edema noted. No calf pain with palpation noted. Epicritic sensation is grossly absent at all sites tested bilaterally. Negative clonus and babinski reflex is down going. There is an ulceration noted on the plantar aspect of the left hallux. Wound has fibrotic and nonviable tissue that extends down through and includes the subcutaneous tissue. After debridement the wound has a granular base. There is no surrounding erythema, edema, warmth or malodor noted. The wound does not probe or track to bone. Assessment:        Problem List Items Addressed This Visit       Ulcer of left foot, with fat layer exposed ([facility] - Primary        Procedure Note  Indications:  Based on my examination of this patient's wound(s)/ulcer(s) today, debridement is required to promote healing and evaluate the wound base. Performed by: Enid Lee DPM    Consent obtained:  Yes    Time out taken:  Yes    Pain Control: Anesthetic  Anesthetic: 4% Lidocaine Liquid Topical (5ml)       Debridement: Excisional Debridement    Using #15 blade scalpel the wound(s)/ulcer(s) was/were debrided down through and including the removal of epidermis, dermis, and subcutaneous tissue.         Devitalized Tissue Debrided:  fibrin and slough    Pre Debridement Measurements:  Are located in the [place]  Documentation Flow Sheet    Diabetic/Pressure/Non Pressure Ulcers only:  Ulcer: Diabetic ulcer, fat layer exposed     Wound/Ulcer #: 1    Post Debridement Measurements:  Wound/Ulcer Descriptions are Pre Debridement except measurements:    Wound 09/15/22 Toe (Comment  which one) Lateral;Left #1  ( noted 7/20/22) (Active)   Wound Image   09/15/22 0801   Wound Etiology Diabetic Moon 3 09/15/22 0801   Dressing/Treatment Collagen;Moisten with saline;Gauze dressing/dressing sponge;Roll gauze 09/15/22 0917   Offloading for Diabetic Foot Ulcers Post op shoe 09/15/22 0917   Wound Length (cm) 0.3 cm 09/15/22 0801   Wound Width (cm) 0.4 cm 09/15/22 0801   Wound Depth (cm) 0.1 cm 09/15/22 0801   Wound Surface Area (cm^2) 0.12 cm^2 09/15/22 0801   Wound Volume (cm^3) 0.012 cm^3 09/15/22 0801   Post-Procedure Length (cm) 0.5 cm 09/15/22 0857   Post-Procedure Width (cm) 0.6 cm 09/15/22 0857   Post-Procedure Depth (cm) 0.1 cm 09/15/22 0857   Post-Procedure Surface Area (cm^2) 0.3 cm^2 09/15/22 0857   Post-Procedure Volume (cm^3) 0.03 cm^3 09/15/22 0857   Wound Assessment Dry 09/15/22 0801   Drainage Amount None 09/15/22 0801   Kaley-wound Assessment Hyperkeratosis (callous); Dry/flaky 09/15/22 0801   Margins Undefined edges 09/15/22 0801   Wound Thickness Description not for Pressure Injury Full thickness 09/15/22 0801   Number of days: 3       Wound 09/15/22 Toe (Comment  which one) Left;Plantar #2, ( noted 7/20/22) (Active)   Wound Image   09/15/22 0801   Wound Etiology Diabetic Moon 3 09/15/22 0801   Dressing/Treatment Collagen;Moisten with saline;Gauze dressing/dressing sponge;Roll gauze 09/15/22 0917   Offloading for Diabetic Foot Ulcers Post op shoe 09/15/22 0917   Wound Length (cm) 0.4 cm 09/15/22 0801   Wound Width (cm) 0.3 cm 09/15/22 0801   Wound Depth (cm) 0.1 cm 09/15/22 0801   Wound Surface Area (cm^2) 0.12 cm^2 09/15/22 0801   Wound Volume (cm^3) 0.012 cm^3 09/15/22 0801   Post-Procedure Length (cm) 0.6 cm 09/15/22 0857   Post-Procedure Width (cm) 0.5 cm 09/15/22 0857   Post-Procedure Depth (cm) 0.1 cm 09/15/22 0857   Post-Procedure Surface Area (cm^2) 0.3 cm^2 09/15/22 0857   Post-Procedure Volume (cm^3) 0.03 cm^3 09/15/22 0857   Wound Assessment Dry 09/15/22 0801   Drainage Amount None 09/15/22 0801   Odor None 09/15/22 0801   Kaley-wound Assessment Hyperkeratosis (callous) 09/15/22 0801   Margins Undefined edges 09/15/22 0801   Wound Thickness Description not for Pressure Injury Full thickness Instructions    Written patient dismissal instructions given to patient and signed by patient or POA. Reappoint 1 week for follow-up or sooner if problems develop       [facility] Physician Orders and Discharge [facility] [address] [place] [address], [address] [place] [address]  Telephone: [phone]  [address] 8:00 am - 4:30 pm and Friday 8:00 am - 12:00 pm.        NAME:  Juni King  YOB: 1965  MEDICAL RECORD NUMBER:  [id number]  DATE:  9/15/2022    Important Reminders:   Please wash hands with soap and water before and after every dressing change. Do not scrub wounds. Keep wounds dry in shower unless otherwise instructed by the physician. SMOKING can slow would healing. Stop smoking as soon as possible to improve healing and prevent further complications associated with smoking. Kaley-Wound Topical Treatments:  Do not apply lotions, creams, or ointments to wound bed unless directed. [] Apply moisturizing lotion to skin surrounding the wound prior to dressing change.  [] Apply antifungal ointment to skin surrounding the wound prior to dressing change.  [] Apply thin film of no sting moisture barrier ointment to skin immediately around      wound. [] Other:       Wound Location: : LEFT GREAT TOE WOUNDS    Wound Cleansing: Normal Saline    Primary Dressing:  [x] PLAIN COLLAGEN  []     Secondary Dressing:  [x] GAUZE THEN ROLL GAUZE  []       Dressing Frequency:  [x] THREE TIMES PER WEEK  [] Do Not Change Dressing        Compression and Edema Control:  [] Wear Home Compression Stockings   [] Spandagrip to:    Size: []Low compression 5-10 mm/Hg      []Medium compression 10-20 mm/Hg           []High compression  20-30 mm/Hg  [] Ace Wrap Toes to Knee to    [] Multilayer Compression Wrap:  to    Do not get leg(s) with wrap wet.   If wraps become too tight call the center or completely remove the wrap. Contact Cast:  Apply: [] Total Contact Cast Applied in Clinic []RightLeg []Left Leg   [] Do not get cast wet. Contact center or go to emergency room if there is a foul odor or becomes uncomfortable due to feeling tight or swelling. Do not use objects inside of cast to scratch. Pressure Relief and Off Loading: SURGICAL SHOE LEFT FOOT  [] Off-loading when [] walking  [] in bed [] sitting   Turn every 2 hours when in bed   Avoid putting direct pressure on the site of the wound. Limit side lying to 30 degree tilt. Limit elevating the head of the bed greater than 30 degrees. [] Assistive Devices     Use as instructed by the provider      Activity: Activity as Tolerated      Dietary:   Continue your diet as tolerated. Protein is a key nutrient in helping to repair damaged tissue and promote new tissue growth. Good sources of protein include milk, yogurt, cheese, fish, lean meat and beans. If you are DIABETIC, having diabetes can make it hard for wounds to heal. Try to keep your blood sugar within it's target range. Limit Sodium, Alcohol and Sugar. Pain:   Please Note some pain, drainage and/or bleeding might be expected after seeing the provider. TO HELP ALLEVIATE PAIN WE RECOMMEND THE FOLLOWING  Elevate the affected limb. Use over the counter medications as permitted by your family doctor. For Persistent Pain not relieved by the above interventions, please notify your family doctor.     Return Appointment:  [x] Return Appointment: With DR ARAUJO  in 1 Week(s)  [x] Wound and dressing supply provider: [facility]  [] ECF or Home Healthcare:  [] Wound Assessment: [] Physician or NP scheduled for Wound Assessment:   [] Orders placed during your visit:      : Barbara Baird     Electronically signed by Samuel Goss RN on 9/15/2022 at 8:26 AM       [facility] Information: Should you experience any significant changes in your wound(s)

## 2022-09-22 ENCOUNTER — HOSPITAL ENCOUNTER (OUTPATIENT)
Dept: WOUND CARE | Age: 57
Discharge: HOME | End: 2022-09-22
Payer: MEDICARE

## 2022-09-22 VITALS
SYSTOLIC BLOOD PRESSURE: 122 MMHG | HEART RATE: 102 BPM | RESPIRATION RATE: 16 BRPM | TEMPERATURE: 97.8 F | DIASTOLIC BLOOD PRESSURE: 86 MMHG

## 2022-09-22 DIAGNOSIS — L97.522 ULCER OF LEFT FOOT, WITH FAT LAYER EXPOSED (HCC): Primary | ICD-10-CM

## 2022-09-22 PROCEDURE — 11042 DBRDMT SUBQ TIS 1ST 20SQCM/<: CPT

## 2022-09-22 RX ORDER — LIDOCAINE 50 MG/G
OINTMENT TOPICAL ONCE
Status: CANCELLED | OUTPATIENT
Start: 2022-09-22 | End: 2022-09-22

## 2022-09-22 RX ORDER — LIDOCAINE HYDROCHLORIDE 20 MG/ML
JELLY TOPICAL ONCE
Status: CANCELLED | OUTPATIENT
Start: 2022-09-22 | End: 2022-09-22

## 2022-09-22 RX ORDER — GINSENG 100 MG
CAPSULE ORAL ONCE
Status: CANCELLED | OUTPATIENT
Start: 2022-09-22 | End: 2022-09-22

## 2022-09-22 RX ORDER — LIDOCAINE HYDROCHLORIDE 40 MG/ML
SOLUTION TOPICAL ONCE
Status: COMPLETED | OUTPATIENT
Start: 2022-09-22 | End: 2022-09-22

## 2022-09-22 RX ORDER — CLOBETASOL PROPIONATE 0.5 MG/G
OINTMENT TOPICAL ONCE
Status: CANCELLED | OUTPATIENT
Start: 2022-09-22 | End: 2022-09-22

## 2022-09-22 RX ORDER — BACITRACIN, NEOMYCIN, POLYMYXIN B 400; 3.5; 5 [USP'U]/G; MG/G; [USP'U]/G
OINTMENT TOPICAL ONCE
Status: CANCELLED | OUTPATIENT
Start: 2022-09-22 | End: 2022-09-22

## 2022-09-22 RX ORDER — GENTAMICIN SULFATE 1 MG/G
OINTMENT TOPICAL ONCE
Status: CANCELLED | OUTPATIENT
Start: 2022-09-22 | End: 2022-09-22

## 2022-09-22 RX ORDER — LIDOCAINE 40 MG/G
CREAM TOPICAL ONCE
Status: CANCELLED | OUTPATIENT
Start: 2022-09-22 | End: 2022-09-22

## 2022-09-22 RX ORDER — LIDOCAINE HYDROCHLORIDE 40 MG/ML
SOLUTION TOPICAL ONCE
Status: CANCELLED | OUTPATIENT
Start: 2022-09-22 | End: 2022-09-22

## 2022-09-22 RX ORDER — BETAMETHASONE DIPROPIONATE 0.05 %
OINTMENT (GRAM) TOPICAL ONCE
Status: CANCELLED | OUTPATIENT
Start: 2022-09-22 | End: 2022-09-22

## 2022-09-22 RX ORDER — BACITRACIN ZINC AND POLYMYXIN B SULFATE 500; 1000 [USP'U]/G; [USP'U]/G
OINTMENT TOPICAL ONCE
Status: CANCELLED | OUTPATIENT
Start: 2022-09-22 | End: 2022-09-22

## 2022-09-22 RX ADMIN — LIDOCAINE HYDROCHLORIDE: 40 SOLUTION TOPICAL at 08:22

## 2022-09-22 ASSESSMENT — PAIN SCALES - GENERAL: PAINLEVEL_OUTOF10: 0

## 2022-09-23 PROCEDURE — 99281 EMR DPT VST MAYX REQ PHY/QHP: CPT

## 2022-09-24 ENCOUNTER — HOSPITAL ENCOUNTER (EMERGENCY)
Age: 57
Discharge: HOME | End: 2022-09-24
Payer: MEDICARE

## 2022-09-24 VITALS
BODY MASS INDEX: 28.94 KG/M2 | HEART RATE: 87 BPM | RESPIRATION RATE: 18 BRPM | TEMPERATURE: 98.7 F | WEIGHT: 225.53 LBS | HEIGHT: 74 IN

## 2022-09-24 DIAGNOSIS — S39.012A STRAIN OF LUMBAR REGION, INITIAL ENCOUNTER: Primary | ICD-10-CM

## 2022-09-24 PROCEDURE — 6360000002 HC RX W HCPCS

## 2022-09-24 PROCEDURE — 6370000000 HC RX 637 (ALT 250 FOR IP)

## 2022-09-24 RX ORDER — LIDOCAINE 4 G/G
PATCH TOPICAL
Status: DISCONTINUED
Start: 2022-09-24 | End: 2022-09-24 | Stop reason: HOSPADM

## 2022-09-24 RX ORDER — KETOROLAC TROMETHAMINE 30 MG/ML
INJECTION, SOLUTION INTRAMUSCULAR; INTRAVENOUS
Status: DISCONTINUED
Start: 2022-09-24 | End: 2022-09-24 | Stop reason: HOSPADM

## 2022-09-24 RX ORDER — ACETAMINOPHEN 500 MG
TABLET ORAL
Status: DISCONTINUED
Start: 2022-09-24 | End: 2022-09-24 | Stop reason: HOSPADM

## 2022-09-24 ASSESSMENT — ENCOUNTER SYMPTOMS: BACK PAIN: 1

## 2022-09-25 NOTE — ED PROVIDER NOTES
[address] [facility]        Pt Name: Francisco Sandy  MRN: [id number]  [place] 1965  Date of evaluation: 9/23/2022  Provider: Yasmine Parada PA-C  PCP: LAURA Lovelace CNP  Note Started: 9:05 PM EDT      SHANNEN. I have evaluated this patient. My supervising physician was available for consultation. Triage CHIEF COMPLAINT       Chief Complaint   Patient presents with    Back Pain     Chair broke down while he was seating and injured his back         HISTORY OF PRESENT ILLNESS   (Location/Symptom, Timing/Onset, Context/Setting, Quality, Duration, Modifying Factors, Severity)  Note limiting factors. Chief Complaint: back pain    Juni Resendiz is a 62 y.o. male who presents to the ED after falling out of a chair at [facility] earlier today. He states that he was sitting in a chair when it broke underneath him. He states that he is now having pain on the left side of his back. He denies any radiation down into his legs. He denies any numbness into his legs, or saddle anesthesia, denies any difficulty with urination or bowel movements. Denies fever, chills. He denies hitting his head, loss of consciousness, change in vision, vomiting. He states that he wanted to come here for some medication but wanted some documentation, because he says that security did not document anything. Nursing Notes were all reviewed and agreed with or any disagreements were addressed in the HPI. REVIEW OF SYSTEMS    (2-9 systems for level 4, 10 or more for level 5)     Review of Systems   Musculoskeletal:  Positive for back pain. Negative for gait problem and neck pain. Skin:  Negative for rash and wound.      PAST MEDICAL HISTORY     Past Medical History:   Diagnosis Date    Abscess of arm, left 2/1/1108    Acute metabolic encephalopathy 6/38/6851    Acute respiratory failure with hypoxia (HCC)     Arthritis     Blood circulation, collateral     CAD (coronary artery disease) 7/15/2014    CAD (coronary artery disease)     Cardiac arrest ([facility] 10/05/2018    Cerebral artery occlusion with cerebral infarction ([address]     Cholecystitis 4/14/2021    COVID-19 virus infection 7/1/2020    Diabetes mellitus ([address]     Diabetic peripheral neuropathy ([address] 6/8/2018    Elbow contusion 3/24/2014    GERD (gastroesophageal reflux disease)     ulcers    High anion gap metabolic acidosis 4/6/7149    Hypercholesteremia     Hyperlipidemia     Hypertension     ICD (implantable cardioverter-defibrillator) in place 2018    Left arm numbness 9/11/2010    MRSA (methicillin resistant staph aureus) culture positive 07/13/2018    foot wound    Multifocal pneumonia     Neuropathy     Neutrophilic leukocytosis 8/15/3800    NSTEMI (non-ST elevated myocardial infarction) ([address] 10/3/2018    Pneumonia due to COVID-19 virus     POTS (postural orthostatic tachycardia syndrome)     Psychiatric problem     anxiety, depression, bipolar    Sepsis ([address] 7/1/2020    SIRS (systemic inflammatory response syndrome) ([address] 4/14/2021    Skin ulcer of left foot with fat layer exposed ([address] 6/1/2018    Sleep apnea     does not use Cpap    ST elevation myocardial infarction (STEMI) of inferolateral wall ([address] 11/13/2018    Syncope     Type II or unspecified type diabetes mellitus without mention of complication, not stated as uncontrolled     Ulcer of foot due to secondary diabetes ([address] 8/15/2018    Wears glasses        SURGICAL HISTORY     Past Surgical History:   Procedure Laterality Date    CARDIAC CATHETERIZATION      CHOLECYSTECTOMY, LAPAROSCOPIC N/A 4/21/2021    LAPAROSCOPIC CHOLECYSTECTOMY WITH CHOLANGIOGRAM performed by Essie Roper MD at [place] [address]  10/06/2018    Bare Metal Stent to PDA    CORONARY ANGIOPLASTY WITH STENT PLACEMENT  10/07/2018    Bare Metal Stent to OM    CORONARY ANGIOPLASTY WITH STENT PLACEMENT 10/03/2018    [facility] to [facility] CATH LAB PROCEDURE      FINGER SURGERY Left     Left Thumb    HERNIA REPAIR      VASCULAR SURGERY      VASECTOMY         CURRENTMEDICATIONS       Discharge Medication List as of 9/24/2022  5:06 AM        CONTINUE these medications which have NOT CHANGED    Details   insulin glargine (LANTUS) 100 UNIT/ML injection vial Inject 37 Units into the skin nightly, Disp-10 mL, R-3Normal      !! insulin lispro (HUMALOG) 100 UNIT/ML SOLN injection vial Inject 0-8 Units into the skin 3 times daily (with meals)   No Insulin 200-249 270-199  No Insulin 200-249 2 Units 250-299 4 Units 300-349 6 Units   Units 250-299 4 Units 300-349 6 Units, Disp-10 each, R-1Print      !! insulin lispro (HUMALOG) 100 UNIT/ML SOLN injection vial Inject 19 Units into the skin 3 times daily (with meals), Disp-500 mL, R-0Normal      prasugrel (EFFIENT) 10 MG TABS TAKE 1 TABLET BY MOUTH EVERY DAY, Disp-30 tablet, R-3Normal      atorvastatin (LIPITOR) 80 MG tablet TAKE 1 TABLET BY MOUTH EVERY DAY, Disp-90 tablet, R-3Normal      Omega-3 Fatty Acids (FISH OIL PO) Take 1 capsule by mouth daily Historical Med      GARLIC PO Take 1 capsule by mouth daily Historical Med      aspirin 81 MG chewable tablet Take 81 mg by mouth dailyHistorical Med      Cholecalciferol (VITAMIN D3) 94786 units CAPS Take 1 capsule by mouth once a week Historical Med      Dulaglutide 3 MG/0.5ML SOPN Inject 3 mg into the skin once a week Indications: Friday Historical Med       !! - Potential duplicate medications found. Please discuss with provider. ALLERGIES     Patient has no known allergies.     FAMILYHISTORY       Family History   Problem Relation Age of Onset    High Blood Pressure Mother     Stroke Mother     Heart Disease Mother     COPD Mother     Heart Disease Father     Cancer Father         skin    Diabetes Sister     Cancer Sister     Heart Disease Brother     Diabetes Brother         SOCIAL HISTORY Social History     Socioeconomic History    Marital status:    Tobacco Use    Smoking status: Former     Packs/day: 2.00     Years: 12.00     Pack years: 24.00     Types: Cigarettes, Cigars    Smokeless tobacco: Never    Tobacco comments:     quit in the 80's   Vaping Use    Vaping Use: Never used   Substance and Sexual Activity    Alcohol use: No    Drug use: Not Currently     Types: Marijuana Steven Grimes)     Comment: quit 80's    Sexual activity: Yes     Partners: Female   Social History Narrative    ** Merged History Encounter **            SCREENINGS             PHYSICAL EXAM    (up to 7 for level 4, 8 or more for level 5)     ED Triage Vitals [09/24/22 0045]   BP Temp Temp Source Heart Rate Resp SpO2 Height Weight   -- 98.7 °F (37.1 °C) Oral 87 18 -- 6' 2\" (1.88 m) 225 lb 8.5 oz (102.3 kg)       Physical Exam  Constitutional:       General: He is not in acute distress. Appearance: Normal appearance. He is not ill-appearing, toxic-appearing or diaphoretic. HENT:      Head: Normocephalic and atraumatic. Right Ear: External ear normal.      Left Ear: External ear normal.      Nose: Nose normal.   Eyes:      General:         Right eye: No discharge. Left eye: No discharge. Pulmonary:      Effort: Pulmonary effort is normal. No respiratory distress. Musculoskeletal:         General: Normal range of motion. Cervical back: Normal range of motion. Comments: No midline tenderness of patient's thoracic, lumbar spine r  Left -sided low to mid back pain that is worsened with palpation and movement  No skin changes  Remarked range of motion and strength against resistance in all 4 extremities  Normal sensation in bilateral lower extremities  Normal capillary refill bilateral lower extremities  Posterior tibialis pulses 2+, intact bilateral   Skin:     General: Skin is warm and dry. Neurological:      General: No focal deficit present.       Mental Status: He is alert and oriented to person, place, and time. Psychiatric:         Mood and Affect: Mood normal.         Behavior: Behavior normal.       DIAGNOSTIC RESULTS   LABS:    Labs Reviewed - No data to display    When ordered, only abnormal lab results are displayed. All other labs were within normal range or not returned as of this dictation. EKG: When ordered, EKG's are interpreted by the Emergency Department Physician in the absence of a cardiologist.  Please see their note for interpretation of EKG. RADIOLOGY:   Non-plain film images such as CT, Ultrasound and MRI are read by the radiologist. Plain radiographic images are visualized andpreliminarily interpreted by the  ED Provider with the below findings:        Interpretation Formerly named [facility] Radiologist below, if available at the time of this note:    No orders to display     No results found. PROCEDURES   Unless otherwise noted below, none     Procedures    CRITICAL CARE TIME   N/A    CONSULTS:  None      EMERGENCY DEPARTMENT COURSE and DIFFERENTIAL DIAGNOSIS/MDM:   Vitals:    Vitals:    09/24/22 0045   Pulse: 87   Resp: 18   Temp: 98.7 °F (37.1 °C)   TempSrc: Oral   Weight: 225 lb 8.5 oz (102.3 kg)   Height: 6' 2\" (1.88 m)       Patient was given thefollowing medications:  Medications - No data to display      Is this patient to be included in the SEP-1 Core Measure due to severe sepsis or septic shock? No   Exclusion criteria - the patient is NOT to be included for SEP-1 Core Measure due to: Infection is not suspected    This is a 62y.o. year old male who presents to the ED with complaint of left-sided low to mid back pain that has been present for the past several hours. Vitals upon arrival show within normal limits. Our epic EMR system was experiencing downtime at the time that this patient was evaluated, BP was within normal limits . Physical exam shows as above. I discussed imaging with patient, however he states that he wants to hold off at this time.   He would just like something for the pain. He does not feel like he fractured anything. Medications given: Toradol Tylenol given here in the ED  Symptoms improved. I did send him home with some muscle relaxers and anti-inflammatories as well. Discussed with patient that if his symptoms worsen or new concerning symptoms present that he should return to the emergency department. I also gave him an orthopedic referral in case his symptoms do not improve. Patient was in agreement. He would like to go home. He was discharged in stable condition. I am the Primary Clinician of Record. FINAL IMPRESSION      1. Strain of lumbar region, initial encounter          DISPOSITION/PLAN   DISPOSITION Decision To Discharge 09/24/2022 05:06:00 AM      PATIENT REFERREDTO:  No follow-up provider specified.     DISCHARGE MEDICATIONS:  Discharge Medication List as of 9/24/2022  5:06 AM          DISCONTINUED MEDICATIONS:  Discharge Medication List as of 9/24/2022  5:06 AM        STOP taking these medications       metoprolol succinate (TOPROL XL) 50 MG extended release tablet Comments:   Reason for Stopping:                      (Please note that portions ofthis note were completed with a voice recognition program.  Efforts were made to edit the dictations but occasionally words are mis-transcribed.)    Peri Sanford PA-C (electronically signed)              Peri Sanford PA-C  09/24/22 2111       Peri Sanford PA-C  10/25/22 1552

## 2022-09-26 ENCOUNTER — TELEPHONE (OUTPATIENT)
Dept: WOUND CARE | Age: 57
End: 2022-09-26

## 2022-09-26 DIAGNOSIS — L97.522 ULCER OF LEFT FOOT, WITH FAT LAYER EXPOSED (HCC): Primary | ICD-10-CM

## 2022-09-26 NOTE — DISCHARGE INSTRUCTIONS
[address] Physician Orders and Discharge Instructions  [address], [address] [place] [address]  Telephone: [phone]  [address] 8:00 am - 4:30 pm and Friday 8:00 am - 12:00 pm.          NAME:  Juni King  YOB: 1965  MEDICAL RECORD NUMBER:  [id number]  DATE:  9/29/2022     Important Reminders:   Please wash hands with soap and water before and after every dressing change. Do not scrub wounds. Keep wounds dry in shower unless otherwise instructed by the physician. SMOKING can slow would healing. Stop smoking as soon as possible to improve healing and prevent further complications associated with smoking. Kaley-Wound Topical Treatments:  Do not apply lotions, creams, or ointments to wound bed unless directed. [] Apply moisturizing lotion to skin surrounding the wound prior to dressing change.  [] Apply antifungal ointment to skin surrounding the wound prior to dressing change.  [] Apply thin film of no sting moisture barrier ointment to skin immediately around      wound. [] Other:         Wound Location: : LEFT GREAT TOE WOUND     Wound Cleansing: Normal Saline     Primary Dressing:  [x] PLAIN COLLAGEN  []      Secondary Dressing:  [x] GAUZE THEN ROLL GAUZE  []         Dressing Frequency:  [x] THREE TIMES PER WEEK  [] Do Not Change Dressing           Compression and Edema Control:  [] Wear Home Compression Stockings   [] Spandagrip to:    Size: []Low compression 5-10 mm/Hg                 []Medium compression 10-20 mm/Hg           []High compression  20-30 mm/Hg  [] Ace Wrap Toes to Knee to    [] Multilayer Compression Wrap:  to               Do not get leg(s) with wrap wet. If wraps become too tight call the center or completely remove the wrap.          Pressure Relief and Off Loading: SURGICAL SHOE LEFT FOOT  [] Off-loading when   [] walking       [] in bed         [] sitting   Turn every 2 hours when in bed             Avoid putting direct pressure on the site of the wound. Limit side lying to 30 degree tilt. Limit elevating the head of the bed greater than 30 degrees. [] Assistive Devices     Use as instructed by the provider        Activity: Activity as Tolerated        Dietary:   Continue your diet as tolerated. Protein is a key nutrient in helping to repair damaged tissue and promote new tissue growth. Good sources of protein include milk, yogurt, cheese, fish, lean meat and beans. If you are DIABETIC, having diabetes can make it hard for wounds to heal. Try to keep your blood sugar within it's target range. Limit Sodium, Alcohol and Sugar. Pain:   Please Note some pain, drainage and/or bleeding might be expected after seeing the provider. TO HELP ALLEVIATE PAIN WE RECOMMEND THE FOLLOWING  Elevate the affected limb. Use over the counter medications as permitted by your family doctor. For Persistent Pain not relieved by the above interventions, please notify your family doctor. Return Appointment:  [x] Return Appointment: With DR ARAUJO  in 1 Week(s)  [x] Wound and dressing supply provider: [facility]  [] ECF or Home Healthcare:  [] Wound Assessment:         [] Physician or NP scheduled for Wound Assessment:   [] Orders placed during your visit:        : Senait Rivas     Electronically signed by Celso Oseguera RN on 9/29/2022 at 8:47 AM          [address]: Should you experience any significant changes in your wound(s) or have questions about your wound care, please contact the [address] [place] [address] at [address] 8:00 am - 4:30 pm and Friday 8:00 am - 12:30 pm.  If you need help with your wound outside these hours and cannot wait until we are again available, contact your PCP or go to the hospital emergency room.       PLEASE NOTE: IF YOU ARE UNABLE TO OBTAIN WOUND SUPPLIES, CONTINUE TO USE THE SUPPLIES YOU HAVE AVAILABLE UNTIL YOU ARE ABLE TO REACH US. IT IS MOST IMPORTANT TO KEEP THE WOUND COVERED AT ALL TIMES.      Physician Signature:_______________________     Date: ___________ Time:  ____________                                  Dr Hannah Nicole

## 2022-09-27 NOTE — PROGRESS NOTES
[address]   Progress Note and Procedure Note      Sally Grady  MEDICAL RECORD NUMBER:  [id number]  AGE: 62 y.o. GENDER: male  : 1965  EPISODE DATE:  2022    Subjective:     Chief Complaint   Patient presents with    Wound Check     Follow up for left foot. HISTORY of PRESENT ILLNESS HPI     Juni Barrientos is a 62 y.o. male who presents today for wound/ulcer evaluation. History of Wound Context: Presents today with a chief complaint of a left hallux diabetic foot ulceration. He was recently hospitalized for cellulitis and has been doing daily dressing changes and ambulating in a surgical shoe as recommended. Patient admits his blood sugars are not well controlled but they are improving.   He has not yet followed up with his primary care physician since discharge from the hospital.  Wound/Ulcer Pain Timing/Severity: none  Quality of pain: N/A  Severity:  0 / 10   Modifying Factors: None  Associated Signs/Symptoms: none    Ulcer Identification:  Ulcer Type: diabetic    Contributing Factors: diabetes and poor glucose control    Acute Wound: N/A    PAST MEDICAL HISTORY        Diagnosis Date    Abscess of arm, left 2/3/5228    Acute metabolic encephalopathy     Acute respiratory failure with hypoxia (HCC)     Arthritis     Blood circulation, collateral     CAD (coronary artery disease) 7/15/2014    CAD (coronary artery disease)     Cardiac arrest ([address] 10/05/2018    Cerebral artery occlusion with cerebral infarction ([address]     Cholecystitis 2021    COVID-19 virus infection 2020    Diabetes mellitus ([address]     Diabetic peripheral neuropathy ([address] 2018    Elbow contusion 3/24/2014    GERD (gastroesophageal reflux disease)     ulcers    High anion gap metabolic acidosis 1/3/4053    Hypercholesteremia     Hyperlipidemia     Hypertension     ICD (implantable cardioverter-defibrillator) in place     Left arm numbness 2010    MRSA Cigarettes, Cigars    Smokeless tobacco: Never    Tobacco comments:     quit in the 80's   Vaping Use    Vaping Use: Never used   Substance Use Topics    Alcohol use: No    Drug use: Not Currently     Types: Marijuana [place])     Comment: quit 80's       ALLERGIES    No Known Allergies    MEDICATIONS    Current Outpatient Medications on File Prior to Encounter   Medication Sig Dispense Refill    insulin glargine (LANTUS) 100 UNIT/ML injection vial Inject 37 Units into the skin nightly 10 mL 3    insulin lispro (HUMALOG) 100 UNIT/ML SOLN injection vial Inject 0-8 Units into the skin 3 times daily (with meals)   No Insulin 200-249 270-199  No Insulin 200-249 2 Units 250-299 4 Units 300-349 6 Units   Units 250-299 4 Units 300-349 6 Units 10 each 1    insulin lispro (HUMALOG) 100 UNIT/ML SOLN injection vial Inject 19 Units into the skin 3 times daily (with meals) 500 mL 0    prasugrel (EFFIENT) 10 MG TABS TAKE 1 TABLET BY MOUTH EVERY DAY 30 tablet 3    [DISCONTINUED] metoprolol succinate (TOPROL XL) 50 MG extended release tablet Take 1 tablet by mouth in the morning. 30 tablet 3    atorvastatin (LIPITOR) 80 MG tablet TAKE 1 TABLET BY MOUTH EVERY DAY 90 tablet 3    Omega-3 Fatty Acids (FISH OIL PO) Take 1 capsule by mouth daily       GARLIC PO Take 1 capsule by mouth daily       aspirin 81 MG chewable tablet Take 81 mg by mouth daily      Cholecalciferol (VITAMIN D3) 83281 units CAPS Take 1 capsule by mouth once a week       Dulaglutide 3 MG/0.5ML SOPN Inject 3 mg into the skin once a week Indications: Friday        No current facility-administered medications on file prior to encounter. REVIEW OF SYSTEMS  Review of Systems    Pertinent items are noted in HPI.     Objective:      /86   Pulse (!) 102   Temp 97.8 °F (36.6 °C) (Infrared)   Resp 16     Wt Readings from Last 3 Encounters:   09/24/22 225 lb 8.5 oz (102.3 kg)   09/05/22 219 lb 9.3 oz (99.6 kg)   07/29/22 210 lb (95.3 kg)       PHYSICAL EXAM  Physical Exam    DP/PT pulses palpable bilaterally. CFT brisk to all digits. Digits are pink and warm to the touch. Hair growth is absent in appearance. No edema noted. No calf pain with palpation noted. Epicritic sensation is grossly absent at all sites tested bilaterally. Negative clonus and babinski reflex is down going. There is an ulceration noted on the plantar aspect of the left hallux. Wound has fibrotic and nonviable tissue that extends down through and includes the subcutaneous tissue. After debridement the wound has a granular base. There is no surrounding erythema, edema, warmth or malodor noted. The wound does not probe or track to bone. Assessment:        Problem List Items Addressed This Visit       Ulcer of left foot, with fat layer exposed ([address] - Primary        Procedure Note  Indications:  Based on my examination of this patient's wound(s)/ulcer(s) today, debridement is required to promote healing and evaluate the wound base. Performed by: Dinora Sebastian DPM    Consent obtained:  Yes    Time out taken:  Yes    Pain Control: Anesthetic  Anesthetic: 4% Lidocaine Liquid Topical       Debridement: Excisional Debridement    Using #15 blade scalpel the wound(s)/ulcer(s) was/were debrided down through and including the removal of epidermis, dermis, and subcutaneous tissue.         Devitalized Tissue Debrided:  fibrin and slough    Pre Debridement Measurements:  Are located in the Wound/Ulcer Documentation Flow Sheet    Diabetic/Pressure/Non Pressure Ulcers only:  Ulcer: Diabetic ulcer, fat layer exposed     Wound/Ulcer #: 1, 2, and 3    Post Debridement Measurements:  Wound/Ulcer Descriptions are Pre Debridement except measurements:    Wound 09/15/22 Toe (Comment  which one) Lateral;Left #1  ( noted 7/20/22) (Active)   Wound Image   09/15/22 0801   Wound Etiology Diabetic Moon 3 09/15/22 0801   Dressing/Treatment Collagen;Moisten with saline;Gauze dressing/dressing sponge;Roll gauze 09/22/22 0924   Offloading for Diabetic Foot Ulcers Post op shoe 09/22/22 0924   Wound Length (cm) 0.3 cm 09/22/22 0817   Wound Width (cm) 0.4 cm 09/22/22 0817   Wound Depth (cm) 0.1 cm 09/22/22 0817   Wound Surface Area (cm^2) 0.12 cm^2 09/22/22 0817   Change in Wound Size % (l*w) 0 09/22/22 0817   Wound Volume (cm^3) 0.012 cm^3 09/22/22 0817   Wound Healing % 0 09/22/22 0817   Post-Procedure Length (cm) 0.5 cm 09/22/22 0854   Post-Procedure Width (cm) 0.6 cm 09/22/22 0854   Post-Procedure Depth (cm) 0.1 cm 09/22/22 0854   Post-Procedure Surface Area (cm^2) 0.3 cm^2 09/22/22 0854   Post-Procedure Volume (cm^3) 0.03 cm^3 09/22/22 0854   Wound Assessment Dry;Eschar dry 09/22/22 0817   Drainage Amount None 09/22/22 0817   Odor None 09/22/22 0817   Kaley-wound Assessment Hyperkeratosis (callous); Dry/flaky 09/22/22 0817   Margins Undefined edges 09/22/22 0817   Wound Thickness Description not for Pressure Injury Full thickness 09/22/22 0817   Number of days: 11       Wound 09/15/22 Toe (Comment  which one) Left;Plantar #2, ( noted 7/20/22) (Active)   Wound Image   09/15/22 0801   Wound Etiology Diabetic Moon 3 09/15/22 0801   Dressing/Treatment Collagen;Moisten with saline;Gauze dressing/dressing sponge;Roll gauze 09/22/22 0924   Offloading for Diabetic Foot Ulcers Post op shoe 09/22/22 0924   Wound Length (cm) 0.4 cm 09/22/22 0817   Wound Width (cm) 0.5 cm 09/22/22 0817   Wound Depth (cm) 0.2 cm 09/22/22 0817   Wound Surface Area (cm^2) 0.2 cm^2 09/22/22 0817   Change in Wound Size % (l*w) -66.67 09/22/22 0817   Wound Volume (cm^3) 0.04 cm^3 09/22/22 0817   Wound Healing % -233 09/22/22 0817   Post-Procedure Length (cm) 0.6 cm 09/22/22 0854   Post-Procedure Width (cm) 0.7 cm 09/22/22 0854   Post-Procedure Depth (cm) 0.2 cm 09/22/22 0854   Post-Procedure Surface Area (cm^2) 0.42 cm^2 09/22/22 0854   Post-Procedure Volume (cm^3) 0.084 cm^3 09/22/22 0854   Wound Assessment Granulation tissue;Slough 09/22/22 0817 Drainage Amount Scant 09/22/22 0817   Drainage Description Serosanguinous 09/22/22 0817   Odor None 09/22/22 0817   Kaley-wound Assessment Hyperkeratosis (callous) 09/22/22 0817   Margins Attached edges 09/22/22 0817   Wound Thickness Description not for Pressure Injury Full thickness 09/22/22 0817   Number of days: 11       Wound 09/15/22 Toe (Comment  which one) Left;Medial #3. ( noted 7/22/22) (Active)   Wound Image   09/15/22 0801   Wound Etiology Diabetic Moon 3 09/15/22 0801   Dressing/Treatment Collagen;Moisten with saline;Gauze dressing/dressing sponge;Roll gauze 09/22/22 0924   Offloading for Diabetic Foot Ulcers Post op shoe 09/22/22 0924   Wound Length (cm) 1.3 cm 09/22/22 0817   Wound Width (cm) 1.2 cm 09/22/22 0817   Wound Depth (cm) 0.1 cm 09/22/22 0817   Wound Surface Area (cm^2) 1.56 cm^2 09/22/22 0817   Change in Wound Size % (l*w) -225 09/22/22 0817   Wound Volume (cm^3) 0.156 cm^3 09/22/22 0817   Wound Healing % -63 09/22/22 0817   Post-Procedure Length (cm) 1.5 cm 09/22/22 0854   Post-Procedure Width (cm) 1.4 cm 09/22/22 0854   Post-Procedure Depth (cm) 0.1 cm 09/22/22 0854   Post-Procedure Surface Area (cm^2) 2.1 cm^2 09/22/22 0854   Post-Procedure Volume (cm^3) 0.21 cm^3 09/22/22 0854   Wound Assessment Eschar dry;Dry 09/22/22 0817   Drainage Amount None 09/22/22 0817   Drainage Description Serosanguinous 09/15/22 0801   Odor None 09/22/22 0817   Kaley-wound Assessment Hyperkeratosis (callous); Dry/flaky 09/22/22 0817   Margins Undefined edges 09/22/22 0817   Wound Thickness Description not for Pressure Injury Full thickness 09/22/22 0817   Number of days: 11          Total Surface Area Debrided:  2.82 sq cm     Estimated Blood Loss:  Minimal    Hemostasis Achieved:  by pressure    Procedural Pain:  0  / 10     Post Procedural Pain:  0 / 10     Response to treatment:  Well tolerated by patient. Plan:   E/M x30 minutes. Patient is to continue taking antibiotics until gone.     Patient was instructed to continue local wound care daily. Continue to offload the area with surgical shoe. Prolonged discussion with patient regarding the importance of good glycemic control for wound healing and to prevent further diabetic complications. Encourage patient to follow-up with his primary care physician. Treatment Note please see attached Discharge Instructions    Written patient dismissal instructions given to patient and signed by patient or POA. Reappoint 1 week for follow-up or sooner if problems develop       Discharge [id number] [facility] Physician Orders and Discharge [address], [address] [place] [address]  Telephone: [phone] [address] 8:00 am - 4:30 pm and Friday 8:00 am - 12:00 pm.          NAME:  Juni King  YOB: 1965  MEDICAL RECORD NUMBER:  [id number]  DATE:  9/22/2022     Important Reminders:   Please wash hands with soap and water before and after every dressing change. Do not scrub wounds. Keep wounds dry in shower unless otherwise instructed by the physician. SMOKING can slow would healing. Stop smoking as soon as possible to improve healing and prevent further complications associated with smoking. Kaley-Wound Topical Treatments:  Do not apply lotions, creams, or ointments to wound bed unless directed. [] Apply moisturizing lotion to skin surrounding the wound prior to dressing change.  [] Apply antifungal ointment to skin surrounding the wound prior to dressing change.  [] Apply thin film of no sting moisture barrier ointment to skin immediately around      wound.   [] Other:         Wound Location: : LEFT GREAT TOE WOUNDS     Wound Cleansing: Normal Saline     Primary Dressing:  [x] PLAIN COLLAGEN  []      Secondary Dressing:  [x] GAUZE THEN ROLL GAUZE  []         Dressing Frequency:  [x] THREE TIMES PER WEEK  [] Do Not Change Dressing           Compression and Edema Control:  [] Wear Home Compression Stockings   [] Spandagrip to:    Size: []Low compression 5-10 mm/Hg                 []Medium compression 10-20 mm/Hg           []High compression  20-30 mm/Hg  [] Ace Wrap Toes to Knee to    [] Multilayer Compression Wrap:  to               Do not get leg(s) with wrap wet. If wraps become too tight call the center or completely remove the wrap. Pressure Relief and Off Loading: SURGICAL SHOE LEFT FOOT  [] Off-loading when   [] walking       [] in bed         [] sitting   Turn every 2 hours when in bed             Avoid putting direct pressure on the site of the wound. Limit side lying to 30 degree tilt. Limit elevating the head of the bed greater than 30 degrees. [] Assistive Devices     Use as instructed by the provider        Activity: Activity as Tolerated        Dietary:   Continue your diet as tolerated. Protein is a key nutrient in helping to repair damaged tissue and promote new tissue growth. Good sources of protein include milk, yogurt, cheese, fish, lean meat and beans. If you are DIABETIC, having diabetes can make it hard for wounds to heal. Try to keep your blood sugar within it's target range. Limit Sodium, Alcohol and Sugar. Pain:   Please Note some pain, drainage and/or bleeding might be expected after seeing the provider. TO HELP ALLEVIATE PAIN WE RECOMMEND THE FOLLOWING  Elevate the affected limb. Use over the counter medications as permitted by your family doctor. For Persistent Pain not relieved by the above interventions, please notify your family doctor.      Return Appointment:  [x] Return Appointment: With DR ARAUJO  in 1 Week(s)  [x] Wound and dressing supply provider: [facility]  [] ECF or Home Healthcare:  [] Wound Assessment:         [] Physician or NP scheduled for Wound Assessment:   [] Orders placed during your visit:        : Yobani Reyna Electronically signed by Mrea Escobedo RN on 9/22/2022 at 8:56 AM        [facility] Information: Should you experience any significant changes in your wound(s) or have questions about your wound care, please contact the [address] [place] [address] at [address] 8:00 am - 4:30 pm and Friday 8:00 am - 12:30 pm.  If you need help with your wound outside these hours and cannot wait until we are again available, contact your PCP or go to the hospital emergency room. PLEASE NOTE: IF YOU ARE UNABLE TO OBTAIN WOUND SUPPLIES, CONTINUE TO USE THE SUPPLIES YOU HAVE AVAILABLE UNTIL YOU ARE ABLE TO REACH US. IT IS MOST IMPORTANT TO KEEP THE WOUND COVERED AT ALL TIMES.      Physician Signature:_______________________     Date: ___________ Time:  ____________                                  Dr Robbie Hickman         Electronically signed by Robbie Hickman DPM on 9/27/2022 at 6:08 AM

## 2022-09-29 ENCOUNTER — HOSPITAL ENCOUNTER (OUTPATIENT)
Dept: WOUND CARE | Age: 57
Discharge: HOME | End: 2022-09-29
Payer: MEDICARE

## 2022-09-29 VITALS
RESPIRATION RATE: 18 BRPM | DIASTOLIC BLOOD PRESSURE: 83 MMHG | TEMPERATURE: 97.9 F | HEART RATE: 96 BPM | SYSTOLIC BLOOD PRESSURE: 119 MMHG

## 2022-09-29 DIAGNOSIS — L97.522 ULCER OF LEFT FOOT, WITH FAT LAYER EXPOSED (HCC): Primary | ICD-10-CM

## 2022-09-29 PROCEDURE — 11042 DBRDMT SUBQ TIS 1ST 20SQCM/<: CPT

## 2022-09-29 RX ORDER — BETAMETHASONE DIPROPIONATE 0.05 %
OINTMENT (GRAM) TOPICAL ONCE
Status: CANCELLED | OUTPATIENT
Start: 2022-09-29 | End: 2022-09-29

## 2022-09-29 RX ORDER — LIDOCAINE 40 MG/G
CREAM TOPICAL ONCE
Status: CANCELLED | OUTPATIENT
Start: 2022-09-29 | End: 2022-09-29

## 2022-09-29 RX ORDER — LIDOCAINE HYDROCHLORIDE 20 MG/ML
JELLY TOPICAL ONCE
Status: CANCELLED | OUTPATIENT
Start: 2022-09-29 | End: 2022-09-29

## 2022-09-29 RX ORDER — LIDOCAINE 50 MG/G
OINTMENT TOPICAL ONCE
Status: CANCELLED | OUTPATIENT
Start: 2022-09-29 | End: 2022-09-29

## 2022-09-29 RX ORDER — CLOBETASOL PROPIONATE 0.5 MG/G
OINTMENT TOPICAL ONCE
Status: CANCELLED | OUTPATIENT
Start: 2022-09-29 | End: 2022-09-29

## 2022-09-29 RX ORDER — BACITRACIN, NEOMYCIN, POLYMYXIN B 400; 3.5; 5 [USP'U]/G; MG/G; [USP'U]/G
OINTMENT TOPICAL ONCE
Status: CANCELLED | OUTPATIENT
Start: 2022-09-29 | End: 2022-09-29

## 2022-09-29 RX ORDER — LIDOCAINE HYDROCHLORIDE 40 MG/ML
SOLUTION TOPICAL ONCE
Status: COMPLETED | OUTPATIENT
Start: 2022-09-29 | End: 2022-09-29

## 2022-09-29 RX ORDER — BACITRACIN ZINC AND POLYMYXIN B SULFATE 500; 1000 [USP'U]/G; [USP'U]/G
OINTMENT TOPICAL ONCE
Status: CANCELLED | OUTPATIENT
Start: 2022-09-29 | End: 2022-09-29

## 2022-09-29 RX ORDER — GENTAMICIN SULFATE 1 MG/G
OINTMENT TOPICAL ONCE
Status: CANCELLED | OUTPATIENT
Start: 2022-09-29 | End: 2022-09-29

## 2022-09-29 RX ORDER — GINSENG 100 MG
CAPSULE ORAL ONCE
Status: CANCELLED | OUTPATIENT
Start: 2022-09-29 | End: 2022-09-29

## 2022-09-29 RX ORDER — LIDOCAINE HYDROCHLORIDE 40 MG/ML
SOLUTION TOPICAL ONCE
Status: CANCELLED | OUTPATIENT
Start: 2022-09-29 | End: 2022-09-29

## 2022-09-29 RX ADMIN — LIDOCAINE HYDROCHLORIDE 2.5 ML: 40 SOLUTION TOPICAL at 08:15

## 2022-09-29 ASSESSMENT — PAIN SCALES - GENERAL: PAINLEVEL_OUTOF10: 0

## 2022-09-29 NOTE — PROGRESS NOTES
[address]   Progress Note and Procedure Note      Reed Sharif  MEDICAL RECORD NUMBER:  [id number]  AGE: 62 y.o. GENDER: male  : 1965  EPISODE DATE:  2022    Subjective:     Chief Complaint   Patient presents with    Wound Check     Follow up left great toe         HISTORY of PRESENT ILLNESS HPI     Juni Topete is a 62 y.o. male who presents today for wound/ulcer evaluation. History of Wound Context: Presents today with a chief complaint of a left hallux diabetic foot ulceration. He was recently hospitalized for cellulitis and has been doing daily dressing changes and ambulating in a surgical shoe as recommended. Patient admits his blood sugars are not well controlled but they are improving.     Wound/Ulcer Pain Timing/Severity: none  Quality of pain: N/A  Severity:  0 / 10   Modifying Factors: None  Associated Signs/Symptoms: none    Ulcer Identification:  Ulcer Type: diabetic    Contributing Factors: diabetes and poor glucose control    Acute Wound: N/A    PAST MEDICAL HISTORY        Diagnosis Date    Abscess of arm, left 4805    Acute metabolic encephalopathy 4304    Acute respiratory failure with hypoxia (HCC)     Arthritis     Blood circulation, collateral     CAD (coronary artery disease) 7/15/2014    CAD (coronary artery disease)     Cardiac arrest ([address] 10/05/2018    Cerebral artery occlusion with cerebral infarction ([address]     Cholecystitis 2021    COVID-19 virus infection 2020    Diabetes mellitus ([address]     Diabetic peripheral neuropathy ([address] 2018    Elbow contusion 3/24/2014    GERD (gastroesophageal reflux disease)     ulcers    High anion gap metabolic acidosis 8607    Hypercholesteremia     Hyperlipidemia     Hypertension     ICD (implantable cardioverter-defibrillator) in place     Left arm numbness 2010    MRSA (methicillin resistant staph aureus) culture positive 2018    foot wound    Multifocal pneumonia     Neuropathy     Neutrophilic leukocytosis 5/82/5743    NSTEMI (non-ST elevated myocardial infarction) ([facility] 10/3/2018    Pneumonia due to COVID-19 virus     POTS (postural orthostatic tachycardia syndrome)     Psychiatric problem     anxiety, depression, bipolar    Sepsis ([facility] 7/1/2020    SIRS (systemic inflammatory response syndrome) ([facility] 4/14/2021    Skin ulcer of left foot with fat layer exposed ([facility] 6/1/2018    Sleep apnea     does not use Cpap    ST elevation myocardial infarction (STEMI) of inferolateral wall ([facility] 11/13/2018    Syncope     Type II or unspecified type diabetes mellitus without mention of complication, not stated as uncontrolled     Ulcer of foot due to secondary diabetes ([facility] 8/15/2018    Wears glasses        PAST SURGICAL HISTORY    Past Surgical History:   Procedure Laterality Date    CARDIAC CATHETERIZATION      CHOLECYSTECTOMY, LAPAROSCOPIC N/A 4/21/2021    LAPAROSCOPIC CHOLECYSTECTOMY WITH CHOLANGIOGRAM performed by Ian Cochran MD at [facility]  10/06/2018    Bare Metal Stent to PDA    CORONARY ANGIOPLASTY WITH STENT PLACEMENT  10/07/2018    Bare Metal Stent to OM    CORONARY ANGIOPLASTY WITH STENT PLACEMENT  10/03/2018    CECE to Circ    DIAGNOSTIC CARDIAC CATH LAB PROCEDURE      FINGER SURGERY Left     Left Thumb    HERNIA REPAIR      VASCULAR SURGERY      VASECTOMY         FAMILY HISTORY    Family History   Problem Relation Age of Onset    High Blood Pressure Mother     Stroke Mother     Heart Disease Mother     COPD Mother     Heart Disease Father     Cancer Father         skin    Diabetes Sister     Cancer Sister     Heart Disease Brother     Diabetes Brother        SOCIAL HISTORY    Social History     Tobacco Use    Smoking status: Former     Packs/day: 2.00     Years: 12.00     Pack years: 24.00     Types: Cigarettes, Cigars    Smokeless tobacco: Never    Tobacco comments:     quit in the 80's Vaping Use    Vaping Use: Never used   Substance Use Topics    Alcohol use: No    Drug use: Not Currently     Types: Marijuana Michelle Natalie)     Comment: quit 80's       ALLERGIES    No Known Allergies    MEDICATIONS    Current Outpatient Medications on File Prior to Encounter   Medication Sig Dispense Refill    insulin glargine (LANTUS) 100 UNIT/ML injection vial Inject 37 Units into the skin nightly 10 mL 3    insulin lispro (HUMALOG) 100 UNIT/ML SOLN injection vial Inject 0-8 Units into the skin 3 times daily (with meals)   No Insulin 200-249 270-199  No Insulin 200-249 2 Units 250-299 4 Units 300-349 6 Units   Units 250-299 4 Units 300-349 6 Units 10 each 1    insulin lispro (HUMALOG) 100 UNIT/ML SOLN injection vial Inject 19 Units into the skin 3 times daily (with meals) 500 mL 0    prasugrel (EFFIENT) 10 MG TABS TAKE 1 TABLET BY MOUTH EVERY DAY 30 tablet 3    [DISCONTINUED] metoprolol succinate (TOPROL XL) 50 MG extended release tablet Take 1 tablet by mouth in the morning. 30 tablet 3    atorvastatin (LIPITOR) 80 MG tablet TAKE 1 TABLET BY MOUTH EVERY DAY 90 tablet 3    Omega-3 Fatty Acids (FISH OIL PO) Take 1 capsule by mouth daily       GARLIC PO Take 1 capsule by mouth daily       aspirin 81 MG chewable tablet Take 81 mg by mouth daily      Cholecalciferol (VITAMIN D3) 40372 units CAPS Take 1 capsule by mouth once a week       Dulaglutide 3 MG/0.5ML SOPN Inject 3 mg into the skin once a week Indications: Friday        No current facility-administered medications on file prior to encounter. REVIEW OF SYSTEMS  Review of Systems    Pertinent items are noted in HPI. Objective:      /83   Pulse 96   Temp 97.9 °F (36.6 °C) (Infrared)   Resp 18     Wt Readings from Last 3 Encounters:   09/24/22 225 lb 8.5 oz (102.3 kg)   09/05/22 219 lb 9.3 oz (99.6 kg)   07/29/22 210 lb (95.3 kg)       PHYSICAL EXAM  Physical Exam    DP/PT pulses palpable bilaterally. CFT brisk to all digits.   Digits are pink and warm to the touch. Hair growth is absent in appearance. No edema noted. No calf pain with palpation noted. Epicritic sensation is grossly absent at all sites tested bilaterally. Negative clonus and babinski reflex is down going. There is an ulceration noted on the plantar aspect of the left hallux. Wound has fibrotic and nonviable tissue that extends down through and includes the subcutaneous tissue. After debridement the wound has a granular base. There is no surrounding erythema, edema, warmth or malodor noted. The wound does not probe or track to bone. Assessment:        Problem List Items Addressed This Visit       Ulcer of left foot, with fat layer exposed ([address] - Primary    Relevant Orders    Initiate Outpatient Wound Care Protocol        Procedure Note  Indications:  Based on my examination of this patient's wound(s)/ulcer(s) today, debridement is required to promote healing and evaluate the wound base. Performed by: Gennaro Melendez DPM    Consent obtained:  Yes    Time out taken:  Yes    Pain Control: Anesthetic  Anesthetic: 4% Lidocaine Liquid Topical (2.5 ml)       Debridement: Excisional Debridement    Using #15 blade scalpel the wound(s)/ulcer(s) was/were debrided down through and including the removal of epidermis, dermis, and subcutaneous tissue.         Devitalized Tissue Debrided:  fibrin and slough    Pre Debridement Measurements:  Are located in the [place]  Documentation Flow Sheet    Diabetic/Pressure/Non Pressure Ulcers only:  Ulcer: Diabetic ulcer, fat layer exposed     Wound/Ulcer #: 2    Post Debridement Measurements:  Wound/Ulcer Descriptions are Pre Debridement except measurements:    Wound 09/15/22 Toe (Comment  which one) Lateral;Left #1  ( noted 7/20/22) (Active)   Wound Image   09/29/22 0806   Wound Etiology Diabetic Moon 3 09/15/22 0801   Dressing/Treatment Collagen;Moisten with saline;Gauze dressing/dressing sponge;Roll gauze 09/22/22 0924   Offloading for Diabetic Foot Ulcers Post op shoe 09/22/22 0924   Wound Length (cm) 0 cm 09/29/22 0806   Wound Width (cm) 0 cm 09/29/22 0806   Wound Depth (cm) 0 cm 09/29/22 0806   Wound Surface Area (cm^2) 0 cm^2 09/29/22 0806   Change in Wound Size % (l*w) 100 09/29/22 0806   Wound Volume (cm^3) 0 cm^3 09/29/22 0806   Wound Healing % 100 09/29/22 0806   Post-Procedure Length (cm) 0 cm 09/29/22 0846   Post-Procedure Width (cm) 0 cm 09/29/22 0846   Post-Procedure Depth (cm) 0 cm 09/29/22 0846   Post-Procedure Surface Area (cm^2) 0 cm^2 09/29/22 0846   Post-Procedure Volume (cm^3) 0 cm^3 09/29/22 0846   Wound Assessment Epithelialization 09/29/22 0806   Drainage Amount None 09/22/22 0817   Odor None 09/22/22 0817   Kaley-wound Assessment Hyperkeratosis (callous); Dry/flaky 09/22/22 0817   Margins Undefined edges 09/22/22 0817   Wound Thickness Description not for Pressure Injury Full thickness 09/22/22 0817   Number of days: 14       Wound 09/15/22 Toe (Comment  which one) Left;Plantar #2, LEFT GREAT TOE( noted 7/20/22) (Active)   Wound Image   09/15/22 0801   Wound Etiology Diabetic Moon 3 09/15/22 0801   Dressing/Treatment Collagen;Moisten with saline;Gauze dressing/dressing sponge;Roll gauze 09/29/22 0904   Offloading for Diabetic Foot Ulcers Post op shoe 09/29/22 0904   Wound Length (cm) 0.2 cm 09/29/22 0806   Wound Width (cm) 0.4 cm 09/29/22 0806   Wound Depth (cm) 0.2 cm 09/29/22 0806   Wound Surface Area (cm^2) 0.08 cm^2 09/29/22 0806   Change in Wound Size % (l*w) 33.33 09/29/22 0806   Wound Volume (cm^3) 0.016 cm^3 09/29/22 0806   Wound Healing % -33 09/29/22 0806   Post-Procedure Length (cm) 0.4 cm 09/29/22 0846   Post-Procedure Width (cm) 0.6 cm 09/29/22 0846   Post-Procedure Depth (cm) 0.2 cm 09/29/22 0846   Post-Procedure Surface Area (cm^2) 0.24 cm^2 09/29/22 0846   Post-Procedure Volume (cm^3) 0.048 cm^3 09/29/22 0846   Wound Assessment Granulation tissue;Slough 09/29/22 0806   Drainage Amount None 09/29/22 0806   Drainage Description Serosanguinous 09/22/22 0817   Odor None 09/29/22 0806   Kaley-wound Assessment Dry/flaky 09/29/22 0806   Margins Attached edges 09/29/22 0806   Wound Thickness Description not for Pressure Injury Full thickness 09/29/22 0806   Number of days: 14       Wound 09/15/22 Toe (Comment  which one) Left;Medial #3. ( noted 7/22/22) (Active)   Wound Image   09/29/22 0806   Wound Etiology Diabetic Moon 3 09/15/22 0801   Dressing/Treatment Collagen;Moisten with saline;Gauze dressing/dressing sponge;Roll gauze 09/22/22 0924   Offloading for Diabetic Foot Ulcers Post op shoe 09/22/22 0924   Wound Length (cm) 0 cm 09/29/22 0806   Wound Width (cm) 0 cm 09/29/22 0806   Wound Depth (cm) 0 cm 09/29/22 0806   Wound Surface Area (cm^2) 0 cm^2 09/29/22 0806   Change in Wound Size % (l*w) 100 09/29/22 0806   Wound Volume (cm^3) 0 cm^3 09/29/22 0806   Wound Healing % 100 09/29/22 0806   Post-Procedure Length (cm) 0 cm 09/29/22 0846   Post-Procedure Width (cm) 0 cm 09/29/22 0846   Post-Procedure Depth (cm) 0 cm 09/29/22 0846   Post-Procedure Surface Area (cm^2) 0 cm^2 09/29/22 0846   Post-Procedure Volume (cm^3) 0 cm^3 09/29/22 0846   Wound Assessment Epithelialization 09/29/22 0806   Drainage Amount None 09/22/22 0817   Drainage Description Serosanguinous 09/15/22 0801   Odor None 09/22/22 0817   Kaley-wound Assessment Hyperkeratosis (callous); Dry/flaky 09/22/22 0817   Margins Undefined edges 09/22/22 0817   Wound Thickness Description not for Pressure Injury Full thickness 09/22/22 0817   Number of days: 14          Total Surface Area Debrided:  0.24 sq cm     Estimated Blood Loss:  Minimal    Hemostasis Achieved:  by pressure    Procedural Pain:  0  / 10     Post Procedural Pain:  0 / 10     Response to treatment:  Well tolerated by patient. Plan:   E/M x30 minutes. Patient was instructed to continue local wound care daily. Continue to offload the area with surgical shoe.     Prolonged discussion with patient regarding the importance of good glycemic control for wound healing and to prevent further diabetic complications. Encourage patient to follow-up with his primary care physician. Treatment Note please see attached Discharge Instructions    Written patient dismissal instructions given to patient and signed by patient or POA. Reappoint 1 week for follow-up or sooner if problems develop       Discharge [id number] [facility] Physician Orders and Discharge [address]  [address] [place] [address]  Telephone: [phone]  [address] 8:00 am - 4:30 pm and Friday 8:00 am - 12:00 pm.          NAME:  Juni King  YOB: 1965  MEDICAL RECORD NUMBER:  [id number]  DATE:  9/29/2022     Important Reminders:   Please wash hands with soap and water before and after every dressing change. Do not scrub wounds. Keep wounds dry in shower unless otherwise instructed by the physician. SMOKING can slow would healing. Stop smoking as soon as possible to improve healing and prevent further complications associated with smoking. Kaley-Wound Topical Treatments:  Do not apply lotions, creams, or ointments to wound bed unless directed. [] Apply moisturizing lotion to skin surrounding the wound prior to dressing change.  [] Apply antifungal ointment to skin surrounding the wound prior to dressing change.  [] Apply thin film of no sting moisture barrier ointment to skin immediately around      wound.   [] Other:         Wound Location: : LEFT GREAT TOE WOUND     Wound Cleansing: Normal Saline     Primary Dressing:  [x] PLAIN COLLAGEN  []      Secondary Dressing:  [x] GAUZE THEN ROLL GAUZE  []         Dressing Frequency:  [x] THREE TIMES PER WEEK  [] Do Not Change Dressing           Compression and Edema Control:  [] Wear Home Compression Stockings   [] Spandagrip to:    Size: []Low compression 5-10 mm/Hg                 []Medium compression 10-20 mm/Hg           []High compression  20-30 mm/Hg  [] Ace Wrap Toes to Knee to    [] Multilayer Compression Wrap:  to               Do not get leg(s) with wrap wet. If wraps become too tight call the center or completely remove the wrap. Pressure Relief and Off Loading: SURGICAL SHOE LEFT FOOT  [] Off-loading when   [] walking       [] in bed         [] sitting   Turn every 2 hours when in bed             Avoid putting direct pressure on the site of the wound. Limit side lying to 30 degree tilt. Limit elevating the head of the bed greater than 30 degrees. [] Assistive Devices     Use as instructed by the provider        Activity: Activity as Tolerated        Dietary:   Continue your diet as tolerated. Protein is a key nutrient in helping to repair damaged tissue and promote new tissue growth. Good sources of protein include milk, yogurt, cheese, fish, lean meat and beans. If you are DIABETIC, having diabetes can make it hard for wounds to heal. Try to keep your blood sugar within it's target range. Limit Sodium, Alcohol and Sugar. Pain:   Please Note some pain, drainage and/or bleeding might be expected after seeing the provider. TO HELP ALLEVIATE PAIN WE RECOMMEND THE FOLLOWING  Elevate the affected limb. Use over the counter medications as permitted by your family doctor. For Persistent Pain not relieved by the above interventions, please notify your family doctor.      Return Appointment:  [x] Return Appointment: With DR ARAUJO  in 1 Week(s)  [x] Wound and dressing supply provider: [facility]  [] ECF or Home Healthcare:  [] Wound Assessment:         [] Physician or NP scheduled for Wound Assessment:   [] Orders placed during your visit:        : Tamara Oates     Electronically signed by Cherry Adler RN on 9/29/2022 at 8:47 AM          [address]: Should you experience any significant changes in your wound(s) or have questions about your wound care, please contact the [address] [place] [address] at [address] 8:00 am - 4:30 pm and Friday 8:00 am - 12:30 pm.  If you need help with your wound outside these hours and cannot wait until we are again available, contact your PCP or go to the hospital emergency room. PLEASE NOTE: IF YOU ARE UNABLE TO OBTAIN WOUND SUPPLIES, CONTINUE TO USE THE SUPPLIES YOU HAVE AVAILABLE UNTIL YOU ARE ABLE TO REACH US. IT IS MOST IMPORTANT TO KEEP THE WOUND COVERED AT ALL TIMES.      Physician Signature:_______________________     Date: ___________ Time:  ____________                                  Dr Srinivasan Tracy         Electronically signed by Srinivasan Tracy DPM on 9/29/2022 at 12:38 PM

## 2022-09-30 NOTE — DISCHARGE INSTRUCTIONS
[address] Physician Orders   [facility] [place]  [address] [facility] [address], [place] [address]  Telephone: [phone]    NAME:  Ca Alvarado OF BIRTH:  1965  MEDICAL RECORD NUMBER:  [id number]  DATE:  10/6/2022    Congratulations! You have completed your treatment. Return to your Primary Care Physician for all your health issues. Resume your ordinary activities as tolerated. Take your medications as prescribed by your primary care physician. Check your skin daily for cracks, bruises, sores, or dryness. Use a moisturizer as needed. Clean and dry your skin, using mild soap and warm water (not hot). Avoid alcohol and caffeine and do not smoke. Maintain a nutritious diet. Avoid pressure on your wound site. Keep your legs elevated above the level of the heart whenever possible.     **MAKE APPOINTMENT WITH ONE OF THE DOCTORS AT DR ARAUJO OFFICE IN 4-6 WEEKS**    **KEEP BLOOD SUGAR UNDER CONTROL**      Physician Signature:______________________    Date: ___________ Time:  ____________    Dr Corey Germain             Electronically signed by Nay Jackson RN on 10/6/2022 at 8:54 AM

## 2022-10-06 ENCOUNTER — HOSPITAL ENCOUNTER (OUTPATIENT)
Dept: WOUND CARE | Age: 57
Discharge: HOME | End: 2022-10-06
Payer: MEDICARE

## 2022-10-06 VITALS
DIASTOLIC BLOOD PRESSURE: 83 MMHG | RESPIRATION RATE: 18 BRPM | SYSTOLIC BLOOD PRESSURE: 125 MMHG | TEMPERATURE: 98.1 F | HEART RATE: 93 BPM

## 2022-10-06 DIAGNOSIS — L97.522 ULCER OF LEFT FOOT, WITH FAT LAYER EXPOSED (HCC): Primary | ICD-10-CM

## 2022-10-06 PROCEDURE — 99211 OFF/OP EST MAY X REQ PHY/QHP: CPT

## 2022-10-06 RX ORDER — GINSENG 100 MG
CAPSULE ORAL ONCE
Status: CANCELLED | OUTPATIENT
Start: 2022-10-06 | End: 2022-10-06

## 2022-10-06 RX ORDER — GENTAMICIN SULFATE 1 MG/G
OINTMENT TOPICAL ONCE
Status: CANCELLED | OUTPATIENT
Start: 2022-10-06 | End: 2022-10-06

## 2022-10-06 RX ORDER — LIDOCAINE 50 MG/G
OINTMENT TOPICAL ONCE
Status: CANCELLED | OUTPATIENT
Start: 2022-10-06 | End: 2022-10-06

## 2022-10-06 RX ORDER — BETAMETHASONE DIPROPIONATE 0.05 %
OINTMENT (GRAM) TOPICAL ONCE
Status: CANCELLED | OUTPATIENT
Start: 2022-10-06 | End: 2022-10-06

## 2022-10-06 RX ORDER — CLOBETASOL PROPIONATE 0.5 MG/G
OINTMENT TOPICAL ONCE
Status: CANCELLED | OUTPATIENT
Start: 2022-10-06 | End: 2022-10-06

## 2022-10-06 RX ORDER — BACITRACIN ZINC AND POLYMYXIN B SULFATE 500; 1000 [USP'U]/G; [USP'U]/G
OINTMENT TOPICAL ONCE
Status: CANCELLED | OUTPATIENT
Start: 2022-10-06 | End: 2022-10-06

## 2022-10-06 RX ORDER — LIDOCAINE HYDROCHLORIDE 40 MG/ML
SOLUTION TOPICAL ONCE
Status: CANCELLED | OUTPATIENT
Start: 2022-10-06 | End: 2022-10-06

## 2022-10-06 RX ORDER — BACITRACIN, NEOMYCIN, POLYMYXIN B 400; 3.5; 5 [USP'U]/G; MG/G; [USP'U]/G
OINTMENT TOPICAL ONCE
Status: CANCELLED | OUTPATIENT
Start: 2022-10-06 | End: 2022-10-06

## 2022-10-06 RX ORDER — LIDOCAINE HYDROCHLORIDE 20 MG/ML
JELLY TOPICAL ONCE
Status: CANCELLED | OUTPATIENT
Start: 2022-10-06 | End: 2022-10-06

## 2022-10-06 RX ORDER — LIDOCAINE 40 MG/G
CREAM TOPICAL ONCE
Status: CANCELLED | OUTPATIENT
Start: 2022-10-06 | End: 2022-10-06

## 2022-10-06 ASSESSMENT — PAIN SCALES - GENERAL
PAINLEVEL_OUTOF10: 0
PAINLEVEL_OUTOF10: 0

## 2022-10-06 NOTE — PROGRESS NOTES
[address]   Progress Note and Procedure Note      Doc Kelly  MEDICAL RECORD NUMBER:  [id number]  AGE: 62 y.o. GENDER: male  : 1965  EPISODE DATE:  10/6/2022    Subjective:     Chief Complaint   Patient presents with    Wound Check     Follow up visit left toe wound         HISTORY of PRESENT ILLNESS HPI     Juni Barker is a 62 y.o. male who presents today for wound/ulcer evaluation. History of Wound Context: Presents today with a chief complaint of a left hallux diabetic foot ulceration. He was recently hospitalized for cellulitis and has been doing daily dressing changes and ambulating in a surgical shoe as recommended. Patient relates his blood sugars are under much better control.   Wound/Ulcer Pain Timing/Severity: none  Quality of pain: N/A  Severity:  0 / 10   Modifying Factors: None  Associated Signs/Symptoms: none    Ulcer Identification:  Ulcer Type: diabetic    Contributing Factors: diabetes and poor glucose control    Acute Wound: N/A    PAST MEDICAL HISTORY        Diagnosis Date    Abscess of arm, left 4409    Acute metabolic encephalopathy     Acute respiratory failure with hypoxia (HCC)     Arthritis     Blood circulation, collateral     CAD (coronary artery disease) 7/15/2014    CAD (coronary artery disease)     Cardiac arrest ([address] 10/05/2018    Cerebral artery occlusion with cerebral infarction ([address]     Cholecystitis 2021    COVID-19 virus infection 2020    Diabetes mellitus ([address]     Diabetic peripheral neuropathy ([address] 2018    Elbow contusion 3/24/2014    GERD (gastroesophageal reflux disease)     ulcers    High anion gap metabolic acidosis 3/2/2589    Hypercholesteremia     Hyperlipidemia     Hypertension     ICD (implantable cardioverter-defibrillator) in place     Left arm numbness 2010    MRSA (methicillin resistant staph aureus) culture positive 2018    foot wound    Multifocal pneumonia Neuropathy     Neutrophilic leukocytosis 0/89/6796    NSTEMI (non-ST elevated myocardial infarction) ([facility] 10/3/2018    Pneumonia due to COVID-19 virus     POTS (postural orthostatic tachycardia syndrome)     Psychiatric problem     anxiety, depression, bipolar    Sepsis ([facility] 7/1/2020    SIRS (systemic inflammatory response syndrome) ([facility] 4/14/2021    Skin ulcer of left foot with fat layer exposed ([facility] 6/1/2018    Sleep apnea     does not use Cpap    ST elevation myocardial infarction (STEMI) of inferolateral wall ([facility] 11/13/2018    Syncope     Type II or unspecified type diabetes mellitus without mention of complication, not stated as uncontrolled     Ulcer of foot due to secondary diabetes ([facility] 8/15/2018    Wears glasses        PAST SURGICAL HISTORY    Past Surgical History:   Procedure Laterality Date    CARDIAC CATHETERIZATION      CHOLECYSTECTOMY, LAPAROSCOPIC N/A 4/21/2021    LAPAROSCOPIC CHOLECYSTECTOMY WITH CHOLANGIOGRAM performed by Jeffry Skaggs MD at [place] [address]  10/06/2018    Bare Metal Stent to PDA    CORONARY ANGIOPLASTY WITH STENT PLACEMENT  10/07/2018    Bare Metal Stent to OM    CORONARY ANGIOPLASTY WITH STENT PLACEMENT  10/03/2018    CECE to Circ    DIAGNOSTIC CARDIAC CATH LAB PROCEDURE      FINGER SURGERY Left     Left Thumb    HERNIA REPAIR      VASCULAR SURGERY      VASECTOMY         FAMILY HISTORY    Family History   Problem Relation Age of Onset    High Blood Pressure Mother     Stroke Mother     Heart Disease Mother     COPD Mother     Heart Disease Father     Cancer Father         skin    Diabetes Sister     Cancer Sister     Heart Disease Brother     Diabetes Brother        SOCIAL HISTORY    Social History     Tobacco Use    Smoking status: Former     Packs/day: 2.00     Years: 12.00     Pack years: 24.00     Types: Cigarettes, Cigars    Smokeless tobacco: Never    Tobacco comments:     quit in the 80's   Vaping Use Vaping Use: Never used   Substance Use Topics    Alcohol use: No    Drug use: Not Currently     Types: Marijuana Daril Lai)     Comment: quit 80's       ALLERGIES    No Known Allergies    MEDICATIONS    Current Outpatient Medications on File Prior to Encounter   Medication Sig Dispense Refill    insulin glargine (LANTUS) 100 UNIT/ML injection vial Inject 37 Units into the skin nightly 10 mL 3    insulin lispro (HUMALOG) 100 UNIT/ML SOLN injection vial Inject 0-8 Units into the skin 3 times daily (with meals)   No Insulin 200-249 270-199  No Insulin 200-249 2 Units 250-299 4 Units 300-349 6 Units   Units 250-299 4 Units 300-349 6 Units 10 each 1    insulin lispro (HUMALOG) 100 UNIT/ML SOLN injection vial Inject 19 Units into the skin 3 times daily (with meals) 500 mL 0    prasugrel (EFFIENT) 10 MG TABS TAKE 1 TABLET BY MOUTH EVERY DAY 30 tablet 3    [DISCONTINUED] metoprolol succinate (TOPROL XL) 50 MG extended release tablet Take 1 tablet by mouth in the morning. 30 tablet 3    atorvastatin (LIPITOR) 80 MG tablet TAKE 1 TABLET BY MOUTH EVERY DAY 90 tablet 3    Omega-3 Fatty Acids (FISH OIL PO) Take 1 capsule by mouth daily       GARLIC PO Take 1 capsule by mouth daily       aspirin 81 MG chewable tablet Take 81 mg by mouth daily      Cholecalciferol (VITAMIN D3) 51043 units CAPS Take 1 capsule by mouth once a week       Dulaglutide 3 MG/0.5ML SOPN Inject 3 mg into the skin once a week Indications: Friday        No current facility-administered medications on file prior to encounter. REVIEW OF SYSTEMS  Review of Systems    Pertinent items are noted in HPI. Objective:      /83   Pulse 93   Temp 98.1 °F (36.7 °C) (Infrared)   Resp 18     Wt Readings from Last 3 Encounters:   09/24/22 225 lb 8.5 oz (102.3 kg)   09/05/22 219 lb 9.3 oz (99.6 kg)   07/29/22 210 lb (95.3 kg)       PHYSICAL EXAM  Physical Exam    DP/PT pulses palpable bilaterally. CFT brisk to all digits.   Digits are pink and warm to the touch. Hair growth is absent in appearance. No edema noted. No calf pain with palpation noted. Epicritic sensation is grossly absent at all sites tested bilaterally. Negative clonus and babinski reflex is down going. The previously noted ulceration on the plantar aspect of the left big toe has epithelialized. There is no surrounding erythema, edema, warmth, malodor or drainage noted. There is no fluctuance or crepitus noted. Assessment:        Problem List Items Addressed This Visit       Ulcer of left foot, with fat layer exposed ([address] - Primary    Relevant Orders    Initiate Outpatient Wound Care Protocol          Plan:   E/M x30 minutes. Okay to discontinue local wound care as the ulceration has healed. Patient was instructed to follow-up with podiatry for routine diabetic foot care to prevent further complications such as ulcerations, infections, and/or amputations. Patient will need to be fitted for extra-depth diabetic shoes to accommodate his bony deformities and prevent reulceration. Prolonged discussion with patient regarding the importance of good glycemic control for wound healing and to prevent further diabetic complications. Encourage patient to follow-up with his primary care physician. Treatment Note please see attached Discharge Instructions    Written patient dismissal instructions given to patient and signed by patient or POA. Reappoint as needed    Discharge Instructions           [address]  Physician Orders   [facility] AT [address], [place] [address]  Telephone: [phone]    NAME:  Madhuri Sanchez OF BIRTH:  1965  MEDICAL RECORD NUMBER:  [id number]  DATE:  10/6/2022    Congratulations! You have completed your treatment. Return to your Primary Care Physician for all your health issues. Resume your ordinary activities as tolerated.    Take your medications as prescribed by your primary care physician. Check your skin daily for cracks, bruises, sores, or dryness. Use a moisturizer as needed. Clean and dry your skin, using mild soap and warm water (not hot). Avoid alcohol and caffeine and do not smoke. Maintain a nutritious diet. Avoid pressure on your wound site. Keep your legs elevated above the level of the heart whenever possible.     **MAKE APPOINTMENT WITH ONE OF THE DOCTORS AT  [facility] OFFICE IN 4-6 WEEKS**    **KEEP BLOOD SUGAR UNDER CONTROL**      Physician Signature:______________________    Date: ___________ Time:  ____________    Dr Isha Paula             Electronically signed by Jay Hutchison RN on 10/6/2022 at 8:54 AM                           Electronically signed by Isha Paula DPM on 10/6/2022 at 9:00 AM

## 2022-10-20 ENCOUNTER — HOSPITAL ENCOUNTER (OUTPATIENT)
Dept: WOUND CARE | Age: 57
Discharge: HOME | End: 2022-10-20
Payer: MEDICARE

## 2022-10-20 VITALS
SYSTOLIC BLOOD PRESSURE: 109 MMHG | DIASTOLIC BLOOD PRESSURE: 75 MMHG | BODY MASS INDEX: 29.03 KG/M2 | HEIGHT: 74 IN | WEIGHT: 226.19 LBS | HEART RATE: 105 BPM | TEMPERATURE: 98.1 F | RESPIRATION RATE: 18 BRPM

## 2022-10-20 DIAGNOSIS — L97.522 ULCER OF LEFT FOOT, WITH FAT LAYER EXPOSED (HCC): Primary | ICD-10-CM

## 2022-10-20 PROCEDURE — 11042 DBRDMT SUBQ TIS 1ST 20SQCM/<: CPT

## 2022-10-20 RX ORDER — LIDOCAINE HYDROCHLORIDE 20 MG/ML
JELLY TOPICAL ONCE
Status: CANCELLED | OUTPATIENT
Start: 2022-10-20 | End: 2022-10-20

## 2022-10-20 RX ORDER — GINSENG 100 MG
CAPSULE ORAL ONCE
Status: CANCELLED | OUTPATIENT
Start: 2022-10-20 | End: 2022-10-20

## 2022-10-20 RX ORDER — LIDOCAINE 40 MG/G
CREAM TOPICAL ONCE
Status: CANCELLED | OUTPATIENT
Start: 2022-10-20 | End: 2022-10-20

## 2022-10-20 RX ORDER — CLOBETASOL PROPIONATE 0.5 MG/G
OINTMENT TOPICAL ONCE
Status: CANCELLED | OUTPATIENT
Start: 2022-10-20 | End: 2022-10-20

## 2022-10-20 RX ORDER — GENTAMICIN SULFATE 1 MG/G
OINTMENT TOPICAL ONCE
Status: CANCELLED | OUTPATIENT
Start: 2022-10-20 | End: 2022-10-20

## 2022-10-20 RX ORDER — BACITRACIN ZINC AND POLYMYXIN B SULFATE 500; 1000 [USP'U]/G; [USP'U]/G
OINTMENT TOPICAL ONCE
Status: CANCELLED | OUTPATIENT
Start: 2022-10-20 | End: 2022-10-20

## 2022-10-20 RX ORDER — BETAMETHASONE DIPROPIONATE 0.05 %
OINTMENT (GRAM) TOPICAL ONCE
Status: CANCELLED | OUTPATIENT
Start: 2022-10-20 | End: 2022-10-20

## 2022-10-20 RX ORDER — BACITRACIN, NEOMYCIN, POLYMYXIN B 400; 3.5; 5 [USP'U]/G; MG/G; [USP'U]/G
OINTMENT TOPICAL ONCE
Status: CANCELLED | OUTPATIENT
Start: 2022-10-20 | End: 2022-10-20

## 2022-10-20 RX ORDER — LIDOCAINE 50 MG/G
OINTMENT TOPICAL ONCE
Status: CANCELLED | OUTPATIENT
Start: 2022-10-20 | End: 2022-10-20

## 2022-10-20 RX ORDER — LIDOCAINE HYDROCHLORIDE 40 MG/ML
SOLUTION TOPICAL ONCE
Status: CANCELLED | OUTPATIENT
Start: 2022-10-20 | End: 2022-10-20

## 2022-10-20 ASSESSMENT — PAIN SCALES - GENERAL: PAINLEVEL_OUTOF10: 0

## 2022-10-20 NOTE — LETTER
[address]  [address] OFFICE [place] [address] [place] [address]  [phone]  Dept: [phone]   TODAYS DATE: 10/17/2022    [address] [place] [address],[address] Floor Wound Care   Appointment Treatment Guidelines  Welcome Mr. Emerson Cardona to the [address] [place] [address] Pkwy. We appreciate the confidence you have shown in choosing us as your wound care provider. Our goal is to heal your wound(s) as quickly as possible. Please read the items below regarding the nature of your appointments. 1. We will make every effort to schedule appointments that are convenient for you. Certain days and times may not be available, depending on your providers office hours and details of your care. 2. Patients will not necessarily be brought to an exam room in the order in which they arrive. Many providers work out of this office and patients are here for different procedures. Our goal is to serve you as quickly as possible. 3. We acknowledge that your time is valuable. Please remember that wound healing takes time and we appreciate your understanding that the length of each patients appointment will vary depending upon their need. 4. It is for your protection that we ask for insurance cards, photo ID, and new consent forms on your first visit and periodically throughout your treatment at all our facilities. 5. Wound Care treatment is known to be most effective when provided on a regular basis. Missed appointments, and not following the recommended plan of care can result in ineffective treatment and a poor outcome. If you find it difficult to keep appointments or to follow the recommended plan of care, it is your responsibility to let the staff know, so that we can work with you toward a solution. 6. If you need to miss an appointment, please call to let us know. We expect 24 hours notice for all cancellations.  We also expect missed visits to be rescheduled as soon as possible, preferably within the same week to promote the most effective healing time for your wound(s). 7. If you will be late for an appointment, please call our center to be sure that the provider can still see you when you arrive. If you are more than 15 minutes late your appointment may need to be rescheduled. 8. If two (2) appointments are missed without notifying us, your care plan may be discontinued. The same may happen if multiple visits are cancelled or rescheduled, even with notice. A missed visit is time when another patient, who also needs care, could have been seen. Thank you for your understanding and consideration.

## 2022-10-20 NOTE — LETTER
[address]  [address] [facility] [address]  [phone]  Dept: [phone]        Thank you . [facility] for choosing [facility] for your Wound Care needs. We hope you found your first visit both encouraging and educational.  We look forward to serving you throughout the healing process. Before your next visit please review the information you received in your [employer]. The first visit can be overwhelming and this is a useful tool to refresh what information you may have been given. If you find yourself with any questions prior to your upcoming appointment, please feel free to contact us. Often wounds can be challenging and it is our goal to see you through the healing process with as much support possible. Again, thank you for choosing [facility] [place]!     Sincerely,      The Staff of [address] [place] [address] and [facility]

## 2022-10-20 NOTE — PROGRESS NOTES
[address]   Progress Note and Procedure Note      Camila Jacques  MEDICAL RECORD NUMBER:  [id number]  AGE: 62 y.o. GENDER: male  : 1965  EPISODE DATE:  10/20/2022    Subjective:     Chief Complaint   Patient presents with    Wound Check     Initial Visit on Left Great Toe; Opened back up on 10/15/2022         HISTORY of PRESENT ILLNESS FELIBERTO Hughes is a 62 y.o. male who presents today for wound/ulcer evaluation. History of Wound Context: Presents today with a chief complaint of a left hallux diabetic foot ulceration. Patient relates the area was red and a little swollen but the redness has resolved.   Wound/Ulcer Pain Timing/Severity: none  Quality of pain: N/A  Severity:  0 / 10   Modifying Factors: None  Associated Signs/Symptoms: none    Ulcer Identification:  Ulcer Type: diabetic    Contributing Factors: diabetes and poor glucose control    Acute Wound: N/A    PAST MEDICAL HISTORY        Diagnosis Date    Abscess of arm, left 2457    Acute metabolic encephalopathy 6883    Acute respiratory failure with hypoxia (HCC)     Arthritis     Blood circulation, collateral     CAD (coronary artery disease) 7/15/2014    CAD (coronary artery disease)     Cardiac arrest ([address] 10/05/2018    Cerebral artery occlusion with cerebral infarction ([address]     Cholecystitis 2021    COVID-19 virus infection 2020    Diabetes mellitus ([address]     Diabetic peripheral neuropathy ([address] 2018    Elbow contusion 3/24/2014    GERD (gastroesophageal reflux disease)     ulcers    High anion gap metabolic acidosis     Hypercholesteremia     Hyperlipidemia     Hypertension     ICD (implantable cardioverter-defibrillator) in place     Left arm numbness 2010    MRSA (methicillin resistant staph aureus) culture positive 2018    foot wound    Multifocal pneumonia     Neuropathy     Neutrophilic leukocytosis     NSTEMI (non-ST elevated myocardial infarction) [facility] 10/3/2018    Pneumonia due to COVID-19 virus     POTS (postural orthostatic tachycardia syndrome)     Psychiatric problem     anxiety, depression, bipolar    Sepsis ([facility] 7/1/2020    SIRS (systemic inflammatory response syndrome) ([facility] 4/14/2021    Skin ulcer of left foot with fat layer exposed ([facility] 6/1/2018    Sleep apnea     does not use Cpap    ST elevation myocardial infarction (STEMI) of inferolateral wall ([facility] 11/13/2018    Syncope     Type II or unspecified type diabetes mellitus without mention of complication, not stated as uncontrolled     Ulcer of foot due to secondary diabetes ([facility] 8/15/2018    Wears glasses        PAST SURGICAL HISTORY    Past Surgical History:   Procedure Laterality Date    CARDIAC CATHETERIZATION      CHOLECYSTECTOMY, LAPAROSCOPIC N/A 4/21/2021    LAPAROSCOPIC CHOLECYSTECTOMY WITH CHOLANGIOGRAM performed by Gayle Ross MD at [place] [address]  10/06/2018    Bare Metal Stent to PDA    CORONARY ANGIOPLASTY WITH STENT PLACEMENT  10/07/2018    Bare Metal Stent to OM    CORONARY ANGIOPLASTY WITH STENT PLACEMENT  10/03/2018    CECE to Circ    DIAGNOSTIC CARDIAC CATH LAB PROCEDURE      FINGER SURGERY Left     Left Thumb    HERNIA REPAIR      VASCULAR SURGERY      VASECTOMY         FAMILY HISTORY    Family History   Problem Relation Age of Onset    High Blood Pressure Mother     Stroke Mother     Heart Disease Mother     COPD Mother     Heart Disease Father     Cancer Father         skin    Diabetes Sister     Cancer Sister     Heart Disease Brother     Diabetes Brother        SOCIAL HISTORY    Social History     Tobacco Use    Smoking status: Former     Packs/day: 2.00     Years: 12.00     Pack years: 24.00     Types: Cigarettes, Cigars    Smokeless tobacco: Never    Tobacco comments:     quit in the 80's   Vaping Use    Vaping Use: Never used   Substance Use Topics    Alcohol use: No    Drug use: Not Currently     Types: Marijuana [place])     Comment: quit 80's       ALLERGIES    No Known Allergies    MEDICATIONS    Current Outpatient Medications on File Prior to Encounter   Medication Sig Dispense Refill    insulin glargine (LANTUS) 100 UNIT/ML injection vial Inject 37 Units into the skin nightly 10 mL 3    insulin lispro (HUMALOG) 100 UNIT/ML SOLN injection vial Inject 0-8 Units into the skin 3 times daily (with meals)   No Insulin 200-249 270-199  No Insulin 200-249 2 Units 250-299 4 Units 300-349 6 Units   Units 250-299 4 Units 300-349 6 Units 10 each 1    insulin lispro (HUMALOG) 100 UNIT/ML SOLN injection vial Inject 19 Units into the skin 3 times daily (with meals) 500 mL 0    prasugrel (EFFIENT) 10 MG TABS TAKE 1 TABLET BY MOUTH EVERY DAY 30 tablet 3    [DISCONTINUED] metoprolol succinate (TOPROL XL) 50 MG extended release tablet Take 1 tablet by mouth in the morning. 30 tablet 3    atorvastatin (LIPITOR) 80 MG tablet TAKE 1 TABLET BY MOUTH EVERY DAY 90 tablet 3    Omega-3 Fatty Acids (FISH OIL PO) Take 1 capsule by mouth daily       GARLIC PO Take 1 capsule by mouth daily       aspirin 81 MG chewable tablet Take 81 mg by mouth daily      Cholecalciferol (VITAMIN D3) [address]42 units CAPS Take 1 capsule by mouth once a week       Dulaglutide 3 MG/0.5ML SOPN Inject 3 mg into the skin once a week Indications: Friday        No current facility-administered medications on file prior to encounter. REVIEW OF SYSTEMS  Review of Systems    Pertinent items are noted in HPI. Objective:      /75   Pulse (!) 105   Temp 98.1 °F (36.7 °C) (Temporal)   Resp 18   Ht 6' 2\" (1.88 m)   Wt 226 lb 3.1 oz (102.6 kg)   BMI 29.04 kg/m²     Wt Readings from Last 3 Encounters:   10/20/22 226 lb 3.1 oz (102.6 kg)   09/24/22 225 lb 8.5 oz (102.3 kg)   09/05/22 219 lb 9.3 oz (99.6 kg)       PHYSICAL EXAM  Physical Exam    DP/PT pulses palpable bilaterally. CFT brisk to all digits.   Digits are pink and warm to the touch. Hair growth is absent in appearance. No edema noted. No calf pain with palpation noted. Epicritic sensation is grossly absent at all sites tested bilaterally. Negative clonus and babinski reflex is down going. Blood blister was noted on the dorsal medial aspect of the hallux IPJ of the left foot. Upon D ivette of the blood blister and ulceration was noted. Wound has fibrotic and nonviable tissue that extends down through and includes the subcutaneous tissue. After debridement the wound has a granular base. There is no surrounding erythema, edema, warmth or malodor noted. The wound does not probe or track to bone. Procedure Note  Indications:  Based on my examination of this patient's wound(s)/ulcer(s) today, debridement is required to promote healing and evaluate the wound base. Performed by: Harry Nance DPM    Consent obtained:  Yes    Time out taken:  Yes    Pain Control: Anesthetic  Anesthetic: None       Debridement: Excisional Debridement    Using #15 blade scalpel the wound(s)/ulcer(s) was/were debrided down through and including the removal of epidermis, dermis, and subcutaneous tissue. Devitalized Tissue Debrided:  fibrin and slough    Pre Debridement Measurements:  Are located in the [place]  Documentation Flow Sheet    Diabetic/Pressure/Non Pressure Ulcers only:  Ulcer: Diabetic ulcer, fat layer exposed     Wound/Ulcer #: 1    Post Debridement Measurements:  Wound/Ulcer Descriptions are Pre Debridement except measurements:    Wound 10/20/22 Toe (Comment  which one) Left;Medial #1 Noted 10/15/2022 Left Medial Great Toe (Active)   Wound Image   10/20/22 0821   Wound Etiology Diabetic Moon 3 10/20/22 0821   Dressing Status Dry 10/20/22 0821   Dressing/Treatment Collagen; Adhesive bandage;Moisten with saline 10/20/22 0845   Wound Length (cm) 0.5 cm 10/20/22 0821   Wound Width (cm) 0.3 cm 10/20/22 0821   Wound Depth (cm) 0.1 cm 10/20/22 0821   Wound Surface Area (cm^2) 0.15 cm^2 10/20/22 0821   Wound Volume (cm^3) 0.015 cm^3 10/20/22 0821   Post-Procedure Length (cm) 0.7 cm 10/20/22 0844   Post-Procedure Width (cm) 0.5 cm 10/20/22 0844   Post-Procedure Depth (cm) 0.1 cm 10/20/22 0844   Post-Procedure Surface Area (cm^2) 0.35 cm^2 10/20/22 0844   Post-Procedure Volume (cm^3) 0.035 cm^3 10/20/22 0844   Wound Assessment Blood filled blister 10/20/22 0821   Drainage Amount None 10/20/22 0821   Odor None 10/20/22 0821   Kaley-wound Assessment Blanchable erythema 10/20/22 0821   Margins Undefined edges 10/20/22 5210   Wound Thickness Description not for Pressure Injury Full thickness 10/20/22 8525   Number of days: 0          Total Surface Area Debrided:  0.35 sq cm     Estimated Blood Loss:  Minimal    Hemostasis Achieved:  by pressure    Procedural Pain:  0  / 10     Post Procedural Pain:  0 / 10     Response to treatment:  Well tolerated by patient. Assessment:        Problem List Items Addressed This Visit       Ulcer of left foot, with fat layer exposed ([facility] - Primary          Plan:   E/M x30 minutes of a new problem of left hallux ulceration. Patient was instructed to do every other day dressing changes with collagen. Prolonged discussion with patient regarding the importance of good glycemic control for wound healing and to prevent further diabetic complications. Encourage patient to follow-up with his primary care physician. Treatment Note please see attached Discharge Instructions    Written patient dismissal instructions given to patient and signed by patient or POA.       Reappoint 1 week    Discharge [id number] [facility] Physician Orders and Discharge [facility]  [address] [facility], [address] [place] [address], [place] [address]  Telephone: [phone]  [address] 8:00 am - 4:30 pm and Friday 8:00 am - 12:00 pm.        NAME:  Juni King  DATE OF BIRTH: 1965  MEDICAL RECORD NUMBER:  [id number]  DATE:  10/20/2022    Important Reminders:   Please wash hands with soap and water before and after every dressing change. Do not scrub wounds. Keep wounds dry in shower unless otherwise instructed by the physician. SMOKING can slow would healing. Stop smoking as soon as possible to improve healing and prevent further complications associated with smoking. Kaley-Wound Topical Treatments:  Do not apply lotions, creams, or ointments to wound bed unless directed. [] Apply moisturizing lotion to skin surrounding the wound prior to dressing change.  [] Apply antifungal ointment to skin surrounding the wound prior to dressing change.  [] Apply thin film of no sting moisture barrier ointment to skin immediately around      wound. [] Other:       Wound Location: LEFT GREAT TOE    Wound Cleansing: Normal Saline    Primary Dressing:  [x] PLAIN COLLAGEN MOISTENED WITH SALINE  []     Secondary Dressing:  [x] BANDAID  []       Dressing Frequency:  [x] THREE TIMES PER WEEK  [] Do Not Change Dressing            Compression and Edema Control:  [] Wear Home Compression Stockings   [] Spandagrip to:    Size: []Low compression 5-10 mm/Hg      []Medium compression 10-20 mm/Hg           []High compression  20-30 mm/Hg  [] Ace Wrap Toes to Knee to    [] Multilayer Compression Wrap:  to    Do not get leg(s) with wrap wet. If wraps become too tight call the center or completely remove the wrap. Pressure Relief and Off Loading:  [] Off-loading when [] walking  [] in bed [] sitting   Turn every 2 hours when in bed   Avoid putting direct pressure on the site of the wound. Limit side lying to 30 degree tilt. Limit elevating the head of the bed greater than 30 degrees. [] Assistive Devices     Use as instructed by the provider      Activity: Activity as Tolerated      Dietary:   Continue your diet as tolerated.   Protein is a key nutrient in helping to repair damaged tissue and promote new tissue growth. Good sources of protein include milk, yogurt, cheese, fish, lean meat and beans. If you are DIABETIC, having diabetes can make it hard for wounds to heal. Try to keep your blood sugar within it's target range. Limit Sodium, Alcohol and Sugar. Pain:   Please Note some pain, drainage and/or bleeding might be expected after seeing the provider. TO HELP ALLEVIATE PAIN WE RECOMMEND THE FOLLOWING  Elevate the affected limb. Use over the counter medications as permitted by your family doctor. For Persistent Pain not relieved by the above interventions, please notify your family doctor. Return Appointment:  [x] Return Appointment: With DR ARAUJO  in 1 Week(s)  [] Wound and dressing supply provider:   [] ECF or Home Healthcare:  [] Wound Assessment: [] Physician or NP scheduled for Wound Assessment:   [] Orders placed during your visit:      : Nano Kinsey     Electronically signed by Jay Hutchison RN on 10/20/2022 at 8:41 AM       [address]: Should you experience any significant changes in your wound(s) or have questions about your wound care, please contact the [address] [place] [address] at [address] 8:00 am - 4:30 pm and Friday 8:00 am - 12:30 pm.  If you need help with your wound outside these hours and cannot wait until we are again available, contact your PCP or go to the hospital emergency room. PLEASE NOTE: IF YOU ARE UNABLE TO OBTAIN WOUND SUPPLIES, CONTINUE TO USE THE SUPPLIES YOU HAVE AVAILABLE UNTIL YOU ARE ABLE TO REACH US. IT IS MOST IMPORTANT TO KEEP THE WOUND COVERED AT ALL TIMES.      Physician Signature:_______________________    Date: ___________ Time:  ____________          Dr Isha Paula                Electronically signed by Isha Paula DPM on 10/20/2022 at 9:21 AM

## 2022-10-20 NOTE — PLAN OF CARE
Patient Name:  Reynaldo Webber  YOB: 1965  Today's Date:  October 20, 2022  Medical Record Number:  [id number]  Provider:    [address] [place] [address] Pkwy   Appointment Treatment Guidelines        The [address] [place] [address] Pkwy Appointment Treatment Guidelines were reviewed on October 20, 2022 with the patient. Mr. Akshat Mukherjee understanding of the [address] [place] [address] Pkwy Appointment Treatment Guidelines.       Electronically signed by Lanie Clark RN on 10/20/22 at 8:29 AM EDT

## 2022-10-20 NOTE — DISCHARGE INSTRUCTIONS
[address] Physician Orders and Discharge Instructions  [facility]  [address] [facility], [address], [facility]  Telephone: [phone] [address] 8:00 am - 4:30 pm and Friday 8:00 am - 12:00 pm.        NAME:  Juni King  YOB: 1965  MEDICAL RECORD NUMBER:  [id number]  DATE:  10/20/2022    Important Reminders:   Please wash hands with soap and water before and after every dressing change. Do not scrub wounds. Keep wounds dry in shower unless otherwise instructed by the physician. SMOKING can slow would healing. Stop smoking as soon as possible to improve healing and prevent further complications associated with smoking. Kaley-Wound Topical Treatments:  Do not apply lotions, creams, or ointments to wound bed unless directed. [] Apply moisturizing lotion to skin surrounding the wound prior to dressing change.  [] Apply antifungal ointment to skin surrounding the wound prior to dressing change.  [] Apply thin film of no sting moisture barrier ointment to skin immediately around      wound. [] Other:       Wound Location: LEFT GREAT TOE    Wound Cleansing: Normal Saline    Primary Dressing:  [x] PLAIN COLLAGEN MOISTENED WITH SALINE  []     Secondary Dressing:  [x] BANDAID  []       Dressing Frequency:  [x] THREE TIMES PER WEEK  [] Do Not Change Dressing            Compression and Edema Control:  [] Wear Home Compression Stockings   [] Spandagrip to:    Size: []Low compression 5-10 mm/Hg      []Medium compression 10-20 mm/Hg           []High compression  20-30 mm/Hg  [] Ace Wrap Toes to Knee to    [] Multilayer Compression Wrap:  to    Do not get leg(s) with wrap wet. If wraps become too tight call the center or completely remove the wrap.            Pressure Relief and Off Loading:  [] Off-loading when [] walking  [] in bed [] sitting   Turn every 2 hours when in bed   Avoid putting direct pressure on the site of the wound. Limit side lying to 30 degree tilt. Limit elevating the head of the bed greater than 30 degrees. [] Assistive Devices     Use as instructed by the provider      Activity: Activity as Tolerated      Dietary:   Continue your diet as tolerated. Protein is a key nutrient in helping to repair damaged tissue and promote new tissue growth. Good sources of protein include milk, yogurt, cheese, fish, lean meat and beans. If you are DIABETIC, having diabetes can make it hard for wounds to heal. Try to keep your blood sugar within it's target range. Limit Sodium, Alcohol and Sugar. Pain:   Please Note some pain, drainage and/or bleeding might be expected after seeing the provider. TO HELP ALLEVIATE PAIN WE RECOMMEND THE FOLLOWING  Elevate the affected limb. Use over the counter medications as permitted by your family doctor. For Persistent Pain not relieved by the above interventions, please notify your family doctor. Return Appointment:  [x] Return Appointment: With DR ARAUJO  in 1 Week(s)  [] Wound and dressing supply provider:   [] ECF or Home Healthcare:  [] Wound Assessment: [] Physician or NP scheduled for Wound Assessment:   [] Orders placed during your visit:      : Devon Uribe     Electronically signed by Velasquez Faulkner RN on 10/20/2022 at 8:41 AM       [address]: Should you experience any significant changes in your wound(s) or have questions about your wound care, please contact the [address] [place] [address] at [address] 8:00 am - 4:30 pm and Friday 8:00 am - 12:30 pm.  If you need help with your wound outside these hours and cannot wait until we are again available, contact your PCP or go to the hospital emergency room. PLEASE NOTE: IF YOU ARE UNABLE TO OBTAIN WOUND SUPPLIES, CONTINUE TO USE THE SUPPLIES YOU HAVE AVAILABLE UNTIL YOU ARE ABLE TO REACH US.  IT IS MOST IMPORTANT TO KEEP THE WOUND COVERED AT ALL TIMES.      Physician Signature:_______________________    Date: ___________ Time:  ____________          Dr Damion Romo

## 2022-10-21 NOTE — DISCHARGE INSTRUCTIONS
[address]  Physician Orders   [facility] [place]  [address] [facility] [address], [address]  Telephone: [phone]    NAME:  Caleb Alfaro OF BIRTH:  1965  MEDICAL RECORD NUMBER:  [id number]  DATE:  10/27/2022    Congratulations! You have completed your treatment. Return to your Primary Care Physician for all your health issues. Resume your ordinary activities as tolerated. Take your medications as prescribed by your primary care physician. Check your skin daily for cracks, bruises, sores, or dryness. Use a moisturizer as needed. Clean and dry your skin, using mild soap and warm water (not hot). Avoid alcohol and caffeine and do not smoke. Maintain a nutritious diet. Avoid pressure on your wound site. Keep your legs elevated above the level of the heart whenever possible.       **FOLLOW UP WITH DR ARAUJO AT HER OFFICE IN 3-4 WEEKS**      Physician Signature:______________________    Date: ___________ Time:  ____________    Dr Pierre Rosen             Electronically signed by Preeti Resendiz RN on 10/27/2022 at 8:11 AM

## 2022-10-27 ENCOUNTER — HOSPITAL ENCOUNTER (OUTPATIENT)
Dept: WOUND CARE | Age: 57
Discharge: HOME | End: 2022-10-27
Payer: MEDICARE

## 2022-10-27 VITALS
RESPIRATION RATE: 18 BRPM | SYSTOLIC BLOOD PRESSURE: 104 MMHG | DIASTOLIC BLOOD PRESSURE: 68 MMHG | HEART RATE: 101 BPM | TEMPERATURE: 97.4 F

## 2022-10-27 DIAGNOSIS — L97.522 ULCER OF LEFT FOOT, WITH FAT LAYER EXPOSED (HCC): Primary | ICD-10-CM

## 2022-10-27 PROCEDURE — 99211 OFF/OP EST MAY X REQ PHY/QHP: CPT

## 2022-10-27 ASSESSMENT — PAIN SCALES - GENERAL: PAINLEVEL_OUTOF10: 0

## 2022-10-27 NOTE — PROGRESS NOTES
[address]   Progress Note and Procedure Note      Flory Jovel  MEDICAL RECORD NUMBER:  [id number]  AGE: 62 y.o. GENDER: male  : 1965  EPISODE DATE:  10/27/2022    Subjective:     Chief Complaint   Patient presents with    Wound Check     Follow up right great toe wound         HISTORY of PRESENT ILLNESS HPI     Juni Butler is a 62 y.o. male who presents today for wound/ulcer evaluation. History of Wound Context: Presents today with a chief complaint of a left hallux diabetic foot ulceration. He has been doing daily dressing changes and ambulating in a surgical shoe as recommended. Patient relates his blood sugars are under much better control.   Wound/Ulcer Pain Timing/Severity: none  Quality of pain: N/A  Severity:  0 / 10   Modifying Factors: None  Associated Signs/Symptoms: none    Ulcer Identification:  Ulcer Type: diabetic    Contributing Factors: diabetes and poor glucose control    Acute Wound: N/A    PAST MEDICAL HISTORY        Diagnosis Date    Abscess of arm, left 3/2/6880    Acute metabolic encephalopathy 3/67/7706    Acute respiratory failure with hypoxia (HCC)     Arthritis     Blood circulation, collateral     CAD (coronary artery disease) 7/15/2014    CAD (coronary artery disease)     Cardiac arrest ([address] 10/05/2018    Cerebral artery occlusion with cerebral infarction ([address]     Cholecystitis 2021    COVID-19 virus infection 2020    Diabetes mellitus ([address]     Diabetic peripheral neuropathy ([address] 2018    Elbow contusion 3/24/2014    GERD (gastroesophageal reflux disease)     ulcers    High anion gap metabolic acidosis     Hypercholesteremia     Hyperlipidemia     Hypertension     ICD (implantable cardioverter-defibrillator) in place     Left arm numbness 2010    MRSA (methicillin resistant staph aureus) culture positive 2018    foot wound    Multifocal pneumonia     Neuropathy     Neutrophilic leukocytosis 4/14/2021    NSTEMI (non-ST elevated myocardial infarction) ([facility] 10/3/2018    Pneumonia due to COVID-19 virus     POTS (postural orthostatic tachycardia syndrome)     Psychiatric problem     anxiety, depression, bipolar    Sepsis ([facility] 7/1/2020    SIRS (systemic inflammatory response syndrome) ([facility] 4/14/2021    Skin ulcer of left foot with fat layer exposed ([facility] 6/1/2018    Sleep apnea     does not use Cpap    ST elevation myocardial infarction (STEMI) of inferolateral wall ([address] 11/13/2018    Syncope     Type II or unspecified type diabetes mellitus without mention of complication, not stated as uncontrolled     Ulcer of foot due to secondary diabetes ([facility] 8/15/2018    Wears glasses        PAST SURGICAL HISTORY    Past Surgical History:   Procedure Laterality Date    CARDIAC CATHETERIZATION      CHOLECYSTECTOMY, LAPAROSCOPIC N/A 4/21/2021    LAPAROSCOPIC CHOLECYSTECTOMY WITH CHOLANGIOGRAM performed by Naomi Sanchez MD at [place] [address]  10/06/2018    Bare Metal Stent to PDA    CORONARY ANGIOPLASTY WITH STENT PLACEMENT  10/07/2018    Bare Metal Stent to OM    CORONARY ANGIOPLASTY WITH STENT PLACEMENT  10/03/2018    CECE to Circ    DIAGNOSTIC CARDIAC CATH LAB PROCEDURE      FINGER SURGERY Left     Left Thumb    HERNIA REPAIR      VASCULAR SURGERY      VASECTOMY         FAMILY HISTORY    Family History   Problem Relation Age of Onset    High Blood Pressure Mother     Stroke Mother     Heart Disease Mother     COPD Mother     Heart Disease Father     Cancer Father         skin    Diabetes Sister     Cancer Sister     Heart Disease Brother     Diabetes Brother        SOCIAL HISTORY    Social History     Tobacco Use    Smoking status: Former     Packs/day: 2.00     Years: 12.00     Pack years: 24.00     Types: Cigarettes, Cigars    Smokeless tobacco: Never    Tobacco comments:     quit in the 80's   Vaping Use    Vaping Use: Never used   Substance Use Topics    Alcohol use: No    Drug use: Not Currently     Types: Marijuana Bailee Barrett)     Comment: quit 80's       ALLERGIES    No Known Allergies    MEDICATIONS    Current Outpatient Medications on File Prior to Encounter   Medication Sig Dispense Refill    insulin glargine (LANTUS) 100 UNIT/ML injection vial Inject 37 Units into the skin nightly 10 mL 3    insulin lispro (HUMALOG) 100 UNIT/ML SOLN injection vial Inject 0-8 Units into the skin 3 times daily (with meals)   No Insulin 200-249 270-199  No Insulin 200-249 2 Units 250-299 4 Units 300-349 6 Units   Units 250-299 4 Units 300-349 6 Units 10 each 1    insulin lispro (HUMALOG) 100 UNIT/ML SOLN injection vial Inject 19 Units into the skin 3 times daily (with meals) 500 mL 0    prasugrel (EFFIENT) 10 MG TABS TAKE 1 TABLET BY MOUTH EVERY DAY 30 tablet 3    [DISCONTINUED] metoprolol succinate (TOPROL XL) 50 MG extended release tablet Take 1 tablet by mouth in the morning. 30 tablet 3    atorvastatin (LIPITOR) 80 MG tablet TAKE 1 TABLET BY MOUTH EVERY DAY 90 tablet 3    Omega-3 Fatty Acids (FISH OIL PO) Take 1 capsule by mouth daily       GARLIC PO Take 1 capsule by mouth daily       aspirin 81 MG chewable tablet Take 81 mg by mouth daily      Cholecalciferol (VITAMIN D3) 04587 units CAPS Take 1 capsule by mouth once a week       Dulaglutide 3 MG/0.5ML SOPN Inject 3 mg into the skin once a week Indications: Friday        No current facility-administered medications on file prior to encounter. REVIEW OF SYSTEMS  Review of Systems    Pertinent items are noted in HPI. Objective:      /68   Pulse (!) 101   Temp 97.4 °F (36.3 °C) (Infrared)   Resp 18     Wt Readings from Last 3 Encounters:   10/20/22 226 lb 3.1 oz (102.6 kg)   09/24/22 225 lb 8.5 oz (102.3 kg)   09/05/22 219 lb 9.3 oz (99.6 kg)       PHYSICAL EXAM  Physical Exam    DP/PT pulses palpable bilaterally. CFT brisk to all digits. Digits are pink and warm to the touch.  Hair growth is absent in appearance. No edema noted. No calf pain with palpation noted. Epicritic sensation is grossly absent at all sites tested bilaterally. Negative clonus and babinski reflex is down going. The previously noted ulceration on the plantar aspect of the left big toe has epithelialized. There is no surrounding erythema, edema, warmth, malodor or drainage noted. There is no fluctuance or crepitus noted. Assessment:        Problem List Items Addressed This Visit       Ulcer of left foot, with fat layer exposed ([address] - Primary          Plan:   E/M x30 minutes. Okay to discontinue local wound care as the ulceration has healed. Patient was instructed to follow-up with podiatry for routine diabetic foot care to prevent further complications such as ulcerations, infections, and/or amputations. Patient will need to be fitted for extra-depth diabetic shoes to accommodate his bony deformities and prevent reulceration. Prolonged discussion with patient regarding the importance of good glycemic control for wound healing and to prevent further diabetic complications. Encourage patient to follow-up with his primary care physician. Treatment Note please see attached Discharge Instructions    Written patient dismissal instructions given to patient and signed by patient or POA. Reappoint as needed    Discharge Instructions                [address]  Physician Orders   [facility] AT [address], [place] [address]  Telephone: [phone]    NAME:  Reva Rodriges OF BIRTH:  1965  MEDICAL RECORD NUMBER:  [id number]  DATE:  10/27/2022    Congratulations! You have completed your treatment. Return to your Primary Care Physician for all your health issues. Resume your ordinary activities as tolerated. Take your medications as prescribed by your primary care physician.    Check your skin daily for cracks, bruises, sores, or dryness. Use a moisturizer as needed. Clean and dry your skin, using mild soap and warm water (not hot). Avoid alcohol and caffeine and do not smoke. Maintain a nutritious diet. Avoid pressure on your wound site. Keep your legs elevated above the level of the heart whenever possible.       **FOLLOW UP WITH DR ARAUJO AT HER OFFICE IN 3-4 WEEKS**      Physician Signature:______________________    Date: ___________ Time:  ____________    Dr Mary Baez             Electronically signed by Maria Teresa Velasquez RN on 10/27/2022 at 8:11 AM                           Electronically signed by Mary Baez DPM on 10/27/2022 at 12:56 PM

## 2022-12-01 ENCOUNTER — OFFICE VISIT (OUTPATIENT)
Dept: CARDIOLOGY CLINIC | Age: 57
End: 2022-12-01
Payer: MEDICARE

## 2022-12-01 ENCOUNTER — NURSE ONLY (OUTPATIENT)
Dept: CARDIOLOGY CLINIC | Age: 57
End: 2022-12-01

## 2022-12-01 VITALS
HEART RATE: 104 BPM | DIASTOLIC BLOOD PRESSURE: 60 MMHG | SYSTOLIC BLOOD PRESSURE: 98 MMHG | BODY MASS INDEX: 29.77 KG/M2 | OXYGEN SATURATION: 99 % | WEIGHT: 232 LBS | HEIGHT: 74 IN

## 2022-12-01 DIAGNOSIS — I25.10 CAD S/P PERCUTANEOUS CORONARY ANGIOPLASTY: Primary | ICD-10-CM

## 2022-12-01 DIAGNOSIS — I47.20 VT (VENTRICULAR TACHYCARDIA): ICD-10-CM

## 2022-12-01 DIAGNOSIS — I46.9 CARDIAC ARREST (HCC): ICD-10-CM

## 2022-12-01 DIAGNOSIS — Z95.810 ICD (IMPLANTABLE CARDIOVERTER-DEFIBRILLATOR) IN PLACE: Primary | ICD-10-CM

## 2022-12-01 DIAGNOSIS — I25.5 ISCHEMIC CARDIOMYOPATHY: ICD-10-CM

## 2022-12-01 DIAGNOSIS — Z98.61 CAD S/P PERCUTANEOUS CORONARY ANGIOPLASTY: Primary | ICD-10-CM

## 2022-12-01 DIAGNOSIS — E78.2 MIXED HYPERLIPIDEMIA: ICD-10-CM

## 2022-12-01 PROCEDURE — 3078F DIAST BP <80 MM HG: CPT | Performed by: INTERNAL MEDICINE

## 2022-12-01 PROCEDURE — 99214 OFFICE O/P EST MOD 30 MIN: CPT | Performed by: INTERNAL MEDICINE

## 2022-12-01 PROCEDURE — 3074F SYST BP LT 130 MM HG: CPT | Performed by: INTERNAL MEDICINE

## 2022-12-01 NOTE — PROGRESS NOTES
[facility]  Cardiology Progress Note    Conception German  1965 December 1, 2022      Reason for Referral: CAD, HTN, HLD, ICD, syncope, hypotenson     CC: \"I have gained about 30 pounds\"       Subjective:     History of Present Illness:    Paty Rizo is a 62 y.o. patient who seeing us today in follow up CAD-multivessel, ischemic cardiomyopathy, VT s/p AICD, anemia, HTN, HLD and DM. Patient had complicated course with NSTEMI, VT multi vessel PCI. He underwent heart catheterization on 10/3/18 resulting in CECE to LCX. He underwent repeat angiography with Dr. Rosario Gentile on 10/7/19 resulting in CECE to [address]. He did have to be placed on balloon pump for hemodynamic support. Due to continued episodes of sustained VT, Dr. Osman Harry placed MRI compatible [employer] AICD. Today, he is here for follow up. He states that his blood sugar is well controlled and he has gained weight. He sates that his device has been making noise but all interrogations have been within normal limits. Patient denies exertional chest pain/pressure, dyspnea at rest, HINOJOSA, PND, orthopnea, palpitations, lightheadedness, weight changes, changes in LE edema, and syncope. Patient reports compliance to his medications.       Past Medical History:   has a past medical history of Abscess of arm, left, Acute metabolic encephalopathy, Acute respiratory failure with hypoxia ([address] Arthritis, Blood circulation, collateral, CAD (coronary artery disease), CAD (coronary artery disease), Cardiac arrest [facility] Cerebral artery occlusion with cerebral infarction ([address] Cholecystitis, COVID-19 virus infection, Diabetes mellitus ([address] Diabetic peripheral neuropathy ([address] Elbow contusion, GERD (gastroesophageal reflux disease), High anion gap metabolic acidosis, Hypercholesteremia, Hyperlipidemia, Hypertension, ICD (implantable cardioverter-defibrillator) in place, Left arm numbness, MRSA (methicillin resistant staph aureus) culture positive, Multifocal pneumonia, Neuropathy, Neutrophilic leukocytosis, NSTEMI (non-ST elevated myocardial infarction) ([address] [facility] Pneumonia due to COVID-19 virus, POTS (postural orthostatic tachycardia syndrome), Psychiatric problem, Sepsis ([facility] SIRS (systemic inflammatory response syndrome) ([facility] Skin ulcer of left foot with fat layer exposed ([address] [facility] Sleep apnea, ST elevation myocardial infarction (STEMI) of inferolateral wall ([address] Syncope, Type II or unspecified type diabetes mellitus without mention of complication, not stated as uncontrolled, Ulcer of foot due to secondary diabetes ([facility] and Wears glasses. Surgical History:   has a past surgical history that includes Vasectomy; Finger surgery (Left); hernia repair; vascular surgery; Colonoscopy; Coronary angioplasty with stent (10/06/2018); Coronary angioplasty with stent (10/07/2018); Coronary angioplasty with stent (10/03/2018); Diagnostic Cardiac Cath Lab Procedure; Cardiac catheterization; and Cholecystectomy, laparoscopic (N/A, 4/21/2021). Social History:   reports that he quit smoking about 42 years ago. His smoking use included cigarettes and cigars. He has a 24.00 pack-year smoking history. He has never used smokeless tobacco. He reports that he does not currently use drugs after having used the following drugs: Marijuana Daril Lai). He reports that he does not drink alcohol. Family History:  family history includes COPD in his mother; Cancer in his father and sister; Diabetes in his brother and sister; Heart Disease in his brother, father, and mother; High Blood Pressure in his mother; Stroke in his mother. Home Medications:  Were reviewed and are listed in nursing record and/or below  Prior to Admission medications    Medication Sig Start Date End Date Taking?  Authorizing Provider   insulin glargine (LANTUS) 100 UNIT/ML injection vial Inject 37 Units into the skin nightly 9/5/22  Yes Yahaira Berkowitz MD   prasugrel (EFFIENT) 10 MG TABS TAKE 1 TABLET BY MOUTH EVERY DAY 7/22/22  Yes LAURA Gimenez CNP   atorvastatin (LIPITOR) 80 MG tablet TAKE 1 TABLET BY MOUTH EVERY DAY 7/6/22  Yes LAURA Blanco CNP   Omega-3 Fatty Acids (FISH OIL PO) Take 1 capsule by mouth daily    Yes Historical Provider, MD   GARLIC PO Take 1 capsule by mouth daily    Yes Historical Provider, MD   aspirin 81 MG chewable tablet Take 81 mg by mouth daily   Yes Historical Provider, MD   Cholecalciferol (VITAMIN D3) 41501 units CAPS Take 1 capsule by mouth once a week    Yes Historical Provider, MD   Dulaglutide 3 MG/0.5ML SOPN Inject 3 mg into the skin once a week Indications: Friday    Yes Historical Provider, MD   metoprolol succinate (TOPROL XL) 50 MG extended release tablet Take 1 tablet by mouth in the morning. 7/22/22 7/22/22  LAURA Gimenez CNP      Allergies:  Patient has no known allergies. Review of Systems: all reviewed and refer to HPI   Constitutional: no unanticipated weight loss. There's been no change in energy level, sleep pattern, or activity level. No fevers, chills. Eyes: No visual changes or diplopia. No scleral icterus. ENT: No Headaches, hearing loss or vertigo. No mouth sores or sore throat. Cardiovascular: No Chest pain, tightness or discomfort. No Shortness of breath. No Palpitations. No Syncope ('blackouts', 'faints', 'collapse') or dizziness. No Dyspnea on exertion, Orthopnea, Paroxysmal nocturnal dyspnea or breathlessness at rest.   No Wheezing, sweating, distress and cough with frothy or bloodstained sputum. Respiratory: No cough or wheezing, no sputum production. No hematemesis. Gastrointestinal: No abdominal pain, appetite loss, blood in stools. No change in bowel or bladder habits. Genitourinary: No dysuria, trouble voiding, or hematuria. Musculoskeletal:  No gait disturbance, no joint complaints. Integumentary: No rash or pruritis.   Neurological: No headache, diplopia, change in muscle strength, numbness or tingling. Psychiatric: No anxiety or depression. Endocrine: No temperature intolerance. No excessive thirst, fluid intake, or urination. No tremor. Hematologic/Lymphatic: No abnormal bruising or bleeding, blood clots or swollen lymph nodes. Allergic/Immunologic: No nasal congestion or hives. Objective:     PHYSICAL EXAM:      Vitals:    12/01/22 0810   BP: 98/60   Site: Left Upper Arm   Position: Sitting   Pulse: (!) 104   SpO2: 99%   Weight: 232 lb (105.2 kg)   Height: 6' 2\" (1.88 m)        Weight: 232 lb (105.2 kg)       General Appearance:  Alert, cooperative, no distress, appears stated age. Head:  Normocephalic, without obvious abnormality, atraumatic. Eyes:  Pupils equal and round. No scleral icterus. Mouth: Moist mucosa, no pharyngeal erythema. Nose: Nares normal. No drainage or sinus tenderness. Neck: Supple, symmetrical, trachea midline. No adenopathy. No tenderness/mass/nodules. No carotid bruit or elevated JVD. Lungs:   Respiratory Effort: Normal   Auscultation: Clear to auscultation bilaterally, respirations unlabored. No wheeze, rales   Chest Wall:  No tenderness or deformity. Cardiovascular:    Pulses  Palpation: normal   Ascultation: Regular rate, S1/ S2 normal. No murmur, rub, or gallop. 2+ radial and pedal pulses, symmetric  Carotid  Femoral   Abdomen and Gastrointestinal:   Soft, non-tender, bowel sounds active. Liver and Spleen  Masses   Musculoskeletal: No muscle wasting  Back  Gait   Extremities: Extremities normal, atraumatic. No cyanosis or edema. No cyanosis clubbing       Skin: Inspection and palpation performed, no rashes or lesions. Pysch: Normal mood and affect.  Alert and oriented to time place person   Neurologic: Normal gross motor and sensory exam.       Labs     All available labs reviewed to date:      Lab Results   Component Value Date/Time    WBC 9.8 09/01/2022 09:31 AM    RBC 5.75 09/01/2022 09:31 AM    HGB 16.5 09/01/2022 09:31 AM    HCT 47.2 09/01/2022 09:31 AM    MCV 82.1 09/01/2022 09:31 AM    RDW 12.4 09/01/2022 09:31 AM     09/01/2022 09:31 AM     Lab Results   Component Value Date/Time     09/01/2022 09:31 AM    K 4.8 09/01/2022 09:31 AM    CL 92 09/01/2022 09:31 AM    CO2 28 09/01/2022 09:31 AM    BUN 17 09/01/2022 09:31 AM    CREATININE 0.8 09/04/2022 05:10 AM    GFRAA >60 09/04/2022 05:10 AM    GFRAA >60 02/10/2013 12:51 AM    AGRATIO 1.2 09/01/2022 09:31 AM    LABGLOM >60 09/04/2022 05:10 AM    GLUCOSE 496 09/01/2022 09:31 AM    PROT 7.9 09/01/2022 09:31 AM    PROT 8.3 11/12/2012 01:54 PM    CALCIUM 10.7 09/01/2022 09:31 AM    BILITOT 0.5 09/01/2022 09:31 AM    ALKPHOS 99 09/01/2022 09:31 AM    AST 13 09/01/2022 09:31 AM    ALT 18 09/01/2022 09:31 AM      No results found for: PTINR  Lab Results   Component Value Date    LABA1C 12.5 09/01/2022     Lab Results   Component Value Date    TROPONINI 0.04 (H) 07/21/2022       Cardiac, Vascular and Imaging Data all Personally Reviewed in Detail by Myself      EKG:   10/31/18 SR, reviewed   2/7/19 SR, 99 bpm T wave inversion   12/13/2021 Sinus rhythm     Echocardiogram: 10/8/18  Summary  Left ventricular cavity size is mildly dilated. Normal left ventricular wall thickness. Ejection fraction is visually estimated to be 45%. There is moderate to severe inferior hypokinesis. Normal right ventricular size and function. ECHO 3/18/2022  Left ventricular cavity size is normal. Normal left ventricular wall  thickness. Overall left ventricular systolic function appears mildly reduced  with an estimated ejection fraction of 50%. There is hypokinesis of the  basal inferolateral walls    Stress Test: 10/9/17   Summary    Pharmacological Stress/MPI Results:        1. Technically a satisfactory study. 2. Normal pharmacological stress portion of the study. 3. No evidence of Ischemia by Myocardial Perfusion Imaging. 4. Gated Study shows normal LV size and Systolic function; EF is 61 %.      Stress test 7/7/2021   Abnormal study. There is a moderate sized, severe intensity, fixed defect of    the basal to mid inferior/inferolateral wall which is consistent with    scar/prior infarction. No evidence of reversible ischemia. LV function is normal with inferolateral akinesis and ejection fraction of    56 %. Overall findings represent a low to intermediate risk study. Stress test 3/18/2022  moderate size severe inferior lateral wall fixed defect consistent with old    MI. no evidence of ischemia. Overall findings represent a low risk scan. Recommendation    Aggressive medical therapy for coronary artery disease. Patient may be discharged. Cath:   10/3/18   ANGIOGRAPHIC FINDINGS:  1. Left main is normal.  LYNNE 3 flow. No stenosis. 2.  Left anterior descending artery comes from the left main coronary  artery. LYNNE 3 flow. No stenosis present with patent diagonal branches. 3.  Left circumflex is occluded in the mid portion. 4.  Right coronary comes from the right coronary cusp. It has a PDA which  has 80% stenosis present. The patient also has an obtuse marginal 1 which  as 80% stenosis present. In summary, successful revascularization with stent placement in the  circumflex artery. This was 2.5 x 38 mm, expanded up to 2.8 mm. This was  a drug-coated Synergy stent    10/7/18 Dr. Edilson Snowden stent patent; PCI of RPDA and PCI OM1, IABP placement    Imaging: All available imaging to date reviewed     ECHO:8/14/19  Suboptimal image quality. Definity contrast administered. Left ventricular cavity size is normal. Normal left ventricular wall  thickness. Overall left ventricular systolic function appears mildly  reduced. Ejection fraction is visually estimated to be 50-55%. No regional  wall motion abnormalities are noted. Diastolic filling parameters suggest  grade I diastolic dysfunction. Mitral valve leaflets appear mildly thickened.   No evidence of significant mitral regurgitation or stenosis. Normal right ventricular size and function. TAPSE measures 17mm. Pacer / ICD wire is visualized in the right ventricle. .  Trivial tricuspid regurgitation. ECHO 2/10/2020  Normal LV size, wall thickness and wall motion: EF is   60%. Grade I  diastolic dysfunction with normal LV filling pressures. Left atrium is of normal size. Normal right ventricular size and function. Trivial mitral & mild tricuspid regurgitation is present. Carotid Duplex:11/13/19  Right Impression   1. There are no focal elevated velocities involving the internal carotid   artery. 2. There is no gross plaque seen involving the internal carotid artery. 3. The right vertebral artery demonstrates normal antegrade flow. Left Impression   1. There are no focal elevated velocities involving the internal carotid   artery. 2. There is no gross plaque seen involving the internal carotid artery. 3. The left vertebral artery demonstrates normal antegrade flow. Cardiac cath 11/18/2020  1. Left main normal.  LYNNE 3 flow. No stenosis. 2.  Left anterior descending artery, has LYNNE 3 flow with 20% stenosis. 3.  Left circumflex has distally LYNNE 2 flow but no significant  stenosis, patent stents. 4.  Right coronary artery in the mid portion has 90% stenosis present  with unstable plaque. LV ejection fraction 60%. In summary, successful revascularization of the right coronary artery  and placement of stent, 4.0 x 26 mm. Ambulatory JEREMIE  Date: 3/16/2021    Right Ankle: 1.21  Left ankle: 1.22      Assessment and Plan       CAD, VT recurrent MI requiring repeat PCI  s/p AICD implant 10/12/18  --S/p NSTEMI, S/p Inferolateral STEMI, S/P multivessel PCI: LCX, EDUARD and RPDA Dr. Max Sanchez. -successful revascularization of the right coronary artery and placement of stent, 4.0 x 26 mm. Asymptomatic. Continuue Asa, Effient, and statin therapy. No b-blocker due to hypotension.      Ischemic cardiomyopathy  Appears compensated on exam.  Repeat echocardiogram to assess heart function. Autonomic dysfunction  No recent syncopal episode. Neurogenic Syncope  Long standing hx of falls  + TTT 2005Unable to tolerate BB and ACEI due to hypotension and recurrent syncope. Follows with Dr Leslie Peguero. Hyperlipidemia, mixed   Continue high intensity statin therapy. DM, II  Levamir, trulicity     Follow up in 7 months     Thank you for allowing us to participate in the care of Juni King. Please do not hesitate to contact me if you have any questions. Bee Ely MD, MPH    [facility], [address] [place], [place] [address]ard [address] [address]  Ph: [phone]  Fax: [phone]    This note was scribed in the presence of Dr Arya Schmitt, by Sophie Garcia RN  Physician Attestation:  The scribes documentation has been prepared under my direction and personally reviewed by me in its entirety. I confirm that the note above accurately reflects all work, treatment, procedures, and medical decision making performed by me.

## 2022-12-02 NOTE — PROGRESS NOTES
Device programming evaluation for patient's single chamber ICD by company representative shows normal device function. Noted NSVT. Pt seeing Dr. Kamila Mcguire today.  <0.1%  PVCs <0.1/hr    Possible Optivol fluid accumulation 09.09.22-11.16.22, resolved and back near baseline. EP physician will review. See interrogation under cardiology tab in the [address] [place] [address] [address] field for more details.

## 2022-12-22 RX ORDER — PRASUGREL 10 MG/1
TABLET, FILM COATED ORAL
Qty: 90 TABLET | Refills: 0 | OUTPATIENT
Start: 2022-12-22

## 2022-12-22 NOTE — TELEPHONE ENCOUNTER
Medication Refill    Medication needing refilled:  prasugrel (EFFIENT)    Dosage of the medication:  10 MG TABS     How are you taking this medication (QD, BID, TID, QID, PRN):  TAKE 1 TABLET BY MOUTH EVERY DAY    30 or 90 day supply called in:  30 day supply    When will you run out of your medication: today    Which Pharmacy are we sending the medication to?:  [id number] - [place], [address] [facility] [phone] - F [phone]

## 2022-12-27 RX ORDER — PRASUGREL 10 MG/1
TABLET, FILM COATED ORAL
Qty: 90 TABLET | Refills: 3 | Status: SHIPPED | OUTPATIENT
Start: 2022-12-27

## 2023-01-04 ENCOUNTER — HOSPITAL ENCOUNTER (OUTPATIENT)
Dept: NON INVASIVE DIAGNOSTICS | Age: 58
Discharge: HOME | End: 2023-01-04
Payer: MEDICARE

## 2023-01-04 DIAGNOSIS — I25.5 ISCHEMIC CARDIOMYOPATHY: ICD-10-CM

## 2023-01-04 PROCEDURE — C8929 TTE W OR WO FOL WCON,DOPPLER: HCPCS

## 2023-01-04 PROCEDURE — 6360000004 HC RX CONTRAST MEDICATION: Performed by: INTERNAL MEDICINE

## 2023-01-04 RX ADMIN — PERFLUTREN 1.5 ML: 6.52 INJECTION, SUSPENSION INTRAVENOUS at 09:18

## 2023-03-12 ENCOUNTER — HOSPITAL ENCOUNTER (EMERGENCY)
Age: 58
Discharge: HOME | End: 2023-03-13
Attending: EMERGENCY MEDICINE
Payer: MEDICARE

## 2023-03-12 ENCOUNTER — APPOINTMENT (OUTPATIENT)
Dept: GENERAL RADIOLOGY | Age: 58
End: 2023-03-12
Payer: MEDICARE

## 2023-03-12 VITALS
BODY MASS INDEX: 30.64 KG/M2 | TEMPERATURE: 98.6 F | RESPIRATION RATE: 16 BRPM | HEART RATE: 99 BPM | SYSTOLIC BLOOD PRESSURE: 131 MMHG | HEIGHT: 74 IN | WEIGHT: 238.76 LBS | DIASTOLIC BLOOD PRESSURE: 93 MMHG | OXYGEN SATURATION: 98 %

## 2023-03-12 DIAGNOSIS — S92.902A CLOSED FRACTURE OF LEFT FOOT, INITIAL ENCOUNTER: Primary | ICD-10-CM

## 2023-03-12 PROCEDURE — 73630 X-RAY EXAM OF FOOT: CPT

## 2023-03-12 PROCEDURE — 99283 EMERGENCY DEPT VISIT LOW MDM: CPT

## 2023-03-12 ASSESSMENT — ENCOUNTER SYMPTOMS
EYE REDNESS: 0
EYE DISCHARGE: 0
CONSTIPATION: 0
COUGH: 0
VOMITING: 0
RECTAL PAIN: 0
ANAL BLEEDING: 0
EYE PAIN: 0
COLOR CHANGE: 0
DIARRHEA: 0
WHEEZING: 0
APNEA: 0
ABDOMINAL PAIN: 0
CHOKING: 0
STRIDOR: 0
ABDOMINAL DISTENTION: 0
BLOOD IN STOOL: 0
SHORTNESS OF BREATH: 0
BACK PAIN: 0
EYE ITCHING: 0
NAUSEA: 0
CHEST TIGHTNESS: 0
PHOTOPHOBIA: 0

## 2023-03-12 ASSESSMENT — PAIN - FUNCTIONAL ASSESSMENT: PAIN_FUNCTIONAL_ASSESSMENT: 0-10

## 2023-03-12 ASSESSMENT — PAIN SCALES - GENERAL: PAINLEVEL_OUTOF10: 5

## 2023-03-13 ENCOUNTER — TELEPHONE (OUTPATIENT)
Dept: ORTHOPEDIC SURGERY | Age: 58
End: 2023-03-13

## 2023-03-13 NOTE — ED TRIAGE NOTES
Pt states that he has toe injury on left side after sitting on ground with kitten. Pt has bruising on his toes. Pt has CAD,  previous cardiac arrest from MI, DM and neuropathy and is unable to feel his feet.

## 2023-03-13 NOTE — TELEPHONE ENCOUNTER
LVM asking patient to call back to schedule. Upon return call, can be seen in [facility] office this Friday 3/17 in open appointment slot.

## 2023-03-13 NOTE — ED PROVIDER NOTES
I PERSONALLY SAW THE PATIENT AND PERFORMED A SUBSTANTIVE PORTION OF THE VISIT INCLUDING ALL ASPECTS OF THE MEDICAL DECISION MAKING PROCESS. [address] [facility]      Pt Name: Lawanda Wallace  MRN: [id number]  Waqasgffior 1965  Date of evaluation: 3/12/2023  Provider: Diego Gentile MD    [address] [place] [address]       Chief Complaint   Patient presents with    Foot Pain     Pt states he was playing on the ground with cat, and stood up, and had toe out of place and a bruise. Pt has neuropathy from diabetes. HISTORY OF PRESENT ILLNESS    Juni Crystal is a 62 y.o. male who presents to the emergency department with foot injury. Patient presents with foot injury. States he jammed his second toe when he was playing with his cat. 7-10 achy pain. Worse with movement. Better with rest.  No other associated symptoms. Nursing Notes were reviewed. Including nursing noted for FM, Surgical History, Past Medical History, Social History, vitals, and allergies; agree with all. REVIEW OF SYSTEMS       Review of Systems   Constitutional:  Negative for activity change, appetite change, chills, diaphoresis, fatigue, fever and unexpected weight change. HENT:  Negative for congestion, dental problem, drooling, ear discharge and ear pain. Eyes:  Negative for photophobia, pain, discharge, redness, itching and visual disturbance. Respiratory:  Negative for apnea, cough, choking, chest tightness, shortness of breath, wheezing and stridor. Cardiovascular:  Negative for chest pain, palpitations and leg swelling. Gastrointestinal:  Negative for abdominal distention, abdominal pain, anal bleeding, blood in stool, constipation, diarrhea, nausea, rectal pain and vomiting. Endocrine: Negative for cold intolerance and heat intolerance. Genitourinary:  Negative for decreased urine volume and urgency. Musculoskeletal:  Positive for arthralgias.  Negative for back pain. Skin:  Negative for color change and pallor. Neurological:  Negative for tremors, facial asymmetry and weakness. Hematological:  Negative for adenopathy. Does not bruise/bleed easily. Psychiatric/Behavioral:  Negative for agitation, behavioral problems, confusion and decreased concentration. Except as noted above the remainder of the review of systems was reviewed and negative.      PAST MEDICAL HISTORY     Past Medical History:   Diagnosis Date    Abscess of arm, left 5/3/0906    Acute metabolic encephalopathy 1/67/7427    Acute respiratory failure with hypoxia (HCC)     Arthritis     Blood circulation, collateral     CAD (coronary artery disease) 7/15/2014    CAD (coronary artery disease)     Cardiac arrest ([address] 10/05/2018    Cerebral artery occlusion with cerebral infarction ([address]     Cholecystitis 4/14/2021    COVID-19 virus infection 7/1/2020    Diabetes mellitus ([address]     Diabetic peripheral neuropathy ([address] 6/8/2018    Elbow contusion 3/24/2014    GERD (gastroesophageal reflux disease)     ulcers    High anion gap metabolic acidosis 5/8/7099    Hypercholesteremia     Hyperlipidemia     Hypertension     ICD (implantable cardioverter-defibrillator) in place 2018    Left arm numbness 9/11/2010    MRSA (methicillin resistant staph aureus) culture positive 07/13/2018    foot wound    Multifocal pneumonia     Neuropathy     Neutrophilic leukocytosis 0/00/4047    NSTEMI (non-ST elevated myocardial infarction) ([address] 10/3/2018    Pneumonia due to COVID-19 virus     POTS (postural orthostatic tachycardia syndrome)     Psychiatric problem     anxiety, depression, bipolar    Sepsis ([address] 7/1/2020    SIRS (systemic inflammatory response syndrome) ([address] 4/14/2021    Skin ulcer of left foot with fat layer exposed ([address] 6/1/2018    Sleep apnea     does not use Cpap    ST elevation myocardial infarction (STEMI) of inferolateral wall ([facility]) 11/13/2018    Syncope     Type II or unspecified type diabetes mellitus without mention of complication, not stated as uncontrolled     Ulcer of foot due to secondary diabetes ([address] 8/15/2018    Wears glasses        SURGICAL HISTORY       Past Surgical History:   Procedure Laterality Date    CARDIAC CATHETERIZATION      CHOLECYSTECTOMY, LAPAROSCOPIC N/A 4/21/2021    LAPAROSCOPIC CHOLECYSTECTOMY WITH CHOLANGIOGRAM performed by Rick Jenkins MD at [place] [address]  10/06/2018    Bare Metal Stent to PDA    CORONARY ANGIOPLASTY WITH STENT PLACEMENT  10/07/2018    Bare Metal Stent to OM    CORONARY ANGIOPLASTY WITH STENT PLACEMENT  10/03/2018    CECE to Circ    DIAGNOSTIC CARDIAC CATH LAB PROCEDURE      FINGER SURGERY Left     Left Thumb    HERNIA REPAIR      VASCULAR SURGERY      VASECTOMY         CURRENT MEDICATIONS       Previous Medications    ASPIRIN 81 MG CHEWABLE TABLET    Take 81 mg by mouth daily    ATORVASTATIN (LIPITOR) 80 MG TABLET    TAKE 1 TABLET BY MOUTH EVERY DAY    CHOLECALCIFEROL (VITAMIN D3) 06659 UNITS CAPS    Take 1 capsule by mouth once a week     DULAGLUTIDE 3 MG/0.5ML SOPN    Inject 3 mg into the skin once a week Indications: Friday     GARLIC PO    Take 1 capsule by mouth daily     INSULIN GLARGINE (LANTUS) 100 UNIT/ML INJECTION VIAL    Inject 37 Units into the skin nightly    OMEGA-3 FATTY ACIDS (FISH OIL PO)    Take 1 capsule by mouth daily     PRASUGREL (EFFIENT) 10 MG TABS    TAKE 1 TABLET BY MOUTH EVERY DAY       ALLERGIES     Patient has no known allergies.     FAMILY HISTORY        Family History   Problem Relation Age of Onset    High Blood Pressure Mother     Stroke Mother     Heart Disease Mother     COPD Mother     Heart Disease Father     Cancer Father         skin    Diabetes Sister     Cancer Sister     Heart Disease Brother     Diabetes Brother        SOCIAL HISTORY       Social History     Socioeconomic History    Marital status:    Tobacco Use    Smoking status: Former Packs/day: 2.00     Years: 12.00     Pack years: 24.00     Types: Cigarettes, Cigars     Quit date: 36     Years since quittin.2    Smokeless tobacco: Never    Tobacco comments:     quit in the 80's   Vaping Use    Vaping Use: Never used   Substance and Sexual Activity    Alcohol use: No    Drug use: Not Currently     Types: Marijuana Daril Lai)     Comment: quit 80's    Sexual activity: Yes     Partners: Female   Social History Narrative    ** Merged History Encounter **            PHYSICAL EXAM       ED Triage Vitals [23 2325]   BP Temp Temp Source Heart Rate Resp SpO2 Height Weight   (!) 131/93 98.6 °F (37 °C) Oral 99 16 98 % 6' 2\" (1.88 m) 238 lb 12.1 oz (108.3 kg)       Physical Exam  Vitals and nursing note reviewed. Constitutional:       General: He is not in acute distress. Appearance: He is well-developed. He is not diaphoretic. HENT:      Head: Normocephalic and atraumatic. Eyes:      General:         Right eye: No discharge. Left eye: No discharge. Pupils: Pupils are equal, round, and reactive to light. Neck:      Thyroid: No thyromegaly. Trachea: No tracheal deviation. Cardiovascular:      Rate and Rhythm: Normal rate and regular rhythm. Heart sounds: No murmur heard. Pulmonary:      Breath sounds: No wheezing or rales. Chest:      Chest wall: No tenderness. Abdominal:      General: There is no distension. Palpations: Abdomen is soft. There is no mass. Tenderness: There is no abdominal tenderness. There is no guarding or rebound. Musculoskeletal:         General: Tenderness and signs of injury present. No deformity. Cervical back: Normal range of motion. Skin:     General: Skin is warm. Coloration: Skin is not pale. Findings: No erythema or rash. Neurological:      Mental Status: He is alert. Cranial Nerves: No cranial nerve deficit. Motor: No abnormal muscle tone.       Coordination: Coordination normal. DIAGNOSTIC RESULTS     EKG: All EKG's are interpreted by the Emergency Department Physician who either signs or Co-signs this chart in the absence of acardiologist.    Interpreted by myself     RADIOLOGY:   Non-plain film images such as CT, Ultrasoundand MRI are read by the radiologist. Plain radiographic images are visualized and preliminarily interpreted by the emergency physician with the below findings:    X-ray of the foot shows fracture of the base of the second toe    ED BEDSIDE ULTRASOUND:   Performed by ED Physician - none    LABS:  Labs Reviewed - No data to display    All other labs were withinnormal range or not returned as of this dictation. EMERGENCY DEPARTMENT COURSE and DIFFERENTIAL DIAGNOSIS/MDM:     PMH, Surgical Hx, FH, Social Hx reviewed by myself (ETOH usage, Tobacco usage, Drug usage reviewed by myself, no pertinent Hx)- No Pertinent Hx     Old records were reviewed by me     MDM 51-year-old with second toe fracture. Kyle take. Postop shoe. Outpatient follow-up with orthopedics. DDX-   Social Determinants (Poverty, Education, uninsured) -uninsured  Prior notes-   Name and route all medications-none  Charting interpretations all by myself-x-ray as above  Diagnosis descriptions-moderate pain  Consults-   Disposition- Considered -   I estimate there is LOW risk for Sepsis, MI, Stroke, Tamponade, PTX, Toxicity or other life threatening etiology thus I consider the discharge disposition reasonable. The patient is at low risk for mortality based on demographic, history and clinical factors. Given the best available information and clinical assessment, I estimate the risk of hospitalization to be greater than risk of treatment at home. I have explained to the patient that the risk could rapidly change, given precautions for return and instructions. Explained to patient that the risk for mortality is low based on demographic, history and clinical factors.      I discussed with patient the results of evaluation in the ED, diagnosis, care, and prognosis. The plan is to discharge to home. Patient is in agreement with plan and questions have been answered. I also discussed with patient the reasons which may require a return visit and the importance of follow-up care. The patient is well-appearing, nontoxic, and improved at the time of discharge. Patient agrees to call to arrange follow-up care as directed. Patient understands to return immediately for worsening/change in symptoms. I PERSONALLY SAW THE PATIENT AND PERFORMED A SUBSTANTIVE PORTION OF THE VISIT INCLUDING ALL ASPECTS OF THE MEDICAL DECISION MAKING PROCESS. The primary clinician of record [address]   Total Critical Caretime was 12 minutes, excluding separately reportable procedures. There was a high probability of clinically significant/life threatening deterioration in the patient's condition which required my urgent intervention. CRITICAL CARE  I personally saw the patient and independently provided 12 minutes of non-concurrent critical care out of the total shared critical care time provided. This excludes seperately billable procedures. Critical care time was provided for patient as above that required close evaluation and/or intervention with concern for potential patient decompensation. PROCEDURES:  Unlessotherwise noted below, none    FINAL IMPRESSION      1.  Closed fracture of left foot, initial encounter          DISPOSITION/PLAN   DISPOSITION      PATIENT REFERRED TO:  Guanaco Colbert MD  [address] [facility]) [address] [place] [address]  [phone]    Call today      DISCHARGE MEDICATIONS:  New Prescriptions    No medications on file          (Please note that portions ofthis note were completed with a voice recognition program.  Efforts were made to edit the dictations but occasionally words are mis-transcribed.)    Everett Moran MD(electronically signed)  Attending Emergency Physician       Mindy Lopez MD  03/12/23 [id number]

## 2023-03-17 ENCOUNTER — OFFICE VISIT (OUTPATIENT)
Dept: ORTHOPEDIC SURGERY | Age: 58
End: 2023-03-17

## 2023-03-17 VITALS — HEIGHT: 74 IN | BODY MASS INDEX: 30.54 KG/M2 | WEIGHT: 238 LBS

## 2023-03-17 DIAGNOSIS — S92.522A CLOSED DISPLACED FRACTURE OF MIDDLE PHALANX OF LESSER TOE OF LEFT FOOT, INITIAL ENCOUNTER: Primary | ICD-10-CM

## 2023-03-17 NOTE — PROGRESS NOTES
CHIEF COMPLAINT:   1- Left foot pain/ second toe mid phalanx plantar base minimally displaced fracture. 2- DM with neuropathy. DATE OF INJURY:  3/12/2023    HISTORY:  Mr. Amna Chawla 62 y.o.  male with DM and neuropathy presents today for the first visit for evaluation of a left foot injury which occurred when he was on his toes playing with his cat. He is complaining of left foot second toe pain and swelling. This is better with elevation and worse with bearing any wt. The pain is sharp and not radiating. No other complaint. He was seen 1st at [facility], where he was x-rayed and splinted and asked to f/u with orthopaedics.     Past Medical History:   Diagnosis Date    Abscess of arm, left 6/2/2942    Acute metabolic encephalopathy 8/05/8340    Acute respiratory failure with hypoxia (HCC)     Arthritis     Blood circulation, collateral     CAD (coronary artery disease) 7/15/2014    CAD (coronary artery disease)     Cardiac arrest ([address] 10/05/2018    Cerebral artery occlusion with cerebral infarction ([address]     Cholecystitis 4/14/2021    COVID-19 virus infection 7/1/2020    Diabetes mellitus ([address]     Diabetic peripheral neuropathy ([address] 6/8/2018    Elbow contusion 3/24/2014    GERD (gastroesophageal reflux disease)     ulcers    High anion gap metabolic acidosis 4/9/4397    Hypercholesteremia     Hyperlipidemia     Hypertension     ICD (implantable cardioverter-defibrillator) in place 2018    Left arm numbness 9/11/2010    MRSA (methicillin resistant staph aureus) culture positive 07/13/2018    foot wound    Multifocal pneumonia     Neuropathy     Neutrophilic leukocytosis 6/10/7020    NSTEMI (non-ST elevated myocardial infarction) ([address] 10/3/2018    Pneumonia due to COVID-19 virus     POTS (postural orthostatic tachycardia syndrome)     Psychiatric problem     anxiety, depression, bipolar    Sepsis ([address] 7/1/2020    SIRS (systemic inflammatory response syndrome) ([address] 4/14/2021    Skin ulcer of left foot with fat layer exposed ([facility] 2018    Sleep apnea     does not use Cpap    ST elevation myocardial infarction (STEMI) of inferolateral wall ([facility] 2018    Syncope     Type II or unspecified type diabetes mellitus without mention of complication, not stated as uncontrolled     Ulcer of foot due to secondary diabetes ([facility] 8/15/2018    Wears glasses        Past Surgical History:   Procedure Laterality Date    CARDIAC CATHETERIZATION      CHOLECYSTECTOMY, LAPAROSCOPIC N/A 2021    LAPAROSCOPIC CHOLECYSTECTOMY WITH CHOLANGIOGRAM performed by Celeste Noriega MD at [place] [address]  10/06/2018    Bare Metal Stent to PDA    CORONARY ANGIOPLASTY WITH STENT PLACEMENT  10/07/2018    Bare Metal Stent to OM    CORONARY ANGIOPLASTY WITH STENT PLACEMENT  10/03/2018    CECE to Circ    DIAGNOSTIC CARDIAC CATH LAB PROCEDURE      FINGER SURGERY Left     Left Thumb    HERNIA REPAIR      VASCULAR SURGERY      VASECTOMY         Social History     Socioeconomic History    Marital status:      Spouse name: Not on file    Number of children: Not on file    Years of education: Not on file    Highest education level: Not on file   Occupational History    Not on file   Tobacco Use    Smoking status: Former     Packs/day: 2.00     Years: 12.00     Pack years: 24.00     Types: Cigarettes, Cigars     Quit date:      Years since quittin.2    Smokeless tobacco: Never    Tobacco comments:     quit in the 80's   Vaping Use    Vaping Use: Never used   Substance and Sexual Activity    Alcohol use: No    Drug use: Not Currently     Types: Marijuana Alfornia Cashing)     Comment: quit 80's    Sexual activity: Yes     Partners: Female   Other Topics Concern    Not on file   Social History Narrative    ** Merged History Encounter **          Social Determinants of Health     Financial Resource Strain: Not on file   Food Insecurity: Not on file   Transportation Needs: Not on file Physical Activity: Not on file   Stress: Not on file   Social Connections: Not on file   Intimate Partner Violence: Not on file   Housing Stability: Not on file       Family History   Problem Relation Age of Onset    High Blood Pressure Mother     Stroke Mother     Heart Disease Mother     COPD Mother     Heart Disease Father     Cancer Father         skin    Diabetes Sister     Cancer Sister     Heart Disease Brother     Diabetes Brother        Current Outpatient Medications on File Prior to Visit   Medication Sig Dispense Refill    prasugrel (EFFIENT) 10 MG TABS TAKE 1 TABLET BY MOUTH EVERY DAY 90 tablet 3    insulin glargine (LANTUS) 100 UNIT/ML injection vial Inject 37 Units into the skin nightly 10 mL 3    [DISCONTINUED] metoprolol succinate (TOPROL XL) 50 MG extended release tablet Take 1 tablet by mouth in the morning. 30 tablet 3    atorvastatin (LIPITOR) 80 MG tablet TAKE 1 TABLET BY MOUTH EVERY DAY 90 tablet 3    Omega-3 Fatty Acids (FISH OIL PO) Take 1 capsule by mouth daily       GARLIC PO Take 1 capsule by mouth daily       aspirin 81 MG chewable tablet Take 81 mg by mouth daily      Cholecalciferol (VITAMIN D3) 67420 units CAPS Take 1 capsule by mouth once a week       Dulaglutide 3 MG/0.5ML SOPN Inject 3 mg into the skin once a week Indications: Friday        No current facility-administered medications on file prior to visit. Pertinent items are noted in HPI  Review of systems reviewed from Patient History Form and available in the patient's chart under the Media tab. No change noted. PHYSICAL EXAMINATION:  Mr. Daisy Nguyen is a very pleasant 62 y.o.  male who presents today in no acute distress, awake, alert, and oriented. He is well dressed, nourished and  groomed. Patient with normal affect. Height is  6' 2\" (1.88 m), weight is 238 lb (108 kg), Body mass index is 30.56 kg/m². Resting respiratory rate is 16.      Examination of the gait, showed that the patient walks with a limp in regular shoes, WB left leg. Examination of both feet and ankles showing a decreased range of motion of the left foot second toe compare to the other side because of pain. There is moderate swelling that can be seen, as well as ecchymosis over second toe of the left foot. He has intact sensation and good pedal pulses. He has significant tenderness on deep palpation over the second toe proximal and mid phalanx left foot        IMAGING: Xray's were reviewed, dated 3/12/2023, 3 views of the left foot, and showed a second toe mid phalanx plantar base minimally displaced fracture. IMPRESSION:   1- Left foot pain/ second toe mid phalanx plantar base minimally displaced fracture. 2- DM with neuropathy. PLAN: I discussed that the overall alignment of this fracture is good and that we can try to treat this non-operatively in a post-op shoe with full WB but no strapping as he has neuropathy. We discussed the risk of nonunion and  malunion. We will see him  back in 6 weeks at which time we will get a new xray of the left foot.         Steve Deleon MD

## 2023-04-28 ENCOUNTER — OFFICE VISIT (OUTPATIENT)
Dept: ORTHOPEDIC SURGERY | Age: 58
End: 2023-04-28

## 2023-04-28 VITALS — BODY MASS INDEX: 30.54 KG/M2 | WEIGHT: 238 LBS | HEIGHT: 74 IN

## 2023-04-28 DIAGNOSIS — G62.9 NEUROPATHY: ICD-10-CM

## 2023-04-28 DIAGNOSIS — S92.522A CLOSED DISPLACED FRACTURE OF MIDDLE PHALANX OF LESSER TOE OF LEFT FOOT, INITIAL ENCOUNTER: Primary | ICD-10-CM

## 2023-04-28 NOTE — PROGRESS NOTES
CHIEF COMPLAINT:   1- Left foot pain/ second toe mid phalanx plantar base minimally displaced fracture. 2- DM with neuropathy. DATE OF INJURY:  3/12/2023    HISTORY:  Mr. Joselin Hughes 62 y.o.  male with DM and neuropathy presents today for f/u evaluation of a left foot injury which occurred when he was on his toes playing with his cat. He reported no more left foot second toe pain and swelling. No other complaint. He was seen 1st at [place], where he was x-rayed and splinted and asked to f/u with orthopaedics. He has diabetic neuropathy.     Past Medical History:   Diagnosis Date    Abscess of arm, left 1/1/9291    Acute metabolic encephalopathy 6/89/2259    Acute respiratory failure with hypoxia (HCC)     Arthritis     Blood circulation, collateral     CAD (coronary artery disease) 7/15/2014    CAD (coronary artery disease)     Cardiac arrest ([address] 10/05/2018    Cerebral artery occlusion with cerebral infarction ([address]     Cholecystitis 4/14/2021    COVID-19 virus infection 7/1/2020    Diabetes mellitus ([address]     Diabetic peripheral neuropathy ([address] 6/8/2018    Elbow contusion 3/24/2014    GERD (gastroesophageal reflux disease)     ulcers    High anion gap metabolic acidosis 8/7/5005    Hypercholesteremia     Hyperlipidemia     Hypertension     ICD (implantable cardioverter-defibrillator) in place 2018    Left arm numbness 9/11/2010    MRSA (methicillin resistant staph aureus) culture positive 07/13/2018    foot wound    Multifocal pneumonia     Neuropathy     Neutrophilic leukocytosis 7/02/6800    NSTEMI (non-ST elevated myocardial infarction) ([address] 10/3/2018    Pneumonia due to COVID-19 virus     POTS (postural orthostatic tachycardia syndrome)     Psychiatric problem     anxiety, depression, bipolar    Sepsis ([address] 7/1/2020    SIRS (systemic inflammatory response syndrome) ([address] 4/14/2021    Skin ulcer of left foot with fat layer exposed ([address] 6/1/2018    Sleep apnea     does not use Cpap    ST elevation

## 2023-05-04 ENCOUNTER — APPOINTMENT (OUTPATIENT)
Dept: CT IMAGING | Age: 58
End: 2023-05-04
Payer: MEDICARE

## 2023-05-04 ENCOUNTER — HOSPITAL ENCOUNTER (EMERGENCY)
Age: 58
Discharge: HOME | End: 2023-05-04
Payer: MEDICARE

## 2023-05-04 VITALS
DIASTOLIC BLOOD PRESSURE: 79 MMHG | SYSTOLIC BLOOD PRESSURE: 125 MMHG | OXYGEN SATURATION: 96 % | RESPIRATION RATE: 16 BRPM | BODY MASS INDEX: 31.63 KG/M2 | TEMPERATURE: 98 F | HEART RATE: 96 BPM | HEIGHT: 74 IN | WEIGHT: 246.47 LBS
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
## 2023-05-04 DIAGNOSIS — S01.411A CHEEK LACERATION, RIGHT, INITIAL ENCOUNTER: ICD-10-CM

## 2023-05-04 DIAGNOSIS — S09.90XA INJURY OF HEAD, INITIAL ENCOUNTER: ICD-10-CM

## 2023-05-04 PROCEDURE — 12013 RPR F/E/E/N/L/M 2.6-5.0 CM: CPT

## 2023-05-04 PROCEDURE — 72125 CT NECK SPINE W/O DYE: CPT

## 2023-05-04 PROCEDURE — 70450 CT HEAD/BRAIN W/O DYE: CPT

## 2023-05-04 PROCEDURE — 99284 EMERGENCY DEPT VISIT MOD MDM: CPT

## 2023-05-04 PROCEDURE — 70486 CT MAXILLOFACIAL W/O DYE: CPT

## 2023-05-04 ASSESSMENT — PAIN - FUNCTIONAL ASSESSMENT: PAIN_FUNCTIONAL_ASSESSMENT: 0-10

## 2023-05-04 ASSESSMENT — LIFESTYLE VARIABLES
HOW OFTEN DO YOU HAVE A DRINK CONTAINING ALCOHOL: MONTHLY OR LESS
HOW MANY STANDARD DRINKS CONTAINING ALCOHOL DO YOU HAVE ON A TYPICAL DAY: 1 OR 2

## 2023-05-04 ASSESSMENT — ENCOUNTER SYMPTOMS
BACK PAIN: 0
VOMITING: 0
NAUSEA: 0

## 2023-05-04 ASSESSMENT — PAIN SCALES - GENERAL: PAINLEVEL_OUTOF10: 7

## 2023-05-04 ASSESSMENT — PAIN DESCRIPTION - LOCATION: LOCATION: FACE

## 2023-05-04 ASSESSMENT — PAIN DESCRIPTION - ORIENTATION: ORIENTATION: RIGHT

## 2023-05-04 ASSESSMENT — PAIN DESCRIPTION - PAIN TYPE: TYPE: ACUTE PAIN

## 2023-05-04 NOTE — ED TRIAGE NOTES
Pt into ER from home c/c facial injury, small lac to right side face, after tripping on a trash can lid and falling forward to strike face on can. Pt believes he had a brief LOC. Pt c/o pain to the right side of his head.

## 2023-05-05 NOTE — ED PROVIDER NOTES
head injury TECHNOLOGIST PROVIDED HISTORY: Has a \"code stroke\" or \"stroke alert\" been called? ->No Reason for exam:->head injury Reason for Exam: head injury; ORDERING SYSTEM PROVIDED HISTORY: trauma, r/o fx TECHNOLOGIST PROVIDED HISTORY: Reason for exam:->trauma, r/o fx Reason for Exam: trauma, r/o fx; ORDERING SYSTEM PROVIDED HISTORY: trauma, r/o fracture TECHNOLOGIST PROVIDED HISTORY: Reason for exam:->trauma, r/o fracture Reason for Exam: trauma, r/o fracture FINDINGS: CT HEAD: BRAIN/VENTRICLES: There is no acute intracranial hemorrhage, mass effect or midline shift. No abnormal extra-axial fluid collection. The gray-white differentiation is maintained without evidence of an acute infarct. There is no evidence of hydrocephalus. CT FACIAL BONES: FACIAL BONES: The maxilla, pterygoid plates and zygomatic arches are intact. The mandible is intact. The mandibular condyles are normally situated. The nasal bones and maxillary nasal processes are intact. ORBITS: The globes appear intact. The extraocular muscles, optic nerve sheath complexes and lacrimal glands appear unremarkable. No retrobulbar hematoma or mass is seen. The orbital walls and rims are intact. SINUSES/MASTOIDS: The paranasal sinuses and mastoid air cells are well aerated. No acute fracture is seen. SOFT TISSUES: No acute abnormality of the visualized skull or soft tissues. CT SCAN CERVICAL SPINE: BONES/ALIGNMENT: The alignment of the cervical spine is within normal limits. No acute fractures or dislocations are seen. DEGENERATIVE CHANGES: There is mild degenerative disc disease of the cervical spine. SOFT TISSUES: There is no prevertebral soft tissue swelling. 1. No acute intracranial abnormality. 2. No acute traumatic abnormality involving facial bones. 3. No acute traumatic abnormality involving the cervical spine.      CT FACIAL BONES WO CONTRAST    Result Date: 5/4/2023  EXAMINATION: CT OF THE HEAD WITHOUT CONTRAST; CT OF THE CERVICAL SPINE

## 2023-05-05 NOTE — ED NOTES
D/C: Order noted for d/c. Pt confirmed d/c paperwork  have correct name. Discharge and education instructions reviewed with patient. Teach-back successful. Pt verbalized understanding and signed d/c papers. Pt denied questions at this time. No acute distress noted. Patient instructed to follow-up as noted - return to emergency department if symptoms worsen. Patient verbalized understanding. Discharged per EDMD with discharge instructions. Pt discharged  to private vehicle. Patient stable upon departure. Thanked patient for choosing [facility]) for care.           Nayan Munoz RN  05/04/23 4512

## 2023-05-09 ENCOUNTER — HOSPITAL ENCOUNTER (EMERGENCY)
Age: 58
Discharge: LEFT BEFORE DISCHARGE | End: 2023-05-09

## 2023-05-27 ENCOUNTER — HOSPITAL ENCOUNTER (EMERGENCY)
Age: 58
Discharge: HOME | End: 2023-05-27
Payer: MEDICARE

## 2023-05-27 VITALS
TEMPERATURE: 97.6 F | HEIGHT: 74 IN | WEIGHT: 241.84 LBS | HEART RATE: 99 BPM | DIASTOLIC BLOOD PRESSURE: 77 MMHG | RESPIRATION RATE: 18 BRPM | BODY MASS INDEX: 31.04 KG/M2 | SYSTOLIC BLOOD PRESSURE: 118 MMHG | OXYGEN SATURATION: 98 %

## 2023-05-27 DIAGNOSIS — S90.819A ABRASION OF FOOT, UNSPECIFIED LATERALITY, INITIAL ENCOUNTER: Primary | ICD-10-CM

## 2023-05-27 LAB
GLUCOSE BLD-MCNC: 119 MG/DL (ref 70–99)
PERFORMED ON: ABNORMAL

## 2023-05-27 PROCEDURE — 99283 EMERGENCY DEPT VISIT LOW MDM: CPT

## 2023-05-27 PROCEDURE — 6370000000 HC RX 637 (ALT 250 FOR IP): Performed by: NURSE PRACTITIONER

## 2023-05-27 RX ORDER — BACITRACIN, NEOMYCIN, POLYMYXIN B 400; 3.5; 5 [USP'U]/G; MG/G; [USP'U]/G
OINTMENT TOPICAL ONCE
Status: COMPLETED | OUTPATIENT
Start: 2023-05-27 | End: 2023-05-27

## 2023-05-27 RX ORDER — CEPHALEXIN 500 MG/1
500 CAPSULE ORAL ONCE
Status: COMPLETED | OUTPATIENT
Start: 2023-05-27 | End: 2023-05-27

## 2023-05-27 RX ORDER — CEPHALEXIN 500 MG/1
500 CAPSULE ORAL 2 TIMES DAILY
Qty: 14 CAPSULE | Refills: 0 | Status: SHIPPED | OUTPATIENT
Start: 2023-05-27 | End: 2023-06-03

## 2023-05-27 RX ADMIN — BACITRACIN, NEOMYCIN, POLYMYXIN B 1 G: 400; 3.5; 5 OINTMENT TOPICAL at 20:24

## 2023-05-27 RX ADMIN — CEPHALEXIN 500 MG: 500 CAPSULE ORAL at 20:09

## 2023-05-27 ASSESSMENT — PAIN SCALES - GENERAL: PAINLEVEL_OUTOF10: 0

## 2023-05-27 ASSESSMENT — PAIN - FUNCTIONAL ASSESSMENT: PAIN_FUNCTIONAL_ASSESSMENT: 0-10

## 2023-05-30 ASSESSMENT — ENCOUNTER SYMPTOMS
SHORTNESS OF BREATH: 0
DIARRHEA: 0
ANAL BLEEDING: 0
VOMITING: 0
ABDOMINAL PAIN: 0
BACK PAIN: 0
COUGH: 0
NAUSEA: 0

## 2023-06-01 ENCOUNTER — HOSPITAL ENCOUNTER (OUTPATIENT)
Dept: WOUND CARE | Age: 58
Discharge: HOME | End: 2023-06-01
Payer: MEDICARE

## 2023-06-01 VITALS
BODY MASS INDEX: 31.39 KG/M2 | RESPIRATION RATE: 18 BRPM | DIASTOLIC BLOOD PRESSURE: 95 MMHG | TEMPERATURE: 97.6 F | WEIGHT: 244.49 LBS | HEART RATE: 101 BPM | SYSTOLIC BLOOD PRESSURE: 148 MMHG

## 2023-06-01 DIAGNOSIS — L97.512 RIGHT FOOT ULCER, WITH FAT LAYER EXPOSED (HCC): ICD-10-CM

## 2023-06-01 DIAGNOSIS — L97.522 ULCER OF LEFT FOOT, WITH FAT LAYER EXPOSED (HCC): Primary | ICD-10-CM

## 2023-06-01 PROCEDURE — 11042 DBRDMT SUBQ TIS 1ST 20SQCM/<: CPT

## 2023-06-01 PROCEDURE — 99213 OFFICE O/P EST LOW 20 MIN: CPT

## 2023-06-01 RX ORDER — SODIUM CHLOR/HYPOCHLOROUS ACID 0.033 %
SOLUTION, IRRIGATION IRRIGATION ONCE
OUTPATIENT
Start: 2023-06-01 | End: 2023-06-01

## 2023-06-01 RX ORDER — LIDOCAINE 50 MG/G
OINTMENT TOPICAL ONCE
OUTPATIENT
Start: 2023-06-01 | End: 2023-06-01

## 2023-06-01 RX ORDER — BETAMETHASONE DIPROPIONATE 0.05 %
OINTMENT (GRAM) TOPICAL ONCE
OUTPATIENT
Start: 2023-06-01 | End: 2023-06-01

## 2023-06-01 RX ORDER — CLOBETASOL PROPIONATE 0.5 MG/G
OINTMENT TOPICAL ONCE
OUTPATIENT
Start: 2023-06-01 | End: 2023-06-01

## 2023-06-01 RX ORDER — LIDOCAINE HYDROCHLORIDE 20 MG/ML
JELLY TOPICAL ONCE
OUTPATIENT
Start: 2023-06-01 | End: 2023-06-01

## 2023-06-01 RX ORDER — GINSENG 100 MG
CAPSULE ORAL ONCE
OUTPATIENT
Start: 2023-06-01 | End: 2023-06-01

## 2023-06-01 RX ORDER — LIDOCAINE HYDROCHLORIDE 40 MG/ML
SOLUTION TOPICAL ONCE
OUTPATIENT
Start: 2023-06-01 | End: 2023-06-01

## 2023-06-01 RX ORDER — BACITRACIN, NEOMYCIN, POLYMYXIN B 400; 3.5; 5 [USP'U]/G; MG/G; [USP'U]/G
OINTMENT TOPICAL ONCE
OUTPATIENT
Start: 2023-06-01 | End: 2023-06-01

## 2023-06-01 RX ORDER — BACITRACIN ZINC AND POLYMYXIN B SULFATE 500; 1000 [USP'U]/G; [USP'U]/G
OINTMENT TOPICAL ONCE
OUTPATIENT
Start: 2023-06-01 | End: 2023-06-01

## 2023-06-01 RX ORDER — LIDOCAINE 40 MG/G
CREAM TOPICAL ONCE
OUTPATIENT
Start: 2023-06-01 | End: 2023-06-01

## 2023-06-01 RX ORDER — GENTAMICIN SULFATE 1 MG/G
OINTMENT TOPICAL ONCE
OUTPATIENT
Start: 2023-06-01 | End: 2023-06-01

## 2023-06-01 ASSESSMENT — PAIN SCALES - GENERAL
PAINLEVEL_OUTOF10: 0
PAINLEVEL_OUTOF10: 0

## 2023-06-01 NOTE — PROGRESS NOTES
[address]   Progress Note and Procedure Note      Sona Gaines  MEDICAL RECORD NUMBER:  [id number]  AGE: 62 y.o. GENDER: male  : 1965  EPISODE DATE:  2023    Subjective:     Chief Complaint   Patient presents with    Wound Check     Initial visit. Walking around [place]. Blisters developed to bilateral feet. Went to ER, is on Keflex. Triple  antibiotic ointment and dry dressing. HISTORY of PRESENT ILLNESS HPI     Juni Gomez is a 62 y.o. male who presents today for wound/ulcer evaluation. History of Wound Context: Presents today with a chief complaint of a left hallux diabetic foot ulceration. Patient relates that he went to [place] this weekend at the water [place] wearing water shoes and developed new ulcers on the bottom of his foot. He went to the emergency department where he was started on Keflex. Patient relates his blood sugars are well controlled. He denies any constitutional symptoms.   Wound/Ulcer Pain Timing/Severity: none  Quality of pain: N/A  Severity:  0 / 10   Modifying Factors: None  Associated Signs/Symptoms: none    Ulcer Identification:  Ulcer Type: diabetic    Contributing Factors: diabetes and poor glucose control    Acute Wound: N/A    PAST MEDICAL HISTORY        Diagnosis Date    Abscess of arm, left     Acute metabolic encephalopathy     Acute respiratory failure with hypoxia (HCC)     Arthritis     Blood circulation, collateral     CAD (coronary artery disease) 7/15/2014    CAD (coronary artery disease)     Cardiac arrest ([facility] 10/05/2018    Cerebral artery occlusion with cerebral infarction ([facility]     Cholecystitis 2021    COVID-19 virus infection 2020    Diabetes mellitus ([facility]     Diabetic peripheral neuropathy (HCC) 2018    Elbow contusion 3/24/2014    GERD (gastroesophageal reflux disease)     ulcers    High anion gap metabolic acidosis 1700    Hypercholesteremia

## 2023-06-01 NOTE — DISCHARGE INSTRUCTIONS
[address] Physician Orders and Discharge [facility]  7[address] [facility], [address] [place] [address], [place] [address]  Telephone: [phone]  [address] 8:00 am - 4:30 pm and Friday 8:00 am - 12:00 pm.        NAME:  Karson Max OF BIRTH:  1965  MEDICAL RECORD NUMBER:  [id number]  DATE:  6/1/2023      Return Appointment:  [x] Return Appointment: With DR Nettie Tran  in  1 Week(s)  [] Wound and dressing supply provider:   [] ECF or Home Healthcare:  [] Wound Assessment: [] Physician or NP scheduled for Wound Assessment:   [] Orders placed during your visit:      Important Reminders:   Please wash hands with soap and water before and after every dressing change. Do not scrub wounds. Keep wounds dry in shower unless otherwise instructed by the physician. SMOKING can slow would healing. Stop smoking as soon as possible to improve healing and prevent further complications associated with smoking. Kaley-Wound Topical Treatments:  Do not apply lotions, creams, or ointments to wound bed unless directed. [] Apply moisturizing lotion to skin surrounding the wound prior to dressing change.  [] Apply antifungal ointment to skin surrounding the wound prior to dressing change.  [] Apply thin film of no sting moisture barrier ointment to skin immediately around      wound. [] Other:       Wound Location: RIGHT AND LEFT PLANTAR FOOT    Wound Cleansing:     Primary Dressing:  [x] PLAIN COLLAGEN SLIGHTLY MOISTENED WITH SALINE  []     Secondary Dressing:  [x] GAUZE  [x] ROLL GAUZE      Dressing Frequency:  [x] THREE TIMES PER WEEK  [] Do Not Change Dressing         Contact Cast:  Apply: [] Total Contact Cast Applied in Clinic []RightLeg []Left Leg   [] Do not get cast wet. Contact center or go to emergency room if there is a foul odor or becomes uncomfortable due to feeling tight or swelling.   Do not use objects inside of cast to

## 2023-06-05 NOTE — DISCHARGE INSTRUCTIONS
[address] Physician Orders and Discharge Instructions  [address], [address] [place] [address]  Telephone: [phone]  [address] 8:00 am - 4:30 pm and Friday 8:00 am - 12:00 pm.          NAME:  April Freitas OF BIRTH:  1965  MEDICAL RECORD NUMBER:  [id number]  DATE:  6/8/2023        Return Appointment:  [x] Return Appointment: With DR Leslie Medrano  in  1 Week(s)  [x] Wound and dressing supply provider: [facility]  [] ECF or Home Healthcare:  [] Wound Assessment:         [] Physician or NP scheduled for Wound Assessment:   [] Orders placed during your visit:        Important Reminders:   Please wash hands with soap and water before and after every dressing change. Do not scrub wounds. Keep wounds dry in shower unless otherwise instructed by the physician. SMOKING can slow would healing. Stop smoking as soon as possible to improve healing and prevent further complications associated with smoking. Kaley-Wound Topical Treatments:  Do not apply lotions, creams, or ointments to wound bed unless directed. [] Apply moisturizing lotion to skin surrounding the wound prior to dressing change.  [] Apply antifungal ointment to skin surrounding the wound prior to dressing change.  [] Apply thin film of no sting moisture barrier ointment to skin immediately around      wound. [] Other:         Wound Location: RIGHT AND LEFT PLANTAR FOOT     Wound Cleansing:      Primary Dressing:  [x] PLAIN COLLAGEN SLIGHTLY MOISTENED WITH SALINE  []      Secondary Dressing:  [x] GAUZE  [x] ROLL GAUZE        Dressing Frequency:  [x] THREE TIMES PER WEEK  [] Do Not Change Dressing           Contact Cast:  Apply:  [] Total Contact Cast Applied in Clinic          []RightLeg      []Left Leg              [] Do not get cast wet.   Contact center or go to emergency room if there is a foul odor or becomes uncomfortable due to feeling tight or

## 2023-06-08 ENCOUNTER — HOSPITAL ENCOUNTER (OUTPATIENT)
Dept: WOUND CARE | Age: 58
Discharge: HOME | End: 2023-06-08
Payer: MEDICARE

## 2023-06-08 VITALS
HEART RATE: 92 BPM | SYSTOLIC BLOOD PRESSURE: 109 MMHG | RESPIRATION RATE: 18 BRPM | TEMPERATURE: 96.8 F | DIASTOLIC BLOOD PRESSURE: 46 MMHG

## 2023-06-08 DIAGNOSIS — L97.522 ULCER OF LEFT FOOT, WITH FAT LAYER EXPOSED (HCC): Primary | ICD-10-CM

## 2023-06-08 DIAGNOSIS — L97.512 RIGHT FOOT ULCER, WITH FAT LAYER EXPOSED (HCC): ICD-10-CM

## 2023-06-08 PROCEDURE — 11042 DBRDMT SUBQ TIS 1ST 20SQCM/<: CPT

## 2023-06-08 RX ORDER — IBUPROFEN 200 MG
TABLET ORAL ONCE
OUTPATIENT
Start: 2023-06-08 | End: 2023-06-08

## 2023-06-08 RX ORDER — BETAMETHASONE DIPROPIONATE 0.05 %
OINTMENT (GRAM) TOPICAL ONCE
OUTPATIENT
Start: 2023-06-08 | End: 2023-06-08

## 2023-06-08 RX ORDER — LIDOCAINE HYDROCHLORIDE 20 MG/ML
JELLY TOPICAL ONCE
OUTPATIENT
Start: 2023-06-08 | End: 2023-06-08

## 2023-06-08 RX ORDER — LIDOCAINE HYDROCHLORIDE 40 MG/ML
SOLUTION TOPICAL ONCE
Status: COMPLETED | OUTPATIENT
Start: 2023-06-08 | End: 2023-06-08

## 2023-06-08 RX ORDER — BACITRACIN ZINC AND POLYMYXIN B SULFATE 500; 1000 [USP'U]/G; [USP'U]/G
OINTMENT TOPICAL ONCE
OUTPATIENT
Start: 2023-06-08 | End: 2023-06-08

## 2023-06-08 RX ORDER — LIDOCAINE 40 MG/G
CREAM TOPICAL ONCE
OUTPATIENT
Start: 2023-06-08 | End: 2023-06-08

## 2023-06-08 RX ORDER — GINSENG 100 MG
CAPSULE ORAL ONCE
OUTPATIENT
Start: 2023-06-08 | End: 2023-06-08

## 2023-06-08 RX ORDER — LIDOCAINE HYDROCHLORIDE 40 MG/ML
SOLUTION TOPICAL ONCE
OUTPATIENT
Start: 2023-06-08 | End: 2023-06-08

## 2023-06-08 RX ORDER — CLOBETASOL PROPIONATE 0.5 MG/G
OINTMENT TOPICAL ONCE
OUTPATIENT
Start: 2023-06-08 | End: 2023-06-08

## 2023-06-08 RX ORDER — SODIUM CHLOR/HYPOCHLOROUS ACID 0.033 %
SOLUTION, IRRIGATION IRRIGATION ONCE
OUTPATIENT
Start: 2023-06-08 | End: 2023-06-08

## 2023-06-08 RX ORDER — LIDOCAINE 50 MG/G
OINTMENT TOPICAL ONCE
OUTPATIENT
Start: 2023-06-08 | End: 2023-06-08

## 2023-06-08 RX ORDER — GENTAMICIN SULFATE 1 MG/G
OINTMENT TOPICAL ONCE
OUTPATIENT
Start: 2023-06-08 | End: 2023-06-08

## 2023-06-08 RX ADMIN — LIDOCAINE HYDROCHLORIDE: 40 SOLUTION TOPICAL at 08:09

## 2023-06-08 ASSESSMENT — PAIN SCALES - GENERAL
PAINLEVEL_OUTOF10: 0
PAINLEVEL_OUTOF10: 0

## 2023-06-08 NOTE — PLAN OF CARE
[address] [facility]:     [facility] Alejandra Mcc[address] [facility] James Escalona  H:[phone] f: [phone]     Ordering Center:     [facility]  [address] [facility] 110  [facility] [address]  [phone]  WOUND CARE Dept: [phone]   [facility] NUMBER [unfilled]    Patient Information:      [address] [place] [address]  [address] [facility] [[email or web address]] Road  [address] [facility] [address]   [phone]   : 1965  AGE: 62 y.o. GENDER: male   TODAYS DATE:  2023    Insurance:      PRIMARY INSURANCE:  Plan: [facility]  Coverage: Angie Calle  Effective Date: 2021  [id number] - (Medicare Managed)    SECONDARY INSURANCE:  Plan:  [employer] [facility] DEPT OF JOB  Coverage: [place]  Effective Date: 3/1/2022  [unfilled]    [unfilled]   [unfilled]     Patient Wound Information:      Problem List Items Addressed This Visit          Other    Ulcer of left foot, with fat layer exposed ([facility] - Primary    Relevant Orders    Initiate Outpatient Wound Care Protocol    Right foot ulcer, with fat layer exposed [facility])    Relevant Orders    Initiate Outpatient Wound Care Protocol     ICD-10 codes: L97.522 ; L97.512    WOUNDS REQUIRING DRESSING SUPPLIES:     Wound 23 Foot Right;Plantar #1 (Active)   Wound Image   23 0818   Wound Etiology Diabetic Moon 2 23 0818   Dressing Status New dressing applied 23 0832   Wound Cleansed Cleansed with saline 23 0832   Dressing/Treatment Collagen;Moisten with saline; Roll gauze;Gauze dressing/dressing sponge 23 0838   Offloading for Diabetic Foot Ulcers Post op shoe 23 0838   Wound Length (cm) 1.1 cm 23 0801   Wound Width (cm) 1.4 cm 23 0801   Wound Depth (cm) 0.1 cm 23 0801   Wound Surface Area (cm^2) 1.54 cm^2 23   Change in Wound Size % (l*w) 57.22 23   Wound Volume (cm^3) 0.154 cm^3 23   Wound Healing % 57 23

## 2023-06-08 NOTE — PROGRESS NOTES
(methicillin resistant staph aureus) culture positive 07/13/2018    foot wound    Multifocal pneumonia     Neuropathy     Neutrophilic leukocytosis 6/29/3187    NSTEMI (non-ST elevated myocardial infarction) ([address] 10/3/2018    Pneumonia due to COVID-19 virus     POTS (postural orthostatic tachycardia syndrome)     Psychiatric problem     anxiety, depression, bipolar    Sepsis ([address] 7/1/2020    SIRS (systemic inflammatory response syndrome) ([address] 4/14/2021    Skin ulcer of left foot with fat layer exposed ([address] 6/1/2018    Sleep apnea     does not use Cpap    ST elevation myocardial infarction (STEMI) of inferolateral wall ([address] 11/13/2018    Syncope     Type II or unspecified type diabetes mellitus without mention of complication, not stated as uncontrolled     Ulcer of foot due to secondary diabetes ([address] 8/15/2018    Wears glasses        PAST SURGICAL HISTORY    Past Surgical History:   Procedure Laterality Date    CARDIAC CATHETERIZATION      CHOLECYSTECTOMY, LAPAROSCOPIC N/A 4/21/2021    LAPAROSCOPIC CHOLECYSTECTOMY WITH CHOLANGIOGRAM performed by Opal Smiley MD at [place] [address]  10/06/2018    Bare Metal Stent to PDA    CORONARY ANGIOPLASTY WITH STENT PLACEMENT  10/07/2018    Bare Metal Stent to OM    CORONARY ANGIOPLASTY WITH STENT PLACEMENT  10/03/2018    CECE to Circ    DIAGNOSTIC CARDIAC CATH LAB PROCEDURE      FINGER SURGERY Left     Left Thumb    HERNIA REPAIR      VASCULAR SURGERY      VASECTOMY         FAMILY HISTORY    Family History   Problem Relation Age of Onset    High Blood Pressure Mother     Stroke Mother     Heart Disease Mother     COPD Mother     Heart Disease Father     Cancer Father         skin    Diabetes Sister     Cancer Sister     Heart Disease Brother     Diabetes Brother        SOCIAL HISTORY    Social History     Tobacco Use    Smoking status: Former     Packs/day: 2.00     Years: 12.00     Pack years: 24.00     Types:

## 2023-06-19 NOTE — DISCHARGE INSTRUCTIONS
[address] Physician Orders and Discharge [facility] 91  8734 [facility], [address] [place] [address]  Telephone: [phone]  [address] 8:00 am - 4:30 pm and Friday 8:00 am - 12:00 pm.          NAME:  Juni King  YOB: 1965  MEDICAL RECORD NUMBER:  [id number]  DATE:  6/22/2023        Return Appointment:  [x] Return Appointment: With DR ARAUJO  in  1 Week(s)  [x] Wound and dressing supply provider: [facility]  [] ECF or Home Healthcare:  [] Wound Assessment:         [] Physician or NP scheduled for Wound Assessment:   [] Orders placed during your visit:     **complete Keflex**     **GO TO ER FOR FEVER OR INCREASED REDNESS/WARMTH TO LEFT FOOT**        Important Reminders:   Please wash hands with soap and water before and after every dressing change. Do not scrub wounds. Keep wounds dry in shower unless otherwise instructed by the physician. SMOKING can slow would healing. Stop smoking as soon as possible to improve healing and prevent further complications associated with smoking. Kaley-Wound Topical Treatments:  Do not apply lotions, creams, or ointments to wound bed unless directed. [] Apply moisturizing lotion to skin surrounding the wound prior to dressing change.  [] Apply antifungal ointment to skin surrounding the wound prior to dressing change.  [] Apply thin film of no sting moisture barrier ointment to skin immediately around      wound. [] Other:         Wound Location: RIGHT AND LEFT PLANTAR FOOT     Wound Cleansing:      Primary Dressing:  [x] PLAIN COLLAGEN SLIGHTLY MOISTENED WITH SALINE  []      Secondary Dressing:  [x] GAUZE  [x] ROLL GAUZE        Dressing Frequency:  [x] THREE TIMES PER WEEK  [] Do Not Change Dressing           Contact Cast:  Apply:  [] Total Contact Cast Applied in Clinic          []RightLeg      []Left Leg              [] Do not get cast wet.   Contact center or go to

## 2023-06-22 ENCOUNTER — HOSPITAL ENCOUNTER (OUTPATIENT)
Dept: WOUND CARE | Age: 58
Discharge: HOME | End: 2023-06-22
Payer: MEDICARE

## 2023-06-22 VITALS
TEMPERATURE: 98.2 F | DIASTOLIC BLOOD PRESSURE: 77 MMHG | SYSTOLIC BLOOD PRESSURE: 126 MMHG | RESPIRATION RATE: 18 BRPM | HEART RATE: 92 BPM

## 2023-06-22 DIAGNOSIS — L97.522 ULCER OF LEFT FOOT, WITH FAT LAYER EXPOSED (HCC): Primary | ICD-10-CM

## 2023-06-22 DIAGNOSIS — L97.512 RIGHT FOOT ULCER, WITH FAT LAYER EXPOSED (HCC): ICD-10-CM

## 2023-06-22 PROCEDURE — 11042 DBRDMT SUBQ TIS 1ST 20SQCM/<: CPT

## 2023-06-22 RX ORDER — IBUPROFEN 200 MG
TABLET ORAL ONCE
OUTPATIENT
Start: 2023-06-22 | End: 2023-06-22

## 2023-06-22 RX ORDER — GENTAMICIN SULFATE 1 MG/G
OINTMENT TOPICAL ONCE
OUTPATIENT
Start: 2023-06-22 | End: 2023-06-22

## 2023-06-22 RX ORDER — LIDOCAINE HYDROCHLORIDE 40 MG/ML
SOLUTION TOPICAL ONCE
Status: COMPLETED | OUTPATIENT
Start: 2023-06-22 | End: 2023-06-22

## 2023-06-22 RX ORDER — LIDOCAINE HYDROCHLORIDE 20 MG/ML
JELLY TOPICAL ONCE
OUTPATIENT
Start: 2023-06-22 | End: 2023-06-22

## 2023-06-22 RX ORDER — CLOBETASOL PROPIONATE 0.5 MG/G
OINTMENT TOPICAL ONCE
OUTPATIENT
Start: 2023-06-22 | End: 2023-06-22

## 2023-06-22 RX ORDER — SODIUM CHLOR/HYPOCHLOROUS ACID 0.033 %
SOLUTION, IRRIGATION IRRIGATION ONCE
OUTPATIENT
Start: 2023-06-22 | End: 2023-06-22

## 2023-06-22 RX ORDER — BETAMETHASONE DIPROPIONATE 0.05 %
OINTMENT (GRAM) TOPICAL ONCE
OUTPATIENT
Start: 2023-06-22 | End: 2023-06-22

## 2023-06-22 RX ORDER — LIDOCAINE 50 MG/G
OINTMENT TOPICAL ONCE
OUTPATIENT
Start: 2023-06-22 | End: 2023-06-22

## 2023-06-22 RX ORDER — LIDOCAINE 40 MG/G
CREAM TOPICAL ONCE
OUTPATIENT
Start: 2023-06-22 | End: 2023-06-22

## 2023-06-22 RX ORDER — GINSENG 100 MG
CAPSULE ORAL ONCE
OUTPATIENT
Start: 2023-06-22 | End: 2023-06-22

## 2023-06-22 RX ORDER — BACITRACIN ZINC AND POLYMYXIN B SULFATE 500; 1000 [USP'U]/G; [USP'U]/G
OINTMENT TOPICAL ONCE
OUTPATIENT
Start: 2023-06-22 | End: 2023-06-22

## 2023-06-22 RX ORDER — LIDOCAINE HYDROCHLORIDE 40 MG/ML
SOLUTION TOPICAL ONCE
OUTPATIENT
Start: 2023-06-22 | End: 2023-06-22

## 2023-06-22 RX ADMIN — LIDOCAINE HYDROCHLORIDE: 40 SOLUTION TOPICAL at 08:08

## 2023-06-22 ASSESSMENT — PAIN SCALES - GENERAL
PAINLEVEL_OUTOF10: 0
PAINLEVEL_OUTOF10: 0

## 2023-06-22 NOTE — PROGRESS NOTES
[address]   Progress Note and Procedure Note      Tara Muñoz  MEDICAL RECORD NUMBER:  [id number]  AGE: 62 y.o. GENDER: male  : 1965  EPISODE DATE:  2023    Subjective:     Chief Complaint   Patient presents with    Wound Check     Follow up right and left foot wounds         HISTORY of PRESENT ILLNESS HPI     Juni Mckeon is a 62 y.o. male who presents today for wound/ulcer evaluation. History of Wound Context: Presents today with a chief complaint of a bilateral diabetic foot ulceration. Patient relates that he went to [place] this weekend at the [place] park wearing water shoes and developed new ulcers on the bottom of his foot. Patient relates his blood sugars are well controlled. He denies any constitutional symptoms. Patient relates he has been taking his antibiotics as prescribed.   Wound/Ulcer Pain Timing/Severity: none  Quality of pain: N/A  Severity:  0 / 10   Modifying Factors: None  Associated Signs/Symptoms: none    Ulcer Identification:  Ulcer Type: diabetic    Contributing Factors: diabetes and poor glucose control    Acute Wound: N/A    PAST MEDICAL HISTORY        Diagnosis Date    Abscess of arm, left 8963    Acute metabolic encephalopathy     Acute respiratory failure with hypoxia (HCC)     Arthritis     Blood circulation, collateral     CAD (coronary artery disease) 7/15/2014    CAD (coronary artery disease)     Cardiac arrest ([facility] 10/05/2018    Cerebral artery occlusion with cerebral infarction ([address]     Cholecystitis 2021    COVID-19 virus infection 2020    Diabetes mellitus ([address]     Diabetic peripheral neuropathy ([facility] 2018    Elbow contusion 3/24/2014    GERD (gastroesophageal reflux disease)     ulcers    High anion gap metabolic acidosis 2978    Hypercholesteremia     Hyperlipidemia     Hypertension     ICD (implantable cardioverter-defibrillator) in place     Left arm numbness 2010

## 2023-06-28 ENCOUNTER — NURSE ONLY (OUTPATIENT)
Dept: CARDIOLOGY CLINIC | Age: 58
End: 2023-06-28
Payer: MEDICARE

## 2023-06-28 ENCOUNTER — OFFICE VISIT (OUTPATIENT)
Dept: CARDIOLOGY CLINIC | Age: 58
End: 2023-06-28
Payer: MEDICARE

## 2023-06-28 VITALS
HEIGHT: 74 IN | OXYGEN SATURATION: 95 % | BODY MASS INDEX: 30.54 KG/M2 | HEART RATE: 92 BPM | SYSTOLIC BLOOD PRESSURE: 128 MMHG | WEIGHT: 238 LBS | DIASTOLIC BLOOD PRESSURE: 82 MMHG

## 2023-06-28 DIAGNOSIS — I47.20 VENTRICULAR TACHYCARDIA (HCC): ICD-10-CM

## 2023-06-28 DIAGNOSIS — I25.10 CAD S/P PERCUTANEOUS CORONARY ANGIOPLASTY: ICD-10-CM

## 2023-06-28 DIAGNOSIS — E78.2 MIXED HYPERLIPIDEMIA: ICD-10-CM

## 2023-06-28 DIAGNOSIS — I25.10 CAD S/P PERCUTANEOUS CORONARY ANGIOPLASTY: Primary | ICD-10-CM

## 2023-06-28 DIAGNOSIS — Z98.61 CAD S/P PERCUTANEOUS CORONARY ANGIOPLASTY: Primary | ICD-10-CM

## 2023-06-28 DIAGNOSIS — I47.20 VT (VENTRICULAR TACHYCARDIA) (HCC): ICD-10-CM

## 2023-06-28 DIAGNOSIS — G90.9 AUTONOMIC DYSFUNCTION: ICD-10-CM

## 2023-06-28 DIAGNOSIS — R55 SYNCOPE AND COLLAPSE: ICD-10-CM

## 2023-06-28 DIAGNOSIS — Z98.61 CAD S/P PERCUTANEOUS CORONARY ANGIOPLASTY: ICD-10-CM

## 2023-06-28 DIAGNOSIS — I25.5 ISCHEMIC CARDIOMYOPATHY: ICD-10-CM

## 2023-06-28 DIAGNOSIS — R55 NEUROGENIC SYNCOPE: ICD-10-CM

## 2023-06-28 DIAGNOSIS — Z95.810 ICD (IMPLANTABLE CARDIOVERTER-DEFIBRILLATOR) IN PLACE: Primary | ICD-10-CM

## 2023-06-28 LAB
ALBUMIN SERPL-MCNC: 4.1 G/DL (ref 3.4–5)
ALBUMIN/GLOB SERPL: 1.1 {RATIO} (ref 1.1–2.2)
ALP SERPL-CCNC: 119 U/L (ref 40–129)
ALT SERPL-CCNC: 29 U/L (ref 10–40)
ANION GAP SERPL CALCULATED.3IONS-SCNC: 11 MMOL/L (ref 3–16)
AST SERPL-CCNC: 25 U/L (ref 15–37)
BILIRUB SERPL-MCNC: 0.5 MG/DL (ref 0–1)
BUN SERPL-MCNC: 14 MG/DL (ref 7–20)
CALCIUM SERPL-MCNC: 9.4 MG/DL (ref 8.3–10.6)
CHLORIDE SERPL-SCNC: 100 MMOL/L (ref 99–110)
CHOLEST SERPL-MCNC: 106 MG/DL (ref 0–199)
CO2 SERPL-SCNC: 27 MMOL/L (ref 21–32)
CREAT SERPL-MCNC: 1 MG/DL (ref 0.9–1.3)
DEPRECATED RDW RBC AUTO: 12.7 % (ref 12.4–15.4)
GFR SERPLBLD CREATININE-BSD FMLA CKD-EPI: >60 ML/MIN/{1.73_M2}
GLUCOSE SERPL-MCNC: 165 MG/DL (ref 70–99)
HCT VFR BLD AUTO: 44.4 % (ref 40.5–52.5)
HDLC SERPL-MCNC: 31 MG/DL (ref 40–60)
HGB BLD-MCNC: 15 G/DL (ref 13.5–17.5)
LDL CHOLESTEROL CALCULATED: 53 MG/DL
MCH RBC QN AUTO: 27.6 PG (ref 26–34)
MCHC RBC AUTO-ENTMCNC: 33.8 G/DL (ref 31–36)
MCV RBC AUTO: 81.6 FL (ref 80–100)
PLATELET # BLD AUTO: 321 K/UL (ref 135–450)
PMV BLD AUTO: 7.7 FL (ref 5–10.5)
POTASSIUM SERPL-SCNC: 4.4 MMOL/L (ref 3.5–5.1)
PROT SERPL-MCNC: 7.7 G/DL (ref 6.4–8.2)
RBC # BLD AUTO: 5.45 M/UL (ref 4.2–5.9)
SODIUM SERPL-SCNC: 138 MMOL/L (ref 136–145)
TRIGL SERPL-MCNC: 110 MG/DL (ref 0–150)
VLDLC SERPL CALC-MCNC: 22 MG/DL
WBC # BLD AUTO: 10 K/UL (ref 4–11)

## 2023-06-28 PROCEDURE — 99214 OFFICE O/P EST MOD 30 MIN: CPT | Performed by: INTERNAL MEDICINE

## 2023-06-28 PROCEDURE — 3074F SYST BP LT 130 MM HG: CPT | Performed by: INTERNAL MEDICINE

## 2023-06-28 PROCEDURE — 3078F DIAST BP <80 MM HG: CPT | Performed by: INTERNAL MEDICINE

## 2023-06-28 PROCEDURE — 93282 PRGRMG EVAL IMPLANTABLE DFB: CPT | Performed by: INTERNAL MEDICINE

## 2023-06-28 RX ORDER — INSULIN ASPART 100 [IU]/ML
INJECTION, SOLUTION INTRAVENOUS; SUBCUTANEOUS
COMMUNITY
Start: 2023-06-19

## 2023-06-29 ENCOUNTER — HOSPITAL ENCOUNTER (OUTPATIENT)
Dept: WOUND CARE | Age: 58
Discharge: HOME | End: 2023-06-29
Payer: MEDICARE

## 2023-06-29 VITALS
RESPIRATION RATE: 18 BRPM | HEART RATE: 91 BPM | DIASTOLIC BLOOD PRESSURE: 71 MMHG | TEMPERATURE: 97.3 F | SYSTOLIC BLOOD PRESSURE: 109 MMHG

## 2023-06-29 DIAGNOSIS — L97.522 ULCER OF LEFT FOOT, WITH FAT LAYER EXPOSED (HCC): Primary | ICD-10-CM

## 2023-06-29 DIAGNOSIS — L97.512 RIGHT FOOT ULCER, WITH FAT LAYER EXPOSED (HCC): ICD-10-CM

## 2023-06-29 PROCEDURE — 11042 DBRDMT SUBQ TIS 1ST 20SQCM/<: CPT

## 2023-06-29 RX ORDER — GINSENG 100 MG
CAPSULE ORAL ONCE
OUTPATIENT
Start: 2023-06-29 | End: 2023-06-29

## 2023-06-29 RX ORDER — LIDOCAINE HYDROCHLORIDE 40 MG/ML
SOLUTION TOPICAL ONCE
Status: COMPLETED | OUTPATIENT
Start: 2023-06-29 | End: 2023-06-29

## 2023-06-29 RX ORDER — LIDOCAINE HYDROCHLORIDE 20 MG/ML
JELLY TOPICAL ONCE
OUTPATIENT
Start: 2023-06-29 | End: 2023-06-29

## 2023-06-29 RX ORDER — BETAMETHASONE DIPROPIONATE 0.05 %
OINTMENT (GRAM) TOPICAL ONCE
OUTPATIENT
Start: 2023-06-29 | End: 2023-06-29

## 2023-06-29 RX ORDER — SODIUM CHLOR/HYPOCHLOROUS ACID 0.033 %
SOLUTION, IRRIGATION IRRIGATION ONCE
OUTPATIENT
Start: 2023-06-29 | End: 2023-06-29

## 2023-06-29 RX ORDER — BACITRACIN ZINC AND POLYMYXIN B SULFATE 500; 1000 [USP'U]/G; [USP'U]/G
OINTMENT TOPICAL ONCE
OUTPATIENT
Start: 2023-06-29 | End: 2023-06-29

## 2023-06-29 RX ORDER — GENTAMICIN SULFATE 1 MG/G
OINTMENT TOPICAL ONCE
OUTPATIENT
Start: 2023-06-29 | End: 2023-06-29

## 2023-06-29 RX ORDER — LIDOCAINE 40 MG/G
CREAM TOPICAL ONCE
OUTPATIENT
Start: 2023-06-29 | End: 2023-06-29

## 2023-06-29 RX ORDER — LIDOCAINE HYDROCHLORIDE 40 MG/ML
SOLUTION TOPICAL ONCE
OUTPATIENT
Start: 2023-06-29 | End: 2023-06-29

## 2023-06-29 RX ORDER — IBUPROFEN 200 MG
TABLET ORAL ONCE
OUTPATIENT
Start: 2023-06-29 | End: 2023-06-29

## 2023-06-29 RX ORDER — CLOBETASOL PROPIONATE 0.5 MG/G
OINTMENT TOPICAL ONCE
OUTPATIENT
Start: 2023-06-29 | End: 2023-06-29

## 2023-06-29 RX ORDER — LIDOCAINE 50 MG/G
OINTMENT TOPICAL ONCE
OUTPATIENT
Start: 2023-06-29 | End: 2023-06-29

## 2023-06-29 RX ADMIN — LIDOCAINE HYDROCHLORIDE: 40 SOLUTION TOPICAL at 08:16

## 2023-06-29 ASSESSMENT — PAIN SCALES - GENERAL: PAINLEVEL_OUTOF10: 0

## 2023-07-06 ENCOUNTER — HOSPITAL ENCOUNTER (OUTPATIENT)
Dept: WOUND CARE | Age: 58
Discharge: HOME | End: 2023-07-06
Payer: MEDICARE

## 2023-07-06 VITALS
TEMPERATURE: 97.1 F | SYSTOLIC BLOOD PRESSURE: 116 MMHG | RESPIRATION RATE: 18 BRPM | HEART RATE: 97 BPM | DIASTOLIC BLOOD PRESSURE: 78 MMHG

## 2023-07-06 DIAGNOSIS — L97.522 ULCER OF LEFT FOOT, WITH FAT LAYER EXPOSED (HCC): Primary | ICD-10-CM

## 2023-07-06 DIAGNOSIS — L97.512 RIGHT FOOT ULCER, WITH FAT LAYER EXPOSED (HCC): ICD-10-CM

## 2023-07-06 PROCEDURE — 11042 DBRDMT SUBQ TIS 1ST 20SQCM/<: CPT

## 2023-07-06 RX ORDER — SODIUM CHLOR/HYPOCHLOROUS ACID 0.033 %
SOLUTION, IRRIGATION IRRIGATION ONCE
OUTPATIENT
Start: 2023-07-06 | End: 2023-07-06

## 2023-07-06 RX ORDER — BETAMETHASONE DIPROPIONATE 0.05 %
OINTMENT (GRAM) TOPICAL ONCE
OUTPATIENT
Start: 2023-07-06 | End: 2023-07-06

## 2023-07-06 RX ORDER — LIDOCAINE 50 MG/G
OINTMENT TOPICAL ONCE
OUTPATIENT
Start: 2023-07-06 | End: 2023-07-06

## 2023-07-06 RX ORDER — GENTAMICIN SULFATE 1 MG/G
OINTMENT TOPICAL ONCE
OUTPATIENT
Start: 2023-07-06 | End: 2023-07-06

## 2023-07-06 RX ORDER — CLOBETASOL PROPIONATE 0.5 MG/G
OINTMENT TOPICAL ONCE
OUTPATIENT
Start: 2023-07-06 | End: 2023-07-06

## 2023-07-06 RX ORDER — IBUPROFEN 200 MG
TABLET ORAL ONCE
OUTPATIENT
Start: 2023-07-06 | End: 2023-07-06

## 2023-07-06 RX ORDER — LIDOCAINE HYDROCHLORIDE 20 MG/ML
JELLY TOPICAL ONCE
OUTPATIENT
Start: 2023-07-06 | End: 2023-07-06

## 2023-07-06 RX ORDER — LIDOCAINE 50 MG/G
OINTMENT TOPICAL ONCE
Status: COMPLETED | OUTPATIENT
Start: 2023-07-06 | End: 2023-07-06

## 2023-07-06 RX ORDER — LIDOCAINE 40 MG/G
CREAM TOPICAL ONCE
OUTPATIENT
Start: 2023-07-06 | End: 2023-07-06

## 2023-07-06 RX ORDER — GINSENG 100 MG
CAPSULE ORAL ONCE
OUTPATIENT
Start: 2023-07-06 | End: 2023-07-06

## 2023-07-06 RX ORDER — LIDOCAINE HYDROCHLORIDE 40 MG/ML
SOLUTION TOPICAL ONCE
OUTPATIENT
Start: 2023-07-06 | End: 2023-07-06

## 2023-07-06 RX ORDER — BACITRACIN ZINC AND POLYMYXIN B SULFATE 500; 1000 [USP'U]/G; [USP'U]/G
OINTMENT TOPICAL ONCE
OUTPATIENT
Start: 2023-07-06 | End: 2023-07-06

## 2023-07-06 RX ADMIN — LIDOCAINE 1 G: 50 OINTMENT TOPICAL at 08:38

## 2023-07-06 ASSESSMENT — PAIN SCALES - GENERAL
PAINLEVEL_OUTOF10: 0
PAINLEVEL_OUTOF10: 0

## 2023-07-06 NOTE — PROGRESS NOTES
[facility]   Progress Note and Procedure Note      Louise Montanez  MEDICAL RECORD NUMBER:  [id number]  AGE: 62 y.o. GENDER: male  : 1965  EPISODE DATE:  2023    Subjective:     Chief Complaint   Patient presents with    Wound Check     Follow up visit bilat foot wound         HISTORY of PRESENT ILLNESS HPI     Juni Ruiz is a 62 y.o. male who presents today for wound/ulcer evaluation. History of Wound Context: Presents today with a chief complaint of a bilateral diabetic foot ulceration. Patient relates that he went to [place] this weekend at the water park wearing water shoes and developed new ulcers on the bottom of his foot. Patient relates his blood sugars are well controlled. He denies any constitutional symptoms. Patient relates he has completed taking his antibiotics as prescribed. Patient relates he continues to go to the [facility] [place] but does not get in the pool, just taking his grandson (again). Discussed with patient that this is a lot of walking and he needs to limit his ambulation.   Wound/Ulcer Pain Timing/Severity: none  Quality of pain: N/A  Severity:  0 / 10   Modifying Factors: None  Associated Signs/Symptoms: none    Ulcer Identification:  Ulcer Type: diabetic    Contributing Factors: diabetes and poor glucose control    Acute Wound: N/A    PAST MEDICAL HISTORY        Diagnosis Date    Abscess of arm, left     Acute metabolic encephalopathy     Acute respiratory failure with hypoxia (HCC)     Arthritis     Blood circulation, collateral     CAD (coronary artery disease) 7/15/2014    CAD (coronary artery disease)     Cardiac arrest ([address]) 10/05/2018    Cerebral artery occlusion with cerebral infarction ([address])     Cholecystitis 2021    COVID-19 virus infection 2020    Diabetes mellitus ([address])     Diabetic peripheral neuropathy ([address]) 2018    Elbow contusion 3/24/2014    GERD (gastroesophageal

## 2023-07-06 NOTE — PLAN OF CARE
[address] [place] [address] [address]:     [facility] [address] [place], [place]  V:[phone] f: [phone]     Ordering Center:     [address] [place] [address]  1700 [facility] OFFICE [facility] 2 [facility] 110  [facility] [address]  [phone]  WOUND CARE Dept: [phone]   [facility] NUMBER [unfilled]    Patient Information:      [address]  [facility] [address] [place] [id number] [phone]   : 1965  AGE: 62 y.o.      GENDER: male   TODAYS DATE:  2023    Insurance:      PRIMARY INSURANCE:  Plan: [facility]  Coverage: Jazmine Hernandez  Effective Date: 2021  [id number] - (Medicare Managed)    SECONDARY INSURANCE:  Plan: [address] [place] [address] DEPT OF JOB  Coverage: [place]  Effective Date: 3/1/2022  [unfilled]    [unfilled]   [unfilled]     Patient Wound Information:      Problem List Items Addressed This Visit          Other    Ulcer of left foot, with fat layer exposed ([address]) - Primary    Relevant Orders    Initiate Outpatient Wound Care Protocol    Right foot ulcer, with fat layer exposed [facility])    Relevant Orders    Initiate Outpatient Wound Care Protocol     ICD-10 codes: L97.522 ;  L97.512    WOUNDS REQUIRING DRESSING SUPPLIES:     Wound 23 Foot Right;Plantar #1 ( SINCE 2023 ) (Active)   Wound Image   23 0829   Wound Etiology Diabetic Moon 2 23 0818   Dressing Status New dressing applied 23 0925   Wound Cleansed Cleansed with saline 23 0925   Dressing/Treatment Collagen;Moisten with saline;Gauze dressing/dressing sponge;Roll gauze 23 0925   Offloading for Diabetic Foot Ulcers Post op shoe;Offloading ordered 23 0925   Wound Length (cm) 0.7 cm 23 0829   Wound Width (cm) 1.1 cm 23 0829   Wound Depth (cm) 0.2 cm 23 0829   Wound Surface Area (cm^2) 0.77 cm^2 23 0829   Change in Wound Size % (l*w) 78.61 23 08   Wound Volume (cm^3) 0.154 cm^3 23 9290

## 2023-07-13 ENCOUNTER — HOSPITAL ENCOUNTER (OUTPATIENT)
Dept: WOUND CARE | Age: 58
Discharge: HOME | End: 2023-07-13
Payer: MEDICARE

## 2023-07-13 VITALS
RESPIRATION RATE: 18 BRPM | DIASTOLIC BLOOD PRESSURE: 65 MMHG | HEART RATE: 94 BPM | SYSTOLIC BLOOD PRESSURE: 96 MMHG | TEMPERATURE: 98.2 F

## 2023-07-13 DIAGNOSIS — L97.522 ULCER OF LEFT FOOT, WITH FAT LAYER EXPOSED (HCC): Primary | ICD-10-CM

## 2023-07-13 DIAGNOSIS — L97.512 RIGHT FOOT ULCER, WITH FAT LAYER EXPOSED (HCC): ICD-10-CM

## 2023-07-13 PROCEDURE — 11042 DBRDMT SUBQ TIS 1ST 20SQCM/<: CPT

## 2023-07-13 RX ORDER — LIDOCAINE HYDROCHLORIDE 20 MG/ML
JELLY TOPICAL ONCE
OUTPATIENT
Start: 2023-07-13 | End: 2023-07-13

## 2023-07-13 RX ORDER — LIDOCAINE 40 MG/G
CREAM TOPICAL ONCE
OUTPATIENT
Start: 2023-07-13 | End: 2023-07-13

## 2023-07-13 RX ORDER — BACITRACIN ZINC AND POLYMYXIN B SULFATE 500; 1000 [USP'U]/G; [USP'U]/G
OINTMENT TOPICAL ONCE
OUTPATIENT
Start: 2023-07-13 | End: 2023-07-13

## 2023-07-13 RX ORDER — LIDOCAINE 50 MG/G
OINTMENT TOPICAL ONCE
OUTPATIENT
Start: 2023-07-13 | End: 2023-07-13

## 2023-07-13 RX ORDER — GINSENG 100 MG
CAPSULE ORAL ONCE
OUTPATIENT
Start: 2023-07-13 | End: 2023-07-13

## 2023-07-13 RX ORDER — LIDOCAINE HYDROCHLORIDE 40 MG/ML
SOLUTION TOPICAL ONCE
OUTPATIENT
Start: 2023-07-13 | End: 2023-07-13

## 2023-07-13 RX ORDER — IBUPROFEN 200 MG
TABLET ORAL ONCE
OUTPATIENT
Start: 2023-07-13 | End: 2023-07-13

## 2023-07-13 RX ORDER — CLOBETASOL PROPIONATE 0.5 MG/G
OINTMENT TOPICAL ONCE
OUTPATIENT
Start: 2023-07-13 | End: 2023-07-13

## 2023-07-13 RX ORDER — GENTAMICIN SULFATE 1 MG/G
OINTMENT TOPICAL ONCE
OUTPATIENT
Start: 2023-07-13 | End: 2023-07-13

## 2023-07-13 RX ORDER — LIDOCAINE 50 MG/G
OINTMENT TOPICAL ONCE
Status: COMPLETED | OUTPATIENT
Start: 2023-07-13 | End: 2023-07-13

## 2023-07-13 RX ORDER — BETAMETHASONE DIPROPIONATE 0.05 %
OINTMENT (GRAM) TOPICAL ONCE
OUTPATIENT
Start: 2023-07-13 | End: 2023-07-13

## 2023-07-13 RX ORDER — SODIUM CHLOR/HYPOCHLOROUS ACID 0.033 %
SOLUTION, IRRIGATION IRRIGATION ONCE
OUTPATIENT
Start: 2023-07-13 | End: 2023-07-13

## 2023-07-13 RX ADMIN — LIDOCAINE: 50 OINTMENT TOPICAL at 08:14

## 2023-07-17 NOTE — DISCHARGE INSTRUCTIONS
[address] [place] Physician Orders and Discharge [address] [place] [address]  [address], [address] [place] [address], [address] [place], [place] [address]  Telephone: [phone] [address] 8:00 am - 4:30 pm and Friday 8:00 am - 12:00 pm.          NAME:  Russell Baron OF BIRTH:  1965  MEDICAL RECORD NUMBER:  [id number]  DATE:  7/20/2023        Return Appointment:  [x] Return Appointment: With DR Antony Reddy  in  1 Week(s)  [x] Wound and dressing supply provider: [facility]  [] ECF or Home Healthcare:  [] Wound Assessment:         [] Physician or NP scheduled for Wound Assessment:   [x] Orders placed during your visit: LEFT FOOT WOUND CULTURE OBTAINED TODAY           **GO TO ER FOR FEVER OR INCREASED REDNESS/WARMTH TO LEFT FOOT**        Important Reminders:   Please wash hands with soap and water before and after every dressing change. Do not scrub wounds. Keep wounds dry in shower unless otherwise instructed by the physician. SMOKING can slow would healing. Stop smoking as soon as possible to improve healing and prevent further complications associated with smoking. Kaley-Wound Topical Treatments:  Do not apply lotions, creams, or ointments to wound bed unless directed. [] Apply moisturizing lotion to skin surrounding the wound prior to dressing change.  [] Apply antifungal ointment to skin surrounding the wound prior to dressing change.  [] Apply thin film of no sting moisture barrier ointment to skin immediately around      wound.   [x] Other: VASHE X 5 MINUTE SOAK  TO FOOT WOUNDS TODAY ONLY                  LABS : CBC, BMP, ESR, CRP, URIC ACID, PRE-ALBUMIN      **7/20/2023 : START AUGMENTIN 875MG - TAKE ONE TABLET TWICE PER DAY FOR 10 DAYS ( SCRIPT SENT TO PHARMACY )**        Wound Location: RIGHT AND LEFT PLANTAR FOOT     Wound Cleansing:      Primary Dressing:  [x] PLAIN COLLAGEN SLIGHTLY MOISTENED WITH SALINE  []      Secondary Dressing:  [x] GAUZE  [x] ROLL

## 2023-07-20 ENCOUNTER — HOSPITAL ENCOUNTER (OUTPATIENT)
Dept: WOUND CARE | Age: 58
Discharge: HOME | End: 2023-07-20
Payer: MEDICARE

## 2023-07-20 VITALS
HEART RATE: 101 BPM | SYSTOLIC BLOOD PRESSURE: 134 MMHG | RESPIRATION RATE: 18 BRPM | DIASTOLIC BLOOD PRESSURE: 83 MMHG | TEMPERATURE: 97.2 F

## 2023-07-20 DIAGNOSIS — L97.522 ULCER OF LEFT FOOT, WITH FAT LAYER EXPOSED (HCC): ICD-10-CM

## 2023-07-20 DIAGNOSIS — L97.512 RIGHT FOOT ULCER, WITH FAT LAYER EXPOSED (HCC): ICD-10-CM

## 2023-07-20 DIAGNOSIS — L97.522 ULCER OF LEFT FOOT, WITH FAT LAYER EXPOSED (HCC): Primary | ICD-10-CM

## 2023-07-20 LAB
ANION GAP SERPL CALCULATED.3IONS-SCNC: 12 MMOL/L (ref 3–16)
BUN SERPL-MCNC: 16 MG/DL (ref 7–20)
CALCIUM SERPL-MCNC: 9.6 MG/DL (ref 8.3–10.6)
CHLORIDE SERPL-SCNC: 101 MMOL/L (ref 99–110)
CO2 SERPL-SCNC: 27 MMOL/L (ref 21–32)
CREAT SERPL-MCNC: 1 MG/DL (ref 0.9–1.3)
CRP SERPL-MCNC: 8.6 MG/L (ref 0–5.1)
DEPRECATED RDW RBC AUTO: 13.5 % (ref 12.4–15.4)
ERYTHROCYTE [SEDIMENTATION RATE] IN BLOOD BY WESTERGREN METHOD: 27 MM/HR (ref 0–20)
GFR SERPLBLD CREATININE-BSD FMLA CKD-EPI: >60 ML/MIN/{1.73_M2}
GLUCOSE SERPL-MCNC: 169 MG/DL (ref 70–99)
HCT VFR BLD AUTO: 43.9 % (ref 40.5–52.5)
HGB BLD-MCNC: 14.8 G/DL (ref 13.5–17.5)
MCH RBC QN AUTO: 27.5 PG (ref 26–34)
MCHC RBC AUTO-ENTMCNC: 33.8 G/DL (ref 31–36)
MCV RBC AUTO: 81.5 FL (ref 80–100)
PLATELET # BLD AUTO: 227 K/UL (ref 135–450)
PMV BLD AUTO: 8 FL (ref 5–10.5)
POTASSIUM SERPL-SCNC: 4.3 MMOL/L (ref 3.5–5.1)
PREALB SERPL-MCNC: 15.4 MG/DL (ref 20–40)
RBC # BLD AUTO: 5.39 M/UL (ref 4.2–5.9)
SODIUM SERPL-SCNC: 140 MMOL/L (ref 136–145)
URATE SERPL-MCNC: 7.2 MG/DL (ref 3.5–7.2)
WBC # BLD AUTO: 9.6 K/UL (ref 4–11)

## 2023-07-20 PROCEDURE — 11042 DBRDMT SUBQ TIS 1ST 20SQCM/<: CPT

## 2023-07-20 PROCEDURE — 87070 CULTURE OTHR SPECIMN AEROBIC: CPT

## 2023-07-20 PROCEDURE — 87186 SC STD MICRODIL/AGAR DIL: CPT

## 2023-07-20 PROCEDURE — 87075 CULTR BACTERIA EXCEPT BLOOD: CPT

## 2023-07-20 PROCEDURE — 87077 CULTURE AEROBIC IDENTIFY: CPT

## 2023-07-20 PROCEDURE — 87205 SMEAR GRAM STAIN: CPT

## 2023-07-20 RX ORDER — LIDOCAINE HYDROCHLORIDE 40 MG/ML
SOLUTION TOPICAL ONCE
Status: COMPLETED | OUTPATIENT
Start: 2023-07-20 | End: 2023-07-20

## 2023-07-20 RX ORDER — LIDOCAINE HYDROCHLORIDE 20 MG/ML
JELLY TOPICAL ONCE
OUTPATIENT
Start: 2023-07-20 | End: 2023-07-20

## 2023-07-20 RX ORDER — GENTAMICIN SULFATE 1 MG/G
OINTMENT TOPICAL ONCE
OUTPATIENT
Start: 2023-07-20 | End: 2023-07-20

## 2023-07-20 RX ORDER — SODIUM CHLOR/HYPOCHLOROUS ACID 0.033 %
SOLUTION, IRRIGATION IRRIGATION ONCE
OUTPATIENT
Start: 2023-07-20 | End: 2023-07-20

## 2023-07-20 RX ORDER — BACITRACIN ZINC AND POLYMYXIN B SULFATE 500; 1000 [USP'U]/G; [USP'U]/G
OINTMENT TOPICAL ONCE
OUTPATIENT
Start: 2023-07-20 | End: 2023-07-20

## 2023-07-20 RX ORDER — LIDOCAINE HYDROCHLORIDE 40 MG/ML
SOLUTION TOPICAL ONCE
OUTPATIENT
Start: 2023-07-20 | End: 2023-07-20

## 2023-07-20 RX ORDER — GINSENG 100 MG
CAPSULE ORAL ONCE
OUTPATIENT
Start: 2023-07-20 | End: 2023-07-20

## 2023-07-20 RX ORDER — LIDOCAINE 40 MG/G
CREAM TOPICAL ONCE
OUTPATIENT
Start: 2023-07-20 | End: 2023-07-20

## 2023-07-20 RX ORDER — LIDOCAINE 50 MG/G
OINTMENT TOPICAL ONCE
OUTPATIENT
Start: 2023-07-20 | End: 2023-07-20

## 2023-07-20 RX ORDER — BETAMETHASONE DIPROPIONATE 0.05 %
OINTMENT (GRAM) TOPICAL ONCE
OUTPATIENT
Start: 2023-07-20 | End: 2023-07-20

## 2023-07-20 RX ORDER — AMOXICILLIN AND CLAVULANATE POTASSIUM 875; 125 MG/1; MG/1
1 TABLET, FILM COATED ORAL 2 TIMES DAILY
Qty: 20 TABLET | Refills: 0 | Status: SHIPPED | OUTPATIENT
Start: 2023-07-20 | End: 2023-07-30

## 2023-07-20 RX ORDER — IBUPROFEN 200 MG
TABLET ORAL ONCE
OUTPATIENT
Start: 2023-07-20 | End: 2023-07-20

## 2023-07-20 RX ORDER — CLOBETASOL PROPIONATE 0.5 MG/G
OINTMENT TOPICAL ONCE
OUTPATIENT
Start: 2023-07-20 | End: 2023-07-20

## 2023-07-20 RX ADMIN — LIDOCAINE HYDROCHLORIDE: 40 SOLUTION TOPICAL at 08:13

## 2023-07-20 ASSESSMENT — PAIN SCALES - GENERAL
PAINLEVEL_OUTOF10: 0
PAINLEVEL_OUTOF10: 0

## 2023-07-20 NOTE — PROGRESS NOTES
FOOT**        Important Reminders:   Please wash hands with soap and water before and after every dressing change. Do not scrub wounds. Keep wounds dry in shower unless otherwise instructed by the physician. SMOKING can slow would healing. Stop smoking as soon as possible to improve healing and prevent further complications associated with smoking. Kaley-Wound Topical Treatments:  Do not apply lotions, creams, or ointments to wound bed unless directed. [] Apply moisturizing lotion to skin surrounding the wound prior to dressing change.  [] Apply antifungal ointment to skin surrounding the wound prior to dressing change.  [] Apply thin film of no sting moisture barrier ointment to skin immediately around      wound. [x] Other: VASHE X 5 MINUTE SOAK  TO FOOT WOUNDS TODAY ONLY                  LABS : CBC, BMP, ESR, CRP, URIC ACID, PRE-ALBUMIN      **7/20/2023 : START AUGMENTIN 875MG - TAKE ONE TABLET TWICE PER DAY FOR 10 DAYS ( SCRIPT SENT TO PHARMACY )**        Wound Location: RIGHT AND LEFT PLANTAR FOOT     Wound Cleansing:      Primary Dressing:  [x] PLAIN COLLAGEN SLIGHTLY MOISTENED WITH SALINE  []      Secondary Dressing:  [x] GAUZE  [x] ROLL GAUZE        Dressing Frequency:  [x] EVERY OTHER DAY  [] Do Not Change Dressing           Contact Cast:  Apply:  [] Total Contact Cast Applied in Clinic          []RightLeg      []Left Leg              [] Do not get cast wet. Contact center or go to emergency room if there is a foul odor or becomes uncomfortable due to feeling tight or swelling. Do not use objects inside of cast to scratch. Pressure Relief and Off Loading: SURGICAL SHOE TO BOTH FEET  [x] Off-loading when   [x] walking       [] in bed         [] sitting   Turn every 2 hours when in bed             Avoid putting direct pressure on the site of the wound. Limit side lying to 30 degree tilt. Limit elevating the head of the bed greater than 30 degrees.

## 2023-07-21 NOTE — DISCHARGE INSTRUCTIONS
[address] [place] Physician Orders and Discharge [address] [place] [address]  [address], [address], [address]  Telephone: [phone] [address] 8:00 am - 4:30 pm and Friday 8:00 am - 12:00 pm.          NAME:  Gracia Jimenez OF BIRTH:  1965  MEDICAL RECORD NUMBER:  [id number]  DATE:  7/27/2023        Return Appointment:  [x] Return Appointment: With DR Bossman Nagel  in  1 Week(s)  [x] Wound and dressing supply provider: [facility]  [] ECF or Home Healthcare:  [] Wound Assessment:         [] Physician or NP scheduled for Wound Assessment:   [] Orders placed during your visit:            **GO TO ER FOR FEVER OR INCREASED REDNESS/WARMTH TO LEFT FOOT**        Important Reminders:   Please wash hands with soap and water before and after every dressing change. Do not scrub wounds. Keep wounds dry in shower unless otherwise instructed by the physician. SMOKING can slow would healing. Stop smoking as soon as possible to improve healing and prevent further complications associated with smoking. Kaley-Wound Topical Treatments:  Do not apply lotions, creams, or ointments to wound bed unless directed. [] Apply moisturizing lotion to skin surrounding the wound prior to dressing change.  [] Apply antifungal ointment to skin surrounding the wound prior to dressing change.  [] Apply thin film of no sting moisture barrier ointment to skin immediately around      wound. [x] Other: VASHE X 5 MINUTE SOAK  TO FOOT WOUNDS TODAY ONLY                          **7/27/2023 :  AUGMENTIN 875MG - TAKE ONE TABLET TWICE PER DAY FOR 10 DAYS ( COMPLETE PRESCRIPTION ).   START LEVAQUIN MG - TAKE ONE TABLET DAILY FOR 14 DAYS**        Wound Location: RIGHT AND LEFT PLANTAR FOOT     Wound Cleansing:      Primary Dressing:  [x] HYDORFERA BLUE MOISTENED WITH SALINE  []      Secondary Dressing:  [x] GAUZE  [x] ROLL GAUZE        Dressing Frequency:  [x] EVERY OTHER

## 2023-07-23 LAB
BACTERIA SPEC AEROBE CULT: ABNORMAL
BACTERIA SPEC ANAEROBE CULT: ABNORMAL
GRAM STN SPEC: ABNORMAL
ORGANISM: ABNORMAL

## 2023-07-27 ENCOUNTER — HOSPITAL ENCOUNTER (OUTPATIENT)
Dept: WOUND CARE | Age: 58
Discharge: HOME | End: 2023-07-27
Payer: MEDICARE

## 2023-07-27 VITALS
DIASTOLIC BLOOD PRESSURE: 67 MMHG | HEART RATE: 94 BPM | RESPIRATION RATE: 18 BRPM | TEMPERATURE: 97.3 F | SYSTOLIC BLOOD PRESSURE: 94 MMHG

## 2023-07-27 DIAGNOSIS — L97.522 ULCER OF LEFT FOOT, WITH FAT LAYER EXPOSED (HCC): Primary | ICD-10-CM

## 2023-07-27 DIAGNOSIS — L97.512 RIGHT FOOT ULCER, WITH FAT LAYER EXPOSED (HCC): ICD-10-CM

## 2023-07-27 PROCEDURE — 11042 DBRDMT SUBQ TIS 1ST 20SQCM/<: CPT

## 2023-07-27 RX ORDER — LIDOCAINE HYDROCHLORIDE 40 MG/ML
SOLUTION TOPICAL ONCE
OUTPATIENT
Start: 2023-07-27 | End: 2023-07-27

## 2023-07-27 RX ORDER — BACITRACIN ZINC AND POLYMYXIN B SULFATE 500; 1000 [USP'U]/G; [USP'U]/G
OINTMENT TOPICAL ONCE
OUTPATIENT
Start: 2023-07-27 | End: 2023-07-27

## 2023-07-27 RX ORDER — SODIUM CHLOR/HYPOCHLOROUS ACID 0.033 %
SOLUTION, IRRIGATION IRRIGATION ONCE
OUTPATIENT
Start: 2023-07-27 | End: 2023-07-27

## 2023-07-27 RX ORDER — GENTAMICIN SULFATE 1 MG/G
OINTMENT TOPICAL ONCE
OUTPATIENT
Start: 2023-07-27 | End: 2023-07-27

## 2023-07-27 RX ORDER — GINSENG 100 MG
CAPSULE ORAL ONCE
OUTPATIENT
Start: 2023-07-27 | End: 2023-07-27

## 2023-07-27 RX ORDER — LIDOCAINE 40 MG/G
CREAM TOPICAL ONCE
OUTPATIENT
Start: 2023-07-27 | End: 2023-07-27

## 2023-07-27 RX ORDER — LIDOCAINE 50 MG/G
OINTMENT TOPICAL ONCE
OUTPATIENT
Start: 2023-07-27 | End: 2023-07-27

## 2023-07-27 RX ORDER — CLOBETASOL PROPIONATE 0.5 MG/G
OINTMENT TOPICAL ONCE
OUTPATIENT
Start: 2023-07-27 | End: 2023-07-27

## 2023-07-27 RX ORDER — LEVOFLOXACIN 500 MG/1
500 TABLET, FILM COATED ORAL DAILY
Qty: 14 TABLET | Refills: 0 | Status: SHIPPED | OUTPATIENT
Start: 2023-07-27 | End: 2023-08-10

## 2023-07-27 RX ORDER — BETAMETHASONE DIPROPIONATE 0.05 %
OINTMENT (GRAM) TOPICAL ONCE
OUTPATIENT
Start: 2023-07-27 | End: 2023-07-27

## 2023-07-27 RX ORDER — IBUPROFEN 200 MG
TABLET ORAL ONCE
OUTPATIENT
Start: 2023-07-27 | End: 2023-07-27

## 2023-07-27 RX ORDER — LIDOCAINE HYDROCHLORIDE 40 MG/ML
SOLUTION TOPICAL ONCE
Status: COMPLETED | OUTPATIENT
Start: 2023-07-27 | End: 2023-07-27

## 2023-07-27 RX ORDER — LIDOCAINE HYDROCHLORIDE 20 MG/ML
JELLY TOPICAL ONCE
OUTPATIENT
Start: 2023-07-27 | End: 2023-07-27

## 2023-07-27 RX ADMIN — LIDOCAINE HYDROCHLORIDE: 40 SOLUTION TOPICAL at 08:20

## 2023-07-27 ASSESSMENT — PAIN SCALES - GENERAL
PAINLEVEL_OUTOF10: 0
PAINLEVEL_OUTOF10: 0

## 2023-07-27 NOTE — PROGRESS NOTES
Use    Smoking status: Former     Packs/day: 2.00     Years: 12.00     Pack years: 24.00     Types: Cigarettes, Cigars     Quit date:      Years since quittin.5    Smokeless tobacco: Never    Tobacco comments:     quit in the s   Vaping Use    Vaping Use: Never used   Substance Use Topics    Alcohol use: Yes     Comment: rare    Drug use: Not Currently     Types: Marijuana Lindy Timbo)     Comment: quit        ALLERGIES    No Known Allergies    MEDICATIONS    Current Outpatient Medications on File Prior to Encounter   Medication Sig Dispense Refill    amoxicillin-clavulanate (AUGMENTIN) 875-125 MG per tablet Take 1 tablet by mouth 2 times daily for 10 days 20 tablet 0    PROMOGRAN WOUND DRESSING MATRIX Apply topically Apply ti right/left diabetic foot ulceration daily and cover with a dry sterile dressing 30 each 5    NOVOLOG FLEXPEN 100 UNIT/ML injection pen INJECT 20 UNITS UNDER THE SKIN 3 TIMES DAILY WITH MEALS      prasugrel (EFFIENT) 10 MG TABS TAKE 1 TABLET BY MOUTH EVERY DAY 90 tablet 3    insulin glargine (LANTUS) 100 UNIT/ML injection vial Inject 37 Units into the skin nightly 10 mL 3    [DISCONTINUED] metoprolol succinate (TOPROL XL) 50 MG extended release tablet Take 1 tablet by mouth in the morning. 30 tablet 3    atorvastatin (LIPITOR) 80 MG tablet TAKE 1 TABLET BY MOUTH EVERY DAY 90 tablet 3    Omega-3 Fatty Acids (FISH OIL PO) Take 1 capsule by mouth daily       GARLIC PO Take 1 capsule by mouth daily       aspirin 81 MG chewable tablet Take 1 tablet by mouth daily      Cholecalciferol (VITAMIN D3) 93044 units CAPS Take 1 capsule by mouth once a week Indications: Friday      Dulaglutide 3 MG/0.5ML SOPN Inject 3 mg into the skin once a week Indications: Friday        No current facility-administered medications on file prior to encounter. REVIEW OF SYSTEMS  Review of Systems    Pertinent items are noted in HPI.   Patient specifically denies nausea, fever, vomiting, shortness of breath,

## 2023-07-28 NOTE — DISCHARGE INSTRUCTIONS
[address] [place] Physician Orders and Discharge [address] [place] [address]  750 [facility], [address] [place] [address], [address] [place], [place] [address]  Telephone: [phone] [address] 8:00 am - 4:30 pm and Friday 8:00 am - 12:00 pm.          NAME:  Ayaan Castillo OF BIRTH:  1965  MEDICAL RECORD NUMBER:  [id number]  DATE:  8/3/2023        Return Appointment:  [x] Return Appointment: With DR Andrew Samuel  in  1 Week(s)  [x] Wound and dressing supply provider: [facility]  [] ECF or Home Healthcare:  [] Wound Assessment:         [] Physician or NP scheduled for Wound Assessment:   [] Orders placed during your visit:            **GO TO ER FOR FEVER OR INCREASED REDNESS/WARMTH TO LEFT FOOT**        Important Reminders:   Please wash hands with soap and water before and after every dressing change. Do not scrub wounds. Keep wounds dry in shower unless otherwise instructed by the physician. SMOKING can slow would healing. Stop smoking as soon as possible to improve healing and prevent further complications associated with smoking. Kaley-Wound Topical Treatments:  Do not apply lotions, creams, or ointments to wound bed unless directed. [] Apply moisturizing lotion to skin surrounding the wound prior to dressing change.  [] Apply antifungal ointment to skin surrounding the wound prior to dressing change.  [] Apply thin film of no sting moisture barrier ointment to skin immediately around      wound. [x] Other: VASHE X 5 MINUTE SOAK  TO FOOT WOUNDS TODAY ONLY                          **7/27/2023 :  AUGMENTIN 875MG - TAKE ONE TABLET TWICE PER DAY FOR 10 DAYS ( COMPLETE PRESCRIPTION ).   START LEVAQUIN MG - TAKE ONE TABLET DAILY FOR 14 DAYS**        Wound Location: RIGHT AND LEFT PLANTAR FOOT     Wound Cleansing:      Primary Dressing:  [x] HYDORFERA BLUE MOISTENED WITH SALINE ( MAY SECURE LEFT FOOT WOUND ONLY WITH STERI STRIPS)  []      Secondary Dressing:  [x] GAUZE  [x] ROLL

## 2023-08-03 ENCOUNTER — HOSPITAL ENCOUNTER (OUTPATIENT)
Dept: WOUND CARE | Age: 58
Discharge: HOME | End: 2023-08-03
Payer: MEDICARE

## 2023-08-03 VITALS
TEMPERATURE: 97.5 F | RESPIRATION RATE: 18 BRPM | SYSTOLIC BLOOD PRESSURE: 90 MMHG | HEART RATE: 93 BPM | DIASTOLIC BLOOD PRESSURE: 70 MMHG

## 2023-08-03 DIAGNOSIS — L97.512 RIGHT FOOT ULCER, WITH FAT LAYER EXPOSED (HCC): ICD-10-CM

## 2023-08-03 DIAGNOSIS — L97.522 ULCER OF LEFT FOOT, WITH FAT LAYER EXPOSED (HCC): Primary | ICD-10-CM

## 2023-08-03 PROCEDURE — 11042 DBRDMT SUBQ TIS 1ST 20SQCM/<: CPT

## 2023-08-03 RX ORDER — LIDOCAINE HYDROCHLORIDE 40 MG/ML
SOLUTION TOPICAL ONCE
Status: COMPLETED | OUTPATIENT
Start: 2023-08-03 | End: 2023-08-03

## 2023-08-03 RX ORDER — IBUPROFEN 200 MG
TABLET ORAL ONCE
OUTPATIENT
Start: 2023-08-03 | End: 2023-08-03

## 2023-08-03 RX ORDER — LIDOCAINE 50 MG/G
OINTMENT TOPICAL ONCE
OUTPATIENT
Start: 2023-08-03 | End: 2023-08-03

## 2023-08-03 RX ORDER — SODIUM CHLOR/HYPOCHLOROUS ACID 0.033 %
SOLUTION, IRRIGATION IRRIGATION ONCE
OUTPATIENT
Start: 2023-08-03 | End: 2023-08-03

## 2023-08-03 RX ORDER — CLOBETASOL PROPIONATE 0.5 MG/G
OINTMENT TOPICAL ONCE
OUTPATIENT
Start: 2023-08-03 | End: 2023-08-03

## 2023-08-03 RX ORDER — GENTAMICIN SULFATE 1 MG/G
OINTMENT TOPICAL ONCE
OUTPATIENT
Start: 2023-08-03 | End: 2023-08-03

## 2023-08-03 RX ORDER — LIDOCAINE 40 MG/G
CREAM TOPICAL ONCE
OUTPATIENT
Start: 2023-08-03 | End: 2023-08-03

## 2023-08-03 RX ORDER — LIDOCAINE HYDROCHLORIDE 20 MG/ML
JELLY TOPICAL ONCE
OUTPATIENT
Start: 2023-08-03 | End: 2023-08-03

## 2023-08-03 RX ORDER — BETAMETHASONE DIPROPIONATE 0.05 %
OINTMENT (GRAM) TOPICAL ONCE
OUTPATIENT
Start: 2023-08-03 | End: 2023-08-03

## 2023-08-03 RX ORDER — BACITRACIN ZINC AND POLYMYXIN B SULFATE 500; 1000 [USP'U]/G; [USP'U]/G
OINTMENT TOPICAL ONCE
OUTPATIENT
Start: 2023-08-03 | End: 2023-08-03

## 2023-08-03 RX ORDER — LIDOCAINE HYDROCHLORIDE 40 MG/ML
SOLUTION TOPICAL ONCE
OUTPATIENT
Start: 2023-08-03 | End: 2023-08-03

## 2023-08-03 RX ORDER — GINSENG 100 MG
CAPSULE ORAL ONCE
OUTPATIENT
Start: 2023-08-03 | End: 2023-08-03

## 2023-08-03 RX ADMIN — LIDOCAINE HYDROCHLORIDE 5 ML: 40 SOLUTION TOPICAL at 08:12

## 2023-08-03 ASSESSMENT — PAIN SCALES - GENERAL
PAINLEVEL_OUTOF10: 0
PAINLEVEL_OUTOF10: 0

## 2023-08-03 NOTE — PROGRESS NOTES
your diet as tolerated. Protein is a key nutrient in helping to repair damaged tissue and promote new tissue growth. Good sources of protein include milk, yogurt, cheese, fish, lean meat and beans. If you are DIABETIC, having diabetes can make it hard for wounds to heal. Try to keep your blood sugar within it's target range. Limit Sodium, Alcohol and Sugar. Pain:   Please Note some pain, drainage and/or bleeding might be expected after seeing the provider. TO HELP ALLEVIATE PAIN WE RECOMMEND THE FOLLOWING  Elevate the affected limb. Use over the counter medications as permitted by your family doctor. For Persistent Pain not relieved by the above interventions, please notify your family doctor. : WENDY         Electronically signed by Casandra Gamboa RN on 8/3/2023 at 8:24 [address] Information: Should you experience any significant changes in your wound(s) or have questions about your wound care, please contact the [address] [place] [address] at [address] [place] [address] 8:00 am - 4:30 pm and Friday 8:00 am - 12:30 pm.  If you need help with your wound outside these hours and cannot wait until we are again available, contact your PCP or go to the hospital emergency room. PLEASE NOTE: IF YOU ARE UNABLE TO OBTAIN WOUND SUPPLIES, CONTINUE TO USE THE SUPPLIES YOU HAVE AVAILABLE UNTIL YOU ARE ABLE TO REACH US. IT IS MOST IMPORTANT TO KEEP THE WOUND COVERED AT ALL TIMES.            Physician Signature:_______________________     Date: ___________ Time:  ____________                                   [  x ] Dr Florin Agudelo     [  ] DR Sridhar Galeas        Electronically signed by Florin Agudelo DPM on 8/3/2023 at 8:31 AM

## 2023-08-03 NOTE — PLAN OF CARE
"Anesthesia Transfer of Care Note    Patient: Gus Sabillon    Procedure(s) Performed: Procedure(s) (LRB):  REPAIR, ROTATOR CUFF, ARTHROSCOPIC - DISPENSE [facility] CARE (Left)  DEBRIDEMENT, SHOULDER, ARTHROSCOPIC (Left)  ARTHROSCOPY, SHOULDER, WITH DISTAL CLAVICLE EXCISION (Left)  ARTHROSCOPY,SHOULDER,WITH BICEPS TENODESIS (Left)  ORIF, FRACTURE, HUMERUS (Left)  MICROFRACTURE (Left)    Patient location: PACU    Anesthesia Type: general    Transport from OR: Transported from OR on room air with adequate spontaneous ventilation. Transported from OR on 6-10 L/min O2 by face mask with adequate spontaneous ventilation    Post pain: adequate analgesia    Post assessment: no apparent anesthetic complications and tolerated procedure well    Post vital signs: stable    Level of consciousness: awake    Nausea/Vomiting: no nausea/vomiting    Complications: none    Transfer of care protocol was followed      Last vitals:   Visit Vitals  BP (!) 119/58   Pulse 61   Temp 36.7 °C (98 °F) (Oral)   Resp 16   Ht 6' 1" (1.854 m)   Wt 77.1 kg (170 lb)   SpO2 99%   BMI 22.43 kg/m²     " Description Serosanguinous 08/03/23 0805   Odor None 08/03/23 0805   Kaley-wound Assessment Hyperkeratosis (callous) 08/03/23 0805   Margins Attached edges 08/03/23 0805   Wound Thickness Description not for Pressure Injury Full thickness 08/03/23 0805   Number of days: 63          Supplies Requested :      WOUND #: 1 and 2   PRIMARY DRESSING:  Hydrofera Blue pad   Cover and Secure with: 2X2 gauze pad  4X4 gauze pad  Bulky roll gauze     FREQUENCY OF DRESSING CHANGES:  Every other day           ADDITIONAL ITEMS:  [] Gloves Small  [x] Gloves Medium [] Gloves Large [] Gloves XLarge  [] Tape 1\" [] Tape 2\" [] Tape 3\"  [] Medipore Tape  [x] Saline  [] Skin Prep   [] Adhesive Remover   [] Cotton Tip Applicators   [] Other:    Patient Wound(s) Debrided: [x] Yes   [] No    Debribement Type: subcutaneous tissue    Debridement Date: 8/83/2023    Patient currently being seen by Home Health: [] Yes   [x] No    Duration for needed supplies:  []15  []30  []60  [x]90 Days    Provider Information:      PROVIDER'S NAME: Adarsh Funez DPM     NPI: ANTHONY STINSON  [id number]

## 2023-08-04 NOTE — DISCHARGE INSTRUCTIONS
[address] [place] Physician Orders and Discharge [address] [place] [address]  [address], 17[address] [facility], [address] [place] [address] Drive  Telephone: [phone] [address] 8:00 am - 4:30 pm and Friday 8:00 am - 12:00 pm.          NAME:  Sudarshan Sanabria OF BIRTH:  1965  MEDICAL RECORD NUMBER:  [id number]  DATE:  8/10/2023        Return Appointment:  [x] Return Appointment: With DR Sabine Escobar  in  1 Week(s)  [x] Wound and dressing supply provider: [facility]  [] ECF or Home Healthcare:  [] Wound Assessment:         [] Physician or NP scheduled for Wound Assessment:   [] Orders placed during your visit:            **GO TO ER FOR FEVER OR INCREASED REDNESS/WARMTH TO LEFT FOOT**        Important Reminders:   Please wash hands with soap and water before and after every dressing change. Do not scrub wounds. Keep wounds dry in shower unless otherwise instructed by the physician. SMOKING can slow would healing. Stop smoking as soon as possible to improve healing and prevent further complications associated with smoking. Kaley-Wound Topical Treatments:  Do not apply lotions, creams, or ointments to wound bed unless directed. [] Apply moisturizing lotion to skin surrounding the wound prior to dressing change.  [] Apply antifungal ointment to skin surrounding the wound prior to dressing change.  [] Apply thin film of no sting moisture barrier ointment to skin immediately around      wound. [x] Other: VASHE X 5 MINUTE SOAK  TO FOOT WOUNDS TODAY ONLY                          **7/27/2023 :  AUGMENTIN 875MG - TAKE ONE TABLET TWICE PER DAY FOR 10 DAYS ( COMPLETE PRESCRIPTION ).   START LEVAQUIN MG - TAKE ONE TABLET DAILY FOR 14 DAYS**        Wound Location: RIGHT AND LEFT PLANTAR FOOT     Wound Cleansing:      Primary Dressing:  [x] HYDROFERA BLUE SLIGHTLY MOISTENED WITH SALINE ( MAY SECURE LEFT FOOT WOUND ONLY WITH STERI STRIPS)  []      Secondary Dressing:  [x]

## 2023-08-10 ENCOUNTER — HOSPITAL ENCOUNTER (OUTPATIENT)
Dept: WOUND CARE | Age: 58
Discharge: HOME | End: 2023-08-10
Payer: MEDICARE

## 2023-08-10 VITALS — RESPIRATION RATE: 18 BRPM | TEMPERATURE: 97.1 F

## 2023-08-10 DIAGNOSIS — L97.512 RIGHT FOOT ULCER, WITH FAT LAYER EXPOSED (HCC): ICD-10-CM

## 2023-08-10 DIAGNOSIS — L97.522 ULCER OF LEFT FOOT, WITH FAT LAYER EXPOSED (HCC): Primary | ICD-10-CM

## 2023-08-10 PROCEDURE — 11042 DBRDMT SUBQ TIS 1ST 20SQCM/<: CPT

## 2023-08-10 RX ORDER — CLOBETASOL PROPIONATE 0.5 MG/G
OINTMENT TOPICAL ONCE
OUTPATIENT
Start: 2023-08-10 | End: 2023-08-10

## 2023-08-10 RX ORDER — LIDOCAINE HYDROCHLORIDE 20 MG/ML
JELLY TOPICAL ONCE
OUTPATIENT
Start: 2023-08-10 | End: 2023-08-10

## 2023-08-10 RX ORDER — LIDOCAINE 50 MG/G
OINTMENT TOPICAL ONCE
OUTPATIENT
Start: 2023-08-10 | End: 2023-08-10

## 2023-08-10 RX ORDER — SODIUM CHLOR/HYPOCHLOROUS ACID 0.033 %
SOLUTION, IRRIGATION IRRIGATION ONCE
OUTPATIENT
Start: 2023-08-10 | End: 2023-08-10

## 2023-08-10 RX ORDER — GENTAMICIN SULFATE 1 MG/G
OINTMENT TOPICAL ONCE
OUTPATIENT
Start: 2023-08-10 | End: 2023-08-10

## 2023-08-10 RX ORDER — LIDOCAINE HYDROCHLORIDE 40 MG/ML
SOLUTION TOPICAL ONCE
OUTPATIENT
Start: 2023-08-10 | End: 2023-08-10

## 2023-08-10 RX ORDER — LIDOCAINE HYDROCHLORIDE 40 MG/ML
SOLUTION TOPICAL ONCE
Status: COMPLETED | OUTPATIENT
Start: 2023-08-10 | End: 2023-08-10

## 2023-08-10 RX ORDER — BETAMETHASONE DIPROPIONATE 0.05 %
OINTMENT (GRAM) TOPICAL ONCE
OUTPATIENT
Start: 2023-08-10 | End: 2023-08-10

## 2023-08-10 RX ORDER — LIDOCAINE 40 MG/G
CREAM TOPICAL ONCE
OUTPATIENT
Start: 2023-08-10 | End: 2023-08-10

## 2023-08-10 RX ORDER — IBUPROFEN 200 MG
TABLET ORAL ONCE
OUTPATIENT
Start: 2023-08-10 | End: 2023-08-10

## 2023-08-10 RX ORDER — BACITRACIN ZINC AND POLYMYXIN B SULFATE 500; 1000 [USP'U]/G; [USP'U]/G
OINTMENT TOPICAL ONCE
OUTPATIENT
Start: 2023-08-10 | End: 2023-08-10

## 2023-08-10 RX ORDER — GINSENG 100 MG
CAPSULE ORAL ONCE
OUTPATIENT
Start: 2023-08-10 | End: 2023-08-10

## 2023-08-10 RX ADMIN — LIDOCAINE HYDROCHLORIDE 2.5 ML: 40 SOLUTION TOPICAL at 08:12

## 2023-08-10 ASSESSMENT — PAIN SCALES - GENERAL
PAINLEVEL_OUTOF10: 0
PAINLEVEL_OUTOF10: 0

## 2023-08-10 NOTE — PROGRESS NOTES
is a key nutrient in helping to repair damaged tissue and promote new tissue growth. Good sources of protein include milk, yogurt, cheese, fish, lean meat and beans. If you are DIABETIC, having diabetes can make it hard for wounds to heal. Try to keep your blood sugar within it's target range. Limit Sodium, Alcohol and Sugar. Pain:   Please Note some pain, drainage and/or bleeding might be expected after seeing the provider. TO HELP ALLEVIATE PAIN WE RECOMMEND THE FOLLOWING  Elevate the affected limb. Use over the counter medications as permitted by your family doctor. For Persistent Pain not relieved by the above interventions, please notify your family doctor. : WENDY         Electronically signed by Leonor Ulloa RN on 8/10/2023 at 8:46 AM                 [address] [place] [address] Information: Should you experience any significant changes in your wound(s) or have questions about your wound care, please contact the [address] [place] [address] Street at [address] [place] [address] 8:00 am - 4:30 pm and Friday 8:00 am - 12:30 pm.  If you need help with your wound outside these hours and cannot wait until we are again available, contact your PCP or go to the hospital emergency room. PLEASE NOTE: IF YOU ARE UNABLE TO OBTAIN WOUND SUPPLIES, CONTINUE TO USE THE SUPPLIES YOU HAVE AVAILABLE UNTIL YOU ARE ABLE TO REACH US. IT IS MOST IMPORTANT TO KEEP THE WOUND COVERED AT ALL TIMES.            Physician Signature:_______________________     Date: ___________ Time:  ____________                                   [  x ] Dr Hardeep Greenberg     [  ] DR Elly Rueda        Electronically signed by Hardeep Greenberg DPM on 8/10/2023 at 9:38 AM

## 2023-08-11 NOTE — DISCHARGE INSTRUCTIONS
Lindsey Chen 8:00 am - 4:30 pm and Friday 8:00 am - 12:30 pm.  If you need help with your wound outside these hours and cannot wait until we are again available, contact your PCP or go to the hospital emergency room. PLEASE NOTE: IF YOU ARE UNABLE TO OBTAIN WOUND SUPPLIES, CONTINUE TO USE THE SUPPLIES YOU HAVE AVAILABLE UNTIL YOU ARE ABLE TO REACH US. IT IS MOST IMPORTANT TO KEEP THE WOUND COVERED AT ALL TIMES.            Physician Signature:_______________________     Date: ___________ Time:  ____________                                   [  x ] Dr Trenton Brurows     [  ] DR Alberta Cortes

## 2023-08-17 ENCOUNTER — HOSPITAL ENCOUNTER (OUTPATIENT)
Dept: WOUND CARE | Age: 58
Discharge: HOME | End: 2023-08-17
Payer: MEDICARE

## 2023-08-17 VITALS
SYSTOLIC BLOOD PRESSURE: 124 MMHG | TEMPERATURE: 97.7 F | DIASTOLIC BLOOD PRESSURE: 97 MMHG | RESPIRATION RATE: 18 BRPM | HEART RATE: 91 BPM

## 2023-08-17 DIAGNOSIS — L97.522 ULCER OF LEFT FOOT, WITH FAT LAYER EXPOSED (HCC): Primary | ICD-10-CM

## 2023-08-17 DIAGNOSIS — L97.512 RIGHT FOOT ULCER, WITH FAT LAYER EXPOSED (HCC): ICD-10-CM

## 2023-08-17 PROCEDURE — 11042 DBRDMT SUBQ TIS 1ST 20SQCM/<: CPT

## 2023-08-17 RX ORDER — BETAMETHASONE DIPROPIONATE 0.05 %
OINTMENT (GRAM) TOPICAL ONCE
OUTPATIENT
Start: 2023-08-17 | End: 2023-08-17

## 2023-08-17 RX ORDER — SODIUM CHLOR/HYPOCHLOROUS ACID 0.033 %
SOLUTION, IRRIGATION IRRIGATION ONCE
OUTPATIENT
Start: 2023-08-17 | End: 2023-08-17

## 2023-08-17 RX ORDER — CLOBETASOL PROPIONATE 0.5 MG/G
OINTMENT TOPICAL ONCE
OUTPATIENT
Start: 2023-08-17 | End: 2023-08-17

## 2023-08-17 RX ORDER — GINSENG 100 MG
CAPSULE ORAL ONCE
OUTPATIENT
Start: 2023-08-17 | End: 2023-08-17

## 2023-08-17 RX ORDER — LIDOCAINE 50 MG/G
OINTMENT TOPICAL ONCE
OUTPATIENT
Start: 2023-08-17 | End: 2023-08-17

## 2023-08-17 RX ORDER — LIDOCAINE HYDROCHLORIDE 20 MG/ML
JELLY TOPICAL ONCE
OUTPATIENT
Start: 2023-08-17 | End: 2023-08-17

## 2023-08-17 RX ORDER — BACITRACIN ZINC AND POLYMYXIN B SULFATE 500; 1000 [USP'U]/G; [USP'U]/G
OINTMENT TOPICAL ONCE
OUTPATIENT
Start: 2023-08-17 | End: 2023-08-17

## 2023-08-17 RX ORDER — IBUPROFEN 200 MG
TABLET ORAL ONCE
OUTPATIENT
Start: 2023-08-17 | End: 2023-08-17

## 2023-08-17 RX ORDER — LIDOCAINE HYDROCHLORIDE 40 MG/ML
SOLUTION TOPICAL ONCE
Status: COMPLETED | OUTPATIENT
Start: 2023-08-17 | End: 2023-08-17

## 2023-08-17 RX ORDER — GENTAMICIN SULFATE 1 MG/G
OINTMENT TOPICAL ONCE
OUTPATIENT
Start: 2023-08-17 | End: 2023-08-17

## 2023-08-17 RX ORDER — LIDOCAINE 40 MG/G
CREAM TOPICAL ONCE
OUTPATIENT
Start: 2023-08-17 | End: 2023-08-17

## 2023-08-17 RX ORDER — LIDOCAINE HYDROCHLORIDE 40 MG/ML
SOLUTION TOPICAL ONCE
OUTPATIENT
Start: 2023-08-17 | End: 2023-08-17

## 2023-08-17 RX ADMIN — LIDOCAINE HYDROCHLORIDE: 40 SOLUTION TOPICAL at 08:16

## 2023-08-17 ASSESSMENT — PAIN SCALES - GENERAL: PAINLEVEL_OUTOF10: 0

## 2023-08-17 NOTE — PROGRESS NOTES
measurements:    Wound 06/01/23 Foot Right;Plantar #1 ( SINCE 5/27/2023 ) (Active)   Wound Image   08/03/23 0805   Wound Etiology Diabetic Moon 2 06/01/23 0818   Dressing Status New dressing applied 08/10/23 1025   Wound Cleansed Vashe 08/10/23 1025   Dressing/Treatment Hydrofera blue;Gauze dressing/dressing sponge;Roll gauze; Moisten with saline;Steri-strips 08/10/23 1025   Offloading for Diabetic Foot Ulcers Post op shoe;Offloading ordered 08/10/23 1025   Wound Length (cm) 0.5 cm 08/17/23 0810   Wound Width (cm) 0.5 cm 08/17/23 0810   Wound Depth (cm) 0.2 cm 08/17/23 0810   Wound Surface Area (cm^2) 0.25 cm^2 08/17/23 0810   Change in Wound Size % (l*w) 93.06 08/17/23 0810   Wound Volume (cm^3) 0.05 cm^3 08/17/23 0810   Wound Healing % 86 08/17/23 0810   Post-Procedure Length (cm) 0.7 cm 08/17/23 0842   Post-Procedure Width (cm) 0.7 cm 08/17/23 0842   Post-Procedure Depth (cm) 0.2 cm 08/17/23 0842   Post-Procedure Surface Area (cm^2) 0.49 cm^2 08/17/23 0842   Post-Procedure Volume (cm^3) 0.098 cm^3 08/17/23 0842   Wound Assessment Granulation tissue;Slough 08/17/23 0810   Drainage Amount Small (< 25%) 08/17/23 0810   Drainage Description Serosanguinous 08/17/23 0810   Odor None 08/17/23 0810   Kaley-wound Assessment Hyperkeratosis (callous) 08/17/23 0810   Margins Attached edges 08/17/23 0810   Wound Thickness Description not for Pressure Injury Full thickness 08/17/23 0810   Number of days: 77       Wound 06/01/23 Foot Left;Plantar #2( SINCE 5/27/2023 ) (Active)   Wound Image   08/03/23 0805   Wound Etiology Diabetic Moon 2 06/01/23 0818   Dressing Status New dressing applied 08/10/23 1025   Wound Cleansed Vashe 08/10/23 1025   Dressing/Treatment Hydrofera blue;Moisten with saline;Gauze dressing/dressing sponge;Roll gauze 08/10/23 1025   Offloading for Diabetic Foot Ulcers Offloading ordered;Post op shoe 08/10/23 1025   Wound Length (cm) 1.7 cm 08/17/23 0810   Wound Width (cm) 2.4 cm 08/17/23 0810   Wound Depth

## 2023-08-22 NOTE — DISCHARGE INSTRUCTIONS
[address] [place] Physician Orders and Discharge [address] [place] [address], [address] [place] [address]'S [facility],[address], [address]  Telephone: [phone]  [facility], [facility] 8:00 am - 4:30 pm and Friday 8:00 am - 12:00 pm.          NAME:  Matt Spencer OF BIRTH:  1965  MEDICAL RECORD NUMBER:  [id number]  DATE:  8/24/2023        Return Appointment:  [x] Return Appointment: With DR ARAUJO  in  1 Week(s)  [x] Wound and dressing supply provider: [facility]  [] ECF or Home Healthcare:  [] Wound Assessment:         [] Physician or NP scheduled for Wound Assessment:   [] Orders placed during your visit:            **GO TO ER FOR FEVER OR INCREASED REDNESS/WARMTH TO LEFT FOOT**        Important Reminders:   Please wash hands with soap and water before and after every dressing change. Do not scrub wounds. Keep wounds dry in shower unless otherwise instructed by the physician. SMOKING can slow would healing. Stop smoking as soon as possible to improve healing and prevent further complications associated with smoking. Kaley-Wound Topical Treatments:  Do not apply lotions, creams, or ointments to wound bed unless directed. [] Apply moisturizing lotion to skin surrounding the wound prior to dressing change.  [] Apply antifungal ointment to skin surrounding the wound prior to dressing change.  [] Apply thin film of no sting moisture barrier ointment to skin immediately around      wound.   [x] Other: VASHE X 5 MINUTE SOAK  TO FOOT WOUNDS TODAY ONLY                          Wound Location: RIGHT AND LEFT PLANTAR FOOT     Wound Cleansing:      Primary Dressing:  [x] HYDROFERA BLUE SLIGHTLY MOISTENED WITH SALINE ( MAY SECURE LEFT FOOT WOUND ONLY WITH STERI STRIPS)  []      Secondary Dressing:  [x] GAUZE  [x] ROLL GAUZE        Dressing Frequency:  [x] EVERY OTHER DAY  [] Do Not Change Dressing           Contact Cast:  Apply:  [] Total Contact Cast Applied in Clinic

## 2023-08-23 RX ORDER — PRASUGREL 10 MG/1
TABLET, FILM COATED ORAL
Qty: 90 TABLET | Refills: 3 | Status: SHIPPED | OUTPATIENT
Start: 2023-08-23

## 2023-08-23 NOTE — TELEPHONE ENCOUNTER
Requested Prescriptions     Pending Prescriptions Disp Refills    prasugrel (EFFIENT) 10 MG TABS [Pharmacy Med Name: PRASUGREL 10 MG TABLET] 90 tablet 3     Sig: TAKE 1 TABLET EVERY DAY          Number: 90    Refills: 3    Last Office Visit: 6/28/2023     Next Office Visit: 9/27/2023

## 2023-08-24 ENCOUNTER — HOSPITAL ENCOUNTER (OUTPATIENT)
Dept: WOUND CARE | Age: 58
Discharge: HOME | End: 2023-08-24
Payer: MEDICARE

## 2023-08-24 VITALS
HEART RATE: 94 BPM | TEMPERATURE: 97.9 F | RESPIRATION RATE: 18 BRPM | DIASTOLIC BLOOD PRESSURE: 61 MMHG | SYSTOLIC BLOOD PRESSURE: 91 MMHG

## 2023-08-24 DIAGNOSIS — L97.522 ULCER OF LEFT FOOT, WITH FAT LAYER EXPOSED (HCC): Primary | ICD-10-CM

## 2023-08-24 DIAGNOSIS — L97.512 RIGHT FOOT ULCER, WITH FAT LAYER EXPOSED (HCC): ICD-10-CM

## 2023-08-24 PROCEDURE — 11042 DBRDMT SUBQ TIS 1ST 20SQCM/<: CPT

## 2023-08-24 RX ORDER — GENTAMICIN SULFATE 1 MG/G
OINTMENT TOPICAL ONCE
OUTPATIENT
Start: 2023-08-24 | End: 2023-08-24

## 2023-08-24 RX ORDER — LIDOCAINE 50 MG/G
OINTMENT TOPICAL ONCE
OUTPATIENT
Start: 2023-08-24 | End: 2023-08-24

## 2023-08-24 RX ORDER — IBUPROFEN 200 MG
TABLET ORAL ONCE
OUTPATIENT
Start: 2023-08-24 | End: 2023-08-24

## 2023-08-24 RX ORDER — LIDOCAINE HYDROCHLORIDE 40 MG/ML
SOLUTION TOPICAL ONCE
OUTPATIENT
Start: 2023-08-24 | End: 2023-08-24

## 2023-08-24 RX ORDER — LIDOCAINE HYDROCHLORIDE 20 MG/ML
JELLY TOPICAL ONCE
OUTPATIENT
Start: 2023-08-24 | End: 2023-08-24

## 2023-08-24 RX ORDER — CLOBETASOL PROPIONATE 0.5 MG/G
OINTMENT TOPICAL ONCE
OUTPATIENT
Start: 2023-08-24 | End: 2023-08-24

## 2023-08-24 RX ORDER — SODIUM CHLOR/HYPOCHLOROUS ACID 0.033 %
SOLUTION, IRRIGATION IRRIGATION ONCE
OUTPATIENT
Start: 2023-08-24 | End: 2023-08-24

## 2023-08-24 RX ORDER — BETAMETHASONE DIPROPIONATE 0.05 %
OINTMENT (GRAM) TOPICAL ONCE
OUTPATIENT
Start: 2023-08-24 | End: 2023-08-24

## 2023-08-24 RX ORDER — BACITRACIN ZINC AND POLYMYXIN B SULFATE 500; 1000 [USP'U]/G; [USP'U]/G
OINTMENT TOPICAL ONCE
OUTPATIENT
Start: 2023-08-24 | End: 2023-08-24

## 2023-08-24 RX ORDER — LIDOCAINE HYDROCHLORIDE 40 MG/ML
SOLUTION TOPICAL ONCE
Status: COMPLETED | OUTPATIENT
Start: 2023-08-24 | End: 2023-08-24

## 2023-08-24 RX ORDER — GINSENG 100 MG
CAPSULE ORAL ONCE
OUTPATIENT
Start: 2023-08-24 | End: 2023-08-24

## 2023-08-24 RX ORDER — LIDOCAINE 40 MG/G
CREAM TOPICAL ONCE
OUTPATIENT
Start: 2023-08-24 | End: 2023-08-24

## 2023-08-24 RX ADMIN — LIDOCAINE HYDROCHLORIDE: 40 SOLUTION TOPICAL at 08:19

## 2023-08-24 ASSESSMENT — PAIN SCALES - GENERAL
PAINLEVEL_OUTOF10: 0
PAINLEVEL_OUTOF10: 0

## 2023-08-24 NOTE — PROGRESS NOTES
blood sugar within it's target range. Limit Sodium, Alcohol and Sugar. Pain:   Please Note some pain, drainage and/or bleeding might be expected after seeing the provider. TO HELP ALLEVIATE PAIN WE RECOMMEND THE FOLLOWING  Elevate the affected limb. Use over the counter medications as permitted by your family doctor. For Persistent Pain not relieved by the above interventions, please notify your family doctor. : WENDY         Electronically signed by Brinda Delong RN on 8/24/2023 at 8:34 AM              [address] [place] [address] Information: Should you experience any significant changes in your wound(s) or have questions about your wound care, please contact the [address] [place] [address] at [address] [place] [address] 8:00 am - 4:30 pm and Friday 8:00 am - 12:30 pm.  If you need help with your wound outside these hours and cannot wait until we are again available, contact your PCP or go to the hospital emergency room. PLEASE NOTE: IF YOU ARE UNABLE TO OBTAIN WOUND SUPPLIES, CONTINUE TO USE THE SUPPLIES YOU HAVE AVAILABLE UNTIL YOU ARE ABLE TO REACH US. IT IS MOST IMPORTANT TO KEEP THE WOUND COVERED AT ALL TIMES.            Physician Signature:_______________________     Date: ___________ Time:  ____________                                   [  x ] Dr Cele Guzman     [  ] DR Farida Wooten        Electronically signed by Cele Guzman DPM on 8/24/2023 at 4:39 PM

## 2023-08-25 NOTE — DISCHARGE INSTRUCTIONS
contact your PCP or go to the hospital emergency room. PLEASE NOTE: IF YOU ARE UNABLE TO OBTAIN WOUND SUPPLIES, CONTINUE TO USE THE SUPPLIES YOU HAVE AVAILABLE UNTIL YOU ARE ABLE TO REACH US. IT IS MOST IMPORTANT TO KEEP THE WOUND COVERED AT ALL TIMES.            Physician Signature:_______________________     Date: ___________ Time:  ____________                                   [  x ] Dr Jaren Sutherland     [  ] DR Irving Munguia

## 2023-08-31 ENCOUNTER — HOSPITAL ENCOUNTER (OUTPATIENT)
Dept: WOUND CARE | Age: 58
Discharge: HOME | End: 2023-08-31
Payer: MEDICARE

## 2023-08-31 VITALS
DIASTOLIC BLOOD PRESSURE: 84 MMHG | RESPIRATION RATE: 18 BRPM | TEMPERATURE: 96.8 F | SYSTOLIC BLOOD PRESSURE: 139 MMHG | HEART RATE: 92 BPM

## 2023-08-31 DIAGNOSIS — L97.522 ULCER OF LEFT FOOT, WITH FAT LAYER EXPOSED (HCC): Primary | ICD-10-CM

## 2023-08-31 DIAGNOSIS — L97.512 RIGHT FOOT ULCER, WITH FAT LAYER EXPOSED (HCC): ICD-10-CM

## 2023-08-31 PROCEDURE — 11042 DBRDMT SUBQ TIS 1ST 20SQCM/<: CPT

## 2023-08-31 RX ORDER — GENTAMICIN SULFATE 1 MG/G
OINTMENT TOPICAL ONCE
OUTPATIENT
Start: 2023-08-31 | End: 2023-08-31

## 2023-08-31 RX ORDER — CLOBETASOL PROPIONATE 0.5 MG/G
OINTMENT TOPICAL ONCE
OUTPATIENT
Start: 2023-08-31 | End: 2023-08-31

## 2023-08-31 RX ORDER — SODIUM CHLOR/HYPOCHLOROUS ACID 0.033 %
SOLUTION, IRRIGATION IRRIGATION ONCE
OUTPATIENT
Start: 2023-08-31 | End: 2023-08-31

## 2023-08-31 RX ORDER — BACITRACIN ZINC AND POLYMYXIN B SULFATE 500; 1000 [USP'U]/G; [USP'U]/G
OINTMENT TOPICAL ONCE
OUTPATIENT
Start: 2023-08-31 | End: 2023-08-31

## 2023-08-31 RX ORDER — LIDOCAINE 40 MG/G
CREAM TOPICAL ONCE
OUTPATIENT
Start: 2023-08-31 | End: 2023-08-31

## 2023-08-31 RX ORDER — LIDOCAINE HYDROCHLORIDE 40 MG/ML
SOLUTION TOPICAL ONCE
OUTPATIENT
Start: 2023-08-31 | End: 2023-08-31

## 2023-08-31 RX ORDER — LIDOCAINE HYDROCHLORIDE 40 MG/ML
SOLUTION TOPICAL ONCE
Status: COMPLETED | OUTPATIENT
Start: 2023-08-31 | End: 2023-08-31

## 2023-08-31 RX ORDER — IBUPROFEN 200 MG
TABLET ORAL ONCE
OUTPATIENT
Start: 2023-08-31 | End: 2023-08-31

## 2023-08-31 RX ORDER — BETAMETHASONE DIPROPIONATE 0.05 %
OINTMENT (GRAM) TOPICAL ONCE
OUTPATIENT
Start: 2023-08-31 | End: 2023-08-31

## 2023-08-31 RX ORDER — GINSENG 100 MG
CAPSULE ORAL ONCE
OUTPATIENT
Start: 2023-08-31 | End: 2023-08-31

## 2023-08-31 RX ORDER — LIDOCAINE 50 MG/G
OINTMENT TOPICAL ONCE
OUTPATIENT
Start: 2023-08-31 | End: 2023-08-31

## 2023-08-31 RX ORDER — LIDOCAINE HYDROCHLORIDE 20 MG/ML
JELLY TOPICAL ONCE
OUTPATIENT
Start: 2023-08-31 | End: 2023-08-31

## 2023-08-31 RX ADMIN — LIDOCAINE HYDROCHLORIDE: 40 SOLUTION TOPICAL at 08:09

## 2023-08-31 ASSESSMENT — PAIN SCALES - GENERAL: PAINLEVEL_OUTOF10: 0

## 2023-08-31 NOTE — PROGRESS NOTES
collagen. Patient's blood sugars are reasonably well controlled. He relates his last hemoglobin A1c was 7.9 on 5/26/23. Once patient meets his insurance companies criteria will reapply for a bioengineered skin substitute. Patient has a significant history of nonhealing diabetic foot ulcerations with a history of multiple hospitalizations due to complications from healing. A bioengineered skin substitute will decelerate time to heal and hopefully prevent further complications that could ultimately result in hospitalization. Recommend patient follow-up with his primary care physician regarding persistent back and flank pain status post a mechanical fall. He relates he is planning on going to the emergency department today. Patient was instructed to continue local wound care daily with Hydrofera Blue until bioengineered skin substitutes are approved. Offload the area with surgical shoe right/left. Prolonged discussion with patient regarding the importance of good glycemic control for wound healing and to prevent further diabetic complications. Treatment Note please see attached Discharge Instructions    Written patient dismissal instructions given to patient and signed by patient or POA.       Reappoint 1 week for follow-up or sooner if problems develop       Discharge [address] [place] [address] Physician Orders and Discharge [address] [place] [address], [id number] [place] [address], [address] [place] [address] Drive  Telephone: [phone] 2333 [facility] 8:00 am - 4:30 pm and Friday 8:00 am - 12:00 pm.          NAME:  Ovidio Lincoln OF BIRTH:  1965  MEDICAL RECORD NUMBER:  [id number]  DATE:  8/31/2023        Return Appointment:  [x] Return Appointment: With DR Felisha Oates  in  1 Week(s)  [x] Wound and dressing supply provider: [facility]  [] ECF or Home Healthcare:  [] Wound Assessment:         [] Physician or NP scheduled for Wound

## 2023-09-01 NOTE — DISCHARGE INSTRUCTIONS
wait until we are again available, contact your PCP or go to the hospital emergency room. PLEASE NOTE: IF YOU ARE UNABLE TO OBTAIN WOUND SUPPLIES, CONTINUE TO USE THE SUPPLIES YOU HAVE AVAILABLE UNTIL YOU ARE ABLE TO REACH US. IT IS MOST IMPORTANT TO KEEP THE WOUND COVERED AT ALL TIMES.            Physician Signature:_______________________     Date: ___________ Time:  ____________                                   [  x ] Dr Kamar Espino     [  ] DR Claudean Millet

## 2023-09-07 ENCOUNTER — HOSPITAL ENCOUNTER (OUTPATIENT)
Dept: WOUND CARE | Age: 58
Discharge: HOME | End: 2023-09-07

## 2023-09-07 VITALS
DIASTOLIC BLOOD PRESSURE: 62 MMHG | SYSTOLIC BLOOD PRESSURE: 88 MMHG | RESPIRATION RATE: 18 BRPM | HEART RATE: 101 BPM | TEMPERATURE: 98 F

## 2023-09-07 DIAGNOSIS — L97.522 ULCER OF LEFT FOOT, WITH FAT LAYER EXPOSED (HCC): Primary | ICD-10-CM

## 2023-09-07 DIAGNOSIS — L97.512 RIGHT FOOT ULCER, WITH FAT LAYER EXPOSED (HCC): ICD-10-CM

## 2023-09-07 RX ORDER — LIDOCAINE 50 MG/G
OINTMENT TOPICAL ONCE
OUTPATIENT
Start: 2023-09-07 | End: 2023-09-07

## 2023-09-07 RX ORDER — SODIUM CHLOR/HYPOCHLOROUS ACID 0.033 %
SOLUTION, IRRIGATION IRRIGATION ONCE
OUTPATIENT
Start: 2023-09-07 | End: 2023-09-07

## 2023-09-07 RX ORDER — LIDOCAINE 40 MG/G
CREAM TOPICAL ONCE
Status: COMPLETED | OUTPATIENT
Start: 2023-09-07 | End: 2023-09-07

## 2023-09-07 RX ORDER — LIDOCAINE HYDROCHLORIDE 20 MG/ML
JELLY TOPICAL ONCE
OUTPATIENT
Start: 2023-09-07 | End: 2023-09-07

## 2023-09-07 RX ORDER — BETAMETHASONE DIPROPIONATE 0.05 %
OINTMENT (GRAM) TOPICAL ONCE
OUTPATIENT
Start: 2023-09-07 | End: 2023-09-07

## 2023-09-07 RX ORDER — GENTAMICIN SULFATE 1 MG/G
OINTMENT TOPICAL ONCE
OUTPATIENT
Start: 2023-09-07 | End: 2023-09-07

## 2023-09-07 RX ORDER — GINSENG 100 MG
CAPSULE ORAL ONCE
OUTPATIENT
Start: 2023-09-07 | End: 2023-09-07

## 2023-09-07 RX ORDER — LIDOCAINE HYDROCHLORIDE 40 MG/ML
SOLUTION TOPICAL ONCE
OUTPATIENT
Start: 2023-09-07 | End: 2023-09-07

## 2023-09-07 RX ORDER — BACITRACIN ZINC AND POLYMYXIN B SULFATE 500; 1000 [USP'U]/G; [USP'U]/G
OINTMENT TOPICAL ONCE
OUTPATIENT
Start: 2023-09-07 | End: 2023-09-07

## 2023-09-07 RX ORDER — CLOBETASOL PROPIONATE 0.5 MG/G
OINTMENT TOPICAL ONCE
OUTPATIENT
Start: 2023-09-07 | End: 2023-09-07

## 2023-09-07 RX ORDER — LIDOCAINE 40 MG/G
CREAM TOPICAL ONCE
OUTPATIENT
Start: 2023-09-07 | End: 2023-09-07

## 2023-09-07 RX ORDER — IBUPROFEN 200 MG
TABLET ORAL ONCE
OUTPATIENT
Start: 2023-09-07 | End: 2023-09-07

## 2023-09-07 RX ADMIN — LIDOCAINE: 40 CREAM TOPICAL at 08:19

## 2023-09-07 ASSESSMENT — PAIN SCALES - GENERAL: PAINLEVEL_OUTOF10: 0

## 2023-09-11 NOTE — DISCHARGE INSTRUCTIONS
cannot wait until we are again available, contact your PCP or go to the hospital emergency room. PLEASE NOTE: IF YOU ARE UNABLE TO OBTAIN WOUND SUPPLIES, CONTINUE TO USE THE SUPPLIES YOU HAVE AVAILABLE UNTIL YOU ARE ABLE TO REACH US. IT IS MOST IMPORTANT TO KEEP THE WOUND COVERED AT ALL TIMES.            Physician Signature:_______________________     Date: ___________ Time:  ____________                                   [  x ] Dr Lashonda Escobar     [  ] DR Bahena Ser

## 2023-09-12 NOTE — PLAN OF CARE
Problem: Falls - Risk of:  Goal: Will remain free from falls  Description: Will remain free from falls  4/15/2021 2204 by William Garcia RN  Outcome: Ongoing     Problem: Falls - Risk of:  Goal: Absence of physical injury  Description: Absence of physical injury  4/15/2021 2204 by William Garcia RN  Outcome: Ongoing     Problem: Pain:  Goal: Pain level will decrease  Description: Pain level will decrease  4/15/2021 2204 by William Garcia RN  Outcome: Ongoing     Problem: Pain:  Goal: Control of acute pain  Description: Control of acute pain  4/15/2021 2204 by William Garcia RN  Outcome: Ongoing     Problem: Pain:  Goal: Control of chronic pain  Description: Control of chronic pain  4/15/2021 2204 by William Garcia RN  Outcome: Ongoing [phone]

## 2023-09-14 ENCOUNTER — HOSPITAL ENCOUNTER (OUTPATIENT)
Dept: WOUND CARE | Age: 58
Discharge: HOME | End: 2023-09-14

## 2023-09-14 VITALS
DIASTOLIC BLOOD PRESSURE: 85 MMHG | SYSTOLIC BLOOD PRESSURE: 135 MMHG | RESPIRATION RATE: 18 BRPM | HEART RATE: 102 BPM | TEMPERATURE: 98.1 F

## 2023-09-14 DIAGNOSIS — L97.512 RIGHT FOOT ULCER, WITH FAT LAYER EXPOSED (HCC): ICD-10-CM

## 2023-09-14 DIAGNOSIS — L97.522 ULCER OF LEFT FOOT, WITH FAT LAYER EXPOSED (HCC): Primary | ICD-10-CM

## 2023-09-14 RX ORDER — LIDOCAINE HYDROCHLORIDE 20 MG/ML
JELLY TOPICAL ONCE
OUTPATIENT
Start: 2023-09-14 | End: 2023-09-14

## 2023-09-14 RX ORDER — IBUPROFEN 200 MG
TABLET ORAL ONCE
OUTPATIENT
Start: 2023-09-14 | End: 2023-09-14

## 2023-09-14 RX ORDER — LIDOCAINE HYDROCHLORIDE 40 MG/ML
SOLUTION TOPICAL ONCE
OUTPATIENT
Start: 2023-09-14 | End: 2023-09-14

## 2023-09-14 RX ORDER — CLOBETASOL PROPIONATE 0.5 MG/G
OINTMENT TOPICAL ONCE
OUTPATIENT
Start: 2023-09-14 | End: 2023-09-14

## 2023-09-14 RX ORDER — LIDOCAINE 40 MG/G
CREAM TOPICAL ONCE
OUTPATIENT
Start: 2023-09-14 | End: 2023-09-14

## 2023-09-14 RX ORDER — GINSENG 100 MG
CAPSULE ORAL ONCE
OUTPATIENT
Start: 2023-09-14 | End: 2023-09-14

## 2023-09-14 RX ORDER — LIDOCAINE 50 MG/G
OINTMENT TOPICAL ONCE
OUTPATIENT
Start: 2023-09-14 | End: 2023-09-14

## 2023-09-14 RX ORDER — SODIUM CHLOR/HYPOCHLOROUS ACID 0.033 %
SOLUTION, IRRIGATION IRRIGATION ONCE
OUTPATIENT
Start: 2023-09-14 | End: 2023-09-14

## 2023-09-14 RX ORDER — BACITRACIN ZINC AND POLYMYXIN B SULFATE 500; 1000 [USP'U]/G; [USP'U]/G
OINTMENT TOPICAL ONCE
OUTPATIENT
Start: 2023-09-14 | End: 2023-09-14

## 2023-09-14 RX ORDER — BETAMETHASONE DIPROPIONATE 0.05 %
OINTMENT (GRAM) TOPICAL ONCE
OUTPATIENT
Start: 2023-09-14 | End: 2023-09-14

## 2023-09-14 RX ORDER — LIDOCAINE HYDROCHLORIDE 40 MG/ML
SOLUTION TOPICAL ONCE
Status: COMPLETED | OUTPATIENT
Start: 2023-09-14 | End: 2023-09-14

## 2023-09-14 RX ORDER — GENTAMICIN SULFATE 1 MG/G
OINTMENT TOPICAL ONCE
OUTPATIENT
Start: 2023-09-14 | End: 2023-09-14

## 2023-09-14 RX ADMIN — LIDOCAINE HYDROCHLORIDE: 40 SOLUTION TOPICAL at 08:23

## 2023-09-14 ASSESSMENT — PAIN SCALES - GENERAL
PAINLEVEL_OUTOF10: 0
PAINLEVEL_OUTOF10: 0

## 2023-09-18 NOTE — DISCHARGE INSTRUCTIONS
1125 [place] Physician Orders and Discharge [address] [place] [address]  [address], [address], [address]  Telephone: [phone] [address] 8:00 am - 4:30 pm and Friday 8:00 am - 12:00 pm.          NAME:  Cassidy Lee OF BIRTH:  1965  MEDICAL RECORD NUMBER:  [id number]  DATE:  9/21/2023        Return Appointment:  [x] Return Appointment: With DR Leah Feng  in  1 Week(s)  [x] Wound and dressing supply provider: [facility]  [] ECF or Home Healthcare:  [] Wound Assessment:         [] Physician or NP scheduled for Wound Assessment:   [] Orders placed during your visit:            **GO TO ER FOR FEVER OR INCREASED REDNESS/WARMTH TO LEFT FOOT**        Important Reminders:   Please wash hands with soap and water before and after every dressing change. Do not scrub wounds. Keep wounds dry in shower unless otherwise instructed by the physician. SMOKING can slow would healing. Stop smoking as soon as possible to improve healing and prevent further complications associated with smoking. Kaley-Wound Topical Treatments:  Do not apply lotions, creams, or ointments to wound bed unless directed. [] Apply moisturizing lotion to skin surrounding the wound prior to dressing change.  [] Apply antifungal ointment to skin surrounding the wound prior to dressing change.  [] Apply thin film of no sting moisture barrier ointment to skin immediately around      wound.   [x] Other: VASHE X 5 MINUTE SOAK  TO FOOT WOUNDS TODAY ONLY                          Wound Location:  LEFT PLANTAR FOOT     Wound Cleansing:      Primary Dressing:  [x] HYDROFERA BLUE SLIGHTLY MOISTENED WITH SALINE ( MAY SECURE LEFT FOOT WOUND ONLY WITH STERI STRIPS)  []      Secondary Dressing:  [x] GAUZE  [x] ROLL GAUZE        Dressing Frequency:  [x] EVERY OTHER DAY  [] Do Not Change Dressing           Contact Cast:  Apply:  [] Total Contact Cast Applied in Clinic

## 2023-09-21 ENCOUNTER — HOSPITAL ENCOUNTER (OUTPATIENT)
Dept: WOUND CARE | Age: 58
Discharge: HOME | End: 2023-09-21
Payer: MEDICARE

## 2023-09-21 VITALS
RESPIRATION RATE: 18 BRPM | SYSTOLIC BLOOD PRESSURE: 137 MMHG | DIASTOLIC BLOOD PRESSURE: 89 MMHG | TEMPERATURE: 97.3 F | HEART RATE: 92 BPM

## 2023-09-21 DIAGNOSIS — L97.522 ULCER OF LEFT FOOT, WITH FAT LAYER EXPOSED (HCC): Primary | ICD-10-CM

## 2023-09-21 DIAGNOSIS — L97.512 RIGHT FOOT ULCER, WITH FAT LAYER EXPOSED (HCC): ICD-10-CM

## 2023-09-21 PROCEDURE — 11042 DBRDMT SUBQ TIS 1ST 20SQCM/<: CPT

## 2023-09-21 RX ORDER — CLOBETASOL PROPIONATE 0.5 MG/G
OINTMENT TOPICAL ONCE
OUTPATIENT
Start: 2023-09-21 | End: 2023-09-21

## 2023-09-21 RX ORDER — GENTAMICIN SULFATE 1 MG/G
OINTMENT TOPICAL ONCE
OUTPATIENT
Start: 2023-09-21 | End: 2023-09-21

## 2023-09-21 RX ORDER — BACITRACIN ZINC AND POLYMYXIN B SULFATE 500; 1000 [USP'U]/G; [USP'U]/G
OINTMENT TOPICAL ONCE
OUTPATIENT
Start: 2023-09-21 | End: 2023-09-21

## 2023-09-21 RX ORDER — GINSENG 100 MG
CAPSULE ORAL ONCE
OUTPATIENT
Start: 2023-09-21 | End: 2023-09-21

## 2023-09-21 RX ORDER — LIDOCAINE HYDROCHLORIDE 20 MG/ML
JELLY TOPICAL ONCE
OUTPATIENT
Start: 2023-09-21 | End: 2023-09-21

## 2023-09-21 RX ORDER — SODIUM CHLOR/HYPOCHLOROUS ACID 0.033 %
SOLUTION, IRRIGATION IRRIGATION ONCE
OUTPATIENT
Start: 2023-09-21 | End: 2023-09-21

## 2023-09-21 RX ORDER — LIDOCAINE HYDROCHLORIDE 40 MG/ML
SOLUTION TOPICAL ONCE
OUTPATIENT
Start: 2023-09-21 | End: 2023-09-21

## 2023-09-21 RX ORDER — IBUPROFEN 200 MG
TABLET ORAL ONCE
OUTPATIENT
Start: 2023-09-21 | End: 2023-09-21

## 2023-09-21 RX ORDER — LIDOCAINE 50 MG/G
OINTMENT TOPICAL ONCE
OUTPATIENT
Start: 2023-09-21 | End: 2023-09-21

## 2023-09-21 RX ORDER — TRIAMCINOLONE ACETONIDE 1 MG/G
OINTMENT TOPICAL ONCE
OUTPATIENT
Start: 2023-09-21 | End: 2023-09-21

## 2023-09-21 RX ORDER — BETAMETHASONE DIPROPIONATE 0.05 %
OINTMENT (GRAM) TOPICAL ONCE
OUTPATIENT
Start: 2023-09-21 | End: 2023-09-21

## 2023-09-21 RX ORDER — LIDOCAINE 40 MG/G
CREAM TOPICAL ONCE
OUTPATIENT
Start: 2023-09-21 | End: 2023-09-21

## 2023-09-21 RX ORDER — LIDOCAINE HYDROCHLORIDE 40 MG/ML
SOLUTION TOPICAL ONCE
Status: COMPLETED | OUTPATIENT
Start: 2023-09-21 | End: 2023-09-21

## 2023-09-21 RX ADMIN — LIDOCAINE HYDROCHLORIDE: 40 SOLUTION TOPICAL at 08:14

## 2023-09-21 ASSESSMENT — PAIN SCALES - GENERAL: PAINLEVEL_OUTOF10: 0

## 2023-09-25 NOTE — DISCHARGE INSTRUCTIONS
1125 [place] Physician Orders and Discharge Instructions  [address], [facility]5 [facility], [address] [place] [address] Drive  Telephone: [phone] [address] 8:00 am - 4:30 pm and Friday 8:00 am - 12:00 pm.          NAME:  Anitha Amaya OF BIRTH:  1965  MEDICAL RECORD NUMBER:  [id number]  DATE:  9/28/2023        Return Appointment:  [x] Return Appointment: With DR ARAUJO  in  1 Week(s)  [x] Wound and dressing supply provider: [facility]  [] ECF or Home Healthcare:  [] Wound Assessment:         [] Physician or NP scheduled for Wound Assessment:   [] Orders placed during your visit:            **GO TO ER FOR FEVER OR INCREASED REDNESS/WARMTH TO LEFT FOOT**        Important Reminders:   Please wash hands with soap and water before and after every dressing change. Do not scrub wounds. Keep wounds dry in shower unless otherwise instructed by the physician. SMOKING can slow would healing. Stop smoking as soon as possible to improve healing and prevent further complications associated with smoking. Kaley-Wound Topical Treatments:  Do not apply lotions, creams, or ointments to wound bed unless directed. [] Apply moisturizing lotion to skin surrounding the wound prior to dressing change.  [] Apply antifungal ointment to skin surrounding the wound prior to dressing change.  [] Apply thin film of no sting moisture barrier ointment to skin immediately around      wound. [x] Other: VASHE X 5 MINUTE SOAK  TO FOOT WOUNDS TODAY ONLY                          Wound Location:  LEFT PLANTAR FOOT     Wound Cleansing:      Primary Dressing:  [x] HYDROFERA BLUE SLIGHTLY MOISTENED WITH SALINE ( MAY SECURE LEFT FOOT WOUND ONLY WITH STERI STRIPS).   CUT TO SIZE OF WOUND  []      Secondary Dressing:  [x] GAUZE  [x] ROLL GAUZE        Dressing Frequency:  [x]DAILY  [] Do Not Change Dressing           Contact Cast:  Apply:  [] Total Contact Cast Applied in

## 2023-09-28 ENCOUNTER — HOSPITAL ENCOUNTER (OUTPATIENT)
Dept: WOUND CARE | Age: 58
Discharge: HOME | End: 2023-09-28
Payer: MEDICARE

## 2023-09-28 VITALS
SYSTOLIC BLOOD PRESSURE: 116 MMHG | DIASTOLIC BLOOD PRESSURE: 78 MMHG | TEMPERATURE: 98.2 F | HEART RATE: 91 BPM | RESPIRATION RATE: 18 BRPM

## 2023-09-28 DIAGNOSIS — L97.522 ULCER OF LEFT FOOT, WITH FAT LAYER EXPOSED (HCC): Primary | ICD-10-CM

## 2023-09-28 DIAGNOSIS — L97.512 RIGHT FOOT ULCER, WITH FAT LAYER EXPOSED (HCC): ICD-10-CM

## 2023-09-28 PROCEDURE — 11042 DBRDMT SUBQ TIS 1ST 20SQCM/<: CPT

## 2023-09-28 RX ORDER — LIDOCAINE 40 MG/G
CREAM TOPICAL ONCE
OUTPATIENT
Start: 2023-09-28 | End: 2023-09-28

## 2023-09-28 RX ORDER — LIDOCAINE 50 MG/G
OINTMENT TOPICAL ONCE
Status: COMPLETED | OUTPATIENT
Start: 2023-09-28 | End: 2023-09-28

## 2023-09-28 RX ORDER — GINSENG 100 MG
CAPSULE ORAL ONCE
OUTPATIENT
Start: 2023-09-28 | End: 2023-09-28

## 2023-09-28 RX ORDER — BACITRACIN ZINC AND POLYMYXIN B SULFATE 500; 1000 [USP'U]/G; [USP'U]/G
OINTMENT TOPICAL ONCE
OUTPATIENT
Start: 2023-09-28 | End: 2023-09-28

## 2023-09-28 RX ORDER — IBUPROFEN 200 MG
TABLET ORAL ONCE
OUTPATIENT
Start: 2023-09-28 | End: 2023-09-28

## 2023-09-28 RX ORDER — LIDOCAINE HYDROCHLORIDE 20 MG/ML
JELLY TOPICAL ONCE
OUTPATIENT
Start: 2023-09-28 | End: 2023-09-28

## 2023-09-28 RX ORDER — TRIAMCINOLONE ACETONIDE 1 MG/G
OINTMENT TOPICAL ONCE
OUTPATIENT
Start: 2023-09-28 | End: 2023-09-28

## 2023-09-28 RX ORDER — CLOBETASOL PROPIONATE 0.5 MG/G
OINTMENT TOPICAL ONCE
OUTPATIENT
Start: 2023-09-28 | End: 2023-09-28

## 2023-09-28 RX ORDER — GENTAMICIN SULFATE 1 MG/G
OINTMENT TOPICAL ONCE
OUTPATIENT
Start: 2023-09-28 | End: 2023-09-28

## 2023-09-28 RX ORDER — BETAMETHASONE DIPROPIONATE 0.05 %
OINTMENT (GRAM) TOPICAL ONCE
OUTPATIENT
Start: 2023-09-28 | End: 2023-09-28

## 2023-09-28 RX ORDER — LIDOCAINE 50 MG/G
OINTMENT TOPICAL ONCE
OUTPATIENT
Start: 2023-09-28 | End: 2023-09-28

## 2023-09-28 RX ORDER — LIDOCAINE HYDROCHLORIDE 40 MG/ML
SOLUTION TOPICAL ONCE
OUTPATIENT
Start: 2023-09-28 | End: 2023-09-28

## 2023-09-28 RX ORDER — SODIUM CHLOR/HYPOCHLOROUS ACID 0.033 %
SOLUTION, IRRIGATION IRRIGATION ONCE
OUTPATIENT
Start: 2023-09-28 | End: 2023-09-28

## 2023-09-28 RX ADMIN — LIDOCAINE 2.5 ML: 50 OINTMENT TOPICAL at 08:13

## 2023-09-28 ASSESSMENT — PAIN SCALES - GENERAL
PAINLEVEL_OUTOF10: 0
PAINLEVEL_OUTOF10: 0

## 2023-09-28 NOTE — PROGRESS NOTES
resistant staph aureus) culture positive 07/13/2018    foot wound    Multifocal pneumonia     Neuropathy     Neutrophilic leukocytosis 6/09/1484    NSTEMI (non-ST elevated myocardial infarction) ([address]) 10/3/2018    Pneumonia due to COVID-19 virus     POTS (postural orthostatic tachycardia syndrome)     Psychiatric problem     anxiety, depression, bipolar    Sepsis ([address]) 7/1/2020    SIRS (systemic inflammatory response syndrome) ([address]) 4/14/2021    Skin ulcer of left foot with fat layer exposed ([address]) 6/1/2018    Sleep apnea     does not use Cpap    ST elevation myocardial infarction (STEMI) of inferolateral wall ([address]) 11/13/2018    Syncope     Type II or unspecified type diabetes mellitus without mention of complication, not stated as uncontrolled     Ulcer of foot due to secondary diabetes ([address]) 8/15/2018    Wears glasses        PAST SURGICAL HISTORY    Past Surgical History:   Procedure Laterality Date    CARDIAC CATHETERIZATION      CHOLECYSTECTOMY, LAPAROSCOPIC N/A 4/21/2021    LAPAROSCOPIC CHOLECYSTECTOMY WITH CHOLANGIOGRAM performed by Cm Brown MD at [address] [place] [address]  10/06/2018    Bare Metal Stent to PDA    CORONARY ANGIOPLASTY WITH STENT PLACEMENT  10/07/2018    Bare Metal Stent to OM    CORONARY ANGIOPLASTY WITH STENT PLACEMENT  10/03/2018    CECE to Circ    DIAGNOSTIC CARDIAC CATH LAB PROCEDURE      FINGER SURGERY Left     Left Thumb    HERNIA REPAIR      VASCULAR SURGERY      VASECTOMY         FAMILY HISTORY    Family History   Problem Relation Age of Onset    High Blood Pressure Mother     Stroke Mother     Heart Disease Mother     COPD Mother     Heart Disease Father     Cancer Father         skin    Diabetes Sister     Cancer Sister     Heart Disease Brother     Diabetes Brother        SOCIAL HISTORY    Social History     Tobacco Use    Smoking status: Former     Packs/day: 2.00     Years: 12.00     Additional pack years: 0.00     Total

## 2023-10-05 ENCOUNTER — HOSPITAL ENCOUNTER (OUTPATIENT)
Dept: WOUND CARE | Age: 58
Discharge: HOME | End: 2023-10-05
Payer: MEDICARE

## 2023-10-05 VITALS
TEMPERATURE: 98.2 F | SYSTOLIC BLOOD PRESSURE: 105 MMHG | RESPIRATION RATE: 18 BRPM | HEART RATE: 92 BPM | DIASTOLIC BLOOD PRESSURE: 72 MMHG

## 2023-10-05 DIAGNOSIS — L97.522 ULCER OF LEFT FOOT, WITH FAT LAYER EXPOSED (HCC): Primary | ICD-10-CM

## 2023-10-05 DIAGNOSIS — L97.512 RIGHT FOOT ULCER, WITH FAT LAYER EXPOSED (HCC): ICD-10-CM

## 2023-10-05 PROCEDURE — 11042 DBRDMT SUBQ TIS 1ST 20SQCM/<: CPT

## 2023-10-05 RX ORDER — LIDOCAINE HYDROCHLORIDE 40 MG/ML
SOLUTION TOPICAL ONCE
Status: COMPLETED | OUTPATIENT
Start: 2023-10-05 | End: 2023-10-05

## 2023-10-05 RX ORDER — BACITRACIN ZINC AND POLYMYXIN B SULFATE 500; 1000 [USP'U]/G; [USP'U]/G
OINTMENT TOPICAL ONCE
OUTPATIENT
Start: 2023-10-05 | End: 2023-10-05

## 2023-10-05 RX ORDER — LIDOCAINE HYDROCHLORIDE 20 MG/ML
JELLY TOPICAL ONCE
OUTPATIENT
Start: 2023-10-05 | End: 2023-10-05

## 2023-10-05 RX ORDER — IBUPROFEN 200 MG
TABLET ORAL ONCE
OUTPATIENT
Start: 2023-10-05 | End: 2023-10-05

## 2023-10-05 RX ORDER — SODIUM CHLOR/HYPOCHLOROUS ACID 0.033 %
SOLUTION, IRRIGATION IRRIGATION ONCE
OUTPATIENT
Start: 2023-10-05 | End: 2023-10-05

## 2023-10-05 RX ORDER — LIDOCAINE 50 MG/G
OINTMENT TOPICAL ONCE
OUTPATIENT
Start: 2023-10-05 | End: 2023-10-05

## 2023-10-05 RX ORDER — GINSENG 100 MG
CAPSULE ORAL ONCE
OUTPATIENT
Start: 2023-10-05 | End: 2023-10-05

## 2023-10-05 RX ORDER — CLOBETASOL PROPIONATE 0.5 MG/G
OINTMENT TOPICAL ONCE
OUTPATIENT
Start: 2023-10-05 | End: 2023-10-05

## 2023-10-05 RX ORDER — BETAMETHASONE DIPROPIONATE 0.05 %
OINTMENT (GRAM) TOPICAL ONCE
OUTPATIENT
Start: 2023-10-05 | End: 2023-10-05

## 2023-10-05 RX ORDER — GENTAMICIN SULFATE 1 MG/G
OINTMENT TOPICAL ONCE
OUTPATIENT
Start: 2023-10-05 | End: 2023-10-05

## 2023-10-05 RX ORDER — LIDOCAINE 40 MG/G
CREAM TOPICAL ONCE
OUTPATIENT
Start: 2023-10-05 | End: 2023-10-05

## 2023-10-05 RX ORDER — LIDOCAINE HYDROCHLORIDE 40 MG/ML
SOLUTION TOPICAL ONCE
OUTPATIENT
Start: 2023-10-05 | End: 2023-10-05

## 2023-10-05 RX ORDER — TRIAMCINOLONE ACETONIDE 1 MG/G
OINTMENT TOPICAL ONCE
OUTPATIENT
Start: 2023-10-05 | End: 2023-10-05

## 2023-10-05 RX ADMIN — LIDOCAINE HYDROCHLORIDE: 40 SOLUTION TOPICAL at 08:31

## 2023-10-05 ASSESSMENT — PAIN SCALES - GENERAL: PAINLEVEL_OUTOF10: 0

## 2023-10-05 NOTE — PATIENT INSTRUCTIONS
these hours and cannot wait until we are again available, contact your PCP or go to the hospital emergency room. PLEASE NOTE: IF YOU ARE UNABLE TO OBTAIN WOUND SUPPLIES, CONTINUE TO USE THE SUPPLIES YOU HAVE AVAILABLE UNTIL YOU ARE ABLE TO REACH US. IT IS MOST IMPORTANT TO KEEP THE WOUND COVERED AT ALL TIMES.            Physician Signature:_______________________     Date: ___________ Time:  ____________                                   [  x ] Dr Glen Juan     [  ] DR Yuli Glez

## 2023-10-05 NOTE — PROGRESS NOTES
1027 [facility]   Progress Note and Procedure Note      Santos Rubio  MEDICAL RECORD NUMBER:  [id number]  AGE: 62 y.o. GENDER: male  : 1965  EPISODE DATE:  10/5/2023    Subjective:     Chief Complaint   Patient presents with    Wound Check     Follow-up visit for a wound to the left foot. HISTORY of PRESENT ILLNESS HPI     Juni Jaeger is a 62 y.o. male who presents today for wound/ulcer evaluation. History of Wound Context: Presents today with a chief complaint of a bilateral diabetic foot ulceration. Of note a bioengineered skin substitute for a diabetic foot ulceration that has failed to resolve after 4 weeks of conservative care has been denied by his insurance company despite me doing a peer to peer review.   Wound/Ulcer Pain Timing/Severity: none  Quality of pain: N/A  Severity:  0 / 10   Modifying Factors: None  Associated Signs/Symptoms: none    Ulcer Identification:  Ulcer Type: diabetic    Contributing Factors: diabetes and poor glucose control    Acute Wound: N/A    PAST MEDICAL HISTORY        Diagnosis Date    Abscess of arm, left 3/0/6990    Acute metabolic encephalopathy     Acute respiratory failure with hypoxia (HCC)     Arthritis     Blood circulation, collateral     CAD (coronary artery disease) 7/15/2014    CAD (coronary artery disease)     Cardiac arrest ([address]) 10/05/2018    Cerebral artery occlusion with cerebral infarction ([address])     Cholecystitis 2021    COVID-19 virus infection 2020    Diabetes mellitus ([address])     Diabetic peripheral neuropathy ([address]) 2018    Elbow contusion 3/24/2014    GERD (gastroesophageal reflux disease)     ulcers    High anion gap metabolic acidosis     Hypercholesteremia     Hyperlipidemia     Hypertension     ICD (implantable cardioverter-defibrillator) in place     Left arm numbness 2010    MRSA (methicillin resistant staph aureus) culture positive 2018    foot wound

## 2023-10-12 ENCOUNTER — HOSPITAL ENCOUNTER (OUTPATIENT)
Dept: WOUND CARE | Age: 58
Discharge: HOME | End: 2023-10-12
Payer: MEDICARE

## 2023-10-12 VITALS
TEMPERATURE: 98.1 F | DIASTOLIC BLOOD PRESSURE: 71 MMHG | SYSTOLIC BLOOD PRESSURE: 117 MMHG | RESPIRATION RATE: 18 BRPM | HEART RATE: 92 BPM

## 2023-10-12 DIAGNOSIS — L97.512 RIGHT FOOT ULCER, WITH FAT LAYER EXPOSED (HCC): ICD-10-CM

## 2023-10-12 DIAGNOSIS — L97.522 ULCER OF LEFT FOOT, WITH FAT LAYER EXPOSED (HCC): Primary | ICD-10-CM

## 2023-10-12 PROCEDURE — 11042 DBRDMT SUBQ TIS 1ST 20SQCM/<: CPT

## 2023-10-12 RX ORDER — LIDOCAINE 50 MG/G
OINTMENT TOPICAL ONCE
OUTPATIENT
Start: 2023-10-12 | End: 2023-10-12

## 2023-10-12 RX ORDER — BACITRACIN ZINC AND POLYMYXIN B SULFATE 500; 1000 [USP'U]/G; [USP'U]/G
OINTMENT TOPICAL ONCE
OUTPATIENT
Start: 2023-10-12 | End: 2023-10-12

## 2023-10-12 RX ORDER — BETAMETHASONE DIPROPIONATE 0.05 %
OINTMENT (GRAM) TOPICAL ONCE
OUTPATIENT
Start: 2023-10-12 | End: 2023-10-12

## 2023-10-12 RX ORDER — LIDOCAINE HYDROCHLORIDE 20 MG/ML
JELLY TOPICAL ONCE
OUTPATIENT
Start: 2023-10-12 | End: 2023-10-12

## 2023-10-12 RX ORDER — GINSENG 100 MG
CAPSULE ORAL ONCE
OUTPATIENT
Start: 2023-10-12 | End: 2023-10-12

## 2023-10-12 RX ORDER — LIDOCAINE 40 MG/G
CREAM TOPICAL ONCE
OUTPATIENT
Start: 2023-10-12 | End: 2023-10-12

## 2023-10-12 RX ORDER — CLOBETASOL PROPIONATE 0.5 MG/G
OINTMENT TOPICAL ONCE
OUTPATIENT
Start: 2023-10-12 | End: 2023-10-12

## 2023-10-12 RX ORDER — SODIUM CHLOR/HYPOCHLOROUS ACID 0.033 %
SOLUTION, IRRIGATION IRRIGATION ONCE
OUTPATIENT
Start: 2023-10-12 | End: 2023-10-12

## 2023-10-12 RX ORDER — TRIAMCINOLONE ACETONIDE 1 MG/G
OINTMENT TOPICAL ONCE
OUTPATIENT
Start: 2023-10-12 | End: 2023-10-12

## 2023-10-12 RX ORDER — LIDOCAINE HYDROCHLORIDE 40 MG/ML
SOLUTION TOPICAL ONCE
Status: COMPLETED | OUTPATIENT
Start: 2023-10-12 | End: 2023-10-12

## 2023-10-12 RX ORDER — GENTAMICIN SULFATE 1 MG/G
OINTMENT TOPICAL ONCE
OUTPATIENT
Start: 2023-10-12 | End: 2023-10-12

## 2023-10-12 RX ORDER — LIDOCAINE HYDROCHLORIDE 40 MG/ML
SOLUTION TOPICAL ONCE
OUTPATIENT
Start: 2023-10-12 | End: 2023-10-12

## 2023-10-12 RX ORDER — IBUPROFEN 200 MG
TABLET ORAL ONCE
OUTPATIENT
Start: 2023-10-12 | End: 2023-10-12

## 2023-10-12 RX ADMIN — LIDOCAINE HYDROCHLORIDE 1 ML: 40 SOLUTION TOPICAL at 08:11

## 2023-10-12 ASSESSMENT — PAIN SCALES - GENERAL
PAINLEVEL_OUTOF10: 0
PAINLEVEL_OUTOF10: 0

## 2023-10-12 NOTE — PROGRESS NOTES
1027 [facility]   Progress Note and Procedure Note      Sabine Graves  MEDICAL RECORD NUMBER:  [id number]  AGE: 62 y.o. GENDER: male  : 1965  EPISODE DATE:  10/12/2023    Subjective:     Chief Complaint   Patient presents with    Wound Check     Follow up left foot wound         HISTORY of PRESENT ILLNESS HPI     Juni Johnson is a 62 y.o. male who presents today for wound/ulcer evaluation. History of Wound Context: Presents today with a chief complaint of a bilateral diabetic foot ulceration. Of note a bioengineered skin substitute for a diabetic foot ulceration that has failed to resolve after 4 weeks of conservative care has been denied by his insurance company despite me doing a peer to peer review.   Wound/Ulcer Pain Timing/Severity: none  Quality of pain: N/A  Severity:  0 / 10   Modifying Factors: None  Associated Signs/Symptoms: none    Ulcer Identification:  Ulcer Type: diabetic    Contributing Factors: diabetes and poor glucose control    Acute Wound: N/A    PAST MEDICAL HISTORY        Diagnosis Date    Abscess of arm, left     Acute metabolic encephalopathy     Acute respiratory failure with hypoxia (HCC)     Arthritis     Blood circulation, collateral     CAD (coronary artery disease) 7/15/2014    CAD (coronary artery disease)     Cardiac arrest ([address]) 10/05/2018    Cerebral artery occlusion with cerebral infarction ([address])     Cholecystitis 2021    COVID-19 virus infection 2020    Diabetes mellitus ([address])     Diabetic peripheral neuropathy ([address]) 2018    Elbow contusion 3/24/2014    GERD (gastroesophageal reflux disease)     ulcers    High anion gap metabolic acidosis 366    Hypercholesteremia     Hyperlipidemia     Hypertension     ICD (implantable cardioverter-defibrillator) in place     Left arm numbness 2010    MRSA (methicillin resistant staph aureus) culture positive 2018    foot wound    Multifocal pneumonia

## 2023-10-12 NOTE — PLAN OF CARE
[address] [address] [address]:     [facility] [address] [place] [address]  E:[phone] f: [phone]     [address]:     [facility]5 [place]  1700 [facility] OFFICE [facility] 2 [facility] 110  [address]  [phone]  WOUND CARE Dept: [phone]   Fax: [phone]    Patient Information:      [address]  [address] [id number] [phone]   : 1965  AGE: 62 y.o. GENDER: male   TODAYS DATE:  10/12/2023    Insurance:      PRIMARY INSURANCE:  Plan: [facility] MED[facility]  Coverage: [facility]  Effective Date: 2021  Group Number: [unfilled]  Subscriber Number: [id number] - (Medicare Managed)    SECONDARY INSURANCE:  Plan: MEDICAID [facility] DEPT OF JOB  Coverage: [place]  Effective Date: 3/1/2022  [unfilled]    [unfilled]   [unfilled]     Payer/Plan Subscr  Sex Relation Sub. Ins. ID Effective Group Num   1. [address] [facility] 1965 Male Self [id number] 21                                    [address] [address]   2. [place] Leonette Generous 1965 Male Self [id number] 3/1/22                                    [address]           Patient Wound Information:      Problem List Items Addressed This Visit          Other    Ulcer of left foot, with fat layer exposed ([address]) - Primary    Relevant Orders    Initiate Outpatient Wound Care Protocol    Right foot ulcer, with fat layer exposed ([address])    Relevant Orders    Initiate Outpatient Wound Care Protocol     ICD-10 codes:  Patient Active Problem List   Diagnosis Code    Diabetes mellitus out of control [id number]    Essential hypertension I10    HLD (hyperlipidemia) E78.5    Abnormal stress test R94.39    DMII (diabetes mellitus, type 2) (HCC) E11.9    POTS (postural orthostatic tachycardia syndrome) G90. A    Diabetic peripheral neuropathy (HCC) E11.42    Abnormal EKG R94.31    Syncope and collapse R55    VT (ventricular

## 2023-10-12 NOTE — PATIENT INSTRUCTIONS
these hours and cannot wait until we are again available, contact your PCP or go to the hospital emergency room. PLEASE NOTE: IF YOU ARE UNABLE TO OBTAIN WOUND SUPPLIES, CONTINUE TO USE THE SUPPLIES YOU HAVE AVAILABLE UNTIL YOU ARE ABLE TO REACH US. IT IS MOST IMPORTANT TO KEEP THE WOUND COVERED AT ALL TIMES.            Physician Signature:_______________________     Date: ___________ Time:  ____________                                   [  x ] Dr Mira Meckel     [  ] DR Beverley Holley

## 2023-10-19 ENCOUNTER — HOSPITAL ENCOUNTER (OUTPATIENT)
Dept: WOUND CARE | Age: 58
Discharge: HOME | End: 2023-10-19
Payer: MEDICARE

## 2023-10-19 VITALS
DIASTOLIC BLOOD PRESSURE: 85 MMHG | SYSTOLIC BLOOD PRESSURE: 127 MMHG | TEMPERATURE: 97.9 F | HEART RATE: 93 BPM | RESPIRATION RATE: 18 BRPM

## 2023-10-19 DIAGNOSIS — L97.522 ULCER OF LEFT FOOT, WITH FAT LAYER EXPOSED (HCC): Primary | ICD-10-CM

## 2023-10-19 DIAGNOSIS — L97.512 RIGHT FOOT ULCER, WITH FAT LAYER EXPOSED (HCC): ICD-10-CM

## 2023-10-19 PROCEDURE — 11042 DBRDMT SUBQ TIS 1ST 20SQCM/<: CPT

## 2023-10-19 RX ORDER — GENTAMICIN SULFATE 1 MG/G
OINTMENT TOPICAL ONCE
OUTPATIENT
Start: 2023-10-19 | End: 2023-10-19

## 2023-10-19 RX ORDER — BETAMETHASONE DIPROPIONATE 0.05 %
OINTMENT (GRAM) TOPICAL ONCE
OUTPATIENT
Start: 2023-10-19 | End: 2023-10-19

## 2023-10-19 RX ORDER — LIDOCAINE HYDROCHLORIDE 20 MG/ML
JELLY TOPICAL ONCE
OUTPATIENT
Start: 2023-10-19 | End: 2023-10-19

## 2023-10-19 RX ORDER — IBUPROFEN 200 MG
TABLET ORAL ONCE
OUTPATIENT
Start: 2023-10-19 | End: 2023-10-19

## 2023-10-19 RX ORDER — SODIUM CHLOR/HYPOCHLOROUS ACID 0.033 %
SOLUTION, IRRIGATION IRRIGATION ONCE
OUTPATIENT
Start: 2023-10-19 | End: 2023-10-19

## 2023-10-19 RX ORDER — CLOBETASOL PROPIONATE 0.5 MG/G
OINTMENT TOPICAL ONCE
OUTPATIENT
Start: 2023-10-19 | End: 2023-10-19

## 2023-10-19 RX ORDER — GINSENG 100 MG
CAPSULE ORAL ONCE
OUTPATIENT
Start: 2023-10-19 | End: 2023-10-19

## 2023-10-19 RX ORDER — LIDOCAINE 40 MG/G
CREAM TOPICAL ONCE
Status: COMPLETED | OUTPATIENT
Start: 2023-10-19 | End: 2023-10-19

## 2023-10-19 RX ORDER — LIDOCAINE 50 MG/G
OINTMENT TOPICAL ONCE
OUTPATIENT
Start: 2023-10-19 | End: 2023-10-19

## 2023-10-19 RX ORDER — TRIAMCINOLONE ACETONIDE 1 MG/G
OINTMENT TOPICAL ONCE
OUTPATIENT
Start: 2023-10-19 | End: 2023-10-19

## 2023-10-19 RX ORDER — LIDOCAINE 40 MG/G
CREAM TOPICAL ONCE
OUTPATIENT
Start: 2023-10-19 | End: 2023-10-19

## 2023-10-19 RX ORDER — BACITRACIN ZINC AND POLYMYXIN B SULFATE 500; 1000 [USP'U]/G; [USP'U]/G
OINTMENT TOPICAL ONCE
OUTPATIENT
Start: 2023-10-19 | End: 2023-10-19

## 2023-10-19 RX ORDER — LIDOCAINE HYDROCHLORIDE 40 MG/ML
SOLUTION TOPICAL ONCE
OUTPATIENT
Start: 2023-10-19 | End: 2023-10-19

## 2023-10-19 RX ADMIN — LIDOCAINE: 40 CREAM TOPICAL at 08:24

## 2023-10-19 ASSESSMENT — PAIN SCALES - GENERAL: PAINLEVEL_OUTOF10: 0

## 2023-10-19 NOTE — PATIENT INSTRUCTIONS
pm.  If you need help with your wound outside these hours and cannot wait until we are again available, contact your PCP or go to the hospital emergency room. PLEASE NOTE: IF YOU ARE UNABLE TO OBTAIN WOUND SUPPLIES, CONTINUE TO USE THE SUPPLIES YOU HAVE AVAILABLE UNTIL YOU ARE ABLE TO REACH US. IT IS MOST IMPORTANT TO KEEP THE WOUND COVERED AT ALL TIMES.            Physician Signature:_______________________     Date: ___________ Time:  ____________                                   [  x ] Dr Bruno Maxwell     [  ] DR Sivakumar Rivera

## 2023-10-19 NOTE — PROGRESS NOTES
Date: ___________ Time:  ____________                                   [  x ] Dr Rodolfo Mak     [  ] DR Juan R Greenberg       Electronically signed by Rodolfo Mak DPM on 10/19/2023 at 11:13 AM

## 2023-10-26 ENCOUNTER — HOSPITAL ENCOUNTER (OUTPATIENT)
Dept: WOUND CARE | Age: 58
Discharge: HOME | End: 2023-10-26
Payer: MEDICARE

## 2023-10-26 VITALS
DIASTOLIC BLOOD PRESSURE: 77 MMHG | HEART RATE: 86 BPM | RESPIRATION RATE: 18 BRPM | SYSTOLIC BLOOD PRESSURE: 119 MMHG | TEMPERATURE: 97.9 F

## 2023-10-26 PROCEDURE — 99211 OFF/OP EST MAY X REQ PHY/QHP: CPT

## 2023-10-26 ASSESSMENT — PAIN SCALES - GENERAL
PAINLEVEL_OUTOF10: 0
PAINLEVEL_OUTOF10: 0

## 2023-10-26 NOTE — PATIENT INSTRUCTIONS
404 [facility] Physician Orders   [facility] ORTHOPEDIC AND SPINE [facility] AT [address], [address] [place], [place] [address]  Telephone: [phone]    NAME:  Ovidio Lincoln OF BIRTH:  1965  MEDICAL RECORD NUMBER:  [id number]  DATE:  10/26/2023    Congratulations! You have completed your treatment. Return to your Primary Care Physician for all your health issues. Resume your ordinary activities as tolerated. Take your medications as prescribed by your primary care physician. Check your skin daily for cracks, bruises, sores, or dryness. Use a moisturizer as needed. Clean and dry your skin, using mild soap and warm water (not hot). Avoid alcohol and caffeine and do not smoke. Maintain a nutritious diet. Avoid pressure on your wound site. Keep your legs elevated above the level of the heart whenever possible.       **SCRIPT GIVEN FOR NEW DIABETIC SHOES**    **FOLLOW UP WITH DR ARAUJO AT HER OFFICE IN 4-6 WEEKS**      Physician Signature:______________________    Date: ___________ Time:  ____________    Dr Lashonda Escobar             Electronically signed by Alex Clarke RN on 10/26/2023 at 8:39 AM

## 2023-10-30 ENCOUNTER — OFFICE VISIT (OUTPATIENT)
Dept: CARDIOLOGY CLINIC | Age: 58
End: 2023-10-30
Payer: MEDICARE

## 2023-10-30 ENCOUNTER — NURSE ONLY (OUTPATIENT)
Dept: CARDIOLOGY CLINIC | Age: 58
End: 2023-10-30
Payer: MEDICARE

## 2023-10-30 VITALS
OXYGEN SATURATION: 94 % | DIASTOLIC BLOOD PRESSURE: 60 MMHG | HEIGHT: 74 IN | WEIGHT: 241 LBS | HEART RATE: 90 BPM | BODY MASS INDEX: 30.93 KG/M2 | SYSTOLIC BLOOD PRESSURE: 80 MMHG

## 2023-10-30 DIAGNOSIS — I47.20 VENTRICULAR TACHYCARDIA (HCC): ICD-10-CM

## 2023-10-30 DIAGNOSIS — I25.10 CAD S/P PERCUTANEOUS CORONARY ANGIOPLASTY: Primary | ICD-10-CM

## 2023-10-30 DIAGNOSIS — E78.2 MIXED HYPERLIPIDEMIA: ICD-10-CM

## 2023-10-30 DIAGNOSIS — I25.5 ISCHEMIC CARDIOMYOPATHY: ICD-10-CM

## 2023-10-30 DIAGNOSIS — Z98.61 CAD S/P PERCUTANEOUS CORONARY ANGIOPLASTY: Primary | ICD-10-CM

## 2023-10-30 DIAGNOSIS — R55 NEUROGENIC SYNCOPE: ICD-10-CM

## 2023-10-30 DIAGNOSIS — Z95.810 ICD (IMPLANTABLE CARDIOVERTER-DEFIBRILLATOR) IN PLACE: Primary | ICD-10-CM

## 2023-10-30 DIAGNOSIS — I47.20 VT (VENTRICULAR TACHYCARDIA) (HCC): ICD-10-CM

## 2023-10-30 DIAGNOSIS — R07.9 CHEST PAIN, UNSPECIFIED TYPE: ICD-10-CM

## 2023-10-30 PROCEDURE — 3074F SYST BP LT 130 MM HG: CPT | Performed by: NURSE PRACTITIONER

## 2023-10-30 PROCEDURE — 93282 PRGRMG EVAL IMPLANTABLE DFB: CPT | Performed by: INTERNAL MEDICINE

## 2023-10-30 PROCEDURE — 99215 OFFICE O/P EST HI 40 MIN: CPT | Performed by: NURSE PRACTITIONER

## 2023-10-30 PROCEDURE — 3078F DIAST BP <80 MM HG: CPT | Performed by: NURSE PRACTITIONER

## 2023-10-30 RX ORDER — NITROGLYCERIN 0.4 MG/1
0.4 TABLET SUBLINGUAL EVERY 5 MIN PRN
Qty: 25 TABLET | Refills: 3 | Status: SHIPPED | OUTPATIENT
Start: 2023-10-30

## 2023-11-15 ENCOUNTER — HOSPITAL ENCOUNTER (OUTPATIENT)
Dept: NON INVASIVE DIAGNOSTICS | Age: 58
Discharge: HOME | End: 2023-11-15
Payer: MEDICARE

## 2023-11-15 DIAGNOSIS — Z98.61 CAD S/P PERCUTANEOUS CORONARY ANGIOPLASTY: ICD-10-CM

## 2023-11-15 DIAGNOSIS — R07.9 CHEST PAIN, UNSPECIFIED TYPE: ICD-10-CM

## 2023-11-15 DIAGNOSIS — I25.10 CAD S/P PERCUTANEOUS CORONARY ANGIOPLASTY: ICD-10-CM

## 2023-11-15 PROCEDURE — 6360000002 HC RX W HCPCS: Performed by: NURSE PRACTITIONER

## 2023-11-15 PROCEDURE — A9502 TC99M TETROFOSMIN: HCPCS | Performed by: NURSE PRACTITIONER

## 2023-11-15 PROCEDURE — 93017 CV STRESS TEST TRACING ONLY: CPT

## 2023-11-15 PROCEDURE — 78452 HT MUSCLE IMAGE SPECT MULT: CPT

## 2023-11-15 PROCEDURE — 3430000000 HC RX DIAGNOSTIC RADIOPHARMACEUTICAL: Performed by: NURSE PRACTITIONER

## 2023-11-15 RX ORDER — REGADENOSON 0.08 MG/ML
0.4 INJECTION, SOLUTION INTRAVENOUS
Status: COMPLETED | OUTPATIENT
Start: 2023-11-15 | End: 2023-11-15

## 2023-11-15 RX ADMIN — TETROFOSMIN 10 MILLICURIE: 1.38 INJECTION, POWDER, LYOPHILIZED, FOR SOLUTION INTRAVENOUS at 08:25

## 2023-11-15 RX ADMIN — REGADENOSON 0.4 MG: 0.08 INJECTION, SOLUTION INTRAVENOUS at 09:34

## 2023-11-15 RX ADMIN — TETROFOSMIN 30 MILLICURIE: 1.38 INJECTION, POWDER, LYOPHILIZED, FOR SOLUTION INTRAVENOUS at 09:35

## 2024-01-31 NOTE — PROGRESS NOTES
Value Date/Time     07/20/2023 09:32 AM    K 4.3 07/20/2023 09:32 AM    K 4.8 09/01/2022 09:31 AM     07/20/2023 09:32 AM    CO2 27 07/20/2023 09:32 AM    BUN 16 07/20/2023 09:32 AM    CREATININE 1.0 07/20/2023 09:32 AM    GFRAA >60 09/04/2022 05:10 AM    GFRAA >60 02/10/2013 12:51 AM    AGRATIO 1.1 06/28/2023 01:46 PM    LABGLOM >60 07/20/2023 09:32 AM    GLUCOSE 169 07/20/2023 09:32 AM    PROT 7.7 06/28/2023 01:46 PM    PROT 8.3 11/12/2012 01:54 PM    CALCIUM 9.6 07/20/2023 09:32 AM    BILITOT 0.5 06/28/2023 01:46 PM    ALKPHOS 119 06/28/2023 01:46 PM    AST 25 06/28/2023 01:46 PM    ALT 29 06/28/2023 01:46 PM      No results found for: \"PTINR\"  Lab Results   Component Value Date    LABA1C 12.5 09/01/2022     Lab Results   Component Value Date    TROPONINI 0.04 (H) 07/21/2022       Cardiac, Vascular and Imaging Data all Personally Reviewed in Detail by Myself      EKG:   10/31/18 SR, reviewed   2/7/19 SR, 99 bpm T wave inversion   12/13/2021 Sinus rhythm   6/28/2023 Sinus rhythm     ECHO   1/4/2023  Mild conc. LVH with normal LV size & wall motion. EF is   50%. Normal  diastolic function.  Left atrium is of normal size.  Normal right ventricular size and function.  Mild tricuspid regurgitation.    ECHO   3/18/2022  Left ventricular cavity size is normal. Normal left ventricular wall  thickness. Overall left ventricular systolic function appears mildly reduced  with an estimated ejection fraction of 50%. There is hypokinesis of the  basal inferolateral walls    ECHO   2/10/2020  Normal LV size, wall thickness and wall motion: EF is   60%. Grade I  diastolic dysfunction with normal LV filling pressures.  Left atrium is of normal size.  Normal right ventricular size and function.  Trivial mitral & mild tricuspid regurgitation is present.    Stress Test:   11/15/2023   1. Technically a satisfactory study.   2. Fixed perfusion defect involving basal lateral and inferiorlateral wall   suggestive of scar.

## 2024-02-08 ENCOUNTER — OFFICE VISIT (OUTPATIENT)
Dept: CARDIOLOGY CLINIC | Age: 59
End: 2024-02-08
Payer: MEDICARE

## 2024-02-08 ENCOUNTER — NURSE ONLY (OUTPATIENT)
Dept: CARDIOLOGY CLINIC | Age: 59
End: 2024-02-08

## 2024-02-08 VITALS
HEART RATE: 90 BPM | SYSTOLIC BLOOD PRESSURE: 128 MMHG | WEIGHT: 239 LBS | DIASTOLIC BLOOD PRESSURE: 84 MMHG | OXYGEN SATURATION: 98 % | BODY MASS INDEX: 30.69 KG/M2

## 2024-02-08 DIAGNOSIS — E11.00 TYPE 2 DIABETES MELLITUS WITH HYPEROSMOLARITY WITHOUT COMA, WITHOUT LONG-TERM CURRENT USE OF INSULIN (HCC): ICD-10-CM

## 2024-02-08 DIAGNOSIS — I25.5 ISCHEMIC CARDIOMYOPATHY: ICD-10-CM

## 2024-02-08 DIAGNOSIS — Z95.810 ICD (IMPLANTABLE CARDIOVERTER-DEFIBRILLATOR) IN PLACE: ICD-10-CM

## 2024-02-08 DIAGNOSIS — I47.20 VT (VENTRICULAR TACHYCARDIA) (HCC): ICD-10-CM

## 2024-02-08 DIAGNOSIS — R55 SYNCOPE AND COLLAPSE: ICD-10-CM

## 2024-02-08 DIAGNOSIS — Z98.61 CAD S/P PERCUTANEOUS CORONARY ANGIOPLASTY: Primary | ICD-10-CM

## 2024-02-08 DIAGNOSIS — R55 NEUROGENIC SYNCOPE: ICD-10-CM

## 2024-02-08 DIAGNOSIS — E78.2 MIXED HYPERLIPIDEMIA: ICD-10-CM

## 2024-02-08 DIAGNOSIS — R94.31 ABNORMAL ELECTROCARDIOGRAM (ECG) (EKG): ICD-10-CM

## 2024-02-08 DIAGNOSIS — Z95.810 ICD (IMPLANTABLE CARDIOVERTER-DEFIBRILLATOR) IN PLACE: Primary | ICD-10-CM

## 2024-02-08 DIAGNOSIS — I10 ESSENTIAL HYPERTENSION: ICD-10-CM

## 2024-02-08 DIAGNOSIS — I25.10 CAD S/P PERCUTANEOUS CORONARY ANGIOPLASTY: Primary | ICD-10-CM

## 2024-02-08 PROCEDURE — 3074F SYST BP LT 130 MM HG: CPT | Performed by: INTERNAL MEDICINE

## 2024-02-08 PROCEDURE — 3079F DIAST BP 80-89 MM HG: CPT | Performed by: INTERNAL MEDICINE

## 2024-02-08 PROCEDURE — 99214 OFFICE O/P EST MOD 30 MIN: CPT | Performed by: INTERNAL MEDICINE

## 2024-02-08 NOTE — PROGRESS NOTES
See cardiology tab    Discussed importance of HM. Pt states he has no where to plug it in but is going to make arrangements at home so he can get it plugged in and working.

## 2024-03-01 ENCOUNTER — APPOINTMENT (OUTPATIENT)
Dept: CT IMAGING | Age: 59
End: 2024-03-01
Payer: MEDICARE

## 2024-03-01 ENCOUNTER — HOSPITAL ENCOUNTER (EMERGENCY)
Age: 59
Discharge: HOME | End: 2024-03-01
Attending: EMERGENCY MEDICINE
Payer: MEDICARE

## 2024-03-01 ENCOUNTER — APPOINTMENT (OUTPATIENT)
Dept: GENERAL RADIOLOGY | Age: 59
End: 2024-03-01
Payer: MEDICARE

## 2024-03-01 VITALS
TEMPERATURE: 97.5 F | HEART RATE: 100 BPM | HEIGHT: 74 IN | BODY MASS INDEX: 30.9 KG/M2 | SYSTOLIC BLOOD PRESSURE: 102 MMHG | OXYGEN SATURATION: 98 % | DIASTOLIC BLOOD PRESSURE: 72 MMHG | RESPIRATION RATE: 17 BRPM | WEIGHT: 240.74 LBS

## 2024-03-01 DIAGNOSIS — S20.212A CHEST WALL CONTUSION, LEFT, INITIAL ENCOUNTER: Primary | ICD-10-CM

## 2024-03-01 DIAGNOSIS — S90.221A SUBUNGUAL HEMATOMA OF FOOT, RIGHT, INITIAL ENCOUNTER: ICD-10-CM

## 2024-03-01 LAB
ANION GAP SERPL CALCULATED.3IONS-SCNC: 12 MMOL/L (ref 3–16)
BASOPHILS # BLD: 0.1 K/UL (ref 0–0.2)
BASOPHILS NFR BLD: 1 %
BUN SERPL-MCNC: 17 MG/DL (ref 7–20)
CALCIUM SERPL-MCNC: 10.2 MG/DL (ref 8.3–10.6)
CHLORIDE SERPL-SCNC: 102 MMOL/L (ref 99–110)
CO2 SERPL-SCNC: 28 MMOL/L (ref 21–32)
CREAT SERPL-MCNC: 1.4 MG/DL (ref 0.9–1.3)
DEPRECATED RDW RBC AUTO: 13.2 % (ref 12.4–15.4)
EOSINOPHIL # BLD: 0.2 K/UL (ref 0–0.6)
EOSINOPHIL NFR BLD: 1.5 %
GFR SERPLBLD CREATININE-BSD FMLA CKD-EPI: 58 ML/MIN/{1.73_M2}
GLUCOSE SERPL-MCNC: 92 MG/DL (ref 70–99)
HCT VFR BLD AUTO: 44.8 % (ref 40.5–52.5)
HGB BLD-MCNC: 15.7 G/DL (ref 13.5–17.5)
LYMPHOCYTES # BLD: 3 K/UL (ref 1–5.1)
LYMPHOCYTES NFR BLD: 28.8 %
MCH RBC QN AUTO: 28.6 PG (ref 26–34)
MCHC RBC AUTO-ENTMCNC: 35.1 G/DL (ref 31–36)
MCV RBC AUTO: 81.5 FL (ref 80–100)
MONOCYTES # BLD: 0.8 K/UL (ref 0–1.3)
MONOCYTES NFR BLD: 8.1 %
NEUTROPHILS # BLD: 6.3 K/UL (ref 1.7–7.7)
NEUTROPHILS NFR BLD: 60.6 %
PLATELET # BLD AUTO: 223 K/UL (ref 135–450)
PMV BLD AUTO: 8 FL (ref 5–10.5)
POTASSIUM SERPL-SCNC: 4.7 MMOL/L (ref 3.5–5.1)
RBC # BLD AUTO: 5.5 M/UL (ref 4.2–5.9)
SODIUM SERPL-SCNC: 142 MMOL/L (ref 136–145)
WBC # BLD AUTO: 10.4 K/UL (ref 4–11)

## 2024-03-01 PROCEDURE — 6360000004 HC RX CONTRAST MEDICATION: Performed by: EMERGENCY MEDICINE

## 2024-03-01 PROCEDURE — 80048 BASIC METABOLIC PNL TOTAL CA: CPT

## 2024-03-01 PROCEDURE — 71260 CT THORAX DX C+: CPT

## 2024-03-01 PROCEDURE — 99285 EMERGENCY DEPT VISIT HI MDM: CPT

## 2024-03-01 PROCEDURE — 73630 X-RAY EXAM OF FOOT: CPT

## 2024-03-01 PROCEDURE — 85025 COMPLETE CBC W/AUTO DIFF WBC: CPT

## 2024-03-01 RX ORDER — LIDOCAINE 50 MG/G
1 PATCH TOPICAL DAILY
Qty: 10 PATCH | Refills: 0 | Status: SHIPPED | OUTPATIENT
Start: 2024-03-01 | End: 2024-03-11

## 2024-03-01 RX ADMIN — IOPAMIDOL 75 ML: 755 INJECTION, SOLUTION INTRAVENOUS at 16:14

## 2024-03-01 ASSESSMENT — PAIN - FUNCTIONAL ASSESSMENT
PAIN_FUNCTIONAL_ASSESSMENT: 0-10
PAIN_FUNCTIONAL_ASSESSMENT: PREVENTS OR INTERFERES SOME ACTIVE ACTIVITIES AND ADLS
PAIN_FUNCTIONAL_ASSESSMENT: 0-10

## 2024-03-01 ASSESSMENT — PAIN SCALES - GENERAL
PAINLEVEL_OUTOF10: 5
PAINLEVEL_OUTOF10: 7

## 2024-03-01 ASSESSMENT — PAIN DESCRIPTION - DESCRIPTORS
DESCRIPTORS: DISCOMFORT
DESCRIPTORS: DISCOMFORT

## 2024-03-01 ASSESSMENT — PAIN DESCRIPTION - LOCATION
LOCATION: RIB CAGE
LOCATION: RIB CAGE

## 2024-03-01 ASSESSMENT — PAIN DESCRIPTION - PAIN TYPE: TYPE: ACUTE PAIN

## 2024-03-01 ASSESSMENT — PAIN DESCRIPTION - ORIENTATION
ORIENTATION: LEFT
ORIENTATION: LEFT

## 2024-03-01 NOTE — ED NOTES
D/C: Order noted for d/c. Pt confirmed d/c paperwork has correct name. Discharge and education instructions reviewed with patient. Teach-back successful.  Pt verbalized understanding and denied questions at this time. No acute distress noted. Patient instructed to follow-up as noted - return to emergency department if symptoms worsen. Patient verbalized understanding. Discharged per EDMD with discharge instructions. Pt discharged to private vehicle. Patient stable upon departure. Thanked patient for [facility] for care. Provider aware of patient pain at time of discharge.

## 2024-03-01 NOTE — DISCHARGE INSTRUCTIONS
1.  Tylenol or ibuprofen over-the-counter for pain and escalate to prescribed medications if needed.  2.  Follow-up with your podiatrist and primary care physician on outpatient basis call for an appointment.  3.  Return emerged department for severe worsening abdominal pain or inability to tolerate oral fluids

## 2024-03-01 NOTE — ED PROVIDER NOTES
[facility] EMERGENCY DEPARTMENT     EMERGENCY DEPARTMENT ENCOUNTER            Pt Name: Juni King   MRN: [id number]   Birthdate 1965   Date of evaluation: 3/1/2024   Provider: Kenrick Small MD   PCP: Kiesha Peralta APRN - [facility]   Note Started: 3:54 PM EST 3/1/24          CHIEF COMPLAINT     Chief Complaint   Patient presents with    Toe Injury     Patient noticed bruising to right big toe last night and worsened today.    Rib Pain     Left rib pain after climbing in a dumpster to get his cell phone             HISTORY OF PRESENT ILLNESS:   History from : Patient   Limitations to history : None     Juni King is a 58 y.o. male who presents right toe and LUQ pain.     Is a pleasant 58-year-old  gentleman with multiple comorbidities and is on a blood thinner who woke about 2 days ago with bruising under the toenail of his right toe.  He is concerned that it is an infection.  He also complains of left upper quadrant left flank pain.  Apparently climbed into a dumpster after he accidentally dropped a cell phone into it about a week ago and struck the left upper quadrant of his abdomen chest wall on the dumpster leg.  Had pain which initially was improving but now has been worse over the past day or so.  Worse with deep breathing no cough no shortness of breath no peter chest pain.  Pain is radiating to the left lower quadrant from the left upper quadrant.  No nausea vomiting fevers chills urgency dysuria frequency    Nursing Notes were all reviewed and agreed with, or any disagreements were addressed in the HPI.     REVIEW OF SYSTEMS :    Positives and Pertinent negatives as per HPI.      MEDICAL HISTORY   has a past medical history of Abscess of arm, left (7/1/2018), Acute metabolic encephalopathy (9/18/2017), Acute respiratory failure with hypoxia (HCC), Arthritis, Blood circulation, collateral, CAD (coronary artery disease) (7/15/2014), CAD (coronary artery disease),

## 2024-05-14 ENCOUNTER — HOSPITAL ENCOUNTER (OUTPATIENT)
Age: 59
Discharge: HOME | End: 2024-05-16
Attending: INTERNAL MEDICINE
Payer: MEDICARE

## 2024-05-14 DIAGNOSIS — R94.31 ABNORMAL ELECTROCARDIOGRAM (ECG) (EKG): ICD-10-CM

## 2024-05-14 DIAGNOSIS — Z95.810 ICD (IMPLANTABLE CARDIOVERTER-DEFIBRILLATOR) IN PLACE: ICD-10-CM

## 2024-05-14 LAB
ECHO AO ROOT DIAM: 3.9 CM
ECHO AV AREA PEAK VELOCITY: 2.6 CM2
ECHO AV AREA VTI: 2.6 CM2
ECHO AV MEAN GRADIENT: 4 MMHG
ECHO AV MEAN VELOCITY: 0.9 M/S
ECHO AV PEAK GRADIENT: 7 MMHG
ECHO AV PEAK VELOCITY: 1.3 M/S
ECHO AV VELOCITY RATIO: 0.69
ECHO AV VTI: 26.1 CM
ECHO LA AREA 2C: 21.8 CM2
ECHO LA AREA 4C: 20.1 CM2
ECHO LA MAJOR AXIS: 4.9 CM
ECHO LA MINOR AXIS: 5.6 CM
ECHO LA VOL BP: 70 ML (ref 18–58)
ECHO LA VOL MOD A2C: 66 ML (ref 18–58)
ECHO LA VOL MOD A4C: 65 ML (ref 18–58)
ECHO LV E' LATERAL VELOCITY: 7 CM/S
ECHO LV E' SEPTAL VELOCITY: 7 CM/S
ECHO LV EDV 3D: 166 ML
ECHO LV EJECTION FRACTION 3D: 60 %
ECHO LV ESV 3D: 67 ML
ECHO LV FRACTIONAL SHORTENING: 14 % (ref 28–44)
ECHO LV INTERNAL DIMENSION DIASTOLIC: 4.2 CM (ref 4.2–5.9)
ECHO LV INTERNAL DIMENSION SYSTOLIC: 3.6 CM
ECHO LV IVSD: 0.9 CM (ref 0.6–1)
ECHO LV MASS 2D: 109.8 G (ref 88–224)
ECHO LV MASS 3D: 258 G
ECHO LV POSTERIOR WALL DIASTOLIC: 0.8 CM (ref 0.6–1)
ECHO LV RELATIVE WALL THICKNESS RATIO: 0.38
ECHO LVOT AREA: 3.8 CM2
ECHO LVOT AV VTI INDEX: 0.72
ECHO LVOT DIAM: 2.2 CM
ECHO LVOT MEAN GRADIENT: 2 MMHG
ECHO LVOT PEAK GRADIENT: 3 MMHG
ECHO LVOT PEAK VELOCITY: 0.9 M/S
ECHO LVOT SV: 71 ML
ECHO LVOT VTI: 18.7 CM
ECHO MV A VELOCITY: 1.08 M/S
ECHO MV E DECELERATION TIME (DT): 193 MS
ECHO MV E VELOCITY: 0.9 M/S
ECHO MV E/A RATIO: 0.83
ECHO MV E/E' LATERAL: 12.86
ECHO MV E/E' RATIO (AVERAGED): 12.86
ECHO MV E/E' SEPTAL: 12.86
ECHO PV MAX VELOCITY: 0.9 M/S
ECHO PV MEAN GRADIENT: 2 MMHG
ECHO PV MEAN VELOCITY: 0.6 M/S
ECHO PV PEAK GRADIENT: 3 MMHG
ECHO PV VTI: 18.7 CM
ECHO RA AREA 4C: 13.2 CM2
ECHO RA VOLUME: 30 ML
ECHO RV BASAL DIMENSION: 4 CM
ECHO RV FREE WALL PEAK S': 10 CM/S
ECHO RV MID DIMENSION: 2.3 CM
ECHO RV TAPSE: 1 CM (ref 1.7–?)
ECHO TV E WAVE: 0.8 M/S

## 2024-05-14 PROCEDURE — 93306 TTE W/DOPPLER COMPLETE: CPT | Performed by: INTERNAL MEDICINE

## 2024-05-14 PROCEDURE — 93306 TTE W/DOPPLER COMPLETE: CPT

## 2024-05-22 ENCOUNTER — HOSPITAL ENCOUNTER (OUTPATIENT)
Age: 59
Setting detail: OBSERVATION
Discharge: HOME | End: 2024-05-22
Attending: EMERGENCY MEDICINE | Admitting: HOSPITALIST
Payer: MEDICARE

## 2024-05-22 ENCOUNTER — APPOINTMENT (OUTPATIENT)
Dept: GENERAL RADIOLOGY | Age: 59
End: 2024-05-22
Payer: MEDICARE

## 2024-05-22 VITALS
OXYGEN SATURATION: 95 % | SYSTOLIC BLOOD PRESSURE: 159 MMHG | RESPIRATION RATE: 12 BRPM | TEMPERATURE: 97.6 F | HEIGHT: 73 IN | WEIGHT: 241.4 LBS | DIASTOLIC BLOOD PRESSURE: 88 MMHG | HEART RATE: 85 BPM | BODY MASS INDEX: 31.99 KG/M2

## 2024-05-22 DIAGNOSIS — R79.89 ELEVATED TROPONIN: ICD-10-CM

## 2024-05-22 DIAGNOSIS — I20.0 UNSTABLE ANGINA (HCC): Primary | ICD-10-CM

## 2024-05-22 DIAGNOSIS — I25.10 CAD, MULTIPLE VESSEL: ICD-10-CM

## 2024-05-22 PROBLEM — I21.4 NSTEMI (NON-ST ELEVATED MYOCARDIAL INFARCTION) (HCC): Status: ACTIVE | Noted: 2024-05-22

## 2024-05-22 PROBLEM — E11.9 TYPE 2 DIABETES MELLITUS, WITH LONG-TERM CURRENT USE OF INSULIN (HCC): Status: ACTIVE | Noted: 2022-09-01

## 2024-05-22 PROBLEM — I50.32 HEART FAILURE WITH IMPROVED EJECTION FRACTION (HFIMPEF) (HCC): Status: ACTIVE | Noted: 2024-05-22

## 2024-05-22 PROBLEM — Z86.74 HISTORY OF CARDIAC ARREST: Status: ACTIVE | Noted: 2024-05-22

## 2024-05-22 PROBLEM — Z79.4 TYPE 2 DIABETES MELLITUS, WITH LONG-TERM CURRENT USE OF INSULIN (HCC): Status: ACTIVE | Noted: 2022-09-01

## 2024-05-22 LAB
ALBUMIN SERPL-MCNC: 4 G/DL (ref 3.4–5)
ALBUMIN/GLOB SERPL: 1.3 {RATIO} (ref 1.1–2.2)
ALP SERPL-CCNC: 80 U/L (ref 40–129)
ALT SERPL-CCNC: 24 U/L (ref 10–40)
ANION GAP SERPL CALCULATED.3IONS-SCNC: 12 MMOL/L (ref 3–16)
APTT BLD: 24.6 SEC (ref 22.1–36.4)
APTT BLD: 83 SEC (ref 22.1–36.4)
AST SERPL-CCNC: 16 U/L (ref 15–37)
BASOPHILS # BLD: 0.1 K/UL (ref 0–0.2)
BASOPHILS NFR BLD: 1.1 %
BILIRUB SERPL-MCNC: 0.4 MG/DL (ref 0–1)
BUN SERPL-MCNC: 18 MG/DL (ref 7–20)
CALCIUM SERPL-MCNC: 9.6 MG/DL (ref 8.3–10.6)
CHLORIDE SERPL-SCNC: 99 MMOL/L (ref 99–110)
CO2 SERPL-SCNC: 27 MMOL/L (ref 21–32)
CREAT SERPL-MCNC: 1.1 MG/DL (ref 0.9–1.3)
DEPRECATED RDW RBC AUTO: 12.8 % (ref 12.4–15.4)
DEPRECATED RDW RBC AUTO: 13.3 % (ref 12.4–15.4)
EKG ATRIAL RATE: 90 BPM
EKG DIAGNOSIS: NORMAL
EKG P AXIS: 62 DEGREES
EKG P-R INTERVAL: 174 MS
EKG Q-T INTERVAL: 358 MS
EKG QRS DURATION: 102 MS
EKG QTC CALCULATION (BAZETT): 437 MS
EKG R AXIS: 11 DEGREES
EKG T AXIS: 52 DEGREES
EKG VENTRICULAR RATE: 90 BPM
EOSINOPHIL # BLD: 0.2 K/UL (ref 0–0.6)
EOSINOPHIL NFR BLD: 2.3 %
EST. AVERAGE GLUCOSE BLD GHB EST-MCNC: 226 MG/DL
GFR SERPLBLD CREATININE-BSD FMLA CKD-EPI: 77 ML/MIN/{1.73_M2}
GLUCOSE BLD-MCNC: 208 MG/DL (ref 70–99)
GLUCOSE SERPL-MCNC: 223 MG/DL (ref 70–99)
HBA1C MFR BLD: 9.5 %
HCT VFR BLD AUTO: 41.1 % (ref 40.5–52.5)
HCT VFR BLD AUTO: 42.5 % (ref 40.5–52.5)
HGB BLD-MCNC: 14.3 G/DL (ref 13.5–17.5)
HGB BLD-MCNC: 15.3 G/DL (ref 13.5–17.5)
LYMPHOCYTES # BLD: 3.5 K/UL (ref 1–5.1)
LYMPHOCYTES NFR BLD: 34.2 %
MCH RBC QN AUTO: 28 PG (ref 26–34)
MCH RBC QN AUTO: 28.9 PG (ref 26–34)
MCHC RBC AUTO-ENTMCNC: 34.7 G/DL (ref 31–36)
MCHC RBC AUTO-ENTMCNC: 36.1 G/DL (ref 31–36)
MCV RBC AUTO: 80 FL (ref 80–100)
MCV RBC AUTO: 80.8 FL (ref 80–100)
MONOCYTES # BLD: 0.8 K/UL (ref 0–1.3)
MONOCYTES NFR BLD: 7.5 %
NEUTROPHILS # BLD: 5.6 K/UL (ref 1.7–7.7)
NEUTROPHILS NFR BLD: 54.9 %
NT-PROBNP SERPL-MCNC: 82 PG/ML (ref 0–124)
PERFORMED ON: ABNORMAL
PLATELET # BLD AUTO: 193 K/UL (ref 135–450)
PLATELET # BLD AUTO: 196 K/UL (ref 135–450)
PMV BLD AUTO: 8.1 FL (ref 5–10.5)
PMV BLD AUTO: 8.3 FL (ref 5–10.5)
POTASSIUM SERPL-SCNC: 4.4 MMOL/L (ref 3.5–5.1)
PROT SERPL-MCNC: 7.2 G/DL (ref 6.4–8.2)
RBC # BLD AUTO: 5.09 M/UL (ref 4.2–5.9)
RBC # BLD AUTO: 5.31 M/UL (ref 4.2–5.9)
SODIUM SERPL-SCNC: 138 MMOL/L (ref 136–145)
TROPONIN, HIGH SENSITIVITY: 51 NG/L (ref 0–22)
TROPONIN, HIGH SENSITIVITY: 60 NG/L (ref 0–22)
WBC # BLD AUTO: 10 K/UL (ref 4–11)
WBC # BLD AUTO: 10.2 K/UL (ref 4–11)

## 2024-05-22 PROCEDURE — 85027 COMPLETE CBC AUTOMATED: CPT

## 2024-05-22 PROCEDURE — 85730 THROMBOPLASTIN TIME PARTIAL: CPT

## 2024-05-22 PROCEDURE — 96365 THER/PROPH/DIAG IV INF INIT: CPT

## 2024-05-22 PROCEDURE — 99223 1ST HOSP IP/OBS HIGH 75: CPT | Performed by: NURSE PRACTITIONER

## 2024-05-22 PROCEDURE — 85025 COMPLETE CBC W/AUTO DIFF WBC: CPT

## 2024-05-22 PROCEDURE — 6370000000 HC RX 637 (ALT 250 FOR IP): Performed by: HOSPITALIST

## 2024-05-22 PROCEDURE — 80053 COMPREHEN METABOLIC PANEL: CPT

## 2024-05-22 PROCEDURE — 83880 ASSAY OF NATRIURETIC PEPTIDE: CPT

## 2024-05-22 PROCEDURE — 93010 ELECTROCARDIOGRAM REPORT: CPT | Performed by: INTERNAL MEDICINE

## 2024-05-22 PROCEDURE — 83036 HEMOGLOBIN GLYCOSYLATED A1C: CPT

## 2024-05-22 PROCEDURE — 93005 ELECTROCARDIOGRAM TRACING: CPT | Performed by: EMERGENCY MEDICINE

## 2024-05-22 PROCEDURE — 84484 ASSAY OF TROPONIN QUANT: CPT

## 2024-05-22 PROCEDURE — 71046 X-RAY EXAM CHEST 2 VIEWS: CPT

## 2024-05-22 PROCEDURE — 6360000002 HC RX W HCPCS: Performed by: HOSPITALIST

## 2024-05-22 PROCEDURE — G0378 HOSPITAL OBSERVATION PER HR: HCPCS

## 2024-05-22 PROCEDURE — 99285 EMERGENCY DEPT VISIT HI MDM: CPT

## 2024-05-22 PROCEDURE — 96366 THER/PROPH/DIAG IV INF ADDON: CPT

## 2024-05-22 PROCEDURE — 2580000003 HC RX 258: Performed by: HOSPITALIST

## 2024-05-22 RX ORDER — SODIUM CHLORIDE 0.9 % (FLUSH) 0.9 %
5-40 SYRINGE (ML) INJECTION PRN
Status: DISCONTINUED | OUTPATIENT
Start: 2024-05-22 | End: 2024-05-22 | Stop reason: HOSPADM

## 2024-05-22 RX ORDER — ACETAMINOPHEN 325 MG/1
650 TABLET ORAL EVERY 6 HOURS PRN
Status: DISCONTINUED | OUTPATIENT
Start: 2024-05-22 | End: 2024-05-22 | Stop reason: HOSPADM

## 2024-05-22 RX ORDER — INSULIN LISPRO 100 [IU]/ML
0-8 INJECTION, SOLUTION INTRAVENOUS; SUBCUTANEOUS
Status: DISCONTINUED | OUTPATIENT
Start: 2024-05-22 | End: 2024-05-22 | Stop reason: HOSPADM

## 2024-05-22 RX ORDER — DEXTROSE MONOHYDRATE 100 MG/ML
INJECTION, SOLUTION INTRAVENOUS CONTINUOUS PRN
Status: DISCONTINUED | OUTPATIENT
Start: 2024-05-22 | End: 2024-05-22 | Stop reason: HOSPADM

## 2024-05-22 RX ORDER — GLUCAGON 1 MG/ML
1 KIT INJECTION PRN
Status: DISCONTINUED | OUTPATIENT
Start: 2024-05-22 | End: 2024-05-22 | Stop reason: HOSPADM

## 2024-05-22 RX ORDER — LISINOPRIL 10 MG/1
10 TABLET ORAL DAILY
Status: DISCONTINUED | OUTPATIENT
Start: 2024-05-22 | End: 2024-05-22 | Stop reason: HOSPADM

## 2024-05-22 RX ORDER — ATORVASTATIN CALCIUM 40 MG/1
80 TABLET, FILM COATED ORAL NIGHTLY
Status: DISCONTINUED | OUTPATIENT
Start: 2024-05-22 | End: 2024-05-22 | Stop reason: HOSPADM

## 2024-05-22 RX ORDER — SODIUM CHLORIDE 9 MG/ML
INJECTION, SOLUTION INTRAVENOUS PRN
Status: DISCONTINUED | OUTPATIENT
Start: 2024-05-22 | End: 2024-05-22 | Stop reason: HOSPADM

## 2024-05-22 RX ORDER — MAGNESIUM SULFATE IN WATER 40 MG/ML
2000 INJECTION, SOLUTION INTRAVENOUS PRN
Status: DISCONTINUED | OUTPATIENT
Start: 2024-05-22 | End: 2024-05-22 | Stop reason: HOSPADM

## 2024-05-22 RX ORDER — HEPARIN SODIUM 1000 [USP'U]/ML
4000 INJECTION, SOLUTION INTRAVENOUS; SUBCUTANEOUS ONCE
Status: COMPLETED | OUTPATIENT
Start: 2024-05-22 | End: 2024-05-22

## 2024-05-22 RX ORDER — SODIUM CHLORIDE 0.9 % (FLUSH) 0.9 %
5-40 SYRINGE (ML) INJECTION EVERY 12 HOURS SCHEDULED
Status: DISCONTINUED | OUTPATIENT
Start: 2024-05-22 | End: 2024-05-22 | Stop reason: HOSPADM

## 2024-05-22 RX ORDER — HEPARIN SODIUM 1000 [USP'U]/ML
30 INJECTION, SOLUTION INTRAVENOUS; SUBCUTANEOUS PRN
Status: DISCONTINUED | OUTPATIENT
Start: 2024-05-22 | End: 2024-05-22

## 2024-05-22 RX ORDER — HEPARIN SODIUM 10000 [USP'U]/100ML
0-4000 INJECTION, SOLUTION INTRAVENOUS CONTINUOUS
Status: DISCONTINUED | OUTPATIENT
Start: 2024-05-22 | End: 2024-05-22

## 2024-05-22 RX ORDER — INSULIN LISPRO 100 [IU]/ML
0-4 INJECTION, SOLUTION INTRAVENOUS; SUBCUTANEOUS NIGHTLY
Status: DISCONTINUED | OUTPATIENT
Start: 2024-05-22 | End: 2024-05-22 | Stop reason: HOSPADM

## 2024-05-22 RX ORDER — POTASSIUM CHLORIDE 7.45 MG/ML
10 INJECTION INTRAVENOUS PRN
Status: DISCONTINUED | OUTPATIENT
Start: 2024-05-22 | End: 2024-05-22 | Stop reason: HOSPADM

## 2024-05-22 RX ORDER — HEPARIN SODIUM 1000 [USP'U]/ML
60 INJECTION, SOLUTION INTRAVENOUS; SUBCUTANEOUS PRN
Status: DISCONTINUED | OUTPATIENT
Start: 2024-05-22 | End: 2024-05-22

## 2024-05-22 RX ORDER — ACETAMINOPHEN 650 MG/1
650 SUPPOSITORY RECTAL EVERY 6 HOURS PRN
Status: DISCONTINUED | OUTPATIENT
Start: 2024-05-22 | End: 2024-05-22 | Stop reason: HOSPADM

## 2024-05-22 RX ORDER — ONDANSETRON 2 MG/ML
4 INJECTION INTRAMUSCULAR; INTRAVENOUS EVERY 6 HOURS PRN
Status: DISCONTINUED | OUTPATIENT
Start: 2024-05-22 | End: 2024-05-22 | Stop reason: HOSPADM

## 2024-05-22 RX ORDER — SODIUM CHLORIDE 9 MG/ML
INJECTION, SOLUTION INTRAVENOUS CONTINUOUS
Status: DISCONTINUED | OUTPATIENT
Start: 2024-05-22 | End: 2024-05-22 | Stop reason: HOSPADM

## 2024-05-22 RX ORDER — POTASSIUM CHLORIDE 20 MEQ/1
40 TABLET, EXTENDED RELEASE ORAL PRN
Status: DISCONTINUED | OUTPATIENT
Start: 2024-05-22 | End: 2024-05-22 | Stop reason: HOSPADM

## 2024-05-22 RX ORDER — HYDRALAZINE HYDROCHLORIDE 20 MG/ML
10 INJECTION INTRAMUSCULAR; INTRAVENOUS EVERY 4 HOURS PRN
Status: DISCONTINUED | OUTPATIENT
Start: 2024-05-22 | End: 2024-05-22 | Stop reason: HOSPADM

## 2024-05-22 RX ORDER — POLYETHYLENE GLYCOL 3350 17 G/17G
17 POWDER, FOR SOLUTION ORAL DAILY PRN
Status: DISCONTINUED | OUTPATIENT
Start: 2024-05-22 | End: 2024-05-22 | Stop reason: HOSPADM

## 2024-05-22 RX ORDER — ONDANSETRON 4 MG/1
4 TABLET, ORALLY DISINTEGRATING ORAL EVERY 8 HOURS PRN
Status: DISCONTINUED | OUTPATIENT
Start: 2024-05-22 | End: 2024-05-22 | Stop reason: HOSPADM

## 2024-05-22 RX ADMIN — TICAGRELOR 180 MG: 90 TABLET ORAL at 07:00

## 2024-05-22 RX ADMIN — HEPARIN SODIUM 1000 UNITS/HR: 10000 INJECTION, SOLUTION INTRAVENOUS at 07:30

## 2024-05-22 RX ADMIN — Medication 10 ML: at 07:27

## 2024-05-22 RX ADMIN — Medication 10 ML: at 07:32

## 2024-05-22 RX ADMIN — SODIUM CHLORIDE 1000 ML: 9 INJECTION, SOLUTION INTRAVENOUS at 07:03

## 2024-05-22 RX ADMIN — HEPARIN SODIUM 4000 UNITS: 1000 INJECTION INTRAVENOUS; SUBCUTANEOUS at 07:29

## 2024-05-22 RX ADMIN — INSULIN LISPRO 2 UNITS: 100 INJECTION, SOLUTION INTRAVENOUS; SUBCUTANEOUS at 09:16

## 2024-05-22 ASSESSMENT — PAIN DESCRIPTION - FREQUENCY
FREQUENCY: CONTINUOUS

## 2024-05-22 ASSESSMENT — PAIN DESCRIPTION - ORIENTATION
ORIENTATION: MID

## 2024-05-22 ASSESSMENT — PAIN SCALES - GENERAL
PAINLEVEL_OUTOF10: 4
PAINLEVEL_OUTOF10: 2
PAINLEVEL_OUTOF10: 3

## 2024-05-22 ASSESSMENT — PAIN DESCRIPTION - LOCATION
LOCATION: CHEST

## 2024-05-22 ASSESSMENT — PAIN DESCRIPTION - DESCRIPTORS
DESCRIPTORS: PRESSURE

## 2024-05-22 ASSESSMENT — PAIN DESCRIPTION - PAIN TYPE
TYPE: ACUTE PAIN

## 2024-05-22 ASSESSMENT — HEART SCORE: ECG: NON-SPECIFC REPOLARIZATION DISTURBANCE/LBTB/PM

## 2024-05-22 NOTE — CONSULTS
Clinical Pharmacy Note  Heparin Dosing Consult    Juni King is a 58 y.o. male ordered heparin per CAD/STEMI/NSTEMI/UA/AFIB nomogram by Dr. Garcia.     No results found for: \"ANTIXAUHEP\", \"LABHEPA\"   Lab Results   Component Value Date/Time    HGB 15.3 05/22/2024 01:34 AM    HCT 42.5 05/22/2024 01:34 AM     05/22/2024 01:34 AM    INR 1.06 04/14/2021 12:00 PM       Ht Readings from Last 1 Encounters:   05/22/24 1.854 m (6' 1\")        Wt Readings from Last 1 Encounters:   05/22/24 109.5 kg (241 lb 6.5 oz)        Assessment/Plan:  Initial bolus: 4000 units  Initial infusion rate: 1000 units/hr  Next anti-Xa:: 6 hours after initiation of heparin drip    Pharmacy will continue to monitor adjust heparin based on anti-Xa results using nomogram below:     CAD/STEMI/NSTEMI/UA/AFIB Heparin Nomogram     Initial Bolus: 60 units/kg Max Bolus: 4,000 units       Initial Rate: 12 units/kg/hr Max Initial Rate: 1,000 units/hr     anti-Xa Bolus Titration   < 0.1 Heparin 60 units/kg bolus Increase drip by 4 units/kg/hr   0.1 - 0.29 Heparin 30 units/kg bolus Increase drip by 2 units/kg/hr   0.3 - 0.7 No Bolus No Change   0.71 - 0.8 No Bolus Decrease drip by 1 units/kg/hr   0.81 - 0.99 No Bolus Decrease drip by 2 units/kg/hr   > 1 Hold Heparin for 1 hour Decrease drip by 3 units/kg/hr     Obtain anti-Xa 6 hours after initial bolus and 6 hours after any dose change until two consecutive therapeutic anti-Xa levels are achieved - then daily.    Zakia KeenD

## 2024-05-22 NOTE — ED NOTES
Heparin started per orders.Pt encouraged not to get up. To stay in bed due to increase risk of injury/bleeding. Pt voiced that he understands.

## 2024-05-22 NOTE — H&P
V2.0  History and Physical      Name:  Juni King /Age/Sex: 1965  (58 y.o. male)   MRN & CSN:  [id number] & [id number] Encounter Date/Time: 2024 6:22 AM EDT   Location:  010/B-10 PCP: Kiesha Peralta APRN - CNP       Hospital Day: 1    Assessment and Plan:   Juni King is a 58 y.o. male with a pmh of diabetes mellitus; cardiac arrest (in 2018); and heart failure with improved ejection fraction; and pacemaker with defibrillator who presents with; diabetes mellitus; cardiac arrest (in 2018); and heart failure with improved ejection fraction; and pacemaker with defibrillator who presents with NSTEMI (non-ST elevated myocardial infarction) ([facility])    Hospital Problems             Last Modified POA    * (Principal) NSTEMI (non-ST elevated myocardial infarction) ([facility]) 2024 Yes    Type 2 diabetes mellitus, with long-term current use of insulin ([facility]) 2024 Yes    Diabetic peripheral neuropathy ([facility]) 2024 Yes    Heart failure with improved ejection fraction (HFimpEF) ([facility]) 2024 Yes    History of cardiac arrest 2024 Yes       Plan:  Patient reports having stress test 4 to 5 months ago and that was normal.  Echocardiogram was in May 2024 that shows hypokinesis in the basal inferolateral EF was 50 to 55%.  Reports a previous history history of an ejection fraction in the 20s.  Continue home aspirin and Brilinta.  Start patient on heparin drip.  Keep patient NPO.  Consult cardiology.  Diabetes mellitus-blood glucose is not within goal.  Accu-Chek with question scale insulin ordered.  Resume home insulin regimen.  Elevated blood pressure-patient had systolic blood pressure of more than 180 on presentation.  He denies history of high blood pressure.  Reportedly he stated that his systolic blood pressure is typically in the 90s.  Left second toe pain.  Resolved on itself.  Trend blood pressures.  Advanced care planning was discussed with patient for a total of [16] minutes.  Full

## 2024-05-22 NOTE — ED NOTES
Discharge and education instructions reviewed. Patient verbalized understanding, teach-back successful. Patient denied questions at this time. No acute distress noted. Patient instructed to follow-up as noted - return to emergency department if symptoms worsen. Patient verbalized understanding. Discharged per EDMD with discharged instructions. Patient discharged by Lisa MANCERA ED RN

## 2024-05-22 NOTE — CONSULTS
[facility]  Cardiology Consult              Patient Name: Juni King  Date of admission: 5/22/2024  1:23 AM  Patient's age: 58 y.o., 1965  Admission Dx: NSTEMI (non-ST elevated myocardial infarction) (HCC) [I21.4]    Reason for Consult:  chest pain   Requesting Physician: Tre Garcia MD  Code Status: Full Code   CHIEF COMPLAINT:  chest pain     History Obtained From:  patient, electronic medical record    HISTORY OF PRESENT ILLNESS:      The patient is a 58 y.o.  male who is admitted to the hospital for chest pain. Patient reports he was sitting at home and developed chest pain. Pain felt like \"food was stuck\"   His blood pressure was also elevated at the time. His wife attempted to drive him to the ED however he became anxious and she went to the fire department and he was transported to [facility] from there.  Patient with known hx of CAD and PCI and CM with recovered EF.  He had a stress test 11/2023 which was stable and also had a recent echo with preserved EF and no WMA     Patient reports he has been pain free overnight.       Past Medical History/Past Surgical History/Social History/Family History/Living Situation: Reviewed per record            Home Medications:    Prior to Admission medications    Medication Sig Start Date End Date Taking? Authorizing Provider   nitroGLYCERIN (NITROSTAT) 0.4 MG SL tablet Place 1 tablet under the tongue every 5 minutes as needed for Chest pain 10/30/23   Gladys Heath APRN - CNP   prasugrel (EFFIENT) 10 MG TABS TAKE 1 TABLET EVERY DAY 8/23/23   Matt Espino MD   NOVOLOG FLEXPEN 100 UNIT/ML injection pen Inject 22 Units into the skin 3 times daily (before meals) 6/19/23   ProviderAnselmo MD   insulin glargine (LANTUS) 100 UNIT/ML injection vial Inject 37 Units into the skin nightly  Patient taking differently: Inject 35 Units into the skin nightly 9/5/22   Milagro Toussaint MD   metoprolol succinate (TOPROL XL) 50 MG extended release  reversible ischemia     2.   Hx of VT    ICD in situ   10/2018  3. HTN    Elevated on admission    Now stable   Continue current regimen   4. CAD    11/2020 RCA PCI 4 x 26   10/2018  CX PCI 2.5 x 38   10/2018 RPDA PCI and OM1 PCI   5. CM with recovered EF   Compensated   Continue medical therapy     Patient discussed with Dr Espino  No plan for additional cardiac testing at this time.    Cardiology will sign off with outpt followup.    Please call with questions.            All questions and concerns were addressed to the patient/family. Alternatives to my treatment were discussed. The note was completed using EMR. Every effort was made to ensure accuracy; however, inadvertent computerized transcription errors may be present.         Discussed with patient and Nurse.    Gladys Heath CNP  [facility] [place]  5/22/2024  10:33 AM

## 2024-05-22 NOTE — PROGRESS NOTES
Medication Reconciliation    List of medications patient is currently taking is complete.     Source of information: 1. Conversation with patient at bedside                                      2. EPIC records         Huber Cornelius [facility]   5/22/2024  12:23 PM

## 2024-05-22 NOTE — PROGRESS NOTES
Hospitalist Progress Note  5/22/2024 9:40 AM    PCP: Kiesha Peralta, LAURA - CNP    [id number]     Date of Admission: 5/22/2024                                                                                                                     HOSPITAL COURSE    Patient demographics:  The patient  Juni King is a 58 y.o. male     Significant past medical history:   Patient Active Problem List   Diagnosis    Diabetes mellitus out of control    Essential hypertension    Mixed hyperlipidemia    Abnormal stress test    DMII (diabetes mellitus, type 2) ([facility])    POTS (postural orthostatic tachycardia syndrome)    Diabetic peripheral neuropathy ([facility])    Abnormal EKG    Neurogenic syncope    VT (ventricular tachycardia) ([facility])    Idiopathic hypotension    Abnormal ECG    Ventricular tachycardia ([facility])    CAD, multiple vessel    ICD (implantable cardioverter-defibrillator) in place    Anemia    Lightheadedness    Cardiac arrest ([facility])    Ischemic cardiomyopathy    Poor appetite    Chronic hypoxemic respiratory failure ([facility])    Postinflammatory pulmonary fibrosis ([facility])    CAD S/P percutaneous coronary angioplasty    Chest pain    Type 2 diabetes mellitus, with long-term current use of insulin ([facility])    Ulcer of left foot, with fat layer exposed ([facility])    Closed displaced fracture of middle phalanx of lesser toe of left foot    Neuropathy    Right foot ulcer, with fat layer exposed ([facility])    NSTEMI (non-ST elevated myocardial infarction) ([facility])    Heart failure with improved ejection fraction (HFimpEF) ([facility])    History of cardiac arrest         Presenting symptoms:      Diagnostic workup:      CONSULTS DURING ADMISSION :   IP CONSULT TO CARDIOLOGY      Patient was diagnosed with:        Treatment while inpatient:  58 years old male with medical history significant for known coronary artery disease and PCI.  Patient also has history of for cardiomyopathy with recovered ejection fraction.  Patient's recent stress test was

## 2024-05-22 NOTE — ED NOTES
Patient educated about Heparin and if he falls he is to return to ED asap per Beulah MANCERA ED RN.

## 2024-05-22 NOTE — ED TRIAGE NOTES
Pt into ED from home via EMS with c/o pressure chest pain that began x2 hrs prior. Pt states he was sitting in recliner when pain occurred. Followed by nausea, and L sided arm tingling. 324 ASA given by EMS, with moderate relief. Pt a/ox4, cardiac hx

## 2024-05-22 NOTE — ED NOTES
Pt noted manipulating blood pressure cuff. Showing abnormal vital signs. RN was able to encourage pt to allow RN to fix the cuff and get an accurate reading. BP wnl. Dr Beard made aware. Lisinopril ordered however per MD ok to hold today until pt needs it.

## 2024-05-22 NOTE — ED PROVIDER NOTES
[facility] EMERGENCY DEPARTMENT     EMERGENCY DEPARTMENT ENCOUNTER            Pt Name: Juni King   MRN: [id number]   Birthdate 1965   Date of evaluation: 5/22/2024   Provider: Desean Harris MD   PCP: Kiesha Peralta APRN - ROMMEL   Note Started: 1:36 AM EDT 5/22/24          CHIEF COMPLAINT     Chief Complaint   Patient presents with    Chest Pain     Pt into ED from home via EMS with c/o pressure chest pain that began x2 hrs prior. Pt states he was sitting in recliner when pain occurred. Followed by nausea, and L sided arm tingling. 324 ASA given by EMS, with moderate relief. Pt a/ox4, cardiac hx           HISTORY OF PRESENT ILLNESS:   History from : Patient and EMS   Limitations to history : None     Juni King is a 58 y.o. male who  has a past medical history of Abscess of arm, left, Acute metabolic encephalopathy, Acute respiratory failure with hypoxia ([facility]), Arthritis, Blood circulation, collateral, CAD (coronary artery disease), CAD (coronary artery disease), Cardiac arrest ([facility]), Cerebral artery occlusion with cerebral infarction ([facility]), Cholecystitis, COVID-19 virus infection, Diabetes mellitus ([facility]), Diabetic peripheral neuropathy ([facility]), Elbow contusion, GERD (gastroesophageal reflux disease), High anion gap metabolic acidosis, Hypercholesteremia, Hyperlipidemia, Hypertension, ICD (implantable cardioverter-defibrillator) in place, Left arm numbness, MRSA (methicillin resistant staph aureus) culture positive, Multifocal pneumonia, Neuropathy, Neutrophilic leukocytosis, NSTEMI (non-ST elevated myocardial infarction) ([facility]), Pneumonia due to COVID-19 virus, POTS (postural orthostatic tachycardia syndrome), Psychiatric problem, Sepsis ([facility]), SIRS (systemic inflammatory response syndrome) ([facility]), Skin ulcer of left foot with fat layer exposed ([facility]), Sleep apnea, ST elevation myocardial infarction (STEMI) of inferolateral wall ([facility]), Syncope, Type II or unspecified type  virus infection, Diabetes mellitus (HCC), Diabetic peripheral neuropathy (HCC), Elbow contusion, GERD (gastroesophageal reflux disease), High anion gap metabolic acidosis, Hypercholesteremia, Hyperlipidemia, Hypertension, ICD (implantable cardioverter-defibrillator) in place, Left arm numbness, MRSA (methicillin resistant staph aureus) culture positive, Multifocal pneumonia, Neuropathy, Neutrophilic leukocytosis, NSTEMI (non-ST elevated myocardial infarction) ([facility]), Pneumonia due to COVID-19 virus, POTS (postural orthostatic tachycardia syndrome), Psychiatric problem, Sepsis ([facility]), SIRS (systemic inflammatory response syndrome) ([facility]), Skin ulcer of left foot with fat layer exposed ([facility]), Sleep apnea, ST elevation myocardial infarction (STEMI) of inferolateral wall ([facility]), Syncope, Type II or unspecified type diabetes mellitus without mention of complication, not stated as uncontrolled, Ulcer of foot due to secondary diabetes ([facility]), and Wears glasses.    Records Reviewed: Patient's echocardiogram from 5/14/2024, ED visit note on 3/1/2024 for chest pain and subungual hematoma, cardiology office visit note on 2/8/2024 for CAD, ICD, neurogenic syncope, ischemic cardiomyopathy, essential hypertension, hyperlipidemia, type 2 diabetes and abnormal EKG      Disposition Considerations:    I am the Primary Clinician of Record.        FINAL IMPRESSION    1. Unstable angina (HCC)    2. Elevated troponin    3. CAD, multiple vessel           DISPOSITION/PLAN:     Pt admitted to hospital for further workup.    PATIENT REFERRED TO:   No follow-up provider specified.     DISCHARGE MEDICATIONS:   New Prescriptions    No medications on file        DISCONTINUED MEDICATIONS:   Discontinued Medications    No medications on file              (Please note that portions of this note were completed with a voice recognition program.  Efforts were made to edit the dictations but occasionally words are mis-transcribed.)       Desean Harris MD

## 2024-05-22 NOTE — CARE COORDINATION
Case Management Assessment  Initial Evaluation    Date/Time of Evaluation: 5/22/2024 3:11 PM  Assessment Completed by: JACKIE Hyatt, BEVERLY    If patient is discharged prior to next notation, then this note serves as note for discharge by case management.    Patient Name: Juni King                   YOB: 1965  Diagnosis: NSTEMI (non-ST elevated myocardial infarction) (HCC) [I21.4]                   Date / Time: 5/22/2024  1:23 AM    Patient Admission Status: Observation   Readmission Risk (Low < 19, Mod (19-27), High > 27): No data recorded  Current PCP: Kiesha Peralta APRN - CNP  PCP verified by CM? Yes    Chart Reviewed: Yes      History Provided by: Patient  Patient Orientation: Alert and Oriented    Patient Cognition: Alert    Hospitalization in the last 30 days (Readmission):  No    If yes, Readmission Assessment in CM Navigator will be completed.    Advance Directives:      Code Status: Full Code   Patient's Primary Decision Maker is: Legal Next of Kin    Primary Decision Maker: Ivan King - Spouse - [phone]    Discharge Planning:    Patient lives with: Spouse/Significant Other, Children, Other (Comment) (pets, has five cats.) Type of Home: House  Primary Care Giver: Self  Patient Support Systems include: Children, Family Members   Current Financial resources: Medicare  Current community resources: None  Current services prior to admission: Durable Medical Equipment            Current DME: Glucometer, Walker            Type of Home Care services:  None    ADLS  Prior functional level: Independent in ADLs/IADLs  Current functional level: Independent in ADLs/IADLs    PT AM-PAC:   /24  OT AM-PAC:   /24    Family can provide assistance at DC: Yes  Would you like Case Management to discuss the discharge plan with any other family members/significant others, and if so, who? Yes  Plans to Return to Present Housing: Yes  Other Identified Issues/Barriers to RETURNING to current  housing: none noted at this time.   Potential Assistance needed at discharge: Other (Comment) (TBD)            Potential DME:  None noted.   Patient expects to discharge to: House  Plan for transportation at discharge: Family    Financial  Payor: [employer] MEDICARE / Plan: [employer] MEDICARE / Product Type: *No Product type* /     Does insurance require precert for SNF: Yes    Potential assistance Purchasing Medications: No  Meds-to-Beds request:        [facility] [address] [place] [facility] [place], [place] - [address] - P [phone] - F [phone]  [address] [facility] [address]  Phone: [phone] Fax: [phone]      Notes:    Factors facilitating achievement of predicted outcomes: Family support, Motivated, Cooperative, Pleasant, Good insight into deficits, Has needed Durable Medical Equipment at home, and Knowledge about rehab    Barriers to discharge: None noted at this time.     Additional Case Management Notes:   1) Waiting on cardiology clearance and discontinuation of IV Heparin.   2) Discharge to home with family.     The Plan for Transition of Care is related to the following treatment goals of NSTEMI (non-ST elevated myocardial infarction) (HCC) [I21.4]    The Patient and/or Patient Representative Agree with the Discharge Plan?  Yes.     Respectfully submitted,    KAMRON Simpson Harrold   [phone]    Electronically signed by JACKIE Hyatt LSW on 5/22/2024 at 3:11 PM

## 2024-05-22 NOTE — ED NOTES
Per Cardiology and Hospitalist, pt will be discharged from the ED to go home. Awaiting dc orders at this time

## 2024-06-04 NOTE — PROGRESS NOTES
[facility]   Cardiology Office Visit      Date: 6/12/2024  CC:    Chief Complaint   Patient presents with    Follow-Up from Hospital     No cc       I had the privilege of visiting Juni King in the office.     Presents today for follow up :for known history of  CAD-multivessel, ischemic cardiomyopathy, VT s/p AICD, anemia, HTN, HLD and DM. Patient had complicated course with NSTEMI, VT multi vessel PCI. He underwent heart catheterization on 10/3/18 resulting in CECE to LCX. He underwent repeat angiography with Dr. Dickinson on 10/7/19 resulting in CECE to OM1. He did have to be placed on balloon pump for hemodynamic support. Due to continued episodes of sustained VT, Dr. Srikanth Ambrose-MILE placed MRI compatible Med[employer]onic AICD.     Reports episodes of chest pain with moving refrigerator-reports he felt like he should have gone to the ED however did not go.   He denies additional episodes. He does report worsening general fatigue and is not as active as he was since completing cardiac rehab. He also reports intermittent shortness of breath. He also reports worsening palpitations since the episode of chest pain.     Reports occasional lightheadedness however this is no different from previous and his blood pressure has remained stable.     Pt reports he has been feeling well.  Patient denies lightheadedness, dizziness, shortness of breath, chest pain, palpitations, orthopnea, edema, presyncope or syncope.       HPI: Jnui King is a 59 y.o.     Past Medical History:   Diagnosis Date    Abscess of arm, left 7/1/2018    Acute metabolic encephalopathy 9/18/2017    Acute respiratory failure with hypoxia (HCC)     Arthritis     Blood circulation, collateral     CAD (coronary artery disease) 7/15/2014    CAD (coronary artery disease)     Cardiac arrest (HCC) 10/05/2018    Cerebral artery occlusion with cerebral infarction (HCC)     Cholecystitis 4/14/2021    COVID-19 virus infection 7/1/2020    Diabetes

## 2024-06-12 ENCOUNTER — OFFICE VISIT (OUTPATIENT)
Dept: CARDIOLOGY CLINIC | Age: 59
End: 2024-06-12
Payer: MEDICARE

## 2024-06-12 VITALS
DIASTOLIC BLOOD PRESSURE: 78 MMHG | BODY MASS INDEX: 31 KG/M2 | WEIGHT: 235 LBS | SYSTOLIC BLOOD PRESSURE: 128 MMHG | HEART RATE: 94 BPM | OXYGEN SATURATION: 98 %

## 2024-06-12 DIAGNOSIS — Z95.810 ICD (IMPLANTABLE CARDIOVERTER-DEFIBRILLATOR) IN PLACE: ICD-10-CM

## 2024-06-12 DIAGNOSIS — I25.10 CAD S/P PERCUTANEOUS CORONARY ANGIOPLASTY: Primary | ICD-10-CM

## 2024-06-12 DIAGNOSIS — I47.20 VT (VENTRICULAR TACHYCARDIA) (HCC): ICD-10-CM

## 2024-06-12 DIAGNOSIS — Z98.61 CAD S/P PERCUTANEOUS CORONARY ANGIOPLASTY: Primary | ICD-10-CM

## 2024-06-12 DIAGNOSIS — R55 SYNCOPE AND COLLAPSE: ICD-10-CM

## 2024-06-12 PROCEDURE — 3074F SYST BP LT 130 MM HG: CPT | Performed by: NURSE PRACTITIONER

## 2024-06-12 PROCEDURE — 99214 OFFICE O/P EST MOD 30 MIN: CPT | Performed by: NURSE PRACTITIONER

## 2024-06-12 PROCEDURE — 3078F DIAST BP <80 MM HG: CPT | Performed by: NURSE PRACTITIONER

## 2024-06-17 RX ORDER — NITROGLYCERIN 0.4 MG/1
0.4 TABLET SUBLINGUAL EVERY 5 MIN PRN
Qty: 25 TABLET | Refills: 3 | Status: SHIPPED | OUTPATIENT
Start: 2024-06-17

## 2024-08-28 RX ORDER — NITROGLYCERIN 0.4 MG/1
0.4 TABLET SUBLINGUAL EVERY 5 MIN PRN
Qty: 25 TABLET | Refills: 3 | Status: SHIPPED | OUTPATIENT
Start: 2024-08-28

## 2024-08-28 NOTE — TELEPHONE ENCOUNTER
Medication provider checked, last note checked for changes, recent labs checked, last visit or next visit within range, medication approved for refill  \

## 2024-09-04 RX ORDER — PRASUGREL 10 MG/1
TABLET, FILM COATED ORAL
Qty: 90 TABLET | Refills: 3 | Status: SHIPPED | OUTPATIENT
Start: 2024-09-04

## 2024-09-04 NOTE — TELEPHONE ENCOUNTER
Last OV: 24 Ivana  Next OV: 10/10/24 Kenzie  Last labs: 24 CBC, BNP CMP   Last EK24  Last filled:    Disp Refills Start End     prasugrel (EFFIENT) 10 MG TABS 90 tablet 3 2023 --    Sig: TAKE 1 TABLET EVERY DAY    Patient taking differently: Take 1 tablet by mouth daily    Sent to pharmacy as: Prasugrel HCl 10 MG Oral Tablet (EFFIENT)    Cosign for Ordering: Accepted by Matt Espino MD on 2023  9:19 AM    E-Prescribing Status: Receipt confirmed by pharmacy (2023  1:17 PM EDT)

## 2024-10-01 NOTE — PROGRESS NOTES
with normal LV filling pressures.  Left atrium is of normal size.  Normal right ventricular size and function.  Trivial mitral & mild tricuspid regurgitation is present.    Stress Test:   11/15/2023   1. Technically a satisfactory study.   2. Fixed perfusion defect involving basal lateral and inferiorlateral wall   suggestive of scar.   3. Gated Study shows normal LV size and Systolic function; EF is 51 %.    Stress test 3/18/2022  moderate size severe inferior lateral wall fixed defect consistent with old    MI. no evidence of ischemia.    Overall findings represent a low risk scan.        Recommendation    Aggressive medical therapy for coronary artery disease.    Patient may be discharged.     Stress test 7/7/2021   Abnormal study. There is a moderate sized, severe intensity, fixed defect of    the basal to mid inferior/inferolateral wall which is consistent with    scar/prior infarction.    No evidence of reversible ischemia.    LV function is normal with inferolateral akinesis and ejection fraction of    56 %.    Overall findings represent a low to intermediate risk study.           Cath:   Cardiac cath 11/18/2020  1.  Left main normal.  LYNNE 3 flow.  No stenosis.  2.  Left anterior descending artery, has LYNNE 3 flow with 20% stenosis.  3.  Left circumflex has distally LYNNE 2 flow but no significant  stenosis, patent stents.  4.  Right coronary artery in the mid portion has 90% stenosis present  with unstable plaque.  LV ejection fraction 60%.     In summary, successful revascularization of the right coronary artery  and placement of stent, 4.0 x 26 mm.    Cardiac cath 10/3/18   ANGIOGRAPHIC FINDINGS:  1.  Left main is normal.  LYNNE 3 flow.  No stenosis.  2.  Left anterior descending artery comes from the left main coronary  artery.  LYNNE 3 flow.  No stenosis present with patent diagonal branches.  3.  Left circumflex is occluded in the mid portion.  4.  Right coronary comes from the right coronary cusp.  It

## 2024-10-10 ENCOUNTER — OFFICE VISIT (OUTPATIENT)
Dept: CARDIOLOGY CLINIC | Age: 59
End: 2024-10-10

## 2024-10-10 VITALS
HEART RATE: 92 BPM | DIASTOLIC BLOOD PRESSURE: 60 MMHG | SYSTOLIC BLOOD PRESSURE: 88 MMHG | HEIGHT: 73 IN | BODY MASS INDEX: 29.77 KG/M2 | WEIGHT: 224.6 LBS | OXYGEN SATURATION: 97 % | RESPIRATION RATE: 15 BRPM

## 2024-10-10 DIAGNOSIS — I25.5 ISCHEMIC CARDIOMYOPATHY: ICD-10-CM

## 2024-10-10 DIAGNOSIS — I25.10 CAD S/P PERCUTANEOUS CORONARY ANGIOPLASTY: Primary | ICD-10-CM

## 2024-10-10 DIAGNOSIS — Z98.61 CAD S/P PERCUTANEOUS CORONARY ANGIOPLASTY: ICD-10-CM

## 2024-10-10 DIAGNOSIS — I25.10 CAD S/P PERCUTANEOUS CORONARY ANGIOPLASTY: ICD-10-CM

## 2024-10-10 DIAGNOSIS — E78.2 MIXED HYPERLIPIDEMIA: ICD-10-CM

## 2024-10-10 DIAGNOSIS — Z98.61 CAD S/P PERCUTANEOUS CORONARY ANGIOPLASTY: Primary | ICD-10-CM

## 2024-10-10 DIAGNOSIS — I10 ESSENTIAL HYPERTENSION: ICD-10-CM

## 2024-10-10 DIAGNOSIS — I47.20 VT (VENTRICULAR TACHYCARDIA) (HCC): ICD-10-CM

## 2024-10-10 LAB
ALBUMIN SERPL-MCNC: 4 G/DL (ref 3.4–5)
ALBUMIN/GLOB SERPL: 1.5 {RATIO} (ref 1.1–2.2)
ALP SERPL-CCNC: 83 U/L (ref 40–129)
ALT SERPL-CCNC: 28 U/L (ref 10–40)
ANION GAP SERPL CALCULATED.3IONS-SCNC: 11 MMOL/L (ref 3–16)
AST SERPL-CCNC: 19 U/L (ref 15–37)
BILIRUB SERPL-MCNC: 0.3 MG/DL (ref 0–1)
BUN SERPL-MCNC: 17 MG/DL (ref 7–20)
CALCIUM SERPL-MCNC: 9.6 MG/DL (ref 8.3–10.6)
CHLORIDE SERPL-SCNC: 97 MMOL/L (ref 99–110)
CHOLEST SERPL-MCNC: 208 MG/DL (ref 0–199)
CO2 SERPL-SCNC: 28 MMOL/L (ref 21–32)
CREAT SERPL-MCNC: 1.2 MG/DL (ref 0.9–1.3)
DEPRECATED RDW RBC AUTO: 13.1 % (ref 12.4–15.4)
GFR SERPLBLD CREATININE-BSD FMLA CKD-EPI: 70 ML/MIN/{1.73_M2}
GLUCOSE SERPL-MCNC: 356 MG/DL (ref 70–99)
HCT VFR BLD AUTO: 45.3 % (ref 40.5–52.5)
HDLC SERPL-MCNC: 31 MG/DL (ref 40–60)
HGB BLD-MCNC: 15.2 G/DL (ref 13.5–17.5)
LDL CHOLESTEROL: ABNORMAL MG/DL
LDLC SERPL-MCNC: 96 MG/DL
MCH RBC QN AUTO: 28 PG (ref 26–34)
MCHC RBC AUTO-ENTMCNC: 33.7 G/DL (ref 31–36)
MCV RBC AUTO: 83.3 FL (ref 80–100)
PLATELET # BLD AUTO: 203 K/UL (ref 135–450)
PMV BLD AUTO: 9.1 FL (ref 5–10.5)
POTASSIUM SERPL-SCNC: 3.8 MMOL/L (ref 3.5–5.1)
PROT SERPL-MCNC: 6.7 G/DL (ref 6.4–8.2)
RBC # BLD AUTO: 5.44 M/UL (ref 4.2–5.9)
SODIUM SERPL-SCNC: 136 MMOL/L (ref 136–145)
TRIGL SERPL-MCNC: 405 MG/DL (ref 0–150)
VLDLC SERPL CALC-MCNC: ABNORMAL MG/DL
WBC # BLD AUTO: 8.7 K/UL (ref 4–11)

## 2024-10-16 ENCOUNTER — TELEPHONE (OUTPATIENT)
Dept: CARDIOLOGY CLINIC | Age: 59
End: 2024-10-16

## 2024-10-17 DIAGNOSIS — I25.10 CAD S/P PERCUTANEOUS CORONARY ANGIOPLASTY: ICD-10-CM

## 2024-10-17 DIAGNOSIS — Z98.61 CAD S/P PERCUTANEOUS CORONARY ANGIOPLASTY: ICD-10-CM

## 2024-10-17 DIAGNOSIS — E78.2 MIXED HYPERLIPIDEMIA: Primary | ICD-10-CM

## 2024-12-12 ENCOUNTER — TRANSCRIBE ORDERS (OUTPATIENT)
Dept: ADMINISTRATIVE | Age: 59
End: 2024-12-12

## 2024-12-12 DIAGNOSIS — Z12.11 ENCOUNTER FOR SCREENING FOR MALIGNANT NEOPLASM OF COLON: Primary | ICD-10-CM

## 2024-12-30 ENCOUNTER — HOSPITAL ENCOUNTER (INPATIENT)
Age: 59
LOS: 2 days | Discharge: HOME | DRG: 069 | End: 2025-01-03
Attending: EMERGENCY MEDICINE | Admitting: HOSPITALIST
Payer: MEDICARE

## 2024-12-30 ENCOUNTER — APPOINTMENT (OUTPATIENT)
Dept: CT IMAGING | Age: 59
DRG: 069 | End: 2024-12-30
Payer: MEDICARE

## 2024-12-30 DIAGNOSIS — R29.90 STROKE-LIKE SYMPTOMS: Primary | ICD-10-CM

## 2024-12-30 DIAGNOSIS — I63.9 CEREBROVASCULAR ACCIDENT (CVA), UNSPECIFIED MECHANISM (HCC): ICD-10-CM

## 2024-12-30 DIAGNOSIS — R29.898 LEFT LEG WEAKNESS: ICD-10-CM

## 2024-12-30 LAB
ALBUMIN SERPL-MCNC: 4.3 G/DL (ref 3.4–5)
ALBUMIN/GLOB SERPL: 1.3 {RATIO} (ref 1.1–2.2)
ALP SERPL-CCNC: 84 U/L (ref 40–129)
ALT SERPL-CCNC: 19 U/L (ref 10–40)
ANION GAP SERPL CALCULATED.3IONS-SCNC: 12 MMOL/L (ref 3–16)
AST SERPL-CCNC: 21 U/L (ref 15–37)
BASOPHILS # BLD: 0.1 K/UL (ref 0–0.2)
BASOPHILS NFR BLD: 0.9 %
BILIRUB SERPL-MCNC: 0.4 MG/DL (ref 0–1)
BUN SERPL-MCNC: 17 MG/DL (ref 7–20)
CALCIUM SERPL-MCNC: 9.5 MG/DL (ref 8.3–10.6)
CHLORIDE SERPL-SCNC: 98 MMOL/L (ref 99–110)
CO2 SERPL-SCNC: 23 MMOL/L (ref 21–32)
CREAT SERPL-MCNC: 1.2 MG/DL (ref 0.9–1.3)
DEPRECATED RDW RBC AUTO: 12.9 % (ref 12.4–15.4)
EOSINOPHIL # BLD: 0.1 K/UL (ref 0–0.6)
EOSINOPHIL NFR BLD: 1.6 %
GFR SERPLBLD CREATININE-BSD FMLA CKD-EPI: 69 ML/MIN/{1.73_M2}
GLUCOSE BLD-MCNC: 377 MG/DL (ref 70–99)
GLUCOSE SERPL-MCNC: 412 MG/DL (ref 70–99)
HCT VFR BLD AUTO: 44.4 % (ref 40.5–52.5)
HGB BLD-MCNC: 15.5 G/DL (ref 13.5–17.5)
INR PPP: 0.9 (ref 0.85–1.15)
LYMPHOCYTES # BLD: 2.8 K/UL (ref 1–5.1)
LYMPHOCYTES NFR BLD: 33.1 %
MCH RBC QN AUTO: 29.2 PG (ref 26–34)
MCHC RBC AUTO-ENTMCNC: 35 G/DL (ref 31–36)
MCV RBC AUTO: 83.5 FL (ref 80–100)
MONOCYTES # BLD: 0.7 K/UL (ref 0–1.3)
MONOCYTES NFR BLD: 7.8 %
NEUTROPHILS # BLD: 4.8 K/UL (ref 1.7–7.7)
NEUTROPHILS NFR BLD: 56.6 %
PERFORMED ON: ABNORMAL
PLATELET # BLD AUTO: 211 K/UL (ref 135–450)
PMV BLD AUTO: 8.6 FL (ref 5–10.5)
POTASSIUM SERPL-SCNC: 4.6 MMOL/L (ref 3.5–5.1)
PROT SERPL-MCNC: 7.6 G/DL (ref 6.4–8.2)
PROTHROMBIN TIME: 12.4 SEC (ref 11.9–14.9)
RBC # BLD AUTO: 5.32 M/UL (ref 4.2–5.9)
SODIUM SERPL-SCNC: 133 MMOL/L (ref 136–145)
TROPONIN, HIGH SENSITIVITY: 61 NG/L (ref 0–22)
WBC # BLD AUTO: 8.5 K/UL (ref 4–11)

## 2024-12-30 PROCEDURE — 6360000004 HC RX CONTRAST MEDICATION: Performed by: EMERGENCY MEDICINE

## 2024-12-30 PROCEDURE — 93005 ELECTROCARDIOGRAM TRACING: CPT | Performed by: EMERGENCY MEDICINE

## 2024-12-30 PROCEDURE — 84484 ASSAY OF TROPONIN QUANT: CPT

## 2024-12-30 PROCEDURE — 99285 EMERGENCY DEPT VISIT HI MDM: CPT

## 2024-12-30 PROCEDURE — 70450 CT HEAD/BRAIN W/O DYE: CPT

## 2024-12-30 PROCEDURE — 70496 CT ANGIOGRAPHY HEAD: CPT

## 2024-12-30 PROCEDURE — 70498 CT ANGIOGRAPHY NECK: CPT

## 2024-12-30 PROCEDURE — 85025 COMPLETE CBC W/AUTO DIFF WBC: CPT

## 2024-12-30 PROCEDURE — 80053 COMPREHEN METABOLIC PANEL: CPT

## 2024-12-30 PROCEDURE — 85610 PROTHROMBIN TIME: CPT

## 2024-12-30 RX ORDER — ATORVASTATIN CALCIUM 80 MG/1
80 TABLET, FILM COATED ORAL DAILY
Status: DISCONTINUED | OUTPATIENT
Start: 2024-12-31 | End: 2025-01-03 | Stop reason: HOSPADM

## 2024-12-30 RX ORDER — ONDANSETRON 4 MG/1
4 TABLET, ORALLY DISINTEGRATING ORAL EVERY 8 HOURS PRN
Status: DISCONTINUED | OUTPATIENT
Start: 2024-12-30 | End: 2025-01-03 | Stop reason: HOSPADM

## 2024-12-30 RX ORDER — ENOXAPARIN SODIUM 100 MG/ML
30 INJECTION SUBCUTANEOUS 2 TIMES DAILY
Status: DISCONTINUED | OUTPATIENT
Start: 2024-12-31 | End: 2025-01-03 | Stop reason: HOSPADM

## 2024-12-30 RX ORDER — INSULIN GLARGINE 100 [IU]/ML
37 INJECTION, SOLUTION SUBCUTANEOUS NIGHTLY
Status: DISCONTINUED | OUTPATIENT
Start: 2024-12-31 | End: 2025-01-03 | Stop reason: HOSPADM

## 2024-12-30 RX ORDER — INSULIN LISPRO 100 [IU]/ML
0-16 INJECTION, SOLUTION INTRAVENOUS; SUBCUTANEOUS
Status: DISCONTINUED | OUTPATIENT
Start: 2024-12-31 | End: 2025-01-03 | Stop reason: HOSPADM

## 2024-12-30 RX ORDER — POLYETHYLENE GLYCOL 3350 17 G/17G
17 POWDER, FOR SOLUTION ORAL DAILY PRN
Status: DISCONTINUED | OUTPATIENT
Start: 2024-12-30 | End: 2025-01-03 | Stop reason: HOSPADM

## 2024-12-30 RX ORDER — NITROGLYCERIN 0.4 MG/1
0.4 TABLET SUBLINGUAL EVERY 5 MIN PRN
Status: DISCONTINUED | OUTPATIENT
Start: 2024-12-30 | End: 2024-12-30

## 2024-12-30 RX ORDER — GLUCAGON 1 MG/ML
1 KIT INJECTION PRN
Status: DISCONTINUED | OUTPATIENT
Start: 2024-12-30 | End: 2025-01-03 | Stop reason: HOSPADM

## 2024-12-30 RX ORDER — INSULIN GLARGINE 100 [IU]/ML
40 INJECTION, SOLUTION SUBCUTANEOUS NIGHTLY
Status: DISCONTINUED | OUTPATIENT
Start: 2024-12-31 | End: 2024-12-30

## 2024-12-30 RX ORDER — ASPIRIN 300 MG/1
300 SUPPOSITORY RECTAL DAILY
Status: DISCONTINUED | OUTPATIENT
Start: 2024-12-31 | End: 2025-01-03 | Stop reason: HOSPADM

## 2024-12-30 RX ORDER — ASPIRIN 81 MG/1
81 TABLET, CHEWABLE ORAL DAILY
Status: DISCONTINUED | OUTPATIENT
Start: 2024-12-31 | End: 2025-01-03 | Stop reason: HOSPADM

## 2024-12-30 RX ORDER — SODIUM CHLORIDE 0.9 % (FLUSH) 0.9 %
5-40 SYRINGE (ML) INJECTION EVERY 12 HOURS SCHEDULED
Status: DISCONTINUED | OUTPATIENT
Start: 2024-12-31 | End: 2025-01-03 | Stop reason: HOSPADM

## 2024-12-30 RX ORDER — LABETALOL HYDROCHLORIDE 5 MG/ML
10 INJECTION, SOLUTION INTRAVENOUS EVERY 10 MIN PRN
Status: DISCONTINUED | OUTPATIENT
Start: 2024-12-30 | End: 2025-01-03 | Stop reason: HOSPADM

## 2024-12-30 RX ORDER — SODIUM CHLORIDE 9 MG/ML
INJECTION, SOLUTION INTRAVENOUS PRN
Status: DISCONTINUED | OUTPATIENT
Start: 2024-12-30 | End: 2025-01-03 | Stop reason: HOSPADM

## 2024-12-30 RX ORDER — SODIUM CHLORIDE 9 MG/ML
INJECTION, SOLUTION INTRAVENOUS CONTINUOUS
Status: ACTIVE | OUTPATIENT
Start: 2024-12-31 | End: 2024-12-31

## 2024-12-30 RX ORDER — SODIUM CHLORIDE 0.9 % (FLUSH) 0.9 %
5-40 SYRINGE (ML) INJECTION PRN
Status: DISCONTINUED | OUTPATIENT
Start: 2024-12-30 | End: 2025-01-03 | Stop reason: HOSPADM

## 2024-12-30 RX ORDER — IOPAMIDOL 755 MG/ML
75 INJECTION, SOLUTION INTRAVASCULAR
Status: COMPLETED | OUTPATIENT
Start: 2024-12-30 | End: 2024-12-30

## 2024-12-30 RX ORDER — DEXTROSE MONOHYDRATE 100 MG/ML
INJECTION, SOLUTION INTRAVENOUS CONTINUOUS PRN
Status: DISCONTINUED | OUTPATIENT
Start: 2024-12-30 | End: 2025-01-03 | Stop reason: HOSPADM

## 2024-12-30 RX ORDER — ONDANSETRON 2 MG/ML
4 INJECTION INTRAMUSCULAR; INTRAVENOUS EVERY 6 HOURS PRN
Status: DISCONTINUED | OUTPATIENT
Start: 2024-12-30 | End: 2025-01-03 | Stop reason: HOSPADM

## 2024-12-30 RX ORDER — PRASUGREL 10 MG/1
10 TABLET, FILM COATED ORAL DAILY
Status: DISCONTINUED | OUTPATIENT
Start: 2024-12-31 | End: 2025-01-03 | Stop reason: HOSPADM

## 2024-12-30 RX ADMIN — IOPAMIDOL 75 ML: 755 INJECTION, SOLUTION INTRAVENOUS at 22:45

## 2024-12-30 ASSESSMENT — PAIN SCALES - GENERAL: PAINLEVEL_OUTOF10: 7

## 2024-12-30 ASSESSMENT — LIFESTYLE VARIABLES
HOW OFTEN DO YOU HAVE A DRINK CONTAINING ALCOHOL: NEVER
HOW MANY STANDARD DRINKS CONTAINING ALCOHOL DO YOU HAVE ON A TYPICAL DAY: PATIENT DOES NOT DRINK

## 2024-12-30 ASSESSMENT — PAIN DESCRIPTION - LOCATION: LOCATION: HEAD;NECK;FACE

## 2024-12-30 ASSESSMENT — PAIN DESCRIPTION - DESCRIPTORS: DESCRIPTORS: ACHING;NUMBNESS;TINGLING

## 2024-12-30 ASSESSMENT — PAIN - FUNCTIONAL ASSESSMENT: PAIN_FUNCTIONAL_ASSESSMENT: 0-10

## 2024-12-30 ASSESSMENT — PAIN DESCRIPTION - ORIENTATION: ORIENTATION: LEFT

## 2024-12-31 PROBLEM — E11.65 UNCONTROLLED TYPE 2 DIABETES MELLITUS WITH HYPERGLYCEMIA (HCC): Status: ACTIVE | Noted: 2017-09-18

## 2024-12-31 LAB
ANION GAP SERPL CALCULATED.3IONS-SCNC: 11 MMOL/L (ref 3–16)
BUN SERPL-MCNC: 16 MG/DL (ref 7–20)
CALCIUM SERPL-MCNC: 8.4 MG/DL (ref 8.3–10.6)
CHLORIDE SERPL-SCNC: 102 MMOL/L (ref 99–110)
CHOLEST SERPL-MCNC: 204 MG/DL (ref 0–199)
CO2 SERPL-SCNC: 22 MMOL/L (ref 21–32)
CREAT SERPL-MCNC: 1 MG/DL (ref 0.9–1.3)
DEPRECATED RDW RBC AUTO: 13 % (ref 12.4–15.4)
EKG ATRIAL RATE: 82 BPM
EKG DIAGNOSIS: NORMAL
EKG P AXIS: 42 DEGREES
EKG P-R INTERVAL: 172 MS
EKG Q-T INTERVAL: 374 MS
EKG QRS DURATION: 96 MS
EKG QTC CALCULATION (BAZETT): 436 MS
EKG R AXIS: 6 DEGREES
EKG T AXIS: 34 DEGREES
EKG VENTRICULAR RATE: 82 BPM
EST. AVERAGE GLUCOSE BLD GHB EST-MCNC: 297.7 MG/DL
GFR SERPLBLD CREATININE-BSD FMLA CKD-EPI: 86 ML/MIN/{1.73_M2}
GLUCOSE BLD-MCNC: 193 MG/DL (ref 70–99)
GLUCOSE BLD-MCNC: 208 MG/DL (ref 70–99)
GLUCOSE BLD-MCNC: 231 MG/DL (ref 70–99)
GLUCOSE BLD-MCNC: 273 MG/DL (ref 70–99)
GLUCOSE BLD-MCNC: 339 MG/DL (ref 70–99)
GLUCOSE SERPL-MCNC: 320 MG/DL (ref 70–99)
HBA1C MFR BLD: 12 %
HCT VFR BLD AUTO: 42.7 % (ref 40.5–52.5)
HDLC SERPL-MCNC: 33 MG/DL (ref 40–60)
HGB BLD-MCNC: 15 G/DL (ref 13.5–17.5)
LDLC SERPL CALC-MCNC: ABNORMAL MG/DL
LDLC SERPL-MCNC: 112 MG/DL
MCH RBC QN AUTO: 29.3 PG (ref 26–34)
MCHC RBC AUTO-ENTMCNC: 35.1 G/DL (ref 31–36)
MCV RBC AUTO: 83.5 FL (ref 80–100)
PERFORMED ON: ABNORMAL
PLATELET # BLD AUTO: 203 K/UL (ref 135–450)
PMV BLD AUTO: 8.7 FL (ref 5–10.5)
POTASSIUM SERPL-SCNC: 3.9 MMOL/L (ref 3.5–5.1)
RBC # BLD AUTO: 5.11 M/UL (ref 4.2–5.9)
SODIUM SERPL-SCNC: 135 MMOL/L (ref 136–145)
TRIGL SERPL-MCNC: 657 MG/DL (ref 0–150)
TROPONIN, HIGH SENSITIVITY: 47 NG/L (ref 0–22)
TROPONIN, HIGH SENSITIVITY: 49 NG/L (ref 0–22)
TROPONIN, HIGH SENSITIVITY: 49 NG/L (ref 0–22)
VLDLC SERPL CALC-MCNC: ABNORMAL MG/DL
WBC # BLD AUTO: 8.7 K/UL (ref 4–11)

## 2024-12-31 PROCEDURE — 6370000000 HC RX 637 (ALT 250 FOR IP): Performed by: INTERNAL MEDICINE

## 2024-12-31 PROCEDURE — 97530 THERAPEUTIC ACTIVITIES: CPT

## 2024-12-31 PROCEDURE — G0378 HOSPITAL OBSERVATION PER HR: HCPCS

## 2024-12-31 PROCEDURE — 2580000003 HC RX 258: Performed by: INTERNAL MEDICINE

## 2024-12-31 PROCEDURE — 85027 COMPLETE CBC AUTOMATED: CPT

## 2024-12-31 PROCEDURE — 6360000002 HC RX W HCPCS: Performed by: INTERNAL MEDICINE

## 2024-12-31 PROCEDURE — 84484 ASSAY OF TROPONIN QUANT: CPT

## 2024-12-31 PROCEDURE — 83036 HEMOGLOBIN GLYCOSYLATED A1C: CPT

## 2024-12-31 PROCEDURE — 80048 BASIC METABOLIC PNL TOTAL CA: CPT

## 2024-12-31 PROCEDURE — 97161 PT EVAL LOW COMPLEX 20 MIN: CPT

## 2024-12-31 PROCEDURE — 84132 ASSAY OF SERUM POTASSIUM: CPT

## 2024-12-31 PROCEDURE — 6370000000 HC RX 637 (ALT 250 FOR IP): Performed by: EMERGENCY MEDICINE

## 2024-12-31 PROCEDURE — 2500000003 HC RX 250 WO HCPCS: Performed by: INTERNAL MEDICINE

## 2024-12-31 PROCEDURE — 97165 OT EVAL LOW COMPLEX 30 MIN: CPT

## 2024-12-31 PROCEDURE — 6370000000 HC RX 637 (ALT 250 FOR IP): Performed by: HOSPITALIST

## 2024-12-31 PROCEDURE — 80061 LIPID PANEL: CPT

## 2024-12-31 PROCEDURE — 96372 THER/PROPH/DIAG INJ SC/IM: CPT

## 2024-12-31 PROCEDURE — 83721 ASSAY OF BLOOD LIPOPROTEIN: CPT

## 2024-12-31 PROCEDURE — 36415 COLL VENOUS BLD VENIPUNCTURE: CPT

## 2024-12-31 PROCEDURE — 93010 ELECTROCARDIOGRAM REPORT: CPT | Performed by: INTERNAL MEDICINE

## 2024-12-31 RX ORDER — ACETAMINOPHEN 500 MG
1000 TABLET ORAL ONCE
Status: COMPLETED | OUTPATIENT
Start: 2024-12-31 | End: 2024-12-31

## 2024-12-31 RX ORDER — ACETAMINOPHEN 325 MG/1
650 TABLET ORAL EVERY 4 HOURS PRN
Status: DISCONTINUED | OUTPATIENT
Start: 2024-12-31 | End: 2025-01-03 | Stop reason: HOSPADM

## 2024-12-31 RX ADMIN — INSULIN LISPRO 4 UNITS: 100 INJECTION, SOLUTION INTRAVENOUS; SUBCUTANEOUS at 09:16

## 2024-12-31 RX ADMIN — ATORVASTATIN CALCIUM 80 MG: 80 TABLET, FILM COATED ORAL at 09:18

## 2024-12-31 RX ADMIN — PRASUGREL 10 MG: 10 TABLET, FILM COATED ORAL at 09:26

## 2024-12-31 RX ADMIN — SODIUM CHLORIDE, PRESERVATIVE FREE 10 ML: 5 INJECTION INTRAVENOUS at 21:21

## 2024-12-31 RX ADMIN — INSULIN LISPRO 8 UNITS: 100 INJECTION, SOLUTION INTRAVENOUS; SUBCUTANEOUS at 13:19

## 2024-12-31 RX ADMIN — ASPIRIN 81 MG: 81 TABLET, CHEWABLE ORAL at 09:18

## 2024-12-31 RX ADMIN — SODIUM CHLORIDE, PRESERVATIVE FREE 10 ML: 5 INJECTION INTRAVENOUS at 09:19

## 2024-12-31 RX ADMIN — Medication 37 UNITS: at 01:10

## 2024-12-31 RX ADMIN — Medication 37 UNITS: at 21:22

## 2024-12-31 RX ADMIN — ENOXAPARIN SODIUM 30 MG: 100 INJECTION SUBCUTANEOUS at 09:15

## 2024-12-31 RX ADMIN — INSULIN LISPRO 4 UNITS: 100 INJECTION, SOLUTION INTRAVENOUS; SUBCUTANEOUS at 21:23

## 2024-12-31 RX ADMIN — ACETAMINOPHEN 650 MG: 325 TABLET ORAL at 23:18

## 2024-12-31 RX ADMIN — ACETAMINOPHEN 650 MG: 325 TABLET ORAL at 13:18

## 2024-12-31 RX ADMIN — ACETAMINOPHEN 1000 MG: 500 TABLET ORAL at 00:03

## 2024-12-31 RX ADMIN — INSULIN LISPRO 4 UNITS: 100 INJECTION, SOLUTION INTRAVENOUS; SUBCUTANEOUS at 18:12

## 2024-12-31 RX ADMIN — SODIUM CHLORIDE: 9 INJECTION, SOLUTION INTRAVENOUS at 01:13

## 2024-12-31 RX ADMIN — INSULIN LISPRO 12 UNITS: 100 INJECTION, SOLUTION INTRAVENOUS; SUBCUTANEOUS at 01:10

## 2024-12-31 ASSESSMENT — PAIN SCALES - GENERAL
PAINLEVEL_OUTOF10: 2
PAINLEVEL_OUTOF10: 7
PAINLEVEL_OUTOF10: 2
PAINLEVEL_OUTOF10: 2
PAINLEVEL_OUTOF10: 4
PAINLEVEL_OUTOF10: 3

## 2024-12-31 ASSESSMENT — PAIN DESCRIPTION - LOCATION
LOCATION: HEAD
LOCATION: HEAD;ARM
LOCATION: HEAD
LOCATION: HEAD

## 2024-12-31 ASSESSMENT — PAIN DESCRIPTION - DESCRIPTORS
DESCRIPTORS: ACHING
DESCRIPTORS: ACHING;TINGLING
DESCRIPTORS: ACHING

## 2024-12-31 ASSESSMENT — PAIN DESCRIPTION - ORIENTATION
ORIENTATION: LEFT
ORIENTATION: UPPER
ORIENTATION: LEFT

## 2024-12-31 ASSESSMENT — PAIN DESCRIPTION - PAIN TYPE: TYPE: ACUTE PAIN

## 2024-12-31 NOTE — PROGRESS NOTES
Occupational Therapy  Facility/Department: [address] PROGRESSIVE CARE  Occupational Therapy Initial Assessment and Discharge      Name: Juni King  : 1965  MRN: [id number]  Date of Service: 2024    Staff assist recommendation: Ax1      Discharge Recommendations: Juni King scored a 24/ on the AM-PAC ADL Inpatient form. Current research shows that an AM-PAC score of 18 or greater is typically associated with a discharge to the patient's home setting.     If patient discharges prior to next session this note will serve as a discharge summary.  Please see below for the latest assessment towards goals.     Home with assist PRN  OT Equipment Recommendations  Equipment Needed: No         Patient Diagnosis(es): The encounter diagnosis was Stroke-like symptoms.  Past Medical History:  has a past medical history of Abscess of arm, left, Acute metabolic encephalopathy, Acute respiratory failure with hypoxia, Arthritis, CAD (coronary artery disease), Cardiac arrest, Cerebral artery occlusion with cerebral infarction (HCC), Cholecystitis, COVID-19 virus infection, Diabetic peripheral neuropathy ([facility]), Elbow contusion, GERD (gastroesophageal reflux disease), High anion gap metabolic acidosis, Hyperlipidemia, Hypertension, ICD (implantable cardioverter-defibrillator) in place, Left arm numbness, MRSA (methicillin resistant staph aureus) culture positive, Multifocal pneumonia, Neuropathy, NSTEMI (non-ST elevated myocardial infarction) ([facility]), Pneumonia due to COVID-19 virus, POTS (postural orthostatic tachycardia syndrome), Psychiatric problem, Sepsis ([facility]), Skin ulcer of left foot with fat layer exposed ([facility]), Sleep apnea, ST elevation myocardial infarction (STEMI) of inferolateral wall ([facility]), Syncope, Type II or unspecified type diabetes mellitus without mention of complication, not stated as uncontrolled, Ulcer of foot due to secondary diabetes ([facility]), and Wears glasses.  Past Surgical History:  has a past  reports of light headedness/dizziness)  Overall Level of Assistance: Supervision  Distance (ft): 75 Feet  Assistive Device: None     AROM: Within functional limits  PROM: Within functional limits  Strength: Within functional limits  Coordination: Within functional limits  Tone: Normal  Sensation: Intact (reports baseline neuropathy in feet)  ADL  Putting On/Taking Off Footwear: Independent (pt able to manage B socks)  Functional Mobility: Independent  Additional Comments: Anticipate pt Ind with all ADLs based on ROM, strength, and balance.                 Cognition  Overall Cognitive Status: WFL  Orientation  Overall Orientation Status: Within Functional Limits                  Education Given To: Patient  Education Provided: Role of Therapy;Plan of Care;Transfer Training  Education Method: Demonstration;Verbal  Barriers to Learning: None  Education Outcome: Verbalized understanding;Demonstrated understanding          AM-PAC - ADL  AM-PAC Daily Activity - Inpatient   How much help is needed for putting on and taking off regular lower body clothing?: None  How much help is needed for bathing (which includes washing, rinsing, drying)?: None  How much help is needed for toileting (which includes using toilet, bedpan, or urinal)?: None  How much help is needed for putting on and taking off regular upper body clothing?: None  How much help is needed for taking care of personal grooming?: None  How much help for eating meals?: None  AM-PAC Inpatient Daily Activity Raw Score: 24  AM-PAC Inpatient ADL T-Scale Score : 57.54  ADL Inpatient CMS 0-100% Score: 0  ADL Inpatient CMS G-Code Modifier : CH    Tinneti Score       Goals  Short Term Goals  Time Frame for Short Term Goals: no ongoing OT goals  Short Term Goal 1: complete transfers and ADLs Ind- goal met 12/31      Therapy Time   Individual Concurrent Group Co-treatment   Time In 0659         Time Out 0722         Minutes 23         Timed Code Treatment Minutes: 8 Minutes

## 2024-12-31 NOTE — H&P
V2.0  History and Physical      Name:  Juni King /Age/Sex: 1965  (59 y.o. male)   MRN & CSN:  [id number] & [id number] Encounter Date/Time: 2024 12:03 AM EST   Location:  [address] [place] [address] PCP: Kiesha Peralta APRN - CNP       Hospital Day: 2    Assessment and Plan:   Juni King is a 59 y.o. obese male smoker with a pmh of diabetes type 2 with complications, CVA, CAD, heart failure, cardiac arrest status post AICD placement, hypertension, dyslipidemia, GERD who presents with Weakness of left lower extremity.    Hospital Problems             Last Modified POA    * (Principal) Weakness of left lower extremity 2024 Yes    Uncontrolled type 2 diabetes mellitus with hyperglycemia (HCC) 2024 Yes    Diabetic neuropathy 2024 Yes       Plan:  Monitoring in stepdown unit with every 4 hours neurochecks, continue antiplatelets and statin with permissive hypertension, MRI of the brain once AICD is turned off by OLED-T, neurology to see in a.m.  Moderate insulin sliding scale before every meal and at bedtime as needed and Lantus 37 units nightly, check glycosylated hemoglobin  Resume home regimen for chronic stable conditions with the above-mentioned modifications    Disposition:   Current Living situation: Home  Expected Disposition: To be determined  Estimated D/C: 2 days    Diet Diet NPO   DVT Prophylaxis [x] Lovenox, []  Heparin, [] SCDs, [] Ambulation,  [] Eliquis, [] Xarelto, [] Coumadin   Code Status Full Code   Surrogate Decision Maker/ POA Wife     Personally reviewed Lab Studies and Imaging     Discussed management of the case with no one who recommended n/a.    EKG interpreted personally and results normal sinus rhythm with a ventricular rate of 82, QTc 436, no acute ischemic changes.    Imaging that was interpreted personally includes none and results n/a.    Drugs that require monitoring for toxicity include none and the method of monitoring was n/a.        History from:      patient    History of Present Illness:     Chief Complaint: Left face and arm tingling, left leg weakness  Juni King is a 59 y.o. obese male smoker in remission with pmh of diabetes type 2 with complications, CVA, CAD, heart failure, cardiac arrest status post AICD placement, hypertension, dyslipidemia, GERD who presents with left face and arm tingling and left leg weakness which started around 8 PM on the night of admission.  Although he has had the tingling sensation in his left arm is never had it in the left face and has never had weakness of his left lower extremity.  He denies any visual changes or speech impediment.  He denies any lightheadedness, palpitations, chest pain, shortness of breath, cough, fevers, chills, nausea or vomiting, diarrhea or dysuria.    In the emergency room his temperature was 36.6, blood pressure 161/101 with a pulse of 85, respirations 17, sats 99% on room air, BMI 30.51.     Review of Systems:        Pertinent positives and negatives discussed in HPI.     Objective:   No intake or output data in the 24 hours ending 12/31/24 0003   Vitals:   Vitals:    12/30/24 2235 12/30/24 2300 12/30/24 2345   BP: (!) 161/101 (!) 169/98 (!) 176/96   Pulse: 85 87 85   Resp: 17 15 16   Temp: 97.8 °F (36.6 °C)     SpO2: 99% 99% 99%   Weight: 104.9 kg (231 lb 4.2 oz)     Height: 1.854 m (6' 1\")         Personally Reviewed Medications Prior to Admission     Prior to Admission medications    Medication Sig Start Date End Date Taking? Authorizing Provider   prasugrel (EFFIENT) 10 MG TABS TAKE 1 TABLET DAILY 9/4/24   Matt Espino MD   nitroGLYCERIN (NITROSTAT) 0.4 MG SL tablet PLACE 1 TABLET UNDER THE TONGUE EVERY 5 MINUTES AS NEEDED FOR CHEST PAIN. 8/28/24   Gladys Heath APRN - CNP   vitamin D 25 MCG (1000 UT) CAPS Take 1 capsule by mouth Daily    ProviderAnselmo MD   NOVOLOG FLEXPEN 100 UNIT/ML injection pen Inject 22 Units into the skin 3 times daily (before meals) 6/19/23

## 2024-12-31 NOTE — NURSE NAVIGATOR
4 Eyes Skin Assessment     NAME:  Juni King  YOB: 1965  MEDICAL RECORD NUMBER:  [id number]    The patient is being assessed for  Admission    I agree that at least one RN has performed a thorough Head to Toe Skin Assessment on the patient. ALL assessment sites listed below have been assessed.      Areas assessed by both nurses:    Head, Face, Ears, Shoulders, Back, Chest, Arms, Elbows, Hands, Sacrum. Buttock, Coccyx, Ischium, Legs. Feet and Heels, Under Medical Devices , and Other left upper back red scab abrasion ,  scattered /red spots on  head and both arms and legs/feet  ,  stated that he has 5 cats  and some of these marks are scratches from them,,  chantel rectal area red and excoriated ,, left chest defibrillator ,,,        Does the Patient have a Wound? No noted wound(s)  but has many scattered red/abrasion/scrates,,       Michael Prevention initiated by RN: Yes  Wound Care Orders initiated by RN: No    Pressure Injury (Stage 3,4, Unstageable, DTI, NWPT, and Complex wounds) if present, place Wound referral order by RN under : No    New Ostomies, if present place, Ostomy referral order under : No     Nurse 1 eSignature: Electronically signed by Allison Adrain RN on 12/31/24 at 3:37 AM EST    **SHARE this note so that the co-signing nurse can place an eSignature**    Nurse 2 eSignature: Electronically signed by Jackelyn Sheridan RN on 12/31/24 at 6:45 AM EST

## 2024-12-31 NOTE — ACP (ADVANCE CARE PLANNING)
Advance Care Planning     Advance Care Planning Inpatient Note  [facility] Department    Today's Date: 12/31/2024  Unit: [facility] PROGRESSIVE CARE    Received request from HealthCare Provider.  Upon review of chart and communication with care team, patient's decision making abilities are not in question.. Patient was/were present in the room during visit.    Goals of ACP Conversation:  Discuss advance care planning documents    Health Care Decision Makers:       Primary Decision Maker: Ivan King - Spouse - [phone]  Summary:  No Decision Maker named by patient at this time    Advance Care Planning Documents (Patient Wishes):  None     Assessment:  Patient stated he would most likely name his spouse and daughter but the patient to review the AD docs before completing them.     Interventions:  Provided education on documents for clarity and greater understanding  Discussed and provided education on state decision maker hierarchy  Encouraged ongoing ACP conversation with future decision makers and loved ones  Reviewed but did not complete ACP document    Care Preferences Communicated:   No    Outcomes/Plan:  ACP Discussion: Postponed  Patient to follow-up    Electronically signed by Chaplain Yoli on 12/31/2024 at 11:24 AM

## 2024-12-31 NOTE — PROGRESS NOTES
Patient seen and examined at bed side    Awaiting MRI brain    Neurology consulted    PT and OT    Will follow

## 2024-12-31 NOTE — PROGRESS NOTES
Physical Therapy  Facility/Department: [address] PROGRESSIVE CARE  Physical Therapy Initial Assessment    Name: Juni King  : 1965  MRN: [id number]  Date of Service: 2024    Discharge Recommendations:  Home with assist PRN   PT Equipment Recommendations  Equipment Needed: No      Patient Diagnosis(es): The encounter diagnosis was Stroke-like symptoms.  Past Medical History:  has a past medical history of Abscess of arm, left, Acute metabolic encephalopathy, Acute respiratory failure with hypoxia, Arthritis, CAD (coronary artery disease), Cardiac arrest, Cerebral artery occlusion with cerebral infarction (HCC), Cholecystitis, COVID-19 virus infection, Diabetic peripheral neuropathy (HCC), Elbow contusion, GERD (gastroesophageal reflux disease), High anion gap metabolic acidosis, Hyperlipidemia, Hypertension, ICD (implantable cardioverter-defibrillator) in place, Left arm numbness, MRSA (methicillin resistant staph aureus) culture positive, Multifocal pneumonia, Neuropathy, NSTEMI (non-ST elevated myocardial infarction) ([facility]), Pneumonia due to COVID-19 virus, POTS (postural orthostatic tachycardia syndrome), Psychiatric problem, Sepsis ([facility]), Skin ulcer of left foot with fat layer exposed ([facility]), Sleep apnea, ST elevation myocardial infarction (STEMI) of inferolateral wall ([facility]), Syncope, Type II or unspecified type diabetes mellitus without mention of complication, not stated as uncontrolled, Ulcer of foot due to secondary diabetes ([facility]), and Wears glasses.  Past Surgical History:  has a past surgical history that includes Vasectomy; Finger surgery (Left); hernia repair; vascular surgery; Colonoscopy; Coronary angioplasty with stent (10/06/2018); Coronary angioplasty with stent (10/07/2018); Coronary angioplasty with stent (10/03/2018); Diagnostic Cardiac Cath Lab Procedure; Cardiac catheterization; and Cholecystectomy, laparoscopic (N/A, 2021).    Assessment  Body Structures, Functions, Activity  (degrees)  LLE AROM : WFL    Strength RLE  Strength RLE: WFL  Strength LLE  Strength LLE: WFL     Bed mobility  Supine to Sit: Independent  Sit to Supine: Independent    Transfers  Sit to Stand: Independent  Stand to Sit: Independent    Ambulation  Surface: Level tile  Device: No Device  Assistance: Supervision  Quality of Gait: Slow speed, minimal sway, no dizziness, no LOB.  Distance: 150'    AM-PAC - Mobility    AM-PAC Basic Mobility - Inpatient   How much help is needed turning from your back to your side while in a flat bed without using bedrails?: None  How much help is needed moving from lying on your back to sitting on the side of a flat bed without using bedrails?: None  How much help is needed moving to and from a bed to a chair?: None  How much help is needed standing up from a chair using your arms?: None  How much help is needed walking in hospital room?: A Little  How much help is needed climbing 3-5 steps with a railing?: A Little  AM-[facility] Inpatient Mobility Raw Score : 22  AM-PAC Inpatient T-Scale Score : 53.28  Mobility Inpatient CMS 0-100% Score: 20.91  Mobility Inpatient CMS G-Code Modifier : CJ    Goals  Short Term Goals  Time Frame for Short Term Goals: 2-3 days  Short Term Goal 1: Ambulation 500' (I)  Short Term Goal 2: Ascend/Descend 12 steps (I)  Patient Goals   Patient Goals : To go home     Education  Patient Education  Education Given To: Patient  Education Provided: Role of Therapy;Plan of Care  Education Method: Demonstration;Verbal  Education Outcome: Continued education needed    Therapy Time   Individual Concurrent Group Co-treatment   Time In 0840         Time Out 0853         Minutes 13             Low Complexity Evaluation    Albert Becker PT   Electronically signed by Albert Becker PT [id number] on 12/31/2024 at 8:54 AM

## 2024-12-31 NOTE — ED PROVIDER NOTES
[facility] EMERGENCY DEPARTMENT  EMERGENCY DEPARTMENT ENCOUNTER        Pt Name: uJni King  MRN: [id number]  Birthdate 1965  Date of evaluation: 12/30/2024  Provider: Gracie Chaves MD  PCP: Kiesha Peralta APRN - ROMMEL  Note Started: 10:40 PM EST 12/30/24    CHIEF COMPLAINT       Chief Complaint   Patient presents with    Numbness     Pt into ED for left arm and face numbness that started around between 6-7 pm tonight. Pt states he woke up with left sided shoulder pain this morning then it worsened to the numbness. Pt also states he also has a headache.         HISTORY OF PRESENT ILLNESS: 1 or more Elements     History from : Patient    Limitations to history : None    Juni King is a 59 y.o. male who presents to the emergency department with left-sided arm and facial numbness.  Patient states he woke up this morning with some pain to his left shoulder.  He states it was worse when he moved his shoulder.  About 630 or so this evening he noted some pain and numbness to his left arm and his left side of his face.  He also noticed that when he moved his left eye it seemed to hurt.  He states when he was getting into the car to come to the emergency department he then noticed that his left leg seemed weaker.  He does not notice any numbness or tingling in the leg.  He states he is had MIs before and takes Effient though he has not taken it in about 3 days.  He has not taken his aspirin today either.  He states he is having some headache on the left side of his head and now notices some blurry vision.    Nursing Notes were all reviewed and agreed with or any disagreements were addressed in the HPI.    REVIEW OF SYSTEMS :      Review of Systems    10 systems reviewed and negative except as in HPI/MDM    SURGICAL HISTORY     Past Surgical History:   Procedure Laterality Date    CARDIAC CATHETERIZATION      CHOLECYSTECTOMY, LAPAROSCOPIC N/A 4/21/2021    LAPAROSCOPIC CHOLECYSTECTOMY  injection 30 mg (has no administration in time range)   aspirin chewable tablet 81 mg (has no administration in time range)     Or   aspirin suppository 300 mg (has no administration in time range)   labetalol (NORMODYNE;TRANDATE) injection 10 mg (has no administration in time range)   glucose chewable tablet 16 g (has no administration in time range)   dextrose bolus 10% 125 mL (has no administration in time range)     Or   dextrose bolus 10% 250 mL (has no administration in time range)   glucagon injection 1 mg (has no administration in time range)   dextrose 10 % infusion (has no administration in time range)   insulin lispro (HUMALOG,ADMELOG) injection vial 0-16 Units (has no administration in time range)   atorvastatin (LIPITOR) tablet 80 mg (has no administration in time range)   prasugrel (EFFIENT) tablet 10 mg (has no administration in time range)   0.9 % sodium chloride infusion (has no administration in time range)   insulin glargine (LANTUS) injection vial 37 Units (has no administration in time range)   acetaminophen (TYLENOL) tablet 1,000 mg (has no administration in time range)   iopamidol (ISOVUE-370) 76 % injection 75 mL (75 mLs IntraVENous Given 12/30/24 1554)            Is this patient to be included in the SEP-1 Core Measure due to severe sepsis or septic shock?       PAST MEDICAL HISTORY      has a past medical history of Abscess of arm, left (07/01/2018), Acute metabolic encephalopathy (09/18/2017), Acute respiratory failure with hypoxia, Arthritis, CAD (coronary artery disease), Cardiac arrest (10/05/2018), Cerebral artery occlusion with cerebral infarction (HCC), Cholecystitis (04/14/2021), COVID-19 virus infection (07/01/2020), Diabetic peripheral neuropathy (HCC) (06/08/2018), Elbow contusion (03/24/2014), GERD (gastroesophageal reflux disease), High anion gap metabolic acidosis (07/01/2020), Hyperlipidemia, Hypertension, ICD (implantable cardioverter-defibrillator) in place (2018), Left arm

## 2024-12-31 NOTE — NURSE NAVIGATOR
No MRI ordered ,,,pt stated that he has a defibrillator  while in er was told that he would need to be cleared  by  [employer] ,, to get mri ,,

## 2024-12-31 NOTE — NURSE NAVIGATOR
NAME:  Juni King  YOB: 1965  MEDICAL RECORD NUMBER:  [id number]  TODAYS DATE:  12/31/2024    Discussed personal risk factors for Stroke/TIA with patient/family, and ways to reduce the risk for a recurrent stroke. Patient's personal risk factors which were identified are:     [] Alcohol Abuse: check with your physician before any alcohol consumption.   [x] Atrial fibrillation: may cause blood clots.  [] Drug Abuse: Seek help, talk with your doctor  [] Clotting Disorder  [x] Diabetes  [x] Family history of stroke or heart disease  [] High Blood Pressure/Hypertension: work with your physician.  [x] High cholesterol: monitor cholesterol levels with your physician.   [x] Overweight/Obesity: work with your physician for your ideal body weight.  [] Physical Inactivity: get regular exercise as directed by your physician.   [x] Personal history of previous TIA or stroke  [x] Poor Diet; decrease salt (sodium) in your diet, follow diet directed by physician.   [] Smoking: Cigarette/Cigar: stop smoking.         Advised pt. that you can reduce your risk for stroke/TIA by modifying/controlling the risk factors that you have. Pt.advised to take the medications as prescribed, which will be detailed in the discharge instructions, and to not stop taking them without consulting their physician. In addition, pt. advised to maintain a healthy diet, exercise regularly and to not smoke.      [facility]'s Stroke treatment and prevention, Managing your recovery  notebook  provided and/or reviewed  with patient/family.  The notebook includes, but not limited to, sections addressing warning signs & symptoms of a stroke, which are: sudden numbness or weakness especially on one side of the body, sudden confusion, difficulty speaking or understanding, sudden changes in vision, sudden dizziness or loss of balance/ coordination, sudden severe headache, syncope and seizure.  The need to call EMS (911) immediately if signs &

## 2024-12-31 NOTE — ED NOTES
Pt complain of left sided numbness and tingling in extremities, headache, face pain, and blurred vision in left eye. Pt last known well around 6 pm. Pt states he is on blood thinner but has not taken it past couple of days.

## 2024-12-31 NOTE — CONSULTS
Neurology Consult Note  Reason for Consult: left face and upper extremity numbness, left lower extremity weakness    Chief complaint: left sided symptoms    Erika Chester MD asked me to see Juni King in consultation for evaluation of left face and upper extremity numbness, left lower extremity weakness    History of Present Illness:  Juni King is a 59 y.o. male who presents with left sided symptoms.     I obtained my information via interview w/ the patient, supplemented by chart review.    The patient tells me that he went to bed around 0430 yesterday feeling fine.  He woke up around Noon.  At that time he noticed that he was having some pain in the joint on his L shoulder.  He thought he may have slept on it wrong.  Throughout the day he noticed that he was having some weakness in his LLE.  It was heavy.  He was having to use his arms to assist movement sometimes.  Around 1830 he felt that he was having some blurry vision in his L eye and the L side of his face felt odd.  His LUE started to feel like pins and needles.  He developed a terrible L sided headache.  Over time his head pain worsened.  It felt like he had gotten hit over the head.  He ended up coming to the ED around 2215 in order to be evaluated.      Initial BP was 161/101.  CT head negative.  CTA head/neck negative.  NIH 2 per ED provider (L sensory, LLE weakness).      He says he woke today and the weakness in his leg was resolved.  No further headache.  Still has a bit of sensory change in his L cheek area and L fingertips.  Vision is normal.      He has no hx of migraines or other headache disorder.      He has CAD and AICD placement.  He is supposed to be on both asa and Effient though he says he stopped taking essentially all of his medication about a week ago.  He says he is a recovered drug addict and sometimes doesn't like the idea of taking pills.      He does reports intermittent neck pain since 2018 after falling  Start date:     Quit date:     Years since quittin.0   Smokeless Tobacco Never   Tobacco Comments    quit in the 80's     Social History     Substance and Sexual Activity   Drug Use Not Currently    Types: Marijuana (Weed)    Comment: quit 80s     Social History     Substance and Sexual Activity   Alcohol Use Yes    Comment: rare     ROS  Constitutional- No weight loss or fevers  Eyes- No diplopia. No photophobia.  Ears/nose/throat- No dysphagia. No Dysarthria  Cardiovascular- No palpitations. No chest pain  Respiratory- No dyspnea. No Cough  Gastrointestinal- No Abdominal pain. No Vomiting.  Genitourinary- No incontinence. No urinary retention  Musculoskeletal- No myalgia. No arthralgia  Skin- No rash. No easy bruising.  Psychiatric- No depression. No anxiety  Endocrine- No diabetes. No thyroid issues.  Hematologic- No bleeding difficulty. No fatigue  Neurologic- No weakness. No Headache.    Exam  Blood pressure (!) 143/82, pulse 77, temperature 97.6 °F (36.4 °C), temperature source Oral, resp. rate 12, height 1.88 m (6' 2\"), weight 101.1 kg (222 lb 14.4 oz), SpO2 95%.  Constitutional    Vital signs: BP, HR, and RR reviewed   General alert, no distress  Eyes: unable to visualize the fundi  Psychiatric: cooperative with examination, no psychotic behavior noted.  Neurologic  Mental status:   orientation to person, place, time.     General fund of knowledge grossly intact   Memory grossly intact   Attention intact as able to attend well to the exam     Language fluent in conversation   Comprehension intact; follows simple commands  Cranial nerves:   CN2: visual fields full   CN 3,4,6: extraocular muscles intact.  PERRLA.   CN5: facial sensation symmetric on exam.   CN7: face symmetric without dysarthria  CN8: hearing grossly intact  CN11: trap full strength on shoulder shrug  CN12: tongue midline with protrusion  Strength: good strength in all 4 extremities   Deep tendon reflexes: normal in all 4

## 2025-01-01 PROBLEM — R29.898 LEFT LEG WEAKNESS: Status: ACTIVE | Noted: 2025-01-01

## 2025-01-01 LAB
C DIFF TOX A+B STL QL IA: NORMAL
GLUCOSE BLD-MCNC: 160 MG/DL (ref 70–99)
GLUCOSE BLD-MCNC: 230 MG/DL (ref 70–99)
GLUCOSE BLD-MCNC: 234 MG/DL (ref 70–99)
GLUCOSE BLD-MCNC: 240 MG/DL (ref 70–99)
PERFORMED ON: ABNORMAL

## 2025-01-01 PROCEDURE — 94760 N-INVAS EAR/PLS OXIMETRY 1: CPT

## 2025-01-01 PROCEDURE — 6370000000 HC RX 637 (ALT 250 FOR IP): Performed by: INTERNAL MEDICINE

## 2025-01-01 PROCEDURE — 2500000003 HC RX 250 WO HCPCS: Performed by: INTERNAL MEDICINE

## 2025-01-01 PROCEDURE — 6370000000 HC RX 637 (ALT 250 FOR IP): Performed by: HOSPITALIST

## 2025-01-01 PROCEDURE — 96360 HYDRATION IV INFUSION INIT: CPT

## 2025-01-01 PROCEDURE — 1200000000 HC SEMI PRIVATE

## 2025-01-01 PROCEDURE — 87449 NOS EACH ORGANISM AG IA: CPT

## 2025-01-01 PROCEDURE — 87324 CLOSTRIDIUM AG IA: CPT

## 2025-01-01 PROCEDURE — 96361 HYDRATE IV INFUSION ADD-ON: CPT

## 2025-01-01 RX ADMIN — INSULIN LISPRO 4 UNITS: 100 INJECTION, SOLUTION INTRAVENOUS; SUBCUTANEOUS at 18:46

## 2025-01-01 RX ADMIN — INSULIN LISPRO 4 UNITS: 100 INJECTION, SOLUTION INTRAVENOUS; SUBCUTANEOUS at 12:42

## 2025-01-01 RX ADMIN — SODIUM CHLORIDE, PRESERVATIVE FREE 10 ML: 5 INJECTION INTRAVENOUS at 08:54

## 2025-01-01 RX ADMIN — Medication 37 UNITS: at 20:55

## 2025-01-01 RX ADMIN — ATORVASTATIN CALCIUM 80 MG: 80 TABLET, FILM COATED ORAL at 08:54

## 2025-01-01 RX ADMIN — INSULIN LISPRO 4 UNITS: 100 INJECTION, SOLUTION INTRAVENOUS; SUBCUTANEOUS at 20:55

## 2025-01-01 RX ADMIN — SODIUM CHLORIDE, PRESERVATIVE FREE 10 ML: 5 INJECTION INTRAVENOUS at 20:55

## 2025-01-01 RX ADMIN — ASPIRIN 81 MG: 81 TABLET, CHEWABLE ORAL at 08:54

## 2025-01-01 RX ADMIN — ACETAMINOPHEN 650 MG: 325 TABLET ORAL at 18:45

## 2025-01-01 RX ADMIN — PRASUGREL 10 MG: 10 TABLET, FILM COATED ORAL at 08:54

## 2025-01-01 ASSESSMENT — PAIN SCALES - GENERAL
PAINLEVEL_OUTOF10: 0
PAINLEVEL_OUTOF10: 0
PAINLEVEL_OUTOF10: 4
PAINLEVEL_OUTOF10: 0

## 2025-01-01 ASSESSMENT — PAIN - FUNCTIONAL ASSESSMENT: PAIN_FUNCTIONAL_ASSESSMENT: ACTIVITIES ARE NOT PREVENTED

## 2025-01-01 ASSESSMENT — PAIN SCALES - WONG BAKER: WONGBAKER_NUMERICALRESPONSE: NO HURT

## 2025-01-01 ASSESSMENT — PAIN DESCRIPTION - ORIENTATION: ORIENTATION: ANTERIOR

## 2025-01-01 ASSESSMENT — PAIN DESCRIPTION - LOCATION: LOCATION: HEAD

## 2025-01-01 ASSESSMENT — PAIN DESCRIPTION - DESCRIPTORS: DESCRIPTORS: ACHING

## 2025-01-01 NOTE — PROGRESS NOTES
V2.0    [facility] [place]      Name:  Juni King /Age/Sex: 1965  (59 y.o. male)   MRN & CSN:  [id number] & [id number] Encounter Date/Time: 2025 12:01 PM EST   Location:  O4P-2596/5264-01 PCP: Kiesha Peralta, APRN - CNP     Attending:Erika Gregg MD       Hospital Day: 3    Assessment and Recommendations   Juni King is a 59 y.o. male with pmh of  who presents with Weakness of left lower extremity      Left sided numbness      Headache    Plan:     Change to inpatient status due to continued evaluation for LE weakness     Awaiting MRI brain   Patient has pacemaker   Neurology recommendation noted   Cont ASA , Effient ,  statin       DM type 2 : cont Lantus + SSI    DLP : cont statin     PT and OT eval    Took me more than 51  minutes for clinical management, coordination of care, reviewing labs , ordering medication as needed , reviewing consultants notes and recommendation  including plan of care discussion with the patient ,nursing staff and case management and consultants        Diet ADULT DIET; Regular; 4 carb choices (60 gm/meal)   DVT Prophylaxis [] Lovenox, []  Heparin, [] SCDs, [] Ambulation,  [] Eliquis, [] Xarelto  [] Coumadin   Code Status Full Code   Disposition From:   Expected Disposition:   Estimated Date of Discharge:   Patient requires continued admission due to    Surrogate Decision Maker/ POA         Personally reviewed Lab Studies and Imaging        Telemetry reviewed by myself no ST elevation          Medical Decision Making:  The following items were considered in medical decision making:  Discussion of patient care with other providers  Reviewed clinical lab tests  Reviewed radiology tests  Reviewed other diagnostic tests/interventions  Independent review of radiologic images  Microbiology cultures and other micro tests reviewed            Subjective:     Chief Complaint:     Juni King is a 59 y.o. male who presents with    AAO x 3    Not in acute

## 2025-01-01 NOTE — PROGRESS NOTES
Attempted to call MRI per Dr. Gregg request to determine if patient would have brain MRI done today. No answer. Will attempt again when time permits.

## 2025-01-01 NOTE — CARE COORDINATION
SW attempted to speak with patient regarding possible discharge needs, however, he just started eating breakfast. SW will try again later if time permits.     Respectfully submitted,    Eva MEJIA, KAMRON  [facility]   [phone]    Electronically signed by JACKIE Hyatt, BEVERLY on 1/1/2025 at 9:12 AM

## 2025-01-02 ENCOUNTER — APPOINTMENT (OUTPATIENT)
Dept: MRI IMAGING | Age: 60
DRG: 069 | End: 2025-01-02
Payer: MEDICARE

## 2025-01-02 LAB
GLUCOSE BLD-MCNC: 124 MG/DL (ref 70–99)
GLUCOSE BLD-MCNC: 233 MG/DL (ref 70–99)
GLUCOSE BLD-MCNC: 256 MG/DL (ref 70–99)
GLUCOSE BLD-MCNC: 265 MG/DL (ref 70–99)
PERFORMED ON: ABNORMAL
URATE SERPL-MCNC: 7.4 MG/DL (ref 3.5–7.2)

## 2025-01-02 PROCEDURE — 1200000000 HC SEMI PRIVATE

## 2025-01-02 PROCEDURE — 4B02XSZ MEASUREMENT OF CARDIAC PACEMAKER, EXTERNAL APPROACH: ICD-10-PCS | Performed by: HOSPITALIST

## 2025-01-02 PROCEDURE — 97110 THERAPEUTIC EXERCISES: CPT

## 2025-01-02 PROCEDURE — 2500000003 HC RX 250 WO HCPCS: Performed by: INTERNAL MEDICINE

## 2025-01-02 PROCEDURE — 70551 MRI BRAIN STEM W/O DYE: CPT

## 2025-01-02 PROCEDURE — 36415 COLL VENOUS BLD VENIPUNCTURE: CPT

## 2025-01-02 PROCEDURE — 84550 ASSAY OF BLOOD/URIC ACID: CPT

## 2025-01-02 PROCEDURE — 6370000000 HC RX 637 (ALT 250 FOR IP): Performed by: INTERNAL MEDICINE

## 2025-01-02 PROCEDURE — 6370000000 HC RX 637 (ALT 250 FOR IP): Performed by: HOSPITALIST

## 2025-01-02 PROCEDURE — 97530 THERAPEUTIC ACTIVITIES: CPT

## 2025-01-02 PROCEDURE — 94760 N-INVAS EAR/PLS OXIMETRY 1: CPT

## 2025-01-02 RX ORDER — OXYCODONE AND ACETAMINOPHEN 5; 325 MG/1; MG/1
1 TABLET ORAL EVERY 4 HOURS PRN
Status: DISCONTINUED | OUTPATIENT
Start: 2025-01-02 | End: 2025-01-03 | Stop reason: HOSPADM

## 2025-01-02 RX ADMIN — ACETAMINOPHEN 650 MG: 325 TABLET ORAL at 05:55

## 2025-01-02 RX ADMIN — PRASUGREL 10 MG: 10 TABLET, FILM COATED ORAL at 09:17

## 2025-01-02 RX ADMIN — ASPIRIN 81 MG: 81 TABLET, CHEWABLE ORAL at 09:02

## 2025-01-02 RX ADMIN — SODIUM CHLORIDE, PRESERVATIVE FREE 10 ML: 5 INJECTION INTRAVENOUS at 20:49

## 2025-01-02 RX ADMIN — SODIUM CHLORIDE, PRESERVATIVE FREE 10 ML: 5 INJECTION INTRAVENOUS at 09:02

## 2025-01-02 RX ADMIN — INSULIN LISPRO 4 UNITS: 100 INJECTION, SOLUTION INTRAVENOUS; SUBCUTANEOUS at 14:01

## 2025-01-02 RX ADMIN — ATORVASTATIN CALCIUM 80 MG: 80 TABLET, FILM COATED ORAL at 09:02

## 2025-01-02 RX ADMIN — INSULIN LISPRO 8 UNITS: 100 INJECTION, SOLUTION INTRAVENOUS; SUBCUTANEOUS at 18:14

## 2025-01-02 RX ADMIN — INSULIN LISPRO 8 UNITS: 100 INJECTION, SOLUTION INTRAVENOUS; SUBCUTANEOUS at 20:52

## 2025-01-02 RX ADMIN — Medication 37 UNITS: at 20:52

## 2025-01-02 ASSESSMENT — PAIN SCALES - GENERAL
PAINLEVEL_OUTOF10: 3
PAINLEVEL_OUTOF10: 0

## 2025-01-02 ASSESSMENT — PAIN - FUNCTIONAL ASSESSMENT: PAIN_FUNCTIONAL_ASSESSMENT: ACTIVITIES ARE NOT PREVENTED

## 2025-01-02 ASSESSMENT — PAIN SCALES - WONG BAKER: WONGBAKER_NUMERICALRESPONSE: NO HURT

## 2025-01-02 ASSESSMENT — PAIN DESCRIPTION - ORIENTATION: ORIENTATION: RIGHT

## 2025-01-02 ASSESSMENT — PAIN DESCRIPTION - DESCRIPTORS: DESCRIPTORS: ACHING;DISCOMFORT

## 2025-01-02 ASSESSMENT — PAIN DESCRIPTION - LOCATION: LOCATION: TOE (COMMENT WHICH ONE)

## 2025-01-02 NOTE — PROGRESS NOTES
Physical Therapy  Facility/Department: [address] PROGRESSIVE CARE  Daily Treatment Note  NAME: Juni King  : 1965  MRN: [id number]    Date of Service: 2025    Discharge Recommendations:  Outpatient PT, Patient would benefit from continued therapy after discharge   PT Equipment Recommendations  Equipment Needed: No    Patient Diagnosis(es): The encounter diagnosis was Stroke-like symptoms.    Assessment  Assessment: Pt able to complete bed mobility, transfers, and community ambulation (I) without device, no LOB.  He did have some difficulty with high-level balance exercises (Romberg EC, Romberg head turns).  Pt requires additional therapy in the acute setting to improve strength, balance, endurance, and independence with mobility tasks.  Pt would benefit from OPPT upon D/C to continue to address these deficits.     Juni King scored a 24/24 on the AM-PAC short mobility form. Current research shows that an AM-PAC score of 18 or greater is typically associated with a discharge to the patient's home setting. Based on the patient's AM-PAC score and their current functional mobility deficits, it is recommended that the patient have 2-3 sessions per week of Physical Therapy at d/c to increase the patient's independence.  At this time, this patient demonstrates the endurance and safety to discharge home with OPPT (home vs OP services) and a follow up treatment frequency of 2-3x/wk.  Please see assessment section for further patient specific details.    If patient discharges prior to next session this note will serve as a discharge summary.  Please see below for the latest assessment towards goals.        Equipment Needed: No    Plan  Physical Therapy Plan  General Plan: 2-3 times per week  Current Treatment Recommendations: Gait training;Stair training;Balance training;Endurance training;Safety education & training;Patient/Caregiver education & training;Equipment evaluation, education, &

## 2025-01-02 NOTE — PROGRESS NOTES
This RN accompanying patient to MRI d/t defibrillator status. Currently monitoring. Intermittent episodes of tachycardia. Care ongoing.

## 2025-01-02 NOTE — PROGRESS NOTES
V2.0    [facility] Progress Note      Name:  Juni King /Age/Sex: 1965  (59 y.o. male)   MRN & CSN:  [id number] & [id number] Encounter Date/Time: 2025 12:01 PM EST   Location:  E5V-0099/5264-01 PCP: Kiesha Peralta, APRN - CNP     Attending:Erika Gregg MD       Hospital Day: 4    Assessment and Recommendations   Juni King is a 59 y.o. male with pmh of  who presents with Weakness of left lower extremity      Left sided numbness      Headache    Plan:     Change to inpatient status due to continued evaluation for LE weakness     Awaiting MRI brain on   Patient has pacemaker   Neurology recommendation noted   Cont ASA , Effient ,  statin       DM type 2 : cont Lantus + SSI    DLP : cont statin     PT and OT eval    DC plan    Awaiting MRI        Diet ADULT DIET; Regular; 4 carb choices (60 gm/meal)   DVT Prophylaxis [] Lovenox, []  Heparin, [] SCDs, [] Ambulation,  [] Eliquis, [] Xarelto  [] Coumadin   Code Status Full Code   Disposition From:   Expected Disposition:   Estimated Date of Discharge:   Patient requires continued admission due to    Surrogate Decision Maker/ POA         Personally reviewed Lab Studies and Imaging        Telemetry reviewed by myself no ST elevation          Medical Decision Making:  The following items were considered in medical decision making:  Discussion of patient care with other providers  Reviewed clinical lab tests  Reviewed radiology tests  Reviewed other diagnostic tests/interventions  Independent review of radiologic images  Microbiology cultures and other micro tests reviewed            Subjective:     Chief Complaint:     Juni King is a 59 y.o. male who presents with    AAO x 3    Not in acute distress    No acute issue overnight    Denies SOB or chest pain    Left side numbness + , chronic       Review of Systems:      Pertinent positives and negatives discussed in HPI    Objective:     Intake/Output Summary (Last 24 hours) at  Symptoms Decision Support Exception - unselect if not a suspected or confirmed emergency medical condition->Emergency Medical Condition (MA) Reason for Exam: stroke, numbness FINDINGS: CTA NECK: AORTIC ARCH/ARCH VESSELS: No dissection or arterial injury.  No significant stenosis of the brachiocephalic or subclavian arteries. CAROTID ARTERIES: No dissection, arterial injury, or hemodynamically significant stenosis by NASCET criteria. VERTEBRAL ARTERIES: No dissection, arterial injury, or significant stenosis. SOFT TISSUES: The lung apices are clear.  No cervical or superior mediastinal lymphadenopathy.  The larynx and pharynx are unremarkable.  No acute abnormality of the salivary and thyroid glands. BONES: No acute osseous abnormality. CTA HEAD: ANTERIOR CIRCULATION: No significant stenosis of the intracranial internal carotid, anterior cerebral, or middle cerebral arteries. No aneurysm. POSTERIOR CIRCULATION: No significant stenosis of the basilar or posterior cerebral arteries. No aneurysm. OTHER: No dural venous sinus thrombosis on this non-dedicated study. BRAIN: Findings are separately reported.     No large vessel occlusion in the head or neck.       CBC:   Recent Labs     12/30/24 2244 12/31/24 0148   WBC 8.5 8.7   HGB 15.5 15.0    203     BMP:    Recent Labs     12/30/24 2244 12/31/24  0148 12/31/24  0453   * 135*  --    K 4.6  --  3.9   CL 98* 102  --    CO2 23 22  --    BUN 17 16  --    CREATININE 1.2 1.0  --    GLUCOSE 412* 320*  --      Hepatic:   Recent Labs     12/30/24 2244   AST 21   ALT 19   BILITOT 0.4   ALKPHOS 84     Lipids:   Lab Results   Component Value Date/Time    CHOL 204 12/31/2024 01:48 AM    HDL 33 12/31/2024 01:48 AM    HDL 35 09/10/2010 02:32 PM    TRIG 657 12/31/2024 01:48 AM     Hemoglobin A1C:   Lab Results   Component Value Date/Time    LABA1C 12.0 12/31/2024 01:48 AM     TSH:   Lab Results   Component Value Date/Time    TSH 1.65 10/06/2020 11:10 AM     Troponin:

## 2025-01-02 NOTE — PROGRESS NOTES
Progress Note  The patient is being evaluated for TIA (left face and upper extremity numbness, left lower extremity weakness )    Updates  Reports that his sensory symptoms remain resolved. He said when walking with therapy today they noticed he was dragging his LLE and it does seem weaker than baseline.     Had a headache this morning, non currently.     No new symptoms since last seen by neurology.     Awaiting MRI imaging.     Active Ambulatory Problems     Diagnosis Date Noted    Diabetes mellitus out of control 09/11/2010    Mixed hyperlipidemia 07/15/2014    Abnormal stress test 07/15/2014    Uncontrolled type 2 diabetes mellitus with hyperglycemia ([facility]) 09/18/2017    POTS (postural orthostatic tachycardia syndrome) 09/18/2017    Diabetic polyneuropathy associated with type 2 diabetes mellitus ([facility]) 06/08/2018    Abnormal EKG     Syncope and collapse     VT (ventricular tachycardia) ([facility])     Idiopathic hypotension     Abnormal ECG     Ventricular tachycardia ([facility])     CAD, multiple vessel     ICD (implantable cardioverter-defibrillator) in place     Anemia     Lightheadedness 10/24/2018    Cardiac arrest 11/13/2018    Ischemic cardiomyopathy 11/13/2018    Poor appetite 07/01/2020    Chronic hypoxemic respiratory failure 09/11/2020    Postinflammatory pulmonary fibrosis ([facility]) 09/11/2020    CAD S/P percutaneous coronary angioplasty 11/18/2020    Chest pain 03/18/2022    Type 2 diabetes mellitus, with long-term current use of insulin ([facility]) 09/01/2022    Ulcer of left foot, with fat layer exposed ([facility]) 09/15/2022    Closed displaced fracture of middle phalanx of lesser toe of left foot 03/17/2023    Neuropathy 04/28/2023    Right foot ulcer, with fat layer exposed ([facility]) 06/01/2023    NSTEMI (non-ST elevated myocardial infarction) ([facility]) 05/22/2024    Heart failure with improved ejection fraction (HFimpEF) ([facility]) 05/22/2024    History of cardiac arrest 05/22/2024     Resolved Ambulatory Problems     Diagnosis Date

## 2025-01-02 NOTE — PROGRESS NOTES
Physician Progress Note      PATIENT:               PATTI ESCOBEDO  CSN #:                  [id number]  :                       1965  ADMIT DATE:       2024 10:24 PM  DISCH DATE:  RESPONDING  PROVIDER #:        Erika Gregg MD          QUERY TEXT:    Pt admitted with stroke like symptoms. Pt noted to have elevated troponin. If   possible, please document in the progress notes and discharge summary if you   are evaluating and/or treating any of the following:    The medical record reflects the following:  Risk Factors: HTN, DM2,  Clinical Indicators: Trop 61,49, 49, 47. /101  Per Neurology consult note, \"Keep in mind possible cardiac etiology given   initial L shoulder pain and mildly elevated troponin on admission.\" \"He was   recently noncompliant with his medications, which included DAPT (ASA +   prasugrel) and a statin.  He did not take any anti-HTN meds at home.\"  Treatment: CT, CTA, Serial labs, ASA, statin, telemetry, pacemaker   interrogation  Options provided:  -- Non-ischemic myocardial injury.  -- Non-ischemic myocardial injury due to other, Please specify cause.  -- Elevated troponin without myocardial injury  -- Other - I will add my own diagnosis  -- Disagree - Not applicable / Not valid  -- Disagree - Clinically unable to determine / Unknown  -- Refer to Clinical Documentation Reviewer    PROVIDER RESPONSE TEXT:    This patient has elevated troponin without myocardial injury.    Query created by: Cait Hale on 2025 9:57 AM      Electronically signed by:  Erika Gregg MD 2025 10:12 AM

## 2025-01-02 NOTE — PLAN OF CARE
Problem: Chronic Conditions and Co-morbidities  Goal: Patient's chronic conditions and co-morbidity symptoms are monitored and maintained or improved  1/2/2025 0018 by Elias Rodriguez RN  Outcome: Progressing  1/1/2025 1548 by Catiy Goldstein RN  Outcome: Progressing     Problem: Discharge Planning  Goal: Discharge to home or other facility with appropriate resources  1/2/2025 0018 by Elias Rodriguez RN  Outcome: Progressing  1/1/2025 1548 by Caity Goldstein RN  Outcome: Progressing     Problem: Pain  Goal: Verbalizes/displays adequate comfort level or baseline comfort level  1/2/2025 0018 by Elias Rodriguez RN  Outcome: Progressing  1/1/2025 1548 by Caity Goldstein RN  Outcome: Progressing     Problem: Skin/Tissue Integrity  Goal: Absence of new skin breakdown  Description: 1.  Monitor for areas of redness and/or skin breakdown  2.  Assess vascular access sites hourly  3.  Every 4-6 hours minimum:  Change oxygen saturation probe site  4.  Every 4-6 hours:  If on nasal continuous positive airway pressure, respiratory therapy assess nares and determine need for appliance change or resting period.  1/2/2025 0018 by Elias Rodriguez RN  Outcome: Progressing  1/1/2025 1548 by Caity Goldstein RN  Outcome: Progressing     Problem: Safety - Adult  Goal: Free from fall injury  1/2/2025 0018 by Elias Rodriguez RN  Outcome: Progressing  1/1/2025 1548 by Caity Goldstein RN  Outcome: Progressing     Problem: ABCDS Injury Assessment  Goal: Absence of physical injury  1/2/2025 0018 by Elias Rodriguez RN  Outcome: Progressing  1/1/2025 1548 by Caity Goldstein RN  Outcome: Progressing

## 2025-01-03 ENCOUNTER — APPOINTMENT (OUTPATIENT)
Age: 60
DRG: 069 | End: 2025-01-03
Payer: MEDICARE

## 2025-01-03 VITALS
TEMPERATURE: 97.6 F | SYSTOLIC BLOOD PRESSURE: 123 MMHG | RESPIRATION RATE: 20 BRPM | OXYGEN SATURATION: 95 % | HEIGHT: 74 IN | HEART RATE: 72 BPM | WEIGHT: 224 LBS | DIASTOLIC BLOOD PRESSURE: 76 MMHG | BODY MASS INDEX: 28.75 KG/M2

## 2025-01-03 LAB
ECHO AO ROOT DIAM: 3.2 CM
ECHO AO ROOT INDEX: 1.4 CM/M2
ECHO BSA: 2.3 M2
ECHO EST RA PRESSURE: 3 MMHG
ECHO LA AREA 4C: 19.5 CM2
ECHO LA DIAMETER INDEX: 1.49 CM/M2
ECHO LA DIAMETER: 3.4 CM
ECHO LA MAJOR AXIS: 5.1 CM
ECHO LA TO AORTIC ROOT RATIO: 1.06
ECHO LA VOL MOD A4C: 60 ML (ref 18–58)
ECHO LA VOLUME INDEX MOD A4C: 26 ML/M2 (ref 16–34)
ECHO LV EDV 3D: 125 ML
ECHO LV EDV A4C: 99 ML
ECHO LV EDV INDEX 3D: 55 ML/M2
ECHO LV EDV INDEX A4C: 43 ML/M2
ECHO LV EF PHYSICIAN: 53 %
ECHO LV EJECTION FRACTION 3D: 53 %
ECHO LV EJECTION FRACTION A4C: 48 %
ECHO LV ESV 3D: 59 ML
ECHO LV ESV A4C: 51 ML
ECHO LV ESV INDEX 3D: 26 ML/M2
ECHO LV ESV INDEX A4C: 22 ML/M2
ECHO LV FRACTIONAL SHORTENING: 20 % (ref 28–44)
ECHO LV INTERNAL DIMENSION DIASTOLE INDEX: 2.15 CM/M2
ECHO LV INTERNAL DIMENSION DIASTOLIC: 4.9 CM (ref 4.2–5.9)
ECHO LV INTERNAL DIMENSION SYSTOLIC INDEX: 1.71 CM/M2
ECHO LV INTERNAL DIMENSION SYSTOLIC: 3.9 CM
ECHO LV IVSD: 0.9 CM (ref 0.6–1)
ECHO LV MASS 2D: 164.3 G (ref 88–224)
ECHO LV MASS 3D INDEX: 74.1 G/M2
ECHO LV MASS 3D: 169 G
ECHO LV MASS INDEX 2D: 72.1 G/M2 (ref 49–115)
ECHO LV POSTERIOR WALL DIASTOLIC: 1 CM (ref 0.6–1)
ECHO LV RELATIVE WALL THICKNESS RATIO: 0.41
ECHO LVOT AREA: 3.8 CM2
ECHO LVOT DIAM: 2.2 CM
ECHO RA AREA 4C: 13 CM2
ECHO RA END SYSTOLIC VOLUME APICAL 4 CHAMBER INDEX BSA: 11 ML/M2
ECHO RA VOLUME: 26 ML
ECHO RIGHT VENTRICULAR SYSTOLIC PRESSURE (RVSP): 32 MMHG
ECHO RV INTERNAL DIMENSION: 2.5 CM
ECHO RV TAPSE: 2.2 CM (ref 1.7–?)
ECHO TV E WAVE: 0.6 M/S
ECHO TV PEAK GRADIENT: 3 MMHG
ECHO TV REGURGITANT MAX VELOCITY: 2.69 M/S
ECHO TV REGURGITANT PEAK GRADIENT: 29 MMHG
GLUCOSE BLD-MCNC: 195 MG/DL (ref 70–99)
GLUCOSE BLD-MCNC: 202 MG/DL (ref 70–99)
PERFORMED ON: ABNORMAL
PERFORMED ON: ABNORMAL

## 2025-01-03 PROCEDURE — 93308 TTE F-UP OR LMTD: CPT | Performed by: INTERNAL MEDICINE

## 2025-01-03 PROCEDURE — 93321 DOPPLER ECHO F-UP/LMTD STD: CPT | Performed by: INTERNAL MEDICINE

## 2025-01-03 PROCEDURE — 6370000000 HC RX 637 (ALT 250 FOR IP): Performed by: INTERNAL MEDICINE

## 2025-01-03 PROCEDURE — 9990000010 HC NO CHARGE VISIT

## 2025-01-03 PROCEDURE — 94760 N-INVAS EAR/PLS OXIMETRY 1: CPT

## 2025-01-03 PROCEDURE — 93325 DOPPLER ECHO COLOR FLOW MAPG: CPT | Performed by: INTERNAL MEDICINE

## 2025-01-03 PROCEDURE — 2500000003 HC RX 250 WO HCPCS: Performed by: INTERNAL MEDICINE

## 2025-01-03 PROCEDURE — 93308 TTE F-UP OR LMTD: CPT

## 2025-01-03 RX ADMIN — PRASUGREL 10 MG: 10 TABLET, FILM COATED ORAL at 09:05

## 2025-01-03 RX ADMIN — SODIUM CHLORIDE, PRESERVATIVE FREE 10 ML: 5 INJECTION INTRAVENOUS at 08:59

## 2025-01-03 RX ADMIN — ATORVASTATIN CALCIUM 80 MG: 80 TABLET, FILM COATED ORAL at 08:59

## 2025-01-03 RX ADMIN — INSULIN LISPRO 4 UNITS: 100 INJECTION, SOLUTION INTRAVENOUS; SUBCUTANEOUS at 09:05

## 2025-01-03 RX ADMIN — ASPIRIN 81 MG: 81 TABLET, CHEWABLE ORAL at 08:59

## 2025-01-03 ASSESSMENT — PAIN SCALES - GENERAL: PAINLEVEL_OUTOF10: 0

## 2025-01-03 NOTE — DISCHARGE INSTR - COC
Continuity of Care Form    Patient Name: Juni King   :  1965  MRN:  [id number]    Admit date:  2024  Discharge date:  ***    Code Status Order: Full Code   Advance Directives:   Advance Care Flowsheet Documentation             Admitting Physician:  Erika Gregg MD  PCP: Kiesha Peralta, APRN - CNP    Discharging Nurse: ***  Discharging Hospital Unit/Room#: M3P-1972/5264-01  Discharging Unit Phone Number: ***    Emergency Contact:   Extended Emergency Contact Information  Primary Emergency Contact: Ivan King  Address:  [address]  Home Phone: [phone]  Mobile Phone: [phone]  Relation: Spouse  Secondary Emergency Contact: Misty King   [facility]  Home Phone: [phone]  Mobile Phone: [phone]  Relation: Child    Past Surgical History:  Past Surgical History:   Procedure Laterality Date    CARDIAC CATHETERIZATION      CHOLECYSTECTOMY, LAPAROSCOPIC N/A 2021    LAPAROSCOPIC CHOLECYSTECTOMY WITH CHOLANGIOGRAM performed by Timoteo Benavidez MD at [facility] OR    COLONOSCOPY      CORONARY ANGIOPLASTY WITH STENT PLACEMENT  10/06/2018    Bare Metal Stent to PDA    CORONARY ANGIOPLASTY WITH STENT PLACEMENT  10/07/2018    Bare Metal Stent to OM    CORONARY ANGIOPLASTY WITH STENT PLACEMENT  10/03/2018    CECE to Circ    DIAGNOSTIC CARDIAC CATH LAB PROCEDURE      FINGER SURGERY Left     Left Thumb    HERNIA REPAIR      VASCULAR SURGERY      VASECTOMY         Immunization History:   Immunization History   Administered Date(s) Administered    Pneumococcal, PPSV23, PNEUMOVAX 23, (age 2y+), SC/IM, 0.5mL 2010    TDaP, ADACEL (age 10y-64y), BOOSTRIX (age 10y+), IM, 0.5mL 2018       Active Problems:  Patient Active Problem List   Diagnosis Code    Diabetes mellitus out of control [id number]    Mixed hyperlipidemia E78.2    Abnormal stress test R94.39    Uncontrolled type 2 diabetes mellitus with  (0-10 scale): Pain Level: 0  Last Weight:   Wt Readings from Last 1 Encounters:   25 101.7 kg (224 lb 3.3 oz)     Mental Status:  {IP PT MENTAL STATUS:}    IV Access:  { MIRIAM IV ACCESS:756487928}    Nursing Mobility/ADLs:  Walking   {CHP DME ADLs:296117560}  Transfer  {CHP DME ADLs:051298040}  Bathing  {CHP DME ADLs:825607872}  Dressing  {CHP DME ADLs:097109778}  Toileting  {CHP DME ADLs:559720099}  Feeding  {CHP DME ADLs:333360111}  Med Admin  {CHP DME ADLs:492114888}  Med Delivery   { MIRIAM MED Delivery:676150296}    Wound Care Documentation and Therapy:        Elimination:  Continence:   Bowel: {YES / NO:}  Bladder: {YES / NO:}  Urinary Catheter: {Urinary Catheter:031993775}   Colostomy/Ileostomy/Ileal Conduit: {YES / NO:}       Date of Last BM: ***    Intake/Output Summary (Last 24 hours) at 1/3/2025 1348  Last data filed at 1/3/2025 0946  Gross per 24 hour   Intake 540 ml   Output 0 ml   Net 540 ml     I/O last 3 completed shifts:  In: 780 [P.O.:780]  Out: 0     Safety Concerns:     { MIRIAM Safety Concerns:385715781}    Impairments/Disabilities:      { MIRIAM Impairments/Disabilities:785764652}    Nutrition Therapy:  Current Nutrition Therapy:   { MIRIAM Diet List:053097892}    Routes of Feeding: {[facility] DME Other Feedings:835276168}  Liquids: {Slp liquid thickness:10270}  Daily Fluid Restriction: {CHP DME Yes amt example:197654083}  Last Modified Barium Swallow with Video (Video Swallowing Test): {Done Not Done Date:}    Treatments at the Time of Hospital Discharge:   Respiratory Treatments: ***  Oxygen Therapy:  {Therapy; copd oxygen:52059}  Ventilator:    { CC Vent List:251727366}    Rehab Therapies: {THERAPEUTIC INTERVENTION:3835764308}  Weight Bearing Status/Restrictions: {[facility] Weight Bearin}  Other Medical Equipment (for information only, NOT a DME order):  {EQUIPMENT:519361325}  Other Treatments: ***    Patient's personal belongings (please select all that are

## 2025-01-03 NOTE — PROGRESS NOTES
Progress Note  The patient is being evaluated for TIA (left face and upper extremity numbness, left lower extremity weakness )    Updates  MRI brain completed.     Feels essentially at baseline.   Mild headache this morning that he thinks is caused by the way he slept.     Active Ambulatory Problems     Diagnosis Date Noted    Diabetes mellitus out of control 09/11/2010    Mixed hyperlipidemia 07/15/2014    Abnormal stress test 07/15/2014    Uncontrolled type 2 diabetes mellitus with hyperglycemia ([facility]) 09/18/2017    POTS (postural orthostatic tachycardia syndrome) 09/18/2017    Diabetic polyneuropathy associated with type 2 diabetes mellitus ([facility]) 06/08/2018    Abnormal EKG     Syncope and collapse     VT (ventricular tachycardia) ([facility])     Idiopathic hypotension     Abnormal ECG     Ventricular tachycardia ([facility])     CAD, multiple vessel     ICD (implantable cardioverter-defibrillator) in place     Anemia     Lightheadedness 10/24/2018    Cardiac arrest 11/13/2018    Ischemic cardiomyopathy 11/13/2018    Poor appetite 07/01/2020    Chronic hypoxemic respiratory failure 09/11/2020    Postinflammatory pulmonary fibrosis ([facility]) 09/11/2020    CAD S/P percutaneous coronary angioplasty 11/18/2020    Chest pain 03/18/2022    Type 2 diabetes mellitus, with long-term current use of insulin ([facility]) 09/01/2022    Ulcer of left foot, with fat layer exposed ([facility]) 09/15/2022    Closed displaced fracture of middle phalanx of lesser toe of left foot 03/17/2023    Neuropathy 04/28/2023    Right foot ulcer, with fat layer exposed ([facility]) 06/01/2023    NSTEMI (non-ST elevated myocardial infarction) ([facility]) 05/22/2024    Heart failure with improved ejection fraction (HFimpEF) ([facility]) 05/22/2024    History of cardiac arrest 05/22/2024     Resolved Ambulatory Problems     Diagnosis Date Noted    Chest pain 09/11/2010    Left arm numbness 09/11/2010    Essential hypertension 09/11/2010    Elbow contusion 03/24/2014    CAD (coronary artery  times resolved.       Recommendations  - Continue to Recommend pacemaker interrogation (per primary team).   - Limited TTE given complete ECHO this year to complete TIA work up (ordered). If otherwise ready for discharge and not to be completed today can likely be done as an OP through PCP.    - Continue DAPT (ASA/Effient) for secondary stroke prevention.   - Continue high intensity statin. LDL goal <70. Consider additional agents if patient qualifies (defer to primary).   - HbA1c goal <7. Discussed with patient. Consider IP Diabtetes education if patient to be receptive.   - Normotension. Long term goal <130/80 given diabetes.   - Telemetry monitoring while IP. Please notify neurology of any atrial fibrillation/flutter.   - PT, OT, SLP   - Continued compliance encouragement.   - Follow up with neurology in 1 month.     Will follow peripherally for TTE results. Please call back with any further questions or concerns or if pacemaker interrogation is revealing.     Eleonora Munz, CNP  [facility]    A copy of this note was provided for Erika Chester MD

## 2025-01-03 NOTE — CARE COORDINATION
Patient up independently, has a primary care & insurance.  OP physical therapy orders placed by Hospitalist.   No discharge needs to note and patient does not require a Case Management assessment at this time.    Electronically signed by Nalini Lopez RN Case Management on 1/3/2025 at 1:32 PM

## 2025-01-03 NOTE — PLAN OF CARE
Problem: Chronic Conditions and Co-morbidities  Goal: Patient's chronic conditions and co-morbidity symptoms are monitored and maintained or improved  Outcome: Progressing     Problem: Discharge Planning  Goal: Discharge to home or other facility with appropriate resources  Outcome: Progressing  Flowsheets (Taken 1/2/2025 2050)  Discharge to home or other facility with appropriate resources: Identify barriers to discharge with patient and caregiver     Problem: Pain  Goal: Verbalizes/displays adequate comfort level or baseline comfort level  Outcome: Progressing     Problem: Skin/Tissue Integrity  Goal: Absence of new skin breakdown  Description: 1.  Monitor for areas of redness and/or skin breakdown  2.  Assess vascular access sites hourly  3.  Every 4-6 hours minimum:  Change oxygen saturation probe site  4.  Every 4-6 hours:  If on nasal continuous positive airway pressure, respiratory therapy assess nares and determine need for appliance change or resting period.  Outcome: Progressing     Problem: Safety - Adult  Goal: Free from fall injury  Outcome: Progressing     Problem: ABCDS Injury Assessment  Goal: Absence of physical injury  Outcome: Progressing

## 2025-01-03 NOTE — PROGRESS NOTES
IV and telemetry monitor removed. Discharge paperwork completed and discussed with the patient and his wife. All questions answered. Patient has been made aware of follow up appointments that have been made and need to be made and patient verbalized understanding. Patient has no new prescriptions. All belongings have been packed up and sent with the patient. Patient will be driven home by his wife.

## 2025-01-03 NOTE — PROGRESS NOTES
Physical Therapy  Treatment Attempt    25    Name: Juni King   : 1965    MRN: [id number]    Patient seen at bedside this morning. Unable to tolerate therapy at this due to fatigue and L great toe irritation \"from gout.\" Agreeable for attempt later today. Will monitor and check back this date as schedule allows.    If patient discharges prior to next session, please refer to previous PT note for d/c summary.    Electronically signed by Ryne La PT on 1/3/2025 at 10:42 AM

## 2025-01-03 NOTE — DISCHARGE SUMMARY
Hospital Medicine Discharge Summary    Patient ID: Juni King      Patient's PCP: Kiesha Peralta, APRN - CNP    Admit Date: 12/30/2024     Discharge Date:   1/3/25    Admitting Physician: Erika Gregg MD     Discharge Physician: Erika Gregg MD     Discharge Diagnoses:       Active Hospital Problems    Diagnosis     Left leg weakness [R29.898]     Weakness of left lower extremity [R29.898]     Diabetic polyneuropathy associated with type 2 diabetes mellitus (HCC) [E11.42]     Uncontrolled type 2 diabetes mellitus with hyperglycemia (HCC) [E11.65]        The patient was seen and examined on day of discharge and this discharge summary is in conjunction with any daily progress note from day of discharge.    Hospital Course:     Juni King is a 59 y.o. obese male smoker in remission with pmh of diabetes type 2 with complications, CVA, CAD, heart failure, cardiac arrest status post AICD placement, hypertension, dyslipidemia, GERD who presents with left face and arm tingling and left leg weakness which started around 8 PM on the night of admission.  Although he has had the tingling sensation in his left arm is never had it in the left face and has never had weakness of his left lower extremity.  He denies any visual changes or speech impediment.  He denies any lightheadedness, palpitations, chest pain, shortness of breath, cough, fevers, chills, nausea or vomiting, diarrhea or dysuria.     In the emergency room his temperature was 36.6, blood pressure 161/101 with a pulse of 85, respirations 17, sats 99% on room air, BMI 30.51.      Impression  1. Acute left face and LUE sensory changes: Resolved  2. LLE weakness: subtly improving   3. Left eye blurry vision: Resolved  4. Mild headache  5. Uncontrolled DM   6. HTN  7. HLD  8. Medication non compliance.      Juni King is a 58 yo m with multiple, and uncontrolled vascular risk factors who presented with left hemibody symptoms that  TABLET UNDER THE TONGUE EVERY 5 MINUTES AS NEEDED FOR CHEST PAIN.  Qty: 25 tablet, Refills: 3      vitamin D 25 MCG (1000 UT) CAPS Take 1 capsule by mouth Daily      NOVOLOG FLEXPEN 100 UNIT/ML injection pen Inject 22 Units into the skin 3 times daily (before meals)      insulin glargine (LANTUS) 100 UNIT/ML injection vial Inject 37 Units into the skin nightly  Qty: 10 mL, Refills: 3      atorvastatin (LIPITOR) 80 MG tablet TAKE 1 TABLET BY MOUTH EVERY DAY  Qty: 90 tablet, Refills: 3      aspirin 81 MG chewable tablet Take 1 tablet by mouth daily      Dulaglutide 3 MG/0.5ML SOPN Inject 3 mg into the skin once a week Indications: Friday       prasugrel (EFFIENT) 10 MG TABS TAKE 1 TABLET DAILY  Qty: 90 tablet, Refills: 3      Omega-3 Fatty Acids (FISH OIL PO) Take 1 capsule by mouth daily              Time Spent on discharge is more than 45 minutes in the examination, evaluation, counseling and review of medications and discharge plan.      Signed:    Erika Gregg MD   1/3/2025      Thank you Kiesha Peralta, APRN - CNP for the opportunity to be involved in this patient's care. If you have any questions or concerns please feel free to contact me at [phone].

## 2025-03-05 RX ORDER — NITROGLYCERIN 0.4 MG/1
0.4 TABLET SUBLINGUAL EVERY 5 MIN PRN
Qty: 25 TABLET | Refills: 3 | Status: SHIPPED | OUTPATIENT
Start: 2025-03-05

## 2025-04-18 ENCOUNTER — APPOINTMENT (OUTPATIENT)
Age: 60
End: 2025-04-18
Payer: MEDICARE

## 2025-04-18 ENCOUNTER — HOSPITAL ENCOUNTER (EMERGENCY)
Age: 60
Discharge: HOME | End: 2025-04-18
Attending: EMERGENCY MEDICINE
Payer: MEDICARE

## 2025-04-18 VITALS
HEART RATE: 102 BPM | HEIGHT: 74 IN | WEIGHT: 233.5 LBS | SYSTOLIC BLOOD PRESSURE: 156 MMHG | TEMPERATURE: 97.8 F | DIASTOLIC BLOOD PRESSURE: 75 MMHG | RESPIRATION RATE: 16 BRPM | OXYGEN SATURATION: 99 % | BODY MASS INDEX: 29.97 KG/M2
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
## 2025-04-18 DIAGNOSIS — S16.1XXA STRAIN OF NECK MUSCLE, INITIAL ENCOUNTER: ICD-10-CM

## 2025-04-18 DIAGNOSIS — S09.90XA CLOSED HEAD INJURY, INITIAL ENCOUNTER: ICD-10-CM

## 2025-04-18 PROCEDURE — 72125 CT NECK SPINE W/O DYE: CPT

## 2025-04-18 PROCEDURE — 99284 EMERGENCY DEPT VISIT MOD MDM: CPT

## 2025-04-18 PROCEDURE — 6370000000 HC RX 637 (ALT 250 FOR IP): Performed by: EMERGENCY MEDICINE

## 2025-04-18 PROCEDURE — 70450 CT HEAD/BRAIN W/O DYE: CPT

## 2025-04-18 RX ORDER — ACETAMINOPHEN 500 MG
1000 TABLET ORAL
Status: COMPLETED | OUTPATIENT
Start: 2025-04-18 | End: 2025-04-18

## 2025-04-18 RX ORDER — NAPROXEN 500 MG/1
500 TABLET ORAL 2 TIMES DAILY WITH MEALS
Qty: 14 TABLET | Refills: 0 | Status: SHIPPED | OUTPATIENT
Start: 2025-04-18 | End: 2025-04-25

## 2025-04-18 RX ORDER — METHOCARBAMOL 500 MG/1
500 TABLET, FILM COATED ORAL 4 TIMES DAILY
Qty: 40 TABLET | Refills: 0 | Status: SHIPPED | OUTPATIENT
Start: 2025-04-18 | End: 2025-04-28

## 2025-04-18 RX ADMIN — ACETAMINOPHEN 1000 MG: 500 TABLET ORAL at 21:01

## 2025-04-18 ASSESSMENT — PAIN SCALES - GENERAL: PAINLEVEL_OUTOF10: 4

## 2025-04-18 ASSESSMENT — PAIN - FUNCTIONAL ASSESSMENT: PAIN_FUNCTIONAL_ASSESSMENT: 0-10

## 2025-04-18 NOTE — ED TRIAGE NOTES
Pt presents to the ED with c/o fall at [facility]. Pt states he was using his walker and ran over a crack and fell backward and hit his head. +LOC pt is on effient.

## 2025-04-19 NOTE — ED PROVIDER NOTES
EMERGENCY MEDICINE ATTENDING NOTE  Issac Arellano Jr., DO, FACEP, FAAEM        CHIEF COMPLAINT  Chief Complaint   Patient presents with    Fall        HISTORY OF PRESENT ILLNESS  Juni King is a 59 y.o. male who presents to the ED for evaluation of a fall.  Patient fell when his walker got caught in a crack at [facility].  Fell backward and hit his head.  Did not lose consciousness for few seconds.  Is also having some pain to the neck since it occurred.  Denies numbness or tingling or weakness.  Does have diffuse headache mostly in the back.  He is on blood thinners and wants make sure everything else is okay.  Denies any other injuries or complaints at this time.    Nursing/triage notes reviewed.  No other complaints, modifying factors or associated symptoms.     REVIEW OF SYSTEMS:  All systems are reviewed and are negative unless noted in the HPI.    PAST MEDICAL HISTORY  Past Medical History:   Diagnosis Date    Abscess of arm, left 07/01/2018    Acute metabolic encephalopathy 09/18/2017    Acute respiratory failure with hypoxia ([facility])     Arthritis     CAD (coronary artery disease)     Cardiac arrest ([facility]) 10/05/2018    Cerebral artery occlusion with cerebral infarction ([facility])     Cholecystitis 04/14/2021    COVID-19 virus infection 07/01/2020    Diabetic peripheral neuropathy ([facility]) 06/08/2018    Elbow contusion 03/24/2014    GERD (gastroesophageal reflux disease)     ulcers    High anion gap metabolic acidosis 07/01/2020    Hyperlipidemia     Hypertension     ICD (implantable cardioverter-defibrillator) in place 2018    Left arm numbness 09/11/2010    MRSA (methicillin resistant staph aureus) culture positive 07/13/2018    foot wound    Multifocal pneumonia     Neuropathy     NSTEMI (non-ST elevated myocardial infarction) ([facility]) 10/03/2018    Pneumonia due to COVID-19 virus     POTS (postural orthostatic tachycardia syndrome)     Psychiatric problem     anxiety, depression, bipolar    Sepsis ([facility])

## 2025-06-12 ENCOUNTER — OFFICE VISIT (OUTPATIENT)
Dept: CARDIOLOGY CLINIC | Age: 60
End: 2025-06-12
Payer: MEDICARE

## 2025-06-12 ENCOUNTER — CLINICAL SUPPORT (OUTPATIENT)
Dept: CARDIOLOGY CLINIC | Age: 60
End: 2025-06-12
Payer: MEDICARE

## 2025-06-12 VITALS
DIASTOLIC BLOOD PRESSURE: 62 MMHG | HEART RATE: 85 BPM | SYSTOLIC BLOOD PRESSURE: 118 MMHG | BODY MASS INDEX: 27.73 KG/M2 | OXYGEN SATURATION: 97 % | WEIGHT: 216 LBS

## 2025-06-12 DIAGNOSIS — I47.20 VT (VENTRICULAR TACHYCARDIA) (HCC): ICD-10-CM

## 2025-06-12 DIAGNOSIS — I25.5 ISCHEMIC CARDIOMYOPATHY: ICD-10-CM

## 2025-06-12 DIAGNOSIS — E78.2 MIXED HYPERLIPIDEMIA: ICD-10-CM

## 2025-06-12 DIAGNOSIS — Z95.810 ICD (IMPLANTABLE CARDIOVERTER-DEFIBRILLATOR) IN PLACE: Primary | ICD-10-CM

## 2025-06-12 DIAGNOSIS — I42.9 CARDIOMYOPATHY, UNSPECIFIED TYPE (HCC): ICD-10-CM

## 2025-06-12 DIAGNOSIS — I10 ESSENTIAL HYPERTENSION: ICD-10-CM

## 2025-06-12 DIAGNOSIS — Z98.61 CAD S/P PERCUTANEOUS CORONARY ANGIOPLASTY: Primary | ICD-10-CM

## 2025-06-12 DIAGNOSIS — I25.10 CAD S/P PERCUTANEOUS CORONARY ANGIOPLASTY: Primary | ICD-10-CM

## 2025-06-12 PROCEDURE — 99214 OFFICE O/P EST MOD 30 MIN: CPT | Performed by: INTERNAL MEDICINE

## 2025-06-12 PROCEDURE — 93289 INTERROG DEVICE EVAL HEART: CPT | Performed by: INTERNAL MEDICINE

## 2025-06-12 PROCEDURE — 3074F SYST BP LT 130 MM HG: CPT | Performed by: INTERNAL MEDICINE

## 2025-06-12 PROCEDURE — 3078F DIAST BP <80 MM HG: CPT | Performed by: INTERNAL MEDICINE

## 2025-06-12 NOTE — PROGRESS NOTES
secondary diabetes (HCC), and Wears glasses.    Surgical History:   has a past surgical history that includes Vasectomy; Finger surgery (Left); hernia repair; vascular surgery; Colonoscopy; Coronary angioplasty with stent (10/06/2018); Coronary angioplasty with stent (10/07/2018); Coronary angioplasty with stent (10/03/2018); Diagnostic Cardiac Cath Lab Procedure; Cardiac catheterization; and Cholecystectomy, laparoscopic (N/A, 4/21/2021).     Social History:   reports that he quit smoking about 45 years ago. His smoking use included cigarettes and cigars. He started smoking about 57 years ago. He has a 24 pack-year smoking history. He has never used smokeless tobacco. He reports current alcohol use. He reports that he does not currently use drugs after having used the following drugs: Marijuana (Weed).     Family History:  family history includes COPD in his mother; Cancer in his father and sister; Diabetes in his brother and sister; Heart Disease in his brother, father, and mother; High Blood Pressure in his mother; Stroke in his mother.    Home Medications:  Were reviewed and are listed in nursing record and/or below  Prior to Admission medications    Medication Sig Start Date End Date Taking? Authorizing Provider   naproxen (NAPROSYN) 500 MG tablet Take 1 tablet by mouth 2 times daily (with meals) for 7 days 4/18/25 4/25/25  Issac Arellano DO   nitroGLYCERIN (NITROSTAT) 0.4 MG SL tablet Place 1 tablet under the tongue every 5 minutes as needed for Chest pain 3/5/25   Matt Espino MD   prasugrel (EFFIENT) 10 MG TABS TAKE 1 TABLET DAILY 9/4/24   Matt Espino MD   vitamin D 25 MCG (1000 UT) CAPS Take 1 capsule by mouth Daily    Anselmo Young MD   NOVOLOG FLEXPEN 100 UNIT/ML injection pen Inject 22 Units into the skin 3 times daily (before meals) 6/19/23   Anselmo Young MD   insulin glargine (LANTUS) 100 UNIT/ML injection vial Inject 37 Units into the skin nightly 9/5/22   Milagro Toussaint MD 
nodes.  Allergic/Immunologic: No nasal congestion or hives.    Objective:     PHYSICAL EXAM:      Vitals:    06/12/25 1146   BP: 118/62   BP Site: Left Upper Arm   Patient Position: Sitting   BP Cuff Size: Medium Adult   Pulse: 85   SpO2: 97%   Weight: 98 kg (216 lb)                Weight - Scale: 98 kg (216 lb)       General Appearance:  Alert, cooperative, no distress, appears stated age.   Head:  Normocephalic, without obvious abnormality, atraumatic.   Eyes:  Pupils equal and round. No scleral icterus.   Mouth: Moist mucosa, no pharyngeal erythema.   Nose: Nares normal. No drainage or sinus tenderness.   Neck: Supple, symmetrical, trachea midline. No adenopathy. No tenderness/mass/nodules. No carotid bruit or elevated JVD.   Lungs:   Respiratory Effort: Normal   Auscultation: Clear to auscultation bilaterally, respirations unlabored. No wheeze, rales   Chest Wall:  No tenderness or deformity.   Cardiovascular:    Pulses  Palpation: normal   Ascultation: Regular rate, S1/ S2 normal. No murmur, rub, or gallop.  2+ radial and pedal pulses, symmetric  Carotid  Femoral   Abdomen and Gastrointestinal:   Soft, non-tender, bowel sounds active.  Liver and Spleen  Masses   Musculoskeletal: No muscle wasting  Back  Gait   Extremities: Extremities normal, atraumatic. No cyanosis or edema. No cyanosis clubbing       Skin: Inspection and palpation performed, no rashes or lesions.   Pysch: Normal mood and affect. Alert and oriented to time place person   Neurologic: Normal gross motor and sensory exam.       Labs     All available labs reviewed to date:      Lab Results   Component Value Date/Time    WBC 8.7 12/31/2024 01:48 AM    RBC 5.11 12/31/2024 01:48 AM    HGB 15.0 12/31/2024 01:48 AM    HCT 42.7 12/31/2024 01:48 AM    MCV 83.5 12/31/2024 01:48 AM    RDW 13.0 12/31/2024 01:48 AM     12/31/2024 01:48 AM     Lab Results   Component Value Date/Time     12/31/2024 01:48 AM    K 3.9 12/31/2024 04:53 AM    K 4.6

## 2025-07-11 ENCOUNTER — HOSPITAL ENCOUNTER (EMERGENCY)
Age: 60
Discharge: HOME | End: 2025-07-11
Payer: MEDICARE

## 2025-07-11 VITALS
RESPIRATION RATE: 18 BRPM | SYSTOLIC BLOOD PRESSURE: 112 MMHG | BODY MASS INDEX: 27.61 KG/M2 | WEIGHT: 215.17 LBS | HEART RATE: 98 BPM | DIASTOLIC BLOOD PRESSURE: 78 MMHG | HEIGHT: 74 IN | TEMPERATURE: 98.4 F | OXYGEN SATURATION: 100 %

## 2025-07-11 DIAGNOSIS — E11.628 TYPE 2 DIABETES MELLITUS WITH OTHER SKIN COMPLICATION, WITH LONG-TERM CURRENT USE OF INSULIN (HCC): ICD-10-CM

## 2025-07-11 DIAGNOSIS — Z79.4 TYPE 2 DIABETES MELLITUS WITH OTHER SKIN COMPLICATION, WITH LONG-TERM CURRENT USE OF INSULIN (HCC): ICD-10-CM

## 2025-07-11 DIAGNOSIS — L03.032 PARONYCHIA OF GREAT TOE OF LEFT FOOT: Primary | ICD-10-CM

## 2025-07-11 PROCEDURE — 6370000000 HC RX 637 (ALT 250 FOR IP): Performed by: PHYSICIAN ASSISTANT

## 2025-07-11 PROCEDURE — 99283 EMERGENCY DEPT VISIT LOW MDM: CPT

## 2025-07-11 RX ADMIN — AMOXICILLIN AND CLAVULANATE POTASSIUM 1 TABLET: 875; 125 TABLET, FILM COATED ORAL at 18:42

## 2025-07-11 NOTE — ED NOTES
D/C: Order noted for d/c. Pt confirmed d/c paperwork has correct name. Discharge and education instructions reviewed with patient. Teach-back successful.  Pt verbalized understanding and denied questions at this time. No acute distress noted. Patient instructed to follow-up as noted - return to emergency department if symptoms worsen. Patient verbalized understanding. Discharged per EDMD with discharge instructions. Pt discharged to private vehicle. Patient stable upon departure. Thanked patient for [facility] for care. Provider aware of patient pain at time of discharge.

## 2025-07-11 NOTE — ED PROVIDER NOTES
[facility] EMERGENCY DEPARTMENT  EMERGENCY DEPARTMENT ENCOUNTER        Pt Name: Juni King  MRN: [id number]  Birthdate 1965  Date of evaluation: 7/11/2025  Provider: Gordon Hernandez PA-C  PCP: Kiesha Peralta APRN - CNP  Note Started: 6:29 PM EDT 7/11/25      SHANNEN. I have evaluated this patient.        CHIEF COMPLAINT       Chief Complaint   Patient presents with    Toe Pain     Pt arrives with c/o lt foot great toe pain/poss infection from toenail trim. Pt is diabetic, states x 2-3 days redness and swelling.  6/10 pain.       HISTORY OF PRESENT ILLNESS: 1 or more Elements     History From: Patient    Juni King is a 60 y.o. male who presents to the Emergency Department with complaint of possible infection emerging left great toe.  He is diabetic.  He has neuropathy related to diabetes.  His shoe is off I walk into the room.  He has what appears to be paronychia medial aspect of the left great toe.  Not on antibiotics.  He does follow with podiatrist.    Nursing Notes were all reviewed and agreed with or any disagreements were addressed in the HPI.    REVIEW OF SYSTEMS :      Review of Systems    Positives and Pertinent negatives as per HPI.     SURGICAL HISTORY     Past Surgical History:   Procedure Laterality Date    CARDIAC CATHETERIZATION      CHOLECYSTECTOMY, LAPAROSCOPIC N/A 4/21/2021    LAPAROSCOPIC CHOLECYSTECTOMY WITH CHOLANGIOGRAM performed by Timoteo Benavidez MD at [facility] OR    COLONOSCOPY      CORONARY ANGIOPLASTY WITH STENT PLACEMENT  10/06/2018    Bare Metal Stent to PDA    CORONARY ANGIOPLASTY WITH STENT PLACEMENT  10/07/2018    Bare Metal Stent to OM    CORONARY ANGIOPLASTY WITH STENT PLACEMENT  10/03/2018    CECE to Circ    DIAGNOSTIC CARDIAC CATH LAB PROCEDURE      FINGER SURGERY Left     Left Thumb    HERNIA REPAIR      VASCULAR SURGERY      VASECTOMY         CURRENTMEDICATIONS       Discharge Medication List as of 7/11/2025  6:39 PM        CONTINUE these

## 2025-07-11 NOTE — DISCHARGE INSTRUCTIONS
This appears to be a paronychia or ingrown toenail with associated infection.  Complicated because you are diabetic.  Augmentin sent to your pharmacy.  First dose given here in the emergency room.  For pain control I recommend ibuprofen or Tylenol.  I do recommend warm soapy soaks with some salt and chlorhexidine as given to her in the ER.  Contact your podiatrist on Monday for an appointment on Monday afternoon or Tuesday.

## (undated) DEVICE — TISSUE RETRIEVAL SYSTEM: Brand: INZII RETRIEVAL SYSTEM

## (undated) DEVICE — LOOP LIG SUT SZ 0 L18IN ABSRB POLYDIOXANONE MFIL PDS II

## (undated) DEVICE — SOLUTION IV IRRIG POUR BRL 0.9% SODIUM CHL 2F7124

## (undated) DEVICE — [HIGH FLOW INSUFFLATOR,  DO NOT USE IF PACKAGE IS DAMAGED,  KEEP DRY,  KEEP AWAY FROM SUNLIGHT,  PROTECT FROM HEAT AND RADIOACTIVE SOURCES.]: Brand: PNEUMOSURE

## (undated) DEVICE — APPLIER CLP M/L SHFT DIA5MM 15 LIG LIGAMAX 5

## (undated) DEVICE — TROCAR: Brand: KII SLEEVE

## (undated) DEVICE — SYRINGE MED 30ML STD CLR PLAS LUERLOCK TIP N CTRL DISP

## (undated) DEVICE — MERCY HEALTH WEST TURNOVER: Brand: MEDLINE INDUSTRIES, INC.

## (undated) DEVICE — 3M™ TEGADERM™ TRANSPARENT FILM DRESSING FRAME STYLE, 1624W, 2-3/8 IN X 2-3/4 IN (6 CM X 7 CM), 100/CT 4CT/CASE: Brand: 3M™ TEGADERM™

## (undated) DEVICE — SOLUTION IV 1000ML 0.9% SOD CHL FOR IRRIG PLAS CONT

## (undated) DEVICE — DRAPE,LAP,CHOLE,W/TROUGHS,STERILE: Brand: MEDLINE

## (undated) DEVICE — TROCAR: Brand: KII FIOS FIRST ENTRY

## (undated) DEVICE — NEEDLE HYPO 25GA L1.5IN BVL ORIENTED ECLIPSE

## (undated) DEVICE — APPLICATOR MEDICATED 26 CC SOLUTION HI LT ORNG CHLORAPREP

## (undated) DEVICE — AGENT HEMSTAT W2XL14IN OXIDIZED REGENERATED CELOS ABSRB FOR

## (undated) DEVICE — CANISTER, RIGID, 1200CC: Brand: MEDLINE INDUSTRIES, INC.

## (undated) DEVICE — GAUZE,SPONGE,2"X2",8PLY,STERILE,LF,2'S: Brand: MEDLINE

## (undated) DEVICE — INDICATED FOR USE DURING OPEN AND LAPAROSCOPIC CHOLECYSTECTOMY PROCEDURES TO INJECT RADIOPAQUE MEDIA THROUGH THE CYSTIC DUCT INTO THE BILIARY TREE.: Brand: AEROSTAT®

## (undated) DEVICE — Device

## (undated) DEVICE — SUTURE SZ 0 27IN 5/8 CIR UR-6  TAPER PT VIOLET ABSRB VICRYL J603H

## (undated) DEVICE — SUTURE VCRL SZ 4-0 L18IN ABSRB UD L19MM PS-2 3/8 CIR PRIM J496H

## (undated) DEVICE — SYRINGE 20ML LL S/C 50

## (undated) DEVICE — DRAPE C ARM UNIV W41XL74IN CLR PLAS XR VELC CLSR POLY STRP

## (undated) DEVICE — ELECTRODE PT RET AD L9FT HI MOIST COND ADH HYDRGEL CORDED

## (undated) DEVICE — STRIP,CLOSURE,WOUND,MEDI-STRIP,1/2X4: Brand: MEDLINE

## (undated) DEVICE — LAPAROSCOPY PK

## (undated) DEVICE — GLOVE ORANGE PI 7   MSG9070

## (undated) DEVICE — TROCARS: Brand: KII® BALLOON BLUNT TIP SYSTEM

## (undated) DEVICE — GARMENT COMPR STD FOR 17IN CALF UNIF THER FLOTRN

## (undated) DEVICE — ADTEC SINGLE USE HOOK SCISSORS, SHAFT ONLY, MONOPOLAR, STRAIGHT, WORKING LENGTH: 12 1/4", (310 MM), DIAM. 5 MM, BLUNT/BLUNT, INSULATED, SINGLE ACTION, STERILE, DISPOSABLE, PACKAGE OF 10 PIECES: Brand: AESCULAP

## (undated) DEVICE — COVER LT HNDL BLU PLAS

## (undated) DEVICE — 3M™ STERI-STRIP™ COMPOUND BENZOIN TINCTURE 40 BAGS/CARTON 4 CARTONS/CASE C1544: Brand: 3M™ STERI-STRIP™